# Patient Record
Sex: MALE | Race: WHITE | NOT HISPANIC OR LATINO | Employment: OTHER | URBAN - METROPOLITAN AREA
[De-identification: names, ages, dates, MRNs, and addresses within clinical notes are randomized per-mention and may not be internally consistent; named-entity substitution may affect disease eponyms.]

---

## 2017-01-25 ENCOUNTER — HOSPITAL ENCOUNTER (OUTPATIENT)
Dept: ORTHOPEDIC SURGERY | Facility: CLINIC | Age: 77
Discharge: HOME | End: 2017-01-25
Attending: ORTHOPAEDIC SURGERY | Admitting: ORTHOPAEDIC SURGERY

## 2017-01-25 ENCOUNTER — HOSPITAL ENCOUNTER (OUTPATIENT)
Dept: CT IMAGING | Facility: HOSPITAL | Age: 77
Discharge: HOME | End: 2017-01-25
Attending: ORTHOPAEDIC SURGERY | Admitting: ORTHOPAEDIC SURGERY

## 2017-02-07 ENCOUNTER — HOSPITAL ENCOUNTER (OUTPATIENT)
Dept: MRI IMAGING | Facility: HOSPITAL | Age: 77
Discharge: HOME | End: 2017-02-07
Attending: ORTHOPAEDIC SURGERY | Admitting: ORTHOPAEDIC SURGERY

## 2017-02-27 ENCOUNTER — HOSPITAL ENCOUNTER (OUTPATIENT)
Dept: PAIN MEDICINE | Facility: HOSPITAL | Age: 77
Discharge: HOME | End: 2017-02-27
Attending: PAIN MEDICINE | Admitting: PAIN MEDICINE

## 2017-03-13 ENCOUNTER — HOSPITAL ENCOUNTER (OUTPATIENT)
Dept: PAIN MEDICINE | Facility: HOSPITAL | Age: 77
Discharge: HOME | End: 2017-03-13
Attending: PAIN MEDICINE | Admitting: PAIN MEDICINE

## 2017-04-21 ENCOUNTER — HOSPITAL ENCOUNTER (OUTPATIENT)
Dept: CT IMAGING | Facility: HOSPITAL | Age: 77
Discharge: HOME | End: 2017-04-21
Attending: PHYSICIAN ASSISTANT | Admitting: PHYSICIAN ASSISTANT

## 2017-04-26 ENCOUNTER — HOSPITAL ENCOUNTER (OUTPATIENT)
Dept: PHYSICAL THERAPY | Facility: HOSPITAL | Age: 77
Setting detail: RECURRING SERIES
Discharge: HOME | End: 2017-07-03
Attending: PHYSICIAN ASSISTANT | Admitting: PHYSICIAN ASSISTANT

## 2017-06-13 ENCOUNTER — OFFICE (AMBULATORY)
Dept: URBAN - METROPOLITAN AREA CLINIC 64 | Facility: CLINIC | Age: 77
End: 2017-06-13

## 2017-06-13 VITALS
SYSTOLIC BLOOD PRESSURE: 109 MMHG | HEART RATE: 64 BPM | WEIGHT: 160 LBS | HEIGHT: 73 IN | DIASTOLIC BLOOD PRESSURE: 55 MMHG

## 2017-06-13 DIAGNOSIS — K59.00 CONSTIPATION, UNSPECIFIED: ICD-10-CM

## 2017-06-13 PROCEDURE — 99213 OFFICE O/P EST LOW 20 MIN: CPT | Performed by: INTERNAL MEDICINE

## 2017-11-07 ENCOUNTER — HOSPITAL ENCOUNTER (OUTPATIENT)
Dept: ORTHOPEDIC SURGERY | Facility: CLINIC | Age: 77
Discharge: HOME | End: 2017-11-07
Attending: PHYSICIAN ASSISTANT | Admitting: PHYSICIAN ASSISTANT

## 2017-12-04 ENCOUNTER — HOSPITAL ENCOUNTER (OUTPATIENT)
Dept: ORTHOPEDIC SURGERY | Facility: CLINIC | Age: 77
Discharge: HOME | End: 2017-12-04
Attending: ORTHOPAEDIC SURGERY | Admitting: ORTHOPAEDIC SURGERY

## 2017-12-13 ENCOUNTER — OFFICE (AMBULATORY)
Dept: URBAN - METROPOLITAN AREA CLINIC 64 | Facility: CLINIC | Age: 77
End: 2017-12-13

## 2017-12-13 VITALS
DIASTOLIC BLOOD PRESSURE: 71 MMHG | WEIGHT: 172 LBS | HEART RATE: 82 BPM | SYSTOLIC BLOOD PRESSURE: 106 MMHG | HEIGHT: 73 IN

## 2017-12-13 DIAGNOSIS — K59.00 CONSTIPATION, UNSPECIFIED: ICD-10-CM

## 2017-12-13 PROCEDURE — 99212 OFFICE O/P EST SF 10 MIN: CPT | Performed by: INTERNAL MEDICINE

## 2018-05-09 ENCOUNTER — HOSPITAL ENCOUNTER (OUTPATIENT)
Dept: ORTHOPEDIC SURGERY | Facility: CLINIC | Age: 78
Discharge: HOME | End: 2018-05-09
Attending: PHYSICIAN ASSISTANT | Admitting: PHYSICIAN ASSISTANT

## 2019-08-19 ENCOUNTER — OFFICE VISIT (OUTPATIENT)
Dept: ORTHOPEDIC SURGERY | Facility: CLINIC | Age: 79
End: 2019-08-19

## 2019-08-19 VITALS
DIASTOLIC BLOOD PRESSURE: 74 MMHG | SYSTOLIC BLOOD PRESSURE: 109 MMHG | BODY MASS INDEX: 22.48 KG/M2 | HEART RATE: 105 BPM | HEIGHT: 72 IN | WEIGHT: 166 LBS

## 2019-08-19 DIAGNOSIS — M25.511 RIGHT SHOULDER PAIN, UNSPECIFIED CHRONICITY: ICD-10-CM

## 2019-08-19 DIAGNOSIS — M25.512 ACUTE PAIN OF LEFT SHOULDER: Primary | ICD-10-CM

## 2019-08-19 PROCEDURE — 99213 OFFICE O/P EST LOW 20 MIN: CPT | Performed by: ORTHOPAEDIC SURGERY

## 2019-08-19 PROCEDURE — 20610 DRAIN/INJ JOINT/BURSA W/O US: CPT | Performed by: ORTHOPAEDIC SURGERY

## 2019-08-19 RX ORDER — GABAPENTIN 800 MG/1
TABLET ORAL EVERY 12 HOURS
COMMUNITY
Start: 2016-01-05 | End: 2020-04-15

## 2019-08-19 RX ORDER — SIMVASTATIN 40 MG
40 TABLET ORAL NIGHTLY
COMMUNITY
End: 2020-07-02

## 2019-08-19 RX ORDER — DICLOFENAC SODIUM 75 MG/1
75 TABLET, DELAYED RELEASE ORAL DAILY
COMMUNITY
Start: 2017-11-07 | End: 2020-04-15

## 2019-08-19 RX ORDER — TRIAMCINOLONE ACETONIDE 40 MG/ML
80 INJECTION, SUSPENSION INTRA-ARTICULAR; INTRAMUSCULAR ONCE
Status: COMPLETED | OUTPATIENT
Start: 2019-08-19 | End: 2019-08-19

## 2019-08-19 RX ORDER — MULTIVITAMIN
1 TABLET ORAL DAILY
COMMUNITY

## 2019-08-19 RX ORDER — BACLOFEN 20 MG/1
TABLET ORAL
Refills: 3 | COMMUNITY
Start: 2019-05-24 | End: 2020-04-15

## 2019-08-19 RX ORDER — TOPIRAMATE 100 MG/1
TABLET, FILM COATED ORAL
COMMUNITY
Start: 2016-01-05 | End: 2020-04-15

## 2019-08-19 RX ORDER — LANOLIN ALCOHOL/MO/W.PET/CERES
1000 CREAM (GRAM) TOPICAL DAILY
COMMUNITY
Start: 2018-11-15

## 2019-08-19 RX ORDER — FLUDROCORTISONE ACETATE 0.1 MG/1
0.2 TABLET ORAL EVERY MORNING
COMMUNITY
End: 2020-08-20

## 2019-08-19 RX ORDER — POTASSIUM CHLORIDE 20 MEQ/1
20 TABLET, EXTENDED RELEASE ORAL DAILY
COMMUNITY
End: 2020-04-15

## 2019-08-19 RX ADMIN — TRIAMCINOLONE ACETONIDE 80 MG: 40 INJECTION, SUSPENSION INTRA-ARTICULAR; INTRAMUSCULAR at 14:43

## 2019-08-19 NOTE — PROGRESS NOTES
"Note that injection was done in each shoulder for bilateral procedure     Patient ID: Shukri Park is a 78 y.o. male.  Bilateral shoulder pain  Right side injection in November starting to wear off, left shoulder is causing pain in the impingement area    Review of Systems:  Bilateral shoulder pain  Denies chest pain      Objective:    /74   Pulse 105   Ht 182.9 cm (72\")   Wt 75.3 kg (166 lb)   BMI 22.51 kg/m²     Physical Examination:     right shoulder demonstrates no scars with slight supraspinatus atrophy and pain in the impingement area.  Passive elevation is 150°, abduction 130°, external rotation 50°, internal rotation is the right hip. He has pain and weakness on [place], [place],   and supraspinatus testing.  Belly press and lift-off are 5/5 and 4/5.Sensory and motor exam are intact all distributions.  Radial pulse is palpable and capillary refill is less than two seconds to all digits    Imaging:   Well-maintained joint spaces    Assessment:    Bilateral shoulder pain    Plan:  Treatment options discussed  I recommend injection after todays evaluation.  Risks and benefits were discussed. Under sterile technique and written consent I injected 40mg of Kenalog and 1cc of 1% Lidocaine plain into the subacromial space. It was well tolerated.  "

## 2019-08-28 ENCOUNTER — TELEPHONE (OUTPATIENT)
Dept: ORTHOPEDIC SURGERY | Facility: CLINIC | Age: 79
End: 2019-08-28

## 2019-11-20 ENCOUNTER — HOSPITAL ENCOUNTER (EMERGENCY)
Facility: HOSPITAL | Age: 79
Discharge: HOME | End: 2019-11-20
Attending: EMERGENCY MEDICINE | Admitting: EMERGENCY MEDICINE

## 2019-11-20 ENCOUNTER — APPOINTMENT (OUTPATIENT)
Dept: CT IMAGING | Facility: HOSPITAL | Age: 79
End: 2019-11-20

## 2019-11-20 VITALS
RESPIRATION RATE: 17 BRPM | SYSTOLIC BLOOD PRESSURE: 117 MMHG | HEART RATE: 90 BPM | OXYGEN SATURATION: 99 % | HEIGHT: 73 IN | TEMPERATURE: 98.6 F | DIASTOLIC BLOOD PRESSURE: 65 MMHG | WEIGHT: 158.29 LBS | BODY MASS INDEX: 20.98 KG/M2
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
## 2019-11-20 DIAGNOSIS — S01.01XA LACERATION OF SCALP, INITIAL ENCOUNTER: Primary | ICD-10-CM

## 2019-11-20 PROCEDURE — 99283 EMERGENCY DEPT VISIT LOW MDM: CPT

## 2019-11-20 PROCEDURE — 70450 CT HEAD/BRAIN W/O DYE: CPT

## 2019-11-20 PROCEDURE — 72125 CT NECK SPINE W/O DYE: CPT

## 2020-01-16 ENCOUNTER — OFFICE VISIT (OUTPATIENT)
Dept: ORTHOPEDIC SURGERY | Facility: CLINIC | Age: 80
End: 2020-01-16

## 2020-01-16 VITALS
WEIGHT: 158 LBS | HEIGHT: 73 IN | BODY MASS INDEX: 20.94 KG/M2 | SYSTOLIC BLOOD PRESSURE: 92 MMHG | DIASTOLIC BLOOD PRESSURE: 59 MMHG | HEART RATE: 79 BPM

## 2020-01-16 DIAGNOSIS — M25.511 RIGHT SHOULDER PAIN, UNSPECIFIED CHRONICITY: Primary | ICD-10-CM

## 2020-01-16 DIAGNOSIS — M25.512 ACUTE PAIN OF LEFT SHOULDER: ICD-10-CM

## 2020-01-16 PROCEDURE — 99213 OFFICE O/P EST LOW 20 MIN: CPT | Performed by: ORTHOPAEDIC SURGERY

## 2020-01-16 PROCEDURE — 20610 DRAIN/INJ JOINT/BURSA W/O US: CPT | Performed by: ORTHOPAEDIC SURGERY

## 2020-01-16 RX ORDER — TRIAMCINOLONE ACETONIDE 40 MG/ML
80 INJECTION, SUSPENSION INTRA-ARTICULAR; INTRAMUSCULAR ONCE
Status: COMPLETED | OUTPATIENT
Start: 2020-01-16 | End: 2020-01-16

## 2020-01-16 RX ADMIN — TRIAMCINOLONE ACETONIDE 80 MG: 40 INJECTION, SUSPENSION INTRA-ARTICULAR; INTRAMUSCULAR at 14:54

## 2020-04-15 ENCOUNTER — OFFICE VISIT (OUTPATIENT)
Dept: ORTHOPEDIC SURGERY | Facility: CLINIC | Age: 80
End: 2020-04-15

## 2020-04-15 VITALS
HEIGHT: 73 IN | HEART RATE: 97 BPM | BODY MASS INDEX: 21.87 KG/M2 | TEMPERATURE: 97.5 F | SYSTOLIC BLOOD PRESSURE: 117 MMHG | DIASTOLIC BLOOD PRESSURE: 83 MMHG | WEIGHT: 165 LBS

## 2020-04-15 DIAGNOSIS — M25.511 RIGHT SHOULDER PAIN, UNSPECIFIED CHRONICITY: Primary | ICD-10-CM

## 2020-04-15 PROCEDURE — 20610 DRAIN/INJ JOINT/BURSA W/O US: CPT | Performed by: ORTHOPAEDIC SURGERY

## 2020-04-15 PROCEDURE — 99213 OFFICE O/P EST LOW 20 MIN: CPT | Performed by: ORTHOPAEDIC SURGERY

## 2020-04-15 RX ORDER — HYDROCODONE BITARTRATE AND ACETAMINOPHEN 7.5; 325 MG/1; MG/1
1 TABLET ORAL EVERY 6 HOURS PRN
COMMUNITY
Start: 2020-04-14 | End: 2021-01-07 | Stop reason: HOSPADM

## 2020-04-15 RX ORDER — POTASSIUM CHLORIDE 1500 MG/1
20 TABLET, FILM COATED, EXTENDED RELEASE ORAL 2 TIMES DAILY WITH MEALS
COMMUNITY
Start: 2020-03-14 | End: 2020-07-15 | Stop reason: HOSPADM

## 2020-04-15 RX ORDER — TRIAMCINOLONE ACETONIDE 40 MG/ML
80 INJECTION, SUSPENSION INTRA-ARTICULAR; INTRAMUSCULAR ONCE
Status: COMPLETED | OUTPATIENT
Start: 2020-04-15 | End: 2020-04-15

## 2020-04-15 RX ADMIN — TRIAMCINOLONE ACETONIDE 80 MG: 40 INJECTION, SUSPENSION INTRA-ARTICULAR; INTRAMUSCULAR at 14:10

## 2020-04-15 NOTE — PROGRESS NOTES
"     Patient ID: Shukri Park is a 79 y.o. male.  Right shoulder pain  Shukri is an 79-year-old gentleman here with right shoulder pain.  He has had previous injections most recently in January with good results.  He has pain and grinding in the shoulder worse with heavy activity    Review of Systems:  Right shoulder pain denies chest pain        Objective:    /83   Pulse 97   Temp 97.5 °F (36.4 °C) (Oral)   Ht 185.4 cm (73\")   Wt 74.8 kg (165 lb)   BMI 21.77 kg/m²     Physical Examination:       He is a pleasant male in no distress. He is alert and oriented x3 and appears his stated age.  Right shoulder demonstrates supraspinatus atrophy with pain in the impingement area.  Passive elevation 160 degrees abduction 120 degrees external rotation 30 degrees with pain and weakness on cuff testing.Sensory and motor exam are intact all distributions. Radial pulse is palpable and capillary refill is less than two seconds to all digits  Imaging:       Assessment:    Right shoulder degenerative joint disease    Plan:  Treatment options discussed  I recommend injection after todays evaluation.  Risks and benefits were discussed. Under sterile technique and written consent I injected 40mg of Kenalog and 1cc of 1% Lidocaine plain into the subacromial space. It was well tolerated.          Disclaimer: Please note that areas of this note were completed with computer voice recognition software.  Quite often unanticipated grammatical, syntax, homophones, and other interpretive errors are inadvertently transcribed by the computer software. Please excuse any errors that have escaped final proofreading.  "

## 2020-07-01 ENCOUNTER — APPOINTMENT (OUTPATIENT)
Dept: GENERAL RADIOLOGY | Facility: HOSPITAL | Age: 80
End: 2020-07-01

## 2020-07-01 ENCOUNTER — HOSPITAL ENCOUNTER (INPATIENT)
Facility: HOSPITAL | Age: 80
LOS: 13 days | Discharge: NURSING, REHAB OR LONG-TERM CARE | End: 2020-07-15
Attending: EMERGENCY MEDICINE | Admitting: INTERNAL MEDICINE

## 2020-07-01 DIAGNOSIS — I25.10 CORONARY ARTERY DISEASE INVOLVING NATIVE CORONARY ARTERY OF NATIVE HEART WITHOUT ANGINA PECTORIS: ICD-10-CM

## 2020-07-01 DIAGNOSIS — J18.9 PNEUMONIA OF BOTH LUNGS DUE TO INFECTIOUS ORGANISM, UNSPECIFIED PART OF LUNG: ICD-10-CM

## 2020-07-01 DIAGNOSIS — I48.21 PERMANENT ATRIAL FIBRILLATION (HCC): ICD-10-CM

## 2020-07-01 DIAGNOSIS — I48.11 LONGSTANDING PERSISTENT ATRIAL FIBRILLATION (HCC): ICD-10-CM

## 2020-07-01 DIAGNOSIS — I50.9 CONGESTIVE HEART FAILURE, UNSPECIFIED HF CHRONICITY, UNSPECIFIED HEART FAILURE TYPE (HCC): ICD-10-CM

## 2020-07-01 DIAGNOSIS — I50.22 CHRONIC SYSTOLIC CONGESTIVE HEART FAILURE (HCC): ICD-10-CM

## 2020-07-01 DIAGNOSIS — I48.19 PERSISTENT ATRIAL FIBRILLATION (HCC): ICD-10-CM

## 2020-07-01 DIAGNOSIS — I25.5 ISCHEMIC CARDIOMYOPATHY: ICD-10-CM

## 2020-07-01 DIAGNOSIS — I48.91 ATRIAL FIBRILLATION WITH RAPID VENTRICULAR RESPONSE (HCC): Primary | ICD-10-CM

## 2020-07-01 DIAGNOSIS — R19.7 NAUSEA VOMITING AND DIARRHEA: ICD-10-CM

## 2020-07-01 DIAGNOSIS — R11.2 NAUSEA VOMITING AND DIARRHEA: ICD-10-CM

## 2020-07-01 LAB — D-LACTATE SERPL-SCNC: 2.5 MMOL/L (ref 0.5–2)

## 2020-07-01 PROCEDURE — 86140 C-REACTIVE PROTEIN: CPT | Performed by: EMERGENCY MEDICINE

## 2020-07-01 PROCEDURE — 99284 EMERGENCY DEPT VISIT MOD MDM: CPT

## 2020-07-01 PROCEDURE — 83690 ASSAY OF LIPASE: CPT | Performed by: EMERGENCY MEDICINE

## 2020-07-01 PROCEDURE — 83615 LACTATE (LD) (LDH) ENZYME: CPT | Performed by: EMERGENCY MEDICINE

## 2020-07-01 PROCEDURE — 83880 ASSAY OF NATRIURETIC PEPTIDE: CPT | Performed by: EMERGENCY MEDICINE

## 2020-07-01 PROCEDURE — 93005 ELECTROCARDIOGRAM TRACING: CPT | Performed by: EMERGENCY MEDICINE

## 2020-07-01 PROCEDURE — 84484 ASSAY OF TROPONIN QUANT: CPT | Performed by: EMERGENCY MEDICINE

## 2020-07-01 PROCEDURE — 80053 COMPREHEN METABOLIC PANEL: CPT | Performed by: EMERGENCY MEDICINE

## 2020-07-01 PROCEDURE — 85025 COMPLETE CBC W/AUTO DIFF WBC: CPT | Performed by: EMERGENCY MEDICINE

## 2020-07-01 PROCEDURE — 87040 BLOOD CULTURE FOR BACTERIA: CPT | Performed by: EMERGENCY MEDICINE

## 2020-07-01 PROCEDURE — 71045 X-RAY EXAM CHEST 1 VIEW: CPT

## 2020-07-01 PROCEDURE — 83735 ASSAY OF MAGNESIUM: CPT | Performed by: EMERGENCY MEDICINE

## 2020-07-01 PROCEDURE — 83605 ASSAY OF LACTIC ACID: CPT

## 2020-07-01 PROCEDURE — 84145 PROCALCITONIN (PCT): CPT | Performed by: EMERGENCY MEDICINE

## 2020-07-01 RX ORDER — ONDANSETRON 2 MG/ML
4 INJECTION INTRAMUSCULAR; INTRAVENOUS ONCE
Status: COMPLETED | OUTPATIENT
Start: 2020-07-01 | End: 2020-07-02

## 2020-07-01 RX ORDER — DILTIAZEM HYDROCHLORIDE 5 MG/ML
5 INJECTION INTRAVENOUS ONCE
Status: COMPLETED | OUTPATIENT
Start: 2020-07-01 | End: 2020-07-02

## 2020-07-01 RX ORDER — SODIUM CHLORIDE 0.9 % (FLUSH) 0.9 %
10 SYRINGE (ML) INJECTION AS NEEDED
Status: DISCONTINUED | OUTPATIENT
Start: 2020-07-01 | End: 2020-07-15 | Stop reason: HOSPADM

## 2020-07-02 ENCOUNTER — APPOINTMENT (OUTPATIENT)
Dept: CT IMAGING | Facility: HOSPITAL | Age: 80
End: 2020-07-02

## 2020-07-02 ENCOUNTER — APPOINTMENT (OUTPATIENT)
Dept: CARDIOLOGY | Facility: HOSPITAL | Age: 80
End: 2020-07-02

## 2020-07-02 PROBLEM — A41.9 SEPSIS (HCC): Status: ACTIVE | Noted: 2020-07-02

## 2020-07-02 PROBLEM — G89.29 CHRONIC PAIN: Chronic | Status: ACTIVE | Noted: 2020-07-02

## 2020-07-02 PROBLEM — I48.91 ATRIAL FIBRILLATION WITH RAPID VENTRICULAR RESPONSE: Status: ACTIVE | Noted: 2020-07-02

## 2020-07-02 LAB
ALBUMIN SERPL-MCNC: 3.5 G/DL (ref 3.5–5.2)
ALBUMIN/GLOB SERPL: 1 G/DL
ALP SERPL-CCNC: 54 U/L (ref 39–117)
ALT SERPL W P-5'-P-CCNC: 15 U/L (ref 1–41)
ANION GAP SERPL CALCULATED.3IONS-SCNC: 19 MMOL/L (ref 5–15)
ANION GAP SERPL CALCULATED.3IONS-SCNC: 19 MMOL/L (ref 5–15)
ARTERIAL PATENCY WRIST A: POSITIVE
AST SERPL-CCNC: 31 U/L (ref 1–40)
ATMOSPHERIC PRESS: ABNORMAL MM[HG]
B PERT DNA SPEC QL NAA+PROBE: NOT DETECTED
BASE EXCESS BLDA CALC-SCNC: 3.9 MMOL/L (ref 0–3)
BASOPHILS # BLD AUTO: 0 10*3/MM3 (ref 0–0.2)
BASOPHILS # BLD AUTO: 0.1 10*3/MM3 (ref 0–0.2)
BASOPHILS NFR BLD AUTO: 0.5 % (ref 0–1.5)
BASOPHILS NFR BLD AUTO: 0.6 % (ref 0–1.5)
BDY SITE: ABNORMAL
BH CV ECHO MEAS - ACS: 2.1 CM
BH CV ECHO MEAS - AO MAX PG (FULL): -0.26 MMHG
BH CV ECHO MEAS - AO MAX PG: 2.5 MMHG
BH CV ECHO MEAS - AO MEAN PG (FULL): 0.39 MMHG
BH CV ECHO MEAS - AO MEAN PG: 1.7 MMHG
BH CV ECHO MEAS - AO ROOT AREA (BSA CORRECTED): 2.1
BH CV ECHO MEAS - AO ROOT AREA: 12.8 CM^2
BH CV ECHO MEAS - AO ROOT DIAM: 4 CM
BH CV ECHO MEAS - AO V2 MAX: 78.3 CM/SEC
BH CV ECHO MEAS - AO V2 MEAN: 61.8 CM/SEC
BH CV ECHO MEAS - AO V2 VTI: 11.3 CM
BH CV ECHO MEAS - AORTIC HR: 151.6 BPM
BH CV ECHO MEAS - AORTIC R-R: 0.4 SEC
BH CV ECHO MEAS - ASC AORTA: 3.7 CM
BH CV ECHO MEAS - AVA(I,A): 5.7 CM^2
BH CV ECHO MEAS - AVA(I,D): 5.7 CM^2
BH CV ECHO MEAS - AVA(V,A): 5.3 CM^2
BH CV ECHO MEAS - AVA(V,D): 5.3 CM^2
BH CV ECHO MEAS - BSA(HAYCOCK): 1.9 M^2
BH CV ECHO MEAS - BSA: 1.9 M^2
BH CV ECHO MEAS - BZI_BMI: 18.7 KILOGRAMS/M^2
BH CV ECHO MEAS - BZI_METRIC_HEIGHT: 190.5 CM
BH CV ECHO MEAS - BZI_METRIC_WEIGHT: 68 KG
BH CV ECHO MEAS - CI(AO): 11.3 L/MIN/M^2
BH CV ECHO MEAS - CI(LVOT): 5 L/MIN/M^2
BH CV ECHO MEAS - CO(AO): 22 L/MIN
BH CV ECHO MEAS - CO(LVOT): 9.8 L/MIN
BH CV ECHO MEAS - EDV(CUBED): 179 ML
BH CV ECHO MEAS - EDV(MOD-SP4): 68.5 ML
BH CV ECHO MEAS - EDV(TEICH): 155.9 ML
BH CV ECHO MEAS - EF(CUBED): 44.1 %
BH CV ECHO MEAS - EF(MOD-BP): 27 %
BH CV ECHO MEAS - EF(MOD-SP4): 26.8 %
BH CV ECHO MEAS - EF(TEICH): 36.2 %
BH CV ECHO MEAS - ESV(CUBED): 100.1 ML
BH CV ECHO MEAS - ESV(MOD-SP4): 50.1 ML
BH CV ECHO MEAS - ESV(TEICH): 99.5 ML
BH CV ECHO MEAS - FS: 17.6 %
BH CV ECHO MEAS - IVS/LVPW: 0.84
BH CV ECHO MEAS - IVSD: 1.1 CM
BH CV ECHO MEAS - LA DIMENSION(2D): 4.6 CM
BH CV ECHO MEAS - LV DIASTOLIC VOL/BSA (35-75): 35.3 ML/M^2
BH CV ECHO MEAS - LV MASS(C)D: 276.4 GRAMS
BH CV ECHO MEAS - LV MASS(C)DI: 142.4 GRAMS/M^2
BH CV ECHO MEAS - LV MAX PG: 2.7 MMHG
BH CV ECHO MEAS - LV MEAN PG: 1.3 MMHG
BH CV ECHO MEAS - LV SYSTOLIC VOL/BSA (12-30): 25.8 ML/M^2
BH CV ECHO MEAS - LV V1 MAX: 82.3 CM/SEC
BH CV ECHO MEAS - LV V1 MEAN: 53 CM/SEC
BH CV ECHO MEAS - LV V1 VTI: 12.7 CM
BH CV ECHO MEAS - LVIDD: 5.6 CM
BH CV ECHO MEAS - LVIDS: 4.6 CM
BH CV ECHO MEAS - LVOT AREA: 5.1 CM^2
BH CV ECHO MEAS - LVOT DIAM: 2.5 CM
BH CV ECHO MEAS - LVPWD: 1.3 CM
BH CV ECHO MEAS - MR MAX PG: 82.4 MMHG
BH CV ECHO MEAS - MR MAX VEL: 453.8 CM/SEC
BH CV ECHO MEAS - MV MAX PG: 3.3 MMHG
BH CV ECHO MEAS - MV MEAN PG: 1.8 MMHG
BH CV ECHO MEAS - MV V2 MAX: 90.3 CM/SEC
BH CV ECHO MEAS - MV V2 MEAN: 63.4 CM/SEC
BH CV ECHO MEAS - MV V2 VTI: 9.6 CM
BH CV ECHO MEAS - MVA(VTI): 6.7 CM^2
BH CV ECHO MEAS - PA ACC TIME: 0.06 SEC
BH CV ECHO MEAS - PA MAX PG (FULL): 0.22 MMHG
BH CV ECHO MEAS - PA MAX PG: 2.4 MMHG
BH CV ECHO MEAS - PA MEAN PG (FULL): 0.15 MMHG
BH CV ECHO MEAS - PA MEAN PG: 1.2 MMHG
BH CV ECHO MEAS - PA PR(ACCEL): 49.9 MMHG
BH CV ECHO MEAS - PA V2 MAX: 77.1 CM/SEC
BH CV ECHO MEAS - PA V2 MEAN: 50.6 CM/SEC
BH CV ECHO MEAS - PA V2 VTI: 9.1 CM
BH CV ECHO MEAS - PVA(I,A): 4.7 CM^2
BH CV ECHO MEAS - PVA(I,D): 4.7 CM^2
BH CV ECHO MEAS - PVA(V,A): 3.4 CM^2
BH CV ECHO MEAS - PVA(V,D): 3.4 CM^2
BH CV ECHO MEAS - QP/QS: 0.66
BH CV ECHO MEAS - RAP SYSTOLE: 3 MMHG
BH CV ECHO MEAS - RV MAX PG: 2.2 MMHG
BH CV ECHO MEAS - RV MEAN PG: 1.1 MMHG
BH CV ECHO MEAS - RV V1 MAX: 73.4 CM/SEC
BH CV ECHO MEAS - RV V1 MEAN: 46.7 CM/SEC
BH CV ECHO MEAS - RV V1 VTI: 11.9 CM
BH CV ECHO MEAS - RVDD: 2.1 CM
BH CV ECHO MEAS - RVOT AREA: 3.6 CM^2
BH CV ECHO MEAS - RVOT DIAM: 2.1 CM
BH CV ECHO MEAS - RVSP: 35.7 MMHG
BH CV ECHO MEAS - SI(AO): 74.6 ML/M^2
BH CV ECHO MEAS - SI(CUBED): 40.6 ML/M^2
BH CV ECHO MEAS - SI(LVOT): 33.3 ML/M^2
BH CV ECHO MEAS - SI(MOD-SP4): 9.5 ML/M^2
BH CV ECHO MEAS - SI(TEICH): 29.1 ML/M^2
BH CV ECHO MEAS - SV(AO): 144.9 ML
BH CV ECHO MEAS - SV(CUBED): 78.9 ML
BH CV ECHO MEAS - SV(LVOT): 64.6 ML
BH CV ECHO MEAS - SV(MOD-SP4): 18.4 ML
BH CV ECHO MEAS - SV(RVOT): 42.6 ML
BH CV ECHO MEAS - SV(TEICH): 56.4 ML
BH CV ECHO MEAS - TR MAX VEL: 285.8 CM/SEC
BILIRUB SERPL-MCNC: 1.5 MG/DL (ref 0.2–1.2)
BILIRUB UR QL STRIP: NEGATIVE
BUN SERPL-MCNC: 22 MG/DL (ref 8–23)
BUN SERPL-MCNC: 23 MG/DL (ref 8–23)
BUN SERPL-MCNC: ABNORMAL MG/DL
BUN SERPL-MCNC: ABNORMAL MG/DL
BUN/CREAT SERPL: ABNORMAL
BUN/CREAT SERPL: ABNORMAL
C PNEUM DNA NPH QL NAA+NON-PROBE: NOT DETECTED
CALCIUM SPEC-SCNC: 8.8 MG/DL (ref 8.6–10.5)
CALCIUM SPEC-SCNC: 9.1 MG/DL (ref 8.6–10.5)
CHLORIDE SERPL-SCNC: 104 MMOL/L (ref 98–107)
CHLORIDE SERPL-SCNC: 104 MMOL/L (ref 98–107)
CLARITY UR: CLEAR
CO2 BLDA-SCNC: 27.2 MMOL/L (ref 22–29)
CO2 SERPL-SCNC: 22 MMOL/L (ref 22–29)
CO2 SERPL-SCNC: 23 MMOL/L (ref 22–29)
COLOR UR: YELLOW
CREAT SERPL-MCNC: 0.69 MG/DL (ref 0.76–1.27)
CREAT SERPL-MCNC: 0.8 MG/DL (ref 0.76–1.27)
CRP SERPL-MCNC: 28.6 MG/DL (ref 0–0.5)
D-LACTATE SERPL-SCNC: 1.8 MMOL/L (ref 0.5–2)
D-LACTATE SERPL-SCNC: 5.1 MMOL/L (ref 0.5–2)
DEPRECATED RDW RBC AUTO: 47.3 FL (ref 37–54)
DEPRECATED RDW RBC AUTO: 47.3 FL (ref 37–54)
EOSINOPHIL # BLD AUTO: 0 10*3/MM3 (ref 0–0.4)
EOSINOPHIL # BLD AUTO: 0 10*3/MM3 (ref 0–0.4)
EOSINOPHIL NFR BLD AUTO: 0 % (ref 0.3–6.2)
EOSINOPHIL NFR BLD AUTO: 0.2 % (ref 0.3–6.2)
ERYTHROCYTE [DISTWIDTH] IN BLOOD BY AUTOMATED COUNT: 14.8 % (ref 12.3–15.4)
ERYTHROCYTE [DISTWIDTH] IN BLOOD BY AUTOMATED COUNT: 14.8 % (ref 12.3–15.4)
FLUAV H1 2009 PAND RNA NPH QL NAA+PROBE: NOT DETECTED
FLUAV H1 HA GENE NPH QL NAA+PROBE: NOT DETECTED
FLUAV H3 RNA NPH QL NAA+PROBE: NOT DETECTED
FLUAV SUBTYP SPEC NAA+PROBE: NOT DETECTED
FLUBV RNA ISLT QL NAA+PROBE: NOT DETECTED
GFR SERPL CREATININE-BSD FRML MDRD: 110 ML/MIN/1.73
GFR SERPL CREATININE-BSD FRML MDRD: 93 ML/MIN/1.73
GLOBULIN UR ELPH-MCNC: 3.4 GM/DL
GLUCOSE SERPL-MCNC: 119 MG/DL (ref 65–99)
GLUCOSE SERPL-MCNC: 172 MG/DL (ref 65–99)
GLUCOSE UR STRIP-MCNC: NEGATIVE MG/DL
HADV DNA SPEC NAA+PROBE: NOT DETECTED
HCO3 BLDA-SCNC: 26.2 MMOL/L (ref 21–28)
HCOV 229E RNA SPEC QL NAA+PROBE: NOT DETECTED
HCOV HKU1 RNA SPEC QL NAA+PROBE: NOT DETECTED
HCOV NL63 RNA SPEC QL NAA+PROBE: NOT DETECTED
HCOV OC43 RNA SPEC QL NAA+PROBE: NOT DETECTED
HCT VFR BLD AUTO: 34.3 % (ref 37.5–51)
HCT VFR BLD AUTO: 35.5 % (ref 37.5–51)
HEMODILUTION: NO
HGB BLD-MCNC: 11.6 G/DL (ref 13–17.7)
HGB BLD-MCNC: 11.7 G/DL (ref 13–17.7)
HGB UR QL STRIP.AUTO: NEGATIVE
HMPV RNA NPH QL NAA+NON-PROBE: NOT DETECTED
HOLD SPECIMEN: NORMAL
HPIV1 RNA SPEC QL NAA+PROBE: NOT DETECTED
HPIV2 RNA SPEC QL NAA+PROBE: NOT DETECTED
HPIV3 RNA NPH QL NAA+PROBE: NOT DETECTED
HPIV4 P GENE NPH QL NAA+PROBE: NOT DETECTED
INHALED O2 CONCENTRATION: <21 %
KETONES UR QL STRIP: NEGATIVE
LACTATE HOLD SPECIMEN: NORMAL
LDH SERPL-CCNC: 337 U/L (ref 135–225)
LEUKOCYTE ESTERASE UR QL STRIP.AUTO: NEGATIVE
LIPASE SERPL-CCNC: 13 U/L (ref 13–60)
LYMPHOCYTES # BLD AUTO: 0.6 10*3/MM3 (ref 0.7–3.1)
LYMPHOCYTES # BLD AUTO: 1.3 10*3/MM3 (ref 0.7–3.1)
LYMPHOCYTES NFR BLD AUTO: 13.1 % (ref 19.6–45.3)
LYMPHOCYTES NFR BLD AUTO: 7.9 % (ref 19.6–45.3)
M PNEUMO IGG SER IA-ACNC: NOT DETECTED
MAGNESIUM SERPL-MCNC: 2 MG/DL (ref 1.6–2.4)
MAGNESIUM SERPL-MCNC: 2.4 MG/DL (ref 1.6–2.4)
MCH RBC QN AUTO: 30.2 PG (ref 26.6–33)
MCH RBC QN AUTO: 30.4 PG (ref 26.6–33)
MCHC RBC AUTO-ENTMCNC: 32.9 G/DL (ref 31.5–35.7)
MCHC RBC AUTO-ENTMCNC: 33.8 G/DL (ref 31.5–35.7)
MCV RBC AUTO: 89.9 FL (ref 79–97)
MCV RBC AUTO: 91.7 FL (ref 79–97)
MODALITY: ABNORMAL
MONOCYTES # BLD AUTO: 0.4 10*3/MM3 (ref 0.1–0.9)
MONOCYTES # BLD AUTO: 1 10*3/MM3 (ref 0.1–0.9)
MONOCYTES NFR BLD AUTO: 10.1 % (ref 5–12)
MONOCYTES NFR BLD AUTO: 5.1 % (ref 5–12)
NEUTROPHILS NFR BLD AUTO: 6.5 10*3/MM3 (ref 1.7–7)
NEUTROPHILS NFR BLD AUTO: 7.3 10*3/MM3 (ref 1.7–7)
NEUTROPHILS NFR BLD AUTO: 76.1 % (ref 42.7–76)
NEUTROPHILS NFR BLD AUTO: 86.4 % (ref 42.7–76)
NITRITE UR QL STRIP: NEGATIVE
NRBC BLD AUTO-RTO: 0 /100 WBC (ref 0–0.2)
NRBC BLD AUTO-RTO: 0.1 /100 WBC (ref 0–0.2)
NT-PROBNP SERPL-MCNC: 3485 PG/ML (ref 5–1800)
PCO2 BLDA: 31.3 MM HG (ref 35–48)
PH BLDA: 7.53 PH UNITS (ref 7.35–7.45)
PH UR STRIP.AUTO: <=5 [PH] (ref 5–8)
PLATELET # BLD AUTO: 227 10*3/MM3 (ref 140–450)
PLATELET # BLD AUTO: 253 10*3/MM3 (ref 140–450)
PMV BLD AUTO: 8.3 FL (ref 6–12)
PMV BLD AUTO: 8.6 FL (ref 6–12)
PO2 BLDA: 79.4 MM HG (ref 83–108)
POTASSIUM SERPL-SCNC: 3.5 MMOL/L (ref 3.5–5.2)
POTASSIUM SERPL-SCNC: 4.1 MMOL/L (ref 3.5–5.2)
PROCALCITONIN SERPL-MCNC: 0.11 NG/ML (ref 0.1–0.25)
PROT SERPL-MCNC: 6.9 G/DL (ref 6–8.5)
PROT UR QL STRIP: NEGATIVE
RBC # BLD AUTO: 3.82 10*6/MM3 (ref 4.14–5.8)
RBC # BLD AUTO: 3.87 10*6/MM3 (ref 4.14–5.8)
RHINOVIRUS RNA SPEC NAA+PROBE: NOT DETECTED
RSV RNA NPH QL NAA+NON-PROBE: NOT DETECTED
SAO2 % BLDCOA: 97.1 % (ref 94–98)
SARS-COV-2 RNA PNL SPEC NAA+PROBE: NOT DETECTED
SARS-COV-2 RNA PNL SPEC NAA+PROBE: NOT DETECTED
SODIUM SERPL-SCNC: 145 MMOL/L (ref 136–145)
SODIUM SERPL-SCNC: 146 MMOL/L (ref 136–145)
SP GR UR STRIP: 1.01 (ref 1–1.03)
TROPONIN T SERPL-MCNC: 0.02 NG/ML (ref 0–0.03)
TROPONIN T SERPL-MCNC: <0.01 NG/ML (ref 0–0.03)
TROPONIN T SERPL-MCNC: <0.01 NG/ML (ref 0–0.03)
TSH SERPL DL<=0.05 MIU/L-ACNC: 0.52 UIU/ML (ref 0.27–4.2)
UROBILINOGEN UR QL STRIP: NORMAL
VENTILATOR MODE: 3
WBC # BLD AUTO: 7.5 10*3/MM3 (ref 3.4–10.8)
WBC # BLD AUTO: 9.6 10*3/MM3 (ref 3.4–10.8)
WHOLE BLOOD HOLD SPECIMEN: NORMAL

## 2020-07-02 PROCEDURE — 93306 TTE W/DOPPLER COMPLETE: CPT

## 2020-07-02 PROCEDURE — 25010000002 FUROSEMIDE PER 20 MG: Performed by: NURSE PRACTITIONER

## 2020-07-02 PROCEDURE — 80048 BASIC METABOLIC PNL TOTAL CA: CPT | Performed by: NURSE PRACTITIONER

## 2020-07-02 PROCEDURE — 87635 SARS-COV-2 COVID-19 AMP PRB: CPT | Performed by: INTERNAL MEDICINE

## 2020-07-02 PROCEDURE — 25010000002 HYDROCORTISONE SODIUM SUCCINATE 100 MG RECONSTITUTED SOLUTION: Performed by: INTERNAL MEDICINE

## 2020-07-02 PROCEDURE — 71250 CT THORAX DX C-: CPT

## 2020-07-02 PROCEDURE — 25010000002 DROPERIDOL PER 5 MG: Performed by: EMERGENCY MEDICINE

## 2020-07-02 PROCEDURE — 36600 WITHDRAWAL OF ARTERIAL BLOOD: CPT

## 2020-07-02 PROCEDURE — 25010000002 METHYLPREDNISOLONE PER 40 MG: Performed by: INTERNAL MEDICINE

## 2020-07-02 PROCEDURE — 83605 ASSAY OF LACTIC ACID: CPT | Performed by: INTERNAL MEDICINE

## 2020-07-02 PROCEDURE — 74176 CT ABD & PELVIS W/O CONTRAST: CPT

## 2020-07-02 PROCEDURE — 83605 ASSAY OF LACTIC ACID: CPT

## 2020-07-02 PROCEDURE — 25010000002 PIPERACILLIN SOD-TAZOBACTAM PER 1 G: Performed by: EMERGENCY MEDICINE

## 2020-07-02 PROCEDURE — 25010000002 FUROSEMIDE PER 20 MG: Performed by: EMERGENCY MEDICINE

## 2020-07-02 PROCEDURE — 99223 1ST HOSP IP/OBS HIGH 75: CPT | Performed by: INTERNAL MEDICINE

## 2020-07-02 PROCEDURE — 25010000002 PIPERACILLIN SOD-TAZOBACTAM PER 1 G: Performed by: NURSE PRACTITIONER

## 2020-07-02 PROCEDURE — 83735 ASSAY OF MAGNESIUM: CPT | Performed by: NURSE PRACTITIONER

## 2020-07-02 PROCEDURE — 93306 TTE W/DOPPLER COMPLETE: CPT | Performed by: INTERNAL MEDICINE

## 2020-07-02 PROCEDURE — 87040 BLOOD CULTURE FOR BACTERIA: CPT | Performed by: EMERGENCY MEDICINE

## 2020-07-02 PROCEDURE — 82803 BLOOD GASES ANY COMBINATION: CPT

## 2020-07-02 PROCEDURE — 87635 SARS-COV-2 COVID-19 AMP PRB: CPT | Performed by: EMERGENCY MEDICINE

## 2020-07-02 PROCEDURE — 94640 AIRWAY INHALATION TREATMENT: CPT

## 2020-07-02 PROCEDURE — 99222 1ST HOSP IP/OBS MODERATE 55: CPT | Performed by: INTERNAL MEDICINE

## 2020-07-02 PROCEDURE — 81003 URINALYSIS AUTO W/O SCOPE: CPT | Performed by: NURSE PRACTITIONER

## 2020-07-02 PROCEDURE — 84484 ASSAY OF TROPONIN QUANT: CPT | Performed by: NURSE PRACTITIONER

## 2020-07-02 PROCEDURE — 85025 COMPLETE CBC W/AUTO DIFF WBC: CPT | Performed by: NURSE PRACTITIONER

## 2020-07-02 PROCEDURE — 84443 ASSAY THYROID STIM HORMONE: CPT | Performed by: NURSE PRACTITIONER

## 2020-07-02 PROCEDURE — 94799 UNLISTED PULMONARY SVC/PX: CPT

## 2020-07-02 PROCEDURE — 0099U HC BIOFIRE FILMARRAY RESP PANEL 1: CPT | Performed by: EMERGENCY MEDICINE

## 2020-07-02 PROCEDURE — 25010000002 DIGOXIN PER 500 MCG: Performed by: INTERNAL MEDICINE

## 2020-07-02 PROCEDURE — 70486 CT MAXILLOFACIAL W/O DYE: CPT

## 2020-07-02 PROCEDURE — 25010000002 ONDANSETRON PER 1 MG: Performed by: EMERGENCY MEDICINE

## 2020-07-02 RX ORDER — POTASSIUM CHLORIDE 20 MEQ/1
20 TABLET, EXTENDED RELEASE ORAL DAILY
Status: DISCONTINUED | OUTPATIENT
Start: 2020-07-02 | End: 2020-07-08

## 2020-07-02 RX ORDER — FUROSEMIDE 10 MG/ML
40 INJECTION INTRAMUSCULAR; INTRAVENOUS ONCE
Status: COMPLETED | OUTPATIENT
Start: 2020-07-02 | End: 2020-07-02

## 2020-07-02 RX ORDER — FLUDROCORTISONE ACETATE 0.1 MG/1
0.2 TABLET ORAL DAILY
Status: DISCONTINUED | OUTPATIENT
Start: 2020-07-02 | End: 2020-07-05

## 2020-07-02 RX ORDER — FLUDROCORTISONE ACETATE 0.1 MG/1
0.1 TABLET ORAL EVERY EVENING
COMMUNITY
End: 2020-08-12

## 2020-07-02 RX ORDER — BISACODYL 5 MG/1
5 TABLET, DELAYED RELEASE ORAL DAILY PRN
Status: DISCONTINUED | OUTPATIENT
Start: 2020-07-02 | End: 2020-07-15 | Stop reason: HOSPADM

## 2020-07-02 RX ORDER — HYDROCODONE BITARTRATE AND ACETAMINOPHEN 7.5; 325 MG/1; MG/1
1 TABLET ORAL EVERY 6 HOURS PRN
Status: DISCONTINUED | OUTPATIENT
Start: 2020-07-02 | End: 2020-07-15 | Stop reason: HOSPADM

## 2020-07-02 RX ORDER — SODIUM CHLORIDE 0.9 % (FLUSH) 0.9 %
10 SYRINGE (ML) INJECTION EVERY 12 HOURS SCHEDULED
Status: DISCONTINUED | OUTPATIENT
Start: 2020-07-02 | End: 2020-07-08

## 2020-07-02 RX ORDER — ALUMINA, MAGNESIA, AND SIMETHICONE 2400; 2400; 240 MG/30ML; MG/30ML; MG/30ML
15 SUSPENSION ORAL EVERY 6 HOURS PRN
Status: DISCONTINUED | OUTPATIENT
Start: 2020-07-02 | End: 2020-07-15 | Stop reason: HOSPADM

## 2020-07-02 RX ORDER — LANOLIN ALCOHOL/MO/W.PET/CERES
1000 CREAM (GRAM) TOPICAL DAILY
Status: DISCONTINUED | OUTPATIENT
Start: 2020-07-02 | End: 2020-07-15 | Stop reason: HOSPADM

## 2020-07-02 RX ORDER — SIMVASTATIN 40 MG
80 TABLET ORAL NIGHTLY
COMMUNITY

## 2020-07-02 RX ORDER — ONDANSETRON 4 MG/1
4 TABLET, FILM COATED ORAL EVERY 6 HOURS PRN
Status: DISCONTINUED | OUTPATIENT
Start: 2020-07-02 | End: 2020-07-03

## 2020-07-02 RX ORDER — MULTIVITAMIN,THERAPEUTIC
1 TABLET ORAL DAILY
Status: DISCONTINUED | OUTPATIENT
Start: 2020-07-02 | End: 2020-07-15 | Stop reason: HOSPADM

## 2020-07-02 RX ORDER — IPRATROPIUM BROMIDE AND ALBUTEROL SULFATE 2.5; .5 MG/3ML; MG/3ML
3 SOLUTION RESPIRATORY (INHALATION)
Status: DISCONTINUED | OUTPATIENT
Start: 2020-07-02 | End: 2020-07-06

## 2020-07-02 RX ORDER — ACETAMINOPHEN 160 MG/5ML
650 SOLUTION ORAL EVERY 4 HOURS PRN
Status: DISCONTINUED | OUTPATIENT
Start: 2020-07-02 | End: 2020-07-15 | Stop reason: HOSPADM

## 2020-07-02 RX ORDER — ACETAMINOPHEN 325 MG/1
650 TABLET ORAL EVERY 4 HOURS PRN
Status: DISCONTINUED | OUTPATIENT
Start: 2020-07-02 | End: 2020-07-15 | Stop reason: HOSPADM

## 2020-07-02 RX ORDER — ATORVASTATIN CALCIUM 20 MG/1
20 TABLET, FILM COATED ORAL DAILY
Status: DISCONTINUED | OUTPATIENT
Start: 2020-07-02 | End: 2020-07-15 | Stop reason: HOSPADM

## 2020-07-02 RX ORDER — ACETAMINOPHEN 650 MG/1
650 SUPPOSITORY RECTAL EVERY 4 HOURS PRN
Status: DISCONTINUED | OUTPATIENT
Start: 2020-07-02 | End: 2020-07-15 | Stop reason: HOSPADM

## 2020-07-02 RX ORDER — DIGOXIN 125 MCG
125 TABLET ORAL
Status: DISCONTINUED | OUTPATIENT
Start: 2020-07-02 | End: 2020-07-03

## 2020-07-02 RX ORDER — DROPERIDOL 2.5 MG/ML
1.25 INJECTION, SOLUTION INTRAMUSCULAR; INTRAVENOUS ONCE
Status: COMPLETED | OUTPATIENT
Start: 2020-07-02 | End: 2020-07-02

## 2020-07-02 RX ORDER — CHOLECALCIFEROL (VITAMIN D3) 125 MCG
5 CAPSULE ORAL NIGHTLY PRN
Status: DISCONTINUED | OUTPATIENT
Start: 2020-07-02 | End: 2020-07-15 | Stop reason: HOSPADM

## 2020-07-02 RX ORDER — BUDESONIDE 0.5 MG/2ML
0.5 INHALANT ORAL
Status: DISCONTINUED | OUTPATIENT
Start: 2020-07-02 | End: 2020-07-06

## 2020-07-02 RX ORDER — ONDANSETRON 2 MG/ML
4 INJECTION INTRAMUSCULAR; INTRAVENOUS EVERY 6 HOURS PRN
Status: DISCONTINUED | OUTPATIENT
Start: 2020-07-02 | End: 2020-07-03

## 2020-07-02 RX ORDER — FLUDROCORTISONE ACETATE 0.1 MG/1
0.1 TABLET ORAL EVERY EVENING
Status: DISCONTINUED | OUTPATIENT
Start: 2020-07-02 | End: 2020-07-15 | Stop reason: HOSPADM

## 2020-07-02 RX ORDER — DILTIAZEM HYDROCHLORIDE 5 MG/ML
10 INJECTION INTRAVENOUS ONCE
Status: COMPLETED | OUTPATIENT
Start: 2020-07-02 | End: 2020-07-02

## 2020-07-02 RX ORDER — DILTIAZEM HYDROCHLORIDE 5 MG/ML
5 INJECTION INTRAVENOUS ONCE
Status: COMPLETED | OUTPATIENT
Start: 2020-07-02 | End: 2020-07-02

## 2020-07-02 RX ORDER — FUROSEMIDE 40 MG/1
40 TABLET ORAL DAILY
Status: DISCONTINUED | OUTPATIENT
Start: 2020-07-03 | End: 2020-07-08

## 2020-07-02 RX ORDER — METHYLPREDNISOLONE SODIUM SUCCINATE 40 MG/ML
40 INJECTION, POWDER, LYOPHILIZED, FOR SOLUTION INTRAMUSCULAR; INTRAVENOUS EVERY 8 HOURS
Status: DISCONTINUED | OUTPATIENT
Start: 2020-07-02 | End: 2020-07-05

## 2020-07-02 RX ORDER — SODIUM CHLORIDE 0.9 % (FLUSH) 0.9 %
10 SYRINGE (ML) INJECTION AS NEEDED
Status: DISCONTINUED | OUTPATIENT
Start: 2020-07-02 | End: 2020-07-09 | Stop reason: SDUPTHER

## 2020-07-02 RX ORDER — DIGOXIN 0.25 MG/ML
250 INJECTION INTRAMUSCULAR; INTRAVENOUS ONCE
Status: COMPLETED | OUTPATIENT
Start: 2020-07-02 | End: 2020-07-02

## 2020-07-02 RX ADMIN — MELATONIN TAB 5 MG 5 MG: 5 TAB at 21:02

## 2020-07-02 RX ADMIN — PIPERACILLIN AND TAZOBACTAM 3.38 G: 3; .375 INJECTION, POWDER, FOR SOLUTION INTRAVENOUS at 03:00

## 2020-07-02 RX ADMIN — IPRATROPIUM BROMIDE AND ALBUTEROL SULFATE 3 ML: .5; 3 SOLUTION RESPIRATORY (INHALATION) at 19:36

## 2020-07-02 RX ADMIN — FLUDROCORTISONE ACETATE 0.1 MG: 0.1 TABLET ORAL at 16:44

## 2020-07-02 RX ADMIN — SODIUM CHLORIDE 10 MG/HR: 900 INJECTION, SOLUTION INTRAVENOUS at 12:23

## 2020-07-02 RX ADMIN — FUROSEMIDE 40 MG: 10 INJECTION, SOLUTION INTRAMUSCULAR; INTRAVENOUS at 06:12

## 2020-07-02 RX ADMIN — POTASSIUM CHLORIDE 20 MEQ: 1500 TABLET, EXTENDED RELEASE ORAL at 08:47

## 2020-07-02 RX ADMIN — PIPERACILLIN AND TAZOBACTAM 3.38 G: 3; .375 INJECTION, POWDER, FOR SOLUTION INTRAVENOUS at 08:48

## 2020-07-02 RX ADMIN — DILTIAZEM HYDROCHLORIDE 5 MG: 5 INJECTION INTRAVENOUS at 01:05

## 2020-07-02 RX ADMIN — ONDANSETRON 4 MG: 2 INJECTION INTRAMUSCULAR; INTRAVENOUS at 00:24

## 2020-07-02 RX ADMIN — METOPROLOL TARTRATE 12.5 MG: 25 TABLET, FILM COATED ORAL at 12:13

## 2020-07-02 RX ADMIN — HYDROCORTISONE SODIUM SUCCINATE 25 MG: 100 INJECTION, POWDER, FOR SOLUTION INTRAMUSCULAR; INTRAVENOUS at 21:02

## 2020-07-02 RX ADMIN — Medication 10 ML: at 11:47

## 2020-07-02 RX ADMIN — DROPERIDOL 1.25 MG: 2.5 INJECTION, SOLUTION INTRAMUSCULAR; INTRAVENOUS at 02:25

## 2020-07-02 RX ADMIN — FLUDROCORTISONE ACETATE 0.2 MG: 0.1 TABLET ORAL at 08:47

## 2020-07-02 RX ADMIN — ATORVASTATIN CALCIUM 20 MG: 20 TABLET, FILM COATED ORAL at 08:47

## 2020-07-02 RX ADMIN — METOPROLOL TARTRATE 12.5 MG: 25 TABLET, FILM COATED ORAL at 17:43

## 2020-07-02 RX ADMIN — HYDROCODONE BITARTRATE AND ACETAMINOPHEN 1 TABLET: 7.5; 325 TABLET ORAL at 16:56

## 2020-07-02 RX ADMIN — SODIUM CHLORIDE 1000 ML: 900 INJECTION, SOLUTION INTRAVENOUS at 05:03

## 2020-07-02 RX ADMIN — SODIUM CHLORIDE 5 MG/HR: 900 INJECTION, SOLUTION INTRAVENOUS at 00:24

## 2020-07-02 RX ADMIN — CYANOCOBALAMIN TAB 1000 MCG 1000 MCG: 1000 TAB at 08:47

## 2020-07-02 RX ADMIN — DIGOXIN 500 MCG: 250 INJECTION, SOLUTION INTRAMUSCULAR; INTRAVENOUS; PARENTERAL at 08:39

## 2020-07-02 RX ADMIN — METHYLPREDNISOLONE SODIUM SUCCINATE 40 MG: 40 INJECTION, POWDER, FOR SOLUTION INTRAMUSCULAR; INTRAVENOUS at 17:43

## 2020-07-02 RX ADMIN — BUDESONIDE 0.5 MG: 0.5 INHALANT RESPIRATORY (INHALATION) at 19:36

## 2020-07-02 RX ADMIN — DILTIAZEM HYDROCHLORIDE 5 MG: 5 INJECTION INTRAVENOUS at 00:24

## 2020-07-02 RX ADMIN — HYDROCODONE BITARTRATE AND ACETAMINOPHEN 1 TABLET: 7.5; 325 TABLET ORAL at 09:12

## 2020-07-02 RX ADMIN — DILTIAZEM HYDROCHLORIDE 10 MG: 5 INJECTION INTRAVENOUS at 04:53

## 2020-07-02 RX ADMIN — DIGOXIN 125 MCG: 0.12 TABLET ORAL at 12:09

## 2020-07-02 RX ADMIN — SODIUM CHLORIDE 15 MG/HR: 900 INJECTION, SOLUTION INTRAVENOUS at 05:01

## 2020-07-02 RX ADMIN — PIPERACILLIN AND TAZOBACTAM 3.38 G: 3; .375 INJECTION, POWDER, FOR SOLUTION INTRAVENOUS at 16:50

## 2020-07-02 RX ADMIN — THERA TABS 1 TABLET: TAB at 08:47

## 2020-07-02 RX ADMIN — Medication 10 ML: at 21:03

## 2020-07-02 RX ADMIN — FUROSEMIDE 40 MG: 10 INJECTION, SOLUTION INTRAMUSCULAR; INTRAVENOUS at 02:43

## 2020-07-02 RX ADMIN — SODIUM CHLORIDE 15 MG/HR: 900 INJECTION, SOLUTION INTRAVENOUS at 20:09

## 2020-07-02 RX ADMIN — HYDROCORTISONE SODIUM SUCCINATE 100 MG: 100 INJECTION, POWDER, FOR SOLUTION INTRAMUSCULAR; INTRAVENOUS at 12:13

## 2020-07-02 RX ADMIN — SODIUM CHLORIDE 500 ML: 900 INJECTION, SOLUTION INTRAVENOUS at 00:27

## 2020-07-02 NOTE — CONSULTS
Referring Provider: Dr. Yu    Attending: Temo Marti *    Reason for Consultation:    Atrial fibrillation with rapid ventricular response  CAD  History of previous coronary artery stenting  Hypertension  Nausea and vomiting  Elevated BNP    Chief complaint:    Nausea and vomiting  Shortness of breath  Palpitation       History of present illness:     Patient is 79 years old white gentleman whose past medical history significant for hypertension, hyperlipidemia, CAD, coronary artery stenting, chronic atrial fibrillation, who is admitted with symptom of shortness of breath and palpitation.    Patient reports that few days prior to the admission he started having feeling bad.  He had generalized body aches and pains.  He was weak.  He developed cough which is mostly clear expectoration.  No fever and chills.  Patient complain of shortness of breath.  Patient had nausea and vomiting.  No diarrhea.  Patient developed palpitation.    In the emergency room, he was noted to be in atrial fibrillation with rapid ventricular response.  He was started on Cardizem drip.  His proBNP was 3485.  Initial troponin was negative.    Patient denies any chest pain or tightness or heaviness.  Patient does complain of shortness of breath.  No significant leg edema.    Review of Systems  Review of Systems   Constitution: Negative for chills and fever.   HENT: Negative for ear discharge and nosebleeds.    Eyes: Negative for discharge and redness.   Cardiovascular: Positive for palpitations. Negative for chest pain, orthopnea, paroxysmal nocturnal dyspnea and syncope.   Respiratory: Positive for cough and shortness of breath. Negative for wheezing.    Endocrine: Negative for heat intolerance.   Skin: Negative for rash.   Musculoskeletal: Positive for arthritis and joint pain. Negative for myalgias.   Gastrointestinal: Negative for abdominal pain, melena, nausea and vomiting.   Genitourinary: Negative for dysuria and hematuria.    Neurological: Negative for dizziness, light-headedness, numbness and tremors.   Psychiatric/Behavioral: Negative for depression. The patient is not nervous/anxious.        Past Medical History  Past Medical History:   Diagnosis Date   • A-fib (CMS/HCC)    • Aortic aneurysm (CMS/HCC)    • Appetite loss    • Cancer (CMS/HCC)     right lobe cancer - surgically removed    • Dark stools    • Foot pain, bilateral    • Hyperlipidemia    • Low back pain    • S/P epidural steroid injection     [place] Dr Gomes's - no relief   • Weight loss     acute loss of weight 30 lbs    and Past Surgical History:   Procedure Laterality Date   • CATARACT EXTRACTION, BILATERAL     • CORONARY ANGIOPLASTY WITH STENT PLACEMENT     • LUMBAR DECOMPRESSION  09/2015    L2-L3   • LUMBAR DECOMPRESSION  2006    Dr. Logan   • OTHER SURGICAL HISTORY  05/2015    left SI fusion with bone graft - Dr. Presley   • OTHER SURGICAL HISTORY      carotid artery repair    • OTHER SURGICAL HISTORY Left 02/19/2016    Left SI fusion 2/19/2016 Dr. Zendejas   • POSTERIOR SPINAL FUSION  10/24/2016    PSF T12-3/TLIF L3-L4 poss L2-L3 --- Dr. 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       Family History  Family History   Problem Relation Age of Onset   • Cancer Other    • Hyperlipidemia Other    • Heart disease Other        Social History  Social History     Socioeconomic History   • Marital status: Legally      Spouse name: Not on file   • Number of children: Not on file   • Years of education: Not on file   • Highest education level: Not on file   Tobacco Use   • Smoking status: Never Smoker   • Smokeless tobacco: Never Used   Substance and Sexual Activity   • Alcohol use: No     Frequency: Never   • Drug use: Never   • Sexual activity: Defer       Objective     Physical Exam:    Vital Signs  Vitals:    07/02/20 0653 07/02/20 0941 07/02/20 0956 07/02/20 1000   BP: 137/98   135/81   BP Location:    Left arm   Patient Position:     "Lying   Pulse:  (!) 135 119 (!) 123   Resp:    23   Temp:    97.5 °F (36.4 °C)   TempSrc:    Oral   SpO2:       Weight: 68 kg (150 lb)      Height: 185.4 cm (73\")          Weight  Flowsheet Rows      First Filed Value   Admission Height  185.4 cm (73\") Documented at 07/01/2020 2336   Admission Weight  68 kg (150 lb) Documented at 07/01/2020 2336           Physical Exam   Constitutional: He is oriented to person, place, and time. He appears well-developed and well-nourished.   HENT:   Head: Normocephalic and atraumatic.   Eyes: No scleral icterus.   Neck: No thyromegaly present.   Cardiovascular: Normal rate, regular rhythm and normal heart sounds. Exam reveals no gallop and no friction rub.   No murmur heard.  Pulmonary/Chest: Effort normal. No respiratory distress. He has no wheezes. He has rales.   Abdominal: There is no tenderness.   Musculoskeletal: He exhibits no edema.   Lymphadenopathy:     He has no cervical adenopathy.   Neurological: He is alert and oriented to person, place, and time.   Skin: No rash noted. No erythema.   Psychiatric: He has a normal mood and affect.       Results Review:  Lab Results (last 24 hours)     Procedure Component Value Units Date/Time    Troponin [[id number]] Collected:  07/02/20 1027    Specimen:  Blood Updated:  07/02/20 1053    Blood Gas, Arterial [[id number]]  (Abnormal) Collected:  07/02/20 1049    Specimen:  Arterial Blood Updated:  07/02/20 1053     Site Right Radial     Sunil's Test Positive     pH, Arterial 7.531 pH units      pCO2, Arterial 31.3 mm Hg      pO2, Arterial 79.4 mm Hg      HCO3, Arterial 26.2 mmol/L      Base Excess, Arterial 3.9 mmol/L      Comment: Serial Number: [id number]:  [id number]        O2 Saturation, Arterial 97.1 %      CO2 Content 27.2 mmol/L      Barometric Pressure for Blood Gas --     Comment: N/A        Modality Cannula     FIO2 <21 %      Ventilator Mode 3     Hemodilution No    BUN [[id number]]  (Normal) Collected:  07/02/20 0457    " Specimen:  Blood Updated:  07/02/20 0723     BUN 22 mg/dL     Urinalysis With Culture If Indicated - Urine, Clean Catch [[id number]]  (Normal) Collected:  07/02/20 0612    Specimen:  Urine, Clean Catch Updated:  07/02/20 0659     Color, UA Yellow     Appearance, UA Clear     pH, UA <=5.0     Specific Gravity, UA 1.008     Glucose, UA Negative     Ketones, UA Negative     Bilirubin, UA Negative     Blood, UA Negative     Protein, UA Negative     Leuk Esterase, UA Negative     Nitrite, UA Negative     Urobilinogen, UA 0.2 E.U./dL    Narrative:       Urine microscopic not indicated.    Troponin [[id number]]  (Normal) Collected:  07/02/20 0457    Specimen:  Blood Updated:  07/02/20 0543     Troponin T 0.017 ng/mL     Narrative:       Troponin T Reference Range:  <= 0.03 ng/mL-   Negative for AMI  >0.03 ng/mL-     Abnormal for myocardial necrosis.  Clinicians would have to utilize clinical acumen, EKG, Troponin and serial changes to determine if it is an Acute Myocardial Infarction or myocardial injury due to an underlying chronic condition.       Results may be falsely decreased if patient taking Biotin.      TSH [[id number]]  (Normal) Collected:  07/02/20 0457    Specimen:  Blood Updated:  07/02/20 0543     TSH 0.524 uIU/mL     Basic Metabolic Panel [[id number]]  (Abnormal) Collected:  07/02/20 0457    Specimen:  Blood Updated:  07/02/20 0531     Glucose 172 mg/dL      BUN --     Comment: Testing performed by alternate method        Creatinine 0.80 mg/dL      Sodium 146 mmol/L      Potassium 3.5 mmol/L      Comment: Specimen hemolyzed.  Results may be affected.        Chloride 104 mmol/L      CO2 23.0 mmol/L      Calcium 8.8 mg/dL      eGFR Non African Amer 93 mL/min/1.73      BUN/Creatinine Ratio --     Comment: Testing not performed        Anion Gap 19.0 mmol/L     Narrative:       GFR Normal >60  Chronic Kidney Disease <60  Kidney Failure <15      Magnesium [[id number]]  (Normal) Collected:  07/02/20 0457    Specimen:   Blood Updated:  07/02/20 0531     Magnesium 2.0 mg/dL     CBC Auto Differential [[id number]]  (Abnormal) Collected:  07/02/20 0457    Specimen:  Blood Updated:  07/02/20 0515     WBC 7.50 10*3/mm3      RBC 3.82 10*6/mm3      Hemoglobin 11.6 g/dL      Hematocrit 34.3 %      MCV 89.9 fL      MCH 30.4 pg      MCHC 33.8 g/dL      RDW 14.8 %      RDW-SD 47.3 fl      MPV 8.3 fL      Platelets 227 10*3/mm3      Neutrophil % 86.4 %      Lymphocyte % 7.9 %      Monocyte % 5.1 %      Eosinophil % 0.0 %      Basophil % 0.6 %      Neutrophils, Absolute 6.50 10*3/mm3      Lymphocytes, Absolute 0.60 10*3/mm3      Monocytes, Absolute 0.40 10*3/mm3      Eosinophils, Absolute 0.00 10*3/mm3      Basophils, Absolute 0.00 10*3/mm3      nRBC 0.0 /100 WBC     Lactic Acid, Reflex [[id number]]  (Abnormal) Collected:  07/02/20 0401    Specimen:  Blood Updated:  07/02/20 0443     Lactate 5.1 mmol/L     Lactic Acid, Reflex Timer (This will reflex a repeat order 3-3:15 hours after ordered.) [[id number]] Collected:  07/01/20 2352    Specimen:  Blood Updated:  07/02/20 0300     Hold Tube Hold for add-ons.     Comment: Auto resulted.       Blood Culture - Blood, Arm, Left [[id number]] Collected:  07/02/20 0249    Specimen:  Blood from Arm, Left Updated:  07/02/20 0255    Respiratory Panel, PCR - Swab, Nasopharynx [[id number]]  (Normal) Collected:  07/02/20 0027    Specimen:  Swab from Nasopharynx Updated:  07/02/20 0153     ADENOVIRUS, PCR Not Detected     Coronavirus 229E Not Detected     Coronavirus HKU1 Not Detected     Coronavirus NL63 Not Detected     Coronavirus OC43 Not Detected     Human Metapneumovirus Not Detected     Human Rhinovirus/Enterovirus Not Detected     Influenza B PCR Not Detected     Parainfluenza Virus 1 Not Detected     Parainfluenza Virus 2 Not Detected     Parainfluenza Virus 3 Not Detected     Parainfluenza Virus 4 Not Detected     Bordetella pertussis pcr Not Detected     Influenza A H1 2009 PCR Not Detected      Chlamydophila pneumoniae PCR Not Detected     Mycoplasma pneumo by PCR Not Detected     Influenza A PCR Not Detected     Influenza A H3 Not Detected     Influenza A H1 Not Detected     RSV, PCR Not Detected    Narrative:       The coronavirus on the RVP is NOT COVID-19 and is NOT indicative of infection with COVID-19.     COVID-19 [facility] IN-HOUSE, Nasal Swab No Transport Media - Swab, Nasal Cavity [[id number]]  (Normal) Collected:  07/02/20 0027    Specimen:  Swab from Nasal Cavity Updated:  07/02/20 0104     COVID19 Not Detected    Narrative:       Fact sheet for providers: https://www.fda.gov/media/761418/download     Fact sheet for patients: https://www.fda.gov/media/029748/download    Extra Tubes [[id number]] Collected:  07/01/20 2348    Specimen:  Blood from Arm, Right Updated:  07/02/20 0100    Narrative:       The following orders were created for panel order Extra Tubes.  Procedure                               Abnormality         Status                     ---------                               -----------         ------                     Light Blue Top[[id number]]                                   Final result               Gold Top - SST[[id number]]                                   Final result                 Please view results for these tests on the individual orders.    Light Blue Top [[id number]] Collected:  07/01/20 2348    Specimen:  Blood from Arm, Right Updated:  07/02/20 0100     Extra Tube hold for add-on     Comment: Auto resulted       Gold Top - SST [[id number]] Collected:  07/01/20 2348    Specimen:  Blood from Arm, Right Updated:  07/02/20 0100     Extra Tube Hold for add-ons.     Comment: Auto resulted.       Lactate Dehydrogenase [[id number]]  (Abnormal) Collected:  07/01/20 2348    Specimen:  Blood from Arm, Right Updated:  07/02/20 0039      U/L     C-reactive Protein [[id number]]  (Abnormal) Collected:  07/01/20 2348    Specimen:  Blood from Arm, Right Updated:  07/02/20 0039      "C-Reactive Protein 28.60 mg/dL     BNP [[id number]]  (Abnormal) Collected:  07/01/20 2348    Specimen:  Blood from Arm, Right Updated:  07/02/20 0036     proBNP 3,485.0 pg/mL     Narrative:       Among patients with dyspnea, NT-proBNP is highly sensitive for the detection of acute congestive heart failure. In addition NT-proBNP of <300 pg/ml effectively rules out acute congestive heart failure with 99% negative predictive value.    Results may be falsely decreased if patient taking Biotin.      BUN [[id number]]  (Normal) Collected:  07/01/20 2348    Specimen:  Blood from Arm, Right Updated:  07/02/20 0033     BUN 23 mg/dL     Procalcitonin [[id number]]  (Normal) Collected:  07/01/20 2348    Specimen:  Blood from Arm, Right Updated:  07/02/20 0023     Procalcitonin 0.11 ng/mL     Narrative:       As a Marker for Sepsis (Non-Neonates):   1. <0.5 ng/mL represents a low risk of severe sepsis and/or septic shock.  1. >2 ng/mL represents a high risk of severe sepsis and/or septic shock.    As a Marker for Lower Respiratory Tract Infections that require antibiotic therapy:  PCT on Admission     Antibiotic Therapy             6-12 Hrs later  > 0.5                Strongly Recommended            >0.25 - <0.5         Recommended  0.1 - 0.25           Discouraged                   Remeasure/reassess PCT  <0.1                 Strongly Discouraged          Remeasure/reassess PCT      As 28 day mortality risk marker: \"Change in Procalcitonin Result\" (> 80 % or <=80 %) if Day 0 (or Day 1) and Day 4 values are available. Refer to http://www.[facility]-pct-calculator.com/   Change in PCT <=80 %   A decrease of PCT levels below or equal to 80 % defines a positive change in PCT test result representing a higher risk for 28-day all-cause mortality of patients diagnosed with severe sepsis or septic shock.  Change in PCT > 80 %   A decrease of PCT levels of more than 80 % defines a negative change in PCT result representing a lower risk for " 28-day all-cause mortality of patients diagnosed with severe sepsis or septic shock.                Results may be falsely decreased if patient taking Biotin.     Comprehensive Metabolic Panel [[id number]]  (Abnormal) Collected:  07/01/20 2348    Specimen:  Blood from Arm, Right Updated:  07/02/20 0019     Glucose 119 mg/dL      BUN --     Comment: Testing performed by alternate method        Creatinine 0.69 mg/dL      Sodium 145 mmol/L      Potassium 4.1 mmol/L      Chloride 104 mmol/L      CO2 22.0 mmol/L      Calcium 9.1 mg/dL      Total Protein 6.9 g/dL      Albumin 3.50 g/dL      ALT (SGPT) 15 U/L      AST (SGOT) 31 U/L      Alkaline Phosphatase 54 U/L      Total Bilirubin 1.5 mg/dL      eGFR Non African Amer 110 mL/min/1.73      Globulin 3.4 gm/dL      A/G Ratio 1.0 g/dL      BUN/Creatinine Ratio --     Comment: Testing not performed        Anion Gap 19.0 mmol/L     Narrative:       GFR Normal >60  Chronic Kidney Disease <60  Kidney Failure <15      Lipase [[id number]]  (Normal) Collected:  07/01/20 2348    Specimen:  Blood from Arm, Right Updated:  07/02/20 0019     Lipase 13 U/L     Troponin [[id number]]  (Normal) Collected:  07/01/20 2348    Specimen:  Blood from Arm, Right Updated:  07/02/20 0019     Troponin T <0.010 ng/mL     Narrative:       Troponin T Reference Range:  <= 0.03 ng/mL-   Negative for AMI  >0.03 ng/mL-     Abnormal for myocardial necrosis.  Clinicians would have to utilize clinical acumen, EKG, Troponin and serial changes to determine if it is an Acute Myocardial Infarction or myocardial injury due to an underlying chronic condition.       Results may be falsely decreased if patient taking Biotin.      Magnesium [[id number]]  (Normal) Collected:  07/01/20 2348    Specimen:  Blood from Arm, Right Updated:  07/02/20 0019     Magnesium 2.4 mg/dL     CBC & Differential [[id number]] Collected:  07/01/20 2348    Specimen:  Blood from Arm, Right Updated:  07/02/20 0005    Narrative:       The  following orders were created for panel order CBC & Differential.  Procedure                               Abnormality         Status                     ---------                               -----------         ------                     CBC Auto Differential[[id number]]        Abnormal            Final result                 Please view results for these tests on the individual orders.    CBC Auto Differential [[id number]]  (Abnormal) Collected:  07/01/20 2348    Specimen:  Blood from Arm, Right Updated:  07/02/20 0005     WBC 9.60 10*3/mm3      RBC 3.87 10*6/mm3      Hemoglobin 11.7 g/dL      Hematocrit 35.5 %      MCV 91.7 fL      MCH 30.2 pg      MCHC 32.9 g/dL      RDW 14.8 %      RDW-SD 47.3 fl      MPV 8.6 fL      Platelets 253 10*3/mm3      Neutrophil % 76.1 %      Lymphocyte % 13.1 %      Monocyte % 10.1 %      Eosinophil % 0.2 %      Basophil % 0.5 %      Neutrophils, Absolute 7.30 10*3/mm3      Lymphocytes, Absolute 1.30 10*3/mm3      Monocytes, Absolute 1.00 10*3/mm3      Eosinophils, Absolute 0.00 10*3/mm3      Basophils, Absolute 0.10 10*3/mm3      nRBC 0.1 /100 WBC     Blood Culture - Blood, Arm, Right [[id number]] Collected:  07/01/20 2348    Specimen:  Blood from Arm, Right Updated:  07/01/20 2355    POC Lactate [[id number]]  (Abnormal) Collected:  07/01/20 2352    Specimen:  Blood Updated:  07/01/20 2353     Lactate 2.5 mmol/L      Comment: Serial Number: [id number]:  [id number]           Imaging Results (Last 72 Hours)     Procedure Component Value Units Date/Time    XR Chest 1 View [[id number]] Collected:  07/02/20 0724     Updated:  07/02/20 0729    Narrative:       XR CHEST 1 VW-     Date of Exam: 7/1/2020 11:50 PM     Indication: Atrial fibrillation and vomiting.     Comparison: 11/7/2016     Technique: A single view of the chest was obtained.     FINDINGS:      Heart size is at the upper limits of normal.  There is new  interstitial and alveolar airspace disease within both lungs with  slight  upper lobe predominance.  Findings may be secondary to pulmonary edema  or atypical pneumonia.  Pulmonary vessels are indistinct consistent with  pulmonary vascular congestion.  There is a more dense area of airspace  consolidation within the right lung base.  There is a probable small  right pleural effusion.  No definite left pleural effusion identified.             Impression:             1.  Borderline heart size with pulmonary vascular congestion.  2.  Bilateral interstitial and alveolar airspace disease favored to be  due to pulmonary edema or less likely atypical pneumonia.        Electronically Signed By-Lico Ferrari On:7/2/2020 7:26 AM  This report was finalized on [id number] by  Lico Ferrari, .        ECG 12 Lead   Preliminary Result   HEART RATE= 163  bpm   RR Interval= 368  ms   WY Interval=   ms   P Horizontal Axis=   deg   P Front Axis=   deg   QRSD Interval= 78  ms   QT Interval= 283  ms   QRS Axis= 7  deg   T Wave Axis= 42  deg   - ABNORMAL ECG -   Atrial fibrillation with rapid V-rate   Electronically Signed By:    Date and Time of Study: 2020-07-01 23:40:10      ECG 12 Lead    (Results Pending)     CBC    Results from last 7 days   Lab Units 07/02/20  0457 07/01/20  2348   WBC 10*3/mm3 7.50 9.60   HEMOGLOBIN g/dL 11.6* 11.7*   PLATELETS 10*3/mm3 227 253     BMP   Results from last 7 days   Lab Units 07/02/20  0457 07/01/20  2348   SODIUM mmol/L 146* 145   POTASSIUM mmol/L 3.5 4.1   CHLORIDE mmol/L 104 104   CO2 mmol/L 23.0 22.0   BUN  22 23   CREATININE mg/dL 0.80 0.69*   GLUCOSE mg/dL 172* 119*   MAGNESIUM mg/dL 2.0 2.4     CMP   Results from last 7 days   Lab Units 07/02/20  0457 07/01/20  2348   SODIUM mmol/L 146* 145   POTASSIUM mmol/L 3.5 4.1   CHLORIDE mmol/L 104 104   CO2 mmol/L 23.0 22.0   BUN  22 23   CREATININE mg/dL 0.80 0.69*   GLUCOSE mg/dL 172* 119*   ALBUMIN g/dL  --  3.50   BILIRUBIN mg/dL  --  1.5*   ALK PHOS U/L  --  54   AST (SGOT) U/L  --  31   ALT (SGPT) U/L  --   15   LIPASE U/L  --  13     Medication Review  Scheduled Meds:  atorvastatin 20 mg Oral Daily   digoxin 125 mcg Oral Daily   fludrocortisone 0.1 mg Oral Q PM   fludrocortisone 0.2 mg Oral Daily   hydrocortisone sodium succinate 25 mg Intravenous Q24H   metoprolol tartrate 12.5 mg Oral Q6H   piperacillin-tazobactam 3.375 g Intravenous Q8H   potassium chloride 20 mEq Oral Daily   sodium chloride 10 mL Intravenous Q12H   Thera 1 tablet Oral Daily   vitamin B-12 1,000 mcg Oral Daily     Continuous Infusions:  dilTIAZem 5-15 mg/hr Last Rate: 15 mg/hr (07/02/20 0501)   Pharmacy to Dose Zosyn       PRN Meds:.•  acetaminophen **OR** acetaminophen **OR** acetaminophen  •  aluminum-magnesium hydroxide-simethicone  •  bisacodyl  •  HYDROcodone-acetaminophen  •  magnesium hydroxide  •  melatonin  •  ondansetron **OR** ondansetron  •  Pharmacy to Dose Zosyn  •  [COMPLETED] Insert peripheral IV **AND** sodium chloride  •  sodium chloride    Assessment:      Atrial fibrillation (CMS/[facility])    CAD (coronary artery disease)    Chronic anticoagulation    Hyperlipidemia    Presence of stent in right coronary artery    Chronic pain    Sepsis (CMS/[facility])        Plan:    Patient is admitted with shortness of breath and nausea and vomiting.  He was found to be in atrial fibrillation with rapid ventricular response.  Patient also had elevated proBNP and chest x-ray was consistent with mild pulmonary edema.  Patient still have elevated heart rate.    1.  I would add digoxin 0.125 mg daily  2.  Lopressor 12.5 mg every 6  3.  Lasix 40 mg daily p.o.  4.  Review echocardiogram  5.  If heart rate is controlled by tomorrow I will proceed with stress test.  6.  According to the records of Dr. Montelongo, patient was seen at [facility] in October 2019 and it was documented that patient had chronic atrial fibrillation.  7.  I will continue Xarelto.  I discussed with Dr. Marti        I discussed the patients findings and my recommendations with  patient and Dr. Figueroa Cronin MD  07/02/20  11:08

## 2020-07-02 NOTE — PLAN OF CARE
Patient remains a falls risk.    Patient's pain being managed successfully with Knott.    Patient is being encouraged to turn routinely, drinking lots of fluids, and eating meals well.    Patient went down for CT of chest, abdomen, and sinus. CT of chest came back with bilateral groundglass airspace disease with interlobular septal  thickening with upper lobe predominance.  Findings are nonspecific but  can be seen with Covid 19 pneumonia. Per radiology note. Upon talking with Dr. Marti, we consulted Pulmonology. Dr. Clarke with pulmonology ordered a COVID-19 test. Patient is in enhanced contact/droplet precautions currently. COVID-19 test administered, currently waiting on results.     Patient is on Cardizem drip at 15. As well as digoxin, and metoprolol per mar.  Problem: Patient Care Overview  Goal: Plan of Care Review  Outcome: Ongoing (interventions implemented as appropriate)  Flowsheets (Taken 7/2/2020 2335)  Progress: improving  Plan of Care Reviewed With: patient  Goal: Individualization and Mutuality  Outcome: Ongoing (interventions implemented as appropriate)  Goal: Discharge Needs Assessment  Outcome: Ongoing (interventions implemented as appropriate)  Goal: Interprofessional Rounds/Family Conf  Outcome: Ongoing (interventions implemented as appropriate)     Problem: Fall Risk (Adult)  Goal: Identify Related Risk Factors and Signs and Symptoms  Outcome: Ongoing (interventions implemented as appropriate)  Goal: Absence of Fall  Outcome: Ongoing (interventions implemented as appropriate)     Problem: Pain, Chronic (Adult)  Goal: Identify Related Risk Factors and Signs and Symptoms  Outcome: Ongoing (interventions implemented as appropriate)  Goal: Acceptable Pain/Comfort Level and Functional Ability  Outcome: Ongoing (interventions implemented as appropriate)     Problem: Skin Injury Risk (Adult)  Goal: Identify Related Risk Factors and Signs and Symptoms  Outcome: Ongoing (interventions implemented as  appropriate)  Goal: Skin Health and Integrity  Outcome: Ongoing (interventions implemented as appropriate)

## 2020-07-02 NOTE — H&P
[facility] Medicine Services      Patient Name: Shukri Park  : 1940  MRN: [id number]  Primary Care Physician: Nikki Gonzales MD  Date of admission: 2020    Patient Care Team:  Nikki Gonzales MD as PCP - General (Family Medicine)          Subjective   History Present Illness     Chief Complaint:   Chief Complaint   Patient presents with   • Weakness - Generalized       Mr. Park is a 79 y.o. male with a history of atrial fibrillation, hyperlipidemia, chronic low back pain, hypotension, chronic anticoagulation presented to the [facility] ED on 2020 with a complaint of generalized body aches and pains, weakness and a cough.  Patient states he is also had some nausea and vomiting.  Patient denies chest pain, shortness of breath, diarrhea, or any ill contacts.  Upon arrival to the ED patient was found to be in atrial fibrillation with RVR, heart rate 160s patient was given Cardizem bolus and started on a Cardizem drip.    In the ED, initial troponin negative, proBNP 3485, , glucose 119, sodium 145, potassium 4.1, BUN 23, creatinine 0.69, C-reactive protein 28.6, lactate 2.5, lipase 13, magnesium 2.4, procalcitonin 0.11, WBC 9.6, Hgb 11.7, HCT 35.5, blood cultures pending.  EKG: A. fib with RVR.  Respiratory panel negative, COVID 19 test negative.  Chest x-ray with pulmonary edema, pneumonia as read by per ED physician, awaiting official radiology read.  Patient is on a Cardizem drip at 15 mg/h, received a 500 mL normal saline bolus, 4 mg Zofran IV x1, 40 mg Lasix IV x1, Inapsine 1.25 mg IV x1, patient will be admitted for further evaluation and management.       Review of Systems   Constitution: Positive for decreased appetite and malaise/fatigue.   HENT: Positive for congestion.    Eyes: Negative.    Cardiovascular: Negative.    Respiratory: Positive for cough.    Endocrine: Negative.    Hematologic/Lymphatic: Negative.    Skin: Negative.       Musculoskeletal: Negative.    Gastrointestinal: Positive for nausea and vomiting.   Genitourinary: Negative.    Neurological: Negative.    Psychiatric/Behavioral: Negative.    Allergic/Immunologic: Negative.    All other systems reviewed and are negative.          Personal History     Past Medical History:   Past Medical History:   Diagnosis Date   • A-fib (CMS/HCC)    • Aortic aneurysm (CMS/HCC)    • Appetite loss    • Cancer (CMS/HCC)     right lobe cancer - surgically removed    • Dark stools    • Foot pain, bilateral    • Hyperlipidemia    • Low back pain    • S/P epidural steroid injection     Israel Gomes's - no relief   • Weight loss     acute loss of weight 30 lbs       Surgical History:      Past Surgical History:   Procedure Laterality Date   • CATARACT EXTRACTION, BILATERAL     • CORONARY ANGIOPLASTY WITH STENT PLACEMENT     • LUMBAR DECOMPRESSION  09/2015    L2-L3   • LUMBAR DECOMPRESSION  2006    Dr. Logan   • OTHER SURGICAL HISTORY  05/2015    left SI fusion with bone graft - Dr. Presley   • OTHER SURGICAL HISTORY      carotid artery repair    • OTHER SURGICAL HISTORY Left 02/19/2016    Left SI fusion 2/19/2016 Dr. Zendejas   • POSTERIOR SPINAL FUSION  10/24/2016    PSF T12-3/TLIF L3-L4 poss L2-L3 --- Dr. 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 Otherwise pertinent FHx was reviewed and unremarkable.     Social History:  reports that he has never smoked. He has never used smokeless tobacco. He reports that he does not drink alcohol or use drugs.      Medications:  Prior to Admission medications    Medication Sig Start Date End Date Taking? Authorizing Provider   fludrocortisone 0.1 MG tablet Take 0.2 mg by mouth Every Morning.   Yes Provider, MD Marvin   fludrocortisone 0.1 MG tablet Take 0.1 mg by  mouth Every Evening.   Yes Marvin Lucas MD   HYDROcodone-acetaminophen (NORCO) 7.5-325 MG per tablet Take 1 tablet by mouth Every 6 (Six) Hours As Needed. 4/14/20  Yes Marvin Lucas MD   Multiple Vitamin (MULTIVITAMIN) tablet Take 1 tablet by mouth Daily.   Yes Marvin Lucas MD   potassium chloride ER (K-TAB) 20 MEQ tablet controlled-release ER tablet Take 20 mEq by mouth 2 (Two) Times a Day With Meals. 3/14/20  Yes Marvin Lucas MD   rivaroxaban (XARELTO) 20 MG tablet Take 20 mg by mouth Daily.   Yes Marvin Lucas MD   simvastatin (ZOCOR) 40 MG tablet Take 80 mg by mouth Every Night.   Yes Marvin Lucas MD   vitamin B-12 (CYANOCOBALAMIN) 1000 MCG tablet Take 1,000 mcg by mouth Daily. 11/15/18  Yes Marvin Lucas MD   simvastatin (ZOCOR) 40 MG tablet Take 40 mg by mouth Every Night.  7/2/20  Marvin Lucas MD       Allergies:    Allergies   Allergen Reactions   • Ciprofloxacin Anaphylaxis   • Amlodipine Unknown (See Comments)   • Rocephin [Ceftriaxone] Other (See Comments)     Mouth sores       Objective   Objective     Vital Signs  Temp:  [97.5 °F (36.4 °C)] 97.5 °F (36.4 °C)  Heart Rate:  [117-150] 117  Resp:  [18-22] 18  BP: (107-141)/(54-85) 128/78  SpO2:  [85 %-96 %] 96 %  on   ;      Body mass index is 19.79 kg/m².    Physical Exam   Constitutional: He is oriented to person, place, and time. He appears well-developed.   HENT:   Head: Normocephalic.   Eyes: Conjunctivae are normal.   Neck: Normal range of motion.   Cardiovascular: Normal heart sounds and intact distal pulses.   Atrial fibrillation heart rate 120-140 beats per minute   Pulmonary/Chest: Effort normal. He has rales.   Abdominal: Soft. Bowel sounds are normal.   Musculoskeletal: Normal range of motion.   Neurological: He is alert and oriented to person, place, and time.   Skin: Skin is warm and dry.       Results Review:  I have personally reviewed most recent cardiac tracings, lab  results and radiology images and interpretations and agree with findings,     Results from last 7 days   Lab Units 07/01/20  2348   WBC 10*3/mm3 9.60   HEMOGLOBIN g/dL 11.7*   HEMATOCRIT % 35.5*   PLATELETS 10*3/mm3 253     Results from last 7 days   Lab Units 07/02/20  0401  07/01/20  2348   SODIUM mmol/L  --   --  145   POTASSIUM mmol/L  --   --  4.1   CHLORIDE mmol/L  --   --  104   CO2 mmol/L  --   --  22.0   BUN   --   --  23   CREATININE mg/dL  --   --  0.69*   GLUCOSE mg/dL  --   --  119*   CALCIUM mg/dL  --   --  9.1   ALT (SGPT) U/L  --   --  15   AST (SGOT) U/L  --   --  31   TROPONIN T ng/mL  --   --  <0.010   PROBNP pg/mL  --   --  3,485.0*   LACTATE mmol/L 5.1*   < >  --    PROCALCITONIN ng/mL  --   --  0.11    < > = values in this interval not displayed.     Estimated Creatinine Clearance: 70.8 mL/min (A) (by C-G formula based on SCr of 0.69 mg/dL (L)).  Brief Urine Lab Results     None          Microbiology Results (last 10 days)     Procedure Component Value - Date/Time    Respiratory Panel, PCR - Swab, Nasopharynx [[id number]]  (Normal) Collected:  07/02/20 0027    Lab Status:  Final result Specimen:  Swab from Nasopharynx Updated:  07/02/20 0153     ADENOVIRUS, PCR Not Detected     Coronavirus 229E Not Detected     Coronavirus HKU1 Not Detected     Coronavirus NL63 Not Detected     Coronavirus OC43 Not Detected     Human Metapneumovirus Not Detected     Human Rhinovirus/Enterovirus Not Detected     Influenza B PCR Not Detected     Parainfluenza Virus 1 Not Detected     Parainfluenza Virus 2 Not Detected     Parainfluenza Virus 3 Not Detected     Parainfluenza Virus 4 Not Detected     Bordetella pertussis pcr Not Detected     Influenza A H1 2009 PCR Not Detected     Chlamydophila pneumoniae PCR Not Detected     Mycoplasma pneumo by PCR Not Detected     Influenza A PCR Not Detected     Influenza A H3 Not Detected     Influenza A H1 Not Detected     RSV, PCR Not Detected    Narrative:       The  coronavirus on the RVP is NOT COVID-19 and is NOT indicative of infection with COVID-19.     COVID-19 [employer] IN-HOUSE, Nasal Swab No Transport Media - Swab, Nasal Cavity [[id number]]  (Normal) Collected:  07/02/20 0027    Lab Status:  Final result Specimen:  Swab from Nasal Cavity Updated:  07/02/20 0104     COVID19 Not Detected    Narrative:       Fact sheet for providers: https://www.fda.gov/media/729626/download     Fact sheet for patients: https://www.fda.gov/media/291377/download          ECG/EMG Results (most recent)     Procedure Component Value Units Date/Time    ECG 12 Lead [[id number]] Collected:  07/01/20 2340     Updated:  07/01/20 2341    Narrative:       HEART RATE= 163  bpm  RR Interval= 368  ms  AR Interval=   ms  P Horizontal Axis=   deg  P Front Axis=   deg  QRSD Interval= 78  ms  QT Interval= 283  ms  QRS Axis= 7  deg  T Wave Axis= 42  deg  - ABNORMAL ECG -  Atrial fibrillation with rapid V-rate  Electronically Signed By:   Date and Time of Study: 2020-07-01 23:40:10                    No radiology results for the last 7 days      Estimated Creatinine Clearance: 70.8 mL/min (A) (by C-G formula based on SCr of 0.69 mg/dL (L)).    Assessment/Plan   Assessment/Plan       Active Hospital Problems    Diagnosis  POA   • **Atrial fibrillation (CMS/HCC) [I48.91]  Yes     Priority: High   • Chronic pain [G89.29]  Yes     Priority: Medium   • Sepsis (CMS/HCC) [A41.9]  Yes     Priority: Medium   • CAD (coronary artery disease) [I25.10]  Yes     Priority: Medium   • Chronic anticoagulation [Z79.01]  Not Applicable     Priority: Medium   • Hyperlipidemia [E78.5]  Yes     Priority: Medium   • Presence of stent in right coronary artery [Z95.5]  Not Applicable     Priority: Medium      Resolved Hospital Problems   No resolved problems to display.     Atrial fibrillation  -Cardizem drip 15 mg/hr  -Patient received several Cardizem boluses in the ED  -Patient with history, on Xarelto,  -Not currently on any  antiarrhythmics at home  -Check magnesium, TSH, trend troponins  -Cardiology consult    Possible sepsis  -Patient ruled in for sepsis due to tachycardia, tachypnea, lactate 2.5  -Given Zosyn 3.375 g, continue until sepsis ruled out  -Patient received 500 mL normal saline IV bolus in ED  -Blood cultures pending  -COVID-19 ruled out  -Respiratory panel negative  -Check urine    CAD  -No echo on file, obtain echo  -BNP 3485  -Per review of outside records: Last angioplasty to the RCA and left circumflex 4/16/2004  -Currently on Florinef for hypotension  -Continue Florinef, continue statin    Hyperlipidemia  -Continue statin    Chronic pain  -Due to multiple back surgeries  -Continue Norco, (INSPECT verified)      VTE Prophylaxis -   Mechanical Order History:     None      Pharmalogical Order History:     Ordered     Dose Route Frequency Stop    07/02/20 0414  rivaroxaban (XARELTO) tablet 20 mg      20 mg PO Daily With Dinner --          CODE STATUS:    Code Status and Medical Interventions:   Ordered at: 07/02/20 0406     Level Of Support Discussed With:    Patient     Code Status:    CPR     Medical Interventions (Level of Support Prior to Arrest):    Full         I discussed the patient's findings and my recommendations with patient.        Electronically signed by TERRANCE Guallpa, 07/02/20, 4:44 AM.  [facility] [employer] Hospitalist Team

## 2020-07-02 NOTE — CONSULTS
PULMONARY/ CRITICAL CARE/ SLEEP MEDICINE CONSULT NOTE        Patient Name:  Shukri Park    :  1940    Medical Record:  [id number]    REQUESTING PHYSICIAN    Nikki Gonzales MD    PRIMARY CARE PHYSICIAN     Nikki Gonzales MD    REASON FOR CONSULTATION    Shukri Park is a 79 y.o. male who was referred for consultation for SOA and ? COVID.      This is a 79-year-old male with a history of COPD, former smoker, atrial fibrillation on chronic anticoagulation who lives all by himself presented to the hospital for fatigue, tiredness and shortness of breath.  Patient has been feeling puny for the few days with poor appetite.  He has noticed extreme fatigue with malaise.  He denies any fever or chills.  He has noted increased shortness of breath however cough has been mild.  He did cough clear sputum and only one time he had a maroonish sputum.  He denies any wheezing or chest pain or pleurisy.  Denies any sick contacts.  He states that he is visited by a home health nurse only.  He denies any other contacts.    CT scan of the chest was done that showed bilateral groundglass airspace disease with interlobular septal thickening upper lobe predominant.  Patient denies any active tobacco use or vaping.    He has a history of chronic sinus issues.  He is not on any maintenance inhalers.      REVIEW OF SYSTEMS    Constitutional:  + Fatigue  Eyes:  Denies change in visual acuity   HENT:  Denies nasal congestion or sore throat   Respiratory:  + SOA, one episode of maroonish sputum  Cardiovascular:  Denies chest pain or edema   GI:  Denies abdominal pain, nausea, vomiting, bloody stools or diarrhea   :  Denies dysuria   Musculoskeletal:  Denies back pain or joint pain   Integument:  Denies rash   Neurologic:  Denies headache, focal weakness or sensory changes   Endocrine:  Denies polyuria or polydipsia   Lymphatic:  Denies swollen glands   Psychiatric:  Denies depression or anxiety     MEDICAL HISTORY    Past  Medical History:   Diagnosis Date   • A-fib (CMS/HCC)    • Aortic aneurysm (CMS/HCC)    • Appetite loss    • Cancer (CMS/HCC)     right lobe cancer - surgically removed    • Dark stools    • Foot pain, bilateral    • Hyperlipidemia    • Low back pain    • S/P epidural steroid injection     Israel Gomes's - no relief   • Weight loss     acute loss of weight 30 lbs        SURGICAL HISTORY    Past Surgical History:   Procedure Laterality Date   • CATARACT EXTRACTION, BILATERAL     • CORONARY ANGIOPLASTY WITH STENT PLACEMENT     • LUMBAR DECOMPRESSION  09/2015    L2-L3   • LUMBAR DECOMPRESSION  2006    Dr. Logan   • OTHER SURGICAL HISTORY  05/2015    left SI fusion with bone graft - Dr. Presley   • OTHER SURGICAL HISTORY      carotid artery repair    • OTHER SURGICAL HISTORY Left 02/19/2016    Left SI fusion 2/19/2016 Dr. Zendejas   • POSTERIOR SPINAL FUSION  10/24/2016    PSF T12-3/TLIF L3-L4 poss L2-L3 --- Dr. 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        FAMILY HISTORY    Family History   Problem Relation Age of Onset   • Cancer Other    • Hyperlipidemia Other    • Heart disease Other        SOCIAL HISTORY    Social History     Tobacco Use   • Smoking status: Never Smoker   • Smokeless tobacco: Never Used   Substance Use Topics   • Alcohol use: No     Frequency: Never        ALLERGIES    Allergies   Allergen Reactions   • Ciprofloxacin Anaphylaxis   • Amlodipine Unknown (See Comments)   • Rocephin [Ceftriaxone] Other (See Comments)     Mouth sores       MEDICATIONS    Scheduled Meds:  atorvastatin 20 mg Oral Daily   digoxin 125 mcg Oral Daily   fludrocortisone 0.1 mg Oral Q PM   fludrocortisone 0.2 mg Oral Daily   [START ON 7/3/2020] furosemide 40 mg Oral Daily   hydrocortisone sodium succinate 25 mg Intravenous Q8H   methylPREDNISolone sodium succinate 40 mg Intravenous Q8H   metoprolol tartrate 12.5 mg Oral Q6H   piperacillin-tazobactam 3.375 g Intravenous Q8H   potassium chloride  20 mEq Oral Daily   rivaroxaban 20 mg Oral Daily With Dinner   sodium chloride 10 mL Intravenous Q12H   Thera 1 tablet Oral Daily   vitamin B-12 1,000 mcg Oral Daily     Continuous Infusions:  dilTIAZem 5-15 mg/hr Last Rate: 15 mg/hr (20 1309)   Pharmacy to Dose Zosyn       PRN Meds:.•  acetaminophen **OR** acetaminophen **OR** acetaminophen  •  aluminum-magnesium hydroxide-simethicone  •  bisacodyl  •  HYDROcodone-acetaminophen  •  magnesium hydroxide  •  melatonin  •  ondansetron **OR** ondansetron  •  Pharmacy to Dose Zosyn  •  [COMPLETED] Insert peripheral IV **AND** sodium chloride  •  sodium chloride      PHYSICAL EXAM    tMax 24 hrs:  Temp (24hrs), Av.5 °F (36.4 °C), Min:97.5 °F (36.4 °C), Max:97.5 °F (36.4 °C)    Vitals Ranges:  Temp:  [97.5 °F (36.4 °C)] 97.5 °F (36.4 °C)  Heart Rate:  [] 112  Resp:  [12-23] 12  BP: ()/(12-98) 94/60  Intake and Output Last 3 Shifts:  I/O last 3 completed shifts:  In: 1500 [IV Piggyback:1500]  Out: 1850 [Urine:1850]    Constitutional:  Well developed, well nourished, no acute distress, non-toxic appearance, Elderly male  Eyes:  PERRL, conjunctiva normal   HENT:  Atraumatic, external ears normal, nose normal, oropharynx moist, no pharyngeal exudates.   Neck- normal range of motion, no tenderness, supple   Respiratory:  No respiratory distress, normal breath sounds, no rales, no wheezing   Cardiovascular:  Normal rate, normal rhythm, no murmurs, no gallops, no rubs   GI:  Soft, nondistended, normal bowel sounds, nontender, no organomegaly, no mass, no rebound, no guarding   :  No costovertebral angle tenderness   Musculoskeletal:  No edema, no tenderness, no deformities. Back- no tenderness  Integument:  Well hydrated, no rash   Lymphatic:  No lymphadenopathy noted   Neurologic:  Alert & oriented x 3, CN 2-12 normal, normal motor function, normal sensory function, no focal deficits noted   Psychiatric:  Speech and behavior appropriate     LABS    Lab  Results (last 24 hours)     Procedure Component Value Units Date/Time    Lactic Acid, Plasma [[id number]]  (Normal) Collected:  07/02/20 1243    Specimen:  Blood Updated:  07/02/20 1310     Lactate 1.8 mmol/L     Troponin [[id number]]  (Normal) Collected:  07/02/20 1027    Specimen:  Blood Updated:  07/02/20 1118     Troponin T <0.010 ng/mL     Narrative:       Troponin T Reference Range:  <= 0.03 ng/mL-   Negative for AMI  >0.03 ng/mL-     Abnormal for myocardial necrosis.  Clinicians would have to utilize clinical acumen, EKG, Troponin and serial changes to determine if it is an Acute Myocardial Infarction or myocardial injury due to an underlying chronic condition.       Results may be falsely decreased if patient taking Biotin.      Blood Gas, Arterial [[id number]]  (Abnormal) Collected:  07/02/20 1049    Specimen:  Arterial Blood Updated:  07/02/20 1053     Site Right Radial     Sunil's Test Positive     pH, Arterial 7.531 pH units      pCO2, Arterial 31.3 mm Hg      pO2, Arterial 79.4 mm Hg      HCO3, Arterial 26.2 mmol/L      Base Excess, Arterial 3.9 mmol/L      Comment: Serial Number: [id number]:  [id number]        O2 Saturation, Arterial 97.1 %      CO2 Content 27.2 mmol/L      Barometric Pressure for Blood Gas --     Comment: N/A        Modality Cannula     FIO2 <21 %      Ventilator Mode 3     Hemodilution No    BUN [[id number]]  (Normal) Collected:  07/02/20 0457    Specimen:  Blood Updated:  07/02/20 0723     BUN 22 mg/dL     Urinalysis With Culture If Indicated - Urine, Clean Catch [[id number]]  (Normal) Collected:  07/02/20 0612    Specimen:  Urine, Clean Catch Updated:  07/02/20 0659     Color, UA Yellow     Appearance, UA Clear     pH, UA <=5.0     Specific Gravity, UA 1.008     Glucose, UA Negative     Ketones, UA Negative     Bilirubin, UA Negative     Blood, UA Negative     Protein, UA Negative     Leuk Esterase, UA Negative     Nitrite, UA Negative     Urobilinogen, UA 0.2 E.U./dL    Narrative:        Urine microscopic not indicated.    Troponin [[id number]]  (Normal) Collected:  07/02/20 0457    Specimen:  Blood Updated:  07/02/20 0543     Troponin T 0.017 ng/mL     Narrative:       Troponin T Reference Range:  <= 0.03 ng/mL-   Negative for AMI  >0.03 ng/mL-     Abnormal for myocardial necrosis.  Clinicians would have to utilize clinical acumen, EKG, Troponin and serial changes to determine if it is an Acute Myocardial Infarction or myocardial injury due to an underlying chronic condition.       Results may be falsely decreased if patient taking Biotin.      TSH [[id number]]  (Normal) Collected:  07/02/20 0457    Specimen:  Blood Updated:  07/02/20 0543     TSH 0.524 uIU/mL     Basic Metabolic Panel [[id number]]  (Abnormal) Collected:  07/02/20 0457    Specimen:  Blood Updated:  07/02/20 0531     Glucose 172 mg/dL      BUN --     Comment: Testing performed by alternate method        Creatinine 0.80 mg/dL      Sodium 146 mmol/L      Potassium 3.5 mmol/L      Comment: Specimen hemolyzed.  Results may be affected.        Chloride 104 mmol/L      CO2 23.0 mmol/L      Calcium 8.8 mg/dL      eGFR Non African Amer 93 mL/min/1.73      BUN/Creatinine Ratio --     Comment: Testing not performed        Anion Gap 19.0 mmol/L     Narrative:       GFR Normal >60  Chronic Kidney Disease <60  Kidney Failure <15      Magnesium [[id number]]  (Normal) Collected:  07/02/20 0457    Specimen:  Blood Updated:  07/02/20 0531     Magnesium 2.0 mg/dL     CBC Auto Differential [[id number]]  (Abnormal) Collected:  07/02/20 0457    Specimen:  Blood Updated:  07/02/20 0515     WBC 7.50 10*3/mm3      RBC 3.82 10*6/mm3      Hemoglobin 11.6 g/dL      Hematocrit 34.3 %      MCV 89.9 fL      MCH 30.4 pg      MCHC 33.8 g/dL      RDW 14.8 %      RDW-SD 47.3 fl      MPV 8.3 fL      Platelets 227 10*3/mm3      Neutrophil % 86.4 %      Lymphocyte % 7.9 %      Monocyte % 5.1 %      Eosinophil % 0.0 %      Basophil % 0.6 %      Neutrophils, Absolute 6.50  10*3/mm3      Lymphocytes, Absolute 0.60 10*3/mm3      Monocytes, Absolute 0.40 10*3/mm3      Eosinophils, Absolute 0.00 10*3/mm3      Basophils, Absolute 0.00 10*3/mm3      nRBC 0.0 /100 WBC     Lactic Acid, Reflex [[id number]]  (Abnormal) Collected:  07/02/20 0401    Specimen:  Blood Updated:  07/02/20 0443     Lactate 5.1 mmol/L     Lactic Acid, Reflex Timer (This will reflex a repeat order 3-3:15 hours after ordered.) [[id number]] Collected:  07/01/20 2352    Specimen:  Blood Updated:  07/02/20 0300     Hold Tube Hold for add-ons.     Comment: Auto resulted.       Blood Culture - Blood, Arm, Left [[id number]] Collected:  07/02/20 0249    Specimen:  Blood from Arm, Left Updated:  07/02/20 0255    Respiratory Panel, PCR - Swab, Nasopharynx [[id number]]  (Normal) Collected:  07/02/20 0027    Specimen:  Swab from Nasopharynx Updated:  07/02/20 0153     ADENOVIRUS, PCR Not Detected     Coronavirus 229E Not Detected     Coronavirus HKU1 Not Detected     Coronavirus NL63 Not Detected     Coronavirus OC43 Not Detected     Human Metapneumovirus Not Detected     Human Rhinovirus/Enterovirus Not Detected     Influenza B PCR Not Detected     Parainfluenza Virus 1 Not Detected     Parainfluenza Virus 2 Not Detected     Parainfluenza Virus 3 Not Detected     Parainfluenza Virus 4 Not Detected     Bordetella pertussis pcr Not Detected     Influenza A H1 2009 PCR Not Detected     Chlamydophila pneumoniae PCR Not Detected     Mycoplasma pneumo by PCR Not Detected     Influenza A PCR Not Detected     Influenza A H3 Not Detected     Influenza A H1 Not Detected     RSV, PCR Not Detected    Narrative:       The coronavirus on the RVP is NOT COVID-19 and is NOT indicative of infection with COVID-19.     COVID-19 [facility] IN-HOUSE, Nasal Swab No Transport Media - Swab, Nasal Cavity [[id number]]  (Normal) Collected:  07/02/20 0027    Specimen:  Swab from Nasal Cavity Updated:  07/02/20 0104     COVID19 Not Detected    Narrative:       Fact  sheet for providers: https://www.fda.gov/media/998881/download     Fact sheet for patients: https://www.fda.gov/media/865713/download    Extra Tubes [[id number]] Collected:  07/01/20 2348    Specimen:  Blood from Arm, Right Updated:  07/02/20 0100    Narrative:       The following orders were created for panel order Extra Tubes.  Procedure                               Abnormality         Status                     ---------                               -----------         ------                     Light Blue Top[[id number]]                                   Final result               Gold Top - SST[[id number]]                                   Final result                 Please view results for these tests on the individual orders.    Light Blue Top [[id number]] Collected:  07/01/20 2348    Specimen:  Blood from Arm, Right Updated:  07/02/20 0100     Extra Tube hold for add-on     Comment: Auto resulted       Gold Top - SST [[id number]] Collected:  07/01/20 2348    Specimen:  Blood from Arm, Right Updated:  07/02/20 0100     Extra Tube Hold for add-ons.     Comment: Auto resulted.       Lactate Dehydrogenase [[id number]]  (Abnormal) Collected:  07/01/20 2348    Specimen:  Blood from Arm, Right Updated:  07/02/20 0039      U/L     C-reactive Protein [[id number]]  (Abnormal) Collected:  07/01/20 2348    Specimen:  Blood from Arm, Right Updated:  07/02/20 0039     C-Reactive Protein 28.60 mg/dL     BNP [[id number]]  (Abnormal) Collected:  07/01/20 2348    Specimen:  Blood from Arm, Right Updated:  07/02/20 0036     proBNP 3,485.0 pg/mL     Narrative:       Among patients with dyspnea, NT-proBNP is highly sensitive for the detection of acute congestive heart failure. In addition NT-proBNP of <300 pg/ml effectively rules out acute congestive heart failure with 99% negative predictive value.    Results may be falsely decreased if patient taking Biotin.      BUN [[id number]]  (Normal) Collected:  07/01/20 2348     "Specimen:  Blood from Arm, Right Updated:  07/02/20 0033     BUN 23 mg/dL     Procalcitonin [[id number]]  (Normal) Collected:  07/01/20 2348    Specimen:  Blood from Arm, Right Updated:  07/02/20 0023     Procalcitonin 0.11 ng/mL     Narrative:       As a Marker for Sepsis (Non-Neonates):   1. <0.5 ng/mL represents a low risk of severe sepsis and/or septic shock.  1. >2 ng/mL represents a high risk of severe sepsis and/or septic shock.    As a Marker for Lower Respiratory Tract Infections that require antibiotic therapy:  PCT on Admission     Antibiotic Therapy             6-12 Hrs later  > 0.5                Strongly Recommended            >0.25 - <0.5         Recommended  0.1 - 0.25           Discouraged                   Remeasure/reassess PCT  <0.1                 Strongly Discouraged          Remeasure/reassess PCT      As 28 day mortality risk marker: \"Change in Procalcitonin Result\" (> 80 % or <=80 %) if Day 0 (or Day 1) and Day 4 values are available. Refer to http://www.[employer]-pct-calculator.com/   Change in PCT <=80 %   A decrease of PCT levels below or equal to 80 % defines a positive change in PCT test result representing a higher risk for 28-day all-cause mortality of patients diagnosed with severe sepsis or septic shock.  Change in PCT > 80 %   A decrease of PCT levels of more than 80 % defines a negative change in PCT result representing a lower risk for 28-day all-cause mortality of patients diagnosed with severe sepsis or septic shock.                Results may be falsely decreased if patient taking Biotin.     Comprehensive Metabolic Panel [[id number]]  (Abnormal) Collected:  07/01/20 2348    Specimen:  Blood from Arm, Right Updated:  07/02/20 0019     Glucose 119 mg/dL      BUN --     Comment: Testing performed by alternate method        Creatinine 0.69 mg/dL      Sodium 145 mmol/L      Potassium 4.1 mmol/L      Chloride 104 mmol/L      CO2 22.0 mmol/L      Calcium 9.1 mg/dL      Total Protein 6.9 " g/dL      Albumin 3.50 g/dL      ALT (SGPT) 15 U/L      AST (SGOT) 31 U/L      Alkaline Phosphatase 54 U/L      Total Bilirubin 1.5 mg/dL      eGFR Non African Amer 110 mL/min/1.73      Globulin 3.4 gm/dL      A/G Ratio 1.0 g/dL      BUN/Creatinine Ratio --     Comment: Testing not performed        Anion Gap 19.0 mmol/L     Narrative:       GFR Normal >60  Chronic Kidney Disease <60  Kidney Failure <15      Lipase [[id number]]  (Normal) Collected:  07/01/20 2348    Specimen:  Blood from Arm, Right Updated:  07/02/20 0019     Lipase 13 U/L     Troponin [[id number]]  (Normal) Collected:  07/01/20 2348    Specimen:  Blood from Arm, Right Updated:  07/02/20 0019     Troponin T <0.010 ng/mL     Narrative:       Troponin T Reference Range:  <= 0.03 ng/mL-   Negative for AMI  >0.03 ng/mL-     Abnormal for myocardial necrosis.  Clinicians would have to utilize clinical acumen, EKG, Troponin and serial changes to determine if it is an Acute Myocardial Infarction or myocardial injury due to an underlying chronic condition.       Results may be falsely decreased if patient taking Biotin.      Magnesium [[id number]]  (Normal) Collected:  07/01/20 2348    Specimen:  Blood from Arm, Right Updated:  07/02/20 0019     Magnesium 2.4 mg/dL     CBC & Differential [[id number]] Collected:  07/01/20 2348    Specimen:  Blood from Arm, Right Updated:  07/02/20 0005    Narrative:       The following orders were created for panel order CBC & Differential.  Procedure                               Abnormality         Status                     ---------                               -----------         ------                     CBC Auto Differential[[id number]]        Abnormal            Final result                 Please view results for these tests on the individual orders.    CBC Auto Differential [[id number]]  (Abnormal) Collected:  07/01/20 2348    Specimen:  Blood from Arm, Right Updated:  07/02/20 0005     WBC 9.60 10*3/mm3      RBC 3.87  10*6/mm3      Hemoglobin 11.7 g/dL      Hematocrit 35.5 %      MCV 91.7 fL      MCH 30.2 pg      MCHC 32.9 g/dL      RDW 14.8 %      RDW-SD 47.3 fl      MPV 8.6 fL      Platelets 253 10*3/mm3      Neutrophil % 76.1 %      Lymphocyte % 13.1 %      Monocyte % 10.1 %      Eosinophil % 0.2 %      Basophil % 0.5 %      Neutrophils, Absolute 7.30 10*3/mm3      Lymphocytes, Absolute 1.30 10*3/mm3      Monocytes, Absolute 1.00 10*3/mm3      Eosinophils, Absolute 0.00 10*3/mm3      Basophils, Absolute 0.10 10*3/mm3      nRBC 0.1 /100 WBC     Blood Culture - Blood, Arm, Right [[id number]] Collected:  07/01/20 2348    Specimen:  Blood from Arm, Right Updated:  07/01/20 2355    POC Lactate [[id number]]  (Abnormal) Collected:  07/01/20 2352    Specimen:  Blood Updated:  07/01/20 2353     Lactate 2.5 mmol/L      Comment: Serial Number: [id number]:  [id number]            Microbiology Results (last 10 days)     Procedure Component Value - Date/Time    Respiratory Panel, PCR - Swab, Nasopharynx [[id number]]  (Normal) Collected:  07/02/20 0027    Lab Status:  Final result Specimen:  Swab from Nasopharynx Updated:  07/02/20 0153     ADENOVIRUS, PCR Not Detected     Coronavirus 229E Not Detected     Coronavirus HKU1 Not Detected     Coronavirus NL63 Not Detected     Coronavirus OC43 Not Detected     Human Metapneumovirus Not Detected     Human Rhinovirus/Enterovirus Not Detected     Influenza B PCR Not Detected     Parainfluenza Virus 1 Not Detected     Parainfluenza Virus 2 Not Detected     Parainfluenza Virus 3 Not Detected     Parainfluenza Virus 4 Not Detected     Bordetella pertussis pcr Not Detected     Influenza A H1 2009 PCR Not Detected     Chlamydophila pneumoniae PCR Not Detected     Mycoplasma pneumo by PCR Not Detected     Influenza A PCR Not Detected     Influenza A H3 Not Detected     Influenza A H1 Not Detected     RSV, PCR Not Detected    Narrative:       The coronavirus on the RVP is NOT COVID-19 and is NOT  indicative of infection with COVID-19.     COVID-19 [facility] IN-HOUSE, Nasal Swab No Transport Media - Swab, Nasal Cavity [[id number]]  (Normal) Collected:  07/02/20 0027    Lab Status:  Final result Specimen:  Swab from Nasal Cavity Updated:  07/02/20 0104     COVID19 Not Detected    Narrative:       Fact sheet for providers: https://www.fda.gov/media/997563/download     Fact sheet for patients: https://www.fda.gov/media/509713/download         CBC  Results from last 7 days   Lab Units 07/02/20  0457 07/01/20  2348   WBC 10*3/mm3 7.50 9.60   RBC 10*6/mm3 3.82* 3.87*   HEMOGLOBIN g/dL 11.6* 11.7*   HEMATOCRIT % 34.3* 35.5*   MCV fL 89.9 91.7   PLATELETS 10*3/mm3 227 253       BMP  Results from last 7 days   Lab Units 07/02/20  0457 07/01/20  2348   SODIUM mmol/L 146* 145   POTASSIUM mmol/L 3.5 4.1   CHLORIDE mmol/L 104 104   CO2 mmol/L 23.0 22.0   BUN  22 23   CREATININE mg/dL 0.80 0.69*   GLUCOSE mg/dL 172* 119*   MAGNESIUM mg/dL 2.0 2.4       CMP Results from last 7 days   Lab Units 07/02/20  0457 07/01/20  2348   SODIUM mmol/L 146* 145   POTASSIUM mmol/L 3.5 4.1   CHLORIDE mmol/L 104 104   CO2 mmol/L 23.0 22.0   BUN  22 23   CREATININE mg/dL 0.80 0.69*   GLUCOSE mg/dL 172* 119*   ALBUMIN g/dL  --  3.50   BILIRUBIN mg/dL  --  1.5*   ALK PHOS U/L  --  54   AST (SGOT) U/L  --  31   ALT (SGPT) U/L  --  15   LIPASE U/L  --  13       TROPONIN  Results from last 7 days   Lab Units 07/02/20  1027   TROPONIN T ng/mL <0.010       CoAg        Creatinine Clearance  Estimated Creatinine Clearance: 70.8 mL/min (by C-G formula based on SCr of 0.8 mg/dL).    ABG  Results from last 7 days   Lab Units 07/02/20  1049   PH, ARTERIAL pH units 7.531*   PCO2, ARTERIAL mm Hg 31.3*   PO2 ART mm Hg 79.4*   O2 SATURATION ART % 97.1   BASE EXCESS ART mmol/L 3.9*     IMAGING & OTHER STUDIES    Imaging Results (Last 72 Hours)     Procedure Component Value Units Date/Time    CT Chest Without Contrast [[id number]] Collected:  07/02/20 1412      Updated:  07/02/20 1431    Narrative:       CT CHEST WO CONTRAST-, CT ABDOMEN PELVIS WO CONTRAST-     Date of Exam: 7/2/2020 1:46 PM     Indication: pneumonia; I48.91-Unspecified atrial fibrillation;  I50.9-Heart failure, unspecified; J18.9-Pneumonia, unspecified organism.   Sepsis      Comparison Exams: None available.     Technique: Multiple axial images were obtained from the thoracic inlet  through the sepsis pubis without the administration of IV contrast. The  axial data was used to generate reformatted images in the coronal and  sagittal planes.     Automated exposure control and iterative reconstruction methods were  used.     FINDINGS:  Chest: There is mediastinal adenopathy.  Precarinal node measures 2.5 x  1.8 cm in size.  No hilar or axillary adenopathy identified.  There is  emphysematous change of the lungs.  There is groundglass airspace  disease and interlobular septal thickening within the bilateral upper  lobes and patchy areas of additional groundglass airspace disease and  interlobular septal thickening within the bilateral lower lobes.   Findings are nonspecific and may be related to pneumonia including  atypical pneumonia as well as Covid 19 pneumonia.  Clinical correlation  recommended.  Within the right upper lobe there is a noncalcified 7 mm  pulmonary nodule.  Patient status post right middle lobectomy.  There is  low density material tracking within the major fissure which may be due  to small right pleural effusion.  Pleural scarring or treatment-related  change could also give this appearance.  Left within the posterior left  lower lobe there is a noncalcified 4 mm nodule.     ABDOMEN: Liver, pancreas, and spleen are within normal limits.   Bilateral adrenal glands appear normal.  Again demonstrated is a large  cyst arises from the upper pole the right kidney.  Kidneys are otherwise  unremarkable.  No renal or ureteral stone or hydronephrosis.  There is  some high density material seen  dependently within the gallbladder which  may be due to sludge or multiple small stones.  There is no inflammatory  change around the gallbladder.  No evidence of biliary tract  obstruction.  Upper GI tract appears within normal limits.  There is an  abdominal aortic aneurysm measuring 3.8 x 3.7 cm in size.  No abdominal  adenopathy or free intraperitoneal fluid identified.     Pelvis: Urinary bladder is within normal limits.  GI tract is  unremarkable.  There is no pelvic or inguinal adenopathy.  No free  intraperitoneal fluid is seen.  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  Left common iliac artery  measures 1.9 cm in size.     Bones: There is diffuse osteopenia.  Patient status post arthrodesis of  the left sacroiliac joint.  There are degenerative changes throughout  spine.  There are postoperative changes of the lumbar spine with fusion  from the T12-L4 vertebral levels.  No hardware complication identified.   There are no suspicious lytic or sclerotic bony lesions.       Impression:          1.  Bilateral groundglass airspace disease with interlobular septal  thickening with upper lobe predominance.  Findings are nonspecific but  can be seen with Covid 19 pneumonia.  Other bacterial and atypical  pneumonias can give this appearance as well.  2.  Status post right middle lobectomy.  3.  Thickening along the major fissure on the right which could be due  to a small amount of pleural fluid or pleural scarring related to  treatment change.  4.  No acute process seen within the abdomen.  5.  Abdominal aortic aneurysm measuring 3.8 cm in size.     Electronically Signed By-Lico Ferrari On:7/2/2020 2:29 PM  This report was finalized on [id number] by  Lico Ferrari, .    CT Abdomen Pelvis Without Contrast [[id number]] Collected:  07/02/20 1416     Updated:  07/02/20 1431    Narrative:       CT CHEST WO CONTRAST-, CT ABDOMEN PELVIS WO CONTRAST-     Date of Exam: 7/2/2020  1:46 PM     Indication: pneumonia; I48.91-Unspecified atrial fibrillation;  I50.9-Heart failure, unspecified; J18.9-Pneumonia, unspecified organism.   Sepsis      Comparison Exams: None available.     Technique: Multiple axial images were obtained from the thoracic inlet  through the sepsis pubis without the administration of IV contrast. The  axial data was used to generate reformatted images in the coronal and  sagittal planes.     Automated exposure control and iterative reconstruction methods were  used.     FINDINGS:  Chest: There is mediastinal adenopathy.  Precarinal node measures 2.5 x  1.8 cm in size.  No hilar or axillary adenopathy identified.  There is  emphysematous change of the lungs.  There is groundglass airspace  disease and interlobular septal thickening within the bilateral upper  lobes and patchy areas of additional groundglass airspace disease and  interlobular septal thickening within the bilateral lower lobes.   Findings are nonspecific and may be related to pneumonia including  atypical pneumonia as well as Covid 19 pneumonia.  Clinical correlation  recommended.  Within the right upper lobe there is a noncalcified 7 mm  pulmonary nodule.  Patient status post right middle lobectomy.  There is  low density material tracking within the major fissure which may be due  to small right pleural effusion.  Pleural scarring or treatment-related  change could also give this appearance.  Left within the posterior left  lower lobe there is a noncalcified 4 mm nodule.     ABDOMEN: Liver, pancreas, and spleen are within normal limits.   Bilateral adrenal glands appear normal.  Again demonstrated is a large  cyst arises from the upper pole the right kidney.  Kidneys are otherwise  unremarkable.  No renal or ureteral stone or hydronephrosis.  There is  some high density material seen dependently within the gallbladder which  may be due to sludge or multiple small stones.  There is no inflammatory  change  around the gallbladder.  No evidence of biliary tract  obstruction.  Upper GI tract appears within normal limits.  There is an  abdominal aortic aneurysm measuring 3.8 x 3.7 cm in size.  No abdominal  adenopathy or free intraperitoneal fluid identified.     Pelvis: Urinary bladder is within normal limits.  GI tract is  unremarkable.  There is no pelvic or inguinal adenopathy.  No free  intraperitoneal fluid is seen.  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  Left common iliac artery  measures 1.9 cm in size.     Bones: There is diffuse osteopenia.  Patient status post arthrodesis of  the left sacroiliac joint.  There are degenerative changes throughout  spine.  There are postoperative changes of the lumbar spine with fusion  from the T12-L4 vertebral levels.  No hardware complication identified.   There are no suspicious lytic or sclerotic bony lesions.       Impression:          1.  Bilateral groundglass airspace disease with interlobular septal  thickening with upper lobe predominance.  Findings are nonspecific but  can be seen with Covid 19 pneumonia.  Other bacterial and atypical  pneumonias can give this appearance as well.  2.  Status post right middle lobectomy.  3.  Thickening along the major fissure on the right which could be due  to a small amount of pleural fluid or pleural scarring related to  treatment change.  4.  No acute process seen within the abdomen.  5.  Abdominal aortic aneurysm measuring 3.8 cm in size.     Electronically Signed By-Lico Ferrari On:7/2/2020 2:29 PM  This report was finalized on [id number] by  Lico Ferrari, .    CT Sinus Without Contrast [[id number]] Collected:  07/02/20 1409     Updated:  07/02/20 1416    Narrative:       CT SINUS WO CONTRAST-     Date of Exam: 7/2/2020 1:45 PM     Indication: severe sinusitis and epistaxis, sepsis; I48.91-Unspecified  atrial fibrillation; I50.9-Heart failure, unspecified;  J18.9-Pneumonia,  unspecified organism.     Comparison: CT head without contrast 11/20/2019. No prior dedicated CT  sinus study at this institution for comparison.      Technique: Contiguous axial images of the paranasal sinuses were  performed.  Coronal and sagittal reconstructions were performed.  Automated exposure control and iterative reconstruction methods were  used.     FINDINGS:  There is nasal septal deviation toward the right with a right-sided bony  apical spur measuring 5 mm. The osteomeatal units are normal.  No  osteolytic or osteoblastic lesions are present. There is mild the left  maxillary sinus mucosal thickening superiorly. There is a small mucous  retention cyst or polyp in the left maxillary sinus anteriorly measuring  7 x 12 x 11 mm. A small mucous retention cyst or polyp in the right  sphenoid sinus laterally measures 9 x 10 x 7 mm. Otherwise, the  paranasal sinuses are clear. No air-fluid levels are identified. The  cribriform plate is intact.  The orbital spaces are clear.  No soft  tissue masses.       Impression:          1. No evidence of acute/active sinusitis.  2. Minor left maxillary sinus mucosal thickening. Small mucous retention  cysts or polyps within the left maxillary and right sphenoid sinus.  3. Bony nasal septal deviation toward the right.     Electronically Signed By-Dr. Gladys Lau MD On:7/2/2020 2:14 PM  This report was finalized on [id number] by Dr. Gladys Lau MD.    XR Chest 1 View [[id number]] Collected:  07/02/20 0724     Updated:  07/02/20 0729    Narrative:       XR CHEST 1 VW-     Date of Exam: 7/1/2020 11:50 PM     Indication: Atrial fibrillation and vomiting.     Comparison: 11/7/2016     Technique: A single view of the chest was obtained.     FINDINGS:      Heart size is at the upper limits of normal.  There is new  interstitial and alveolar airspace disease within both lungs with slight  upper lobe predominance.  Findings may be secondary to pulmonary  edema  or atypical pneumonia.  Pulmonary vessels are indistinct consistent with  pulmonary vascular congestion.  There is a more dense area of airspace  consolidation within the right lung base.  There is a probable small  right pleural effusion.  No definite left pleural effusion identified.             Impression:             1.  Borderline heart size with pulmonary vascular congestion.  2.  Bilateral interstitial and alveolar airspace disease favored to be  due to pulmonary edema or less likely atypical pneumonia.        Electronically Signed By-Lico Ferrari On:7/2/2020 7:26 AM  This report was finalized on [id number] by  Lico Ferrari, .          ASSESSMENT      Atrial fibrillation (CMS/HCC)    CAD (coronary artery disease)    Chronic anticoagulation    Hyperlipidemia    Presence of stent in right coronary artery    Chronic pain    Sepsis (CMS/HCC)      1.  Bilateral PNA (viral vs bacterial) other differentials include Pulmonary edema and possible hemorrhage  2. COPD with exacerbation  3. H/o RML lobectomy  4. CAD  5. Acute hypoxia due to above  6. Afib with RVR  7. Chronic Hypotension  8. Chronic Anticoagulation with Xarelto  9. Lactic acidosis    PLAN    See orders. The plan was discussed with the patient and/or family.     1.  Reviewed CT scan of the chest and discussed with the patient in detail.  Covid test is negative.  Will repeat COVID test.  If negative can consider bronchoscopy.  Will continue IV antibiotics with Zosyn.  Procalcitonin mildly elevated only.  Urine for Legionella and streptococcal antigen negative.  Follow sputum studies.    2.  I will add IV Solu-Medrol.  Patient has extensive bilateral emphysema with bullous disease noted.  Start patient on scheduled DuoNeb along with Pulmicort nebs.    3.  Patient has chronic hypertension and is on Florinef and hydrocortisone    4.  Currently on Cardizem drip per cardiology.  Patient remains on Xarelto and digoxin    Patient is currently  requiring 3 L oxygen via nasal cannula.  Continue weaning as tolerated.    I thank you for this opportunity to take part in this patient's care and will follow the patient along with you.

## 2020-07-02 NOTE — PLAN OF CARE
Problem: Patient Care Overview  Goal: Plan of Care Review  Outcome: Ongoing (interventions implemented as appropriate)  Note:   Pt recently admitted to the unit with a-fib with RVR. Cardizem drip infusing. HR uncontrolled. Lactate significantly increased. Pt is anxious, but resting in between care.   Goal: Individualization and Mutuality  Outcome: Ongoing (interventions implemented as appropriate)  Goal: Discharge Needs Assessment  Outcome: Ongoing (interventions implemented as appropriate)  Goal: Interprofessional Rounds/Family Conf  Outcome: Ongoing (interventions implemented as appropriate)

## 2020-07-02 NOTE — ED NOTES
While giving patient Inapsine, I noticed Pt's O2 sats were 88. I hooked pt to 3L nasal canula. Pt's O2 now 95.     Pedro Nuñez, RN  07/02/20 9241

## 2020-07-02 NOTE — ED NOTES
Patient reports generalized weakness, fatigue, nausea, and vomiting x4 days. Symptoms got worse tonight.     Keren Patel LPN  07/02/20 0035

## 2020-07-02 NOTE — NURSING NOTE
After full head to toe assessment was completed on admission, CESIA Mar was notified that pt's HR remains 125-165. oCy ordered a 10mg cardizem bolus. About 10 minutes later, also notified Coy of pt's latest lactate increased to 5.1. Coy ordered 500ml NS bolus. Coy then called back to modify the order to 1000ml and add lasix once bolus complete, also cardiology consult called in to office. All orders repeated back. Pt is resting in bed at this time. Will continue to monitor closely.

## 2020-07-02 NOTE — PROGRESS NOTES
Continued Stay Note   [facility]     Patient Name: Shukri Park  MRN: [id number]  Today's Date: 7/2/2020    Admit Date: 7/1/2020    Discharge Plan     Row Name 07/02/20 1232       Plan    Plan  P.T./O.T. eval -pending .     Plan Comments  Spoke with pt - he lives alone and has help thru [facility] 2/days week-Wed/Fri. Pt said he has had [facility] ,in past and has been to [facility]. Pt does not think he will need rehab ,at this time.     Row Name 07/02/20 1111       Plan    Plan Comments  Called pt's rm -no ans. -called Josie /Narendra [phone] left message.                    Letty Roberts RN

## 2020-07-02 NOTE — ED PROVIDER NOTES
Subjective   History of Present Illness  79-year-old male has had a 2-day history of generalized aches and pains as well as weakness and occasional cough as well as postnasal drainage.  The patient has had nausea and vomiting.  He denies any diarrhea.  The patient denies any skin sores and he denies any difficulties with urination.  The patient has a history of atrial fibrillation as well as aortic aneurysm.  He has chronic low back pain as well as elevated cholesterol.  Review of Systems  The patient does not want to answer a review of systems  Past Medical History:   Diagnosis Date   • A-fib (CMS/HCC)    • Aortic aneurysm (CMS/HCC)    • Appetite loss    • Cancer (CMS/HCC)     right lobe cancer - surgically removed    • Dark stools    • Foot pain, bilateral    • Hyperlipidemia    • Low back pain    • S/P epidural steroid injection     Israel Gomes's - no relief   • Weight loss     acute loss of weight 30 lbs       Allergies   Allergen Reactions   • Ciprofloxacin Anaphylaxis   • Amlodipine Unknown (See Comments)   • Rocephin [Ceftriaxone] Other (See Comments)     Mouth sores       Past Surgical History:   Procedure Laterality Date   • CATARACT EXTRACTION, BILATERAL     • CORONARY ANGIOPLASTY WITH STENT PLACEMENT     • LUMBAR DECOMPRESSION  09/2015    L2-L3   • LUMBAR DECOMPRESSION  2006    Dr. Logan   • OTHER SURGICAL HISTORY  05/2015    left SI fusion with bone graft - Dr. Presley   • OTHER SURGICAL HISTORY      carotid artery repair    • OTHER SURGICAL HISTORY Left 02/19/2016    Left SI fusion 2/19/2016 Dr. Zendejas   • POSTERIOR SPINAL FUSION  10/24/2016    PSF T12-3/TLIF L3-L4 poss L2-L3 --- Dr. 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       Family History   Problem Relation Age of Onset   • Cancer Other    • Hyperlipidemia Other    • Heart disease Other        Social History     Socioeconomic History   • Marital status: Legally      Spouse name: Not on file   • Number of  children: Not on file   • Years of education: Not on file   • Highest education level: Not on file   Tobacco Use   • Smoking status: Never Smoker   • Smokeless tobacco: Never Used   Substance and Sexual Activity   • Alcohol use: No     Frequency: Never   • Drug use: Never   • Sexual activity: Defer           Objective   Physical Exam  The patient is awake and alert he was afebrile his pulse was 163 and irregularly irregular blood pressure was 113/76.  The HEENT exam the sclera appear pale pupils equal round reactive to light EOMs are full ENT shows no exudate the neck is supple his chest reveals rales in the right base cardiovascular exam reveals an irregularly irregular rhythm at a rate of about 160.  He has a soft systolic murmur at the left sternal border.  The abdomen was soft nontender bowel sounds are positive I do not feel any masses or pulsatile masses.  The patient has good distal pulses he has no cyanosis clubbing or edema he has no rash neurologic exam is normal  Procedures           ED Course      EKG shows atrial fibrillation at a rate of 160     Results for orders placed or performed during the hospital encounter of 07/01/20   Respiratory Panel, PCR - Swab, Nasopharynx   Result Value Ref Range    ADENOVIRUS, PCR Not Detected Not Detected    Coronavirus 229E Not Detected Not Detected    Coronavirus HKU1 Not Detected Not Detected    Coronavirus NL63 Not Detected Not Detected    Coronavirus OC43 Not Detected Not Detected    Human Metapneumovirus Not Detected Not Detected    Human Rhinovirus/Enterovirus Not Detected Not Detected    Influenza B PCR Not Detected Not Detected    Parainfluenza Virus 1 Not Detected Not Detected    Parainfluenza Virus 2 Not Detected Not Detected    Parainfluenza Virus 3 Not Detected Not Detected    Parainfluenza Virus 4 Not Detected Not Detected    Bordetella pertussis pcr Not Detected Not Detected    Influenza A H1 2009 PCR Not Detected Not Detected    Chlamydophila pneumoniae  PCR Not Detected Not Detected    Mycoplasma pneumo by PCR Not Detected Not Detected    Influenza A PCR Not Detected Not Detected    Influenza A H3 Not Detected Not Detected    Influenza A H1 Not Detected Not Detected    RSV, PCR Not Detected Not Detected   COVID-19 [facility]-HOUSE, Nasal Swab No Transport Media - Swab, Nasal Cavity   Result Value Ref Range    COVID19 Not Detected Not Detected - Ref. Range   Comprehensive Metabolic Panel   Result Value Ref Range    Glucose 119 (H) 65 - 99 mg/dL    BUN      Creatinine 0.69 (L) 0.76 - 1.27 mg/dL    Sodium 145 136 - 145 mmol/L    Potassium 4.1 3.5 - 5.2 mmol/L    Chloride 104 98 - 107 mmol/L    CO2 22.0 22.0 - 29.0 mmol/L    Calcium 9.1 8.6 - 10.5 mg/dL    Total Protein 6.9 6.0 - 8.5 g/dL    Albumin 3.50 3.50 - 5.20 g/dL    ALT (SGPT) 15 1 - 41 U/L    AST (SGOT) 31 1 - 40 U/L    Alkaline Phosphatase 54 39 - 117 U/L    Total Bilirubin 1.5 (H) 0.2 - 1.2 mg/dL    eGFR Non African Amer 110 >60 mL/min/1.73    Globulin 3.4 gm/dL    A/G Ratio 1.0 g/dL    BUN/Creatinine Ratio      Anion Gap 19.0 (H) 5.0 - 15.0 mmol/L   Lipase   Result Value Ref Range    Lipase 13 13 - 60 U/L   Troponin   Result Value Ref Range    Troponin T <0.010 0.000 - 0.030 ng/mL   Procalcitonin   Result Value Ref Range    Procalcitonin 0.11 0.10 - 0.25 ng/mL   Magnesium   Result Value Ref Range    Magnesium 2.4 1.6 - 2.4 mg/dL   CBC Auto Differential   Result Value Ref Range    WBC 9.60 3.40 - 10.80 10*3/mm3    RBC 3.87 (L) 4.14 - 5.80 10*6/mm3    Hemoglobin 11.7 (L) 13.0 - 17.7 g/dL    Hematocrit 35.5 (L) 37.5 - 51.0 %    MCV 91.7 79.0 - 97.0 fL    MCH 30.2 26.6 - 33.0 pg    MCHC 32.9 31.5 - 35.7 g/dL    RDW 14.8 12.3 - 15.4 %    RDW-SD 47.3 37.0 - 54.0 fl    MPV 8.6 6.0 - 12.0 fL    Platelets 253 140 - 450 10*3/mm3    Neutrophil % 76.1 (H) 42.7 - 76.0 %    Lymphocyte % 13.1 (L) 19.6 - 45.3 %    Monocyte % 10.1 5.0 - 12.0 %    Eosinophil % 0.2 (L) 0.3 - 6.2 %    Basophil % 0.5 0.0 - 1.5 %     Neutrophils, Absolute 7.30 (H) 1.70 - 7.00 10*3/mm3    Lymphocytes, Absolute 1.30 0.70 - 3.10 10*3/mm3    Monocytes, Absolute 1.00 (H) 0.10 - 0.90 10*3/mm3    Eosinophils, Absolute 0.00 0.00 - 0.40 10*3/mm3    Basophils, Absolute 0.10 0.00 - 0.20 10*3/mm3    nRBC 0.1 0.0 - 0.2 /100 WBC   BUN   Result Value Ref Range    BUN 23 8 - 23 mg/dL   Lactate Dehydrogenase   Result Value Ref Range     (H) 135 - 225 U/L   C-reactive Protein   Result Value Ref Range    C-Reactive Protein 28.60 (H) 0.00 - 0.50 mg/dL   BNP   Result Value Ref Range    proBNP 3,485.0 (H) 5.0-1,800.0 pg/mL   POC Lactate   Result Value Ref Range    Lactate 2.5 (C) 0.5 - 2.0 mmol/L   Light Blue Top   Result Value Ref Range    Extra Tube hold for add-on    Gold Top - SST   Result Value Ref Range    Extra Tube Hold for add-ons.      Medications   sodium chloride 0.9 % flush 10 mL (has no administration in time range)   dilTIAZem (CARDIZEM) 100 mg in 100 mL NS (1 mg/mL) infusion (ADV) (15 mg/hr Intravenous Rate Change (DUAL SIGN) 7/2/20 0052)   droperidol (INAPSINE) injection 1.25 mg (has no administration in time range)   furosemide (LASIX) injection 40 mg (has no administration in time range)   piperacillin-tazobactam (ZOSYN) IVPB 3.375 g in 100 mL NS (CD) (has no administration in time range)   ondansetron (ZOFRAN) injection 4 mg (4 mg Intravenous Given 7/2/20 0024)   dilTIAZem (CARDIZEM) injection 5 mg (5 mg Intravenous Given 7/2/20 0024)   sodium chloride 0.9 % bolus 500 mL (0 mL Intravenous Stopped 7/2/20 0104)   dilTIAZem (CARDIZEM) injection 5 mg (5 mg Intravenous Given 7/2/20 0105)     No radiology results for the last day       Chest x-ray is remarkable for cardiomegaly and increased pulmonary vasculature possible pulmonary edema and possible pneumonia.  There is some atelectasis on the right.  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                            MDM  The patient has findings that are consistent with congestive heart failure as well as pneumonia.  The patient was treated with diuretics as well as antibiotics.  He was given supplemental O2.  The patient also has atrial fibrillation longstanding with a rapid ventricular response that was treated with Cardizem as a bolus and a drip and his rate improved.  Patient will be admitted for further evaluation.  The patient's troponin was negative.  He had an elevated BNP of 3485.  His CRP was 28 and lactic acid was 2.5.  He did meet criteria for sepsis testis but was not a candidate for fluid bolus and was not hypotensive.  The patient was also COVID negative.  Final diagnoses:   Atrial fibrillation with rapid ventricular response (CMS/HCC)   Congestive heart failure, unspecified HF chronicity, unspecified heart failure type (CMS/HCC)   Pneumonia of both lungs due to infectious organism, unspecified part of lung            Sunny Unger MD  07/02/20 0206

## 2020-07-03 ENCOUNTER — APPOINTMENT (OUTPATIENT)
Dept: ULTRASOUND IMAGING | Facility: HOSPITAL | Age: 80
End: 2020-07-03

## 2020-07-03 ENCOUNTER — INPATIENT HOSPITAL (AMBULATORY)
Dept: URBAN - METROPOLITAN AREA HOSPITAL 84 | Facility: HOSPITAL | Age: 80
End: 2020-07-03
Payer: COMMERCIAL

## 2020-07-03 DIAGNOSIS — R63.0 ANOREXIA: ICD-10-CM

## 2020-07-03 DIAGNOSIS — R93.2 ABNORMAL FINDINGS ON DIAGNOSTIC IMAGING OF LIVER AND BILIARY: ICD-10-CM

## 2020-07-03 DIAGNOSIS — R10.13 EPIGASTRIC PAIN: ICD-10-CM

## 2020-07-03 DIAGNOSIS — R11.2 NAUSEA WITH VOMITING, UNSPECIFIED: ICD-10-CM

## 2020-07-03 DIAGNOSIS — R53.1 WEAKNESS: ICD-10-CM

## 2020-07-03 DIAGNOSIS — R19.7 DIARRHEA, UNSPECIFIED: ICD-10-CM

## 2020-07-03 LAB
ADV 40+41 DNA STL QL NAA+NON-PROBE: NOT DETECTED
ALBUMIN SERPL-MCNC: 3.8 G/DL (ref 3.5–5.2)
ALBUMIN/GLOB SERPL: 1.3 G/DL
ALP SERPL-CCNC: 60 U/L (ref 39–117)
ALT SERPL W P-5'-P-CCNC: 49 U/L (ref 1–41)
ANION GAP SERPL CALCULATED.3IONS-SCNC: 20 MMOL/L (ref 5–15)
AST SERPL-CCNC: 37 U/L (ref 1–40)
ASTRO TYP 1-8 RNA STL QL NAA+NON-PROBE: NOT DETECTED
BASOPHILS # BLD AUTO: 0 10*3/MM3 (ref 0–0.2)
BASOPHILS NFR BLD AUTO: 0.2 % (ref 0–1.5)
BILIRUB SERPL-MCNC: 1.2 MG/DL (ref 0.2–1.2)
BUN SERPL-MCNC: 17 MG/DL (ref 8–23)
BUN SERPL-MCNC: ABNORMAL MG/DL
BUN/CREAT SERPL: ABNORMAL
C CAYETANENSIS DNA STL QL NAA+NON-PROBE: NOT DETECTED
CALCIUM SPEC-SCNC: 9.1 MG/DL (ref 8.6–10.5)
CAMPY SP DNA.DIARRHEA STL QL NAA+PROBE: NOT DETECTED
CHLORIDE SERPL-SCNC: 103 MMOL/L (ref 98–107)
CO2 SERPL-SCNC: 22 MMOL/L (ref 22–29)
CREAT SERPL-MCNC: 0.75 MG/DL (ref 0.76–1.27)
CRYPTOSP STL CULT: NOT DETECTED
DEPRECATED RDW RBC AUTO: 48.1 FL (ref 37–54)
DIGOXIN SERPL-MCNC: <0.3 NG/ML (ref 0.6–1.2)
E COLI DNA SPEC QL NAA+PROBE: NOT DETECTED
E HISTOLYT AG STL-ACNC: NOT DETECTED
EAEC PAA PLAS AGGR+AATA ST NAA+NON-PRB: NOT DETECTED
EC STX1 + STX2 GENES STL NAA+PROBE: NOT DETECTED
EOSINOPHIL # BLD AUTO: 0 10*3/MM3 (ref 0–0.4)
EOSINOPHIL NFR BLD AUTO: 0 % (ref 0.3–6.2)
EPEC EAE GENE STL QL NAA+NON-PROBE: NOT DETECTED
ERYTHROCYTE [DISTWIDTH] IN BLOOD BY AUTOMATED COUNT: 15 % (ref 12.3–15.4)
ETEC LTA+ST1A+ST1B TOX ST NAA+NON-PROBE: NOT DETECTED
G LAMBLIA DNA SPEC QL NAA+PROBE: NOT DETECTED
GFR SERPL CREATININE-BSD FRML MDRD: 100 ML/MIN/1.73
GLOBULIN UR ELPH-MCNC: 3 GM/DL
GLUCOSE BLDC GLUCOMTR-MCNC: 140 MG/DL (ref 70–105)
GLUCOSE BLDC GLUCOMTR-MCNC: 154 MG/DL (ref 70–105)
GLUCOSE BLDC GLUCOMTR-MCNC: 219 MG/DL (ref 70–105)
GLUCOSE SERPL-MCNC: 246 MG/DL (ref 65–99)
HCT VFR BLD AUTO: 39.4 % (ref 37.5–51)
HEMOCCULT STL QL IA: NEGATIVE
HGB BLD-MCNC: 12.7 G/DL (ref 13–17.7)
LIPASE SERPL-CCNC: 30 U/L (ref 13–60)
LYMPHOCYTES # BLD AUTO: 0.7 10*3/MM3 (ref 0.7–3.1)
LYMPHOCYTES NFR BLD AUTO: 5.1 % (ref 19.6–45.3)
MAGNESIUM SERPL-MCNC: 2.1 MG/DL (ref 1.6–2.4)
MCH RBC QN AUTO: 29.9 PG (ref 26.6–33)
MCHC RBC AUTO-ENTMCNC: 32.1 G/DL (ref 31.5–35.7)
MCV RBC AUTO: 93.1 FL (ref 79–97)
MONOCYTES # BLD AUTO: 0.5 10*3/MM3 (ref 0.1–0.9)
MONOCYTES NFR BLD AUTO: 3.4 % (ref 5–12)
NEUTROPHILS NFR BLD AUTO: 12.9 10*3/MM3 (ref 1.7–7)
NEUTROPHILS NFR BLD AUTO: 91.3 % (ref 42.7–76)
NOROVIRUS GI+II RNA STL QL NAA+NON-PROBE: NOT DETECTED
NRBC BLD AUTO-RTO: 0.1 /100 WBC (ref 0–0.2)
P SHIGELLOIDES DNA STL QL NAA+PROBE: NOT DETECTED
PLATELET # BLD AUTO: 273 10*3/MM3 (ref 140–450)
PMV BLD AUTO: 8.4 FL (ref 6–12)
POTASSIUM SERPL-SCNC: 2.6 MMOL/L (ref 3.5–5.2)
PROT SERPL-MCNC: 6.8 G/DL (ref 6–8.5)
RBC # BLD AUTO: 4.23 10*6/MM3 (ref 4.14–5.8)
RV RNA STL NAA+PROBE: NOT DETECTED
SALMONELLA DNA SPEC QL NAA+PROBE: NOT DETECTED
SAPO I+II+IV+V RNA STL QL NAA+NON-PROBE: NOT DETECTED
SHIGELLA SP+EIEC IPAH STL QL NAA+PROBE: NOT DETECTED
SODIUM SERPL-SCNC: 145 MMOL/L (ref 136–145)
V CHOLERAE DNA SPEC QL NAA+PROBE: NOT DETECTED
VIBRIO DNA SPEC NAA+PROBE: NOT DETECTED
WBC # BLD AUTO: 14.1 10*3/MM3 (ref 3.4–10.8)
YERSINIA STL CULT: NOT DETECTED

## 2020-07-03 PROCEDURE — 87324 CLOSTRIDIUM AG IA: CPT | Performed by: NURSE PRACTITIONER

## 2020-07-03 PROCEDURE — 25010000002 HYDROCORTISONE SODIUM SUCCINATE 100 MG RECONSTITUTED SOLUTION: Performed by: INTERNAL MEDICINE

## 2020-07-03 PROCEDURE — 97535 SELF CARE MNGMENT TRAINING: CPT

## 2020-07-03 PROCEDURE — 99233 SBSQ HOSP IP/OBS HIGH 50: CPT | Performed by: INTERNAL MEDICINE

## 2020-07-03 PROCEDURE — 25010000002 METOCLOPRAMIDE PER 10 MG: Performed by: NURSE PRACTITIONER

## 2020-07-03 PROCEDURE — 25010000002 METHYLPREDNISOLONE PER 40 MG: Performed by: INTERNAL MEDICINE

## 2020-07-03 PROCEDURE — 25010000002 DIGOXIN PER 500 MCG: Performed by: INTERNAL MEDICINE

## 2020-07-03 PROCEDURE — 94799 UNLISTED PULMONARY SVC/PX: CPT

## 2020-07-03 PROCEDURE — 80053 COMPREHEN METABOLIC PANEL: CPT | Performed by: INTERNAL MEDICINE

## 2020-07-03 PROCEDURE — 80162 ASSAY OF DIGOXIN TOTAL: CPT | Performed by: INTERNAL MEDICINE

## 2020-07-03 PROCEDURE — 25010000002 MORPHINE PER 10 MG: Performed by: INTERNAL MEDICINE

## 2020-07-03 PROCEDURE — 87449 NOS EACH ORGANISM AG IA: CPT | Performed by: NURSE PRACTITIONER

## 2020-07-03 PROCEDURE — 76705 ECHO EXAM OF ABDOMEN: CPT

## 2020-07-03 PROCEDURE — 63710000001 INSULIN LISPRO (HUMAN) PER 5 UNITS: Performed by: INTERNAL MEDICINE

## 2020-07-03 PROCEDURE — 25010000002 PIPERACILLIN SOD-TAZOBACTAM PER 1 G: Performed by: NURSE PRACTITIONER

## 2020-07-03 PROCEDURE — 93005 ELECTROCARDIOGRAM TRACING: CPT | Performed by: INTERNAL MEDICINE

## 2020-07-03 PROCEDURE — 25010000002 ONDANSETRON PER 1 MG: Performed by: NURSE PRACTITIONER

## 2020-07-03 PROCEDURE — 25010000002 AMIODARONE PER 30 MG: Performed by: INTERNAL MEDICINE

## 2020-07-03 PROCEDURE — 25010000002 PROMETHAZINE PER 50 MG: Performed by: INTERNAL MEDICINE

## 2020-07-03 PROCEDURE — 83690 ASSAY OF LIPASE: CPT | Performed by: INTERNAL MEDICINE

## 2020-07-03 PROCEDURE — 25010000003 POTASSIUM CHLORIDE 10 MEQ/100ML SOLUTION: Performed by: INTERNAL MEDICINE

## 2020-07-03 PROCEDURE — 0097U HC BIOFIRE FILMARRAY GI PANEL: CPT | Performed by: NURSE PRACTITIONER

## 2020-07-03 PROCEDURE — 82962 GLUCOSE BLOOD TEST: CPT

## 2020-07-03 PROCEDURE — 82274 ASSAY TEST FOR BLOOD FECAL: CPT | Performed by: INTERNAL MEDICINE

## 2020-07-03 PROCEDURE — 83735 ASSAY OF MAGNESIUM: CPT | Performed by: INTERNAL MEDICINE

## 2020-07-03 PROCEDURE — 85025 COMPLETE CBC W/AUTO DIFF WBC: CPT | Performed by: INTERNAL MEDICINE

## 2020-07-03 PROCEDURE — 25010000002 ENOXAPARIN PER 10 MG: Performed by: INTERNAL MEDICINE

## 2020-07-03 PROCEDURE — 99222 1ST HOSP IP/OBS MODERATE 55: CPT | Performed by: NURSE PRACTITIONER

## 2020-07-03 PROCEDURE — 97166 OT EVAL MOD COMPLEX 45 MIN: CPT

## 2020-07-03 RX ORDER — NICOTINE POLACRILEX 4 MG
15 LOZENGE BUCCAL
Status: DISCONTINUED | OUTPATIENT
Start: 2020-07-03 | End: 2020-07-13

## 2020-07-03 RX ORDER — DEXTROSE MONOHYDRATE 25 G/50ML
25 INJECTION, SOLUTION INTRAVENOUS
Status: DISCONTINUED | OUTPATIENT
Start: 2020-07-03 | End: 2020-07-13

## 2020-07-03 RX ORDER — DIGOXIN 0.25 MG/ML
500 INJECTION INTRAMUSCULAR; INTRAVENOUS ONCE
Status: COMPLETED | OUTPATIENT
Start: 2020-07-03 | End: 2020-07-03

## 2020-07-03 RX ORDER — POTASSIUM CHLORIDE 7.45 MG/ML
10 INJECTION INTRAVENOUS
Status: DISCONTINUED | OUTPATIENT
Start: 2020-07-03 | End: 2020-07-15 | Stop reason: HOSPADM

## 2020-07-03 RX ORDER — POTASSIUM CHLORIDE 1.5 G/1.77G
40 POWDER, FOR SOLUTION ORAL AS NEEDED
Status: DISCONTINUED | OUTPATIENT
Start: 2020-07-03 | End: 2020-07-15 | Stop reason: HOSPADM

## 2020-07-03 RX ORDER — METOPROLOL TARTRATE 5 MG/5ML
5 INJECTION INTRAVENOUS EVERY 6 HOURS
Status: DISCONTINUED | OUTPATIENT
Start: 2020-07-03 | End: 2020-07-06

## 2020-07-03 RX ORDER — MAGNESIUM SULFATE HEPTAHYDRATE 40 MG/ML
4 INJECTION, SOLUTION INTRAVENOUS AS NEEDED
Status: DISCONTINUED | OUTPATIENT
Start: 2020-07-03 | End: 2020-07-15 | Stop reason: HOSPADM

## 2020-07-03 RX ORDER — ONDANSETRON 2 MG/ML
4 INJECTION INTRAMUSCULAR; INTRAVENOUS EVERY 6 HOURS PRN
Status: DISCONTINUED | OUTPATIENT
Start: 2020-07-03 | End: 2020-07-15 | Stop reason: HOSPADM

## 2020-07-03 RX ORDER — MAGNESIUM SULFATE HEPTAHYDRATE 40 MG/ML
2 INJECTION, SOLUTION INTRAVENOUS AS NEEDED
Status: DISCONTINUED | OUTPATIENT
Start: 2020-07-03 | End: 2020-07-15 | Stop reason: HOSPADM

## 2020-07-03 RX ORDER — DIGOXIN 0.25 MG/ML
250 INJECTION INTRAMUSCULAR; INTRAVENOUS ONCE
Status: DISCONTINUED | OUTPATIENT
Start: 2020-07-03 | End: 2020-07-03

## 2020-07-03 RX ORDER — PANTOPRAZOLE SODIUM 40 MG/10ML
40 INJECTION, POWDER, LYOPHILIZED, FOR SOLUTION INTRAVENOUS
Status: DISCONTINUED | OUTPATIENT
Start: 2020-07-03 | End: 2020-07-06

## 2020-07-03 RX ORDER — METOCLOPRAMIDE HYDROCHLORIDE 5 MG/ML
10 INJECTION INTRAMUSCULAR; INTRAVENOUS EVERY 6 HOURS
Status: DISCONTINUED | OUTPATIENT
Start: 2020-07-03 | End: 2020-07-07

## 2020-07-03 RX ORDER — DIGOXIN 0.25 MG/ML
250 INJECTION INTRAMUSCULAR; INTRAVENOUS ONCE
Status: COMPLETED | OUTPATIENT
Start: 2020-07-04 | End: 2020-07-04

## 2020-07-03 RX ORDER — AMIODARONE HCL/D5W 450 MG/250
0.5 PLASTIC BAG, INJECTION (ML) INTRAVENOUS CONTINUOUS
Status: DISCONTINUED | OUTPATIENT
Start: 2020-07-04 | End: 2020-07-07

## 2020-07-03 RX ORDER — METOPROLOL TARTRATE 5 MG/5ML
2.5 INJECTION INTRAVENOUS EVERY 6 HOURS
Status: DISCONTINUED | OUTPATIENT
Start: 2020-07-03 | End: 2020-07-03

## 2020-07-03 RX ORDER — SUCRALFATE 1 G/1
1 TABLET ORAL
Status: DISCONTINUED | OUTPATIENT
Start: 2020-07-03 | End: 2020-07-15 | Stop reason: HOSPADM

## 2020-07-03 RX ORDER — POTASSIUM CHLORIDE 20 MEQ/1
40 TABLET, EXTENDED RELEASE ORAL AS NEEDED
Status: DISCONTINUED | OUTPATIENT
Start: 2020-07-03 | End: 2020-07-15 | Stop reason: HOSPADM

## 2020-07-03 RX ORDER — ONDANSETRON 2 MG/ML
4 INJECTION INTRAMUSCULAR; INTRAVENOUS EVERY 6 HOURS
Status: DISCONTINUED | OUTPATIENT
Start: 2020-07-03 | End: 2020-07-13

## 2020-07-03 RX ORDER — MORPHINE SULFATE 4 MG/ML
2 INJECTION, SOLUTION INTRAMUSCULAR; INTRAVENOUS EVERY 4 HOURS PRN
Status: DISPENSED | OUTPATIENT
Start: 2020-07-03 | End: 2020-07-13

## 2020-07-03 RX ORDER — DIGOXIN 0.25 MG/ML
125 INJECTION INTRAMUSCULAR; INTRAVENOUS
Status: DISCONTINUED | OUTPATIENT
Start: 2020-07-04 | End: 2020-07-05

## 2020-07-03 RX ORDER — AMIODARONE HCL/D5W 450 MG/250
1 PLASTIC BAG, INJECTION (ML) INTRAVENOUS CONTINUOUS
Status: DISPENSED | OUTPATIENT
Start: 2020-07-03 | End: 2020-07-04

## 2020-07-03 RX ADMIN — THERA TABS 1 TABLET: TAB at 08:42

## 2020-07-03 RX ADMIN — METOPROLOL TARTRATE 12.5 MG: 25 TABLET, FILM COATED ORAL at 01:04

## 2020-07-03 RX ADMIN — METOPROLOL TARTRATE 12.5 MG: 25 TABLET, FILM COATED ORAL at 05:16

## 2020-07-03 RX ADMIN — SODIUM CHLORIDE 15 MG/HR: 900 INJECTION, SOLUTION INTRAVENOUS at 01:53

## 2020-07-03 RX ADMIN — METHYLPREDNISOLONE SODIUM SUCCINATE 40 MG: 40 INJECTION, POWDER, FOR SOLUTION INTRAMUSCULAR; INTRAVENOUS at 01:04

## 2020-07-03 RX ADMIN — POTASSIUM CHLORIDE 10 MEQ: 7.46 INJECTION, SOLUTION INTRAVENOUS at 12:46

## 2020-07-03 RX ADMIN — MELATONIN TAB 5 MG 5 MG: 5 TAB at 20:53

## 2020-07-03 RX ADMIN — BUDESONIDE 0.5 MG: 0.5 INHALANT RESPIRATORY (INHALATION) at 06:50

## 2020-07-03 RX ADMIN — POTASSIUM CHLORIDE 20 MEQ: 1500 TABLET, EXTENDED RELEASE ORAL at 08:42

## 2020-07-03 RX ADMIN — METOCLOPRAMIDE 10 MG: 5 INJECTION, SOLUTION INTRAMUSCULAR; INTRAVENOUS at 20:54

## 2020-07-03 RX ADMIN — Medication 10 ML: at 09:10

## 2020-07-03 RX ADMIN — SODIUM CHLORIDE 15 MG/HR: 900 INJECTION, SOLUTION INTRAVENOUS at 17:33

## 2020-07-03 RX ADMIN — ONDANSETRON 4 MG: 2 INJECTION INTRAMUSCULAR; INTRAVENOUS at 20:54

## 2020-07-03 RX ADMIN — MORPHINE SULFATE: 4 INJECTION INTRAVENOUS at 18:10

## 2020-07-03 RX ADMIN — SUCRALFATE 1 G: 1 TABLET ORAL at 20:53

## 2020-07-03 RX ADMIN — ONDANSETRON 4 MG: 2 INJECTION INTRAMUSCULAR; INTRAVENOUS at 09:16

## 2020-07-03 RX ADMIN — BUDESONIDE 0.5 MG: 0.5 INHALANT RESPIRATORY (INHALATION) at 18:42

## 2020-07-03 RX ADMIN — METOPROLOL TARTRATE 12.5 MG: 25 TABLET, FILM COATED ORAL at 11:57

## 2020-07-03 RX ADMIN — METOPROLOL TARTRATE: 5 INJECTION INTRAVENOUS at 12:32

## 2020-07-03 RX ADMIN — INSULIN LISPRO 3 UNITS: 100 INJECTION, SOLUTION INTRAVENOUS; SUBCUTANEOUS at 11:41

## 2020-07-03 RX ADMIN — HYDROCORTISONE SODIUM SUCCINATE 25 MG: 100 INJECTION, POWDER, FOR SOLUTION INTRAMUSCULAR; INTRAVENOUS at 05:16

## 2020-07-03 RX ADMIN — POTASSIUM CHLORIDE 10 MEQ: 7.46 INJECTION, SOLUTION INTRAVENOUS at 15:14

## 2020-07-03 RX ADMIN — METHYLPREDNISOLONE SODIUM SUCCINATE 40 MG: 40 INJECTION, POWDER, FOR SOLUTION INTRAMUSCULAR; INTRAVENOUS at 17:52

## 2020-07-03 RX ADMIN — PIPERACILLIN AND TAZOBACTAM 3.38 G: 3; .375 INJECTION, POWDER, FOR SOLUTION INTRAVENOUS at 17:49

## 2020-07-03 RX ADMIN — ATORVASTATIN CALCIUM 20 MG: 20 TABLET, FILM COATED ORAL at 08:42

## 2020-07-03 RX ADMIN — MORPHINE SULFATE: 4 INJECTION INTRAVENOUS at 12:34

## 2020-07-03 RX ADMIN — IPRATROPIUM BROMIDE AND ALBUTEROL SULFATE 3 ML: .5; 3 SOLUTION RESPIRATORY (INHALATION) at 18:42

## 2020-07-03 RX ADMIN — METOPROLOL TARTRATE 5 MG: 5 INJECTION INTRAVENOUS at 20:54

## 2020-07-03 RX ADMIN — POTASSIUM CHLORIDE 10 MEQ: 7.46 INJECTION, SOLUTION INTRAVENOUS at 11:40

## 2020-07-03 RX ADMIN — DIGOXIN 125 MCG: 0.12 TABLET ORAL at 11:41

## 2020-07-03 RX ADMIN — INSULIN LISPRO 2 UNITS: 100 INJECTION, SOLUTION INTRAVENOUS; SUBCUTANEOUS at 17:52

## 2020-07-03 RX ADMIN — FLUDROCORTISONE ACETATE 0.1 MG: 0.1 TABLET ORAL at 17:52

## 2020-07-03 RX ADMIN — DIGOXIN 500 MCG: 250 INJECTION, SOLUTION INTRAMUSCULAR; INTRAVENOUS; PARENTERAL at 20:54

## 2020-07-03 RX ADMIN — AMIODARONE HYDROCHLORIDE 1 MG/MIN: 50 INJECTION, SOLUTION INTRAVENOUS at 19:56

## 2020-07-03 RX ADMIN — SODIUM CHLORIDE 15 MG/HR: 900 INJECTION, SOLUTION INTRAVENOUS at 10:03

## 2020-07-03 RX ADMIN — PANTOPRAZOLE SODIUM 40 MG: 40 INJECTION, POWDER, FOR SOLUTION INTRAVENOUS at 11:41

## 2020-07-03 RX ADMIN — FUROSEMIDE 40 MG: 40 TABLET ORAL at 08:42

## 2020-07-03 RX ADMIN — FLUDROCORTISONE ACETATE 0.2 MG: 0.1 TABLET ORAL at 08:42

## 2020-07-03 RX ADMIN — SUCRALFATE 1 G: 1 TABLET ORAL at 17:52

## 2020-07-03 RX ADMIN — METHYLPREDNISOLONE SODIUM SUCCINATE 40 MG: 40 INJECTION, POWDER, FOR SOLUTION INTRAMUSCULAR; INTRAVENOUS at 09:10

## 2020-07-03 RX ADMIN — Medication 10 ML: at 20:54

## 2020-07-03 RX ADMIN — PIPERACILLIN AND TAZOBACTAM 3.38 G: 3; .375 INJECTION, POWDER, FOR SOLUTION INTRAVENOUS at 01:04

## 2020-07-03 RX ADMIN — POTASSIUM CHLORIDE 10 MEQ: 7.46 INJECTION, SOLUTION INTRAVENOUS at 10:33

## 2020-07-03 RX ADMIN — ENOXAPARIN SODIUM 70 MG: 80 INJECTION SUBCUTANEOUS at 20:55

## 2020-07-03 RX ADMIN — ONDANSETRON 4 MG: 2 INJECTION INTRAMUSCULAR; INTRAVENOUS at 15:14

## 2020-07-03 RX ADMIN — PIPERACILLIN AND TAZOBACTAM 3.38 G: 3; .375 INJECTION, POWDER, FOR SOLUTION INTRAVENOUS at 08:43

## 2020-07-03 RX ADMIN — SODIUM CHLORIDE 12.5 MG: 900 INJECTION, SOLUTION INTRAVENOUS at 10:03

## 2020-07-03 RX ADMIN — METOCLOPRAMIDE 10 MG: 5 INJECTION, SOLUTION INTRAMUSCULAR; INTRAVENOUS at 15:14

## 2020-07-03 RX ADMIN — CYANOCOBALAMIN TAB 1000 MCG 1000 MCG: 1000 TAB at 08:42

## 2020-07-03 RX ADMIN — ENOXAPARIN SODIUM 70 MG: 80 INJECTION SUBCUTANEOUS at 11:41

## 2020-07-03 RX ADMIN — IPRATROPIUM BROMIDE AND ALBUTEROL SULFATE 3 ML: .5; 3 SOLUTION RESPIRATORY (INHALATION) at 06:50

## 2020-07-03 NOTE — PROGRESS NOTES
Continued Stay Note  OFEL[facility] Candelario     Patient Name: Shukri Park  MRN: [id number]  Today's Date: 7/3/2020    Admit Date: 7/1/2020    Discharge Plan      OT recommends IP Rehab - Attempted to reach patient per phone due to Covid process. Left message with grandgianna Mackey to  Discuss and obtain choices.     Leeann Marquez RN, CM  Office Phone [phone]  Cell [phone]

## 2020-07-03 NOTE — PROGRESS NOTES
[facility]/PULM/CC PROGRESS NOTE       SUBJECTIVE       This is a 79-year-old male with a history of COPD, former smoker, atrial fibrillation on chronic anticoagulation who lives all by himself presented to the hospital for fatigue, tiredness and shortness of breath.  Patient has been feeling puny for the few days with poor appetite.  He has noticed extreme fatigue with malaise.  He denies any fever or chills.  He has noted increased shortness of breath however cough has been mild.  He did cough clear sputum and only one time he had a maroonish sputum.  He denies any wheezing or chest pain or pleurisy.  Denies any sick contacts.  He states that he is visited by a home health nurse only.  He denies any other contacts.     CT scan of the chest was done that showed bilateral groundglass airspace disease with interlobular septal thickening upper lobe predominant.  Patient denies any active tobacco use or vaping.     He has a history of chronic sinus issues.  He is not on any maintenance inhalers.   7/3:  No new fevers  SOA stable  O2 requirement worsened over night and now on 5L  Remains fully awake and responsive  On oral diet    OBJECTIVE    Vitals:    07/03/20 0500 07/03/20 0617 07/03/20 0650 07/03/20 0655   BP: 99/58 111/63     BP Location:  Left arm     Patient Position:  Lying     Pulse: 98 82 112    Resp:  15 22 22   Temp:  98.6 °F (37 °C)     TempSrc:  Oral     SpO2: 94% 94% 93%    Weight:  67 kg (147 lb 11.3 oz)     Height:          Intake/Output last 3 shifts:  I/O last 3 completed shifts:  In: 1920 [P.O.:320; IV Piggyback:1600]  Out: 3100 [Urine:3100]  Intake/Output this shift:  No intake/output data recorded.    General Appearance:  Alert, cooperative, no distress, appears stated age  Head:  Normocephalic, without obvious abnormality, atraumatic  Eyes:  PERRL, conjunctivae/corneas clear, EOM's intact     Neck:  Supple,  no adenopathy;      Lungs:   Clear to auscultation bilaterally, respirations unlabored  Chest  wall:  No tenderness  Heart:  Regular rate and rhythm, S1 and S2 normal, no murmur, rub   or gallop  Abdomen:  Soft, non-tender, bowel sounds active all four quadrants,  no masses, no hepatomegaly, no splenomegaly  Extremities:  Extremities normal, no cyanosis or edema  Pulses: 2+ and symmetric all extremities  Skin:  No rashes or lesions  Neurologic:   Alert and oriented, no focal deficits    Scheduled Meds:  atorvastatin 20 mg Oral Daily   budesonide 0.5 mg Nebulization BID - RT   digoxin 125 mcg Oral Daily   fludrocortisone 0.1 mg Oral Q PM   fludrocortisone 0.2 mg Oral Daily   furosemide 40 mg Oral Daily   hydrocortisone sodium succinate 25 mg Intravenous Q8H   ipratropium-albuterol 3 mL Nebulization 4x Daily - RT   methylPREDNISolone sodium succinate 40 mg Intravenous Q8H   metoprolol tartrate 12.5 mg Oral Q6H   piperacillin-tazobactam 3.375 g Intravenous Q8H   potassium chloride 20 mEq Oral Daily   rivaroxaban 20 mg Oral Daily With Dinner   sodium chloride 10 mL Intravenous Q12H   Thera 1 tablet Oral Daily   vitamin B-12 1,000 mcg Oral Daily       Continuous Infusions:  dilTIAZem 5-15 mg/hr Last Rate: 15 mg/hr (07/03/20 0153)   Pharmacy to Dose Zosyn         PRN Meds:•  acetaminophen **OR** acetaminophen **OR** acetaminophen  •  aluminum-magnesium hydroxide-simethicone  •  bisacodyl  •  HYDROcodone-acetaminophen  •  magnesium hydroxide  •  melatonin  •  ondansetron **OR** ondansetron  •  Pharmacy to Dose Zosyn  •  [COMPLETED] Insert peripheral IV **AND** sodium chloride  •  sodium chloride     LABS    CBC  Results from last 7 days   Lab Units 07/02/20  0457 07/01/20  2348   WBC 10*3/mm3 7.50 9.60   RBC 10*6/mm3 3.82* 3.87*   HEMOGLOBIN g/dL 11.6* 11.7*   HEMATOCRIT % 34.3* 35.5*   MCV fL 89.9 91.7   PLATELETS 10*3/mm3 227 253       CMP Results from last 7 days   Lab Units 07/02/20  0457 07/01/20  2348   SODIUM mmol/L 146* 145   POTASSIUM mmol/L 3.5 4.1   CHLORIDE mmol/L 104 104   CO2 mmol/L 23.0 22.0   BUN  22  23   CREATININE mg/dL 0.80 0.69*   GLUCOSE mg/dL 172* 119*   ALBUMIN g/dL  --  3.50   BILIRUBIN mg/dL  --  1.5*   ALK PHOS U/L  --  54   AST (SGOT) U/L  --  31   ALT (SGPT) U/L  --  15   LIPASE U/L  --  13       TROPONIN  Results from last 7 days   Lab Units 07/02/20  1027   TROPONIN T ng/mL <0.010       CoAg        ABG  Results from last 7 days   Lab Units 07/02/20  1049   PH, ARTERIAL pH units 7.531*   PCO2, ARTERIAL mm Hg 31.3*   PO2 ART mm Hg 79.4*   O2 SATURATION ART % 97.1   BASE EXCESS ART mmol/L 3.9*       Microbiology  Microbiology Results (last 10 days)     Procedure Component Value - Date/Time    COVID-19,CEPHEID,COR/MILENA/PAD IN-HOUSE(OR EMERGENT/ADD-ON),NP SWAB IN TRANSPORT MEDIA 3-4 HR TAT - Swab, Nasopharynx [[id number]]  (Normal) Collected:  07/02/20 1659    Lab Status:  Final result Specimen:  Swab from Nasopharynx Updated:  07/02/20 2003     COVID19 Not Detected    Narrative:       Fact sheet for providers: https://www.fda.gov/media/438559/download     Fact sheet for patients: https://www.fda.gov/media/385553/download    Blood Culture - Blood, Arm, Left [[id number]] Collected:  07/02/20 0249    Lab Status:  Preliminary result Specimen:  Blood from Arm, Left Updated:  07/03/20 0300     Blood Culture No growth at 24 hours    Respiratory Panel, PCR - Swab, Nasopharynx [[id number]]  (Normal) Collected:  07/02/20 0027    Lab Status:  Final result Specimen:  Swab from Nasopharynx Updated:  07/02/20 0153     ADENOVIRUS, PCR Not Detected     Coronavirus 229E Not Detected     Coronavirus HKU1 Not Detected     Coronavirus NL63 Not Detected     Coronavirus OC43 Not Detected     Human Metapneumovirus Not Detected     Human Rhinovirus/Enterovirus Not Detected     Influenza B PCR Not Detected     Parainfluenza Virus 1 Not Detected     Parainfluenza Virus 2 Not Detected     Parainfluenza Virus 3 Not Detected     Parainfluenza Virus 4 Not Detected     Bordetella pertussis pcr Not Detected     Influenza A H1 2009 PCR  Not Detected     Chlamydophila pneumoniae PCR Not Detected     Mycoplasma pneumo by PCR Not Detected     Influenza A PCR Not Detected     Influenza A H3 Not Detected     Influenza A H1 Not Detected     RSV, PCR Not Detected    Narrative:       The coronavirus on the RVP is NOT COVID-19 and is NOT indicative of infection with COVID-19.     COVID-19 [facility] [employer] IN-HOUSE, Nasal Swab No Transport Media - Swab, Nasal Cavity [[id number]]  (Normal) Collected:  07/02/20 0027    Lab Status:  Final result Specimen:  Swab from Nasal Cavity Updated:  07/02/20 0104     COVID19 Not Detected    Narrative:       Fact sheet for providers: https://www.fda.gov/media/431720/download     Fact sheet for patients: https://www.fda.gov/media/092256/download    Blood Culture - Blood, Arm, Right [[id number]] Collected:  07/01/20 2348    Lab Status:  Preliminary result Specimen:  Blood from Arm, Right Updated:  07/03/20 0000     Blood Culture No growth at 24 hours          IMAGING & OTHER STUDIES    Imaging Results (Last 72 Hours)     Procedure Component Value Units Date/Time    CT Chest Without Contrast [[id number]] Collected:  07/02/20 1416     Updated:  07/02/20 1431    Narrative:       CT CHEST WO CONTRAST-, CT ABDOMEN PELVIS WO CONTRAST-     Date of Exam: 7/2/2020 1:46 PM     Indication: pneumonia; I48.91-Unspecified atrial fibrillation;  I50.9-Heart failure, unspecified; J18.9-Pneumonia, unspecified organism.   Sepsis      Comparison Exams: None available.     Technique: Multiple axial images were obtained from the thoracic inlet  through the sepsis pubis without the administration of IV contrast. The  axial data was used to generate reformatted images in the coronal and  sagittal planes.     Automated exposure control and iterative reconstruction methods were  used.     FINDINGS:  Chest: There is mediastinal adenopathy.  Precarinal node measures 2.5 x  1.8 cm in size.  No hilar or axillary adenopathy identified.  There is  emphysematous  change of the lungs.  There is groundglass airspace  disease and interlobular septal thickening within the bilateral upper  lobes and patchy areas of additional groundglass airspace disease and  interlobular septal thickening within the bilateral lower lobes.   Findings are nonspecific and may be related to pneumonia including  atypical pneumonia as well as Covid 19 pneumonia.  Clinical correlation  recommended.  Within the right upper lobe there is a noncalcified 7 mm  pulmonary nodule.  Patient status post right middle lobectomy.  There is  low density material tracking within the major fissure which may be due  to small right pleural effusion.  Pleural scarring or treatment-related  change could also give this appearance.  Left within the posterior left  lower lobe there is a noncalcified 4 mm nodule.     ABDOMEN: Liver, pancreas, and spleen are within normal limits.   Bilateral adrenal glands appear normal.  Again demonstrated is a large  cyst arises from the upper pole the right kidney.  Kidneys are otherwise  unremarkable.  No renal or ureteral stone or hydronephrosis.  There is  some high density material seen dependently within the gallbladder which  may be due to sludge or multiple small stones.  There is no inflammatory  change around the gallbladder.  No evidence of biliary tract  obstruction.  Upper GI tract appears within normal limits.  There is an  abdominal aortic aneurysm measuring 3.8 x 3.7 cm in size.  No abdominal  adenopathy or free intraperitoneal fluid identified.     Pelvis: Urinary bladder is within normal limits.  GI tract is  unremarkable.  There is no pelvic or inguinal adenopathy.  No free  intraperitoneal fluid is seen.  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  Left common iliac artery  measures 1.9 cm in size.     Bones: There is diffuse osteopenia.  Patient status post arthrodesis of  the left sacroiliac joint.  There are degenerative changes  throughout  spine.  There are postoperative changes of the lumbar spine with fusion  from the T12-L4 vertebral levels.  No hardware complication identified.   There are no suspicious lytic or sclerotic bony lesions.       Impression:          1.  Bilateral groundglass airspace disease with interlobular septal  thickening with upper lobe predominance.  Findings are nonspecific but  can be seen with Covid 19 pneumonia.  Other bacterial and atypical  pneumonias can give this appearance as well.  2.  Status post right middle lobectomy.  3.  Thickening along the major fissure on the right which could be due  to a small amount of pleural fluid or pleural scarring related to  treatment change.  4.  No acute process seen within the abdomen.  5.  Abdominal aortic aneurysm measuring 3.8 cm in size.     Electronically Signed By-Lico Ferrari On:7/2/2020 2:29 PM  This report was finalized on [id number] by  Lico Ferrari, .    CT Abdomen Pelvis Without Contrast [[id number]] Collected:  07/02/20 1416     Updated:  07/02/20 1431    Narrative:       CT CHEST WO CONTRAST-, CT ABDOMEN PELVIS WO CONTRAST-     Date of Exam: 7/2/2020 1:46 PM     Indication: pneumonia; I48.91-Unspecified atrial fibrillation;  I50.9-Heart failure, unspecified; J18.9-Pneumonia, unspecified organism.   Sepsis      Comparison Exams: None available.     Technique: Multiple axial images were obtained from the thoracic inlet  through the sepsis pubis without the administration of IV contrast. The  axial data was used to generate reformatted images in the coronal and  sagittal planes.     Automated exposure control and iterative reconstruction methods were  used.     FINDINGS:  Chest: There is mediastinal adenopathy.  Precarinal node measures 2.5 x  1.8 cm in size.  No hilar or axillary adenopathy identified.  There is  emphysematous change of the lungs.  There is groundglass airspace  disease and interlobular septal thickening within the bilateral  upper  lobes and patchy areas of additional groundglass airspace disease and  interlobular septal thickening within the bilateral lower lobes.   Findings are nonspecific and may be related to pneumonia including  atypical pneumonia as well as Covid 19 pneumonia.  Clinical correlation  recommended.  Within the right upper lobe there is a noncalcified 7 mm  pulmonary nodule.  Patient status post right middle lobectomy.  There is  low density material tracking within the major fissure which may be due  to small right pleural effusion.  Pleural scarring or treatment-related  change could also give this appearance.  Left within the posterior left  lower lobe there is a noncalcified 4 mm nodule.     ABDOMEN: Liver, pancreas, and spleen are within normal limits.   Bilateral adrenal glands appear normal.  Again demonstrated is a large  cyst arises from the upper pole the right kidney.  Kidneys are otherwise  unremarkable.  No renal or ureteral stone or hydronephrosis.  There is  some high density material seen dependently within the gallbladder which  may be due to sludge or multiple small stones.  There is no inflammatory  change around the gallbladder.  No evidence of biliary tract  obstruction.  Upper GI tract appears within normal limits.  There is an  abdominal aortic aneurysm measuring 3.8 x 3.7 cm in size.  No abdominal  adenopathy or free intraperitoneal fluid identified.     Pelvis: Urinary bladder is within normal limits.  GI tract is  unremarkable.  There is no pelvic or inguinal adenopathy.  No free  intraperitoneal fluid is seen.  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  Left common iliac artery  measures 1.9 cm in size.     Bones: There is diffuse osteopenia.  Patient status post arthrodesis of  the left sacroiliac joint.  There are degenerative changes throughout  spine.  There are postoperative changes of the lumbar spine with fusion  from the T12-L4 vertebral levels.   No hardware complication identified.   There are no suspicious lytic or sclerotic bony lesions.       Impression:          1.  Bilateral groundglass airspace disease with interlobular septal  thickening with upper lobe predominance.  Findings are nonspecific but  can be seen with Covid 19 pneumonia.  Other bacterial and atypical  pneumonias can give this appearance as well.  2.  Status post right middle lobectomy.  3.  Thickening along the major fissure on the right which could be due  to a small amount of pleural fluid or pleural scarring related to  treatment change.  4.  No acute process seen within the abdomen.  5.  Abdominal aortic aneurysm measuring 3.8 cm in size.     Electronically Signed By-Lico Ferrari On:7/2/2020 2:29 PM  This report was finalized on [id number] by  Lico Ferrari, .    CT Sinus Without Contrast [[id number]] Collected:  07/02/20 1409     Updated:  07/02/20 1416    Narrative:       CT SINUS WO CONTRAST-     Date of Exam: 7/2/2020 1:45 PM     Indication: severe sinusitis and epistaxis, sepsis; I48.91-Unspecified  atrial fibrillation; I50.9-Heart failure, unspecified; J18.9-Pneumonia,  unspecified organism.     Comparison: CT head without contrast 11/20/2019. No prior dedicated CT  sinus study at this institution for comparison.      Technique: Contiguous axial images of the paranasal sinuses were  performed.  Coronal and sagittal reconstructions were performed.  Automated exposure control and iterative reconstruction methods were  used.     FINDINGS:  There is nasal septal deviation toward the right with a right-sided bony  apical spur measuring 5 mm. The osteomeatal units are normal.  No  osteolytic or osteoblastic lesions are present. There is mild the left  maxillary sinus mucosal thickening superiorly. There is a small mucous  retention cyst or polyp in the left maxillary sinus anteriorly measuring  7 x 12 x 11 mm. A small mucous retention cyst or polyp in the right  sphenoid sinus  laterally measures 9 x 10 x 7 mm. Otherwise, the  paranasal sinuses are clear. No air-fluid levels are identified. The  cribriform plate is intact.  The orbital spaces are clear.  No soft  tissue masses.       Impression:          1. No evidence of acute/active sinusitis.  2. Minor left maxillary sinus mucosal thickening. Small mucous retention  cysts or polyps within the left maxillary and right sphenoid sinus.  3. Bony nasal septal deviation toward the right.     Electronically Signed By-Dr. Gladys Lau MD On:7/2/2020 2:14 PM  This report was finalized on [id number] by Dr. Gladys Lau MD.    XR Chest 1 View [[id number]] Collected:  07/02/20 0724     Updated:  07/02/20 0729    Narrative:       XR CHEST 1 VW-     Date of Exam: 7/1/2020 11:50 PM     Indication: Atrial fibrillation and vomiting.     Comparison: 11/7/2016     Technique: A single view of the chest was obtained.     FINDINGS:      Heart size is at the upper limits of normal.  There is new  interstitial and alveolar airspace disease within both lungs with slight  upper lobe predominance.  Findings may be secondary to pulmonary edema  or atypical pneumonia.  Pulmonary vessels are indistinct consistent with  pulmonary vascular congestion.  There is a more dense area of airspace  consolidation within the right lung base.  There is a probable small  right pleural effusion.  No definite left pleural effusion identified.             Impression:             1.  Borderline heart size with pulmonary vascular congestion.  2.  Bilateral interstitial and alveolar airspace disease favored to be  due to pulmonary edema or less likely atypical pneumonia.        Electronically Signed By-Lico Ferrari On:7/2/2020 7:26 AM  This report was finalized on [id number] by  Lico Ferrari, .        Results for orders placed during the hospital encounter of 07/01/20   Adult Transthoracic Echo Complete W/ Cont if Necessary Per Protocol    Narrative · Left ventricular  systolic function is severely decreased.  · Left ventricular wall thickness is consistent with mild concentric   hypertrophy.  · Left atrial cavity size is moderately dilated.  · Mild-to-moderate mitral valve regurgitation is present  · Mild tricuspid valve regurgitation is present.  · Mild aortic valve regurgitation is present.             ASSESSMENT/PLAN:     Atrial fibrillation (CMS/HCC)    CAD (coronary artery disease)    Chronic anticoagulation    Hyperlipidemia    Presence of stent in right coronary artery    Chronic pain    Sepsis (CMS/HCC)        1.  Bilateral PNA (viral vs bacterial) other differentials include Pulmonary edema and possible hemorrhage  2. COPD with exacerbation  3. H/o RML lobectomy  4. CAD  5. Acute hypoxia due to above  6. Afib with RVR  7. Chronic Hypotension  8. Chronic Anticoagulation with Xarelto  9. Lactic acidosis  10. Hypokalemia     PLAN    See orders. The plan was discussed with the patient and/or family.      1.  Reviewed CT scan of the chest and discussed with the patient in detail.  Covid test is negative X2.  Afebrile.  Will continue IV antibiotics with Zosyn.  Procalcitonin mildly elevated only. Hold off on bronch due to Afib with RVR and potential worsening of hemodynamics.   Urine for Legionella and streptococcal antigen negative.  Follow sputum studies.     2.  Continue IV Solu-Medrol.  Patient has extensive bilateral emphysema with bullous disease noted.  Continue scheduled DuoNeb along with Pulmicort nebs.     3.  Patient has chronic hypotension and is on Florinef and hydrocortisone     4.  Currently on Cardizem drip per cardiology.  Patient remains on digoxin and anticoagulated with therapeutic Lovenox (on xarelto at home)     5. O2 requirement slightly worse at 5L overnight however back to 3L. HR remains uncontrolled despite on Cardizem gtt    Will follow    THIS DOCUMENT HAS BEEN ELECTRONICALLY SIGNED BY  Khadra Saucedo MD  8:12 AM

## 2020-07-03 NOTE — PLAN OF CARE
Problem: Patient Care Overview  Goal: Plan of Care Review  Outcome: Ongoing (interventions implemented as appropriate)  Flowsheets (Taken 7/3/2020 0900)  Outcome Summary: Pt is 78 yo male admitted with A fib with RVR, B PNA, AE COPD, and chronic back pain. Pt c/o weakness, cough, nausea, vomiting, and general malaise. At baseline, pt's sister reports he is primarily ambulatory without AE, but occassionally utilizes cane.  He was independent with ADLs.  Per sister, pt has had one fall to her knowledge. Fal lwas in November with scalp laceration, treated at [facility] ER.  This date, pt on BSC upon OT arrival. HR elevated, and pt is nauseous and attempting to vomit throughout session. Requires Min A for toileting task due to nausea/vomiting, and Min A to maintain dynamic balance task due to nausea/vomiting. Pt appears deconditioned, requiring 4L o2 to maintain SpO2 saturations. He is below independent baseline, and appear to require IP rehab based on this assessment. PPE: gloves, mask, gogglees

## 2020-07-03 NOTE — NURSING NOTE
Per GI's note today there should be a stool study and a CDIFF test completed. I cannot see the orders for these test, so I called lab to verify if the stool collected was sufficient. Fatou in lab could see the orders and stated she has sufficient samples to complete the additional testing and it would be done in the AM.

## 2020-07-03 NOTE — PROGRESS NOTES
[facility] Daily Progress Note      Hospitalist Team  LOS 1 days      Patient Care Team:  Nikki Gonzales MD as PCP - General (Family Medicine)    Patient Location: [facility]/1      Subjective   Subjective     Chief Complaint / Subjective  Chief Complaint   Patient presents with   • Weakness - Generalized         Brief Synopsis of Hospital Course/HPI       Mr. Park is a 79 y.o. male with a history of atrial fibrillation, hyperlipidemia, chronic low back pain, hypotension, chronic anticoagulation presented to the [facility] ED on 7/1/2020 with a complaint of generalized body aches and pains, weakness and a cough.  Patient states he is also had some nausea and vomiting.  Patient denies chest pain, shortness of breath, diarrhea, or any ill contacts.  Upon arrival to the ED patient was found to be in atrial fibrillation with RVR, heart rate 160s patient was given Cardizem bolus and started on a Cardizem drip.     In the ED, initial troponin negative, proBNP 3485, , glucose 119, sodium 145, potassium 4.1, BUN 23, creatinine 0.69, C-reactive protein 28.6, lactate 2.5, lipase 13, magnesium 2.4, procalcitonin 0.11, WBC 9.6, Hgb 11.7, HCT 35.5, blood cultures pending.  EKG: A. fib with RVR.  Respiratory panel negative, COVID 19 test negative.  Chest x-ray with pulmonary edema, pneumonia as read by per ED physician, awaiting official radiology read.  Patient is on a Cardizem drip at 15 mg/h, received a 500 mL normal saline bolus, 4 mg Zofran IV x1, 40 mg Lasix IV x1, Inapsine 1.25 mg IV x1, patient will be admitted for further evaluation and management.         Date::      7/3  Int vomiting  Reviewed ctC/PA/P  Iv phenergan added  ruq for possible gall bladder disease\  Consult gi      ROS  All other ssytems rev and neg    Objective   Objective      Vital Signs  Temp:  [97.5 °F (36.4 °C)-98.9 °F (37.2 °C)] 98.6 °F (37 °C)  Heart Rate:  [] 112  Resp:  [12-23]  "22  BP: ()/(47-81) 111/63  Oxygen Therapy  SpO2: 93 %  Pulse Oximetry Type: Continuous  Device (Oxygen Therapy): room air  Device (Oxygen Therapy): nasal cannula  Flow (L/min): 4  Flowsheet Rows      First Filed Value   Admission Height  185.4 cm (73\") Documented at 07/01/2020 2336   Admission Weight  68 kg (150 lb) Documented at 07/01/2020 2336        Intake & Output (last 3 days)       06/30 0701 - 07/01 0700 07/01 0701 - 07/02 0700 07/02 0701 - 07/03 0700 07/03 0701 - 07/04 0700    P.O.   320 200    IV Piggyback  1500 100     Total Intake(mL/kg)  1500 (22.1) 420 (6.3) 200 (3)    Urine (mL/kg/hr)  1850 1250 (0.8)     Total Output  1850 1250     Net  -350 -830 +200                Lines, Drains & Airways    Active LDAs     Name:   Placement date:   Placement time:   Site:   Days:    Peripheral IV 07/01/20 2349 Right Antecubital   07/01/20    2349    Antecubital   1    Peripheral IV 07/02/20 0837 Posterior;Right Forearm   07/02/20    0837    Forearm   1                  Physical Exam:    Physical Exam   Constitutional: He is oriented to person, place, and time. He appears well-developed and well-nourished. No distress.   HENT:   Head: Normocephalic and atraumatic.   Right Ear: External ear normal.   Left Ear: External ear normal.   Nose: Nose normal.   Mouth/Throat: Oropharynx is clear and moist. No oropharyngeal exudate.   Eyes: Pupils are equal, round, and reactive to light. Conjunctivae and EOM are normal. Right eye exhibits no discharge. Left eye exhibits no discharge. No scleral icterus.   Neck: Normal range of motion. No JVD present. No tracheal deviation present. No thyromegaly present.   Cardiovascular: Normal rate, regular rhythm, normal heart sounds and intact distal pulses. Exam reveals no gallop and no friction rub.   No murmur heard.  Pulmonary/Chest: Effort normal and breath sounds normal. No stridor. No respiratory distress. He has no wheezes. He has no rales. He exhibits no tenderness. "   Abdominal: Soft. Bowel sounds are normal. He exhibits no distension and no mass. There is no tenderness. There is no rebound and no guarding. No hernia.   Musculoskeletal: Normal range of motion. He exhibits no edema, tenderness or deformity.   Lymphadenopathy:     He has no cervical adenopathy.   Neurological: He is alert and oriented to person, place, and time. No cranial nerve deficit or sensory deficit. He exhibits normal muscle tone. Coordination normal.   Skin: Skin is warm and dry. No rash noted. He is not diaphoretic. No erythema.   Psychiatric: He has a normal mood and affect. His behavior is normal.   Nursing note and vitals reviewed.            Procedures:              Results Review:     I reviewed the patient's new clinical results.      Lab Results (last 24 hours)     Procedure Component Value Units Date/Time    Blood Culture - Blood, Arm, Left [[id number]] Collected:  07/02/20 0249    Specimen:  Blood from Arm, Left Updated:  07/03/20 0300     Blood Culture No growth at 24 hours    Blood Culture - Blood, Arm, Right [[id number]] Collected:  07/01/20 2348    Specimen:  Blood from Arm, Right Updated:  07/03/20 0000     Blood Culture No growth at 24 hours    COVID-19,CEPHEID,COR/MILENA/PAD IN-HOUSE(OR EMERGENT/ADD-ON),NP SWAB IN TRANSPORT MEDIA 3-4 HR TAT - Swab, Nasopharynx [[id number]]  (Normal) Collected:  07/02/20 1659    Specimen:  Swab from Nasopharynx Updated:  07/02/20 2003     COVID19 Not Detected    Narrative:       Fact sheet for providers: https://www.fda.gov/media/512965/download     Fact sheet for patients: https://www.fda.gov/media/631685/download    Lactic Acid, Plasma [[id number]]  (Normal) Collected:  07/02/20 1243    Specimen:  Blood Updated:  07/02/20 1310     Lactate 1.8 mmol/L     Troponin [[id number]]  (Normal) Collected:  07/02/20 1027    Specimen:  Blood Updated:  07/02/20 1118     Troponin T <0.010 ng/mL     Narrative:       Troponin T Reference Range:  <= 0.03 ng/mL-   Negative for  AMI  >0.03 ng/mL-     Abnormal for myocardial necrosis.  Clinicians would have to utilize clinical acumen, EKG, Troponin and serial changes to determine if it is an Acute Myocardial Infarction or myocardial injury due to an underlying chronic condition.       Results may be falsely decreased if patient taking Biotin.      Blood Gas, Arterial [[id number]]  (Abnormal) Collected:  07/02/20 1049    Specimen:  Arterial Blood Updated:  07/02/20 1053     Site Right Radial     Sunil's Test Positive     pH, Arterial 7.531 pH units      pCO2, Arterial 31.3 mm Hg      pO2, Arterial 79.4 mm Hg      HCO3, Arterial 26.2 mmol/L      Base Excess, Arterial 3.9 mmol/L      Comment: Serial Number: [id number]:  [id number]        O2 Saturation, Arterial 97.1 %      CO2 Content 27.2 mmol/L      Barometric Pressure for Blood Gas --     Comment: N/A        Modality Cannula     FIO2 <21 %      Ventilator Mode 3     Hemodilution No        No results found for: HGBA1C        Results from last 7 days   Lab Units 07/02/20  1049   PH, ARTERIAL pH units 7.531*   PO2 ART mm Hg 79.4*   PCO2, ARTERIAL mm Hg 31.3*   HCO3 ART mmol/L 26.2     Lab Results   Component Value Date    LIPASE 13 07/01/2020     No results found for: CHOL, CHLPL, TRIG, HDL, LDL, LDLDIRECT    No results found for: INTRAOP, PREDX, FINALDX, COMDX    Microbiology Results (last 10 days)     Procedure Component Value - Date/Time    COVID-19,CEPHEID,COR/MILENA/PAD IN-HOUSE(OR EMERGENT/ADD-ON),NP SWAB IN TRANSPORT MEDIA 3-4 HR TAT - Swab, Nasopharynx [[id number]]  (Normal) Collected:  07/02/20 1659    Lab Status:  Final result Specimen:  Swab from Nasopharynx Updated:  07/02/20 2003     COVID19 Not Detected    Narrative:       Fact sheet for providers: https://www.fda.gov/media/663972/download     Fact sheet for patients: https://www.fda.gov/media/936406/download    Blood Culture - Blood, Arm, Left [[id number]] Collected:  07/02/20 0249    Lab Status:  Preliminary result Specimen:  Blood  from Arm, Left Updated:  07/03/20 0300     Blood Culture No growth at 24 hours    Respiratory Panel, PCR - Swab, Nasopharynx [[id number]]  (Normal) Collected:  07/02/20 0027    Lab Status:  Final result Specimen:  Swab from Nasopharynx Updated:  07/02/20 0153     ADENOVIRUS, PCR Not Detected     Coronavirus 229E Not Detected     Coronavirus HKU1 Not Detected     Coronavirus NL63 Not Detected     Coronavirus OC43 Not Detected     Human Metapneumovirus Not Detected     Human Rhinovirus/Enterovirus Not Detected     Influenza B PCR Not Detected     Parainfluenza Virus 1 Not Detected     Parainfluenza Virus 2 Not Detected     Parainfluenza Virus 3 Not Detected     Parainfluenza Virus 4 Not Detected     Bordetella pertussis pcr Not Detected     Influenza A H1 2009 PCR Not Detected     Chlamydophila pneumoniae PCR Not Detected     Mycoplasma pneumo by PCR Not Detected     Influenza A PCR Not Detected     Influenza A H3 Not Detected     Influenza A H1 Not Detected     RSV, PCR Not Detected    Narrative:       The coronavirus on the RVP is NOT COVID-19 and is NOT indicative of infection with COVID-19.     COVID-19 [employer] IN-HOUSE, Nasal Swab No Transport Media - Swab, Nasal Cavity [[id number]]  (Normal) Collected:  07/02/20 0027    Lab Status:  Final result Specimen:  Swab from Nasal Cavity Updated:  07/02/20 0104     COVID19 Not Detected    Narrative:       Fact sheet for providers: https://www.fda.gov/media/042303/download     Fact sheet for patients: https://www.fda.gov/media/574004/download    Blood Culture - Blood, Arm, Right [[id number]] Collected:  07/01/20 2348    Lab Status:  Preliminary result Specimen:  Blood from Arm, Right Updated:  07/03/20 0000     Blood Culture No growth at 24 hours          ECG/EMG Results (most recent)     Procedure Component Value Units Date/Time    ECG 12 Lead [[id number]] Collected:  07/01/20 2340     Updated:  07/01/20 2341    Narrative:       HEART RATE= 163  bpm  RR Interval= 368   ms  LA Interval=   ms  P Horizontal Axis=   deg  P Front Axis=   deg  QRSD Interval= 78  ms  QT Interval= 283  ms  QRS Axis= 7  deg  T Wave Axis= 42  deg  - ABNORMAL ECG -  Atrial fibrillation with rapid V-rate  Electronically Signed By:   Date and Time of Study: 2020-07-01 23:40:10    Adult Transthoracic Echo Complete W/ Cont if Necessary Per Protocol [[id number]] Collected:  07/02/20 0653     Updated:  07/02/20 1409     BSA 1.9 m^2      RVIDd 2.1 cm      IVSd 1.1 cm      LVIDd 5.6 cm      LVIDs 4.6 cm      LVPWd 1.3 cm      IVS/LVPW 0.84     FS 17.6 %      EDV(Teich) 155.9 ml      ESV(Teich) 99.5 ml      EF(Teich) 36.2 %      EDV(cubed) 179.0 ml      ESV(cubed) 100.1 ml      EF(cubed) 44.1 %      LV mass(C)d 276.4 grams      LV mass(C)dI 142.4 grams/m^2      SV(Teich) 56.4 ml      SI(Teich) 29.1 ml/m^2      SV(cubed) 78.9 ml      SI(cubed) 40.6 ml/m^2      Ao root diam 4.0 cm      Ao root area 12.8 cm^2      ACS 2.1 cm      asc Aorta Diam 3.7 cm      LVOT diam 2.5 cm      LVOT area 5.1 cm^2      RVOT diam 2.1 cm      RVOT area 3.6 cm^2      EDV(MOD-sp4) 68.5 ml      ESV(MOD-sp4) 50.1 ml      EF(MOD-sp4) 26.8 %      SV(MOD-sp4) 18.4 ml      SI(MOD-sp4) 9.5 ml/m^2      Ao root area (BSA corrected) 2.1     LV Dunlap Vol (BSA corrected) 35.3 ml/m^2      LV Sys Vol (BSA corrected) 25.8 ml/m^2      Aortic R-R 0.4 sec      Aortic .6 BPM      MV V2 max 90.3 cm/sec      MV max PG 3.3 mmHg      MV V2 mean 63.4 cm/sec      MV mean PG 1.8 mmHg      MV V2 VTI 9.6 cm      MVA(VTI) 6.7 cm^2      Ao pk sharmila 78.3 cm/sec      Ao max PG 2.5 mmHg      Ao max PG (full) -0.26 mmHg      Ao V2 mean 61.8 cm/sec      Ao mean PG 1.7 mmHg      Ao mean PG (full) 0.39 mmHg      Ao V2 VTI 11.3 cm      AISHWARYA(I,A) 5.7 cm^2      AISHWARYA(I,D) 5.7 cm^2      AISHWARYA(V,A) 5.3 cm^2      AISHWARYA(V,D) 5.3 cm^2      LV V1 max PG 2.7 mmHg      LV V1 mean PG 1.3 mmHg      LV V1 max 82.3 cm/sec      LV V1 mean 53.0 cm/sec      LV V1 VTI 12.7 cm      MR max sharmila 453.8  cm/sec      MR max PG 82.4 mmHg      CO(Ao) 22.0 l/min      CI(Ao) 11.3 l/min/m^2      SV(Ao) 144.9 ml      SI(Ao) 74.6 ml/m^2      CO(LVOT) 9.8 l/min      CI(LVOT) 5.0 l/min/m^2      SV(LVOT) 64.6 ml      SV(RVOT) 42.6 ml      SI(LVOT) 33.3 ml/m^2      PA V2 max 77.1 cm/sec      PA max PG 2.4 mmHg      PA max PG (full) 0.22 mmHg      PA V2 mean 50.6 cm/sec      PA mean PG 1.2 mmHg      PA mean PG (full) 0.15 mmHg      PA V2 VTI 9.1 cm      PVA(I,A) 4.7 cm^2       CV ECHO TARYN - PVA(I,D) 4.7 cm^2       CV ECHO TARYN - PVA(V,A) 3.4 cm^2       CV ECHO TARYN - PVA(V,D) 3.4 cm^2      PA acc time 0.06 sec      RV V1 max PG 2.2 mmHg      RV V1 mean PG 1.1 mmHg      RV V1 max 73.4 cm/sec      RV V1 mean 46.7 cm/sec      RV V1 VTI 11.9 cm      TR max sharmila 285.8 cm/sec      RVSP(TR) 35.7 mmHg      RAP systole 3.0 mmHg      PA pr(Accel) 49.9 mmHg      Qp/Qs 0.66      CV ECHO TARYN - BZI_BMI 18.7 kilograms/m^2       CV ECHO TARYN - BSA(HAYCOCK) 1.9 m^2       CV ECHO TARYN - BZI_METRIC_WEIGHT 68.0 kg       CV ECHO TARYN - BZI_METRIC_HEIGHT 190.5 cm      EF(MOD-bp) 27.0 %      LA dimension(2D) 4.6 cm     Narrative:       · Left ventricular systolic function is severely decreased.  · Left ventricular wall thickness is consistent with mild concentric   hypertrophy.  · Left atrial cavity size is moderately dilated.  · Mild-to-moderate mitral valve regurgitation is present  · Mild tricuspid valve regurgitation is present.  · Mild aortic valve regurgitation is present.                  Results for orders placed during the hospital encounter of 07/01/20   Adult Transthoracic Echo Complete W/ Cont if Necessary Per Protocol    Narrative · Left ventricular systolic function is severely decreased.  · Left ventricular wall thickness is consistent with mild concentric   hypertrophy.  · Left atrial cavity size is moderately dilated.  · Mild-to-moderate mitral valve regurgitation is present  · Mild tricuspid valve regurgitation is  present.  · Mild aortic valve regurgitation is present.          Ct Abdomen Pelvis Without Contrast    Result Date: 7/2/2020   1.  Bilateral groundglass airspace disease with interlobular septal thickening with upper lobe predominance.  Findings are nonspecific but can be seen with Covid 19 pneumonia.  Other bacterial and atypical pneumonias can give this appearance as well. 2.  Status post right middle lobectomy. 3.  Thickening along the major fissure on the right which could be due to a small amount of pleural fluid or pleural scarring related to treatment change. 4.  No acute process seen within the abdomen. 5.  Abdominal aortic aneurysm measuring 3.8 cm in size.  Electronically Signed ByGabriel Ferrari On:7/2/2020 2:29 PM This report was finalized on [id number] by  Lico Ferrari, .    Ct Chest Without Contrast    Result Date: 7/2/2020   1.  Bilateral groundglass airspace disease with interlobular septal thickening with upper lobe predominance.  Findings are nonspecific but can be seen with Covid 19 pneumonia.  Other bacterial and atypical pneumonias can give this appearance as well. 2.  Status post right middle lobectomy. 3.  Thickening along the major fissure on the right which could be due to a small amount of pleural fluid or pleural scarring related to treatment change. 4.  No acute process seen within the abdomen. 5.  Abdominal aortic aneurysm measuring 3.8 cm in size.  Electronically Signed ByGabriel Ferrari On:7/2/2020 2:29 PM This report was finalized on [id number] by  Lico Ferrari, .    Xr Chest 1 View    Result Date: 7/2/2020    1.  Borderline heart size with pulmonary vascular congestion. 2.  Bilateral interstitial and alveolar airspace disease favored to be due to pulmonary edema or less likely atypical pneumonia.   Electronically Signed ByGabriel Ferrari On:7/2/2020 7:26 AM This report was finalized on [id number] by  Lico Ferrari, .    Ct Sinus Without Contrast    Result Date: 7/2/2020    1. No evidence of acute/active sinusitis. 2. Minor left maxillary sinus mucosal thickening. Small mucous retention cysts or polyps within the left maxillary and right sphenoid sinus. 3. Bony nasal septal deviation toward the right.  Electronically Signed By-Dr. Gladys Lau MD On:7/2/2020 2:14 PM This report was finalized on [id number] by Dr. Gladys Lau MD.          Xrays, labs reviewed personally by physician.    Medication Review:   I have reviewed the patient's current medication list      Scheduled Meds    atorvastatin 20 mg Oral Daily   budesonide 0.5 mg Nebulization BID - RT   digoxin 125 mcg Oral Daily   fludrocortisone 0.1 mg Oral Q PM   fludrocortisone 0.2 mg Oral Daily   furosemide 40 mg Oral Daily   ipratropium-albuterol 3 mL Nebulization 4x Daily - RT   methylPREDNISolone sodium succinate 40 mg Intravenous Q8H   metoprolol tartrate 12.5 mg Oral Q6H   piperacillin-tazobactam 3.375 g Intravenous Q8H   potassium chloride 20 mEq Oral Daily   sodium chloride 10 mL Intravenous Q12H   Thera 1 tablet Oral Daily   vitamin B-12 1,000 mcg Oral Daily       Meds Infusions    dilTIAZem 5-15 mg/hr Last Rate: 15 mg/hr (07/03/20 0153)   Pharmacy Consult - Pharmacy to dose     Pharmacy to Dose Zosyn         Meds PRN  •  acetaminophen **OR** acetaminophen **OR** acetaminophen  •  aluminum-magnesium hydroxide-simethicone  •  bisacodyl  •  HYDROcodone-acetaminophen  •  magnesium hydroxide  •  melatonin  •  ondansetron **OR** ondansetron  •  Pharmacy Consult - Pharmacy to dose  •  Pharmacy to Dose Zosyn  •  promethazine  •  [COMPLETED] Insert peripheral IV **AND** sodium chloride  •  sodium chloride    I personally reviewed patient's x-ray films and my findings are     I personally reviewed patient's EKG strips and my findings are       Assessment/Plan   Assessment/Plan     Active Hospital Problems    Diagnosis  POA   • **Atrial fibrillation (CMS/HCC) [I48.91]  Yes   • Chronic pain [G89.29]  Yes   • Sepsis (CMS/HCC)  [A41.9]  Yes   • CAD (coronary artery disease) [I25.10]  Yes   • Chronic anticoagulation [Z79.01]  Not Applicable   • Hyperlipidemia [E78.5]  Yes   • Presence of stent in right coronary artery [Z95.5]  Not Applicable      Resolved Hospital Problems   No resolved problems to display.       MEDICAL DECISION MAKING COMPLEXITY BY PROBLEM:     Atrial fibrillation wRVR   -Cardizem drip 15 mg/hr  -Patient received several Cardizem boluses in the ED  -Patient with history, on Xarelto,  -Not currently on any antiarrhythmics at home  -Check magnesium, TSH, trend troponins  -Cardiology consult     Possible sepsis  Elevated lactic acid  Ct A/P shows possible cholecystitis  RUQ and gi consulted( intract vomiting)  Iv protonix  -Patient ruled in for sepsis due to tachycardia, tachypnea, lactate 2.5  -continue Zosyn 3.375 g, continue until sepsis ruled out  -Patient received 500 mL normal saline IV bolus in ED  -Blood cultures pending  -COVID-19 ruled out neg twice  -Respiratory panel negative  -neg urine     ch systolic chf?  On lasix  On bb  acei when stable      Copd exacerb  pulm following     CAD whx of angioplasty/stent w dr Sweat  -echo shows low ef 27 %  -BNP 3485  -Per review of outside records: Last angioplasty to the RCA and left circumflex 4/16/2004  -Currently on Florinef for hypotension  -Continue Florinef, continue statin  On steroids per pulm     Hyperlipidemia  -Continue statin     Chronic pain  -Due to multiple back surgeries  -Continue Norco, (INSPECT verified)        High risk  Complex case    VTE Prophylaxis -   Mechanical Order History:     None      Pharmalogical Order History:     Ordered     Dose Route Frequency Stop    07/02/20 0414  rivaroxaban (XARELTO) tablet 20 mg  Status:  Discontinued      20 mg PO Daily With Dinner 07/02/20 0951    07/02/20 1221  rivaroxaban (XARELTO) tablet 10 mg  Status:  Discontinued     Note to Pharmacy:  If occult blood negative   Question:  Are you ordering rivaroxaban for  the prevention of blood clots in an acutely ill medical patient?  Answer:  No    10 mg PO Daily With Dinner 07/02/20 1446    07/02/20 1446  rivaroxaban (XARELTO) tablet 20 mg  Status:  Discontinued     Note to Pharmacy:  ## do not start unless fecal occult blood test is negative ##   Question:  Are you ordering rivaroxaban for the prevention of blood clots in an acutely ill medical patient?  Answer:  No    20 mg PO Daily With Dinner 07/03/20 0937            Code Status -   Code Status and Medical Interventions:   Ordered at: 07/02/20 0406     Level Of Support Discussed With:    Patient     Code Status:    CPR     Medical Interventions (Level of Support Prior to Arrest):    Full       This patient has been examined wearing appropriate Personal Protective Equipment and discussed with hospital infection control department. 07/03/20        Discharge Planning          Destination      Coordination has not been started for this encounter.      Durable Medical Equipment      Coordination has not been started for this encounter.      Dialysis/Infusion      Coordination has not been started for this encounter.      Home Medical Care      Coordination has not been started for this encounter.      Therapy      Coordination has not been started for this encounter.      Community Resources      Coordination has not been started for this encounter.            Electronically signed by Temo Marti MD, 07/03/20, 09:39.  [facility] Hospitalist Team

## 2020-07-03 NOTE — PLAN OF CARE
Problem: Patient Care Overview  Goal: Plan of Care Review  Outcome: Ongoing (interventions implemented as appropriate)  Note:   Cardizem drip still infusing at max dose, HR a-fib, uncontrolled. BP stable. Pt has no new complaints or concerns at this time.   Goal: Individualization and Mutuality  Outcome: Ongoing (interventions implemented as appropriate)  Goal: Discharge Needs Assessment  Outcome: Ongoing (interventions implemented as appropriate)  Goal: Interprofessional Rounds/Family Conf  Outcome: Ongoing (interventions implemented as appropriate)

## 2020-07-03 NOTE — PLAN OF CARE
Patient remains in Afib with a rate between 100-170 on my shift. Cardizem drip continues to run at 15 ml/hr.     Patient remains a falls risk.     Patients pain being managed with Morphine.      Problem: Arrhythmia/Dysrhythmia (Symptomatic) (Adult)  Goal: Signs and Symptoms of Listed Potential Problems Will be Absent, Minimized or Managed (Arrhythmia/Dysrhythmia)  Outcome: Ongoing (interventions implemented as appropriate)     Problem: Skin Injury Risk (Adult)  Goal: Identify Related Risk Factors and Signs and Symptoms  Outcome: Ongoing (interventions implemented as appropriate)  Goal: Skin Health and Integrity  Outcome: Ongoing (interventions implemented as appropriate)     Problem: Pain, Chronic (Adult)  Goal: Identify Related Risk Factors and Signs and Symptoms  Outcome: Ongoing (interventions implemented as appropriate)  Goal: Acceptable Pain/Comfort Level and Functional Ability  Outcome: Ongoing (interventions implemented as appropriate)     Problem: Fall Risk (Adult)  Goal: Identify Related Risk Factors and Signs and Symptoms  Outcome: Ongoing (interventions implemented as appropriate)  Goal: Absence of Fall  Outcome: Ongoing (interventions implemented as appropriate)     Problem: Patient Care Overview  Goal: Plan of Care Review  Outcome: Ongoing (interventions implemented as appropriate)  Flowsheets (Taken 7/3/2020 1946)  Progress: no change  Plan of Care Reviewed With: patient  Goal: Individualization and Mutuality  Outcome: Ongoing (interventions implemented as appropriate)  Goal: Discharge Needs Assessment  Outcome: Ongoing (interventions implemented as appropriate)  Goal: Interprofessional Rounds/Family Conf  Outcome: Ongoing (interventions implemented as appropriate)

## 2020-07-03 NOTE — CONSULTS
"GI CONSULT  NOTE:    Referring Provider:    Dr Marti     Chief complaint:   Nausea     Subjective    \"I am so weak I can't talk\"    History of present illness:    Patient is a 79-year-old male with a history of atrial fib on Xarelto, coronary artery disease with stenting, abdominal aortic aneurysm, right middle lobe lobectomy due to lung cancer, COPD, and reflux who presented to the hospital with a complaint of generalized aches and pains weakness, cough and postnasal drip.  Patient was evaluated and admitted into the hospital for bilateral pneumonia and COPD exacerbation.  GI was consulted for nausea.  Patient supposedly has been having nausea, vomiting & diarrhea for about 3 weeks. Has had decreased appetite as well. Denies any NSAID intake. Does not take PPI at home.   No hematemesis or coffee ground emesis.       Endo History:  2016 EGD/colonoscopy (Dr. Hines) normal EGD. hyperplastic polyps.  Grade 2 internal hemorrhoids.    Past Medical History:  Past Medical History:   Diagnosis Date   • A-fib (CMS/HCC)    • Aortic aneurysm (CMS/HCC)    • Appetite loss    • Cancer (CMS/HCC)     right lobe cancer - surgically removed    • Dark stools    • Foot pain, bilateral    • Hyperlipidemia    • Low back pain    • S/P epidural steroid injection     Israel Gomes's - no relief   • Weight loss     acute loss of weight 30 lbs       Past Surgical History:  Past Surgical History:   Procedure Laterality Date   • CATARACT EXTRACTION, BILATERAL     • CORONARY ANGIOPLASTY WITH STENT PLACEMENT     • LUMBAR DECOMPRESSION  09/2015    L2-L3   • LUMBAR DECOMPRESSION  2006    Dr. Logan   • OTHER SURGICAL HISTORY  05/2015    left SI fusion with bone graft - Dr. Presley   • OTHER SURGICAL HISTORY      carotid artery repair    • OTHER SURGICAL HISTORY Left 02/19/2016    Left SI fusion 2/19/2016 Dr. Zendejas   • POSTERIOR SPINAL FUSION  10/24/2016    PSF T12-3/TLIF L3-L4 poss L2-L3 --- Dr. Zendejas   • TUMOR REMOVAL      carcinoid tumor removed " off right lobe tumor       Social History:  Social History     Tobacco Use   • Smoking status: Never Smoker   • Smokeless tobacco: Never Used   Substance Use Topics   • Alcohol use: No     Frequency: Never   • Drug use: Never       Family History:  Family History   Problem Relation Age of Onset   • Cancer Other    • Hyperlipidemia Other    • Heart disease Other        Medications:  Medications Prior to Admission   Medication Sig Dispense Refill Last Dose   • fludrocortisone 0.1 MG tablet Take 0.2 mg by mouth Every Morning.   Taking   • fludrocortisone 0.1 MG tablet Take 0.1 mg by mouth Every Evening.      • HYDROcodone-acetaminophen (NORCO) 7.5-325 MG per tablet Take 1 tablet by mouth Every 6 (Six) Hours As Needed.   Taking   • Multiple Vitamin (MULTIVITAMIN) tablet Take 1 tablet by mouth Daily.   Taking   • potassium chloride ER (K-TAB) 20 MEQ tablet controlled-release ER tablet Take 20 mEq by mouth 2 (Two) Times a Day With Meals.   Taking   • rivaroxaban (XARELTO) 20 MG tablet Take 20 mg by mouth Daily.   Taking   • simvastatin (ZOCOR) 40 MG tablet Take 80 mg by mouth Every Night.      • vitamin B-12 (CYANOCOBALAMIN) 1000 MCG tablet Take 1,000 mcg by mouth Daily.   Taking       Scheduled Meds:  atorvastatin 20 mg Oral Daily   budesonide 0.5 mg Nebulization BID - RT   [START ON 7/4/2020] digoxin 125 mcg Intravenous Daily   enoxaparin 70 mg Subcutaneous Q12H   fludrocortisone 0.1 mg Oral Q PM   fludrocortisone 0.2 mg Oral Daily   furosemide 40 mg Oral Daily   insulin lispro 0-7 Units Subcutaneous Q6H   ipratropium-albuterol 3 mL Nebulization 4x Daily - RT   methylPREDNISolone sodium succinate 40 mg Intravenous Q8H   metoprolol tartrate 2.5 mg Intravenous Q6H   pantoprazole 40 mg Intravenous Q AM   piperacillin-tazobactam 3.375 g Intravenous Q8H   potassium chloride 20 mEq Oral Daily   sodium chloride 10 mL Intravenous Q12H   Thera 1 tablet Oral Daily   vitamin B-12 1,000 mcg Oral Daily     Continuous  Infusions:  dilTIAZem 5-15 mg/hr Last Rate: 15 mg/hr (07/03/20 1003)   Pharmacy Consult - Pharmacy to dose     Pharmacy to Dose Zosyn       PRN Meds:.•  acetaminophen **OR** acetaminophen **OR** acetaminophen  •  aluminum-magnesium hydroxide-simethicone  •  bisacodyl  •  dextrose  •  dextrose  •  glucagon (human recombinant)  •  HYDROcodone-acetaminophen  •  insulin lispro **AND** insulin lispro  •  magnesium hydroxide  •  magnesium sulfate **OR** magnesium sulfate **OR** magnesium sulfate  •  melatonin  •  Morphine  •  ondansetron **OR** ondansetron  •  Pharmacy Consult - Pharmacy to dose  •  Pharmacy to Dose Zosyn  •  potassium chloride **OR** potassium chloride **OR** potassium chloride  •  potassium phosphate infusion greater than 15 mMoles **OR** potassium phosphate infusion greater than 15 mMoles **OR** potassium phosphate **OR** sodium phosphate IVPB **OR** sodium phosphate IVPB **OR** sodium phosphate IVPB  •  promethazine  •  [COMPLETED] Insert peripheral IV **AND** sodium chloride  •  sodium chloride    ALLERGIES:  Ciprofloxacin; Amlodipine; and Rocephin [ceftriaxone]    ROS:  Review of Systems   Constitutional: Positive for appetite change.   HENT: Positive for postnasal drip.    Respiratory: Positive for cough.    Gastrointestinal: Positive for diarrhea, nausea and vomiting.   Neurological: Positive for weakness.       The following systems were reviewed and negative;   Constitution:  No fevers, chills, no unintentional weight loss  Skin: no rash, no jaundice  Eyes:  No blurry vision, no eye pain  HENT:  No change in hearing or smell  Resp:  No dyspnea or cough  CV:  No chest pain or palpitations  :  No dysuria, hematuria  Musculoskeletal:  No leg cramps or arthralgias  Neuro:  No tremor, no numbness  Psych:  No depression or confsuion    Objective  Resting in hospital bed. Family at bedside.     Vital Signs:   Vitals:    07/03/20 1001 07/03/20 1015 07/03/20 1030 07/03/20 1045   BP:       BP Location:        Patient Position:       Pulse: (!) 170 (!) 128 115 (!) 141   Resp:       Temp:       TempSrc:       SpO2:  96% (!) 77% 97%   Weight:       Height:           Physical Exam:   General Appearance:    Awake and alert, in no acute distress. Malaised   Head:    Normocephalic, without obvious abnormality, atraumatic   Eyes:            Conjunctivae normal, anicteric sclera, pupils equal   Ears:    Ears appear intact with no abnormalities noted   Throat:   No oral lesions, no thrush, oral mucosa moist   Neck:   Supple, no JVD   Lungs:     Clear to auscultation bilaterally, respirations regular, even and unlabored    Heart:    Regular rhythm and normal rate, normal S1 and S2, no            Murmur appreciated   Chest Wall:    No abnormalities observed   Abdomen:     Normal bowel sounds, soft, non-tender, no rebound or guarding, non-distended, no hepatosplenomegaly   Rectal:     Deferred   Extremities:   Moves all extremities, no edema, no cyanosis   Pulses:   Pulses palpable and equal bilaterally   Skin:   No rash, no jaundice, normal palpaion   Lymph nodes:   No cervical, supraclavicular or submandibular palpable adenopathy   Neurologic:   Cranial nerves 2 - 12 grossly intact, no asterixis       Results Review:   I reviewed the patient's labs and imaging.  Lab Results (last 24 hours)     Procedure Component Value Units Date/Time    Digoxin Level [[id number]] Collected:  07/03/20 0940    Specimen:  Blood Updated:  07/03/20 1226    POC Glucose Once [[id number]]  (Abnormal) Collected:  07/03/20 1140    Specimen:  Blood Updated:  07/03/20 1151     Glucose 219 mg/dL      Comment: Serial Number: [id number]:  [id number]       Comprehensive Metabolic Panel [[id number]]  (Abnormal) Collected:  07/03/20 0940    Specimen:  Blood Updated:  07/03/20 1029     Glucose 246 mg/dL      BUN --     Comment: Testing performed by alternate method        Creatinine 0.75 mg/dL      Sodium 145 mmol/L      Potassium 2.6 mmol/L      Comment:  Specimen hemolyzed.  Results may be affected.        Chloride 103 mmol/L      CO2 22.0 mmol/L      Calcium 9.1 mg/dL      Total Protein 6.8 g/dL      Albumin 3.80 g/dL      ALT (SGPT) 49 U/L      AST (SGOT) 37 U/L      Alkaline Phosphatase 60 U/L      Total Bilirubin 1.2 mg/dL      eGFR Non African Amer 100 mL/min/1.73      Globulin 3.0 gm/dL      A/G Ratio 1.3 g/dL      BUN/Creatinine Ratio --     Comment: Testing not performed        Anion Gap 20.0 mmol/L     Narrative:       GFR Normal >60  Chronic Kidney Disease <60  Kidney Failure <15      Magnesium [[id number]]  (Normal) Collected:  07/03/20 0940    Specimen:  Blood Updated:  07/03/20 1026     Magnesium 2.1 mg/dL     Lipase [[id number]]  (Normal) Collected:  07/03/20 0940    Specimen:  Blood Updated:  07/03/20 1026     Lipase 30 U/L     BUN [[id number]] Collected:  07/03/20 0940    Specimen:  Blood Updated:  07/03/20 1026    CBC & Differential [[id number]] Collected:  07/03/20 0940    Specimen:  Blood Updated:  07/03/20 0958    Narrative:       The following orders were created for panel order CBC & Differential.  Procedure                               Abnormality         Status                     ---------                               -----------         ------                     CBC Auto Differential[[id number]]        Abnormal            Final result                 Please view results for these tests on the individual orders.    CBC Auto Differential [[id number]]  (Abnormal) Collected:  07/03/20 0940    Specimen:  Blood Updated:  07/03/20 0958     WBC 14.10 10*3/mm3      RBC 4.23 10*6/mm3      Hemoglobin 12.7 g/dL      Hematocrit 39.4 %      MCV 93.1 fL      MCH 29.9 pg      MCHC 32.1 g/dL      RDW 15.0 %      RDW-SD 48.1 fl      MPV 8.4 fL      Platelets 273 10*3/mm3      Neutrophil % 91.3 %      Lymphocyte % 5.1 %      Monocyte % 3.4 %      Eosinophil % 0.0 %      Basophil % 0.2 %      Neutrophils, Absolute 12.90 10*3/mm3      Lymphocytes, Absolute 0.70  10*3/mm3      Monocytes, Absolute 0.50 10*3/mm3      Eosinophils, Absolute 0.00 10*3/mm3      Basophils, Absolute 0.00 10*3/mm3      nRBC 0.1 /100 WBC     Occult Blood, Fecal By Immunoassay - Stool, Per Rectum [[id number]]  (Normal) Collected:  07/03/20 0856    Specimen:  Stool from Per Rectum Updated:  07/03/20 0958     Occult Blood, Fecal by Immunoassay Negative    Blood Culture - Blood, Arm, Left [[id number]] Collected:  07/02/20 0249    Specimen:  Blood from Arm, Left Updated:  07/03/20 0300     Blood Culture No growth at 24 hours    Blood Culture - Blood, Arm, Right [[id number]] Collected:  07/01/20 2348    Specimen:  Blood from Arm, Right Updated:  07/03/20 0000     Blood Culture No growth at 24 hours    COVID-19,CEPHEID,COR/MILENA/PAD IN-HOUSE(OR EMERGENT/ADD-ON),NP SWAB IN TRANSPORT MEDIA 3-4 HR TAT - Swab, Nasopharynx [[id number]]  (Normal) Collected:  07/02/20 1659    Specimen:  Swab from Nasopharynx Updated:  07/02/20 2003     COVID19 Not Detected    Narrative:       Fact sheet for providers: https://www.fda.gov/media/732718/download     Fact sheet for patients: https://www.fda.gov/media/336754/download    Lactic Acid, Plasma [[id number]]  (Normal) Collected:  07/02/20 1243    Specimen:  Blood Updated:  07/02/20 1310     Lactate 1.8 mmol/L           Imaging Results (Last 24 Hours)     Procedure Component Value Units Date/Time    CT Chest Without Contrast [[id number]] Collected:  07/02/20 1416     Updated:  07/02/20 1431    Narrative:       CT CHEST WO CONTRAST-, CT ABDOMEN PELVIS WO CONTRAST-     Date of Exam: 7/2/2020 1:46 PM     Indication: pneumonia; I48.91-Unspecified atrial fibrillation;  I50.9-Heart failure, unspecified; J18.9-Pneumonia, unspecified organism.   Sepsis      Comparison Exams: None available.     Technique: Multiple axial images were obtained from the thoracic inlet  through the sepsis pubis without the administration of IV contrast. The  axial data was used to generate reformatted images in  the coronal and  sagittal planes.     Automated exposure control and iterative reconstruction methods were  used.     FINDINGS:  Chest: There is mediastinal adenopathy.  Precarinal node measures 2.5 x  1.8 cm in size.  No hilar or axillary adenopathy identified.  There is  emphysematous change of the lungs.  There is groundglass airspace  disease and interlobular septal thickening within the bilateral upper  lobes and patchy areas of additional groundglass airspace disease and  interlobular septal thickening within the bilateral lower lobes.   Findings are nonspecific and may be related to pneumonia including  atypical pneumonia as well as Covid 19 pneumonia.  Clinical correlation  recommended.  Within the right upper lobe there is a noncalcified 7 mm  pulmonary nodule.  Patient status post right middle lobectomy.  There is  low density material tracking within the major fissure which may be due  to small right pleural effusion.  Pleural scarring or treatment-related  change could also give this appearance.  Left within the posterior left  lower lobe there is a noncalcified 4 mm nodule.     ABDOMEN: Liver, pancreas, and spleen are within normal limits.   Bilateral adrenal glands appear normal.  Again demonstrated is a large  cyst arises from the upper pole the right kidney.  Kidneys are otherwise  unremarkable.  No renal or ureteral stone or hydronephrosis.  There is  some high density material seen dependently within the gallbladder which  may be due to sludge or multiple small stones.  There is no inflammatory  change around the gallbladder.  No evidence of biliary tract  obstruction.  Upper GI tract appears within normal limits.  There is an  abdominal aortic aneurysm measuring 3.8 x 3.7 cm in size.  No abdominal  adenopathy or free intraperitoneal fluid identified.     Pelvis: Urinary bladder is within normal limits.  GI tract is  unremarkable.  There is no pelvic or inguinal adenopathy.  No  free  intraperitoneal fluid is seen.  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  Left common iliac artery  measures 1.9 cm in size.     Bones: There is diffuse osteopenia.  Patient status post arthrodesis of  the left sacroiliac joint.  There are degenerative changes throughout  spine.  There are postoperative changes of the lumbar spine with fusion  from the T12-L4 vertebral levels.  No hardware complication identified.   There are no suspicious lytic or sclerotic bony lesions.       Impression:          1.  Bilateral groundglass airspace disease with interlobular septal  thickening with upper lobe predominance.  Findings are nonspecific but  can be seen with Covid 19 pneumonia.  Other bacterial and atypical  pneumonias can give this appearance as well.  2.  Status post right middle lobectomy.  3.  Thickening along the major fissure on the right which could be due  to a small amount of pleural fluid or pleural scarring related to  treatment change.  4.  No acute process seen within the abdomen.  5.  Abdominal aortic aneurysm measuring 3.8 cm in size.     Electronically Signed By-Lico Ferrari On:7/2/2020 2:29 PM  This report was finalized on [id number] by  Lico Ferrari, .    CT Abdomen Pelvis Without Contrast [[id number]] Collected:  07/02/20 1416     Updated:  07/02/20 1431    Narrative:       CT CHEST WO CONTRAST-, CT ABDOMEN PELVIS WO CONTRAST-     Date of Exam: 7/2/2020 1:46 PM     Indication: pneumonia; I48.91-Unspecified atrial fibrillation;  I50.9-Heart failure, unspecified; J18.9-Pneumonia, unspecified organism.   Sepsis      Comparison Exams: None available.     Technique: Multiple axial images were obtained from the thoracic inlet  through the sepsis pubis without the administration of IV contrast. The  axial data was used to generate reformatted images in the coronal and  sagittal planes.     Automated exposure control and iterative reconstruction methods  were  used.     FINDINGS:  Chest: There is mediastinal adenopathy.  Precarinal node measures 2.5 x  1.8 cm in size.  No hilar or axillary adenopathy identified.  There is  emphysematous change of the lungs.  There is groundglass airspace  disease and interlobular septal thickening within the bilateral upper  lobes and patchy areas of additional groundglass airspace disease and  interlobular septal thickening within the bilateral lower lobes.   Findings are nonspecific and may be related to pneumonia including  atypical pneumonia as well as Covid 19 pneumonia.  Clinical correlation  recommended.  Within the right upper lobe there is a noncalcified 7 mm  pulmonary nodule.  Patient status post right middle lobectomy.  There is  low density material tracking within the major fissure which may be due  to small right pleural effusion.  Pleural scarring or treatment-related  change could also give this appearance.  Left within the posterior left  lower lobe there is a noncalcified 4 mm nodule.     ABDOMEN: Liver, pancreas, and spleen are within normal limits.   Bilateral adrenal glands appear normal.  Again demonstrated is a large  cyst arises from the upper pole the right kidney.  Kidneys are otherwise  unremarkable.  No renal or ureteral stone or hydronephrosis.  There is  some high density material seen dependently within the gallbladder which  may be due to sludge or multiple small stones.  There is no inflammatory  change around the gallbladder.  No evidence of biliary tract  obstruction.  Upper GI tract appears within normal limits.  There is an  abdominal aortic aneurysm measuring 3.8 x 3.7 cm in size.  No abdominal  adenopathy or free intraperitoneal fluid identified.     Pelvis: Urinary bladder is within normal limits.  GI tract is  unremarkable.  There is no pelvic or inguinal adenopathy.  No free  intraperitoneal fluid is seen.  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   Left common iliac artery  measures 1.9 cm in size.     Bones: There is diffuse osteopenia.  Patient status post arthrodesis of  the left sacroiliac joint.  There are degenerative changes throughout  spine.  There are postoperative changes of the lumbar spine with fusion  from the T12-L4 vertebral levels.  No hardware complication identified.   There are no suspicious lytic or sclerotic bony lesions.       Impression:          1.  Bilateral groundglass airspace disease with interlobular septal  thickening with upper lobe predominance.  Findings are nonspecific but  can be seen with Covid 19 pneumonia.  Other bacterial and atypical  pneumonias can give this appearance as well.  2.  Status post right middle lobectomy.  3.  Thickening along the major fissure on the right which could be due  to a small amount of pleural fluid or pleural scarring related to  treatment change.  4.  No acute process seen within the abdomen.  5.  Abdominal aortic aneurysm measuring 3.8 cm in size.     Electronically Signed By-Lico Ferrari On:7/2/2020 2:29 PM  This report was finalized on [id number] by  Lico Ferrari, .    CT Sinus Without Contrast [[id number]] Collected:  07/02/20 1409     Updated:  07/02/20 1416    Narrative:       CT SINUS WO CONTRAST-     Date of Exam: 7/2/2020 1:45 PM     Indication: severe sinusitis and epistaxis, sepsis; I48.91-Unspecified  atrial fibrillation; I50.9-Heart failure, unspecified; J18.9-Pneumonia,  unspecified organism.     Comparison: CT head without contrast 11/20/2019. No prior dedicated CT  sinus study at this institution for comparison.      Technique: Contiguous axial images of the paranasal sinuses were  performed.  Coronal and sagittal reconstructions were performed.  Automated exposure control and iterative reconstruction methods were  used.     FINDINGS:  There is nasal septal deviation toward the right with a right-sided bony  apical spur measuring 5 mm. The osteomeatal units are normal.   No  osteolytic or osteoblastic lesions are present. There is mild the left  maxillary sinus mucosal thickening superiorly. There is a small mucous  retention cyst or polyp in the left maxillary sinus anteriorly measuring  7 x 12 x 11 mm. A small mucous retention cyst or polyp in the right  sphenoid sinus laterally measures 9 x 10 x 7 mm. Otherwise, the  paranasal sinuses are clear. No air-fluid levels are identified. The  cribriform plate is intact.  The orbital spaces are clear.  No soft  tissue masses.       Impression:          1. No evidence of acute/active sinusitis.  2. Minor left maxillary sinus mucosal thickening. Small mucous retention  cysts or polyps within the left maxillary and right sphenoid sinus.  3. Bony nasal septal deviation toward the right.     Electronically Signed By-Dr. Gladys Lau MD On:7/2/2020 2:14 PM  This report was finalized on [id number] by Dr. Gladys Lau MD.             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  COVID is negative.  Patient has Been complaining of nausea vomiting and diarrhea for the last couple of weeks.  CT was negative.  Patient has also had a decreased appetite.  Patient supposedly had a green formed stool this morning.  Will order stool studies.  I will order PPI, scheduled Reglan and Zofran.  Add carafate slurry  Continue Zosyn for pneumonia  Consider EGD if he does not improve.     I discussed the patients findings and my recommendations with the patient.  Peggy Johnson,  TERRANCE  07/03/20  12:30    Time:

## 2020-07-03 NOTE — THERAPY EVALUATION
Acute Care - Occupational Therapy Initial Evaluation  OFELIA Candelario     Patient Name: Shukri Park  : 1940  MRN: [id number]  Today's Date: 7/3/2020             Admit Date: 2020       ICD-10-CM ICD-9-CM   1. Atrial fibrillation with rapid ventricular response (CMS/HCC) I48.91 427.31   2. Congestive heart failure, unspecified HF chronicity, unspecified heart failure type (CMS/HCC) I50.9 428.0   3. Pneumonia of both lungs due to infectious organism, unspecified part of lung J18.9 483.8     Patient Active Problem List   Diagnosis   • Atrial fibrillation (CMS/HCC)   • CAD (coronary artery disease)   • Chronic anticoagulation   • Hyperlipidemia   • Presence of stent in right coronary artery   • Chronic pain   • Sepsis (CMS/HCC)     Past Medical History:   Diagnosis Date   • A-fib (CMS/HCC)    • Aortic aneurysm (CMS/HCC)    • Appetite loss    • Cancer (CMS/HCC)     right lobe cancer - surgically removed    • Dark stools    • Foot pain, bilateral    • Hyperlipidemia    • Low back pain    • S/P epidural steroid injection     [place] Dr Gomes's - no relief   • Weight loss     acute loss of weight 30 lbs     Past Surgical History:   Procedure Laterality Date   • CATARACT EXTRACTION, BILATERAL     • CORONARY ANGIOPLASTY WITH STENT PLACEMENT     • LUMBAR DECOMPRESSION  2015    L2-L3   • LUMBAR DECOMPRESSION      Dr. Logan   • OTHER SURGICAL HISTORY  2015    left SI fusion with bone graft - Dr. Presley   • OTHER SURGICAL HISTORY      carotid artery repair    • OTHER SURGICAL HISTORY Left 2016    Left SI fusion 2016 Dr. Zendejas   • POSTERIOR SPINAL FUSION  10/24/2016    PSF T12-3/TLIF L3-L4 poss L2-L3 --- Dr. 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  --  yes  -ES       Prior Level of Function  --  independent:  -ES       Equipment Currently Used at Home  --  walker, rolling;cane, straight  -ES       Pertinent History of Current Functional Problem  --  Pt is 78 yo male admitted with A fib with RVR, B PNA, AE COPD, and chronic back pain. Pt c/o weakness, cough, nausea, vomiting, and general malaise. At baseline, pt's sister reports he is primarily ambulatory without AE, but occassionally utilizes cane.  He was independent with ADLs.  Per sister, pt has had one fall to her knowledge. Lilian lwas in November with scalp laceration, treated at [facility].   -ES       Existing Precautions/Restrictions  --  fall  -ES          Relationship/Environment    Primary Source of Support/Comfort  --  spouse;child(hira)  -ES       Name of Support/Comfort Primary Source  --  Grandson,Narendra, is POA. Pt currently going through divorce from spouse, who is in SNF.  -ES       Lives With  --  alone  -ES       Family Caregiver if Needed  --  grandchild(hira), adult  -ES       Primary Roles/Responsibilities  --  retired [employer],   -ES          Resource/Environmental Concerns    Current Living Arrangements  --  home/apartment/condo  -ES       Resource/Environmental Concerns  --  none  -ES       Transportation Concerns  --  car, none  -ES          Home Main Entrance    Number of Stairs, Main Entrance  --  one  -ES          Cognitive Assessment/Intervention- PT/OT    Orientation Status (Cognition)  --  oriented x 4  -ES          Safety Issues, Functional Mobility    Safety Issues Affecting Function (Mobility)  --  impulsivity;judgment impulsive due to nausea/vomiting.   -ES       Impairments Affecting Function (Mobility)  --   balance;pain;postural/trunk control  -ES          Bed Mobility Assessment/Treatment    Comment (Bed Mobility)  --  Pt on BSC upon OT arrival. Pt requests to return to recliner  -ES          Transfer Assessment/Treatment    Transfer Assessment/Treatment  --  toilet transfer;sit-stand transfer;stand-sit transfer;stand pivot/stand step transfer  -ES          Sit-Stand Transfer    Sit-Stand [place] (Transfers)  --  contact guard  -ES          Stand-Sit Transfer    Stand-Sit [place] (Transfers)  --  contact guard  -ES          Stand Pivot/Stand Step Transfer    Stand Pivot/Stand Step [place]  --  contact guard  -ES          Toilet Transfer    Type (Toilet Transfer)  --  lateral  -ES       [place] Level (Toilet Transfer)  --  contact guard  -ES          ADL Assessment/Intervention    BADL Assessment/Intervention  --  upper body dressing;lower body dressing;feeding;toileting  -ES          Upper Body Dressing Assessment/Training    Upper Body Dressing [place] Level  --  upper body dressing skills;set up  -ES          Lower Body Dressing Assessment/Training    Lower Body Dressing [place] Level  --  lower body dressing skills;minimum assist (75% patient effort)  -ES          Self-Feeding Assessment/Training    Comment (Feeding)  --  Pt reports feeding self without difficulty, but is now vomiting.   -ES          Toileting Assessment/Training    [place] Level (Toileting)  --  minimum assist (75% patient effort)  -ES       Comment (Toileting)  --  A for marychuy hygiene, primarily due to pt nausea/vomiting.   -ES          BADL Safety/Performance    Skilled BADL Treatment/Intervention  --  BADL process/adaptation training;cognitive/safety deficit modifications;energy conservation  -ES          General ROM    GENERAL ROM COMMENTS  --  Grossly WFL  -ES          MMT (Manual Muscle Testing)    General MMT Comments  --  Grossly WFL  -ES          Motor Assessment/Interventions    Additional Documentation   --  Balance (Group);Functional Endurance Training (Group)  -ES          Balance    Balance  --  static sitting balance;static standing balance;dynamic sitting balance;dynamic standing balance  -ES          Static Sitting Balance    Level of [place] (Unsupported Sitting, Static Balance)  --  supervision  -ES       Sitting Position (Unsupported Sitting, Static Balance)  --  sitting in chair  -ES       Time Able to Maintain Position (Unsupported Sitting, Static Balance)  --  1 to 2 minutes  -ES          Dynamic Sitting Balance    Level of [place], Reaches Outside Midline (Sitting, Dynamic Balance)  --  supervision  -ES       Sitting Position, Reaches Outside Midline (Sitting, Dynamic Balance)  --  sitting in chair  -ES          Static Standing Balance    Level of [place] (Supported Standing, Static Balance)  --  contact guard assist  -ES       Time Able to Maintain Position (Supported Standing, Static Balance)  --  45 to 60 seconds  -ES          Dynamic Standing Balance    Level of [place], Reaches Outside Midline (Standing, Dynamic Balance)  --  minimal assist, 75% patient effort  -ES       Time Able to Maintain Position, Reaches Outside Midline (Standing, Dynamic Balance)  --  45 to 60 seconds  -ES       Comment, Reaches Outside Midline (Standing, Dynamic Balance)  --  Requires physical assist to maintain upright positioning due to dry heaving.   -ES          Functional Endurance Training    Comment, Functional Endurance  --  Pt weak, deconditioned. SOB with minimal activity.   -ES          Positioning and Restraints    Pre-Treatment Position  --  bedside commode  -ES       Post Treatment Position  --  chair  -ES       In Chair  --  call light within reach;exit alarm on;encouraged to call for assist;notified nsg;with nsg  -ES          Pain Assessment    Additional Documentation  --  Pain Scale: Numbers Pre/Post-Treatment (Group)  -ES          Pain Scale: Numbers Pre/Post-Treatment    Pain Scale:  Numbers, Pretreatment  --  3/10  -ES       Pain Scale: Numbers, Post-Treatment  --  4/10  -ES       Pain Location  --  abdomen  -ES          Health Promotion    Additional Documentation  --  Coping (Group)  -ES          Coping    Observed Emotional State  --  hopeless  -ES       Verbalized Emotional State  --  hopelessness  -ES          Plan of Care Review    Plan of Care Reviewed With  --  patient  -ES       Outcome Summary  --  Pt is 78 yo male admitted with A fib with RVR, B PNA, AE COPD, and chronic back pain. Pt c/o weakness, cough, nausea, vomiting, and general malaise. At baseline, pt's sister reports he is primarily ambulatory without AE, but occassionally utilizes cane.  He was independent with ADLs.  Per sister, pt has had one fall to her knowledge. Lilian lwas in November with scalp laceration, treated at [facility].  This date, pt on BSC upon OT arrival. HR elevated, and pt is nauseous and attempting to vomit throughout session. Requires Min A for toileting task due to nausea/vomiting, and Min A to maintain dynamic balance task due to nausea/vomiting. Pt appears deconditioned, requiring 4L o2 to maintain SpO2 saturations. He is below independent baseline, and appear to require IP rehab based on this assessment. PPE: gloves, mask, gogglees  -ES          Clinical Impression (OT)    Therapy Frequency (OT Eval)  --  5 times/wk  -ES       Predicted Duration of Therapy Intervention (Therapy Eval)  --  until discharge  -ES       Anticipated Discharge Disposition (OT)  --  inpatient rehabilitation facility  -ES          Vital Signs    Pre Systolic BP Rehab  --  96  -ES       Pre Treatment Diastolic BP  --  63  -ES       Intra Systolic BP Rehab  --  140  -ES       Intra Treatment Diastolic BP  --  103  -ES       Post Treatment Diastolic BP  --  133  -ES       Pretreatment Heart Rate (beats/min)  158  -ES  --       Intratreatment Heart Rate (beats/min)  130  -ES  --       Pre SpO2 (%)  --  95  -ES       O2  Delivery Pre Treatment  --  supplemental O2  -ES       Intra SpO2 (%)  --  94  -ES       O2 Delivery Intra Treatment  --  supplemental O2  -ES       Post SpO2 (%)  --  91  -ES       O2 Delivery Post Treatment  --  supplemental O2  -ES       Pre Patient Position  --  Sitting  -ES       Intra Patient Position  --  Standing  -ES       Post Patient Position  --  Sitting  -ES          OT Goals    Bathing Goal Selection (OT)  --  bathing, OT goal 1  -ES       Dressing Goal Selection (OT)  --  dressing, OT goal 1  -ES       Toileting Goal Selection (OT)  --  toileting, OT goal 1  -ES          Bathing Goal 1 (OT)    Activity/Assistive Device (Bathing Goal 1, OT)  --  bathing skills, all  -ES       [place] Level/Cues Needed (Bathing Goal 1, OT)  --  conditional independence  -ES       Time Frame (Bathing Goal 1, OT)  --  2 weeks  -ES          Dressing Goal 1 (OT)    Activity/Assistive Device (Dressing Goal 1, OT)  --  dressing skills, all  -ES       [place]/Cues Needed (Dressing Goal 1, OT)  --  supervision required  -ES       Time Frame (Dressing Goal 1, OT)  --  2 weeks  -ES          Toileting Goal 1 (OT)    Activity/Device (Toileting Goal 1, OT)  --  toileting skills, all  -ES       [place] Level/Cues Needed (Toileting Goal 1, OT)  --  supervision required  -ES       Time Frame (Toileting Goal 1, OT)  --  2 weeks  -ES          Living Environment    Home Accessibility  --  stairs to enter home;tub/shower is not walk in  -ES         User Key  (r) = Recorded By, (t) = Taken By, (c) = Cosigned By    Initials Name Effective Dates    ES Shelby Bass OT 03/01/19 -          Occupational Therapy Education                 Title: PT OT SLP Therapies (In Progress)     Topic: Occupational Therapy (In Progress)     Point: ADL training (Done)     Description:   Instruct learner(s) on proper safety adaptation and remediation techniques during self care or transfers.   Instruct in proper use of assistive devices.               Learning Progress Summary           Patient Acceptance, E,TB, VU,NR by ES at 7/3/2020 1100                   Point: Home exercise program (Not Started)     Description:   Instruct learner(s) on appropriate technique for monitoring, assisting and/or progressing therapeutic exercises/activities.              Learner Progress:   Not documented in this visit.          Point: Precautions (Done)     Description:   Instruct learner(s) on prescribed precautions during self-care and functional transfers.              Learning Progress Summary           Patient Acceptance, E,TB, VU,NR by  at 7/3/2020 1100                   Point: Body mechanics (Not Started)     Description:   Instruct learner(s) on proper positioning and spine alignment during self-care, functional mobility activities and/or exercises.              Learner Progress:   Not documented in this visit.                      User Key     Initials Effective Dates Name Provider Type Discipline     03/01/19 -  Shelby Bass OT Occupational Therapist OT                  OT Recommendation and Plan  Outcome Summary/Treatment Plan (OT)  Anticipated Discharge Disposition (OT): inpatient rehabilitation facility  Therapy Frequency (OT Eval): 5 times/wk  Plan of Care Review  Plan of Care Reviewed With: patient  Plan of Care Reviewed With: patient  Outcome Summary: Pt is 80 yo male admitted with A fib with RVR, B PNA, AE COPD, and chronic back pain. Pt c/o weakness, cough, nausea, vomiting, and general malaise. At baseline, pt's sister reports he is primarily ambulatory without AE, but occassionally utilizes cane.  He was independent with ADLs.  Per sister, pt has had one fall to her knowledge. Fal lwas in November with scalp laceration, treated at [facility].  This date, pt on BSC upon OT arrival. HR elevated, and pt is nauseous and attempting to vomit throughout session. Requires Min A for toileting task due to nausea/vomiting, and Min A to maintain  dynamic balance task due to nausea/vomiting. Pt appears deconditioned, requiring 4L o2 to maintain SpO2 saturations. He is below independent baseline, and appear to require IP rehab based on this assessment. PPE: gloves, mask, gogglees        Time Calculation:   Time Calculation- OT     Row Name 07/03/20 1101             Time Calculation- OT    OT Start Time  0850  -ES      OT Stop Time  0922  -ES      OT Time Calculation (min)  32 min  -ES      Total Timed Code Minutes- OT  10 minute(s)  -ES      OT Non-Billable Time (min)  22 min  -ES      OT Received On  07/03/20  -ES      OT - Next Appointment  07/06/20  -ES      OT Goal Re-Cert Due Date  07/17/20  -ES        User Key  (r) = Recorded By, (t) = Taken By, (c) = Cosigned By    Initials Name Provider Type    Shelby Marie OT Occupational Therapist        Therapy Charges for Today     Code Description Service Date Service Provider Modifiers Qty    [id number]  OT SELF CARE/MGMT/TRAIN EA 15 MIN 7/3/2020 Shelby Bass OT GO 1    [id number]  OT EVAL MOD COMPLEXITY 3 7/3/2020 Shelby Bass OT GO 1               Shelby Bass OT  7/3/2020

## 2020-07-03 NOTE — PROGRESS NOTES
Referring Provider: Hospitalist    Reason for follow-up: Atrial fibrillation     Patient Care Team:  Nikki Gonzales MD as PCP - General (Family Medicine)    Subjective .  Complaints of weakness and fatigue    Objective  Lying in bed comfortably     Review of Systems   Constitution: Positive for malaise/fatigue. Negative for fever.   HENT: Negative for ear pain and nosebleeds.    Eyes: Negative for blurred vision and double vision.   Cardiovascular: Negative for chest pain, dyspnea on exertion and palpitations.   Respiratory: Negative for cough and shortness of breath.    Skin: Negative for rash.   Musculoskeletal: Negative for joint pain.   Gastrointestinal: Negative for abdominal pain, nausea and vomiting.   Neurological: Negative for focal weakness and headaches.   Psychiatric/Behavioral: Negative for depression. The patient is not nervous/anxious.    All other systems reviewed and are negative.      Ciprofloxacin; Amlodipine; and Rocephin [ceftriaxone]    Scheduled Meds:    atorvastatin 20 mg Oral Daily   budesonide 0.5 mg Nebulization BID - RT   [START ON 7/4/2020] digoxin 125 mcg Intravenous Daily   enoxaparin 70 mg Subcutaneous Q12H   fludrocortisone 0.1 mg Oral Q PM   fludrocortisone 0.2 mg Oral Daily   furosemide 40 mg Oral Daily   insulin lispro 0-7 Units Subcutaneous Q6H   ipratropium-albuterol 3 mL Nebulization 4x Daily - RT   methylPREDNISolone sodium succinate 40 mg Intravenous Q8H   metoprolol tartrate 2.5 mg Intravenous Q6H   pantoprazole 40 mg Intravenous Q AM   piperacillin-tazobactam 3.375 g Intravenous Q8H   potassium chloride 20 mEq Oral Daily   sodium chloride 10 mL Intravenous Q12H   Thera 1 tablet Oral Daily   vitamin B-12 1,000 mcg Oral Daily     Continuous Infusions:    dilTIAZem 5-15 mg/hr Last Rate: 15 mg/hr (07/03/20 1003)   Pharmacy Consult - Pharmacy to dose     Pharmacy to Dose Zosyn       PRN Meds:.•  acetaminophen **OR** acetaminophen **OR** acetaminophen  •  aluminum-magnesium  "hydroxide-simethicone  •  bisacodyl  •  dextrose  •  dextrose  •  glucagon (human recombinant)  •  HYDROcodone-acetaminophen  •  insulin lispro **AND** insulin lispro  •  magnesium hydroxide  •  magnesium sulfate **OR** magnesium sulfate **OR** magnesium sulfate  •  melatonin  •  Morphine  •  ondansetron **OR** ondansetron  •  Pharmacy Consult - Pharmacy to dose  •  Pharmacy to Dose Zosyn  •  potassium chloride **OR** potassium chloride **OR** potassium chloride  •  potassium phosphate infusion greater than 15 mMoles **OR** potassium phosphate infusion greater than 15 mMoles **OR** potassium phosphate **OR** sodium phosphate IVPB **OR** sodium phosphate IVPB **OR** sodium phosphate IVPB  •  promethazine  •  [COMPLETED] Insert peripheral IV **AND** sodium chloride  •  sodium chloride        VITAL SIGNS  Vitals:    07/03/20 1001 07/03/20 1015 07/03/20 1030 07/03/20 1045   BP:       BP Location:       Patient Position:       Pulse: (!) 170 (!) 128 115 (!) 141   Resp:       Temp:       TempSrc:       SpO2:  96% (!) 77% 97%   Weight:       Height:           Flowsheet Rows      First Filed Value   Admission Height  185.4 cm (73\") Documented at 07/01/2020 2336   Admission Weight  68 kg (150 lb) Documented at 07/01/2020 2336           TELEMETRY: Atrial fibrillation with controlled ventricular response    Physical Exam:  Physical Exam   Constitutional: He appears well-developed and well-nourished.   HENT:   Head: Normocephalic and atraumatic.   Eyes: Pupils are equal, round, and reactive to light. Conjunctivae and EOM are normal. No scleral icterus.   Neck: Normal range of motion. Neck supple. No JVD present. Carotid bruit is not present.   Cardiovascular: Normal rate, regular rhythm, S1 normal, S2 normal and intact distal pulses. PMI is not displaced.   Murmur heard.  Pulmonary/Chest: Effort normal and breath sounds normal. He has no wheezes. He has no rales.   Abdominal: Soft. Bowel sounds are normal.   Musculoskeletal: " Normal range of motion.   Neurological: He is alert. He has normal strength.   No focal deficits   Skin: Skin is warm and dry. No rash noted.   Psychiatric: He has a normal mood and affect.        Results Review:   I reviewed the patient's new clinical results.  Lab Results (last 24 hours)     Procedure Component Value Units Date/Time    Digoxin Level [[id number]]  (Abnormal) Collected:  07/03/20 0940    Specimen:  Blood Updated:  07/03/20 1242     Digoxin <0.30 ng/mL     POC Glucose Once [[id number]]  (Abnormal) Collected:  07/03/20 1140    Specimen:  Blood Updated:  07/03/20 1151     Glucose 219 mg/dL      Comment: Serial Number: [id number]:  [id number]       Comprehensive Metabolic Panel [[id number]]  (Abnormal) Collected:  07/03/20 0940    Specimen:  Blood Updated:  07/03/20 1029     Glucose 246 mg/dL      BUN --     Comment: Testing performed by alternate method        Creatinine 0.75 mg/dL      Sodium 145 mmol/L      Potassium 2.6 mmol/L      Comment: Specimen hemolyzed.  Results may be affected.        Chloride 103 mmol/L      CO2 22.0 mmol/L      Calcium 9.1 mg/dL      Total Protein 6.8 g/dL      Albumin 3.80 g/dL      ALT (SGPT) 49 U/L      AST (SGOT) 37 U/L      Alkaline Phosphatase 60 U/L      Total Bilirubin 1.2 mg/dL      eGFR Non African Amer 100 mL/min/1.73      Globulin 3.0 gm/dL      A/G Ratio 1.3 g/dL      BUN/Creatinine Ratio --     Comment: Testing not performed        Anion Gap 20.0 mmol/L     Narrative:       GFR Normal >60  Chronic Kidney Disease <60  Kidney Failure <15      Magnesium [[id number]]  (Normal) Collected:  07/03/20 0940    Specimen:  Blood Updated:  07/03/20 1026     Magnesium 2.1 mg/dL     Lipase [[id number]]  (Normal) Collected:  07/03/20 0940    Specimen:  Blood Updated:  07/03/20 1026     Lipase 30 U/L     BUN [[id number]] Collected:  07/03/20 0940    Specimen:  Blood Updated:  07/03/20 1026    CBC & Differential [[id number]] Collected:  07/03/20 0940    Specimen:  Blood  Updated:  07/03/20 0958    Narrative:       The following orders were created for panel order CBC & Differential.  Procedure                               Abnormality         Status                     ---------                               -----------         ------                     CBC Auto Differential[[id number]]        Abnormal            Final result                 Please view results for these tests on the individual orders.    CBC Auto Differential [[id number]]  (Abnormal) Collected:  07/03/20 0940    Specimen:  Blood Updated:  07/03/20 0958     WBC 14.10 10*3/mm3      RBC 4.23 10*6/mm3      Hemoglobin 12.7 g/dL      Hematocrit 39.4 %      MCV 93.1 fL      MCH 29.9 pg      MCHC 32.1 g/dL      RDW 15.0 %      RDW-SD 48.1 fl      MPV 8.4 fL      Platelets 273 10*3/mm3      Neutrophil % 91.3 %      Lymphocyte % 5.1 %      Monocyte % 3.4 %      Eosinophil % 0.0 %      Basophil % 0.2 %      Neutrophils, Absolute 12.90 10*3/mm3      Lymphocytes, Absolute 0.70 10*3/mm3      Monocytes, Absolute 0.50 10*3/mm3      Eosinophils, Absolute 0.00 10*3/mm3      Basophils, Absolute 0.00 10*3/mm3      nRBC 0.1 /100 WBC     Occult Blood, Fecal By Immunoassay - Stool, Per Rectum [[id number]]  (Normal) Collected:  07/03/20 0856    Specimen:  Stool from Per Rectum Updated:  07/03/20 0958     Occult Blood, Fecal by Immunoassay Negative    Blood Culture - Blood, Arm, Left [[id number]] Collected:  07/02/20 0249    Specimen:  Blood from Arm, Left Updated:  07/03/20 0300     Blood Culture No growth at 24 hours    Blood Culture - Blood, Arm, Right [[id number]] Collected:  07/01/20 2348    Specimen:  Blood from Arm, Right Updated:  07/03/20 0000     Blood Culture No growth at 24 hours    COVID-19,CEPHEID,COR/MILENA/PAD IN-HOUSE(OR EMERGENT/ADD-ON),NP SWAB IN TRANSPORT MEDIA 3-4 HR TAT - Swab, Nasopharynx [[id number]]  (Normal) Collected:  07/02/20 1659    Specimen:  Swab from Nasopharynx Updated:  07/02/20 2003     COVID19 Not Detected     Narrative:       Fact sheet for providers: https://www.fda.gov/media/788936/download     Fact sheet for patients: https://www.fda.gov/media/568671/download          Imaging Results (Last 24 Hours)     Procedure Component Value Units Date/Time    CT Chest Without Contrast [[id number]] Collected:  07/02/20 1416     Updated:  07/02/20 1431    Narrative:       CT CHEST WO CONTRAST-, CT ABDOMEN PELVIS WO CONTRAST-     Date of Exam: 7/2/2020 1:46 PM     Indication: pneumonia; I48.91-Unspecified atrial fibrillation;  I50.9-Heart failure, unspecified; J18.9-Pneumonia, unspecified organism.   Sepsis      Comparison Exams: None available.     Technique: Multiple axial images were obtained from the thoracic inlet  through the sepsis pubis without the administration of IV contrast. The  axial data was used to generate reformatted images in the coronal and  sagittal planes.     Automated exposure control and iterative reconstruction methods were  used.     FINDINGS:  Chest: There is mediastinal adenopathy.  Precarinal node measures 2.5 x  1.8 cm in size.  No hilar or axillary adenopathy identified.  There is  emphysematous change of the lungs.  There is groundglass airspace  disease and interlobular septal thickening within the bilateral upper  lobes and patchy areas of additional groundglass airspace disease and  interlobular septal thickening within the bilateral lower lobes.   Findings are nonspecific and may be related to pneumonia including  atypical pneumonia as well as Covid 19 pneumonia.  Clinical correlation  recommended.  Within the right upper lobe there is a noncalcified 7 mm  pulmonary nodule.  Patient status post right middle lobectomy.  There is  low density material tracking within the major fissure which may be due  to small right pleural effusion.  Pleural scarring or treatment-related  change could also give this appearance.  Left within the posterior left  lower lobe there is a noncalcified 4 mm nodule.        ABDOMEN: Liver, pancreas, and spleen are within normal limits.   Bilateral adrenal glands appear normal.  Again demonstrated is a large  cyst arises from the upper pole the right kidney.  Kidneys are otherwise  unremarkable.  No renal or ureteral stone or hydronephrosis.  There is  some high density material seen dependently within the gallbladder which  may be due to sludge or multiple small stones.  There is no inflammatory  change around the gallbladder.  No evidence of biliary tract  obstruction.  Upper GI tract appears within normal limits.  There is an  abdominal aortic aneurysm measuring 3.8 x 3.7 cm in size.  No abdominal  adenopathy or free intraperitoneal fluid identified.     Pelvis: Urinary bladder is within normal limits.  GI tract is  unremarkable.  There is no pelvic or inguinal adenopathy.  No free  intraperitoneal fluid is seen.  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  Left common iliac artery  measures 1.9 cm in size.     Bones: There is diffuse osteopenia.  Patient status post arthrodesis of  the left sacroiliac joint.  There are degenerative changes throughout  spine.  There are postoperative changes of the lumbar spine with fusion  from the T12-L4 vertebral levels.  No hardware complication identified.   There are no suspicious lytic or sclerotic bony lesions.       Impression:          1.  Bilateral groundglass airspace disease with interlobular septal  thickening with upper lobe predominance.  Findings are nonspecific but  can be seen with Covid 19 pneumonia.  Other bacterial and atypical  pneumonias can give this appearance as well.  2.  Status post right middle lobectomy.  3.  Thickening along the major fissure on the right which could be due  to a small amount of pleural fluid or pleural scarring related to  treatment change.  4.  No acute process seen within the abdomen.  5.  Abdominal aortic aneurysm measuring 3.8 cm in size.     Electronically  Signed By-Lico Ferrari On:7/2/2020 2:29 PM  This report was finalized on [id number] by  Lico Ferrari, .    CT Abdomen Pelvis Without Contrast [[id number]] Collected:  07/02/20 1416     Updated:  07/02/20 1431    Narrative:       CT CHEST WO CONTRAST-, CT ABDOMEN PELVIS WO CONTRAST-     Date of Exam: 7/2/2020 1:46 PM     Indication: pneumonia; I48.91-Unspecified atrial fibrillation;  I50.9-Heart failure, unspecified; J18.9-Pneumonia, unspecified organism.   Sepsis      Comparison Exams: None available.     Technique: Multiple axial images were obtained from the thoracic inlet  through the sepsis pubis without the administration of IV contrast. The  axial data was used to generate reformatted images in the coronal and  sagittal planes.     Automated exposure control and iterative reconstruction methods were  used.     FINDINGS:  Chest: There is mediastinal adenopathy.  Precarinal node measures 2.5 x  1.8 cm in size.  No hilar or axillary adenopathy identified.  There is  emphysematous change of the lungs.  There is groundglass airspace  disease and interlobular septal thickening within the bilateral upper  lobes and patchy areas of additional groundglass airspace disease and  interlobular septal thickening within the bilateral lower lobes.   Findings are nonspecific and may be related to pneumonia including  atypical pneumonia as well as Covid 19 pneumonia.  Clinical correlation  recommended.  Within the right upper lobe there is a noncalcified 7 mm  pulmonary nodule.  Patient status post right middle lobectomy.  There is  low density material tracking within the major fissure which may be due  to small right pleural effusion.  Pleural scarring or treatment-related  change could also give this appearance.  Left within the posterior left  lower lobe there is a noncalcified 4 mm nodule.     ABDOMEN: Liver, pancreas, and spleen are within normal limits.   Bilateral adrenal glands appear normal.  Again demonstrated  is a large  cyst arises from the upper pole the right kidney.  Kidneys are otherwise  unremarkable.  No renal or ureteral stone or hydronephrosis.  There is  some high density material seen dependently within the gallbladder which  may be due to sludge or multiple small stones.  There is no inflammatory  change around the gallbladder.  No evidence of biliary tract  obstruction.  Upper GI tract appears within normal limits.  There is an  abdominal aortic aneurysm measuring 3.8 x 3.7 cm in size.  No abdominal  adenopathy or free intraperitoneal fluid identified.     Pelvis: Urinary bladder is within normal limits.  GI tract is  unremarkable.  There is no pelvic or inguinal adenopathy.  No free  intraperitoneal fluid is seen.  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  Left common iliac artery  measures 1.9 cm in size.     Bones: There is diffuse osteopenia.  Patient status post arthrodesis of  the left sacroiliac joint.  There are degenerative changes throughout  spine.  There are postoperative changes of the lumbar spine with fusion  from the T12-L4 vertebral levels.  No hardware complication identified.   There are no suspicious lytic or sclerotic bony lesions.       Impression:          1.  Bilateral groundglass airspace disease with interlobular septal  thickening with upper lobe predominance.  Findings are nonspecific but  can be seen with Covid 19 pneumonia.  Other bacterial and atypical  pneumonias can give this appearance as well.  2.  Status post right middle lobectomy.  3.  Thickening along the major fissure on the right which could be due  to a small amount of pleural fluid or pleural scarring related to  treatment change.  4.  No acute process seen within the abdomen.  5.  Abdominal aortic aneurysm measuring 3.8 cm in size.     Electronically Signed By-Lico Ferrari On:7/2/2020 2:29 PM  This report was finalized on [id number] by  Lico Ferrari, .    CT Sinus  Without Contrast [[id number]] Collected:  07/02/20 1409     Updated:  07/02/20 1416    Narrative:       CT SINUS WO CONTRAST-     Date of Exam: 7/2/2020 1:45 PM     Indication: severe sinusitis and epistaxis, sepsis; I48.91-Unspecified  atrial fibrillation; I50.9-Heart failure, unspecified; J18.9-Pneumonia,  unspecified organism.     Comparison: CT head without contrast 11/20/2019. No prior dedicated CT  sinus study at this institution for comparison.      Technique: Contiguous axial images of the paranasal sinuses were  performed.  Coronal and sagittal reconstructions were performed.  Automated exposure control and iterative reconstruction methods were  used.     FINDINGS:  There is nasal septal deviation toward the right with a right-sided bony  apical spur measuring 5 mm. The osteomeatal units are normal.  No  osteolytic or osteoblastic lesions are present. There is mild the left  maxillary sinus mucosal thickening superiorly. There is a small mucous  retention cyst or polyp in the left maxillary sinus anteriorly measuring  7 x 12 x 11 mm. A small mucous retention cyst or polyp in the right  sphenoid sinus laterally measures 9 x 10 x 7 mm. Otherwise, the  paranasal sinuses are clear. No air-fluid levels are identified. The  cribriform plate is intact.  The orbital spaces are clear.  No soft  tissue masses.       Impression:          1. No evidence of acute/active sinusitis.  2. Minor left maxillary sinus mucosal thickening. Small mucous retention  cysts or polyps within the left maxillary and right sphenoid sinus.  3. Bony nasal septal deviation toward the right.     Electronically Signed By-Dr. Gladys Lau MD On:7/2/2020 2:14 PM  This report was finalized on [id number] by Dr. Gladys Lau MD.          EKG      I personally viewed and interpreted the patient's EKG/Telemetry data:    ECHOCARDIOGRAM:    STRESS MYOVIEW:    CARDIAC CATHETERIZATION:    OTHER:         Assessment/Plan     Principal Problem:     Atrial fibrillation (CMS/HCC)  Active Problems:    CAD (coronary artery disease)    Chronic anticoagulation    Hyperlipidemia    Presence of stent in right coronary artery    Chronic pain    Sepsis (CMS/HCC)  Hypokalemia    Patient presented with atrial fibrillation with rapid medical response and is currently on IV Cardizem  Patient's heart rates are better and hence will switch to oral Cardizem  Patient is already on Xarelto  Patient is ruled out for MI by EKG enzymes  Patient also had signs of sepsis and hence is getting the work-up done and is currently on antibiotics  He is ruled out for COVID  Patient is having an echocardiogram for LV function  Patient has history of coronary status post and placement to the right coronary artery  Patient has hyperlipidemia and is on oral medicines.  Patient's potassium is low and hence being replaced    I discussed the patients findings and my recommendations with patient and nurse    Sedrick Salcedo MD  07/03/20  13:27

## 2020-07-03 NOTE — CONSULTS
"Nutrition Services    Patient Name:  Shukri Park  YOB: 1940  MRN: [id number]  Admit Date:  7/1/2020    Comments: Clear Liquid diet with Boost Breeze BID to provide 500kcal and 18g protein if consumed.    Diet advancement as clinically indicated.      Reason for Assessment                Reason for Assessment    Reason For Assessment 7/3/20: GUMARO, MST 3        Diagnosis H&P:   79 y.o. male with a history of atrial fibrillation, hyperlipidemia, chronic low back pain, hypotension, chronic anticoagulation presented to the [facility] ED on 7/1/2020 with a complaint of generalized body aches and pains, weakness and a cough.  Patient states he is also had some nausea and vomiting.    Current Problems:   Atrial fibrillation  -Cardizem drip 15 mg/hr  -Patient received several Cardizem boluses in the ED  -Patient with history, on Xarelto,  -Not currently on any antiarrhythmics at home  -Check magnesium, TSH, trend troponins  -Cardiology consult     Possible sepsis  -Patient ruled in for sepsis due to tachycardia, tachypnea, lactate 2.5  -Given Zosyn 3.375 g, continue until sepsis ruled out  -Patient received 500 mL normal saline IV bolus in ED  -Blood cultures pending  -COVID-19 ruled out  -Respiratory panel negative  -Check urine     CAD  -No echo on file, obtain echo  -BNP 3485  -Per review of outside records: Last angioplasty to the RCA and left circumflex 4/16/2004  -Currently on Florinef for hypotension  -Continue Florinef, continue statin     Hyperlipidemia  -Continue statin     Chronic pain  -Due to multiple back surgeries  -Continue Norco, (INSPECT verified)               Nutrition/Diet History                Nutrition/Diet History    Narrative     7/3: Attempted to call pt via room telephone without answer. Noted per recent physician note that patient stated \"I am so weak I can't talk\". Chart reviewed.        Functional Status Mostly independent per ADL's, occassionally uses a cane.       " Food Allergies  NKFA       Factors Affecting Nutritional Intake  acute hospitalization with chronic illnesses, nausea         Anthropometrics             Anthropometrics    Height 185.4cm, 73in    Weight 67kg, 147lb, 7/3/20       Admit Weight    Admit Weight 150lb, 7/1/20       Ideal Body Weight (IBW)    Ideal Body Weight (IBW) 184lb    % Ideal Body Weight 80%       Usual Body Weight (UBW)    Usual Body Weight TOMY    % Usual Body Weight TOMY    Weight Hx  4/15/20 165lb  1/16/20 158lb  11/20/19 158lb  8/19/19 166lb       Body Mass Index (BMI)    BMI (kg/m2) 19.49           Labs/Medications                Labs    Pertinent Lab Results Comments Gluc 219 elev, K 2.6 low, Creat 0.75 low,         Medications    Pertinent Medications Comments Lasix, Humalog, Solumedrol, Reglan, Zofran, Protonix, Abx, KCl, Carafate, Mvt, Vit B12         Physical Findings                Physical Findings    Overall Physical Appearance Unable to assess in person related to COVID-19 pandemic and limiting in person visits.     Edema  None documented     Gastrointestinal 7/3 BM     Tubes none     Oral/Mouth Cavity No issues documented     Skin No breakdown noted         Estimated/Assessed Needs              Calorie Requirements     Height Used for Calorie Calculations       Weight Used for Calorie Calculations      Formula chosen for Calorie Calculations       Estimated Calorie Requirements (kcals/day)              Protein Requirements    Weight Used For Protein Calculations       Est Protein Requirement Amount (gms/kg)       Estimated Protein Requirements (gms/day)          Fluid Requirements     Estimated Fluid Requirements (mL/day)            Fluid Deficit       Desired Sodium Level (mEq/L)      Desired Sodium Level (mEq/L)      Estimated Fluid Deficit Needs (L)           Nutrition Prescription Ordered                Nutrition Prescription PO    Current PO Diet Clear Liquid     Supplement         Nutrition Prescription EN    Enteral Route -      TF modular -     TF Delivery Method -     Current Ordered TF  -     Current Ordered Water flush  -     TF Observation  -        Nutrition Prescription TPN    TPN Route -     Current Ordered TPN Volume  -     Dextrose (gm/kcals)  -     Amino Acid (gm/kcals) -     Lipids (ML/Concentration/Frequency)  -     TPN Observation  -          Evaluation of Received Nutrient/Fluid Intake                PO Evaluation    % PO Intake 7/3: 50% x 3 meals        EN Evaluation    TF Changes -     TF Residual -     TF Tolerance      Average EN Delivered  -        TPN Evaluation    Total Number of Days on TPN  -           Clinical Course      Clinical Nutrition Course Details  7/2 HH  7/3 NPO, CL diet         Problem/Interventions:                     Nutrition Diagnoses Problem 1      Problem 1   Inadequate oral intake related to nausea as evidenced by 50% intake on clear liquid diet.     Nutrition Diagnoses Problem 2      Problem 2            Intervention Goal                Intervention Goal    General Diet advanced.    Nausea improves.         Nutrition Intervention                Nutrition Intervention    RD/Tech Action  Add Boost Breeze BID         Nutrition Prescription                Nutrition Prescription PO      Diet  Clear Liquid     Supplement Boost Breeze BID        Nutrition Prescription EN    Enteral Prescription -        Nutrition Prescription TPN    TPN Prescription -         Monitor/Evaluation                Monitor/Evaluation    Monitor Diet advancement, intake, weight, labs, BM, skin             Electronically signed by:  Rebecca Horowitz RD  07/03/20 14:32

## 2020-07-04 ENCOUNTER — APPOINTMENT (OUTPATIENT)
Dept: GENERAL RADIOLOGY | Facility: HOSPITAL | Age: 80
End: 2020-07-04

## 2020-07-04 PROBLEM — I50.9 CONGESTIVE HEART FAILURE (CHF): Status: ACTIVE | Noted: 2020-07-04

## 2020-07-04 LAB
ALBUMIN SERPL-MCNC: 3.9 G/DL (ref 3.5–5.2)
ALBUMIN/GLOB SERPL: 1.3 G/DL
ALP SERPL-CCNC: 55 U/L (ref 39–117)
ALT SERPL W P-5'-P-CCNC: 43 U/L (ref 1–41)
ANION GAP SERPL CALCULATED.3IONS-SCNC: 17 MMOL/L (ref 5–15)
AST SERPL-CCNC: 45 U/L (ref 1–40)
BASOPHILS # BLD AUTO: 0.1 10*3/MM3 (ref 0–0.2)
BASOPHILS NFR BLD AUTO: 0.4 % (ref 0–1.5)
BILIRUB SERPL-MCNC: 1.2 MG/DL (ref 0.2–1.2)
BUN SERPL-MCNC: 12 MG/DL (ref 8–23)
BUN SERPL-MCNC: ABNORMAL MG/DL
BUN/CREAT SERPL: ABNORMAL
C DIFF GDH STL QL: NEGATIVE
CALCIUM SPEC-SCNC: 9.3 MG/DL (ref 8.6–10.5)
CHLORIDE SERPL-SCNC: 101 MMOL/L (ref 98–107)
CO2 SERPL-SCNC: 26 MMOL/L (ref 22–29)
CREAT SERPL-MCNC: 0.59 MG/DL (ref 0.76–1.27)
DEPRECATED RDW RBC AUTO: 54.3 FL (ref 37–54)
EOSINOPHIL # BLD AUTO: 0 10*3/MM3 (ref 0–0.4)
EOSINOPHIL NFR BLD AUTO: 0 % (ref 0.3–6.2)
ERYTHROCYTE [DISTWIDTH] IN BLOOD BY AUTOMATED COUNT: 15.9 % (ref 12.3–15.4)
GFR SERPL CREATININE-BSD FRML MDRD: 132 ML/MIN/1.73
GLOBULIN UR ELPH-MCNC: 2.9 GM/DL
GLUCOSE BLDC GLUCOMTR-MCNC: 141 MG/DL (ref 70–105)
GLUCOSE BLDC GLUCOMTR-MCNC: 148 MG/DL (ref 70–105)
GLUCOSE BLDC GLUCOMTR-MCNC: 163 MG/DL (ref 70–105)
GLUCOSE BLDC GLUCOMTR-MCNC: 168 MG/DL (ref 70–105)
GLUCOSE SERPL-MCNC: 149 MG/DL (ref 65–99)
HCT VFR BLD AUTO: 41.2 % (ref 37.5–51)
HGB BLD-MCNC: 12.9 G/DL (ref 13–17.7)
LYMPHOCYTES # BLD AUTO: 0.8 10*3/MM3 (ref 0.7–3.1)
LYMPHOCYTES NFR BLD AUTO: 4.4 % (ref 19.6–45.3)
MAGNESIUM SERPL-MCNC: 2.2 MG/DL (ref 1.6–2.4)
MCH RBC QN AUTO: 30.2 PG (ref 26.6–33)
MCHC RBC AUTO-ENTMCNC: 31.3 G/DL (ref 31.5–35.7)
MCV RBC AUTO: 96.7 FL (ref 79–97)
MONOCYTES # BLD AUTO: 1 10*3/MM3 (ref 0.1–0.9)
MONOCYTES NFR BLD AUTO: 5.8 % (ref 5–12)
NEUTROPHILS NFR BLD AUTO: 16.1 10*3/MM3 (ref 1.7–7)
NEUTROPHILS NFR BLD AUTO: 89.4 % (ref 42.7–76)
NRBC BLD AUTO-RTO: 0 /100 WBC (ref 0–0.2)
PLATELET # BLD AUTO: 280 10*3/MM3 (ref 140–450)
PMV BLD AUTO: 8.3 FL (ref 6–12)
POTASSIUM SERPL-SCNC: 2.8 MMOL/L (ref 3.5–5.2)
PROT SERPL-MCNC: 6.8 G/DL (ref 6–8.5)
RBC # BLD AUTO: 4.26 10*6/MM3 (ref 4.14–5.8)
SODIUM SERPL-SCNC: 144 MMOL/L (ref 136–145)
WBC # BLD AUTO: 18 10*3/MM3 (ref 3.4–10.8)

## 2020-07-04 PROCEDURE — 25010000002 ONDANSETRON PER 1 MG: Performed by: NURSE PRACTITIONER

## 2020-07-04 PROCEDURE — 82962 GLUCOSE BLOOD TEST: CPT

## 2020-07-04 PROCEDURE — 63710000001 INSULIN LISPRO (HUMAN) PER 5 UNITS: Performed by: INTERNAL MEDICINE

## 2020-07-04 PROCEDURE — 25010000002 MORPHINE PER 10 MG: Performed by: INTERNAL MEDICINE

## 2020-07-04 PROCEDURE — 83735 ASSAY OF MAGNESIUM: CPT | Performed by: INTERNAL MEDICINE

## 2020-07-04 PROCEDURE — 85025 COMPLETE CBC W/AUTO DIFF WBC: CPT | Performed by: INTERNAL MEDICINE

## 2020-07-04 PROCEDURE — 93005 ELECTROCARDIOGRAM TRACING: CPT | Performed by: INTERNAL MEDICINE

## 2020-07-04 PROCEDURE — 94799 UNLISTED PULMONARY SVC/PX: CPT

## 2020-07-04 PROCEDURE — 25010000002 AMIODARONE PER 30 MG: Performed by: INTERNAL MEDICINE

## 2020-07-04 PROCEDURE — 25010000002 METHYLPREDNISOLONE PER 40 MG: Performed by: INTERNAL MEDICINE

## 2020-07-04 PROCEDURE — 99233 SBSQ HOSP IP/OBS HIGH 50: CPT | Performed by: INTERNAL MEDICINE

## 2020-07-04 PROCEDURE — 99232 SBSQ HOSP IP/OBS MODERATE 35: CPT | Performed by: NURSE PRACTITIONER

## 2020-07-04 PROCEDURE — 63710000001 DIPHENHYDRAMINE PER 50 MG: Performed by: PHYSICIAN ASSISTANT

## 2020-07-04 PROCEDURE — 25010000002 DIGOXIN PER 500 MCG: Performed by: INTERNAL MEDICINE

## 2020-07-04 PROCEDURE — 25010000002 METOCLOPRAMIDE PER 10 MG: Performed by: NURSE PRACTITIONER

## 2020-07-04 PROCEDURE — 25010000002 ENOXAPARIN PER 10 MG: Performed by: INTERNAL MEDICINE

## 2020-07-04 PROCEDURE — 71045 X-RAY EXAM CHEST 1 VIEW: CPT

## 2020-07-04 PROCEDURE — 25010000002 PIPERACILLIN SOD-TAZOBACTAM PER 1 G: Performed by: NURSE PRACTITIONER

## 2020-07-04 PROCEDURE — 80053 COMPREHEN METABOLIC PANEL: CPT | Performed by: INTERNAL MEDICINE

## 2020-07-04 RX ORDER — DIPHENHYDRAMINE HCL 25 MG
25 TABLET ORAL ONCE
Status: COMPLETED | OUTPATIENT
Start: 2020-07-04 | End: 2020-07-04

## 2020-07-04 RX ADMIN — SODIUM CHLORIDE 15 MG/HR: 900 INJECTION, SOLUTION INTRAVENOUS at 12:59

## 2020-07-04 RX ADMIN — PIPERACILLIN AND TAZOBACTAM 3.38 G: 3; .375 INJECTION, POWDER, FOR SOLUTION INTRAVENOUS at 16:16

## 2020-07-04 RX ADMIN — ONDANSETRON 4 MG: 2 INJECTION INTRAMUSCULAR; INTRAVENOUS at 09:38

## 2020-07-04 RX ADMIN — HYDROCODONE BITARTRATE AND ACETAMINOPHEN 1 TABLET: 7.5; 325 TABLET ORAL at 05:01

## 2020-07-04 RX ADMIN — SUCRALFATE 1 G: 1 TABLET ORAL at 09:28

## 2020-07-04 RX ADMIN — MELATONIN TAB 5 MG 5 MG: 5 TAB at 20:01

## 2020-07-04 RX ADMIN — THERA TABS 1 TABLET: TAB at 09:28

## 2020-07-04 RX ADMIN — METOCLOPRAMIDE 10 MG: 5 INJECTION, SOLUTION INTRAMUSCULAR; INTRAVENOUS at 20:01

## 2020-07-04 RX ADMIN — METOPROLOL TARTRATE 5 MG: 5 INJECTION INTRAVENOUS at 01:41

## 2020-07-04 RX ADMIN — ONDANSETRON 4 MG: 2 INJECTION INTRAMUSCULAR; INTRAVENOUS at 21:38

## 2020-07-04 RX ADMIN — CYANOCOBALAMIN TAB 1000 MCG 1000 MCG: 1000 TAB at 09:49

## 2020-07-04 RX ADMIN — FLUDROCORTISONE ACETATE 0.1 MG: 0.1 TABLET ORAL at 16:16

## 2020-07-04 RX ADMIN — DIGOXIN 125 MCG: 250 INJECTION, SOLUTION INTRAMUSCULAR; INTRAVENOUS; PARENTERAL at 12:56

## 2020-07-04 RX ADMIN — AMIODARONE HYDROCHLORIDE 0.5 MG/MIN: 50 INJECTION, SOLUTION INTRAVENOUS at 01:41

## 2020-07-04 RX ADMIN — PIPERACILLIN AND TAZOBACTAM 3.38 G: 3; .375 INJECTION, POWDER, FOR SOLUTION INTRAVENOUS at 10:40

## 2020-07-04 RX ADMIN — BUDESONIDE 0.5 MG: 0.5 INHALANT RESPIRATORY (INHALATION) at 20:12

## 2020-07-04 RX ADMIN — POTASSIUM CHLORIDE 40 MEQ: 1500 TABLET, EXTENDED RELEASE ORAL at 05:02

## 2020-07-04 RX ADMIN — ONDANSETRON 4 MG: 2 INJECTION INTRAMUSCULAR; INTRAVENOUS at 16:16

## 2020-07-04 RX ADMIN — ENOXAPARIN SODIUM 70 MG: 80 INJECTION SUBCUTANEOUS at 22:06

## 2020-07-04 RX ADMIN — DIGOXIN 250 MCG: 250 INJECTION, SOLUTION INTRAMUSCULAR; INTRAVENOUS; PARENTERAL at 00:09

## 2020-07-04 RX ADMIN — METOPROLOL TARTRATE 5 MG: 5 INJECTION INTRAVENOUS at 09:44

## 2020-07-04 RX ADMIN — METOCLOPRAMIDE 10 MG: 5 INJECTION, SOLUTION INTRAMUSCULAR; INTRAVENOUS at 14:01

## 2020-07-04 RX ADMIN — POTASSIUM CHLORIDE 20 MEQ: 1500 TABLET, EXTENDED RELEASE ORAL at 09:49

## 2020-07-04 RX ADMIN — IPRATROPIUM BROMIDE AND ALBUTEROL SULFATE 3 ML: .5; 3 SOLUTION RESPIRATORY (INHALATION) at 20:12

## 2020-07-04 RX ADMIN — SUCRALFATE 1 G: 1 TABLET ORAL at 20:01

## 2020-07-04 RX ADMIN — METHYLPREDNISOLONE SODIUM SUCCINATE 40 MG: 40 INJECTION, POWDER, FOR SOLUTION INTRAMUSCULAR; INTRAVENOUS at 18:07

## 2020-07-04 RX ADMIN — POTASSIUM CHLORIDE 40 MEQ: 1500 TABLET, EXTENDED RELEASE ORAL at 20:01

## 2020-07-04 RX ADMIN — SODIUM CHLORIDE 15 MG/HR: 900 INJECTION, SOLUTION INTRAVENOUS at 00:08

## 2020-07-04 RX ADMIN — METOPROLOL TARTRATE 5 MG: 5 INJECTION INTRAVENOUS at 13:49

## 2020-07-04 RX ADMIN — ATORVASTATIN CALCIUM 20 MG: 20 TABLET, FILM COATED ORAL at 09:28

## 2020-07-04 RX ADMIN — DIPHENHYDRAMINE HCL 25 MG: 25 TABLET ORAL at 22:06

## 2020-07-04 RX ADMIN — ONDANSETRON 4 MG: 2 INJECTION INTRAMUSCULAR; INTRAVENOUS at 01:41

## 2020-07-04 RX ADMIN — IPRATROPIUM BROMIDE AND ALBUTEROL SULFATE 3 ML: .5; 3 SOLUTION RESPIRATORY (INHALATION) at 07:48

## 2020-07-04 RX ADMIN — SUCRALFATE 1 G: 1 TABLET ORAL at 13:44

## 2020-07-04 RX ADMIN — METHYLPREDNISOLONE SODIUM SUCCINATE 40 MG: 40 INJECTION, POWDER, FOR SOLUTION INTRAMUSCULAR; INTRAVENOUS at 09:41

## 2020-07-04 RX ADMIN — INSULIN LISPRO 2 UNITS: 100 INJECTION, SOLUTION INTRAVENOUS; SUBCUTANEOUS at 12:50

## 2020-07-04 RX ADMIN — ENOXAPARIN SODIUM 70 MG: 80 INJECTION SUBCUTANEOUS at 12:49

## 2020-07-04 RX ADMIN — FUROSEMIDE 40 MG: 40 TABLET ORAL at 09:28

## 2020-07-04 RX ADMIN — Medication 10 ML: at 20:02

## 2020-07-04 RX ADMIN — AMIODARONE HYDROCHLORIDE 0.5 MG/MIN: 50 INJECTION, SOLUTION INTRAVENOUS at 03:52

## 2020-07-04 RX ADMIN — METOCLOPRAMIDE 10 MG: 5 INJECTION, SOLUTION INTRAMUSCULAR; INTRAVENOUS at 09:46

## 2020-07-04 RX ADMIN — Medication 10 ML: at 09:50

## 2020-07-04 RX ADMIN — FLUDROCORTISONE ACETATE 0.2 MG: 0.1 TABLET ORAL at 09:28

## 2020-07-04 RX ADMIN — BUDESONIDE 0.5 MG: 0.5 INHALANT RESPIRATORY (INHALATION) at 07:48

## 2020-07-04 RX ADMIN — METOCLOPRAMIDE 10 MG: 5 INJECTION, SOLUTION INTRAMUSCULAR; INTRAVENOUS at 01:42

## 2020-07-04 RX ADMIN — MORPHINE SULFATE 2 MG: 4 INJECTION INTRAVENOUS at 13:59

## 2020-07-04 RX ADMIN — POTASSIUM CHLORIDE 40 MEQ: 1500 TABLET, EXTENDED RELEASE ORAL at 17:01

## 2020-07-04 RX ADMIN — SODIUM CHLORIDE 15 MG/HR: 900 INJECTION, SOLUTION INTRAVENOUS at 06:00

## 2020-07-04 RX ADMIN — PIPERACILLIN AND TAZOBACTAM 3.38 G: 3; .375 INJECTION, POWDER, FOR SOLUTION INTRAVENOUS at 00:09

## 2020-07-04 RX ADMIN — METOPROLOL TARTRATE 5 MG: 5 INJECTION INTRAVENOUS at 20:01

## 2020-07-04 RX ADMIN — METHYLPREDNISOLONE SODIUM SUCCINATE 40 MG: 40 INJECTION, POWDER, FOR SOLUTION INTRAMUSCULAR; INTRAVENOUS at 01:41

## 2020-07-04 RX ADMIN — SUCRALFATE 1 G: 1 TABLET ORAL at 18:07

## 2020-07-04 RX ADMIN — PANTOPRAZOLE SODIUM 40 MG: 40 INJECTION, POWDER, FOR SOLUTION INTRAVENOUS at 05:08

## 2020-07-04 NOTE — PROGRESS NOTES
LOS: 2 days   Patient Care Team:  Nikki Gonzales MD as PCP - General (Family Medicine)      Subjective   Sleeping. Less nausea & retching today.    Interval History:   Pending stool studies.   Labs improving except WBC elevated to 18  Feeling slightly better with less vomiting.  Heart rate up to 150, on Cardizem & Amiodarone     ROS:   Nausea and vomiting.  Diarrhea.  Tachycardia  No chest pain, shortness of breath, or cough.        Medication Review:     Current Facility-Administered Medications:   •  acetaminophen (TYLENOL) tablet 650 mg, 650 mg, Oral, Q4H PRN **OR** acetaminophen (TYLENOL) 160 MG/5ML solution 650 mg, 650 mg, Oral, Q4H PRN **OR** acetaminophen (TYLENOL) suppository 650 mg, 650 mg, Rectal, Q4H PRN, Coy Donahue APRN  •  aluminum-magnesium hydroxide-simethicone (MAALOX MAX) 400-400-40 MG/5ML suspension 15 mL, 15 mL, Oral, Q6H PRN, Coy Donahue APRN  •  [] AMIODARONE HCL IN DEXTROSE 450-5 MG/250ML-% IV SOLN, 1 mg/min, Intravenous, Continuous, Stopped at 20 0141 **FOLLOWED BY** AMIODARONE HCL IN DEXTROSE 450-5 MG/250ML-% IV SOLN, 0.5 mg/min, Intravenous, Continuous, Pedro Ritchie, , Last Rate: 16.67 mL/hr at 20 0910, 0.5 mg/min at 20 0910  •  atorvastatin (LIPITOR) tablet 20 mg, 20 mg, Oral, Daily, Coy Donahue APRN, 20 mg at 20 0928  •  bisacodyl (DULCOLAX) EC tablet 5 mg, 5 mg, Oral, Daily PRN, Coy Donahue APRN  •  budesonide (PULMICORT) nebulizer solution 0.5 mg, 0.5 mg, Nebulization, BID - RT, Khadra Saucedo MD, 0.5 mg at 20 0748  •  dextrose (D50W) 25 g/ 50mL Intravenous Solution 25 g, 25 g, Intravenous, Q15 Min PRN, Temo Marti MD  •  dextrose (GLUTOSE) oral gel 15 g, 15 g, Oral, Q15 Min PRN, Temo Marti MD  •  digoxin (LANOXIN) injection 125 mcg, 125 mcg, Intravenous, Daily, Temo Marti MD  •  dilTIAZem (CARDIZEM) 100 mg in 100 mL NS (1  mg/mL) infusion (ADV), 5-15 mg/hr, Intravenous, Titrated, Wade Cronin MD, Last Rate: 15 mL/hr at 07/04/20 0910, 15 mg/hr at 07/04/20 0910  •  enoxaparin (LOVENOX) syringe 70 mg, 70 mg, Subcutaneous, Q12H, FigueroaTemo MD, 70 mg at 07/03/20 2055  •  fludrocortisone tablet 0.1 mg, 0.1 mg, Oral, Q PM, Coy Donahue APRN, 0.1 mg at 07/03/20 1752  •  fludrocortisone tablet 0.2 mg, 0.2 mg, Oral, Daily, Coy Donahue APRN, 0.2 mg at 07/04/20 0928  •  furosemide (LASIX) tablet 40 mg, 40 mg, Oral, Daily, Wade Cronin MD, 40 mg at 07/04/20 0928  •  glucagon (human recombinant) (GLUCAGEN DIAGNOSTIC) injection 1 mg, 1 mg, Subcutaneous, Q15 Min PRN, FigueroaTemo MD  •  HYDROcodone-acetaminophen (NORCO) 7.5-325 MG per tablet 1 tablet, 1 tablet, Oral, Q6H PRN, Coy Donahue APRN, 1 tablet at 07/04/20 0501  •  insulin lispro (humaLOG) injection 0-7 Units, 0-7 Units, Subcutaneous, Q6H, 2 Units at 07/03/20 1752 **AND** insulin lispro (humaLOG) injection 0-7 Units, 0-7 Units, Subcutaneous, PRN, FigueroaTemo MD  •  ipratropium-albuterol (DUO-NEB) nebulizer solution 3 mL, 3 mL, Nebulization, 4x Daily - RT, Khadra Saucedo MD, 3 mL at 07/04/20 0748  •  magnesium hydroxide (MILK OF MAGNESIA) suspension 2400 mg/10mL 10 mL, 10 mL, Oral, Daily PRN, Coy Donahue APRN  •  Magnesium Sulfate 2 gram Bolus, followed by 8 gram infusion (total Mg dose 10 grams)- Mg less than or equal to 1mg/dL, 2 g, Intravenous, PRN **OR** Magnesium Sulfate 2 gram / 50mL Infusion (GIVE X 3 BAGS TO EQUAL 6GM TOTAL DOSE) - Mg 1.1 - 1.5 mg/dl, 2 g, Intravenous, PRN **OR** Magnesium Sulfate 4 gram infusion- Mg 1.6-1.9 mg/dL, 4 g, Intravenous, PRN, Temo Marti MD  •  melatonin tablet 5 mg, 5 mg, Oral, Nightly PRN, Coy Donahue, APRN, 5 mg at 07/03/20 2053  •  methylPREDNISolone sodium succinate (SOLU-Medrol) injection 40 mg, 40 mg, Intravenous,  Q8H, Khadra Saucedo MD, 40 mg at 07/04/20 0941  •  metoclopramide (REGLAN) injection 10 mg, 10 mg, Intravenous, Q6H, Peggy Johnson APRN, 10 mg at 07/04/20 0946  •  metoprolol tartrate (LOPRESSOR) injection 5 mg, 5 mg, Intravenous, Q6H, Pedro Ritchie DO, 5 mg at 07/04/20 0944  •  Morphine sulfate (PF) injection 2 mg, 2 mg, Intravenous, Q4H PRN, Temo Marti MD  •  ondansetron (ZOFRAN) injection 4 mg, 4 mg, Intravenous, Q6H PRN, Peggy Johnson APRN  •  [DISCONTINUED] ondansetron (ZOFRAN) tablet 4 mg, 4 mg, Oral, Q6H PRN **OR** ondansetron (ZOFRAN) injection 4 mg, 4 mg, Intravenous, Q6H, Peggy Johnson APRN, 4 mg at 07/04/20 0938  •  pantoprazole (PROTONIX) injection 40 mg, 40 mg, Intravenous, Q AM, Temo Marti MD, 40 mg at 07/04/20 0508  •  Pharmacy Consult - Pharmacy to dose, , Does not apply, Continuous PRN, Temo Marti MD  •  Pharmacy to Dose Zosyn, , Does not apply, Continuous PRN, Coy Donahue APRN  •  piperacillin-tazobactam (ZOSYN) IVPB 3.375 g in 100 mL NS (CD), 3.375 g, Intravenous, Q8H, Coy Donahue APRN, 3.375 g at 07/04/20 1040  •  potassium chloride (K-DUR,KLOR-CON) CR tablet 20 mEq, 20 mEq, Oral, Daily, Coy Donahue APRN, 20 mEq at 07/04/20 0949  •  potassium chloride (K-DUR,KLOR-CON) CR tablet 40 mEq, 40 mEq, Oral, PRN, 40 mEq at 07/04/20 0502 **OR** potassium chloride (KLOR-CON) packet 40 mEq, 40 mEq, Oral, PRN **OR** potassium chloride 10 mEq in 100 mL IVPB, 10 mEq, Intravenous, Q1H PRN, Temo Marti MD, Last Rate: 100 mL/hr at 07/03/20 1514, 10 mEq at 07/03/20 1514  •  potassium phosphate 45 mmol in sodium chloride 0.9 % 500 mL infusion, 45 mmol, Intravenous, PRN **OR** potassium phosphate 30 mmol in sodium chloride 0.9 % 250 mL infusion, 30 mmol, Intravenous, PRN **OR** potassium phosphate 15 mmol in sodium chloride 0.9 % 100 mL infusion, 15 mmol, Intravenous, PRN  **OR** sodium phosphates 45 mmol in sodium chloride 0.9 % 500 mL IVPB, 45 mmol, Intravenous, PRN **OR** sodium phosphates 30 mmol in sodium chloride 0.9 % 250 mL IVPB, 30 mmol, Intravenous, PRN **OR** sodium phosphates 15 mmol in sodium chloride 0.9 % 250 mL IVPB, 15 mmol, Intravenous, PRN, Temo Marti MD  •  promethazine (PHENERGAN) IVPB 12.5 mg, 12.5 mg, Intravenous, Q6H PRN, Temo Marti MD, 12.5 mg at 07/03/20 1003  •  [COMPLETED] Insert peripheral IV, , , Once **AND** sodium chloride 0.9 % flush 10 mL, 10 mL, Intravenous, PRN, Sunny Unger MD  •  sodium chloride 0.9 % flush 10 mL, 10 mL, Intravenous, Q12H, Coy Donahue APRN, 10 mL at 07/04/20 0950  •  sodium chloride 0.9 % flush 10 mL, 10 mL, Intravenous, PRN, Coy Donahue APRN  •  sucralfate (CARAFATE) tablet 1 g, 1 g, Oral, 4x Daily AC & at Bedtime, Peggy Johnson APRN, 1 g at 07/04/20 0928  •  Thera tablet 1 tablet, 1 tablet, Oral, Daily, Coy Donahue APRN, 1 tablet at 07/04/20 0928  •  vitamin B-12 (CYANOCOBALAMIN) tablet 1,000 mcg, 1,000 mcg, Oral, Daily, Coy Donahue APRN, 1,000 mcg at 07/04/20 0949      Objective  Resting comfortably in bed. Sitter at bedside.     Vital Signs  Temp:  [98 °F (36.7 °C)-99.1 °F (37.3 °C)] 99.1 °F (37.3 °C)  Heart Rate:  [] 96  Resp:  [16-26] 18  BP: (133-162)/() 153/89  Physical Exam:    General Appearance:    Awake and alert, in no acute distress   Head:    Normocephalic, without obvious abnormality   Eyes:          Conjunctivae normal, anicteric sclera   Ears:    Hearing intact   Throat:   No oral lesions, no thrush, oral mucosa moist   Neck:   No adenopathy, supple, no JVD   Lungs:     Clear to auscultation bilaterally, respirations regular, even and unlabored    Heart:    Regular rhythm and Tachycardic. normal S1 and S2, no            murmur, no gallop, no rub   Abdomen:     Normal bowel sounds, soft, non-tender, no rebound  or guarding, non-distended, no hepatosplenomegaly   Rectal:     Deferred   Extremities:   No edema, no cyanosis, no redness   Skin:   No bleeding, bruising or rash, no jaundice   Neurologic:   Cranial nerves 2 - 12 grossly intact, no asterixis, sensation   intact        Results Review:    Lab Results (last 24 hours)     Procedure Component Value Units Date/Time    BUN [[id number]]  (Normal) Collected:  07/04/20 0334    Specimen:  Blood Updated:  07/04/20 0839     BUN 12 mg/dL     POC Glucose Once [[id number]]  (Abnormal) Collected:  07/04/20 0726    Specimen:  Blood Updated:  07/04/20 0734     Glucose 141 mg/dL      Comment: Serial Number: [id number]:  [id number]       Clostridium Difficile EIA - Stool, Per Rectum [[id number]]  (Normal) Collected:  07/03/20 0856    Specimen:  Stool from Per Rectum Updated:  07/04/20 0715     C Diff GDH / Toxin Negative    Comprehensive Metabolic Panel [[id number]]  (Abnormal) Collected:  07/04/20 0334    Specimen:  Blood Updated:  07/04/20 0432     Glucose 149 mg/dL      BUN --     Comment: Testing performed by alternate method        Creatinine 0.59 mg/dL      Sodium 144 mmol/L      Potassium 2.8 mmol/L      Comment: Specimen hemolyzed.  Results may be affected.        Chloride 101 mmol/L      CO2 26.0 mmol/L      Calcium 9.3 mg/dL      Total Protein 6.8 g/dL      Albumin 3.90 g/dL      ALT (SGPT) 43 U/L      AST (SGOT) 45 U/L      Comment: Specimen hemolyzed.  Results may be affected.        Alkaline Phosphatase 55 U/L      Total Bilirubin 1.2 mg/dL      eGFR Non African Amer 132 mL/min/1.73      Globulin 2.9 gm/dL      A/G Ratio 1.3 g/dL      BUN/Creatinine Ratio --     Comment: Testing not performed        Anion Gap 17.0 mmol/L     Narrative:       GFR Normal >60  Chronic Kidney Disease <60  Kidney Failure <15      Magnesium [[id number]]  (Normal) Collected:  07/04/20 0334    Specimen:  Blood Updated:  07/04/20 0427     Magnesium 2.2 mg/dL     CBC & Differential [[id number]]  Collected:  07/04/20 0334    Specimen:  Blood Updated:  07/04/20 0416    Narrative:       The following orders were created for panel order CBC & Differential.  Procedure                               Abnormality         Status                     ---------                               -----------         ------                     CBC Auto Differential[[id number]]        Abnormal            Final result                 Please view results for these tests on the individual orders.    CBC Auto Differential [[id number]]  (Abnormal) Collected:  07/04/20 0334    Specimen:  Blood Updated:  07/04/20 0416     WBC 18.00 10*3/mm3      RBC 4.26 10*6/mm3      Hemoglobin 12.9 g/dL      Hematocrit 41.2 %      MCV 96.7 fL      MCH 30.2 pg      MCHC 31.3 g/dL      RDW 15.9 %      RDW-SD 54.3 fl      MPV 8.3 fL      Platelets 280 10*3/mm3      Neutrophil % 89.4 %      Lymphocyte % 4.4 %      Monocyte % 5.8 %      Eosinophil % 0.0 %      Basophil % 0.4 %      Neutrophils, Absolute 16.10 10*3/mm3      Lymphocytes, Absolute 0.80 10*3/mm3      Monocytes, Absolute 1.00 10*3/mm3      Eosinophils, Absolute 0.00 10*3/mm3      Basophils, Absolute 0.10 10*3/mm3      nRBC 0.0 /100 WBC     Blood Culture - Blood, Arm, Left [[id number]] Collected:  07/02/20 0249    Specimen:  Blood from Arm, Left Updated:  07/04/20 0300     Blood Culture No growth at 2 days    Blood Culture - Blood, Arm, Right [[id number]] Collected:  07/01/20 2348    Specimen:  Blood from Arm, Right Updated:  07/04/20 0000     Blood Culture No growth at 2 days    POC Glucose Once [[id number]]  (Abnormal) Collected:  07/03/20 2004    Specimen:  Blood Updated:  07/03/20 2008     Glucose 140 mg/dL      Comment: Serial Number: [id number]:  [id number]       POC Glucose Once [[id number]]  (Abnormal) Collected:  07/03/20 1629    Specimen:  Blood Updated:  07/03/20 1631     Glucose 154 mg/dL      Comment: Serial Number: [id number]:  [id number]       Gastrointestinal Panel, PCR -  Stool, Per Rectum [[id number]]  (Normal) Collected:  07/03/20 0856    Specimen:  Stool from Per Rectum Updated:  07/03/20 1535     Campylobacter Not Detected     Plesiomonas shigelloides Not Detected     Salmonella Not Detected     Vibrio Not Detected     Vibrio cholerae Not Detected     Yersinia enterocolitica Not Detected     Enteroaggregative E. coli (EAEC) Not Detected     Enteropathogenic E. coli (EPEC) Not Detected     Enterotoxigenic E. coli (ETEC) lt/st Not Detected     Shiga-like toxin-producing E. coli (STEC) stx1/stx2 Not Detected     E. coli O157 Not Detected     Shigella/Enteroinvasive E. coli (EIEC) Not Detected     Cryptosporidium Not Detected     Cyclospora cayetanensis Not Detected     Entamoeba histolytica Not Detected     Giardia lamblia Not Detected     Adenovirus F40/41 Not Detected     Astrovirus Not Detected     Norovirus GI/GII Not Detected     Rotavirus A Not Detected     Sapovirus (I, II, IV or V) Not Detected    Narrative:       If Aeromonas, Staphylococcus aureus or Bacillus cereus are suspected, please order [id number]: Stool Culture, Aeromonas, S aureus, B Cereus.    BUN [[id number]]  (Normal) Collected:  07/03/20 0940    Specimen:  Blood Updated:  07/03/20 1415     BUN 17 mg/dL     Digoxin Level [[id number]]  (Abnormal) Collected:  07/03/20 0940    Specimen:  Blood Updated:  07/03/20 1242     Digoxin <0.30 ng/mL     POC Glucose Once [[id number]]  (Abnormal) Collected:  07/03/20 1140    Specimen:  Blood Updated:  07/03/20 1151     Glucose 219 mg/dL      Comment: Serial Number: [id number]:  [id number]             Imaging Results (Last 24 Hours)     Procedure Component Value Units Date/Time    US Gallbladder [[id number]] Collected:  07/03/20 1503     Updated:  07/03/20 1506    Narrative:       US GALLBLADDER-     Date of Exam: 7/3/2020 2:31 PM     Indication: intractable nausea and vomiting; I48.91-Unspecified atrial  fibrillation; I50.9-Heart failure, unspecified;  J18.9-Pneumonia,  unspecified organism.   Abdominal pain     Comparison: CT 07/02/2020     Technique: Transverse and sagittal ultrasound images of the right upper  quadrant were obtained. Doppler evaluation was also conducted.     FINDINGS:  Visualized portions of the head and body of the pancreas are normal.  Evaluation is limited due to surrounding bowel gas.     The liver appears echogenic.  No focal hepatic lesions.  Portal and  hepatic veins are patent and have normal flow direction and waveforms.      Gallbladder is normal in caliber with no evidence of stones, wall  thickening or pericholecystic fluid. A small amount of sludge is present  within the gallbladder lumen. Negative sonographic Gomes's sign.     The biliary system is nondilated.      The right kidney is normal in size with no evidence of hydronephrosis.  No suspicious focal lesions. Simple cyst is seen arising from the  superior pole measuring up to 7.6 cm.       Impression:       Gallbladder sludge with no evidence of stones. No secondary findings of  acute cholecystitis. Mildly increased echogenicity of liver parenchyma  is present likely related to steatosis. A simple cyst is seen arising  from the right kidney.     Electronically Signed By-Jessica Myles On:7/3/2020 3:04 PM  This report was finalized on [id number] by  Jessica Myles, .            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  White blood cell count increased from 14-18.  Potassium is slowly rising from 2.6-2.8.  AST and ALT are minimally elevated.  Resting better today. Less vomiting. Continue PPI,  carafate, reglan & zofran.   Pending stool study results.  Continue Zosyn for pneumonia  Consider EGD if he does not improve.       Peggy Johnson, APRN  07/04/20  10:52

## 2020-07-04 NOTE — PLAN OF CARE
Problem: Patient Care Overview  Goal: Plan of Care Review  Outcome: Ongoing (interventions implemented as appropriate)  Note:   Pt has complained of difficulty sleeping and intermittent nausea this shift. HR better controlled with the additional meds, but still >100.   Goal: Individualization and Mutuality  Outcome: Ongoing (interventions implemented as appropriate)  Goal: Discharge Needs Assessment  Outcome: Ongoing (interventions implemented as appropriate)  Goal: Interprofessional Rounds/Family Conf  Outcome: Ongoing (interventions implemented as appropriate)

## 2020-07-04 NOTE — PROGRESS NOTES
[facility]/PULM/CC PROGRESS NOTE       SUBJECTIVE       This is a 79-year-old male with a history of COPD, former smoker, atrial fibrillation on chronic anticoagulation who lives all by himself presented to the hospital for fatigue, tiredness and shortness of breath.  Patient has been feeling puny for the few days with poor appetite.  He has noticed extreme fatigue with malaise.  He denies any fever or chills.  He has noted increased shortness of breath however cough has been mild.  He did cough clear sputum and only one time he had a maroonish sputum.  He denies any wheezing or chest pain or pleurisy.  Denies any sick contacts.  He states that he is visited by a home health nurse only.  He denies any other contacts.     CT scan of the chest was done that showed bilateral groundglass airspace disease with interlobular septal thickening upper lobe predominant.  Patient denies any active tobacco use or vaping.     He has a history of chronic sinus issues.  He is not on any maintenance inhalers.   7/3:  No new fevers  SOA stable  O2 requirement worsened over night and now on 5L  Remains fully awake and responsive  On oral diet  7/4: afebrile. soa at baseline. On 5 L. No n.v/d. Feels weak and fatigued.     OBJECTIVE    Vitals:    07/04/20 1400 07/04/20 1500 07/04/20 1600 07/04/20 1700   BP: 134/91 123/77 123/68 124/64   BP Location: Left arm      Patient Position: Lying      Pulse: 90 109 99 97   Resp: 20      Temp: 97.3 °F (36.3 °C)      TempSrc: Oral      SpO2: 95%      Weight:       Height:          Intake/Output last 3 shifts:  I/O last 3 completed shifts:  In: 300 [P.O.:200; IV Piggyback:100]  Out: 800 [Urine:800]  Intake/Output this shift:  I/O this shift:  In: 689 [P.O.:50; I.V.:639]  Out: 990 [Urine:990]    General Appearance:  Alert, cooperative, no distress, appears stated age  Head:  Normocephalic, without obvious abnormality, atraumatic  Eyes:  PERRL, conjunctivae/corneas clear, EOM's intact     Neck:  Supple,  no  adenopathy;      Lungs:   Clear to auscultation bilaterally, respirations unlabored  Chest wall:  No tenderness  Heart:  Regular rate and rhythm, S1 and S2 normal, no murmur, rub   or gallop  Abdomen:  Soft, non-tender, bowel sounds active all four quadrants,  no masses, no hepatomegaly, no splenomegaly  Extremities:  Extremities normal, no cyanosis or edema  Pulses: 2+ and symmetric all extremities  Skin:  No rashes or lesions  Neurologic:   Alert and oriented, no focal deficits    Scheduled Meds:    atorvastatin 20 mg Oral Daily   budesonide 0.5 mg Nebulization BID - RT   digoxin 125 mcg Intravenous Daily   enoxaparin 70 mg Subcutaneous Q12H   fludrocortisone 0.1 mg Oral Q PM   fludrocortisone 0.2 mg Oral Daily   furosemide 40 mg Oral Daily   insulin lispro 0-7 Units Subcutaneous Q6H   ipratropium-albuterol 3 mL Nebulization 4x Daily - RT   methylPREDNISolone sodium succinate 40 mg Intravenous Q8H   metoclopramide 10 mg Intravenous Q6H   metoprolol tartrate 5 mg Intravenous Q6H   ondansetron 4 mg Intravenous Q6H   pantoprazole 40 mg Intravenous Q AM   piperacillin-tazobactam 3.375 g Intravenous Q8H   potassium chloride 20 mEq Oral Daily   sodium chloride 10 mL Intravenous Q12H   sucralfate 1 g Oral 4x Daily AC & at Bedtime   Thera 1 tablet Oral Daily   vitamin B-12 1,000 mcg Oral Daily       Continuous Infusions:    amiodarone 0.5 mg/min Last Rate: 0.5 mg/min (07/04/20 0910)   dilTIAZem 5-15 mg/hr Last Rate: 15 mg/hr (07/04/20 1259)   Pharmacy Consult - Pharmacy to dose     Pharmacy to Dose Zosyn         PRN Meds:•  acetaminophen **OR** acetaminophen **OR** acetaminophen  •  aluminum-magnesium hydroxide-simethicone  •  bisacodyl  •  dextrose  •  dextrose  •  glucagon (human recombinant)  •  HYDROcodone-acetaminophen  •  insulin lispro **AND** insulin lispro  •  magnesium hydroxide  •  magnesium sulfate **OR** magnesium sulfate **OR** magnesium sulfate  •  melatonin  •  Morphine  •  ondansetron  •  Pharmacy Consult  - Pharmacy to dose  •  Pharmacy to Dose Zosyn  •  potassium chloride **OR** potassium chloride **OR** potassium chloride  •  potassium phosphate infusion greater than 15 mMoles **OR** potassium phosphate infusion greater than 15 mMoles **OR** potassium phosphate **OR** sodium phosphate IVPB **OR** sodium phosphate IVPB **OR** sodium phosphate IVPB  •  promethazine  •  [COMPLETED] Insert peripheral IV **AND** sodium chloride  •  sodium chloride     LABS    CBC  Results from last 7 days   Lab Units 07/04/20 0334 07/03/20 0940 07/02/20 0457 07/01/20  2348   WBC 10*3/mm3 18.00* 14.10* 7.50 9.60   RBC 10*6/mm3 4.26 4.23 3.82* 3.87*   HEMOGLOBIN g/dL 12.9* 12.7* 11.6* 11.7*   HEMATOCRIT % 41.2 39.4 34.3* 35.5*   MCV fL 96.7 93.1 89.9 91.7   PLATELETS 10*3/mm3 280 273 227 253       CMP   Results from last 7 days   Lab Units 07/04/20 0334 07/03/20 0940 07/02/20 0457 07/01/20  2348   SODIUM mmol/L 144 145 146* 145   POTASSIUM mmol/L 2.8* 2.6* 3.5 4.1   CHLORIDE mmol/L 101 103 104 104   CO2 mmol/L 26.0 22.0 23.0 22.0   BUN  12 17 22 23   CREATININE mg/dL 0.59* 0.75* 0.80 0.69*   GLUCOSE mg/dL 149* 246* 172* 119*   ALBUMIN g/dL 3.90 3.80  --  3.50   BILIRUBIN mg/dL 1.2 1.2  --  1.5*   ALK PHOS U/L 55 60  --  54   AST (SGOT) U/L 45* 37  --  31   ALT (SGPT) U/L 43* 49*  --  15   LIPASE U/L  --  30  --  13       TROPONIN  Results from last 7 days   Lab Units 07/02/20  1027   TROPONIN T ng/mL <0.010       CoAg        ABG  Results from last 7 days   Lab Units 07/02/20  1049   PH, ARTERIAL pH units 7.531*   PCO2, ARTERIAL mm Hg 31.3*   PO2 ART mm Hg 79.4*   O2 SATURATION ART % 97.1   BASE EXCESS ART mmol/L 3.9*       Microbiology  Microbiology Results (last 10 days)     Procedure Component Value - Date/Time    Gastrointestinal Panel, PCR - Stool, Per Rectum [[id number]]  (Normal) Collected:  07/03/20 0856    Lab Status:  Final result Specimen:  Stool from Per Rectum Updated:  07/03/20 1535     Campylobacter Not Detected      Plesiomonas shigelloides Not Detected     Salmonella Not Detected     Vibrio Not Detected     Vibrio cholerae Not Detected     Yersinia enterocolitica Not Detected     Enteroaggregative E. coli (EAEC) Not Detected     Enteropathogenic E. coli (EPEC) Not Detected     Enterotoxigenic E. coli (ETEC) lt/st Not Detected     Shiga-like toxin-producing E. coli (STEC) stx1/stx2 Not Detected     E. coli O157 Not Detected     Shigella/Enteroinvasive E. coli (EIEC) Not Detected     Cryptosporidium Not Detected     Cyclospora cayetanensis Not Detected     Entamoeba histolytica Not Detected     Giardia lamblia Not Detected     Adenovirus F40/41 Not Detected     Astrovirus Not Detected     Norovirus GI/GII Not Detected     Rotavirus A Not Detected     Sapovirus (I, II, IV or V) Not Detected    Narrative:       If Aeromonas, Staphylococcus aureus or Bacillus cereus are suspected, please order [id number]: Stool Culture, Aeromonas, S aureus, B Cereus.    Clostridium Difficile EIA - Stool, Per Rectum [[id number]]  (Normal) Collected:  07/03/20 0856    Lab Status:  Final result Specimen:  Stool from Per Rectum Updated:  07/04/20 0715     C Diff GDH / Toxin Negative    COVID-19,CEPHEID,COR/[facility]LENA/PAD IN-HOUSE(OR EMERGENT/ADD-ON),NP SWAB IN TRANSPORT MEDIA 3-4 HR TAT - Swab, Nasopharynx [[id number]]  (Normal) Collected:  07/02/20 1659    Lab Status:  Final result Specimen:  Swab from Nasopharynx Updated:  07/02/20 2003     COVID19 Not Detected    Narrative:       Fact sheet for providers: https://www.fda.gov/media/018480/download     Fact sheet for patients: https://www.fda.gov/media/852649/download    Blood Culture - Blood, Arm, Left [[id number]] Collected:  07/02/20 0249    Lab Status:  Preliminary result Specimen:  Blood from Arm, Left Updated:  07/04/20 0300     Blood Culture No growth at 2 days    Respiratory Panel, PCR - Swab, Nasopharynx [[id number]]  (Normal) Collected:  07/02/20 0027    Lab Status:  Final result Specimen:  Swab from  Nasopharynx Updated:  07/02/20 0153     ADENOVIRUS, PCR Not Detected     Coronavirus 229E Not Detected     Coronavirus HKU1 Not Detected     Coronavirus NL63 Not Detected     Coronavirus OC43 Not Detected     Human Metapneumovirus Not Detected     Human Rhinovirus/Enterovirus Not Detected     Influenza B PCR Not Detected     Parainfluenza Virus 1 Not Detected     Parainfluenza Virus 2 Not Detected     Parainfluenza Virus 3 Not Detected     Parainfluenza Virus 4 Not Detected     Bordetella pertussis pcr Not Detected     Influenza A H1 2009 PCR Not Detected     Chlamydophila pneumoniae PCR Not Detected     Mycoplasma pneumo by PCR Not Detected     Influenza A PCR Not Detected     Influenza A H3 Not Detected     Influenza A H1 Not Detected     RSV, PCR Not Detected    Narrative:       The coronavirus on the RVP is NOT COVID-19 and is NOT indicative of infection with COVID-19.     COVID-19 [facility] IN-HOUSE, Nasal Swab No Transport Media - Swab, Nasal Cavity [[id number]]  (Normal) Collected:  07/02/20 0027    Lab Status:  Final result Specimen:  Swab from Nasal Cavity Updated:  07/02/20 0104     COVID19 Not Detected    Narrative:       Fact sheet for providers: https://www.fda.gov/media/707281/download     Fact sheet for patients: https://www.fda.gov/media/426386/download    Blood Culture - Blood, Arm, Right [[id number]] Collected:  07/01/20 2348    Lab Status:  Preliminary result Specimen:  Blood from Arm, Right Updated:  07/04/20 0000     Blood Culture No growth at 2 days          IMAGING & OTHER STUDIES    Imaging Results (Last 72 Hours)     Procedure Component Value Units Date/Time    US Gallbladder [[id number]] Collected:  07/03/20 1503     Updated:  07/03/20 1506    Narrative:       US GALLBLADDER-     Date of Exam: 7/3/2020 2:31 PM     Indication: intractable nausea and vomiting; I48.91-Unspecified atrial  fibrillation; I50.9-Heart failure, unspecified; J18.9-Pneumonia,  unspecified organism.   Abdominal pain      Comparison: CT 07/02/2020     Technique: Transverse and sagittal ultrasound images of the right upper  quadrant were obtained. Doppler evaluation was also conducted.     FINDINGS:  Visualized portions of the head and body of the pancreas are normal.  Evaluation is limited due to surrounding bowel gas.     The liver appears echogenic.  No focal hepatic lesions.  Portal and  hepatic veins are patent and have normal flow direction and waveforms.      Gallbladder is normal in caliber with no evidence of stones, wall  thickening or pericholecystic fluid. A small amount of sludge is present  within the gallbladder lumen. Negative sonographic Gomes's sign.     The biliary system is nondilated.      The right kidney is normal in size with no evidence of hydronephrosis.  No suspicious focal lesions. Simple cyst is seen arising from the  superior pole measuring up to 7.6 cm.       Impression:       Gallbladder sludge with no evidence of stones. No secondary findings of  acute cholecystitis. Mildly increased echogenicity of liver parenchyma  is present likely related to steatosis. A simple cyst is seen arising  from the right kidney.     Electronically Signed By-Jessica Myles On:7/3/2020 3:04 PM  This report was finalized on [id number] by  Jessica Myles, .    CT Chest Without Contrast [[id number]] Collected:  07/02/20 1416     Updated:  07/02/20 1431    Narrative:       CT CHEST WO CONTRAST-, CT ABDOMEN PELVIS WO CONTRAST-     Date of Exam: 7/2/2020 1:46 PM     Indication: pneumonia; I48.91-Unspecified atrial fibrillation;  I50.9-Heart failure, unspecified; J18.9-Pneumonia, unspecified organism.   Sepsis      Comparison Exams: None available.     Technique: Multiple axial images were obtained from the thoracic inlet  through the sepsis pubis without the administration of IV contrast. The  axial data was used to generate reformatted images in the coronal and  sagittal planes.     Automated exposure control and iterative  reconstruction methods were  used.     FINDINGS:  Chest: There is mediastinal adenopathy.  Precarinal node measures 2.5 x  1.8 cm in size.  No hilar or axillary adenopathy identified.  There is  emphysematous change of the lungs.  There is groundglass airspace  disease and interlobular septal thickening within the bilateral upper  lobes and patchy areas of additional groundglass airspace disease and  interlobular septal thickening within the bilateral lower lobes.   Findings are nonspecific and may be related to pneumonia including  atypical pneumonia as well as Covid 19 pneumonia.  Clinical correlation  recommended.  Within the right upper lobe there is a noncalcified 7 mm  pulmonary nodule.  Patient status post right middle lobectomy.  There is  low density material tracking within the major fissure which may be due  to small right pleural effusion.  Pleural scarring or treatment-related  change could also give this appearance.  Left within the posterior left  lower lobe there is a noncalcified 4 mm nodule.     ABDOMEN: Liver, pancreas, and spleen are within normal limits.   Bilateral adrenal glands appear normal.  Again demonstrated is a large  cyst arises from the upper pole the right kidney.  Kidneys are otherwise  unremarkable.  No renal or ureteral stone or hydronephrosis.  There is  some high density material seen dependently within the gallbladder which  may be due to sludge or multiple small stones.  There is no inflammatory  change around the gallbladder.  No evidence of biliary tract  obstruction.  Upper GI tract appears within normal limits.  There is an  abdominal aortic aneurysm measuring 3.8 x 3.7 cm in size.  No abdominal  adenopathy or free intraperitoneal fluid identified.     Pelvis: Urinary bladder is within normal limits.  GI tract is  unremarkable.  There is no pelvic or inguinal adenopathy.  No free  intraperitoneal fluid is seen.  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  Left common iliac artery  measures 1.9 cm in size.     Bones: There is diffuse osteopenia.  Patient status post arthrodesis of  the left sacroiliac joint.  There are degenerative changes throughout  spine.  There are postoperative changes of the lumbar spine with fusion  from the T12-L4 vertebral levels.  No hardware complication identified.   There are no suspicious lytic or sclerotic bony lesions.       Impression:          1.  Bilateral groundglass airspace disease with interlobular septal  thickening with upper lobe predominance.  Findings are nonspecific but  can be seen with Covid 19 pneumonia.  Other bacterial and atypical  pneumonias can give this appearance as well.  2.  Status post right middle lobectomy.  3.  Thickening along the major fissure on the right which could be due  to a small amount of pleural fluid or pleural scarring related to  treatment change.  4.  No acute process seen within the abdomen.  5.  Abdominal aortic aneurysm measuring 3.8 cm in size.     Electronically Signed By-Lico Ferrari On:7/2/2020 2:29 PM  This report was finalized on [id number] by  Lico Ferrari, .    CT Abdomen Pelvis Without Contrast [[id number]] Collected:  07/02/20 1416     Updated:  07/02/20 1431    Narrative:       CT CHEST WO CONTRAST-, CT ABDOMEN PELVIS WO CONTRAST-     Date of Exam: 7/2/2020 1:46 PM     Indication: pneumonia; I48.91-Unspecified atrial fibrillation;  I50.9-Heart failure, unspecified; J18.9-Pneumonia, unspecified organism.   Sepsis      Comparison Exams: None available.     Technique: Multiple axial images were obtained from the thoracic inlet  through the sepsis pubis without the administration of IV contrast. The  axial data was used to generate reformatted images in the coronal and  sagittal planes.     Automated exposure control and iterative reconstruction methods were  used.     FINDINGS:  Chest: There is mediastinal adenopathy.  Precarinal node measures 2.5  x  1.8 cm in size.  No hilar or axillary adenopathy identified.  There is  emphysematous change of the lungs.  There is groundglass airspace  disease and interlobular septal thickening within the bilateral upper  lobes and patchy areas of additional groundglass airspace disease and  interlobular septal thickening within the bilateral lower lobes.   Findings are nonspecific and may be related to pneumonia including  atypical pneumonia as well as Covid 19 pneumonia.  Clinical correlation  recommended.  Within the right upper lobe there is a noncalcified 7 mm  pulmonary nodule.  Patient status post right middle lobectomy.  There is  low density material tracking within the major fissure which may be due  to small right pleural effusion.  Pleural scarring or treatment-related  change could also give this appearance.  Left within the posterior left  lower lobe there is a noncalcified 4 mm nodule.     ABDOMEN: Liver, pancreas, and spleen are within normal limits.   Bilateral adrenal glands appear normal.  Again demonstrated is a large  cyst arises from the upper pole the right kidney.  Kidneys are otherwise  unremarkable.  No renal or ureteral stone or hydronephrosis.  There is  some high density material seen dependently within the gallbladder which  may be due to sludge or multiple small stones.  There is no inflammatory  change around the gallbladder.  No evidence of biliary tract  obstruction.  Upper GI tract appears within normal limits.  There is an  abdominal aortic aneurysm measuring 3.8 x 3.7 cm in size.  No abdominal  adenopathy or free intraperitoneal fluid identified.     Pelvis: Urinary bladder is within normal limits.  GI tract is  unremarkable.  There is no pelvic or inguinal adenopathy.  No free  intraperitoneal fluid is seen.  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  Left common iliac artery  measures 1.9 cm in size.     Bones: There is diffuse osteopenia.  Patient  status post arthrodesis of  the left sacroiliac joint.  There are degenerative changes throughout  spine.  There are postoperative changes of the lumbar spine with fusion  from the T12-L4 vertebral levels.  No hardware complication identified.   There are no suspicious lytic or sclerotic bony lesions.       Impression:          1.  Bilateral groundglass airspace disease with interlobular septal  thickening with upper lobe predominance.  Findings are nonspecific but  can be seen with Covid 19 pneumonia.  Other bacterial and atypical  pneumonias can give this appearance as well.  2.  Status post right middle lobectomy.  3.  Thickening along the major fissure on the right which could be due  to a small amount of pleural fluid or pleural scarring related to  treatment change.  4.  No acute process seen within the abdomen.  5.  Abdominal aortic aneurysm measuring 3.8 cm in size.     Electronically Signed By-Lico Ferrari On:7/2/2020 2:29 PM  This report was finalized on [id number] by  Lico Ferrari, .    CT Sinus Without Contrast [[id number]] Collected:  07/02/20 1409     Updated:  07/02/20 1416    Narrative:       CT SINUS WO CONTRAST-     Date of Exam: 7/2/2020 1:45 PM     Indication: severe sinusitis and epistaxis, sepsis; I48.91-Unspecified  atrial fibrillation; I50.9-Heart failure, unspecified; J18.9-Pneumonia,  unspecified organism.     Comparison: CT head without contrast 11/20/2019. No prior dedicated CT  sinus study at this institution for comparison.      Technique: Contiguous axial images of the paranasal sinuses were  performed.  Coronal and sagittal reconstructions were performed.  Automated exposure control and iterative reconstruction methods were  used.     FINDINGS:  There is nasal septal deviation toward the right with a right-sided bony  apical spur measuring 5 mm. The osteomeatal units are normal.  No  osteolytic or osteoblastic lesions are present. There is mild the left  maxillary sinus mucosal  thickening superiorly. There is a small mucous  retention cyst or polyp in the left maxillary sinus anteriorly measuring  7 x 12 x 11 mm. A small mucous retention cyst or polyp in the right  sphenoid sinus laterally measures 9 x 10 x 7 mm. Otherwise, the  paranasal sinuses are clear. No air-fluid levels are identified. The  cribriform plate is intact.  The orbital spaces are clear.  No soft  tissue masses.       Impression:          1. No evidence of acute/active sinusitis.  2. Minor left maxillary sinus mucosal thickening. Small mucous retention  cysts or polyps within the left maxillary and right sphenoid sinus.  3. Bony nasal septal deviation toward the right.     Electronically Signed By-Dr. Gladys Lau MD On:7/2/2020 2:14 PM  This report was finalized on [id number] by Dr. Gladys Lau MD.    XR Chest 1 View [[id number]] Collected:  07/02/20 0724     Updated:  07/02/20 0729    Narrative:       XR CHEST 1 VW-     Date of Exam: 7/1/2020 11:50 PM     Indication: Atrial fibrillation and vomiting.     Comparison: 11/7/2016     Technique: A single view of the chest was obtained.     FINDINGS:      Heart size is at the upper limits of normal.  There is new  interstitial and alveolar airspace disease within both lungs with slight  upper lobe predominance.  Findings may be secondary to pulmonary edema  or atypical pneumonia.  Pulmonary vessels are indistinct consistent with  pulmonary vascular congestion.  There is a more dense area of airspace  consolidation within the right lung base.  There is a probable small  right pleural effusion.  No definite left pleural effusion identified.             Impression:             1.  Borderline heart size with pulmonary vascular congestion.  2.  Bilateral interstitial and alveolar airspace disease favored to be  due to pulmonary edema or less likely atypical pneumonia.        Electronically Signed By-Lico Ferrari On:7/2/2020 7:26 AM  This report was finalized on  [id number] by  Lico Ferrari, .        Results for orders placed during the hospital encounter of 07/01/20   Adult Transthoracic Echo Complete W/ Cont if Necessary Per Protocol    Narrative · Left ventricular systolic function is severely decreased.  · Left ventricular wall thickness is consistent with mild concentric   hypertrophy.  · Left atrial cavity size is moderately dilated.  · Mild-to-moderate mitral valve regurgitation is present  · Mild tricuspid valve regurgitation is present.  · Mild aortic valve regurgitation is present.             ASSESSMENT/PLAN:     Atrial fibrillation (CMS/HCC)    CAD (coronary artery disease)    Chronic anticoagulation    Hyperlipidemia    Presence of stent in right coronary artery    Chronic pain    Sepsis (CMS/HCC)    Congestive heart failure (CHF) (CMS/HCC)        1.  Bilateral PNA (viral vs bacterial) other differentials include Pulmonary edema and possible hemorrhage  2. COPD with exacerbation  3. H/o RML lobectomy  4. CAD  5. Acute hypoxia due to above  6. Afib with RVR  7. Chronic Hypotension  8. Chronic Anticoagulation with Xarelto  9. Lactic acidosis  10. Hypokalemia     PLAN    See orders. The plan was discussed with the patient and/or family.      1.  Reviewed CT scan of the chest and discussed with the patient in detail.  Covid test is negative X2.  Afebrile.  Will continue IV antibiotics with Zosyn.  Procalcitonin mildly elevated only.     2.  Continue IV Solu-Medrol.   Continue scheduled DuoNeb along with Pulmicort nebs.     3.  Patient has chronic hypotension and is on Florinef and hydrocortisone     4.  Currently on Cardizem drip per cardiology.  Patient remains on digoxin and anticoagulated with therapeutic Lovenox (on xarelto at home)     5. O2 requirement slightly worse at 5L overnight however back to 3L. HR remains uncontrolled despite on Cardizem gtt    Recheck cxr today.     Will follow    THIS DOCUMENT HAS BEEN ELECTRONICALLY SIGNED BY  Lynsey Zaldivar  MD  8:12 AM

## 2020-07-04 NOTE — PLAN OF CARE
Plan of care: heart rate control  Patient remains on amiodarone, cardizem, IV metoprolol and will start PO digoxin at 12 noon today.  Heart rate is still very labile, getting to the 150s when the patient stands and transfers to the bedside commode.  I have talked extensively with the patient that we want to work to control the heart rate as much as possible today, so I am discouraging activity until the heart rate is better controlled.    Skin intact: rash to bilateral buttock    Chest pain 5/10 report this morning.    Problem: Patient Care Overview  Goal: Plan of Care Review  Outcome: Ongoing (interventions implemented as appropriate)  Flowsheets (Taken 7/4/2020 3399)  Plan of Care Reviewed With: patient  Goal: Individualization and Mutuality  Outcome: Ongoing (interventions implemented as appropriate)  Goal: Discharge Needs Assessment  Outcome: Ongoing (interventions implemented as appropriate)  Goal: Interprofessional Rounds/Family Conf  Outcome: Ongoing (interventions implemented as appropriate)     Problem: Fall Risk (Adult)  Goal: Identify Related Risk Factors and Signs and Symptoms  Outcome: Ongoing (interventions implemented as appropriate)  Goal: Absence of Fall  Outcome: Ongoing (interventions implemented as appropriate)     Problem: Pain, Chronic (Adult)  Goal: Identify Related Risk Factors and Signs and Symptoms  Outcome: Ongoing (interventions implemented as appropriate)  Goal: Acceptable Pain/Comfort Level and Functional Ability  Outcome: Ongoing (interventions implemented as appropriate)     Problem: Skin Injury Risk (Adult)  Goal: Identify Related Risk Factors and Signs and Symptoms  Outcome: Ongoing (interventions implemented as appropriate)  Goal: Skin Health and Integrity  Outcome: Ongoing (interventions implemented as appropriate)     Problem: Arrhythmia/Dysrhythmia (Symptomatic) (Adult)  Goal: Signs and Symptoms of Listed Potential Problems Will be Absent, Minimized or Managed  (Arrhythmia/Dysrhythmia)  Outcome: Ongoing (interventions implemented as appropriate)       Problem: Patient Care Overview  Goal: Plan of Care Review  Outcome: Ongoing (interventions implemented as appropriate)  Flowsheets (Taken 7/4/2020 0782)  Plan of Care Reviewed With: patient  Goal: Individualization and Mutuality  Outcome: Ongoing (interventions implemented as appropriate)  Goal: Discharge Needs Assessment  Outcome: Ongoing (interventions implemented as appropriate)  Goal: Interprofessional Rounds/Family Conf  Outcome: Ongoing (interventions implemented as appropriate)     Problem: Fall Risk (Adult)  Goal: Identify Related Risk Factors and Signs and Symptoms  Outcome: Ongoing (interventions implemented as appropriate)  Goal: Absence of Fall  Outcome: Ongoing (interventions implemented as appropriate)     Problem: Pain, Chronic (Adult)  Goal: Identify Related Risk Factors and Signs and Symptoms  Outcome: Ongoing (interventions implemented as appropriate)  Goal: Acceptable Pain/Comfort Level and Functional Ability  Outcome: Ongoing (interventions implemented as appropriate)     Problem: Skin Injury Risk (Adult)  Goal: Identify Related Risk Factors and Signs and Symptoms  Outcome: Ongoing (interventions implemented as appropriate)  Goal: Skin Health and Integrity  Outcome: Ongoing (interventions implemented as appropriate)     Problem: Arrhythmia/Dysrhythmia (Symptomatic) (Adult)  Goal: Signs and Symptoms of Listed Potential Problems Will be Absent, Minimized or Managed (Arrhythmia/Dysrhythmia)  Outcome: Ongoing (interventions implemented as appropriate)

## 2020-07-04 NOTE — PROGRESS NOTES
[facility] Daily Progress Note      Hospitalist Team  LOS 2 days      Patient Care Team:  Nikki Gonzales MD as PCP - General (Family Medicine)    Patient Location: [facility]/1      Subjective   Subjective     Chief Complaint / Subjective  Chief Complaint   Patient presents with   • Weakness - Generalized         Brief Synopsis of Hospital Course/HPI       Mr. Park is a 79 y.o. male with a history of atrial fibrillation, hyperlipidemia, chronic low back pain, hypotension, chronic anticoagulation presented to the [facility] ED on 7/1/2020 with a complaint of generalized body aches and pains, weakness and a cough.  Patient states he is also had some nausea and vomiting.  Patient denies chest pain, shortness of breath, diarrhea, or any ill contacts.  Upon arrival to the ED patient was found to be in atrial fibrillation with RVR, heart rate 160s patient was given Cardizem bolus and started on a Cardizem drip.     In the ED, initial troponin negative, proBNP 3485, , glucose 119, sodium 145, potassium 4.1, BUN 23, creatinine 0.69, C-reactive protein 28.6, lactate 2.5, lipase 13, magnesium 2.4, procalcitonin 0.11, WBC 9.6, Hgb 11.7, HCT 35.5, blood cultures pending.  EKG: A. fib with RVR.  Respiratory panel negative, COVID 19 test negative.  Chest x-ray with pulmonary edema, pneumonia as read by per ED physician, awaiting official radiology read.  Patient is on a Cardizem drip at 15 mg/h, received a 500 mL normal saline bolus, 4 mg Zofran IV x1, 40 mg Lasix IV x1, Inapsine 1.25 mg IV x1, patient will be admitted for further evaluation and management.         Date::      7/3  Int vomiting  Reviewed ctC/PA/P  Iv phenergan added  ruq for possible gall bladder disease\  Consult gi  Echo shows very low ejection fraction of 27%    7/4/2020  Still nauseous  Seen by GI and right upper quadrant ultrasound was done showed no gallstones but gallbladder sludge noted.  PEREZ  "difficile ordered for diarrhea.  Worsening leukocytosis could be related to his steroids.  Has been afebrile throughout.  Hyperkalemia will be corrected with protocol.  No elevation liver enzymes noted.   still  A. fib but heart rate better..  Start amiodarone drip due to uncontrolled A. f    Ib  ROS  All other ssytems rev and neg    Objective   Objective      Vital Signs  Temp:  [98 °F (36.7 °C)-99.1 °F (37.3 °C)] 99.1 °F (37.3 °C)  Heart Rate:  [] 96  Resp:  [16-26] 18  BP: (133-162)/() 153/89  Oxygen Therapy  SpO2: 99 %  Pulse Oximetry Type: Intermittent  Device (Oxygen Therapy): room air  Device (Oxygen Therapy): nasal cannula  Flow (L/min): 5  Flowsheet Rows      First Filed Value   Admission Height  185.4 cm (73\") Documented at 07/01/2020 2336   Admission Weight  68 kg (150 lb) Documented at 07/01/2020 2336        Intake & Output (last 3 days)       07/01 0701 - 07/02 0700 07/02 0701 - 07/03 0700 07/03 0701 - 07/04 0700 07/04 0701 - 07/05 0700    P.O.  320 200     IV Piggyback 1500 100      Total Intake(mL/kg) 1500 (22.1) 420 (6.3) 200 (3)     Urine (mL/kg/hr) 1850 1250 (0.8) 500 (0.3)     Total Output 1850 1250 500     Net -350 -830 -300                 Lines, Drains & Airways    Active LDAs     Name:   Placement date:   Placement time:   Site:   Days:    Peripheral IV 07/01/20 2349 Right Antecubital   07/01/20 2349    Antecubital   1    Peripheral IV 07/02/20 0837 Posterior;Right Forearm   07/02/20    0837    Forearm   1                  Physical Exam:    Physical Exam   Constitutional: He is oriented to person, place, and time. He appears well-developed and well-nourished. No distress.   HENT:   Head: Normocephalic and atraumatic.   Right Ear: External ear normal.   Left Ear: External ear normal.   Nose: Nose normal.   Mouth/Throat: Oropharynx is clear and moist. No oropharyngeal exudate.   Eyes: Pupils are equal, round, and reactive to light. Conjunctivae and EOM are normal. Right eye " exhibits no discharge. Left eye exhibits no discharge. No scleral icterus.   Neck: Normal range of motion. No JVD present. No tracheal deviation present. No thyromegaly present.   Cardiovascular: Normal rate, regular rhythm, normal heart sounds and intact distal pulses. Exam reveals no gallop and no friction rub.   No murmur heard.  Pulmonary/Chest: Effort normal and breath sounds normal. No stridor. No respiratory distress. He has no wheezes. He has no rales. He exhibits no tenderness.   Abdominal: Soft. Bowel sounds are normal. He exhibits no distension and no mass. There is no tenderness. There is no rebound and no guarding. No hernia.   Musculoskeletal: Normal range of motion. He exhibits no edema, tenderness or deformity.   Lymphadenopathy:     He has no cervical adenopathy.   Neurological: He is alert and oriented to person, place, and time. No cranial nerve deficit or sensory deficit. He exhibits normal muscle tone. Coordination normal.   Skin: Skin is warm and dry. No rash noted. He is not diaphoretic. No erythema.   Psychiatric: He has a normal mood and affect. His behavior is normal.   Nursing note and vitals reviewed.            Procedures:              Results Review:     I reviewed the patient's new clinical results.      Lab Results (last 24 hours)     Procedure Component Value Units Date/Time    BUN [[id number]]  (Normal) Collected:  07/04/20 0334    Specimen:  Blood Updated:  07/04/20 0839     BUN 12 mg/dL     POC Glucose Once [[id number]]  (Abnormal) Collected:  07/04/20 0726    Specimen:  Blood Updated:  07/04/20 0734     Glucose 141 mg/dL      Comment: Serial Number: [id number]:  [id number]       Clostridium Difficile EIA - Stool, Per Rectum [[id number]]  (Normal) Collected:  07/03/20 0856    Specimen:  Stool from Per Rectum Updated:  07/04/20 0715     C Diff GDH / Toxin Negative    Comprehensive Metabolic Panel [[id number]]  (Abnormal) Collected:  07/04/20 0334    Specimen:  Blood Updated:   07/04/20 0432     Glucose 149 mg/dL      BUN --     Comment: Testing performed by alternate method        Creatinine 0.59 mg/dL      Sodium 144 mmol/L      Potassium 2.8 mmol/L      Comment: Specimen hemolyzed.  Results may be affected.        Chloride 101 mmol/L      CO2 26.0 mmol/L      Calcium 9.3 mg/dL      Total Protein 6.8 g/dL      Albumin 3.90 g/dL      ALT (SGPT) 43 U/L      AST (SGOT) 45 U/L      Comment: Specimen hemolyzed.  Results may be affected.        Alkaline Phosphatase 55 U/L      Total Bilirubin 1.2 mg/dL      eGFR Non African Amer 132 mL/min/1.73      Globulin 2.9 gm/dL      A/G Ratio 1.3 g/dL      BUN/Creatinine Ratio --     Comment: Testing not performed        Anion Gap 17.0 mmol/L     Narrative:       GFR Normal >60  Chronic Kidney Disease <60  Kidney Failure <15      Magnesium [[id number]]  (Normal) Collected:  07/04/20 0334    Specimen:  Blood Updated:  07/04/20 0427     Magnesium 2.2 mg/dL     CBC & Differential [[id number]] Collected:  07/04/20 0334    Specimen:  Blood Updated:  07/04/20 0416    Narrative:       The following orders were created for panel order CBC & Differential.  Procedure                               Abnormality         Status                     ---------                               -----------         ------                     CBC Auto Differential[[id number]]        Abnormal            Final result                 Please view results for these tests on the individual orders.    CBC Auto Differential [[id number]]  (Abnormal) Collected:  07/04/20 0334    Specimen:  Blood Updated:  07/04/20 0416     WBC 18.00 10*3/mm3      RBC 4.26 10*6/mm3      Hemoglobin 12.9 g/dL      Hematocrit 41.2 %      MCV 96.7 fL      MCH 30.2 pg      MCHC 31.3 g/dL      RDW 15.9 %      RDW-SD 54.3 fl      MPV 8.3 fL      Platelets 280 10*3/mm3      Neutrophil % 89.4 %      Lymphocyte % 4.4 %      Monocyte % 5.8 %      Eosinophil % 0.0 %      Basophil % 0.4 %      Neutrophils, Absolute 16.10  10*3/mm3      Lymphocytes, Absolute 0.80 10*3/mm3      Monocytes, Absolute 1.00 10*3/mm3      Eosinophils, Absolute 0.00 10*3/mm3      Basophils, Absolute 0.10 10*3/mm3      nRBC 0.0 /100 WBC     Blood Culture - Blood, Arm, Left [[id number]] Collected:  07/02/20 0249    Specimen:  Blood from Arm, Left Updated:  07/04/20 0300     Blood Culture No growth at 2 days    Blood Culture - Blood, Arm, Right [[id number]] Collected:  07/01/20 2348    Specimen:  Blood from Arm, Right Updated:  07/04/20 0000     Blood Culture No growth at 2 days    POC Glucose Once [[id number]]  (Abnormal) Collected:  07/03/20 2004    Specimen:  Blood Updated:  07/03/20 2008     Glucose 140 mg/dL      Comment: Serial Number: [id number]:  [id number]       POC Glucose Once [[id number]]  (Abnormal) Collected:  07/03/20 1629    Specimen:  Blood Updated:  07/03/20 1631     Glucose 154 mg/dL      Comment: Serial Number: [id number]:  [id number]       Gastrointestinal Panel, PCR - Stool, Per Rectum [[id number]]  (Normal) Collected:  07/03/20 0856    Specimen:  Stool from Per Rectum Updated:  07/03/20 1535     Campylobacter Not Detected     Plesiomonas shigelloides Not Detected     Salmonella Not Detected     Vibrio Not Detected     Vibrio cholerae Not Detected     Yersinia enterocolitica Not Detected     Enteroaggregative E. coli (EAEC) Not Detected     Enteropathogenic E. coli (EPEC) Not Detected     Enterotoxigenic E. coli (ETEC) lt/st Not Detected     Shiga-like toxin-producing E. coli (STEC) stx1/stx2 Not Detected     E. coli O157 Not Detected     Shigella/Enteroinvasive E. coli (EIEC) Not Detected     Cryptosporidium Not Detected     Cyclospora cayetanensis Not Detected     Entamoeba histolytica Not Detected     Giardia lamblia Not Detected     Adenovirus F40/41 Not Detected     Astrovirus Not Detected     Norovirus GI/GII Not Detected     Rotavirus A Not Detected     Sapovirus (I, II, IV or V) Not Detected    Narrative:       If Aeromonas,  Staphylococcus aureus or Bacillus cereus are suspected, please order [id number]: Stool Culture, Aeromonas, S aureus, B Cereus.    BUN [[id number]]  (Normal) Collected:  07/03/20 0940    Specimen:  Blood Updated:  07/03/20 1415     BUN 17 mg/dL     Digoxin Level [[id number]]  (Abnormal) Collected:  07/03/20 0940    Specimen:  Blood Updated:  07/03/20 1242     Digoxin <0.30 ng/mL     POC Glucose Once [[id number]]  (Abnormal) Collected:  07/03/20 1140    Specimen:  Blood Updated:  07/03/20 1151     Glucose 219 mg/dL      Comment: Serial Number: [id number]:  [id number]       Comprehensive Metabolic Panel [[id number]]  (Abnormal) Collected:  07/03/20 0940    Specimen:  Blood Updated:  07/03/20 1029     Glucose 246 mg/dL      BUN --     Comment: Testing performed by alternate method        Creatinine 0.75 mg/dL      Sodium 145 mmol/L      Potassium 2.6 mmol/L      Comment: Specimen hemolyzed.  Results may be affected.        Chloride 103 mmol/L      CO2 22.0 mmol/L      Calcium 9.1 mg/dL      Total Protein 6.8 g/dL      Albumin 3.80 g/dL      ALT (SGPT) 49 U/L      AST (SGOT) 37 U/L      Alkaline Phosphatase 60 U/L      Total Bilirubin 1.2 mg/dL      eGFR Non African Amer 100 mL/min/1.73      Globulin 3.0 gm/dL      A/G Ratio 1.3 g/dL      BUN/Creatinine Ratio --     Comment: Testing not performed        Anion Gap 20.0 mmol/L     Narrative:       GFR Normal >60  Chronic Kidney Disease <60  Kidney Failure <15      Magnesium [[id number]]  (Normal) Collected:  07/03/20 0940    Specimen:  Blood Updated:  07/03/20 1026     Magnesium 2.1 mg/dL     Lipase [[id number]]  (Normal) Collected:  07/03/20 0940    Specimen:  Blood Updated:  07/03/20 1026     Lipase 30 U/L     CBC & Differential [[id number]] Collected:  07/03/20 0940    Specimen:  Blood Updated:  07/03/20 0958    Narrative:       The following orders were created for panel order CBC & Differential.  Procedure                               Abnormality         Status                      ---------                               -----------         ------                     CBC Auto Differential[[id number]]        Abnormal            Final result                 Please view results for these tests on the individual orders.    CBC Auto Differential [[id number]]  (Abnormal) Collected:  07/03/20 0940    Specimen:  Blood Updated:  07/03/20 0958     WBC 14.10 10*3/mm3      RBC 4.23 10*6/mm3      Hemoglobin 12.7 g/dL      Hematocrit 39.4 %      MCV 93.1 fL      MCH 29.9 pg      MCHC 32.1 g/dL      RDW 15.0 %      RDW-SD 48.1 fl      MPV 8.4 fL      Platelets 273 10*3/mm3      Neutrophil % 91.3 %      Lymphocyte % 5.1 %      Monocyte % 3.4 %      Eosinophil % 0.0 %      Basophil % 0.2 %      Neutrophils, Absolute 12.90 10*3/mm3      Lymphocytes, Absolute 0.70 10*3/mm3      Monocytes, Absolute 0.50 10*3/mm3      Eosinophils, Absolute 0.00 10*3/mm3      Basophils, Absolute 0.00 10*3/mm3      nRBC 0.1 /100 WBC     Occult Blood, Fecal By Immunoassay - Stool, Per Rectum [[id number]]  (Normal) Collected:  07/03/20 0856    Specimen:  Stool from Per Rectum Updated:  07/03/20 0958     Occult Blood, Fecal by Immunoassay Negative        No results found for: HGBA1C        Results from last 7 days   Lab Units 07/02/20  1049   PH, ARTERIAL pH units 7.531*   PO2 ART mm Hg 79.4*   PCO2, ARTERIAL mm Hg 31.3*   HCO3 ART mmol/L 26.2     Lab Results   Component Value Date    LIPASE 30 07/03/2020     No results found for: CHOL, CHLPL, TRIG, HDL, LDL, LDLDIRECT    No results found for: INTRAOP, PREDX, FINALDX, COMDX    Microbiology Results (last 10 days)     Procedure Component Value - Date/Time    Gastrointestinal Panel, PCR - Stool, Per Rectum [[id number]]  (Normal) Collected:  07/03/20 0856    Lab Status:  Final result Specimen:  Stool from Per Rectum Updated:  07/03/20 1535     Campylobacter Not Detected     Plesiomonas shigelloides Not Detected     Salmonella Not Detected     Vibrio Not Detected     Vibrio cholerae  Not Detected     Yersinia enterocolitica Not Detected     Enteroaggregative E. coli (EAEC) Not Detected     Enteropathogenic E. coli (EPEC) Not Detected     Enterotoxigenic E. coli (ETEC) lt/st Not Detected     Shiga-like toxin-producing E. coli (STEC) stx1/stx2 Not Detected     E. coli O157 Not Detected     Shigella/Enteroinvasive E. coli (EIEC) Not Detected     Cryptosporidium Not Detected     Cyclospora cayetanensis Not Detected     Entamoeba histolytica Not Detected     Giardia lamblia Not Detected     Adenovirus F40/41 Not Detected     Astrovirus Not Detected     Norovirus GI/GII Not Detected     Rotavirus A Not Detected     Sapovirus (I, II, IV or V) Not Detected    Narrative:       If Aeromonas, Staphylococcus aureus or Bacillus cereus are suspected, please order [id number]: Stool Culture, Aeromonas, S aureus, B Cereus.    Clostridium Difficile EIA - Stool, Per Rectum [[id number]]  (Normal) Collected:  07/03/20 0856    Lab Status:  Final result Specimen:  Stool from Per Rectum Updated:  07/04/20 0715     C Diff GDH / Toxin Negative    COVID-19,CEPHEID,COR/[facility]LENA/PAD IN-HOUSE(OR EMERGENT/ADD-ON),NP SWAB IN TRANSPORT MEDIA 3-4 HR TAT - Swab, Nasopharynx [[id number]]  (Normal) Collected:  07/02/20 1659    Lab Status:  Final result Specimen:  Swab from Nasopharynx Updated:  07/02/20 2003     COVID19 Not Detected    Narrative:       Fact sheet for providers: https://www.fda.gov/media/271266/download     Fact sheet for patients: https://www.fda.gov/media/776577/download    Blood Culture - Blood, Arm, Left [[id number]] Collected:  07/02/20 0249    Lab Status:  Preliminary result Specimen:  Blood from Arm, Left Updated:  07/04/20 0300     Blood Culture No growth at 2 days    Respiratory Panel, PCR - Swab, Nasopharynx [[id number]]  (Normal) Collected:  07/02/20 0027    Lab Status:  Final result Specimen:  Swab from Nasopharynx Updated:  07/02/20 0153     ADENOVIRUS, PCR Not Detected     Coronavirus 229E Not Detected      Coronavirus HKU1 Not Detected     Coronavirus NL63 Not Detected     Coronavirus OC43 Not Detected     Human Metapneumovirus Not Detected     Human Rhinovirus/Enterovirus Not Detected     Influenza B PCR Not Detected     Parainfluenza Virus 1 Not Detected     Parainfluenza Virus 2 Not Detected     Parainfluenza Virus 3 Not Detected     Parainfluenza Virus 4 Not Detected     Bordetella pertussis pcr Not Detected     Influenza A H1 2009 PCR Not Detected     Chlamydophila pneumoniae PCR Not Detected     Mycoplasma pneumo by PCR Not Detected     Influenza A PCR Not Detected     Influenza A H3 Not Detected     Influenza A H1 Not Detected     RSV, PCR Not Detected    Narrative:       The coronavirus on the RVP is NOT COVID-19 and is NOT indicative of infection with COVID-19.     COVID-19 [employer] IN-HOUSE, Nasal Swab No Transport Media - Swab, Nasal Cavity [[id number]]  (Normal) Collected:  07/02/20 0027    Lab Status:  Final result Specimen:  Swab from Nasal Cavity Updated:  07/02/20 0104     COVID19 Not Detected    Narrative:       Fact sheet for providers: https://www.fda.gov/media/789614/download     Fact sheet for patients: https://www.fda.gov/media/609624/download    Blood Culture - Blood, Arm, Right [[id number]] Collected:  07/01/20 2348    Lab Status:  Preliminary result Specimen:  Blood from Arm, Right Updated:  07/04/20 0000     Blood Culture No growth at 2 days          ECG/EMG Results (most recent)     Procedure Component Value Units Date/Time    Adult Transthoracic Echo Complete W/ Cont if Necessary Per Protocol [[id number]] Collected:  07/02/20 0653     Updated:  07/02/20 1409     BSA 1.9 m^2      RVIDd 2.1 cm      IVSd 1.1 cm      LVIDd 5.6 cm      LVIDs 4.6 cm      LVPWd 1.3 cm      IVS/LVPW 0.84     FS 17.6 %      EDV(Teich) 155.9 ml      ESV(Teich) 99.5 ml      EF(Teich) 36.2 %      EDV(cubed) 179.0 ml      ESV(cubed) 100.1 ml      EF(cubed) 44.1 %      LV mass(C)d 276.4 grams      LV mass(C)dI 142.4  grams/m^2      SV(Teich) 56.4 ml      SI(Teich) 29.1 ml/m^2      SV(cubed) 78.9 ml      SI(cubed) 40.6 ml/m^2      Ao root diam 4.0 cm      Ao root area 12.8 cm^2      ACS 2.1 cm      asc Aorta Diam 3.7 cm      LVOT diam 2.5 cm      LVOT area 5.1 cm^2      RVOT diam 2.1 cm      RVOT area 3.6 cm^2      EDV(MOD-sp4) 68.5 ml      ESV(MOD-sp4) 50.1 ml      EF(MOD-sp4) 26.8 %      SV(MOD-sp4) 18.4 ml      SI(MOD-sp4) 9.5 ml/m^2      Ao root area (BSA corrected) 2.1     LV Dunlap Vol (BSA corrected) 35.3 ml/m^2      LV Sys Vol (BSA corrected) 25.8 ml/m^2      Aortic R-R 0.4 sec      Aortic .6 BPM      MV V2 max 90.3 cm/sec      MV max PG 3.3 mmHg      MV V2 mean 63.4 cm/sec      MV mean PG 1.8 mmHg      MV V2 VTI 9.6 cm      MVA(VTI) 6.7 cm^2      Ao pk sharmila 78.3 cm/sec      Ao max PG 2.5 mmHg      Ao max PG (full) -0.26 mmHg      Ao V2 mean 61.8 cm/sec      Ao mean PG 1.7 mmHg      Ao mean PG (full) 0.39 mmHg      Ao V2 VTI 11.3 cm      AISHWARYA(I,A) 5.7 cm^2      AISHWARYA(I,D) 5.7 cm^2      AISHWARYA(V,A) 5.3 cm^2      AISHWARYA(V,D) 5.3 cm^2      LV V1 max PG 2.7 mmHg      LV V1 mean PG 1.3 mmHg      LV V1 max 82.3 cm/sec      LV V1 mean 53.0 cm/sec      LV V1 VTI 12.7 cm      MR max sharmila 453.8 cm/sec      MR max PG 82.4 mmHg      CO(Ao) 22.0 l/min      CI(Ao) 11.3 l/min/m^2      SV(Ao) 144.9 ml      SI(Ao) 74.6 ml/m^2      CO(LVOT) 9.8 l/min      CI(LVOT) 5.0 l/min/m^2      SV(LVOT) 64.6 ml      SV(RVOT) 42.6 ml      SI(LVOT) 33.3 ml/m^2      PA V2 max 77.1 cm/sec      PA max PG 2.4 mmHg      PA max PG (full) 0.22 mmHg      PA V2 mean 50.6 cm/sec      PA mean PG 1.2 mmHg      PA mean PG (full) 0.15 mmHg      PA V2 VTI 9.1 cm      PVA(I,A) 4.7 cm^2      BH CV ECHO TARYN - PVA(I,D) 4.7 cm^2      BH CV ECHO TARYN - PVA(V,A) 3.4 cm^2       CV ECHO TARYN - PVA(V,D) 3.4 cm^2      PA acc time 0.06 sec      RV V1 max PG 2.2 mmHg      RV V1 mean PG 1.1 mmHg      RV V1 max 73.4 cm/sec      RV V1 mean 46.7 cm/sec      RV V1 VTI 11.9 cm      TR max  sharmila 285.8 cm/sec      RVSP(TR) 35.7 mmHg      RAP systole 3.0 mmHg      PA pr(Accel) 49.9 mmHg      Qp/Qs 0.66      CV ECHO TARYN - BZI_BMI 18.7 kilograms/m^2       CV ECHO TARYN - BSA(HAYCOCK) 1.9 m^2       CV ECHO TARYN - BZI_METRIC_WEIGHT 68.0 kg       CV ECHO TARYN - BZI_METRIC_HEIGHT 190.5 cm      EF(MOD-bp) 27.0 %      LA dimension(2D) 4.6 cm     Narrative:       · Left ventricular systolic function is severely decreased.  · Left ventricular wall thickness is consistent with mild concentric   hypertrophy.  · Left atrial cavity size is moderately dilated.  · Mild-to-moderate mitral valve regurgitation is present  · Mild tricuspid valve regurgitation is present.  · Mild aortic valve regurgitation is present.       ECG 12 Lead [[id number]] Collected:  07/03/20 0945     Updated:  07/03/20 0947    Narrative:       HEART RATE= 106  bpm  RR Interval= 568  ms  IA Interval= 196  ms  P Horizontal Axis= 48  deg  P Front Axis= 0  deg  QRSD Interval= 95  ms  QT Interval= 298  ms  QRS Axis= -18  deg  T Wave Axis= 14  deg  - ABNORMAL ECG -  Sinus tachycardia  Multiple premature complexes, vent & supraven  When compared with ECG of 01-Jul-2020 23:40:10,  Significant rate decrease  Electronically Signed By:   Date and Time of Study: 2020-07-03 09:45:28    ECG 12 Lead [[id number]] Collected:  07/01/20 2340     Updated:  07/03/20 1140    Narrative:       HEART RATE= 163  bpm  RR Interval= 368  ms  IA Interval=   ms  P Horizontal Axis=   deg  P Front Axis=   deg  QRSD Interval= 78  ms  QT Interval= 283  ms  QRS Axis= 7  deg  T Wave Axis= 42  deg  - ABNORMAL ECG -  Atrial fibrillation with rapid V-rate  No previous ECG available for comparison  Electronically Signed By: Sunny Unger (MILENA) 03-Jul-2020 11:38:35  Date and Time of Study: 2020-07-01 23:40:10    ECG 12 Lead [[id number]] Collected:  07/03/20 2021     Updated:  07/03/20 2023    Narrative:       HEART RATE= 133  bpm  RR Interval= 449  ms  IA Interval=   ms  P Horizontal  Axis=   deg  P Front Axis=   deg  QRSD Interval= 92  ms  QT Interval= 296  ms  QRS Axis= -17  deg  T Wave Axis= -11  deg  - ABNORMAL ECG -  Atrial fibrillation  Ventricular premature complex  Probable lateral infarct, old  Electronically Signed By:   Date and Time of Study: 2020-07-03 20:21:30    ECG 12 Lead [[id number]] Collected:  07/04/20 0607     Updated:  07/04/20 0609    Narrative:       HEART RATE= 102  bpm  RR Interval= 589  ms  IL Interval=   ms  P Horizontal Axis=   deg  P Front Axis=   deg  QRSD Interval= 95  ms  QT Interval= 334  ms  QRS Axis= -21  deg  T Wave Axis= 3  deg  - ABNORMAL ECG -  Atrial fibrillation  Ventricular premature complex  Electronically Signed By:   Date and Time of Study: 2020-07-04 06:07:51               Results for orders placed during the hospital encounter of 07/01/20   Adult Transthoracic Echo Complete W/ Cont if Necessary Per Protocol    Narrative · Left ventricular systolic function is severely decreased.  · Left ventricular wall thickness is consistent with mild concentric   hypertrophy.  · Left atrial cavity size is moderately dilated.  · Mild-to-moderate mitral valve regurgitation is present  · Mild tricuspid valve regurgitation is present.  · Mild aortic valve regurgitation is present.          Ct Abdomen Pelvis Without Contrast    Result Date: 7/2/2020   1.  Bilateral groundglass airspace disease with interlobular septal thickening with upper lobe predominance.  Findings are nonspecific but can be seen with Covid 19 pneumonia.  Other bacterial and atypical pneumonias can give this appearance as well. 2.  Status post right middle lobectomy. 3.  Thickening along the major fissure on the right which could be due to a small amount of pleural fluid or pleural scarring related to treatment change. 4.  No acute process seen within the abdomen. 5.  Abdominal aortic aneurysm measuring 3.8 cm in size.  Electronically Signed By-Lico Ferrari On:7/2/2020 2:29 PM This report was  finalized on [id number] by  Lico Ferrari, .    Ct Chest Without Contrast    Result Date: 7/2/2020   1.  Bilateral groundglass airspace disease with interlobular septal thickening with upper lobe predominance.  Findings are nonspecific but can be seen with Covid 19 pneumonia.  Other bacterial and atypical pneumonias can give this appearance as well. 2.  Status post right middle lobectomy. 3.  Thickening along the major fissure on the right which could be due to a small amount of pleural fluid or pleural scarring related to treatment change. 4.  No acute process seen within the abdomen. 5.  Abdominal aortic aneurysm measuring 3.8 cm in size.  Electronically Signed By-Lico Ferrari On:7/2/2020 2:29 PM This report was finalized on [id number] by  Lico Ferrari, Alexandra    Us Gallbladder    Result Date: 7/3/2020  Gallbladder sludge with no evidence of stones. No secondary findings of acute cholecystitis. Mildly increased echogenicity of liver parenchyma is present likely related to steatosis. A simple cyst is seen arising from the right kidney.  Electronically Signed By-Jessica Myles On:7/3/2020 3:04 PM This report was finalized on [id number] by  Jessica Myles, Alexandra    Xr Chest 1 View    Result Date: 7/2/2020    1.  Borderline heart size with pulmonary vascular congestion. 2.  Bilateral interstitial and alveolar airspace disease favored to be due to pulmonary edema or less likely atypical pneumonia.   Electronically Signed ByGabriel Ferrari On:7/2/2020 7:26 AM This report was finalized on [id number] by  Lico Ferrari, Alexandra    Ct Sinus Without Contrast    Result Date: 7/2/2020   1. No evidence of acute/active sinusitis. 2. Minor left maxillary sinus mucosal thickening. Small mucous retention cysts or polyps within the left maxillary and right sphenoid sinus. 3. Bony nasal septal deviation toward the right.  Electronically Signed By-Dr. Gladys Lau MD On:7/2/2020 2:14 PM This report was finalized on [id number] by   Gladys Lau MD.          Xrays, labs reviewed personally by physician.    Medication Review:   I have reviewed the patient's current medication list      Scheduled Meds    atorvastatin 20 mg Oral Daily   budesonide 0.5 mg Nebulization BID - RT   digoxin 125 mcg Intravenous Daily   enoxaparin 70 mg Subcutaneous Q12H   fludrocortisone 0.1 mg Oral Q PM   fludrocortisone 0.2 mg Oral Daily   furosemide 40 mg Oral Daily   insulin lispro 0-7 Units Subcutaneous Q6H   ipratropium-albuterol 3 mL Nebulization 4x Daily - RT   methylPREDNISolone sodium succinate 40 mg Intravenous Q8H   metoclopramide 10 mg Intravenous Q6H   metoprolol tartrate 5 mg Intravenous Q6H   ondansetron 4 mg Intravenous Q6H   pantoprazole 40 mg Intravenous Q AM   piperacillin-tazobactam 3.375 g Intravenous Q8H   potassium chloride 20 mEq Oral Daily   sodium chloride 10 mL Intravenous Q12H   sucralfate 1 g Oral 4x Daily AC & at Bedtime   Thera 1 tablet Oral Daily   vitamin B-12 1,000 mcg Oral Daily       Meds Infusions    amiodarone 0.5 mg/min Last Rate: 0.5 mg/min (07/04/20 0352)   dilTIAZem 5-15 mg/hr Last Rate: 15 mg/hr (07/04/20 0600)   Pharmacy Consult - Pharmacy to dose     Pharmacy to Dose Zosyn         Meds PRN  •  acetaminophen **OR** acetaminophen **OR** acetaminophen  •  aluminum-magnesium hydroxide-simethicone  •  bisacodyl  •  dextrose  •  dextrose  •  glucagon (human recombinant)  •  HYDROcodone-acetaminophen  •  insulin lispro **AND** insulin lispro  •  magnesium hydroxide  •  magnesium sulfate **OR** magnesium sulfate **OR** magnesium sulfate  •  melatonin  •  Morphine  •  ondansetron  •  Pharmacy Consult - Pharmacy to dose  •  Pharmacy to Dose Zosyn  •  potassium chloride **OR** potassium chloride **OR** potassium chloride  •  potassium phosphate infusion greater than 15 mMoles **OR** potassium phosphate infusion greater than 15 mMoles **OR** potassium phosphate **OR** sodium phosphate IVPB **OR** sodium phosphate IVPB **OR** sodium  phosphate IVPB  •  promethazine  •  [COMPLETED] Insert peripheral IV **AND** sodium chloride  •  sodium chloride    I personally reviewed patient's x-ray films and my findings are     I personally reviewed patient's EKG strips and my findings are       Assessment/Plan   Assessment/Plan     Active Hospital Problems    Diagnosis  POA   • **Atrial fibrillation (CMS/HCC) [I48.91]  Yes   • Chronic pain [G89.29]  Yes   • Sepsis (CMS/HCC) [A41.9]  Yes   • CAD (coronary artery disease) [I25.10]  Yes   • Chronic anticoagulation [Z79.01]  Not Applicable   • Hyperlipidemia [E78.5]  Yes   • Presence of stent in right coronary artery [Z95.5]  Not Applicable      Resolved Hospital Problems   No resolved problems to display.       MEDICAL DECISION MAKING COMPLEXITY BY PROBLEM:     Atrial fibrillation wRVR   -Cardizem drip 15 mg/hr, IV digoxin, IV metoprolol, amiodarone drip started  -Patient received several Cardizem boluses in the ED  -Patient with history, on Xarelto, at home.  Currently on Lovenox until stable.  -Not currently on any antiarrhythmics at home  -Replace electrolytes       Possible sepsis  Elevated lactic acid  Ct A/P shows possible cholecystitis  RUQ and gi consulted( intract vomiting)  Iv protonix  -Patient ruled in for sepsis due to tachycardia, tachypnea, lactate 2.5  -continue Zosyn 3.375 g, continue until sepsis ruled out  -Patient received 500 mL normal saline IV bolus in ED  -Blood cultures pending  -COVID-19 ruled out neg twice  -Respiratory panel negative  -neg urine    Intractable nausea  Vomiting after admission  Mild elevated liver enzymes  CT abdomen pelvis shows possible gallstones.  Ultrasound right upper quadrant shows no stones but positive sludge without cholecystitis  Appreciate GI input  May need EGD  On PPI    Chronic A. fib  On interim Lovenox until patient stable and switch to home Xarelto on discharge if renal function stable    New onset systolic congestive heart failure EF 27%, unknown  chronicity  On lasix  On bb  acei when stable  Stress test per cardiology    Copd exacerb  pulm following     CAD whx of angioplasty/stent w dr Montelongo  -echo shows low ef 27 %  -BNP 3485  -Per review of outside records: Last angioplasty to the RCA and left circumflex 4/16/2004  -Currently on Florinef for hypotension  -Continue Florinef, continue statin  On steroids per pulm     Hyperlipidemia  -Continue statin     Chronic pain  -Due to multiple back surgeries  -Continue Norco, (INSPECT verified)        High risk  Complex case    VTE Prophylaxis -   Mechanical Order History:     None      Pharmalogical Order History:     Ordered     Dose Route Frequency Stop    07/02/20 0414  rivaroxaban (XARELTO) tablet 20 mg  Status:  Discontinued      20 mg PO Daily With Dinner 07/02/20 0951    07/02/20 1221  rivaroxaban (XARELTO) tablet 10 mg  Status:  Discontinued     Note to Pharmacy:  If occult blood negative   Question:  Are you ordering rivaroxaban for the prevention of blood clots in an acutely ill medical patient?  Answer:  No    10 mg PO Daily With Dinner 07/02/20 1446    07/02/20 1446  rivaroxaban (XARELTO) tablet 20 mg  Status:  Discontinued     Note to Pharmacy:  ## do not start unless fecal occult blood test is negative ##   Question:  Are you ordering rivaroxaban for the prevention of blood clots in an acutely ill medical patient?  Answer:  No    20 mg PO Daily With Dinner 07/03/20 0937            Code Status -   Code Status and Medical Interventions:   Ordered at: 07/02/20 0406     Level Of Support Discussed With:    Patient     Code Status:    CPR     Medical Interventions (Level of Support Prior to Arrest):    Full       This patient has been examined wearing appropriate Personal Protective Equipment and discussed with hospital infection control department. 07/04/20        Discharge Planning          Destination      Coordination has not been started for this encounter.      Durable Medical Equipment       Coordination has not been started for this encounter.      Dialysis/Infusion      Coordination has not been started for this encounter.      Home Medical Care      Coordination has not been started for this encounter.      Therapy      Coordination has not been started for this encounter.      Community Resources      Coordination has not been started for this encounter.            Electronically signed by Temo Marti MD, 07/04/20, 08:53.  [facility] Hospitalist Team

## 2020-07-04 NOTE — PROGRESS NOTES
Referring Provider: Hospitalist    Reason for follow-up: Atrial fibrillation     Patient Care Team:  Nikki Gonzales MD as PCP - General (Family Medicine)    Subjective .  Complaints of weakness and fatigue    Objective  Lying in bed comfortably     Review of Systems   Constitution: Positive for malaise/fatigue. Negative for fever.   HENT: Negative for ear pain and nosebleeds.    Eyes: Negative for blurred vision and double vision.   Cardiovascular: Negative for chest pain, dyspnea on exertion and palpitations.   Respiratory: Negative for cough and shortness of breath.    Skin: Negative for rash.   Musculoskeletal: Negative for joint pain.   Gastrointestinal: Negative for abdominal pain, nausea and vomiting.   Neurological: Negative for focal weakness and headaches.   Psychiatric/Behavioral: Negative for depression. The patient is not nervous/anxious.    All other systems reviewed and are negative.      Ciprofloxacin; Amlodipine; and Rocephin [ceftriaxone]    Scheduled Meds:    atorvastatin 20 mg Oral Daily   budesonide 0.5 mg Nebulization BID - RT   digoxin 125 mcg Intravenous Daily   enoxaparin 70 mg Subcutaneous Q12H   fludrocortisone 0.1 mg Oral Q PM   fludrocortisone 0.2 mg Oral Daily   furosemide 40 mg Oral Daily   insulin lispro 0-7 Units Subcutaneous Q6H   ipratropium-albuterol 3 mL Nebulization 4x Daily - RT   methylPREDNISolone sodium succinate 40 mg Intravenous Q8H   metoclopramide 10 mg Intravenous Q6H   metoprolol tartrate 5 mg Intravenous Q6H   ondansetron 4 mg Intravenous Q6H   pantoprazole 40 mg Intravenous Q AM   piperacillin-tazobactam 3.375 g Intravenous Q8H   potassium chloride 20 mEq Oral Daily   sodium chloride 10 mL Intravenous Q12H   sucralfate 1 g Oral 4x Daily AC & at Bedtime   Thera 1 tablet Oral Daily   vitamin B-12 1,000 mcg Oral Daily     Continuous Infusions:    amiodarone 0.5 mg/min Last Rate: 0.5 mg/min (07/04/20 0910)   dilTIAZem 5-15 mg/hr Last Rate: 15 mg/hr (07/04/20 0910)    "  Pharmacy Consult - Pharmacy to dose     Pharmacy to Dose Zosyn       PRN Meds:.•  acetaminophen **OR** acetaminophen **OR** acetaminophen  •  aluminum-magnesium hydroxide-simethicone  •  bisacodyl  •  dextrose  •  dextrose  •  glucagon (human recombinant)  •  HYDROcodone-acetaminophen  •  insulin lispro **AND** insulin lispro  •  magnesium hydroxide  •  magnesium sulfate **OR** magnesium sulfate **OR** magnesium sulfate  •  melatonin  •  Morphine  •  ondansetron  •  Pharmacy Consult - Pharmacy to dose  •  Pharmacy to Dose Zosyn  •  potassium chloride **OR** potassium chloride **OR** potassium chloride  •  potassium phosphate infusion greater than 15 mMoles **OR** potassium phosphate infusion greater than 15 mMoles **OR** potassium phosphate **OR** sodium phosphate IVPB **OR** sodium phosphate IVPB **OR** sodium phosphate IVPB  •  promethazine  •  [COMPLETED] Insert peripheral IV **AND** sodium chloride  •  sodium chloride        VITAL SIGNS  Vitals:    07/04/20 0500 07/04/20 0600 07/04/20 0748 07/04/20 0751   BP: 141/84 153/89     BP Location:       Pulse: 108 101 96    Resp:  20 18 18   Temp:  99.1 °F (37.3 °C)     TempSrc:  Oral     SpO2: 91% 98% 99%    Weight:       Height:           Flowsheet Rows      First Filed Value   Admission Height  185.4 cm (73\") Documented at 07/01/2020 2336   Admission Weight  68 kg (150 lb) Documented at 07/01/2020 2336           TELEMETRY: Atrial fibrillation with controlled ventricular response    Physical Exam:  Physical Exam   Constitutional: He appears well-developed and well-nourished.   HENT:   Head: Normocephalic and atraumatic.   Eyes: Pupils are equal, round, and reactive to light. Conjunctivae and EOM are normal. No scleral icterus.   Neck: Normal range of motion. Neck supple. No JVD present. Carotid bruit is not present.   Cardiovascular: Normal rate, regular rhythm, S1 normal, S2 normal and intact distal pulses. PMI is not displaced.   Murmur heard.  Pulmonary/Chest: " Effort normal and breath sounds normal. He has no wheezes. He has no rales.   Abdominal: Soft. Bowel sounds are normal.   Musculoskeletal: Normal range of motion.   Neurological: He is alert. He has normal strength.   No focal deficits   Skin: Skin is warm and dry. No rash noted.   Psychiatric: He has a normal mood and affect.        Results Review:   I reviewed the patient's new clinical results.  Lab Results (last 24 hours)     Procedure Component Value Units Date/Time    BUN [[id number]]  (Normal) Collected:  07/04/20 0334    Specimen:  Blood Updated:  07/04/20 0839     BUN 12 mg/dL     POC Glucose Once [[id number]]  (Abnormal) Collected:  07/04/20 0726    Specimen:  Blood Updated:  07/04/20 0734     Glucose 141 mg/dL      Comment: Serial Number: [id number]:  [id number]       Clostridium Difficile EIA - Stool, Per Rectum [[id number]]  (Normal) Collected:  07/03/20 0856    Specimen:  Stool from Per Rectum Updated:  07/04/20 0715     C Diff GDH / Toxin Negative    Comprehensive Metabolic Panel [[id number]]  (Abnormal) Collected:  07/04/20 0334    Specimen:  Blood Updated:  07/04/20 0432     Glucose 149 mg/dL      BUN --     Comment: Testing performed by alternate method        Creatinine 0.59 mg/dL      Sodium 144 mmol/L      Potassium 2.8 mmol/L      Comment: Specimen hemolyzed.  Results may be affected.        Chloride 101 mmol/L      CO2 26.0 mmol/L      Calcium 9.3 mg/dL      Total Protein 6.8 g/dL      Albumin 3.90 g/dL      ALT (SGPT) 43 U/L      AST (SGOT) 45 U/L      Comment: Specimen hemolyzed.  Results may be affected.        Alkaline Phosphatase 55 U/L      Total Bilirubin 1.2 mg/dL      eGFR Non African Amer 132 mL/min/1.73      Globulin 2.9 gm/dL      A/G Ratio 1.3 g/dL      BUN/Creatinine Ratio --     Comment: Testing not performed        Anion Gap 17.0 mmol/L     Narrative:       GFR Normal >60  Chronic Kidney Disease <60  Kidney Failure <15      Magnesium [[id number]]  (Normal) Collected:  07/04/20  0334    Specimen:  Blood Updated:  07/04/20 0427     Magnesium 2.2 mg/dL     CBC & Differential [[id number]] Collected:  07/04/20 0334    Specimen:  Blood Updated:  07/04/20 0416    Narrative:       The following orders were created for panel order CBC & Differential.  Procedure                               Abnormality         Status                     ---------                               -----------         ------                     CBC Auto Differential[[id number]]        Abnormal            Final result                 Please view results for these tests on the individual orders.    CBC Auto Differential [[id number]]  (Abnormal) Collected:  07/04/20 0334    Specimen:  Blood Updated:  07/04/20 0416     WBC 18.00 10*3/mm3      RBC 4.26 10*6/mm3      Hemoglobin 12.9 g/dL      Hematocrit 41.2 %      MCV 96.7 fL      MCH 30.2 pg      MCHC 31.3 g/dL      RDW 15.9 %      RDW-SD 54.3 fl      MPV 8.3 fL      Platelets 280 10*3/mm3      Neutrophil % 89.4 %      Lymphocyte % 4.4 %      Monocyte % 5.8 %      Eosinophil % 0.0 %      Basophil % 0.4 %      Neutrophils, Absolute 16.10 10*3/mm3      Lymphocytes, Absolute 0.80 10*3/mm3      Monocytes, Absolute 1.00 10*3/mm3      Eosinophils, Absolute 0.00 10*3/mm3      Basophils, Absolute 0.10 10*3/mm3      nRBC 0.0 /100 WBC     Blood Culture - Blood, Arm, Left [[id number]] Collected:  07/02/20 0249    Specimen:  Blood from Arm, Left Updated:  07/04/20 0300     Blood Culture No growth at 2 days    Blood Culture - Blood, Arm, Right [[id number]] Collected:  07/01/20 2348    Specimen:  Blood from Arm, Right Updated:  07/04/20 0000     Blood Culture No growth at 2 days    POC Glucose Once [[id number]]  (Abnormal) Collected:  07/03/20 2004    Specimen:  Blood Updated:  07/03/20 2008     Glucose 140 mg/dL      Comment: Serial Number: [id number]:  [id number]       POC Glucose Once [[id number]]  (Abnormal) Collected:  07/03/20 1629    Specimen:  Blood Updated:  07/03/20 1631      Glucose 154 mg/dL      Comment: Serial Number: [id number]:  [id number]       Gastrointestinal Panel, PCR - Stool, Per Rectum [[id number]]  (Normal) Collected:  07/03/20 0856    Specimen:  Stool from Per Rectum Updated:  07/03/20 1535     Campylobacter Not Detected     Plesiomonas shigelloides Not Detected     Salmonella Not Detected     Vibrio Not Detected     Vibrio cholerae Not Detected     Yersinia enterocolitica Not Detected     Enteroaggregative E. coli (EAEC) Not Detected     Enteropathogenic E. coli (EPEC) Not Detected     Enterotoxigenic E. coli (ETEC) lt/st Not Detected     Shiga-like toxin-producing E. coli (STEC) stx1/stx2 Not Detected     E. coli O157 Not Detected     Shigella/Enteroinvasive E. coli (EIEC) Not Detected     Cryptosporidium Not Detected     Cyclospora cayetanensis Not Detected     Entamoeba histolytica Not Detected     Giardia lamblia Not Detected     Adenovirus F40/41 Not Detected     Astrovirus Not Detected     Norovirus GI/GII Not Detected     Rotavirus A Not Detected     Sapovirus (I, II, IV or V) Not Detected    Narrative:       If Aeromonas, Staphylococcus aureus or Bacillus cereus are suspected, please order [id number]: Stool Culture, Aeromonas, S aureus, B Cereus.    BUN [[id number]]  (Normal) Collected:  07/03/20 0940    Specimen:  Blood Updated:  07/03/20 1415     BUN 17 mg/dL     Digoxin Level [[id number]]  (Abnormal) Collected:  07/03/20 0940    Specimen:  Blood Updated:  07/03/20 1242     Digoxin <0.30 ng/mL     POC Glucose Once [[id number]]  (Abnormal) Collected:  07/03/20 1140    Specimen:  Blood Updated:  07/03/20 1151     Glucose 219 mg/dL      Comment: Serial Number: [id number]:  [id number]             Imaging Results (Last 24 Hours)     Procedure Component Value Units Date/Time    US Gallbladder [[id number]] Collected:  07/03/20 1503     Updated:  07/03/20 1506    Narrative:       US GALLBLADDER-     Date of Exam: 7/3/2020 2:31 PM     Indication: intractable nausea  and vomiting; I48.91-Unspecified atrial  fibrillation; I50.9-Heart failure, unspecified; J18.9-Pneumonia,  unspecified organism.   Abdominal pain     Comparison: CT 07/02/2020     Technique: Transverse and sagittal ultrasound images of the right upper  quadrant were obtained. Doppler evaluation was also conducted.     FINDINGS:  Visualized portions of the head and body of the pancreas are normal.  Evaluation is limited due to surrounding bowel gas.     The liver appears echogenic.  No focal hepatic lesions.  Portal and  hepatic veins are patent and have normal flow direction and waveforms.      Gallbladder is normal in caliber with no evidence of stones, wall  thickening or pericholecystic fluid. A small amount of sludge is present  within the gallbladder lumen. Negative sonographic Gomes's sign.     The biliary system is nondilated.      The right kidney is normal in size with no evidence of hydronephrosis.  No suspicious focal lesions. Simple cyst is seen arising from the  superior pole measuring up to 7.6 cm.       Impression:       Gallbladder sludge with no evidence of stones. No secondary findings of  acute cholecystitis. Mildly increased echogenicity of liver parenchyma  is present likely related to steatosis. A simple cyst is seen arising  from the right kidney.     Electronically Signed By-Jessica Myles On:7/3/2020 3:04 PM  This report was finalized on [id number] by  Jessica Myles, .          EKG      I personally viewed and interpreted the patient's EKG/Telemetry data:    ECHOCARDIOGRAM:    STRESS MYOVIEW:    CARDIAC CATHETERIZATION:    OTHER:         Assessment/Plan     Principal Problem:    Atrial fibrillation (CMS/HCC)  Active Problems:    CAD (coronary artery disease)    Chronic anticoagulation    Hyperlipidemia    Presence of stent in right coronary artery    Chronic pain    Sepsis (CMS/HCC)  Hypokalemia    Patient presented with atrial fibrillation with rapid medical response and is currently on IV  Cardizem  Patient's heart rates are better and hence will switch to oral Cardizem  Patient is already on Xarelto  Patient is ruled out for MI by EKG enzymes  Patient also had signs of sepsis and hence is getting the work-up done and is currently on antibiotics  He is ruled out for COVID  Patient is having an echocardiogram for LV function  Patient also has a stress test ordered but currently has A. fib and hence is on IV amiodarone and Cardizem.  We will switch amiodarone to oral  Patient also has diarrhea and abdominal discomfort is having work-up done for C. difficile  Patient has history of coronary status post and placement to the right coronary artery  Patient has hyperlipidemia and is on oral medicines.  Patient's potassium is low and hence being replaced    I discussed the patients findings and my recommendations with patient and nurse    Sedrick Salcedo MD  07/04/20  10:52

## 2020-07-05 ENCOUNTER — ANESTHESIA EVENT (OUTPATIENT)
Dept: GASTROENTEROLOGY | Facility: HOSPITAL | Age: 80
End: 2020-07-05

## 2020-07-05 ENCOUNTER — INPATIENT HOSPITAL (AMBULATORY)
Dept: URBAN - METROPOLITAN AREA HOSPITAL 84 | Facility: HOSPITAL | Age: 80
End: 2020-07-05
Payer: COMMERCIAL

## 2020-07-05 ENCOUNTER — ANESTHESIA (OUTPATIENT)
Dept: GASTROENTEROLOGY | Facility: HOSPITAL | Age: 80
End: 2020-07-05

## 2020-07-05 DIAGNOSIS — R11.2 NAUSEA WITH VOMITING, UNSPECIFIED: ICD-10-CM

## 2020-07-05 DIAGNOSIS — K31.89 OTHER DISEASES OF STOMACH AND DUODENUM: ICD-10-CM

## 2020-07-05 DIAGNOSIS — R19.7 DIARRHEA, UNSPECIFIED: ICD-10-CM

## 2020-07-05 LAB
ALBUMIN SERPL-MCNC: 3.8 G/DL (ref 3.5–5.2)
ALBUMIN/GLOB SERPL: 1.3 G/DL
ALP SERPL-CCNC: 60 U/L (ref 39–117)
ALT SERPL W P-5'-P-CCNC: 46 U/L (ref 1–41)
ANION GAP SERPL CALCULATED.3IONS-SCNC: 15 MMOL/L (ref 5–15)
AST SERPL-CCNC: 37 U/L (ref 1–40)
BASOPHILS # BLD AUTO: 0 10*3/MM3 (ref 0–0.2)
BASOPHILS NFR BLD AUTO: 0.1 % (ref 0–1.5)
BILIRUB SERPL-MCNC: 1.5 MG/DL (ref 0.2–1.2)
BUN SERPL-MCNC: 15 MG/DL (ref 8–23)
BUN SERPL-MCNC: ABNORMAL MG/DL
BUN/CREAT SERPL: ABNORMAL
CALCIUM SPEC-SCNC: 9.2 MG/DL (ref 8.6–10.5)
CHLORIDE SERPL-SCNC: 95 MMOL/L (ref 98–107)
CO2 SERPL-SCNC: 32 MMOL/L (ref 22–29)
CREAT SERPL-MCNC: 0.64 MG/DL (ref 0.76–1.27)
DEPRECATED RDW RBC AUTO: 46.4 FL (ref 37–54)
EOSINOPHIL # BLD AUTO: 0 10*3/MM3 (ref 0–0.4)
EOSINOPHIL NFR BLD AUTO: 0 % (ref 0.3–6.2)
ERYTHROCYTE [DISTWIDTH] IN BLOOD BY AUTOMATED COUNT: 14.7 % (ref 12.3–15.4)
GFR SERPL CREATININE-BSD FRML MDRD: 120 ML/MIN/1.73
GLOBULIN UR ELPH-MCNC: 2.9 GM/DL
GLUCOSE BLDC GLUCOMTR-MCNC: 133 MG/DL (ref 70–105)
GLUCOSE BLDC GLUCOMTR-MCNC: 145 MG/DL (ref 70–105)
GLUCOSE BLDC GLUCOMTR-MCNC: 165 MG/DL (ref 70–105)
GLUCOSE BLDC GLUCOMTR-MCNC: 183 MG/DL (ref 70–105)
GLUCOSE SERPL-MCNC: 162 MG/DL (ref 65–99)
HCT VFR BLD AUTO: 41.1 % (ref 37.5–51)
HGB BLD-MCNC: 13.5 G/DL (ref 13–17.7)
LYMPHOCYTES # BLD AUTO: 0.6 10*3/MM3 (ref 0.7–3.1)
LYMPHOCYTES NFR BLD AUTO: 4.4 % (ref 19.6–45.3)
MAGNESIUM SERPL-MCNC: 1.9 MG/DL (ref 1.6–2.4)
MCH RBC QN AUTO: 29.9 PG (ref 26.6–33)
MCHC RBC AUTO-ENTMCNC: 32.8 G/DL (ref 31.5–35.7)
MCV RBC AUTO: 91 FL (ref 79–97)
MONOCYTES # BLD AUTO: 0.8 10*3/MM3 (ref 0.1–0.9)
MONOCYTES NFR BLD AUTO: 5.8 % (ref 5–12)
NEUTROPHILS NFR BLD AUTO: 13 10*3/MM3 (ref 1.7–7)
NEUTROPHILS NFR BLD AUTO: 89.7 % (ref 42.7–76)
NRBC BLD AUTO-RTO: 0.1 /100 WBC (ref 0–0.2)
PLATELET # BLD AUTO: 286 10*3/MM3 (ref 140–450)
PMV BLD AUTO: 8.1 FL (ref 6–12)
POTASSIUM SERPL-SCNC: 3.1 MMOL/L (ref 3.5–5.2)
PROT SERPL-MCNC: 6.7 G/DL (ref 6–8.5)
RBC # BLD AUTO: 4.51 10*6/MM3 (ref 4.14–5.8)
SODIUM SERPL-SCNC: 142 MMOL/L (ref 136–145)
WBC # BLD AUTO: 14.5 10*3/MM3 (ref 3.4–10.8)

## 2020-07-05 PROCEDURE — 88305 TISSUE EXAM BY PATHOLOGIST: CPT | Performed by: INTERNAL MEDICINE

## 2020-07-05 PROCEDURE — 25010000002 METHYLPREDNISOLONE PER 40 MG: Performed by: INTERNAL MEDICINE

## 2020-07-05 PROCEDURE — 93005 ELECTROCARDIOGRAM TRACING: CPT | Performed by: INTERNAL MEDICINE

## 2020-07-05 PROCEDURE — 25010000002 PROPOFOL 200 MG/20ML EMULSION: Performed by: ANESTHESIOLOGY

## 2020-07-05 PROCEDURE — 85025 COMPLETE CBC W/AUTO DIFF WBC: CPT | Performed by: INTERNAL MEDICINE

## 2020-07-05 PROCEDURE — 0DB68ZX EXCISION OF STOMACH, VIA NATURAL OR ARTIFICIAL OPENING ENDOSCOPIC, DIAGNOSTIC: ICD-10-PCS | Performed by: INTERNAL MEDICINE

## 2020-07-05 PROCEDURE — 25010000002 MORPHINE PER 10 MG: Performed by: INTERNAL MEDICINE

## 2020-07-05 PROCEDURE — 99233 SBSQ HOSP IP/OBS HIGH 50: CPT | Performed by: INTERNAL MEDICINE

## 2020-07-05 PROCEDURE — 25010000002 ONDANSETRON PER 1 MG: Performed by: INTERNAL MEDICINE

## 2020-07-05 PROCEDURE — 83735 ASSAY OF MAGNESIUM: CPT | Performed by: INTERNAL MEDICINE

## 2020-07-05 PROCEDURE — 25010000002 ENOXAPARIN PER 10 MG: Performed by: INTERNAL MEDICINE

## 2020-07-05 PROCEDURE — 25010000002 METOCLOPRAMIDE PER 10 MG: Performed by: INTERNAL MEDICINE

## 2020-07-05 PROCEDURE — 25010000002 ONDANSETRON PER 1 MG: Performed by: NURSE PRACTITIONER

## 2020-07-05 PROCEDURE — 43239 EGD BIOPSY SINGLE/MULTIPLE: CPT | Performed by: INTERNAL MEDICINE

## 2020-07-05 PROCEDURE — 80053 COMPREHEN METABOLIC PANEL: CPT | Performed by: INTERNAL MEDICINE

## 2020-07-05 PROCEDURE — 25010000002 METOCLOPRAMIDE PER 10 MG: Performed by: NURSE PRACTITIONER

## 2020-07-05 PROCEDURE — 94799 UNLISTED PULMONARY SVC/PX: CPT

## 2020-07-05 PROCEDURE — 25010000002 PIPERACILLIN SOD-TAZOBACTAM PER 1 G: Performed by: NURSE PRACTITIONER

## 2020-07-05 PROCEDURE — 82962 GLUCOSE BLOOD TEST: CPT

## 2020-07-05 PROCEDURE — 63710000001 INSULIN LISPRO (HUMAN) PER 5 UNITS: Performed by: INTERNAL MEDICINE

## 2020-07-05 PROCEDURE — 25010000002 AMIODARONE PER 30 MG: Performed by: INTERNAL MEDICINE

## 2020-07-05 PROCEDURE — 25010000002 DIGOXIN PER 500 MCG: Performed by: INTERNAL MEDICINE

## 2020-07-05 RX ORDER — DIGOXIN 0.25 MG/ML
125 INJECTION INTRAMUSCULAR; INTRAVENOUS
Status: DISCONTINUED | OUTPATIENT
Start: 2020-07-05 | End: 2020-07-07

## 2020-07-05 RX ORDER — ONDANSETRON 2 MG/ML
4 INJECTION INTRAMUSCULAR; INTRAVENOUS EVERY 6 HOURS PRN
Status: DISCONTINUED | OUTPATIENT
Start: 2020-07-05 | End: 2020-07-05 | Stop reason: SDUPTHER

## 2020-07-05 RX ORDER — PROPOFOL 10 MG/ML
INJECTION, EMULSION INTRAVENOUS AS NEEDED
Status: DISCONTINUED | OUTPATIENT
Start: 2020-07-05 | End: 2020-07-05 | Stop reason: SURG

## 2020-07-05 RX ORDER — ONDANSETRON 4 MG/1
4 TABLET, FILM COATED ORAL EVERY 6 HOURS PRN
Status: DISCONTINUED | OUTPATIENT
Start: 2020-07-05 | End: 2020-07-05 | Stop reason: SDUPTHER

## 2020-07-05 RX ORDER — SODIUM CHLORIDE 9 MG/ML
INJECTION, SOLUTION INTRAVENOUS CONTINUOUS PRN
Status: DISCONTINUED | OUTPATIENT
Start: 2020-07-05 | End: 2020-07-05 | Stop reason: SURG

## 2020-07-05 RX ORDER — METHYLPREDNISOLONE SODIUM SUCCINATE 40 MG/ML
40 INJECTION, POWDER, LYOPHILIZED, FOR SOLUTION INTRAMUSCULAR; INTRAVENOUS EVERY 24 HOURS
Status: DISCONTINUED | OUTPATIENT
Start: 2020-07-06 | End: 2020-07-05

## 2020-07-05 RX ORDER — PREDNISONE 10 MG/1
10 TABLET ORAL DAILY
Status: DISCONTINUED | OUTPATIENT
Start: 2020-07-10 | End: 2020-07-06

## 2020-07-05 RX ORDER — PREDNISONE 20 MG/1
20 TABLET ORAL DAILY
Status: DISCONTINUED | OUTPATIENT
Start: 2020-07-08 | End: 2020-07-06

## 2020-07-05 RX ORDER — LIDOCAINE HYDROCHLORIDE 20 MG/ML
INJECTION, SOLUTION EPIDURAL; INFILTRATION; INTRACAUDAL; PERINEURAL AS NEEDED
Status: DISCONTINUED | OUTPATIENT
Start: 2020-07-05 | End: 2020-07-05 | Stop reason: SURG

## 2020-07-05 RX ADMIN — ATORVASTATIN CALCIUM 20 MG: 20 TABLET, FILM COATED ORAL at 08:50

## 2020-07-05 RX ADMIN — PIPERACILLIN AND TAZOBACTAM 3.38 G: 3; .375 INJECTION, POWDER, FOR SOLUTION INTRAVENOUS at 01:51

## 2020-07-05 RX ADMIN — METOCLOPRAMIDE 10 MG: 5 INJECTION, SOLUTION INTRAMUSCULAR; INTRAVENOUS at 15:26

## 2020-07-05 RX ADMIN — Medication 10 ML: at 20:46

## 2020-07-05 RX ADMIN — IPRATROPIUM BROMIDE AND ALBUTEROL SULFATE 3 ML: .5; 3 SOLUTION RESPIRATORY (INHALATION) at 20:37

## 2020-07-05 RX ADMIN — SODIUM CHLORIDE: 0.9 INJECTION, SOLUTION INTRAVENOUS at 11:26

## 2020-07-05 RX ADMIN — POTASSIUM CHLORIDE 40 MEQ: 1500 TABLET, EXTENDED RELEASE ORAL at 18:56

## 2020-07-05 RX ADMIN — AMIODARONE HYDROCHLORIDE 0.5 MG/MIN: 50 INJECTION, SOLUTION INTRAVENOUS at 10:58

## 2020-07-05 RX ADMIN — POTASSIUM CHLORIDE 20 MEQ: 1500 TABLET, EXTENDED RELEASE ORAL at 08:50

## 2020-07-05 RX ADMIN — METOPROLOL TARTRATE 5 MG: 5 INJECTION INTRAVENOUS at 20:54

## 2020-07-05 RX ADMIN — ENOXAPARIN SODIUM 70 MG: 80 INJECTION SUBCUTANEOUS at 11:18

## 2020-07-05 RX ADMIN — POTASSIUM CHLORIDE 40 MEQ: 1500 TABLET, EXTENDED RELEASE ORAL at 06:01

## 2020-07-05 RX ADMIN — METOPROLOL TARTRATE 5 MG: 5 INJECTION INTRAVENOUS at 08:51

## 2020-07-05 RX ADMIN — BUDESONIDE 0.5 MG: 0.5 INHALANT RESPIRATORY (INHALATION) at 20:38

## 2020-07-05 RX ADMIN — PANTOPRAZOLE SODIUM 40 MG: 40 INJECTION, POWDER, FOR SOLUTION INTRAVENOUS at 05:03

## 2020-07-05 RX ADMIN — FLUDROCORTISONE ACETATE 0.1 MG: 0.1 TABLET ORAL at 16:12

## 2020-07-05 RX ADMIN — SUCRALFATE 1 G: 1 TABLET ORAL at 20:46

## 2020-07-05 RX ADMIN — Medication 10 ML: at 09:00

## 2020-07-05 RX ADMIN — SODIUM CHLORIDE 15 MG/HR: 900 INJECTION, SOLUTION INTRAVENOUS at 16:05

## 2020-07-05 RX ADMIN — INSULIN LISPRO 2 UNITS: 100 INJECTION, SOLUTION INTRAVENOUS; SUBCUTANEOUS at 18:12

## 2020-07-05 RX ADMIN — THERA TABS 1 TABLET: TAB at 09:01

## 2020-07-05 RX ADMIN — METHYLPREDNISOLONE SODIUM SUCCINATE 40 MG: 40 INJECTION, POWDER, FOR SOLUTION INTRAMUSCULAR; INTRAVENOUS at 01:50

## 2020-07-05 RX ADMIN — METOCLOPRAMIDE 10 MG: 5 INJECTION, SOLUTION INTRAMUSCULAR; INTRAVENOUS at 08:51

## 2020-07-05 RX ADMIN — SODIUM CHLORIDE 15 MG/HR: 900 INJECTION, SOLUTION INTRAVENOUS at 09:21

## 2020-07-05 RX ADMIN — MORPHINE SULFATE: 4 INJECTION INTRAVENOUS at 09:29

## 2020-07-05 RX ADMIN — ONDANSETRON 4 MG: 2 INJECTION INTRAMUSCULAR; INTRAVENOUS at 02:33

## 2020-07-05 RX ADMIN — METOPROLOL TARTRATE 5 MG: 5 INJECTION INTRAVENOUS at 15:23

## 2020-07-05 RX ADMIN — ENOXAPARIN SODIUM 70 MG: 80 INJECTION SUBCUTANEOUS at 22:15

## 2020-07-05 RX ADMIN — LIDOCAINE HYDROCHLORIDE 70 MG: 20 INJECTION, SOLUTION EPIDURAL; INFILTRATION; INTRACAUDAL; PERINEURAL at 11:19

## 2020-07-05 RX ADMIN — ONDANSETRON 4 MG: 2 INJECTION INTRAMUSCULAR; INTRAVENOUS at 20:46

## 2020-07-05 RX ADMIN — PIPERACILLIN AND TAZOBACTAM 3.38 G: 3; .375 INJECTION, POWDER, FOR SOLUTION INTRAVENOUS at 08:52

## 2020-07-05 RX ADMIN — ONDANSETRON 4 MG: 2 INJECTION INTRAMUSCULAR; INTRAVENOUS at 15:36

## 2020-07-05 RX ADMIN — METHYLPREDNISOLONE SODIUM SUCCINATE 40 MG: 40 INJECTION, POWDER, FOR SOLUTION INTRAMUSCULAR; INTRAVENOUS at 09:01

## 2020-07-05 RX ADMIN — SUCRALFATE 1 G: 1 TABLET ORAL at 11:18

## 2020-07-05 RX ADMIN — METOCLOPRAMIDE 10 MG: 5 INJECTION, SOLUTION INTRAMUSCULAR; INTRAVENOUS at 20:54

## 2020-07-05 RX ADMIN — PROPOFOL 90 MG: 10 INJECTION, EMULSION INTRAVENOUS at 11:22

## 2020-07-05 RX ADMIN — POTASSIUM CHLORIDE 40 MEQ: 1500 TABLET, EXTENDED RELEASE ORAL at 22:15

## 2020-07-05 RX ADMIN — ONDANSETRON 4 MG: 2 INJECTION INTRAMUSCULAR; INTRAVENOUS at 08:51

## 2020-07-05 RX ADMIN — SUCRALFATE 1 G: 1 TABLET ORAL at 17:59

## 2020-07-05 RX ADMIN — FUROSEMIDE 40 MG: 40 TABLET ORAL at 08:49

## 2020-07-05 RX ADMIN — SODIUM CHLORIDE 15 MG/HR: 900 INJECTION, SOLUTION INTRAVENOUS at 23:07

## 2020-07-05 RX ADMIN — MORPHINE SULFATE 2 MG: 4 INJECTION INTRAVENOUS at 16:06

## 2020-07-05 RX ADMIN — FLUDROCORTISONE ACETATE 0.2 MG: 0.1 TABLET ORAL at 08:50

## 2020-07-05 RX ADMIN — METOCLOPRAMIDE 10 MG: 5 INJECTION, SOLUTION INTRAMUSCULAR; INTRAVENOUS at 01:50

## 2020-07-05 RX ADMIN — SUCRALFATE 1 G: 1 TABLET ORAL at 08:50

## 2020-07-05 RX ADMIN — DIGOXIN 125 MCG: 250 INJECTION, SOLUTION INTRAMUSCULAR; INTRAVENOUS; PARENTERAL at 15:30

## 2020-07-05 RX ADMIN — CYANOCOBALAMIN TAB 1000 MCG 1000 MCG: 1000 TAB at 08:49

## 2020-07-05 NOTE — PROGRESS NOTES
[facility] Medicine Services Daily Progress Note      Hospitalist Team  LOS 3 days      Patient Care Team:  Nikki Gonzales MD as PCP - General (Family Medicine)    Patient Location: [facility]/1      Subjective   Subjective     Chief Complaint / Subjective  Chief Complaint   Patient presents with   • Weakness - Generalized         Brief Synopsis of Hospital Course/HPI       Mr. Park is a 79 y.o. male with a history of atrial fibrillation, hyperlipidemia, chronic low back pain, hypotension, chronic anticoagulation presented to the [facility] ED on 7/1/2020 with a complaint of generalized body aches and pains, weakness and a cough.  Patient states he is also had some nausea and vomiting.  Patient denies chest pain, shortness of breath, diarrhea, or any ill contacts.  Upon arrival to the ED patient was found to be in atrial fibrillation with RVR, heart rate 160s patient was given Cardizem bolus and started on a Cardizem drip.     In the ED, initial troponin negative, proBNP 3485, , glucose 119, sodium 145, potassium 4.1, BUN 23, creatinine 0.69, C-reactive protein 28.6, lactate 2.5, lipase 13, magnesium 2.4, procalcitonin 0.11, WBC 9.6, Hgb 11.7, HCT 35.5, blood cultures pending.  EKG: A. fib with RVR.  Respiratory panel negative, COVID 19 test negative.  Chest x-ray with pulmonary edema, pneumonia as read by per ED physician, awaiting official radiology read.  Patient is on a Cardizem drip at 15 mg/h, received a 500 mL normal saline bolus, 4 mg Zofran IV x1, 40 mg Lasix IV x1, Inapsine 1.25 mg IV x1, patient will be admitted for further evaluation and management.         Date::      7/3  Int vomiting  Reviewed ctC/PA/P  Iv phenergan added  ruq for possible gall bladder disease\  Consult gi  Echo shows very low ejection fraction of 27%    7/4/2020  Still nauseous  Seen by GI and right upper quadrant ultrasound was done showed no gallstones but gallbladder sludge noted.  PEREZ  "difficile ordered for diarrhea.  Worsening leukocytosis could be related to his steroids.  Has been afebrile throughout.  Hyperkalemia will be corrected with protocol.  No elevation liver enzymes noted.   still  A. fib but heart rate better..  Start amiodarone drip due to uncontrolled A. f      7/5    bp now much higher  Stop 0.2 mg of fludrocort..Keep 0.1 and monitor.  Low k on replacem,ent  egd today  ? Stress test today or tomorrow  Start diet hopefuly later today  Still w poor appetite an =d nausea        ROS  All other ssytems rev and neg    Objective   Objective      Vital Signs  Temp:  [97.3 °F (36.3 °C)-98 °F (36.7 °C)] 98 °F (36.7 °C)  Heart Rate:  [] 94  Resp:  [15-21] 21  BP: (120-169)/() 150/91  Oxygen Therapy  SpO2: 100 %  Pulse Oximetry Type: Continuous  Device (Oxygen Therapy): room air  Device (Oxygen Therapy): nasal cannula  Flow (L/min): 3  Oxygen Concentration (%): 40  Flowsheet Rows      First Filed Value   Admission Height  185.4 cm (73\") Documented at 07/01/2020 2336   Admission Weight  68 kg (150 lb) Documented at 07/01/2020 2336        Intake & Output (last 3 days)       07/02 0701 - 07/03 0700 07/03 0701 - 07/04 0700 07/04 0701 - 07/05 0700 07/05 0701 - 07/06 0700    P.O. 320 200 50 0    I.V. (mL/kg)   639 (9.4)     IV Piggyback 100       Total Intake(mL/kg) 420 (6.3) 200 (3) 689 (10.2) 0 (0)    Urine (mL/kg/hr) 1250 (0.8) 500 (0.3) 1390 (0.9) 250 (1.2)    Total Output 4752 699 7201 250    Net -830 -300 -701 -250            Urine Unmeasured Occurrence   7 x         Lines, Drains & Airways    Active LDAs     Name:   Placement date:   Placement time:   Site:   Days:    Peripheral IV 07/01/20 2349 Right Antecubital   07/01/20 2349    Antecubital   1    Peripheral IV 07/02/20 0837 Posterior;Right Forearm   07/02/20    0837    Forearm   1                  Physical Exam:    Physical Exam   Constitutional: He is oriented to person, place, and time. He appears well-developed and " well-nourished. No distress.   HENT:   Head: Normocephalic and atraumatic.   Right Ear: External ear normal.   Left Ear: External ear normal.   Nose: Nose normal.   Mouth/Throat: Oropharynx is clear and moist. No oropharyngeal exudate.   Eyes: Pupils are equal, round, and reactive to light. Conjunctivae and EOM are normal. Right eye exhibits no discharge. Left eye exhibits no discharge. No scleral icterus.   Neck: Normal range of motion. No JVD present. No tracheal deviation present. No thyromegaly present.   Cardiovascular: Normal rate, regular rhythm, normal heart sounds and intact distal pulses. Exam reveals no gallop and no friction rub.   No murmur heard.  Pulmonary/Chest: Effort normal and breath sounds normal. No stridor. No respiratory distress. He has no wheezes. He has no rales. He exhibits no tenderness.   Abdominal: Soft. Bowel sounds are normal. He exhibits no distension and no mass. There is no tenderness. There is no rebound and no guarding. No hernia.   Musculoskeletal: Normal range of motion. He exhibits no edema, tenderness or deformity.   Lymphadenopathy:     He has no cervical adenopathy.   Neurological: He is alert and oriented to person, place, and time. No cranial nerve deficit or sensory deficit. He exhibits normal muscle tone. Coordination normal.   Skin: Skin is warm and dry. No rash noted. He is not diaphoretic. No erythema.   Psychiatric: He has a normal mood and affect. His behavior is normal.   Nursing note and vitals reviewed.            Procedures:              Results Review:     I reviewed the patient's new clinical results.      Lab Results (last 24 hours)     Procedure Component Value Units Date/Time    POC Glucose Once [[id number]]  (Abnormal) Collected:  07/05/20 0839    Specimen:  Blood Updated:  07/05/20 0840     Glucose 145 mg/dL      Comment: Serial Number: [id number]:  [id number]       BUN [[id number]]  (Normal) Collected:  07/05/20 0215    Specimen:  Blood Updated:   07/05/20 0831     BUN 15 mg/dL     Comprehensive Metabolic Panel [[id number]]  (Abnormal) Collected:  07/05/20 0215    Specimen:  Blood Updated:  07/05/20 0320     Glucose 162 mg/dL      BUN --     Comment: Testing performed by alternate method        Creatinine 0.64 mg/dL      Sodium 142 mmol/L      Potassium 3.1 mmol/L      Comment: Specimen hemolyzed.  Results may be affected.        Chloride 95 mmol/L      CO2 32.0 mmol/L      Calcium 9.2 mg/dL      Total Protein 6.7 g/dL      Albumin 3.80 g/dL      ALT (SGPT) 46 U/L      AST (SGOT) 37 U/L      Alkaline Phosphatase 60 U/L      Total Bilirubin 1.5 mg/dL      eGFR Non African Amer 120 mL/min/1.73      Globulin 2.9 gm/dL      A/G Ratio 1.3 g/dL      BUN/Creatinine Ratio --     Comment: Testing not performed        Anion Gap 15.0 mmol/L     Narrative:       GFR Normal >60  Chronic Kidney Disease <60  Kidney Failure <15      Magnesium [[id number]]  (Normal) Collected:  07/05/20 0215    Specimen:  Blood Updated:  07/05/20 0320     Magnesium 1.9 mg/dL     Blood Culture - Blood, Arm, Left [[id number]] Collected:  07/02/20 0249    Specimen:  Blood from Arm, Left Updated:  07/05/20 0300     Blood Culture No growth at 3 days    CBC & Differential [[id number]] Collected:  07/05/20 0215    Specimen:  Blood Updated:  07/05/20 0259    Narrative:       The following orders were created for panel order CBC & Differential.  Procedure                               Abnormality         Status                     ---------                               -----------         ------                     CBC Auto Differential[[id number]]        Abnormal            Final result                 Please view results for these tests on the individual orders.    CBC Auto Differential [[id number]]  (Abnormal) Collected:  07/05/20 0215    Specimen:  Blood Updated:  07/05/20 0259     WBC 14.50 10*3/mm3      RBC 4.51 10*6/mm3      Hemoglobin 13.5 g/dL      Hematocrit 41.1 %      MCV 91.0 fL      MCH 29.9  pg      MCHC 32.8 g/dL      RDW 14.7 %      RDW-SD 46.4 fl      MPV 8.1 fL      Platelets 286 10*3/mm3      Neutrophil % 89.7 %      Lymphocyte % 4.4 %      Monocyte % 5.8 %      Eosinophil % 0.0 %      Basophil % 0.1 %      Neutrophils, Absolute 13.00 10*3/mm3      Lymphocytes, Absolute 0.60 10*3/mm3      Monocytes, Absolute 0.80 10*3/mm3      Eosinophils, Absolute 0.00 10*3/mm3      Basophils, Absolute 0.00 10*3/mm3      nRBC 0.1 /100 WBC     Blood Culture - Blood, Arm, Right [[id number]] Collected:  07/01/20 2348    Specimen:  Blood from Arm, Right Updated:  07/05/20 0000     Blood Culture No growth at 3 days    POC Glucose Once [[id number]]  (Abnormal) Collected:  07/04/20 2012    Specimen:  Blood Updated:  07/04/20 2014     Glucose 168 mg/dL      Comment: Serial Number: [id number]:  [id number]       POC Glucose Once [[id number]]  (Abnormal) Collected:  07/04/20 1628    Specimen:  Blood Updated:  07/04/20 1631     Glucose 148 mg/dL      Comment: Serial Number: [id number]:  [id number]       POC Glucose Once [[id number]]  (Abnormal) Collected:  07/04/20 1111    Specimen:  Blood Updated:  07/04/20 1121     Glucose 163 mg/dL      Comment: Serial Number: [id number]:  [id number]           No results found for: HGBA1C        Results from last 7 days   Lab Units 07/02/20  1049   PH, ARTERIAL pH units 7.531*   PO2 ART mm Hg 79.4*   PCO2, ARTERIAL mm Hg 31.3*   HCO3 ART mmol/L 26.2     Lab Results   Component Value Date    LIPASE 30 07/03/2020     No results found for: CHOL, CHLPL, TRIG, HDL, LDL, LDLDIRECT    No results found for: INTRAOP, PREDX, FINALDX, COMDX    Microbiology Results (last 10 days)     Procedure Component Value - Date/Time    Gastrointestinal Panel, PCR - Stool, Per Rectum [[id number]]  (Normal) Collected:  07/03/20 0856    Lab Status:  Final result Specimen:  Stool from Per Rectum Updated:  07/03/20 1535     Campylobacter Not Detected     Plesiomonas shigelloides Not Detected     Salmonella  Not Detected     Vibrio Not Detected     Vibrio cholerae Not Detected     Yersinia enterocolitica Not Detected     Enteroaggregative E. coli (EAEC) Not Detected     Enteropathogenic E. coli (EPEC) Not Detected     Enterotoxigenic E. coli (ETEC) lt/st Not Detected     Shiga-like toxin-producing E. coli (STEC) stx1/stx2 Not Detected     E. coli O157 Not Detected     Shigella/Enteroinvasive E. coli (EIEC) Not Detected     Cryptosporidium Not Detected     Cyclospora cayetanensis Not Detected     Entamoeba histolytica Not Detected     Giardia lamblia Not Detected     Adenovirus F40/41 Not Detected     Astrovirus Not Detected     Norovirus GI/GII Not Detected     Rotavirus A Not Detected     Sapovirus (I, II, IV or V) Not Detected    Narrative:       If Aeromonas, Staphylococcus aureus or Bacillus cereus are suspected, please order [id number]: Stool Culture, Aeromonas, S aureus, B Cereus.    Clostridium Difficile EIA - Stool, Per Rectum [[id number]]  (Normal) Collected:  07/03/20 0856    Lab Status:  Final result Specimen:  Stool from Per Rectum Updated:  07/04/20 0715     C Diff GDH / Toxin Negative    COVID-19,CEPHEID,COR/MILENA/PAD IN-HOUSE(OR EMERGENT/ADD-ON),NP SWAB IN TRANSPORT MEDIA 3-4 HR TAT - Swab, Nasopharynx [[id number]]  (Normal) Collected:  07/02/20 1659    Lab Status:  Final result Specimen:  Swab from Nasopharynx Updated:  07/02/20 2003     COVID19 Not Detected    Narrative:       Fact sheet for providers: https://www.fda.gov/media/597506/download     Fact sheet for patients: https://www.fda.gov/media/257767/download    Blood Culture - Blood, Arm, Left [[id number]] Collected:  07/02/20 0249    Lab Status:  Preliminary result Specimen:  Blood from Arm, Left Updated:  07/05/20 0300     Blood Culture No growth at 3 days    Respiratory Panel, PCR - Swab, Nasopharynx [[id number]]  (Normal) Collected:  07/02/20 0027    Lab Status:  Final result Specimen:  Swab from Nasopharynx Updated:  07/02/20 0153     ADENOVIRUS, PCR  Not Detected     Coronavirus 229E Not Detected     Coronavirus HKU1 Not Detected     Coronavirus NL63 Not Detected     Coronavirus OC43 Not Detected     Human Metapneumovirus Not Detected     Human Rhinovirus/Enterovirus Not Detected     Influenza B PCR Not Detected     Parainfluenza Virus 1 Not Detected     Parainfluenza Virus 2 Not Detected     Parainfluenza Virus 3 Not Detected     Parainfluenza Virus 4 Not Detected     Bordetella pertussis pcr Not Detected     Influenza A H1 2009 PCR Not Detected     Chlamydophila pneumoniae PCR Not Detected     Mycoplasma pneumo by PCR Not Detected     Influenza A PCR Not Detected     Influenza A H3 Not Detected     Influenza A H1 Not Detected     RSV, PCR Not Detected    Narrative:       The coronavirus on the RVP is NOT COVID-19 and is NOT indicative of infection with COVID-19.     COVID-19 [employer] IN-HOUSE, Nasal Swab No Transport Media - Swab, Nasal Cavity [[id number]]  (Normal) Collected:  07/02/20 0027    Lab Status:  Final result Specimen:  Swab from Nasal Cavity Updated:  07/02/20 0104     COVID19 Not Detected    Narrative:       Fact sheet for providers: https://www.fda.gov/media/311910/download     Fact sheet for patients: https://www.fda.gov/media/503960/download    Blood Culture - Blood, Arm, Right [[id number]] Collected:  07/01/20 2348    Lab Status:  Preliminary result Specimen:  Blood from Arm, Right Updated:  07/05/20 0000     Blood Culture No growth at 3 days          ECG/EMG Results (most recent)     Procedure Component Value Units Date/Time    Adult Transthoracic Echo Complete W/ Cont if Necessary Per Protocol [[id number]] Collected:  07/02/20 0653     Updated:  07/02/20 1409     BSA 1.9 m^2      RVIDd 2.1 cm      IVSd 1.1 cm      LVIDd 5.6 cm      LVIDs 4.6 cm      LVPWd 1.3 cm      IVS/LVPW 0.84     FS 17.6 %      EDV(Teich) 155.9 ml      ESV(Teich) 99.5 ml      EF(Teich) 36.2 %      EDV(cubed) 179.0 ml      ESV(cubed) 100.1 ml      EF(cubed) 44.1 %      LV  mass(C)d 276.4 grams      LV mass(C)dI 142.4 grams/m^2      SV(Teich) 56.4 ml      SI(Teich) 29.1 ml/m^2      SV(cubed) 78.9 ml      SI(cubed) 40.6 ml/m^2      Ao root diam 4.0 cm      Ao root area 12.8 cm^2      ACS 2.1 cm      asc Aorta Diam 3.7 cm      LVOT diam 2.5 cm      LVOT area 5.1 cm^2      RVOT diam 2.1 cm      RVOT area 3.6 cm^2      EDV(MOD-sp4) 68.5 ml      ESV(MOD-sp4) 50.1 ml      EF(MOD-sp4) 26.8 %      SV(MOD-sp4) 18.4 ml      SI(MOD-sp4) 9.5 ml/m^2      Ao root area (BSA corrected) 2.1     LV Dunlap Vol (BSA corrected) 35.3 ml/m^2      LV Sys Vol (BSA corrected) 25.8 ml/m^2      Aortic R-R 0.4 sec      Aortic .6 BPM      MV V2 max 90.3 cm/sec      MV max PG 3.3 mmHg      MV V2 mean 63.4 cm/sec      MV mean PG 1.8 mmHg      MV V2 VTI 9.6 cm      MVA(VTI) 6.7 cm^2      Ao pk sharmila 78.3 cm/sec      Ao max PG 2.5 mmHg      Ao max PG (full) -0.26 mmHg      Ao V2 mean 61.8 cm/sec      Ao mean PG 1.7 mmHg      Ao mean PG (full) 0.39 mmHg      Ao V2 VTI 11.3 cm      AISHWARYA(I,A) 5.7 cm^2      AISHWARYA(I,D) 5.7 cm^2      AISHWARYA(V,A) 5.3 cm^2      AISHWARYA(V,D) 5.3 cm^2      LV V1 max PG 2.7 mmHg      LV V1 mean PG 1.3 mmHg      LV V1 max 82.3 cm/sec      LV V1 mean 53.0 cm/sec      LV V1 VTI 12.7 cm      MR max sharmila 453.8 cm/sec      MR max PG 82.4 mmHg      CO(Ao) 22.0 l/min      CI(Ao) 11.3 l/min/m^2      SV(Ao) 144.9 ml      SI(Ao) 74.6 ml/m^2      CO(LVOT) 9.8 l/min      CI(LVOT) 5.0 l/min/m^2      SV(LVOT) 64.6 ml      SV(RVOT) 42.6 ml      SI(LVOT) 33.3 ml/m^2      PA V2 max 77.1 cm/sec      PA max PG 2.4 mmHg      PA max PG (full) 0.22 mmHg      PA V2 mean 50.6 cm/sec      PA mean PG 1.2 mmHg      PA mean PG (full) 0.15 mmHg      PA V2 VTI 9.1 cm      PVA(I,A) 4.7 cm^2      BH CV ECHO TARYN - PVA(I,D) 4.7 cm^2      BH CV ECHO TARYN - PVA(V,A) 3.4 cm^2      BH CV ECHO TARYN - PVA(V,D) 3.4 cm^2      PA acc time 0.06 sec      RV V1 max PG 2.2 mmHg      RV V1 mean PG 1.1 mmHg      RV V1 max 73.4 cm/sec      RV V1 mean  46.7 cm/sec      RV V1 VTI 11.9 cm      TR max sharmila 285.8 cm/sec      RVSP(TR) 35.7 mmHg      RAP systole 3.0 mmHg      PA pr(Accel) 49.9 mmHg      Qp/Qs 0.66      CV ECHO TARYN - BZI_BMI 18.7 kilograms/m^2       CV ECHO TARYN - BSA(HAYCOCK) 1.9 m^2       CV ECHO TARYN - BZI_METRIC_WEIGHT 68.0 kg       CV ECHO TARYN - BZI_METRIC_HEIGHT 190.5 cm      EF(MOD-bp) 27.0 %      LA dimension(2D) 4.6 cm     Narrative:       · Left ventricular systolic function is severely decreased.  · Left ventricular wall thickness is consistent with mild concentric   hypertrophy.  · Left atrial cavity size is moderately dilated.  · Mild-to-moderate mitral valve regurgitation is present  · Mild tricuspid valve regurgitation is present.  · Mild aortic valve regurgitation is present.       ECG 12 Lead [[id number]] Collected:  07/03/20 0945     Updated:  07/03/20 0947    Narrative:       HEART RATE= 106  bpm  RR Interval= 568  ms  CT Interval= 196  ms  P Horizontal Axis= 48  deg  P Front Axis= 0  deg  QRSD Interval= 95  ms  QT Interval= 298  ms  QRS Axis= -18  deg  T Wave Axis= 14  deg  - ABNORMAL ECG -  Sinus tachycardia  Multiple premature complexes, vent & supraven  When compared with ECG of 01-Jul-2020 23:40:10,  Significant rate decrease  Electronically Signed By:   Date and Time of Study: 2020-07-03 09:45:28    ECG 12 Lead [[id number]] Collected:  07/01/20 2340     Updated:  07/03/20 1140    Narrative:       HEART RATE= 163  bpm  RR Interval= 368  ms  CT Interval=   ms  P Horizontal Axis=   deg  P Front Axis=   deg  QRSD Interval= 78  ms  QT Interval= 283  ms  QRS Axis= 7  deg  T Wave Axis= 42  deg  - ABNORMAL ECG -  Atrial fibrillation with rapid V-rate  No previous ECG available for comparison  Electronically Signed By: Sunny Unger (MILENA) 03-Jul-2020 11:38:35  Date and Time of Study: 2020-07-01 23:40:10    ECG 12 Lead [[id number]] Collected:  07/03/20 2021     Updated:  07/03/20 2023    Narrative:       HEART RATE= 133  bpm  RR  Interval= 449  ms  W[place] Interval=   ms  P Horizontal Axis=   deg  P Front Axis=   deg  QRSD Interval= 92  ms  QT Interval= 296  ms  QRS Axis= -17  deg  T Wave Axis= -11  deg  - ABNORMAL ECG -  Atrial fibrillation  Ventricular premature complex  Probable lateral infarct, old  Electronically Signed By:   Date and Time of Study: 2020-07-03 20:21:30    ECG 12 Lead [[id number]] Collected:  07/04/20 0607     Updated:  07/04/20 0609    Narrative:       HEART RATE= 102  bpm  RR Interval= 589  ms  W[place] Interval=   ms  P Horizontal Axis=   deg  P Front Axis=   deg  QRSD Interval= 95  ms  QT Interval= 334  ms  QRS Axis= -21  deg  T Wave Axis= 3  deg  - ABNORMAL ECG -  Atrial fibrillation  Ventricular premature complex  Electronically Signed By:   Date and Time of Study: 2020-07-04 06:07:51    ECG 12 Lead [[id number]] Collected:  07/05/20 0709     Updated:  07/05/20 0710    Narrative:       HEART RATE= 113  bpm  RR Interval= 528  ms  W[place] Interval=   ms  P Horizontal Axis=   deg  P Front Axis=   deg  QRSD Interval= 89  ms  QT Interval= 321  ms  QRS Axis= -36  deg  T Wave Axis= 0  deg  - ABNORMAL ECG -  Atrial fibrillation  Ventricular premature complex  Left axis deviation  When compared with ECG of 04-Jul-2020 6:07:51,  No significant change  Electronically Signed By:   Date and Time of Study: 2020-07-05 07:09:38               Results for orders placed during the hospital encounter of 07/01/20   Adult Transthoracic Echo Complete W/ Cont if Necessary Per Protocol    Narrative · Left ventricular systolic function is severely decreased.  · Left ventricular wall thickness is consistent with mild concentric   hypertrophy.  · Left atrial cavity size is moderately dilated.  · Mild-to-moderate mitral valve regurgitation is present  · Mild tricuspid valve regurgitation is present.  · Mild aortic valve regurgitation is present.          Ct Abdomen Pelvis Without Contrast    Result Date: 7/2/2020   1.  Bilateral groundglass airspace disease with  interlobular septal thickening with upper lobe predominance.  Findings are nonspecific but can be seen with Covid 19 pneumonia.  Other bacterial and atypical pneumonias can give this appearance as well. 2.  Status post right middle lobectomy. 3.  Thickening along the major fissure on the right which could be due to a small amount of pleural fluid or pleural scarring related to treatment change. 4.  No acute process seen within the abdomen. 5.  Abdominal aortic aneurysm measuring 3.8 cm in size.  Electronically Signed ByGabriel Ferrari On:7/2/2020 2:29 PM This report was finalized on [id number] by  Lico Ferrari, .    Ct Chest Without Contrast    Result Date: 7/2/2020   1.  Bilateral groundglass airspace disease with interlobular septal thickening with upper lobe predominance.  Findings are nonspecific but can be seen with Covid 19 pneumonia.  Other bacterial and atypical pneumonias can give this appearance as well. 2.  Status post right middle lobectomy. 3.  Thickening along the major fissure on the right which could be due to a small amount of pleural fluid or pleural scarring related to treatment change. 4.  No acute process seen within the abdomen. 5.  Abdominal aortic aneurysm measuring 3.8 cm in size.  Electronically Signed ByGabriel Ferrari On:7/2/2020 2:29 PM This report was finalized on [id number] by  Lico Ferrari, .    Us Gallbladder    Result Date: 7/3/2020  Gallbladder sludge with no evidence of stones. No secondary findings of acute cholecystitis. Mildly increased echogenicity of liver parenchyma is present likely related to steatosis. A simple cyst is seen arising from the right kidney.  Electronically Signed ByFanny Myles On:7/3/2020 3:04 PM This report was finalized on [id number] by  Jessica Myles, Alexandra    Xr Chest 1 View    Result Date: 7/4/2020  Slight improvement compared to prior radiograph. Groundglass opacity septal thickening upper lobe predominant with slight improvement on the right.  Question atypical infection including Covid 19. Other bacterial or atypical pneumonia as can have this appearance.  Electronically Signed By-Sarah Beth Bolden MD On:7/4/2020 6:33 PM This report was finalized on [id number] by  Sarah Beth Bolden MD.    Xr Chest 1 View    Result Date: 7/2/2020    1.  Borderline heart size with pulmonary vascular congestion. 2.  Bilateral interstitial and alveolar airspace disease favored to be due to pulmonary edema or less likely atypical pneumonia.   Electronically Signed By-Lico Ferrari On:7/2/2020 7:26 AM This report was finalized on [id number] by  Lico Ferrari, .    Ct Sinus Without Contrast    Result Date: 7/2/2020   1. No evidence of acute/active sinusitis. 2. Minor left maxillary sinus mucosal thickening. Small mucous retention cysts or polyps within the left maxillary and right sphenoid sinus. 3. Bony nasal septal deviation toward the right.  Electronically Signed By-Dr. Gladys Lau MD On:7/2/2020 2:14 PM This report was finalized on [id number] by Dr. Gladys Lau MD.          Xrays, labs reviewed personally by physician.    Medication Review:   I have reviewed the patient's current medication list      Scheduled Meds    atorvastatin 20 mg Oral Daily   budesonide 0.5 mg Nebulization BID - RT   digoxin 125 mcg Intravenous Daily   enoxaparin 70 mg Subcutaneous Q12H   fludrocortisone 0.1 mg Oral Q PM   fludrocortisone 0.2 mg Oral Daily   furosemide 40 mg Oral Daily   insulin lispro 0-7 Units Subcutaneous 4x Daily With Meals & Nightly   ipratropium-albuterol 3 mL Nebulization 4x Daily - RT   methylPREDNISolone sodium succinate 40 mg Intravenous Q8H   metoclopramide 10 mg Intravenous Q6H   metoprolol tartrate 5 mg Intravenous Q6H   ondansetron 4 mg Intravenous Q6H   pantoprazole 40 mg Intravenous Q AM   piperacillin-tazobactam 3.375 g Intravenous Q8H   potassium chloride 20 mEq Oral Daily   sodium chloride 10 mL Intravenous Q12H   sucralfate 1 g Oral 4x Daily AC & at Bedtime    Thera 1 tablet Oral Daily   vitamin B-12 1,000 mcg Oral Daily       Meds Infusions    amiodarone 0.5 mg/min Last Rate: 0.5 mg/min (07/04/20 0910)   dilTIAZem 5-15 mg/hr Last Rate: 15 mg/hr (07/05/20 0921)   Pharmacy Consult - Pharmacy to dose     Pharmacy to Dose Zosyn         Meds PRN  •  acetaminophen **OR** acetaminophen **OR** acetaminophen  •  aluminum-magnesium hydroxide-simethicone  •  bisacodyl  •  dextrose  •  dextrose  •  glucagon (human recombinant)  •  HYDROcodone-acetaminophen  •  insulin lispro **AND** insulin lispro  •  magnesium hydroxide  •  magnesium sulfate **OR** magnesium sulfate **OR** magnesium sulfate  •  melatonin  •  Morphine  •  ondansetron  •  Pharmacy Consult - Pharmacy to dose  •  Pharmacy to Dose Zosyn  •  potassium chloride **OR** potassium chloride **OR** potassium chloride  •  potassium phosphate infusion greater than 15 mMoles **OR** potassium phosphate infusion greater than 15 mMoles **OR** potassium phosphate **OR** sodium phosphate IVPB **OR** sodium phosphate IVPB **OR** sodium phosphate IVPB  •  promethazine  •  [COMPLETED] Insert peripheral IV **AND** sodium chloride  •  sodium chloride    I personally reviewed patient's x-ray films and my findings are     I personally reviewed patient's EKG strips and my findings are       Assessment/Plan   Assessment/Plan     Active Hospital Problems    Diagnosis  POA   • **Atrial fibrillation (CMS/HCC) [I48.91]  Yes   • Congestive heart failure (CHF) (CMS/HCC) [I50.9]  Unknown   • Chronic pain [G89.29]  Yes   • Sepsis (CMS/HCC) [A41.9]  Yes   • Nausea vomiting and diarrhea [R11.2, R19.7]  Unknown   • CAD (coronary artery disease) [I25.10]  Yes   • Chronic anticoagulation [Z79.01]  Not Applicable   • Hyperlipidemia [E78.5]  Yes   • Presence of stent in right coronary artery [Z95.5]  Not Applicable      Resolved Hospital Problems   No resolved problems to display.       MEDICAL DECISION MAKING COMPLEXITY BY PROBLEM:     Atrial fibrillation  wRVR   -Cardizem drip 15 mg/hr, IV digoxin, IV metoprolol, amiodarone drip started  -Patient received several Cardizem boluses in the ED  -Patient with history, on Xarelto, at home.  Currently on Lovenox until stable.  -Replace electrolytes  - cards following     Possible sepsis, bilat pna, POA  Elevated lactic acid  Ct A/P shows possible cholecystitis,  RUQ shows sludge only and gi consulted( intract vomiting)  Iv protonix  -Patient ruled in for sepsis due to tachycardia, tachypnea, lactate 2.5  -continue Zosyn 3.375 g,  -Patient received 500 mL normal saline IV bolus in ED  -Blood cultures NEG  -COVID-19 ruled out neg twice  -Respiratory panel negative  -neg urine    Intractable nausea  Vomiting after admission  Mild elevated liver enzymes  CT abdomen pelvis shows possible gallstones.  Ultrasound right upper quadrant shows no stones but positive sludge without cholecystitis  Appreciate GI input   EGD7/5/20  On PPI    diarrhea  cdiff and gi panel neg    Chronic A. fib  On interim Lovenox until patient stable and switch to home Xarelto on discharge if renal function stable    New onset systolic congestive heart failure EF 27%, unknown chronicity  On lasix  On bb  acei when stable  Stress test per cardiology    ?orthiostatic hypotension  On fludrocort  Pt not aware of adrenal defeciency  On steroids in hospital  bp now increasing  cuut down on dose    Copd exacerb  pulm following     CAD whx of angioplasty/stent w dr Montelongo  -echo shows low ef 27 %  -BNP 3485  -Per review of outside records: Last angioplasty to the RCA and left circumflex 4/16/2004  -Currently on Florinef for hypotension  -Continue Florinef, continue statin  On steroids per pulm     Hyperlipidemia  -Continue statin     Chronic pain  -Due to multiple back surgeries  -Continue Norco, (INSPECT verified)        High risk  Complex case    VTE Prophylaxis -   Mechanical Order History:     None      Pharmalogical Order History:     Ordered     Dose Route  Frequency Stop    07/02/20 0414  rivaroxaban (XARELTO) tablet 20 mg  Status:  Discontinued      20 mg PO Daily With Dinner 07/02/20 0951    07/02/20 1221  rivaroxaban (XARELTO) tablet 10 mg  Status:  Discontinued     Note to Pharmacy:  If occult blood negative   Question:  Are you ordering rivaroxaban for the prevention of blood clots in an acutely ill medical patient?  Answer:  No    10 mg PO Daily With Dinner 07/02/20 1446    07/02/20 1446  rivaroxaban (XARELTO) tablet 20 mg  Status:  Discontinued     Note to Pharmacy:  ## do not start unless fecal occult blood test is negative ##   Question:  Are you ordering rivaroxaban for the prevention of blood clots in an acutely ill medical patient?  Answer:  No    20 mg PO Daily With Dinner 07/03/20 0937            Code Status -   Code Status and Medical Interventions:   Ordered at: 07/02/20 0406     Level Of Support Discussed With:    Patient     Code Status:    CPR     Medical Interventions (Level of Support Prior to Arrest):    Full       This patient has been examined wearing appropriate Personal Protective Equipment and discussed with hospital infection control department. 07/05/20        Discharge Planning          Destination      Coordination has not been started for this encounter.      Durable Medical Equipment      Coordination has not been started for this encounter.      Dialysis/Infusion      Coordination has not been started for this encounter.      Home Medical Care      Coordination has not been started for this encounter.      Therapy      Coordination has not been started for this encounter.      Community Resources      Coordination has not been started for this encounter.            Electronically signed by Temo Marti MD, 07/05/20, 10:10.  [facility] Hospitalist Team

## 2020-07-05 NOTE — PLAN OF CARE
"Plan of care:  EGD planned for 11am this morning patient is NPO  Stress test pending the cardiologist's review.    Goals:  Heart rate control - patient remains in afib  with a rate of  bpm laying in bed    Patient remains a fall risk due to deconditioning, and generalized weakness.    Patient reports chest pain in the epigastric area \"5/10\", morphine administered.    Rash to buttock      Problem: Patient Care Overview  Goal: Plan of Care Review  Outcome: Ongoing (interventions implemented as appropriate)  Flowsheets (Taken 7/5/2020 0256)  Plan of Care Reviewed With: patient  Goal: Individualization and Mutuality  Outcome: Ongoing (interventions implemented as appropriate)  Goal: Discharge Needs Assessment  Outcome: Ongoing (interventions implemented as appropriate)  Goal: Interprofessional Rounds/Family Conf  Outcome: Ongoing (interventions implemented as appropriate)     Problem: Fall Risk (Adult)  Goal: Identify Related Risk Factors and Signs and Symptoms  Outcome: Ongoing (interventions implemented as appropriate)  Goal: Absence of Fall  Outcome: Ongoing (interventions implemented as appropriate)     Problem: Pain, Chronic (Adult)  Goal: Identify Related Risk Factors and Signs and Symptoms  Outcome: Ongoing (interventions implemented as appropriate)  Goal: Acceptable Pain/Comfort Level and Functional Ability  Outcome: Ongoing (interventions implemented as appropriate)     Problem: Skin Injury Risk (Adult)  Goal: Identify Related Risk Factors and Signs and Symptoms  Outcome: Ongoing (interventions implemented as appropriate)  Goal: Skin Health and Integrity  Outcome: Ongoing (interventions implemented as appropriate)     Problem: Arrhythmia/Dysrhythmia (Symptomatic) (Adult)  Goal: Signs and Symptoms of Listed Potential Problems Will be Absent, Minimized or Managed (Arrhythmia/Dysrhythmia)  Outcome: Ongoing (interventions implemented as appropriate)    "

## 2020-07-05 NOTE — PROGRESS NOTES
KPA/PULM/CC PROGRESS NOTE       SUBJECTIVE       This is a 79-year-old male with a history of COPD, former smoker, atrial fibrillation on chronic anticoagulation who lives all by himself presented to the hospital for fatigue, tiredness and shortness of breath.  Patient has been feeling puny for the few days with poor appetite.  He has noticed extreme fatigue with malaise.  He denies any fever or chills.  He has noted increased shortness of breath however cough has been mild.  He did cough clear sputum and only one time he had a maroonish sputum.  He denies any wheezing or chest pain or pleurisy.  Denies any sick contacts.  He states that he is visited by a home health nurse only.  He denies any other contacts.     CT scan of the chest was done that showed bilateral groundglass airspace disease with interlobular septal thickening upper lobe predominant.  Patient denies any active tobacco use or vaping.     He has a history of chronic sinus issues.  He is not on any maintenance inhalers.   7/3:  No new fevers  SOA stable  O2 requirement worsened over night and now on 5L  Remains fully awake and responsive  On oral diet  7/4: afebrile. soa at baseline. On 5 L. No n.v/d. Feels weak and fatigued.     OBJECTIVE    Vitals:    07/05/20 1145 07/05/20 1300 07/05/20 1309 07/05/20 1400   BP: 119/79  148/91 147/79   BP Location:    Left arm   Patient Position:    Lying   Pulse: 109 112  118   Resp:    21   Temp:    98.3 °F (36.8 °C)   TempSrc:    Axillary   SpO2: 97% 94%  92%   Weight:       Height:          Intake/Output last 3 shifts:  I/O last 3 completed shifts:  In: 689 [P.O.:50; I.V.:639]  Out: 1890 [Urine:1890]  Intake/Output this shift:  I/O this shift:  In: 50 [I.V.:50]  Out: 1000 [Urine:1000]    General Appearance:  Alert, cooperative, no distress, appears stated age  Head:  Normocephalic, without obvious abnormality, atraumatic  Eyes:  PERRL, conjunctivae/corneas clear, EOM's intact     Neck:  Supple,  no adenopathy;       Lungs:   Clear to auscultation bilaterally, respirations unlabored  Chest wall:  No tenderness  Heart:  Regular rate and rhythm, S1 and S2 normal, no murmur, rub   or gallop  Abdomen:  Soft, non-tender, bowel sounds active all four quadrants,  no masses, no hepatomegaly, no splenomegaly  Extremities:  Extremities normal, no cyanosis or edema  Pulses: 2+ and symmetric all extremities  Skin:  No rashes or lesions  Neurologic:   Alert and oriented, no focal deficits    Scheduled Meds:    atorvastatin 20 mg Oral Daily   budesonide 0.5 mg Nebulization BID - RT   digoxin 125 mcg Intravenous Daily   enoxaparin 70 mg Subcutaneous Q12H   fludrocortisone 0.1 mg Oral Q PM   furosemide 40 mg Oral Daily   insulin lispro 0-7 Units Subcutaneous 4x Daily With Meals & Nightly   ipratropium-albuterol 3 mL Nebulization 4x Daily - RT   [START ON 7/6/2020] methylPREDNISolone sodium succinate 40 mg Intravenous Q24H   metoclopramide 10 mg Intravenous Q6H   metoprolol tartrate 5 mg Intravenous Q6H   ondansetron 4 mg Intravenous Q6H   pantoprazole 40 mg Intravenous Q AM   [MAR Hold] piperacillin-tazobactam 3.375 g Intravenous Q8H   potassium chloride 20 mEq Oral Daily   sodium chloride 10 mL Intravenous Q12H   sucralfate 1 g Oral 4x Daily AC & at Bedtime   Thera 1 tablet Oral Daily   vitamin B-12 1,000 mcg Oral Daily       Continuous Infusions:    amiodarone 0.5 mg/min Last Rate: 0.5 mg/min (07/05/20 1058)   dilTIAZem 5-15 mg/hr Last Rate: 15 mg/hr (07/05/20 0921)   Pharmacy Consult - Pharmacy to dose     Pharmacy to Dose Zosyn         PRN Meds:•  acetaminophen **OR** acetaminophen **OR** acetaminophen  •  aluminum-magnesium hydroxide-simethicone  •  bisacodyl  •  dextrose  •  dextrose  •  glucagon (human recombinant)  •  HYDROcodone-acetaminophen  •  insulin lispro **AND** insulin lispro  •  magnesium hydroxide  •  magnesium sulfate **OR** magnesium sulfate **OR** magnesium sulfate  •  melatonin  •  Morphine  •  ondansetron  •   Pharmacy Consult - Pharmacy to dose  •  Pharmacy to Dose Zosyn  •  potassium chloride **OR** potassium chloride **OR** potassium chloride  •  potassium phosphate infusion greater than 15 mMoles **OR** potassium phosphate infusion greater than 15 mMoles **OR** potassium phosphate **OR** sodium phosphate IVPB **OR** sodium phosphate IVPB **OR** sodium phosphate IVPB  •  promethazine  •  [COMPLETED] Insert peripheral IV **AND** sodium chloride  •  sodium chloride     LABS    CBC  Results from last 7 days   Lab Units 07/05/20 0215 07/04/20  0334 07/03/20  0940 07/02/20  0457 07/01/20  2348   WBC 10*3/mm3 14.50* 18.00* 14.10* 7.50 9.60   RBC 10*6/mm3 4.51 4.26 4.23 3.82* 3.87*   HEMOGLOBIN g/dL 13.5 12.9* 12.7* 11.6* 11.7*   HEMATOCRIT % 41.1 41.2 39.4 34.3* 35.5*   MCV fL 91.0 96.7 93.1 89.9 91.7   PLATELETS 10*3/mm3 286 280 273 227 253       CMP   Results from last 7 days   Lab Units 07/05/20 0215 07/04/20 0334 07/03/20 0940 07/02/20 0457 07/01/20  2348   SODIUM mmol/L 142 144 145 146* 145   POTASSIUM mmol/L 3.1* 2.8* 2.6* 3.5 4.1   CHLORIDE mmol/L 95* 101 103 104 104   CO2 mmol/L 32.0* 26.0 22.0 23.0 22.0   BUN  15 12 17 22 23   CREATININE mg/dL 0.64* 0.59* 0.75* 0.80 0.69*   GLUCOSE mg/dL 162* 149* 246* 172* 119*   ALBUMIN g/dL 3.80 3.90 3.80  --  3.50   BILIRUBIN mg/dL 1.5* 1.2 1.2  --  1.5*   ALK PHOS U/L 60 55 60  --  54   AST (SGOT) U/L 37 45* 37  --  31   ALT (SGPT) U/L 46* 43* 49*  --  15   LIPASE U/L  --   --  30  --  13       TROPONIN  Results from last 7 days   Lab Units 07/02/20  1027   TROPONIN T ng/mL <0.010       CoAg        ABG  Results from last 7 days   Lab Units 07/02/20  1049   PH, ARTERIAL pH units 7.531*   PCO2, ARTERIAL mm Hg 31.3*   PO2 ART mm Hg 79.4*   O2 SATURATION ART % 97.1   BASE EXCESS ART mmol/L 3.9*       Microbiology  Microbiology Results (last 10 days)     Procedure Component Value - Date/Time    Gastrointestinal Panel, PCR - Stool, Per Rectum [[id number]]  (Normal) Collected:   07/03/20 0856    Lab Status:  Final result Specimen:  Stool from Per Rectum Updated:  07/03/20 1535     Campylobacter Not Detected     Plesiomonas shigelloides Not Detected     Salmonella Not Detected     Vibrio Not Detected     Vibrio cholerae Not Detected     Yersinia enterocolitica Not Detected     Enteroaggregative E. coli (EAEC) Not Detected     Enteropathogenic E. coli (EPEC) Not Detected     Enterotoxigenic E. coli (ETEC) lt/st Not Detected     Shiga-like toxin-producing E. coli (STEC) stx1/stx2 Not Detected     E. coli O157 Not Detected     Shigella/Enteroinvasive E. coli (EIEC) Not Detected     Cryptosporidium Not Detected     Cyclospora cayetanensis Not Detected     Entamoeba histolytica Not Detected     Giardia lamblia Not Detected     Adenovirus F40/41 Not Detected     Astrovirus Not Detected     Norovirus GI/GII Not Detected     Rotavirus A Not Detected     Sapovirus (I, II, IV or V) Not Detected    Narrative:       If Aeromonas, Staphylococcus aureus or Bacillus cereus are suspected, please order [id number]: Stool Culture, Aeromonas, S aureus, B Cereus.    Clostridium Difficile EIA - Stool, Per Rectum [[id number]]  (Normal) Collected:  07/03/20 0856    Lab Status:  Final result Specimen:  Stool from Per Rectum Updated:  07/04/20 0715     C Diff GDH / Toxin Negative    COVID-19,CEPHEID,COR/MILENA/PAD IN-HOUSE(OR EMERGENT/ADD-ON),NP SWAB IN TRANSPORT MEDIA 3-4 HR TAT - Swab, Nasopharynx [[id number]]  (Normal) Collected:  07/02/20 1659    Lab Status:  Final result Specimen:  Swab from Nasopharynx Updated:  07/02/20 2003     COVID19 Not Detected    Narrative:       Fact sheet for providers: https://www.fda.gov/media/107453/download     Fact sheet for patients: https://www.fda.gov/media/166136/download    Blood Culture - Blood, Arm, Left [[id number]] Collected:  07/02/20 0249    Lab Status:  Preliminary result Specimen:  Blood from Arm, Left Updated:  07/05/20 0300     Blood Culture No growth at 3 days     Respiratory Panel, PCR - Swab, Nasopharynx [[id number]]  (Normal) Collected:  07/02/20 0027    Lab Status:  Final result Specimen:  Swab from Nasopharynx Updated:  07/02/20 0153     ADENOVIRUS, PCR Not Detected     Coronavirus 229E Not Detected     Coronavirus HKU1 Not Detected     Coronavirus NL63 Not Detected     Coronavirus OC43 Not Detected     Human Metapneumovirus Not Detected     Human Rhinovirus/Enterovirus Not Detected     Influenza B PCR Not Detected     Parainfluenza Virus 1 Not Detected     Parainfluenza Virus 2 Not Detected     Parainfluenza Virus 3 Not Detected     Parainfluenza Virus 4 Not Detected     Bordetella pertussis pcr Not Detected     Influenza A H1 2009 PCR Not Detected     Chlamydophila pneumoniae PCR Not Detected     Mycoplasma pneumo by PCR Not Detected     Influenza A PCR Not Detected     Influenza A H3 Not Detected     Influenza A H1 Not Detected     RSV, PCR Not Detected    Narrative:       The coronavirus on the RVP is NOT COVID-19 and is NOT indicative of infection with COVID-19.     COVID-19 [employer] IN-HOUSE, Nasal Swab No Transport Media - Swab, Nasal Cavity [[id number]]  (Normal) Collected:  07/02/20 0027    Lab Status:  Final result Specimen:  Swab from Nasal Cavity Updated:  07/02/20 0104     COVID19 Not Detected    Narrative:       Fact sheet for providers: https://www.fda.gov/media/427279/download     Fact sheet for patients: https://www.fda.gov/media/109360/download    Blood Culture - Blood, Arm, Right [[id number]] Collected:  07/01/20 2348    Lab Status:  Preliminary result Specimen:  Blood from Arm, Right Updated:  07/05/20 0000     Blood Culture No growth at 3 days          IMAGING & OTHER STUDIES    Imaging Results (Last 72 Hours)     Procedure Component Value Units Date/Time    XR Chest 1 View [[id number]] Collected:  07/04/20 1831     Updated:  07/04/20 1835    Narrative:       DATE OF EXAM:  7/4/2020 6:05 PM     PROCEDURE:  XR CHEST 1 VW-     INDICATIONS:  worsening  hypoxemia; I48.91-Unspecified atrial fibrillation; I50.9-Heart  failure, unspecified; J18.9-Pneumonia, unspecified organism     COMPARISON:  07/01/2020 CT chest 07/02/2020     TECHNIQUE:   Single radiographic view of the chest was obtained.     FINDINGS:  Multifocal groundglass opacities again seen. Slight improvement in the  right mid lung. Mild cardiomegaly. Underlying septal thickening is  slightly improved. No pneumothorax. No pleural effusion. Elevated right  diaphragm.       Impression:       Slight improvement compared to prior radiograph. Groundglass opacity  septal thickening upper lobe predominant with slight improvement on the  right. Question atypical infection including Covid 19. Other bacterial  or atypical pneumonia as can have this appearance.     Electronically Signed By-Sarah Beth Bolden MD On:7/4/2020 6:33 PM  This report was finalized on [id number] by  Sarah Beth Bolden MD.    US Gallbladder [[id number]] Collected:  07/03/20 1503     Updated:  07/03/20 1506    Narrative:       US GALLBLADDER-     Date of Exam: 7/3/2020 2:31 PM     Indication: intractable nausea and vomiting; I48.91-Unspecified atrial  fibrillation; I50.9-Heart failure, unspecified; J18.9-Pneumonia,  unspecified organism.   Abdominal pain     Comparison: CT 07/02/2020     Technique: Transverse and sagittal ultrasound images of the right upper  quadrant were obtained. Doppler evaluation was also conducted.     FINDINGS:  Visualized portions of the head and body of the pancreas are normal.  Evaluation is limited due to surrounding bowel gas.     The liver appears echogenic.  No focal hepatic lesions.  Portal and  hepatic veins are patent and have normal flow direction and waveforms.      Gallbladder is normal in caliber with no evidence of stones, wall  thickening or pericholecystic fluid. A small amount of sludge is present  within the gallbladder lumen. Negative sonographic Gomes's sign.     The biliary system is nondilated.      The  right kidney is normal in size with no evidence of hydronephrosis.  No suspicious focal lesions. Simple cyst is seen arising from the  superior pole measuring up to 7.6 cm.       Impression:       Gallbladder sludge with no evidence of stones. No secondary findings of  acute cholecystitis. Mildly increased echogenicity of liver parenchyma  is present likely related to steatosis. A simple cyst is seen arising  from the right kidney.     Electronically Signed By-Jessica Myles On:7/3/2020 3:04 PM  This report was finalized on [id number] by  Jessica Myles, .        Results for orders placed during the hospital encounter of 07/01/20   Adult Transthoracic Echo Complete W/ Cont if Necessary Per Protocol    Narrative · Left ventricular systolic function is severely decreased.  · Left ventricular wall thickness is consistent with mild concentric   hypertrophy.  · Left atrial cavity size is moderately dilated.  · Mild-to-moderate mitral valve regurgitation is present  · Mild tricuspid valve regurgitation is present.  · Mild aortic valve regurgitation is present.             ASSESSMENT/PLAN:     Atrial fibrillation (CMS/HCC)    CAD (coronary artery disease)    Chronic anticoagulation    Hyperlipidemia    Presence of stent in right coronary artery    Chronic pain    Sepsis (CMS/HCC)    Congestive heart failure (CHF) (CMS/HCC)    Nausea vomiting and diarrhea        1.  Bilateral PNA (viral vs bacterial) other differentials include Pulmonary edema and possible hemorrhage  2. COPD with exacerbation  3. H/o RML lobectomy  4. CAD  5. Acute hypoxia due to above  6. Afib with RVR  7. Chronic Hypotension  8. Chronic Anticoagulation with Xarelto  9. Lactic acidosis  10. Hypokalemia     PLAN    See orders. The plan was discussed with the patient and/or family.      1.  Reviewed CT scan of the chest and discussed with the patient in detail.  Covid test is negative X2.  Afebrile.  Will continue IV antibiotics with Zosyn. Set to fall off at  5 days.     2.  sounds much better overall. Change to oral pred taper for tomorrow. Continue scheduled DuoNeb along with Pulmicort nebs.     3.  Patient has chronic hypotension and is on Florinef and hydrocortisone     4.  Currently on Cardizem, amiodarone, dig  per cardiology.  Patient anticoagulated with therapeutic Lovenox (on xarelto at home). A fib still not controlled and likely  of continued soa.      5. O2 requirement improving.     cxr better from yesterday.     Will follow    THIS DOCUMENT HAS BEEN ELECTRONICALLY SIGNED BY  Lynsey Zaldivar MD  8:12 AM

## 2020-07-05 NOTE — ANESTHESIA POSTPROCEDURE EVALUATION
Patient: Shukri Park    Procedure Summary     Date:  07/05/20 Room / Location:  [facility] ENDOSCOPY 1 / [facility] ENDOSCOPY    Anesthesia Start:  1119 Anesthesia Stop:  1130    Procedure:  ESOPHAGOGASTRODUODENOSCOPY (N/A ) Diagnosis:       Nausea vomiting and diarrhea      (Nausea vomiting and diarrhea [R11.2, R19.7])    Surgeon:  Neal Correa MD Provider:  Cherelle Peng MD    Anesthesia Type:  MAC ASA Status:  3          Anesthesia Type: MAC    Vitals  Vitals Value Taken Time   /81 7/5/2020  3:01 PM   Temp 98.3 °F (36.8 °C) 7/5/2020  2:00 PM   Pulse 93 7/5/2020  3:21 PM   Resp 21 7/5/2020  2:00 PM   SpO2 94 % 7/5/2020  3:21 PM   Vitals shown include unvalidated device data.        Post Anesthesia Care and Evaluation    Patient location during evaluation: PACU  Patient participation: complete - patient participated  Level of consciousness: awake  Pain scale: See nurse's notes for pain score.  Pain management: adequate  Airway patency: patent  Anesthetic complications: No anesthetic complications  PONV Status: none  Cardiovascular status: acceptable  Respiratory status: acceptable  Hydration status: acceptable    Comments: Patient seen and examined postoperatively; vital signs stable; SpO2 greater than or equal to 90%; cardiopulmonary status stable; nausea/vomiting adequately controlled; pain adequately controlled; no apparent anesthesia complications; patient discharged from anesthesia care when discharge criteria were met

## 2020-07-05 NOTE — PROGRESS NOTES
Referring Provider: Hospitalist    Reason for follow-up: Atrial fibrillation     Patient Care Team:  Nikki Gonzales MD as PCP - General (Family Medicine)    Subjective .  Complaints of weakness and fatigue    Objective  Lying in bed comfortably     Review of Systems   Constitution: Positive for malaise/fatigue. Negative for fever.   HENT: Negative for ear pain and nosebleeds.    Eyes: Negative for blurred vision and double vision.   Cardiovascular: Negative for chest pain, dyspnea on exertion and palpitations.   Respiratory: Negative for cough and shortness of breath.    Skin: Negative for rash.   Musculoskeletal: Negative for joint pain.   Gastrointestinal: Negative for abdominal pain, nausea and vomiting.   Neurological: Negative for focal weakness and headaches.   Psychiatric/Behavioral: Negative for depression. The patient is not nervous/anxious.    All other systems reviewed and are negative.      Ciprofloxacin; Amlodipine; and Rocephin [ceftriaxone]    Scheduled Meds:    atorvastatin 20 mg Oral Daily   budesonide 0.5 mg Nebulization BID - RT   digoxin 125 mcg Intravenous Daily   enoxaparin 70 mg Subcutaneous Q12H   fludrocortisone 0.1 mg Oral Q PM   furosemide 40 mg Oral Daily   insulin lispro 0-7 Units Subcutaneous 4x Daily With Meals & Nightly   ipratropium-albuterol 3 mL Nebulization 4x Daily - RT   [START ON 7/6/2020] methylPREDNISolone sodium succinate 40 mg Intravenous Q24H   metoclopramide 10 mg Intravenous Q6H   metoprolol tartrate 5 mg Intravenous Q6H   ondansetron 4 mg Intravenous Q6H   pantoprazole 40 mg Intravenous Q AM   [MAR Hold] piperacillin-tazobactam 3.375 g Intravenous Q8H   potassium chloride 20 mEq Oral Daily   sodium chloride 10 mL Intravenous Q12H   sucralfate 1 g Oral 4x Daily AC & at Bedtime   Thera 1 tablet Oral Daily   vitamin B-12 1,000 mcg Oral Daily     Continuous Infusions:    amiodarone 0.5 mg/min Last Rate: 0.5 mg/min (07/05/20 6021)   dilTIAZem 5-15 mg/hr Last Rate: 15  "mg/hr (07/05/20 0921)   Pharmacy Consult - Pharmacy to dose     Pharmacy to Dose Zosyn       PRN Meds:.•  acetaminophen **OR** acetaminophen **OR** acetaminophen  •  aluminum-magnesium hydroxide-simethicone  •  bisacodyl  •  dextrose  •  dextrose  •  glucagon (human recombinant)  •  HYDROcodone-acetaminophen  •  insulin lispro **AND** insulin lispro  •  magnesium hydroxide  •  magnesium sulfate **OR** magnesium sulfate **OR** magnesium sulfate  •  melatonin  •  Morphine  •  ondansetron  •  Pharmacy Consult - Pharmacy to dose  •  Pharmacy to Dose Zosyn  •  potassium chloride **OR** potassium chloride **OR** potassium chloride  •  potassium phosphate infusion greater than 15 mMoles **OR** potassium phosphate infusion greater than 15 mMoles **OR** potassium phosphate **OR** sodium phosphate IVPB **OR** sodium phosphate IVPB **OR** sodium phosphate IVPB  •  promethazine  •  [COMPLETED] Insert peripheral IV **AND** sodium chloride  •  sodium chloride        VITAL SIGNS  Vitals:    07/05/20 1000 07/05/20 1130 07/05/20 1140 07/05/20 1145   BP: 140/86 99/63 116/81 119/79   BP Location: Left arm      Patient Position: Lying      Pulse: 107 108 107 109   Resp: 26      Temp: 97.1 °F (36.2 °C)      TempSrc: Oral      SpO2: 94% 99% 99% 97%   Weight:       Height:           Flowsheet Rows      First Filed Value   Admission Height  185.4 cm (73\") Documented at 07/01/2020 2336   Admission Weight  68 kg (150 lb) Documented at 07/01/2020 2336           TELEMETRY: Atrial fibrillation with controlled ventricular response    Physical Exam:  Physical Exam   Constitutional: He appears well-developed and well-nourished.   HENT:   Head: Normocephalic and atraumatic.   Eyes: Pupils are equal, round, and reactive to light. Conjunctivae and EOM are normal. No scleral icterus.   Neck: Normal range of motion. Neck supple. No JVD present. Carotid bruit is not present.   Cardiovascular: Normal rate, regular rhythm, S1 normal, S2 normal and intact " distal pulses. PMI is not displaced.   Murmur heard.  Pulmonary/Chest: Effort normal and breath sounds normal. He has no wheezes. He has no rales.   Abdominal: Soft. Bowel sounds are normal.   Musculoskeletal: Normal range of motion.   Neurological: He is alert. He has normal strength.   No focal deficits   Skin: Skin is warm and dry. No rash noted.   Psychiatric: He has a normal mood and affect.        Results Review:   I reviewed the patient's new clinical results.  Lab Results (last 24 hours)     Procedure Component Value Units Date/Time    POC Glucose Once [[id number]]  (Abnormal) Collected:  07/05/20 0839    Specimen:  Blood Updated:  07/05/20 0840     Glucose 145 mg/dL      Comment: Serial Number: [id number]:  [id number]       BUN [[id number]]  (Normal) Collected:  07/05/20 0215    Specimen:  Blood Updated:  07/05/20 0831     BUN 15 mg/dL     Comprehensive Metabolic Panel [[id number]]  (Abnormal) Collected:  07/05/20 0215    Specimen:  Blood Updated:  07/05/20 0320     Glucose 162 mg/dL      BUN --     Comment: Testing performed by alternate method        Creatinine 0.64 mg/dL      Sodium 142 mmol/L      Potassium 3.1 mmol/L      Comment: Specimen hemolyzed.  Results may be affected.        Chloride 95 mmol/L      CO2 32.0 mmol/L      Calcium 9.2 mg/dL      Total Protein 6.7 g/dL      Albumin 3.80 g/dL      ALT (SGPT) 46 U/L      AST (SGOT) 37 U/L      Alkaline Phosphatase 60 U/L      Total Bilirubin 1.5 mg/dL      eGFR Non African Amer 120 mL/min/1.73      Globulin 2.9 gm/dL      A/G Ratio 1.3 g/dL      BUN/Creatinine Ratio --     Comment: Testing not performed        Anion Gap 15.0 mmol/L     Narrative:       GFR Normal >60  Chronic Kidney Disease <60  Kidney Failure <15      Magnesium [[id number]]  (Normal) Collected:  07/05/20 0215    Specimen:  Blood Updated:  07/05/20 0320     Magnesium 1.9 mg/dL     Blood Culture - Blood, Arm, Left [[id number]] Collected:  07/02/20 0249    Specimen:  Blood from Arm,  Left Updated:  07/05/20 0300     Blood Culture No growth at 3 days    CBC & Differential [[id number]] Collected:  07/05/20 0215    Specimen:  Blood Updated:  07/05/20 0259    Narrative:       The following orders were created for panel order CBC & Differential.  Procedure                               Abnormality         Status                     ---------                               -----------         ------                     CBC Auto Differential[[id number]]        Abnormal            Final result                 Please view results for these tests on the individual orders.    CBC Auto Differential [[id number]]  (Abnormal) Collected:  07/05/20 0215    Specimen:  Blood Updated:  07/05/20 0259     WBC 14.50 10*3/mm3      RBC 4.51 10*6/mm3      Hemoglobin 13.5 g/dL      Hematocrit 41.1 %      MCV 91.0 fL      MCH 29.9 pg      MCHC 32.8 g/dL      RDW 14.7 %      RDW-SD 46.4 fl      MPV 8.1 fL      Platelets 286 10*3/mm3      Neutrophil % 89.7 %      Lymphocyte % 4.4 %      Monocyte % 5.8 %      Eosinophil % 0.0 %      Basophil % 0.1 %      Neutrophils, Absolute 13.00 10*3/mm3      Lymphocytes, Absolute 0.60 10*3/mm3      Monocytes, Absolute 0.80 10*3/mm3      Eosinophils, Absolute 0.00 10*3/mm3      Basophils, Absolute 0.00 10*3/mm3      nRBC 0.1 /100 WBC     Blood Culture - Blood, Arm, Right [[id number]] Collected:  07/01/20 2348    Specimen:  Blood from Arm, Right Updated:  07/05/20 0000     Blood Culture No growth at 3 days    POC Glucose Once [[id number]]  (Abnormal) Collected:  07/04/20 2012    Specimen:  Blood Updated:  07/04/20 2014     Glucose 168 mg/dL      Comment: Serial Number: [id number]:  [id number]       POC Glucose Once [[id number]]  (Abnormal) Collected:  07/04/20 1628    Specimen:  Blood Updated:  07/04/20 1631     Glucose 148 mg/dL      Comment: Serial Number: [id number]:  [id number]             Imaging Results (Last 24 Hours)     Procedure Component Value Units Date/Time    XR Chest 1  View [[id number]] Collected:  07/04/20 1831     Updated:  07/04/20 1835    Narrative:       DATE OF EXAM:  7/4/2020 6:05 PM     PROCEDURE:  XR CHEST 1 VW-     INDICATIONS:  worsening hypoxemia; I48.91-Unspecified atrial fibrillation; I50.9-Heart  failure, unspecified; J18.9-Pneumonia, unspecified organism     COMPARISON:  07/01/2020 CT chest 07/02/2020     TECHNIQUE:   Single radiographic view of the chest was obtained.     FINDINGS:  Multifocal groundglass opacities again seen. Slight improvement in the  right mid lung. Mild cardiomegaly. Underlying septal thickening is  slightly improved. No pneumothorax. No pleural effusion. Elevated right  diaphragm.       Impression:       Slight improvement compared to prior radiograph. Groundglass opacity  septal thickening upper lobe predominant with slight improvement on the  right. Question atypical infection including Covid 19. Other bacterial  or atypical pneumonia as can have this appearance.     Electronically Signed By-Sarah Beth Bolden MD On:7/4/2020 6:33 PM  This report was finalized on [id number] by  Sarah Beth Bolden MD.          EKG      I personally viewed and interpreted the patient's EKG/Telemetry data:    ECHOCARDIOGRAM:    STRESS MYOVIEW:    CARDIAC CATHETERIZATION:    OTHER:         Assessment/Plan     Principal Problem:    Atrial fibrillation (CMS/HCC)  Active Problems:    CAD (coronary artery disease)    Chronic anticoagulation    Hyperlipidemia    Presence of stent in right coronary artery    Chronic pain    Sepsis (CMS/HCC)    Congestive heart failure (CHF) (CMS/HCC)    Nausea vomiting and diarrhea  Hypokalemia    Patient presented with atrial fibrillation with rapid medical response and is currently on IV Cardizem  Patient's heart rates are better and hence will switch to oral Cardizem  Patient is already on Xarelto  Patient is ruled out for MI by EKG enzymes  Patient also had signs of sepsis and hence is getting the work-up done and is currently on  antibiotics  He is ruled out for COVID  Patient is having an echocardiogram for LV function  Patient also has a stress test ordered but currently has A. fib and hence is on IV amiodarone and Cardizem.  We will switch amiodarone to oral  Patient also has diarrhea and abdominal discomfort is having work-up done for C. Difficile.  He does not have C. Difficile  Patient is seen by gastroenterologist and is having EGD performed  Patient has history of coronary status post and placement to the right coronary artery  Patient has hyperlipidemia and is on oral medicines.  Patient's potassium is low and hence being replaced    I discussed the patients findings and my recommendations with patient and nurse    Sedrick Salcedo MD  07/05/20  12:22

## 2020-07-05 NOTE — OP NOTE
ESOPHAGOGASTRODUODENOSCOPY Procedure Report    Patient Name:  Shukri Park  YOB: 1940    Date of Surgery:  7/5/2020     Pre-Op Diagnosis:  Nausea vomiting and diarrhea [R11.2, R19.7]       Post-Op Diagnosis Codes:  Moderate gastritis which was biopsied      Procedure/CPT® Codes:      Procedure(s):  ESOPHAGOGASTRODUODENOSCOPY    Staff:  Surgeon(s):  Neal Correa MD      Anesthesia: General    Description of Procedure:  A description of the procedure as well as risks, benefits and alternative methods were explained to the patient who voiced understanding and signed the corresponding consent form. A physical exam was performed and vital signs were monitored throughout the procedure.    An upper GI endoscope was placed into the mouth and proceeded through the esophagus, stomach and second portion of the duodenum without difficulty. The scope was then retroflexed and the fundus was visualized. The procedure was not difficult and there were no immediate complications.    Estimated blood loss none    Biopsies see below    Findings:  Moderate gastritis which was biopsied    Impression:  Nausea and vomiting which may be medication induced versus gastritis versus gallbladder sludge    Recommendations:  Follow-up biopsy results  Continue PPI, Carafate and Reglan as well as antiemetics  Consider surgical evaluation for lap darby if symptoms persist  We will see as needed in the hospital      Neal Correa MD     Date: 7/5/2020    Time: 11:24

## 2020-07-05 NOTE — ANESTHESIA PREPROCEDURE EVALUATION
" Anesthesia Evaluation     Nursing notes reviewed                Airway   Dental      Pulmonary    Cardiovascular     ECG reviewed  PT is on anticoagulation therapy    (+) CAD, dysrhythmias Atrial Fib, CHF , hyperlipidemia,     ROS comment: On xarelto; last dose uncertain    Neuro/Psych  GI/Hepatic/Renal/Endo      Musculoskeletal     Abdominal    Substance History       Comment: \" right lobe cancer - surgically removed\"   OB/GYN          Other      history of cancer remission                    Anesthesia Plan    ASA 3     MAC   (Pt to have a stress test before EGD, per cardiologist)    Anesthetic plan, all risks, benefits, and alternatives have been provided, discussed and informed consent has been obtained with: patient.      "

## 2020-07-05 NOTE — PLAN OF CARE
Spoke with RN, reports pt going for EGD today.  Still with tachycardia at rest, on multiple cardiac meds.  Not appropriate for PT eval today, will f/u 7/6. PT did not enter room.

## 2020-07-05 NOTE — PLAN OF CARE
Problem: Patient Care Overview  Goal: Plan of Care Review  Outcome: Ongoing (interventions implemented as appropriate)  Goal: Individualization and Mutuality  Outcome: Ongoing (interventions implemented as appropriate)  Goal: Discharge Needs Assessment  Outcome: Ongoing (interventions implemented as appropriate)  Goal: Interprofessional Rounds/Family Conf  Outcome: Ongoing (interventions implemented as appropriate)     Problem: Fall Risk (Adult)  Goal: Identify Related Risk Factors and Signs and Symptoms  Outcome: Ongoing (interventions implemented as appropriate)  Goal: Absence of Fall  Outcome: Ongoing (interventions implemented as appropriate)     Problem: Pain, Chronic (Adult)  Goal: Identify Related Risk Factors and Signs and Symptoms  Outcome: Ongoing (interventions implemented as appropriate)  Goal: Acceptable Pain/Comfort Level and Functional Ability  Outcome: Ongoing (interventions implemented as appropriate)     Problem: Skin Injury Risk (Adult)  Goal: Identify Related Risk Factors and Signs and Symptoms  Outcome: Ongoing (interventions implemented as appropriate)  Goal: Skin Health and Integrity  Outcome: Ongoing (interventions implemented as appropriate)     Problem: Arrhythmia/Dysrhythmia (Symptomatic) (Adult)  Goal: Signs and Symptoms of Listed Potential Problems Will be Absent, Minimized or Managed (Arrhythmia/Dysrhythmia)  Outcome: Ongoing (interventions implemented as appropriate)

## 2020-07-06 LAB
ALBUMIN SERPL-MCNC: 3.8 G/DL (ref 3.5–5.2)
ALBUMIN/GLOB SERPL: 1.4 G/DL
ALP SERPL-CCNC: 59 U/L (ref 39–117)
ALT SERPL W P-5'-P-CCNC: 59 U/L (ref 1–41)
ANION GAP SERPL CALCULATED.3IONS-SCNC: 12 MMOL/L (ref 5–15)
AST SERPL-CCNC: 41 U/L (ref 1–40)
BASOPHILS # BLD AUTO: 0 10*3/MM3 (ref 0–0.2)
BASOPHILS NFR BLD AUTO: 0.2 % (ref 0–1.5)
BILIRUB SERPL-MCNC: 1.5 MG/DL (ref 0.2–1.2)
BUN SERPL-MCNC: 19 MG/DL (ref 8–23)
BUN SERPL-MCNC: ABNORMAL MG/DL
BUN/CREAT SERPL: ABNORMAL
CALCIUM SPEC-SCNC: 8.9 MG/DL (ref 8.6–10.5)
CHLORIDE SERPL-SCNC: 98 MMOL/L (ref 98–107)
CO2 SERPL-SCNC: 33 MMOL/L (ref 22–29)
CREAT SERPL-MCNC: 0.65 MG/DL (ref 0.76–1.27)
DEPRECATED RDW RBC AUTO: 45.9 FL (ref 37–54)
DIGOXIN SERPL-MCNC: 1.3 NG/ML (ref 0.6–1.2)
EOSINOPHIL # BLD AUTO: 0 10*3/MM3 (ref 0–0.4)
EOSINOPHIL NFR BLD AUTO: 0 % (ref 0.3–6.2)
ERYTHROCYTE [DISTWIDTH] IN BLOOD BY AUTOMATED COUNT: 14.7 % (ref 12.3–15.4)
GFR SERPL CREATININE-BSD FRML MDRD: 118 ML/MIN/1.73
GLOBULIN UR ELPH-MCNC: 2.8 GM/DL
GLUCOSE BLDC GLUCOMTR-MCNC: 116 MG/DL (ref 70–105)
GLUCOSE BLDC GLUCOMTR-MCNC: 117 MG/DL (ref 70–105)
GLUCOSE BLDC GLUCOMTR-MCNC: 130 MG/DL (ref 70–105)
GLUCOSE BLDC GLUCOMTR-MCNC: 159 MG/DL (ref 70–105)
GLUCOSE SERPL-MCNC: 151 MG/DL (ref 65–99)
HCT VFR BLD AUTO: 44.1 % (ref 37.5–51)
HGB BLD-MCNC: 14.3 G/DL (ref 13–17.7)
LYMPHOCYTES # BLD AUTO: 0.7 10*3/MM3 (ref 0.7–3.1)
LYMPHOCYTES NFR BLD AUTO: 4.5 % (ref 19.6–45.3)
MAGNESIUM SERPL-MCNC: 2.2 MG/DL (ref 1.6–2.4)
MCH RBC QN AUTO: 29.5 PG (ref 26.6–33)
MCHC RBC AUTO-ENTMCNC: 32.3 G/DL (ref 31.5–35.7)
MCV RBC AUTO: 91.1 FL (ref 79–97)
MONOCYTES # BLD AUTO: 1.3 10*3/MM3 (ref 0.1–0.9)
MONOCYTES NFR BLD AUTO: 8.3 % (ref 5–12)
NEUTROPHILS NFR BLD AUTO: 14 10*3/MM3 (ref 1.7–7)
NEUTROPHILS NFR BLD AUTO: 87 % (ref 42.7–76)
NRBC BLD AUTO-RTO: 0 /100 WBC (ref 0–0.2)
PLATELET # BLD AUTO: 302 10*3/MM3 (ref 140–450)
PMV BLD AUTO: 8.2 FL (ref 6–12)
POTASSIUM SERPL-SCNC: 3.5 MMOL/L (ref 3.5–5.2)
PROT SERPL-MCNC: 6.6 G/DL (ref 6–8.5)
RBC # BLD AUTO: 4.84 10*6/MM3 (ref 4.14–5.8)
SODIUM SERPL-SCNC: 143 MMOL/L (ref 136–145)
WBC # BLD AUTO: 16.1 10*3/MM3 (ref 3.4–10.8)

## 2020-07-06 PROCEDURE — 25010000002 PIPERACILLIN SOD-TAZOBACTAM PER 1 G: Performed by: NURSE PRACTITIONER

## 2020-07-06 PROCEDURE — 25010000002 AMIODARONE PER 30 MG: Performed by: NURSE PRACTITIONER

## 2020-07-06 PROCEDURE — 80162 ASSAY OF DIGOXIN TOTAL: CPT | Performed by: NURSE PRACTITIONER

## 2020-07-06 PROCEDURE — 63710000001 INSULIN LISPRO (HUMAN) PER 5 UNITS: Performed by: NURSE PRACTITIONER

## 2020-07-06 PROCEDURE — 25010000002 METOCLOPRAMIDE PER 10 MG: Performed by: NURSE PRACTITIONER

## 2020-07-06 PROCEDURE — 93005 ELECTROCARDIOGRAM TRACING: CPT | Performed by: INTERNAL MEDICINE

## 2020-07-06 PROCEDURE — 99222 1ST HOSP IP/OBS MODERATE 55: CPT | Performed by: SURGERY

## 2020-07-06 PROCEDURE — 99232 SBSQ HOSP IP/OBS MODERATE 35: CPT | Performed by: HOSPITALIST

## 2020-07-06 PROCEDURE — 82962 GLUCOSE BLOOD TEST: CPT

## 2020-07-06 PROCEDURE — 85025 COMPLETE CBC W/AUTO DIFF WBC: CPT | Performed by: INTERNAL MEDICINE

## 2020-07-06 PROCEDURE — 99232 SBSQ HOSP IP/OBS MODERATE 35: CPT | Performed by: INTERNAL MEDICINE

## 2020-07-06 PROCEDURE — 25010000002 DIGOXIN PER 500 MCG: Performed by: NURSE PRACTITIONER

## 2020-07-06 PROCEDURE — 80053 COMPREHEN METABOLIC PANEL: CPT | Performed by: NURSE PRACTITIONER

## 2020-07-06 PROCEDURE — 25010000002 ONDANSETRON PER 1 MG: Performed by: NURSE PRACTITIONER

## 2020-07-06 PROCEDURE — 25010000002 ENOXAPARIN PER 10 MG: Performed by: NURSE PRACTITIONER

## 2020-07-06 PROCEDURE — 97162 PT EVAL MOD COMPLEX 30 MIN: CPT

## 2020-07-06 PROCEDURE — 83735 ASSAY OF MAGNESIUM: CPT | Performed by: NURSE PRACTITIONER

## 2020-07-06 PROCEDURE — 97530 THERAPEUTIC ACTIVITIES: CPT

## 2020-07-06 RX ORDER — IPRATROPIUM BROMIDE AND ALBUTEROL SULFATE 2.5; .5 MG/3ML; MG/3ML
3 SOLUTION RESPIRATORY (INHALATION) EVERY 6 HOURS PRN
Status: DISCONTINUED | OUTPATIENT
Start: 2020-07-06 | End: 2020-07-15 | Stop reason: HOSPADM

## 2020-07-06 RX ORDER — PANTOPRAZOLE SODIUM 40 MG/1
40 TABLET, DELAYED RELEASE ORAL
Status: DISCONTINUED | OUTPATIENT
Start: 2020-07-07 | End: 2020-07-12

## 2020-07-06 RX ADMIN — Medication 10 ML: at 20:02

## 2020-07-06 RX ADMIN — METOPROLOL TARTRATE 5 MG: 5 INJECTION INTRAVENOUS at 02:46

## 2020-07-06 RX ADMIN — METOCLOPRAMIDE 10 MG: 5 INJECTION, SOLUTION INTRAMUSCULAR; INTRAVENOUS at 09:06

## 2020-07-06 RX ADMIN — PIPERACILLIN AND TAZOBACTAM 3.38 G: 3; .375 INJECTION, POWDER, FOR SOLUTION INTRAVENOUS at 09:06

## 2020-07-06 RX ADMIN — METOPROLOL TARTRATE 5 MG: 5 INJECTION INTRAVENOUS at 09:07

## 2020-07-06 RX ADMIN — ONDANSETRON 4 MG: 2 INJECTION INTRAMUSCULAR; INTRAVENOUS at 02:46

## 2020-07-06 RX ADMIN — DIGOXIN 125 MCG: 250 INJECTION, SOLUTION INTRAMUSCULAR; INTRAVENOUS; PARENTERAL at 12:15

## 2020-07-06 RX ADMIN — PIPERACILLIN AND TAZOBACTAM 3.38 G: 3; .375 INJECTION, POWDER, FOR SOLUTION INTRAVENOUS at 02:46

## 2020-07-06 RX ADMIN — AMIODARONE HYDROCHLORIDE 0.5 MG/MIN: 50 INJECTION, SOLUTION INTRAVENOUS at 20:07

## 2020-07-06 RX ADMIN — SUCRALFATE 1 G: 1 TABLET ORAL at 12:16

## 2020-07-06 RX ADMIN — METOCLOPRAMIDE 10 MG: 5 INJECTION, SOLUTION INTRAMUSCULAR; INTRAVENOUS at 20:02

## 2020-07-06 RX ADMIN — AMIODARONE HYDROCHLORIDE 0.5 MG/MIN: 50 INJECTION, SOLUTION INTRAVENOUS at 03:36

## 2020-07-06 RX ADMIN — ONDANSETRON 4 MG: 2 INJECTION INTRAMUSCULAR; INTRAVENOUS at 20:34

## 2020-07-06 RX ADMIN — METOCLOPRAMIDE 10 MG: 5 INJECTION, SOLUTION INTRAMUSCULAR; INTRAVENOUS at 16:54

## 2020-07-06 RX ADMIN — ENOXAPARIN SODIUM 70 MG: 80 INJECTION SUBCUTANEOUS at 23:16

## 2020-07-06 RX ADMIN — SUCRALFATE 1 G: 1 TABLET ORAL at 16:53

## 2020-07-06 RX ADMIN — DILTIAZEM HYDROCHLORIDE 30 MG: 30 TABLET ORAL at 23:16

## 2020-07-06 RX ADMIN — SUCRALFATE 1 G: 1 TABLET ORAL at 20:03

## 2020-07-06 RX ADMIN — METOCLOPRAMIDE 10 MG: 5 INJECTION, SOLUTION INTRAMUSCULAR; INTRAVENOUS at 02:47

## 2020-07-06 RX ADMIN — POTASSIUM CHLORIDE 20 MEQ: 1500 TABLET, EXTENDED RELEASE ORAL at 12:16

## 2020-07-06 RX ADMIN — SODIUM CHLORIDE 15 MG/HR: 900 INJECTION, SOLUTION INTRAVENOUS at 06:27

## 2020-07-06 RX ADMIN — ONDANSETRON 4 MG: 2 INJECTION INTRAMUSCULAR; INTRAVENOUS at 16:54

## 2020-07-06 RX ADMIN — INSULIN LISPRO 2 UNITS: 100 INJECTION, SOLUTION INTRAVENOUS; SUBCUTANEOUS at 12:15

## 2020-07-06 RX ADMIN — METOPROLOL TARTRATE 25 MG: 25 TABLET, FILM COATED ORAL at 20:02

## 2020-07-06 RX ADMIN — METOPROLOL TARTRATE 25 MG: 25 TABLET, FILM COATED ORAL at 16:53

## 2020-07-06 RX ADMIN — FLUDROCORTISONE ACETATE 0.1 MG: 0.1 TABLET ORAL at 16:53

## 2020-07-06 RX ADMIN — PANTOPRAZOLE SODIUM 40 MG: 40 INJECTION, POWDER, FOR SOLUTION INTRAVENOUS at 06:18

## 2020-07-06 RX ADMIN — ONDANSETRON 4 MG: 2 INJECTION INTRAMUSCULAR; INTRAVENOUS at 09:06

## 2020-07-06 RX ADMIN — ENOXAPARIN SODIUM 70 MG: 80 INJECTION SUBCUTANEOUS at 12:15

## 2020-07-06 RX ADMIN — POTASSIUM CHLORIDE 40 MEQ: 1.5 POWDER, FOR SOLUTION ORAL at 03:40

## 2020-07-06 RX ADMIN — PIPERACILLIN AND TAZOBACTAM 3.38 G: 3; .375 INJECTION, POWDER, FOR SOLUTION INTRAVENOUS at 16:54

## 2020-07-06 NOTE — PLAN OF CARE
Problem: Patient Care Overview  Goal: Plan of Care Review  Outcome: Ongoing (interventions implemented as appropriate)  Flowsheets  Taken 7/6/2020 1538  Progress: no change  Plan of Care Reviewed With: patient;son  Taken 7/6/2020 1500  Outcome Summary: Pt is typically (I) but this date still requiring min (A) to mod (A) to come to stand. Marches in place w/ CGA for < 30 sec & HR is over 150, BP is orthostatic & Pt c/o lightheadedness. Continue to recommend IP rehab at d/c. PPE worn: mask, safety glasses, gloves.

## 2020-07-06 NOTE — PROGRESS NOTES
K[facility]/PULM/CC PROGRESS NOTE       SUBJECTIVE       This is a 79-year-old male with a history of COPD, former smoker, atrial fibrillation on chronic anticoagulation who lives all by himself presented to the hospital for fatigue, tiredness and shortness of breath.  Patient has been feeling puny for the few days with poor appetite.  He has noticed extreme fatigue with malaise.  He denies any fever or chills.  He has noted increased shortness of breath however cough has been mild.  He did cough clear sputum and only one time he had a maroonish sputum.  He denies any wheezing or chest pain or pleurisy.  Denies any sick contacts.  He states that he is visited by a home health nurse only.  He denies any other contacts.     CT scan of the chest was done that showed bilateral groundglass airspace disease with interlobular septal thickening upper lobe predominant.  Patient denies any active tobacco use or vaping.     He has a history of chronic sinus issues.  He is not on any maintenance inhalers.   7/3:  No new fevers  SOA stable  O2 requirement worsened over night and now on 5L  Remains fully awake and responsive  On oral diet  7/4: afebrile. soa at baseline. On 5 L. No n.v/d. Feels weak and fatigued.   7/6: Awake.  Currently on 2 L nasal cannula.  Remains on Cardizem and amiodarone infusions.  No complaints of cough or productive phlegm.  No chest pains.  No vomiting    OBJECTIVE    Vitals:    07/06/20 0300 07/06/20 0400 07/06/20 0500 07/06/20 0600   BP: 142/90 141/95 151/99 139/100   BP Location:    Left arm   Patient Position:    Lying   Pulse: 81 89 92 87   Resp:    21   Temp:    97.7 °F (36.5 °C)   TempSrc:    Oral   SpO2: 94% 94% 97% 93%   Weight:    67.2 kg (148 lb 2.4 oz)   Height:          Intake/Output last 3 shifts:  I/O last 3 completed shifts:  In: 2155.1 [P.O.:272; I.V.:1468.1; IV Piggyback:415]  Out: 1780 [Urine:1780]  Intake/Output this shift:  I/O this shift:  In: -   Out: 175 [Urine:175]    General  Appearance:  Alert, cooperative, no distress, appears stated age  Head:  Normocephalic, without obvious abnormality, atraumatic  Eyes:  conjunctivae/corneas clear, EOM's intact     Neck:  Supple,  no adenopathy;      Lungs:   Clear to auscultation bilaterally, respirations unlabored  Chest wall:  No tenderness  Heart: Irregularly irregular rhythm, S1 and S2 normal, no murmur, rub   or gallop  Abdomen:  Soft, non-tender, bowel sounds active all four quadrants,  no masses, no hepatomegaly, no splenomegaly  Extremities:  Extremities normal, no cyanosis or edema  Pulses: 2+ and symmetric all extremities  Skin:  No rashes or lesions  Neurologic:   Alert and oriented, no focal deficits    Scheduled Meds:    atorvastatin 20 mg Oral Daily   budesonide 0.5 mg Nebulization BID - RT   digoxin 125 mcg Intravenous Daily   enoxaparin 70 mg Subcutaneous Q12H   fludrocortisone 0.1 mg Oral Q PM   furosemide 40 mg Oral Daily   insulin lispro 0-7 Units Subcutaneous 4x Daily With Meals & Nightly   ipratropium-albuterol 3 mL Nebulization 4x Daily - RT   metoclopramide 10 mg Intravenous Q6H   metoprolol tartrate 5 mg Intravenous Q6H   metoprolol tartrate 25 mg Oral Q6H   ondansetron 4 mg Intravenous Q6H   pantoprazole 40 mg Intravenous Q AM   piperacillin-tazobactam 3.375 g Intravenous Q8H   potassium chloride 20 mEq Oral Daily   predniSONE 30 mg Oral Daily   Followed by      [START ON 7/8/2020] predniSONE 20 mg Oral Daily   Followed by      [START ON 7/10/2020] predniSONE 10 mg Oral Daily   sodium chloride 10 mL Intravenous Q12H   sucralfate 1 g Oral 4x Daily AC & at Bedtime   Thera 1 tablet Oral Daily   vitamin B-12 1,000 mcg Oral Daily       Continuous Infusions:    amiodarone 0.5 mg/min Last Rate: 0.5 mg/min (07/06/20 0336)   dilTIAZem 5-15 mg/hr Last Rate: 15 mg/hr (07/06/20 0627)   Pharmacy Consult - Pharmacy to dose     Pharmacy to Dose Zosyn         PRN Meds:•  acetaminophen **OR** acetaminophen **OR** acetaminophen  •   aluminum-magnesium hydroxide-simethicone  •  bisacodyl  •  dextrose  •  dextrose  •  glucagon (human recombinant)  •  HYDROcodone-acetaminophen  •  insulin lispro **AND** insulin lispro  •  magnesium hydroxide  •  magnesium sulfate **OR** magnesium sulfate **OR** magnesium sulfate  •  melatonin  •  Morphine  •  ondansetron  •  Pharmacy Consult - Pharmacy to dose  •  Pharmacy to Dose Zosyn  •  potassium chloride **OR** potassium chloride **OR** potassium chloride  •  potassium phosphate infusion greater than 15 mMoles **OR** potassium phosphate infusion greater than 15 mMoles **OR** potassium phosphate **OR** sodium phosphate IVPB **OR** sodium phosphate IVPB **OR** sodium phosphate IVPB  •  promethazine  •  [COMPLETED] Insert peripheral IV **AND** sodium chloride  •  sodium chloride     LABS    CBC  Results from last 7 days   Lab Units 07/06/20 0225 07/05/20 0215 07/04/20  0334 07/03/20  0940 07/02/20  0457 07/01/20  2348   WBC 10*3/mm3 16.10* 14.50* 18.00* 14.10* 7.50 9.60   RBC 10*6/mm3 4.84 4.51 4.26 4.23 3.82* 3.87*   HEMOGLOBIN g/dL 14.3 13.5 12.9* 12.7* 11.6* 11.7*   HEMATOCRIT % 44.1 41.1 41.2 39.4 34.3* 35.5*   MCV fL 91.1 91.0 96.7 93.1 89.9 91.7   PLATELETS 10*3/mm3 302 286 280 273 227 253       CMP   Results from last 7 days   Lab Units 07/06/20  0225 07/05/20 0215 07/04/20  0334 07/03/20  0940 07/02/20  0457 07/01/20  2348   SODIUM mmol/L 143 142 144 145 146* 145   POTASSIUM mmol/L 3.5 3.1* 2.8* 2.6* 3.5 4.1   CHLORIDE mmol/L 98 95* 101 103 104 104   CO2 mmol/L 33.0* 32.0* 26.0 22.0 23.0 22.0   BUN  19 15 12 17 22 23   CREATININE mg/dL 0.65* 0.64* 0.59* 0.75* 0.80 0.69*   GLUCOSE mg/dL 151* 162* 149* 246* 172* 119*   ALBUMIN g/dL 3.80 3.80 3.90 3.80  --  3.50   BILIRUBIN mg/dL 1.5* 1.5* 1.2 1.2  --  1.5*   ALK PHOS U/L 59 60 55 60  --  54   AST (SGOT) U/L 41* 37 45* 37  --  31   ALT (SGPT) U/L 59* 46* 43* 49*  --  15   LIPASE U/L  --   --   --  30  --  13       TROPONIN  Results from last 7 days   Lab  Units 07/02/20  1027   TROPONIN T ng/mL <0.010       CoAg        ABG  Results from last 7 days   Lab Units 07/02/20  1049   PH, ARTERIAL pH units 7.531*   PCO2, ARTERIAL mm Hg 31.3*   PO2 ART mm Hg 79.4*   O2 SATURATION ART % 97.1   BASE EXCESS ART mmol/L 3.9*       Microbiology  Microbiology Results (last 10 days)     Procedure Component Value - Date/Time    Gastrointestinal Panel, PCR - Stool, Per Rectum [[id number]]  (Normal) Collected:  07/03/20 0856    Lab Status:  Final result Specimen:  Stool from Per Rectum Updated:  07/03/20 1535     Campylobacter Not Detected     Plesiomonas shigelloides Not Detected     Salmonella Not Detected     Vibrio Not Detected     Vibrio cholerae Not Detected     Yersinia enterocolitica Not Detected     Enteroaggregative E. coli (EAEC) Not Detected     Enteropathogenic E. coli (EPEC) Not Detected     Enterotoxigenic E. coli (ETEC) lt/st Not Detected     Shiga-like toxin-producing E. coli (STEC) stx1/stx2 Not Detected     E. coli O157 Not Detected     Shigella/Enteroinvasive E. coli (EIEC) Not Detected     Cryptosporidium Not Detected     Cyclospora cayetanensis Not Detected     Entamoeba histolytica Not Detected     Giardia lamblia Not Detected     Adenovirus F40/41 Not Detected     Astrovirus Not Detected     Norovirus GI/GII Not Detected     Rotavirus A Not Detected     Sapovirus (I, II, IV or V) Not Detected    Narrative:       If Aeromonas, Staphylococcus aureus or Bacillus cereus are suspected, please order [id number]: Stool Culture, Aeromonas, S aureus, B Cereus.    Clostridium Difficile EIA - Stool, Per Rectum [[id number]]  (Normal) Collected:  07/03/20 0856    Lab Status:  Final result Specimen:  Stool from Per Rectum Updated:  07/04/20 0715     C Diff GDH / Toxin Negative    COVID-19,CEPHEID,COR/MILENA/PAD IN-HOUSE(OR EMERGENT/ADD-ON),NP SWAB IN TRANSPORT MEDIA 3-4 HR TAT - Swab, Nasopharynx [[id number]]  (Normal) Collected:  07/02/20 1659    Lab Status:  Final result Specimen:   Swab from Nasopharynx Updated:  07/02/20 2003     COVID19 Not Detected    Narrative:       Fact sheet for providers: https://www.fda.gov/media/429431/download     Fact sheet for patients: https://www.fda.gov/media/366989/download    Blood Culture - Blood, Arm, Left [[id number]] Collected:  07/02/20 0249    Lab Status:  Preliminary result Specimen:  Blood from Arm, Left Updated:  07/06/20 0300     Blood Culture No growth at 4 days    Respiratory Panel, PCR - Swab, Nasopharynx [[id number]]  (Normal) Collected:  07/02/20 0027    Lab Status:  Final result Specimen:  Swab from Nasopharynx Updated:  07/02/20 0153     ADENOVIRUS, PCR Not Detected     Coronavirus 229E Not Detected     Coronavirus HKU1 Not Detected     Coronavirus NL63 Not Detected     Coronavirus OC43 Not Detected     Human Metapneumovirus Not Detected     Human Rhinovirus/Enterovirus Not Detected     Influenza B PCR Not Detected     Parainfluenza Virus 1 Not Detected     Parainfluenza Virus 2 Not Detected     Parainfluenza Virus 3 Not Detected     Parainfluenza Virus 4 Not Detected     Bordetella pertussis pcr Not Detected     Influenza A H1 2009 PCR Not Detected     Chlamydophila pneumoniae PCR Not Detected     Mycoplasma pneumo by PCR Not Detected     Influenza A PCR Not Detected     Influenza A H3 Not Detected     Influenza A H1 Not Detected     RSV, PCR Not Detected    Narrative:       The coronavirus on the RVP is NOT COVID-19 and is NOT indicative of infection with COVID-19.     COVID-19 [facility] [employer] IN-HOUSE, Nasal Swab No Transport Media - Swab, Nasal Cavity [[id number]]  (Normal) Collected:  07/02/20 0027    Lab Status:  Final result Specimen:  Swab from Nasal Cavity Updated:  07/02/20 0104     COVID19 Not Detected    Narrative:       Fact sheet for providers: https://www.fda.gov/media/264279/download     Fact sheet for patients: https://www.fda.gov/media/780625/download    Blood Culture - Blood, Arm, Right [[id number]] Collected:  07/01/20 2348    Lab  Status:  Preliminary result Specimen:  Blood from Arm, Right Updated:  07/06/20 0000     Blood Culture No growth at 4 days          IMAGING & OTHER STUDIES    Imaging Results (Last 72 Hours)     Procedure Component Value Units Date/Time    XR Chest 1 View [[id number]] Collected:  07/04/20 1831     Updated:  07/04/20 1835    Narrative:       DATE OF EXAM:  7/4/2020 6:05 PM     PROCEDURE:  XR CHEST 1 VW-     INDICATIONS:  worsening hypoxemia; I48.91-Unspecified atrial fibrillation; I50.9-Heart  failure, unspecified; J18.9-Pneumonia, unspecified organism     COMPARISON:  07/01/2020 CT chest 07/02/2020     TECHNIQUE:   Single radiographic view of the chest was obtained.     FINDINGS:  Multifocal groundglass opacities again seen. Slight improvement in the  right mid lung. Mild cardiomegaly. Underlying septal thickening is  slightly improved. No pneumothorax. No pleural effusion. Elevated right  diaphragm.       Impression:       Slight improvement compared to prior radiograph. Groundglass opacity  septal thickening upper lobe predominant with slight improvement on the  right. Question atypical infection including Covid 19. Other bacterial  or atypical pneumonia as can have this appearance.     Electronically Signed By-Sarah Beth Bolden MD On:7/4/2020 6:33 PM  This report was finalized on [id number] by  Sarah Beth Bolden MD.    US Gallbladder [[id number]] Collected:  07/03/20 1503     Updated:  07/03/20 1506    Narrative:       US GALLBLADDER-     Date of Exam: 7/3/2020 2:31 PM     Indication: intractable nausea and vomiting; I48.91-Unspecified atrial  fibrillation; I50.9-Heart failure, unspecified; J18.9-Pneumonia,  unspecified organism.   Abdominal pain     Comparison: CT 07/02/2020     Technique: Transverse and sagittal ultrasound images of the right upper  quadrant were obtained. Doppler evaluation was also conducted.     FINDINGS:  Visualized portions of the head and body of the pancreas are normal.  Evaluation is limited due  to surrounding bowel gas.     The liver appears echogenic.  No focal hepatic lesions.  Portal and  hepatic veins are patent and have normal flow direction and waveforms.      Gallbladder is normal in caliber with no evidence of stones, wall  thickening or pericholecystic fluid. A small amount of sludge is present  within the gallbladder lumen. Negative sonographic Gomes's sign.     The biliary system is nondilated.      The right kidney is normal in size with no evidence of hydronephrosis.  No suspicious focal lesions. Simple cyst is seen arising from the  superior pole measuring up to 7.6 cm.       Impression:       Gallbladder sludge with no evidence of stones. No secondary findings of  acute cholecystitis. Mildly increased echogenicity of liver parenchyma  is present likely related to steatosis. A simple cyst is seen arising  from the right kidney.     Electronically Signed By-Jessica Myles On:7/3/2020 3:04 PM  This report was finalized on [id number] by  Jessica Myles, .        Results for orders placed during the hospital encounter of 07/01/20   Adult Transthoracic Echo Complete W/ Cont if Necessary Per Protocol    Narrative · Left ventricular systolic function is severely decreased.  · Left ventricular wall thickness is consistent with mild concentric   hypertrophy.  · Left atrial cavity size is moderately dilated.  · Mild-to-moderate mitral valve regurgitation is present  · Mild tricuspid valve regurgitation is present.  · Mild aortic valve regurgitation is present.             ASSESSMENT/PLAN:     Atrial fibrillation (CMS/HCC)    CAD (coronary artery disease)    Chronic anticoagulation    Hyperlipidemia    Presence of stent in right coronary artery    Chronic pain    Sepsis (CMS/HCC)    Congestive heart failure (CHF) (CMS/HCC)    Nausea vomiting and diarrhea        1.  Bilateral PNA (viral vs bacterial) other differentials include Pulmonary edema and possible hemorrhage  2.  A. fib with RVR  3. H/o RML  lobectomy  4. CAD  5. Acute hypoxia due to above  6.  Nausea  7. Chronic Hypotension  8. Chronic Anticoagulation with Xarelto  9. Lactic acidosis  10. Hypokalemia  11.  Diarrhea  12.  Gastritis noted on EGD  13.  Acute on chronic systolic CHF exacerbation       PLAN       -Reviewed CT scan of the chest. Covid test is negative X2.  Afebrile.    Remains on antibiotic therapy with Zosyn.  Finishing antibiotic therapy.  -Blood cultures are negative.  -Patient is a lifelong non-smoker.  No previous history of COPD.  I will discontinue steroids and Pulmicort.  Will change nebs to PRN.  -Management of A. fib per cardiology.  Remains on Cardizem and amiodarone infusions.  Also on IV digoxin and metoprolol.  2D echo results reviewed.  EF 30 to 35%.  Mild to moderate MR noted.  RVSP 35.7  -GI work-up reviewed.  Status post EGD 7/5 with changes of gastritis noted.  Remains on Reglan Carafate and Protonix.  Will switch Protonix to p.o. stool for C. difficile analysis is negative  -Patient with issues with chronic hypotension.  Remains on Florinef which is being continued.  -Currently on 2 L nasal cannula.  Wean supplemental oxygen as appropriate.  Evaluate need for supplemental oxygen at discharge.  -Remains on therapeutic anticoagulation with Lovenox.  He can be switched back to home Xarelto once appropriate.  -Increase activity.  -Okay to resume p.o. diet.  No procedures planned from pulmonary standpoint  We will continue with rest of therapy.  Discussed with patient's nurse in detail  THIS DOCUMENT HAS BEEN ELECTRONICALLY SIGNED BY  Ebenezer Brambila MD

## 2020-07-06 NOTE — PROGRESS NOTES
Continued Stay Note   [facility]     Patient Name: Shukri Park  MRN: [id number]  Today's Date: 7/6/2020    Admit Date: 7/1/2020    Discharge Plan     Row Name 07/06/20 1436       Plan    Plan  [facility] referral pending. Will require pre-cert. No PASRR needed for [facility].     Plan Comments  CM spoke w/ pt's family and they requested placement at [facility]. Referral made via Epic and text message.         Nikia Ozuna, ANUELW, LSW  PRN   Phone: [phone]

## 2020-07-06 NOTE — PROGRESS NOTES
"    Chief complaint abdominal pain    Subjective   Patient continues to have epigastric abdominal pain associated with nausea and poor appetite.  He still rates his symptoms as at least moderate.  Does not feel good at all does not feel like he can go home due to those complaints.  He does not feel like he will be able to eat much.      Objective     Vital Signs  Visit Vitals  /98 (BP Location: Left arm, Patient Position: Lying)   Pulse (!) 122   Temp 97.8 °F (36.6 °C) (Axillary) Comment (Src): pt had a cold drink   Resp 26   Ht 185.4 cm (73\")   Wt 67.2 kg (148 lb 2.4 oz)   SpO2 96%   BMI 19.55 kg/m²       Physical Exam:  Physical Exam   Constitutional: He is oriented to person, place, and time. No distress.   HENT:   Head: Normocephalic and atraumatic.   Eyes: Conjunctivae and EOM are normal. Pupils are equal, round, and reactive to light.   Neck: No JVD present. No thyromegaly present.   Cardiovascular: On and off tachycardic, irregular rhythm, normal heart sounds and intact distal pulses. Exam reveals no gallop and no friction rub.   No murmur heard.  Pulmonary/Chest: Effort normal and breath sounds normal. No stridor. No respiratory distress. He has no wheezes. He has no rales. He exhibits no tenderness.   Abdominal: Soft. Bowel sounds are normal. He exhibits no distension and no mass. There is no tenderness. There is no rebound and no guarding. No hernia.   Musculoskeletal: Normal range of motion.   Lymphadenopathy:     He has no cervical adenopathy.   Neurological: He is alert and oriented to person, place, and time. No cranial nerve deficit or sensory deficit. He exhibits normal muscle tone.   Skin: No rash noted. He is not diaphoretic.   Psychiatric: He has a normal mood and affect.   Vitals reviewed.    Physical Exam          Results Review:    CMP:  Lab Results   Component Value Date    BUN  07/06/2020      Comment:      Testing performed by alternate method    BUN 19 07/06/2020    CREATININE 0.65 " (L) 07/06/2020    EGFRIFNONA 118 07/06/2020     07/06/2020    K 3.5 07/06/2020    CL 98 07/06/2020    CALCIUM 8.9 07/06/2020    ALBUMIN 3.80 07/06/2020    BILITOT 1.5 (H) 07/06/2020    ALKPHOS 59 07/06/2020    AST 41 (H) 07/06/2020    ALT 59 (H) 07/06/2020     CBC:  Lab Results   Component Value Date    WBC 16.10 (H) 07/06/2020    RBC 4.84 07/06/2020    HGB 14.3 07/06/2020    HCT 44.1 07/06/2020    MCV 91.1 07/06/2020    MCH 29.5 07/06/2020    MCHC 32.3 07/06/2020    RDW 14.7 07/06/2020     07/06/2020         Medication Review:     Scheduled Meds:  atorvastatin 20 mg Oral Daily   digoxin 125 mcg Intravenous Daily   enoxaparin 70 mg Subcutaneous Q12H   fludrocortisone 0.1 mg Oral Q PM   furosemide 40 mg Oral Daily   insulin lispro 0-7 Units Subcutaneous 4x Daily With Meals & Nightly   metoclopramide 10 mg Intravenous Q6H   metoprolol tartrate 25 mg Oral Q6H   ondansetron 4 mg Intravenous Q6H   [START ON 7/7/2020] pantoprazole 40 mg Oral Q AM   piperacillin-tazobactam 3.375 g Intravenous Q8H   potassium chloride 20 mEq Oral Daily   sodium chloride 10 mL Intravenous Q12H   sucralfate 1 g Oral 4x Daily AC & at Bedtime   Thera 1 tablet Oral Daily   vitamin B-12 1,000 mcg Oral Daily     Continuous Infusions:  amiodarone 0.5 mg/min Last Rate: 0.5 mg/min (07/06/20 0336)   dilTIAZem 5-15 mg/hr Last Rate: 15 mg/hr (07/06/20 0627)   Pharmacy Consult - Pharmacy to dose     Pharmacy to Dose Zosyn       PRN Meds:.•  acetaminophen **OR** acetaminophen **OR** acetaminophen  •  aluminum-magnesium hydroxide-simethicone  •  bisacodyl  •  dextrose  •  dextrose  •  glucagon (human recombinant)  •  HYDROcodone-acetaminophen  •  insulin lispro **AND** insulin lispro  •  ipratropium-albuterol  •  magnesium hydroxide  •  magnesium sulfate **OR** magnesium sulfate **OR** magnesium sulfate  •  melatonin  •  Morphine  •  ondansetron  •  Pharmacy Consult - Pharmacy to dose  •  Pharmacy to Dose Zosyn  •  potassium chloride **OR**  potassium chloride **OR** potassium chloride  •  potassium phosphate infusion greater than 15 mMoles **OR** potassium phosphate infusion greater than 15 mMoles **OR** potassium phosphate **OR** sodium phosphate IVPB **OR** sodium phosphate IVPB **OR** sodium phosphate IVPB  •  promethazine  •  [COMPLETED] Insert peripheral IV **AND** sodium chloride  •  sodium chloride    Assessment/Plan   Abdominal pain  Epigastric, associated with nausea and no appetite  Status post EGD showed some mild gastritis however his symptoms are really bothersome and some of the imaging shows gallbladder sludge and I am wondering if some of the symptoms might be due to that.  Will consult with general surgery to evaluate if patient is a good candidate for cholecystectomy to see if that may improve his symptoms  Continue PPI Carafate and anti-nausea meds    Pneumonia  Has been on Zosyn  Pulmonary on board adjusted his bronchodilators DC steroids  Micro has been negative including COVID and viral panel    A. fib  Managed by cardiology on IV dig, amnio Cardizem among some of the others-General surgery consult pending if no immediate plan for any intervention surgically we may be able to transition to p.o. meds?  On metoprolol as well   Currently on Lovenox for anticoagulation    CHF  Chronic  EF of 35  Lasix, metoprolol    Diabetes  And insulin sliding scale and Accu-Cheks    Orthostatic hypotension  Fludrocortisone    Dyslipidemia  Continue statin    DVT PUD prophylaxis    Plan as above    Denzel Rich MD  07/06/20  11:23

## 2020-07-06 NOTE — PLAN OF CARE
Problem: Patient Care Overview  Goal: Plan of Care Review  Outcome: Ongoing (interventions implemented as appropriate)  Flowsheets (Taken 7/6/2020 1241)  Outcome Summary: Pt is 80 yo male admitted with A fib with RVR, B PNA, AE COPD, and chronic back pain. Pt c/o weakness, cough, nausea, vomiting, and general malaise. At baseline, pt's sister reports he is primarily ambulatory without AD, but occassionally utilizes cane.  He was independent with ADLs.  Per sister, pt has had one fall to her knowledge. Fall was in November with scalp laceration. Also he fell out of a chair while sleeping and landed on his head within the last week. Patient does not require supp O2 at baseline. Patient is limited this date by tachycardia. He requires min A for transfers. Not able to progress to gait due to fatigue and elevated HR. He is significantly below his independent baseline function. He will require IP rehab upon d/c. PPE: mask, face shield, gloves.

## 2020-07-06 NOTE — PROGRESS NOTES
Nutrition Services    Patient Name:  Shukri Park  YOB: 1940  MRN: [id number]  Admit Date:  7/1/2020    Comments: Healthy Heart diet with Boost Breeze BID to provide 500kcal and 18g protein if consumed.        Reason for Assessment                Reason for Assessment    Reason For Assessment 7/6/20: Follow up protocol    7/3/20: GUMARO, MST 3        Diagnosis H&P:   79 y.o. male with a history of atrial fibrillation, hyperlipidemia, chronic low back pain, hypotension, chronic anticoagulation presented to the [facility] ED on 7/1/2020 with a complaint of generalized body aches and pains, weakness and a cough.  Patient states he is also had some nausea and vomiting.    Current Problems:   Atrial fibrillation  -Cardizem drip 15 mg/hr  -Patient received several Cardizem boluses in the ED  -Patient with history, on Xarelto,  -Not currently on any antiarrhythmics at home  -Check magnesium, TSH, trend troponins  -Cardiology consult     Possible sepsis  -Patient ruled in for sepsis due to tachycardia, tachypnea, lactate 2.5  -Given Zosyn 3.375 g, continue until sepsis ruled out  -Patient received 500 mL normal saline IV bolus in ED  -Blood cultures pending  -COVID-19 ruled out  -Respiratory panel negative  -Check urine     CAD  -No echo on file, obtain echo  -BNP 3485  -Per review of outside records: Last angioplasty to the RCA and left circumflex 4/16/2004  -Currently on Florinef for hypotension  -Continue Florinef, continue statin     Hyperlipidemia  -Continue statin     Chronic pain  -Due to multiple back surgeries  -Continue Norco, (INSPECT verified)      Abdominal pain, nausea  -S/p EGD on 7/5 showing gastritis  -Noted gallbladder sludge with gen.sx to consult for possible cholecystectomy           Nutrition/Diet History                Nutrition/Diet History    Narrative     7/6: Attempted to call pt via room telephone, busy signal. Secure message with pt's RN who reports pt did not take the  "Boost Breeze this morning, but she will attempt again this afternoon.    7/3: Attempted to call pt via room telephone without answer. Noted per recent physician note that patient stated \"I am so weak I can't talk\". Chart reviewed.        Functional Status Mostly independent per ADL's, occassionally uses a cane.       Food Allergies  NKFA       Factors Affecting Nutritional Intake  acute hospitalization with chronic illnesses, nausea         Anthropometrics             Anthropometrics    Height 185.4cm, 73in    Weight 67.2kg, 148lb, 7/6/20    67kg, 147lb, 7/3/20       Admit Weight    Admit Weight 150lb, 7/1/20       Ideal Body Weight (IBW)    Ideal Body Weight (IBW) 184lb    % Ideal Body Weight 80%       Usual Body Weight (UBW)    Usual Body Weight TOMY    % Usual Body Weight TOMY    Weight Hx  4/15/20 165lb  1/16/20 158lb  11/20/19 158lb  8/19/19 166lb       Body Mass Index (BMI)    BMI (kg/m2) 19.55           Labs/Medications                Labs    Pertinent Lab Results Comments Gluc 116-159 elev, Creat 0.65 low, LFTs slightly elev        Medications    Pertinent Medications Comments Lasix, Humalog, Solumedrol, Reglan, Zofran, Protonix, Abx, KCl, Carafate, Mvt, Vit B12         Physical Findings                Physical Findings    Overall Physical Appearance Unable to assess in person related to COVID-19 pandemic and limiting in person visits.     Edema  None documented     Gastrointestinal 7/3 BM, day 3 today if no BM today     Tubes none     Oral/Mouth Cavity No issues documented     Skin No breakdown noted         Estimated/Assessed Needs              Calorie Requirements     Height Used for Calorie Calculations       Weight Used for Calorie Calculations      Formula chosen for Calorie Calculations       Estimated Calorie Requirements (kcals/day)              Protein Requirements    Weight Used For Protein Calculations       Est Protein Requirement Amount (gms/kg)       Estimated Protein Requirements (gms/day)    "       Fluid Requirements     Estimated Fluid Requirements (mL/day)            Fluid Deficit       Desired Sodium Level (mEq/L)      Desired Sodium Level (mEq/L)      Estimated Fluid Deficit Needs (L)           Nutrition Prescription Ordered                Nutrition Prescription PO    Current PO Diet Healthy Heart     Supplement         Nutrition Prescription EN    Enteral Route -     TF modular -     TF Delivery Method -     Current Ordered TF  -     Current Ordered Water flush  -     TF Observation  -        Nutrition Prescription TPN    TPN Route -     Current Ordered TPN Volume  -     Dextrose (gm/kcals)  -     Amino Acid (gm/kcals) -     Lipids (ML/Concentration/Frequency)  -     TPN Observation  -          Evaluation of Received Nutrient/Fluid Intake                PO Evaluation    % PO Intake 7/6: 25% x last 4 meals  7/3: 50% x 3 meals        EN Evaluation    TF Changes -     TF Residual -     TF Tolerance      Average EN Delivered  -        TPN Evaluation    Total Number of Days on TPN  -           Clinical Course      Clinical Nutrition Course Details  7/2 HH  7/3 NPO, CL diet  7/4 NPO  7/5 HH           Problem/Interventions:                     Nutrition Diagnoses Problem 1      Problem 1   Inadequate oral intake related to nausea as evidenced by 25% meal intake.     Nutrition Diagnoses Problem 2      Problem 2            Intervention Goal                Intervention Goal    General Meal intake greater than 50% of meals.    Nausea improves.         Nutrition Intervention                Nutrition Intervention    RD/Tech Action  Continue Boost Breeze BID    *Continue HH diet at this time due to possible need for gallbladder removal and low fat diet showing benefit at this time.         Nutrition Prescription                Nutrition Prescription PO      Diet  Healthy Heart     Supplement Boost Breeze BID        Nutrition Prescription EN    Enteral Prescription -        Nutrition Prescription TPN    TPN  Prescription -         Monitor/Evaluation                Monitor/Evaluation    Monitor Intake, weight, labs, BM, skin             Electronically signed by:  Rebecca Horowitz RD  07/06/20 12:03

## 2020-07-06 NOTE — THERAPY EVALUATION
Patient Name: Shukri Park  : 1940    MRN: [id number]                              Today's Date: 2020       Admit Date: 2020    Visit Dx:     ICD-10-CM ICD-9-CM   1. Atrial fibrillation with rapid ventricular response (CMS/HCC) I48.91 427.31   2. Congestive heart failure, unspecified HF chronicity, unspecified heart failure type (CMS/HCC) I50.9 428.0   3. Pneumonia of both lungs due to infectious organism, unspecified part of lung J18.9 483.8   4. Nausea vomiting and diarrhea R11.2 787.91    R19.7 787.01     Patient Active Problem List   Diagnosis   • Atrial fibrillation (CMS/HCC)   • CAD (coronary artery disease)   • Chronic anticoagulation   • Hyperlipidemia   • Presence of stent in right coronary artery   • Chronic pain   • Sepsis (CMS/HCC)   • Congestive heart failure (CHF) (CMS/HCC)   • Nausea vomiting and diarrhea     Past Medical History:   Diagnosis Date   • A-fib (CMS/[facility])    • Aortic aneurysm (CMS/HCC)    • Appetite loss    • Cancer (CMS/[facility])     right lobe cancer - surgically removed    • Dark stools    • Foot pain, bilateral    • Hyperlipidemia    • Low back pain    • S/P epidural steroid injection     [place] Dr Gomes's - no relief   • Weight loss     acute loss of weight 30 lbs     Past Surgical History:   Procedure Laterality Date   • CATARACT EXTRACTION, BILATERAL     • CORONARY ANGIOPLASTY WITH STENT PLACEMENT     • ENDOSCOPY N/A 2020    Procedure: ESOPHAGOGASTRODUODENOSCOPY;  Surgeon: Neal Correa MD;  Location: [facility] ENDOSCOPY;  Service: Gastroenterology;  Laterality: N/A;   • LUMBAR DECOMPRESSION  2015    L2-L3   • LUMBAR DECOMPRESSION      Dr. Logan   • OTHER SURGICAL HISTORY  2015    left SI fusion with bone graft - Dr. Presley   • OTHER SURGICAL HISTORY      carotid artery repair    • OTHER SURGICAL HISTORY Left 2016    Left SI fusion 2016 Dr. Zendejas   • POSTERIOR SPINAL FUSION  10/24/2016    PSF T12-3/TLIF L3-L4 poss L2-L3 --- Dr. Zendejas   • TUMOR  REMOVAL      carcinoid tumor removed off right lobe tumor     General Information     Row Name 07/06/20 1236          PT Evaluation Time/Intention    Document Type  evaluation  -     Mode of Treatment  physical therapy  -     Row Name 07/06/20 1236          General Information    Patient Profile Reviewed?  yes Patietn sister Ashley is present and assists with subjective.   -     Prior Level of Function  independent:;ADL's;community mobility;driving occasionally uses [employer]. 1 fall in november and fell out of his chair when his chair tipped over and landed on his head 1 week ago.   -     Existing Precautions/Restrictions  fall;oxygen therapy device and L/min 3l SUPP o2. Does not need supp O2 at baseline.   -     Row Name 07/06/20 1236          Relationship/Environment    Lives With  alone  -     Row Name 07/06/20 1236          Resource/Environmental Concerns    Current Living Arrangements  home/apartment/condo one story home  -     Row Name 07/06/20 1236          Home Main Entrance    Number of Stairs, Main Entrance  one  -     Row Name 07/06/20 1236          Stairs Within Home, Primary    Number of Stairs, Within Home, Primary  none  -     Row Name 07/06/20 1236          Cognitive Assessment/Intervention- PT/OT    Orientation Status (Cognition)  oriented x 4  -     Row Name 07/06/20 1236          Safety Issues, Functional Mobility    Impairments Affecting Function (Mobility)  endurance/activity tolerance  -       User Key  (r) = Recorded By, (t) = Taken By, (c) = Cosigned By    Initials Name Provider Type     Ashlyn Ross, PT Physical Therapist        Mobility     Row Name 07/06/20 1239          Bed Mobility Assessment/Treatment    Bed Mobility Assessment/Treatment  supine-sit  -     Supine-Sit [place] (Bed Mobility)  minimum assist (75% patient effort)  -     Row Name 07/06/20 1239          Transfer Assessment/Treatment    Comment (Transfers)  patient performs stand pivot to  chair however not able to progress to gait due to tachycardia  -     Row Name 07/06/20 1239          Bed-Chair Transfer    Bed-Chair [place] (Transfers)  minimum assist (75% patient effort)  -     Row Name 07/06/20 1239          Sit-Stand Transfer    Sit-Stand [place] (Transfers)  minimum assist (75% patient effort)  -       User Key  (r) = Recorded By, (t) = Taken By, (c) = Cosigned By    Initials Name Provider Type     Ashlyn Ross PT Physical Therapist        Obj/Interventions     Row Name 07/06/20 1240          General ROM    GENERAL ROM COMMENTS  B LE WFL- able to don socks with SBA  -     Row Name 07/06/20 1240          MMT (Manual Muscle Testing)    General MMT Comments  B LE >3/5 per functional assessment  -     Row Name 07/06/20 1240          Static Sitting Balance    Level of [place] (Unsupported Sitting, Static Balance)  supervision  -     Row Name 07/06/20 1240          Static Standing Balance    Level of [place] (Supported Standing, Static Balance)  minimal assist, 75% patient effort  -     Time Able to Maintain Position (Supported Standing, Static Balance)  30 to 45 seconds  -     Comment (Supported Standing, Static Balance)  pt fatigues quickly in standing position   -     Row Name 07/06/20 1240          Sensory Assessment/Intervention    Sensory General Assessment  no sensation deficits identified  -       User Key  (r) = Recorded By, (t) = Taken By, (c) = Cosigned By    Initials Name Provider Type     Ashlyn Ross PT Physical Therapist        Goals/Plan     Row Name 07/06/20 1248          Bed Mobility Goal 1 (PT)    Activity/Assistive Device (Bed Mobility Goal 1, PT)  bed mobility activities, all  -     [place] Level/Cues Needed (Bed Mobility Goal 1, PT)  conditional independence  -     Time Frame (Bed Mobility Goal 1, PT)  long term goal (LTG);2 weeks  -     Row Name 07/06/20 1248          Transfer Goal 1 (PT)     Activity/Assistive Device (Transfer Goal 1, PT)  transfers, all  -SS     [place] Level/Cues Needed (Transfer Goal 1, PT)  conditional independence  -SS     Time Frame (Transfer Goal 1, PT)  long term goal (LTG);2 weeks  -SS     Row Name 07/06/20 1248          Gait Training Goal 1 (PT)    Activity/Assistive Device (Gait Training Goal 1, PT)  gait (walking locomotion)  -SS     [place] Level (Gait Training Goal 1, PT)  supervision required  -SS     Distance (Gait Goal 1, PT)  75'  -SS     Time Frame (Gait Training Goal 1, PT)  long term goal (LTG);2 weeks  -SS       User Key  (r) = Recorded By, (t) = Taken By, (c) = Cosigned By    Initials Name Provider Type     Ashlyn Ross, PT Physical Therapist        Clinical Impression     Row Name 07/06/20 1241          Pain Assessment    Additional Documentation  Pain Scale: FACES Pre/Post-Treatment (Group)  -SS     Row Name 07/06/20 1241          Pain Scale: FACES Pre/Post-Treatment    Pain: FACES Scale, Pretreatment  0-->no hurt  -SS     Pain: FACES Scale, Post-Treatment  0-->no hurt  -SS     Row Name 07/06/20 1241          Plan of Care Review    Plan of Care Reviewed With  patient  -SS     Outcome Summary  Pt is 78 yo male admitted with A fib with RVR, B PNA, AE COPD, and chronic back pain. Pt c/o weakness, cough, nausea, vomiting, and general malaise. At baseline, pt's sister reports he is primarily ambulatory without AD, but occassionally utilizes cane.  He was independent with ADLs.  Per sister, pt has had one fall to her knowledge. Fall was in November with scalp laceration. Also he fell out of a chair while sleeping and landed on his head within the last week. Patient does not require supp O2 at baseline. Patient is limited this date by tachycardia. He requires min A for transfers. Not able to progress to gait due to fatigue and elevated HR. He is significantly below his independent baseline function. He will require IP rehab upon d/c. PPE: mask, face  shield, gloves.   -SS     Row Name 07/06/20 1241          Physical Therapy Clinical Impression    Criteria for Skilled Interventions Met (PT Clinical Impression)  yes;treatment indicated  -SS     Rehab Potential (PT Clinical Summary)  good, to achieve stated therapy goals  -SS     Predicted Duration of Therapy (PT)  until d/c  -SS     Row Name 07/06/20 1241          Vital Signs    Pretreatment Heart Rate (beats/min)  100  -SS     Intratreatment Heart Rate (beats/min)  125  -SS     Posttreatment Heart Rate (beats/min)  109  -SS     Pre Patient Position  Supine  -SS     Intra Patient Position  Sitting  -SS     Post Patient Position  Sitting  -SS     Row Name 07/06/20 1241          Positioning and Restraints    Pre-Treatment Position  in bed  -SS     Post Treatment Position  chair  -SS     In Chair  exit alarm on;encouraged to call for assist;with family/caregiver  -SS       User Key  (r) = Recorded By, (t) = Taken By, (c) = Cosigned By    Initials Name Provider Type    SS Ashlyn Ross, PT Physical Therapist        Outcome Measures    No documentation.       Physical Therapy Education                 Title: PT OT SLP Therapies (In Progress)     Topic: Physical Therapy (Done)     Point: Mobility training (Done)     Description:   Instruct learner(s) on safety and technique for assisting patient out of bed, chair or wheelchair.  Instruct in the proper use of assistive devices, such as walker, crutches, cane or brace.              Patient Friendly Description:   It's important to get you on your feet again, but we need to do so in a way that is safe for you. Falling has serious consequences, and your personal safety is the most important thing of all.        When it's time to get out of bed, one of us or a family member will sit next to you on the bed to give you support.     If your doctor or nurse tells you to use a walker, crutches, a cane, or a brace, be sure you use it every time you get out of bed, even if  you think you don't need it.    Learning Progress Summary           Patient Acceptance, E, VU by  at 7/6/2020 2749                               User Key     Initials Effective Dates Name Provider Type Discipline     06/19/19 -  Ashlyn Ross, PT Physical Therapist PT              PT Recommendation and Plan  Planned Therapy Interventions (PT Eval): balance training, bed mobility training, home exercise program, gait training, patient/family education, strengthening, transfer training  Outcome Summary/Treatment Plan (PT)  Anticipated Discharge Disposition (PT): inpatient rehabilitation facility  Plan of Care Reviewed With: patient  Outcome Summary: Pt is 80 yo male admitted with A fib with RVR, B PNA, AE COPD, and chronic back pain. Pt c/o weakness, cough, nausea, vomiting, and general malaise. At baseline, pt's sister reports he is primarily ambulatory without AD, but occassionally utilizes cane.  He was independent with ADLs.  Per sister, pt has had one fall to her knowledge. Fall was in November with scalp laceration. Also he fell out of a chair while sleeping and landed on his head within the last week. Patient does not require supp O2 at baseline. Patient is limited this date by tachycardia. He requires min A for transfers. Not able to progress to gait due to fatigue and elevated HR. He is significantly below his independent baseline function. He will require IP rehab upon d/c. PPE: mask, face shield, gloves.      Time Calculation:   PT Charges     Row Name 07/06/20 1250             Time Calculation    Start Time  1135  -      Stop Time  1154  -      Time Calculation (min)  19 min  -      PT Received On  07/06/20  -      PT - Next Appointment  07/07/20  -      PT Goal Re-Cert Due Date  07/20/20  -         Time Calculation- PT    Total Timed Code Minutes- PT  0 minute(s)  -        User Key  (r) = Recorded By, (t) = Taken By, (c) = Cosigned By    Initials Name Provider Type    MERARI Ross  Ashlyn STRONG, PT Physical Therapist        Therapy Charges for Today     Code Description Service Date Service Provider Modifiers Qty    [id number]  PT EVAL MOD COMPLEXITY 3 7/6/2020 Ashlyn Ross, PT GP 1               Ashlyn Ross, PT  7/6/2020

## 2020-07-06 NOTE — THERAPY TREATMENT NOTE
Acute Care - Occupational Therapy Treatment Note  [facility]     Patient Name: Shukri Park  : 1940  MRN: [id number]  Today's Date: 2020             Admit Date: 2020       ICD-10-CM ICD-9-CM   1. Atrial fibrillation with rapid ventricular response (CMS/HCC) I48.91 427.31   2. Congestive heart failure, unspecified HF chronicity, unspecified heart failure type (CMS/HCC) I50.9 428.0   3. Pneumonia of both lungs due to infectious organism, unspecified part of lung J18.9 483.8   4. Nausea vomiting and diarrhea R11.2 787.91    R19.7 787.01     Patient Active Problem List   Diagnosis   • Atrial fibrillation (CMS/HCC)   • CAD (coronary artery disease)   • Chronic anticoagulation   • Hyperlipidemia   • Presence of stent in right coronary artery   • Chronic pain   • Sepsis (CMS/HCC)   • Congestive heart failure (CHF) (CMS/HCC)   • Nausea vomiting and diarrhea     Past Medical History:   Diagnosis Date   • A-fib (CMS/[facility])    • Aortic aneurysm (CMS/HCC)    • Appetite loss    • Cancer (CMS/HCC)     right lobe cancer - surgically removed    • Dark stools    • Foot pain, bilateral    • Hyperlipidemia    • Low back pain    • S/P epidural steroid injection     [place] Dr Gomes's - no relief   • Weight loss     acute loss of weight 30 lbs     Past Surgical History:   Procedure Laterality Date   • CATARACT EXTRACTION, BILATERAL     • CORONARY ANGIOPLASTY WITH STENT PLACEMENT     • ENDOSCOPY N/A 2020    Procedure: ESOPHAGOGASTRODUODENOSCOPY;  Surgeon: Neal Correa MD;  Location: [facility];  Service: Gastroenterology;  Laterality: N/A;   • LUMBAR DECOMPRESSION  2015    L2-L3   • LUMBAR DECOMPRESSION      Dr. Logan   • OTHER SURGICAL HISTORY  2015    left SI fusion with bone graft - Dr. Presley   • OTHER SURGICAL HISTORY      carotid artery repair    • OTHER SURGICAL HISTORY Left 2016    Left SI fusion 2016 Dr. Zendejas   • POSTERIOR SPINAL FUSION  10/24/2016    PSF T12-3/TLIF L3-L4 poss  L2-L3 --- Dr. 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       Therapy Treatment    Rehabilitation Treatment Summary     Row Name 07/06/20 1500             Vital Signs    Pre Systolic BP Rehab  134  -MH      Pre Treatment Diastolic BP  98  -MH      Intra Systolic BP Rehab  98  -MH      Intra Treatment Diastolic BP  54  -MH      Pretreatment Heart Rate (beats/min)  112  -MH      Intratreatment Heart Rate (beats/min)  152  -MH      Posttreatment Heart Rate (beats/min)  118  -MH      Pre SpO2 (%)  96  -MH      O2 Delivery Pre Treatment  supplemental O2  -MH      Intra SpO2 (%)  90  -MH      O2 Delivery Intra Treatment  room air  -MH      Post SpO2 (%)  94  -MH      O2 Delivery Post Treatment  supplemental O2  -MH      Pre Patient Position  Sitting  -MH      Intra Patient Position  Standing  -MH      Post Patient Position  Sitting  -MH      Recorded by [] Mana Lucio, OT 07/06/20 1536      Row Name 07/06/20 1500             ADL Assessment/Intervention    BADL Assessment/Intervention  lower body dressing;feeding  -MH      Recorded by [] Mana Lucio OT 07/06/20 1536      Row Name 07/06/20 1500             Lower Body Dressing Assessment/Training    Lower Body Dressing [place] Level  socks adjusts socks w/ CGA & extra time supported sit.  -MH      Recorded by [] Mana Lucio OT 07/06/20 1536      Row Name 07/06/20 1500             Self-Feeding Assessment/Training    [place] Level (Feeding)  prepare tray/open items;maximum assist (25% patient effort);scoop food and bring to mouth;liquids to mouth;supervision;set up  -MH      Recorded by [] Mana Lucio OT 07/06/20 1536      Row Name 07/06/20 1500             Toileting Assessment/Training    [place] Level (Toileting)  contact guard assist for use of urinal  -MH      Recorded by [] Mana Lucio OT 07/06/20 1536      Row Name 07/06/20 1500             Positioning and Restraints    Pre-Treatment Position   sitting in chair/recliner  -      Post Treatment Position  chair  -MH      In Chair  notified nsg;reclined;encouraged to call for assist;exit alarm on;with family/caregiver  -      Recorded by [] Mana Lucio, CASTILLO 07/06/20 1536      Row Name 07/06/20 1500             Pain Scale: FACES Pre/Post-Treatment    Pain: FACES Scale, Pretreatment  0-->no hurt  -      Pain: FACES Scale, Post-Treatment  0-->no hurt  -      Recorded by [] Mana Lucio, OT 07/06/20 1536      Row Name 07/06/20 1500             Coping    Observed Emotional State  accepting;calm;cooperative;sad;apprehensive;frustrated  -      Verbalized Emotional State  acceptance  -      Recorded by [] Mana Lucio OT 07/06/20 1536      Row Name 07/06/20 1500             Plan of Care Review    Plan of Care Reviewed With  patient;son  -      Progress  no change  -      Outcome Summary  Pt is typically (I) and still requiring min (A) to mod (A) to come to stand. Marches in place w/ CGA for < 30 sec & HR is over 150, BP is orthostatic & Pt c/o lightheadedness. Continue to recommend IP rehab at d/c.  -      Recorded by [] Mana Lucio, CASTILLO 07/06/20 1536      Row Name 07/06/20 1500             Outcome Summary/Treatment Plan (OT)    Anticipated Discharge Disposition (OT)  inpatient rehabilitation facility  -      Recorded by [] Mana Lucio, OT 07/06/20 1536        User Key  (r) = Recorded By, (t) = Taken By, (c) = Cosigned By    Initials Name Effective Dates Discipline     Mana Lucio, OT 03/01/19 -  OT           Rehab Goal Summary     Row Name 07/06/20 1248             Bed Mobility Goal 1 (PT)    Activity/Assistive Device (Bed Mobility Goal 1, PT)  bed mobility activities, all  -SS      [place] Level/Cues Needed (Bed Mobility Goal 1, PT)  conditional independence  -SS      Time Frame (Bed Mobility Goal 1, PT)  long term goal (LTG);2 weeks  -SS         Transfer Goal 1 (PT)    Activity/Assistive Device (Transfer Goal 1,  PT)  transfers, all  -SS      [place] Level/Cues Needed (Transfer Goal 1, PT)  conditional independence  -SS      Time Frame (Transfer Goal 1, PT)  long term goal (LTG);2 weeks  -SS         Gait Training Goal 1 (PT)    Activity/Assistive Device (Gait Training Goal 1, PT)  gait (walking locomotion)  -SS      [place] Level (Gait Training Goal 1, PT)  supervision required  -SS      Distance (Gait Goal 1, PT)  75'  -SS      Time Frame (Gait Training Goal 1, PT)  long term goal (LTG);2 weeks  -SS        User Key  (r) = Recorded By, (t) = Taken By, (c) = Cosigned By    Initials Name Provider Type Discipline    SS Ashlyn Ross PT Physical Therapist PT        Occupational Therapy Education                 Title: PT OT SLP Therapies (Done)     Topic: Occupational Therapy (Done)     Point: ADL training (Done)     Description:   Instruct learner(s) on proper safety adaptation and remediation techniques during self care or transfers.   Instruct in proper use of assistive devices.              Learning Progress Summary           Patient Acceptance, E,TB, VU,NR by ES at 7/3/2020 1100                   Point: Home exercise program (Done)     Description:   Instruct learner(s) on appropriate technique for monitoring, assisting and/or progressing therapeutic exercises/activities.              Learning Progress Summary           Patient Acceptance, E,TB, VU by TIFFANY at 7/5/2020 0013                   Point: Precautions (Done)     Description:   Instruct learner(s) on prescribed precautions during self-care and functional transfers.              Learning Progress Summary           Patient Acceptance, E, VU,NL,NR by  at 7/6/2020 1537    Acceptance, E,TB, VU,NR by ES at 7/3/2020 1100                   Point: Body mechanics (Done)     Description:   Instruct learner(s) on proper positioning and spine alignment during self-care, functional mobility activities and/or exercises.              Learning Progress Summary            Patient Acceptance, E, VU,NL,NR by  at 7/6/2020 1537                               User Key     Initials Effective Dates Name Provider Type Discipline     03/01/19 -  Mana Lucio OT Occupational Therapist OT    ES 03/01/19 -  Shelby Bass OT Occupational Therapist OT    TIFFANY 03/22/19 -  Renée Luong, RN Registered Nurse Nurse                OT Recommendation and Plan  Outcome Summary/Treatment Plan (OT)  Anticipated Discharge Disposition (OT): inpatient rehabilitation facility  Plan of Care Review  Plan of Care Reviewed With: patient, son  Plan of Care Reviewed With: patient, son  Outcome Summary: Pt is typically (I) and still requiring min (A) to mod (A) to come to stand. Marches in place w/ CGA for < 30 sec & HR is over 150, BP is orthostatic & Pt c/o lightheadedness. Continue to recommend IP rehab at d/c.  Outcome Measures     Row Name 07/06/20 1500             How much help from another person do you currently need...    Turning from your back to your side while in flat bed without using bedrails?  3  -MH      Moving from lying on back to sitting on the side of a flat bed without bedrails?  2  -MH      Moving to and from a bed to a chair (including a wheelchair)?  2  -MH      Standing up from a chair using your arms (e.g., wheelchair, bedside chair)?  2  -MH      Climbing 3-5 steps with a railing?  1  -MH      To walk in hospital room?  1  -MH      AM-PAC 6 Clicks Score (PT)  11  -         Functional Assessment    Outcome Measure Options  AM-PAC 6 Clicks Basic Mobility (PT)  -        User Key  (r) = Recorded By, (t) = Taken By, (c) = Cosigned By    Initials Name Provider Type     Mana Lucio, OT Occupational Therapist           Time Calculation:              Mana Lucio OT  7/6/2020

## 2020-07-06 NOTE — CONSULTS
GENERAL SURGERY CONSULTATION NOTE    Consult requested by: MD Hilario    Patient Care Team:  Nikki Gonzales MD as PCP - General (Family Medicine)    Reason for consult: gallbladder sludge    Subjective     Patient is a 79 y.o. male presents with generalized weakness, body aches, pains, and cough on 7/1/2020.  Of note, the patient reports that he has been having poor appetite with weight loss, nausea, and some mild vomiting over the last few months.  In the ER, the patient was found to be in A. fib with RVR with a heart rate into the 160s was started on a Cardizem bolus and Cardizem drip.  Chest x-ray obtained in the emergency room demonstrated pulmonary edema with pneumonia.  The patient was admitted to the hospital, started on antibiotics, and continued on treatment for his A. fib with RVR.  He does report that he has had some melena.  His anticoagulation was held.  During his hospitalization, the patient was evaluated by cardiology, as well as gastroenterology.  The patient reported that he had been having nausea, vomiting, and diarrhea for about 3 weeks.  Patient was started on a PPI, Reglan, and had a right upper quadrant ultrasound in addition to a CT scan of the abdomen and pelvis.  The CT scan of the abdomen and pelvis, and right upper quadrant ultrasound both demonstrated sludge within the gallbladder without convincing evidence of acute cholecystitis.  An EGD was later performed by gastroenterology yesterday which demonstrated gastritis.  General surgery was consulted as the patient has had persistent nausea and vomiting with poor p.o. intake to see about potentially performing a cholecystectomy.  Of note, the patient still remains on supplemental oxygen with some mild shortness of breath.  He also is noted to be in A. fib with intermittent bouts of RVR controlled with Cardizem and amiodarone.  He has been receiving therapeutic Lovenox for anticoagulation.    Review of Systems   Constitutional: Positive  for appetite change, fatigue and unexpected weight loss. Negative for chills and fever.   HENT: Positive for postnasal drip and sinus pressure. Negative for congestion and sore throat.    Respiratory: Positive for shortness of breath. Negative for cough.    Cardiovascular: Positive for palpitations. Negative for chest pain.   Gastrointestinal: Positive for abdominal pain, diarrhea, nausea, vomiting and indigestion. Negative for constipation and GERD.   Genitourinary: Negative for difficulty urinating, dysuria and frequency.   Musculoskeletal: Negative for arthralgias and back pain.   Skin: Negative for rash and skin lesions.   Neurological: Negative for dizziness, seizures and memory problem.   Hematological: Negative for adenopathy. Bruises/bleeds easily.   Psychiatric/Behavioral: Negative for sleep disturbance and depressed mood.        History  Past Medical History:   Diagnosis Date   • A-fib (CMS/HCC)    • Aortic aneurysm (CMS/HCC)    • Appetite loss    • Cancer (CMS/HCC)     right lobe cancer - surgically removed    • Dark stools    • Foot pain, bilateral    • Hyperlipidemia    • Low back pain    • S/P epidural steroid injection     [place] Dr Gomes's - no relief   • Weight loss     acute loss of weight 30 lbs     Past Surgical History:   Procedure Laterality Date   • CATARACT EXTRACTION, BILATERAL     • CORONARY ANGIOPLASTY WITH STENT PLACEMENT     • ENDOSCOPY N/A 7/5/2020    Procedure: ESOPHAGOGASTRODUODENOSCOPY;  Surgeon: Neal Correa MD;  Location: [facility] ENDOSCOPY;  Service: Gastroenterology;  Laterality: N/A;   • LUMBAR DECOMPRESSION  09/2015    L2-L3   • LUMBAR DECOMPRESSION  2006    Dr. Logan   • OTHER SURGICAL HISTORY  05/2015    left SI fusion with bone graft - Dr. Presley   • OTHER SURGICAL HISTORY      carotid artery repair    • OTHER SURGICAL HISTORY Left 02/19/2016    Left SI fusion 2/19/2016 Dr. Zendejas   • POSTERIOR SPINAL FUSION  10/24/2016    PSF T12-3/TLIF L3-L4 poss L2-L3 --- Dr. Zendejas   •  TUMOR REMOVAL      carcinoid tumor removed off right lobe tumor     Family History   Problem Relation Age of Onset   • Cancer Other    • Hyperlipidemia Other    • Heart disease Other      Social History     Tobacco Use   • Smoking status: Never Smoker   • Smokeless tobacco: Never Used   Substance Use Topics   • Alcohol use: No     Frequency: Never   • Drug use: Never     Medications Prior to Admission   Medication Sig Dispense Refill Last Dose   • fludrocortisone 0.1 MG tablet Take 0.2 mg by mouth Every Morning.   Taking   • fludrocortisone 0.1 MG tablet Take 0.1 mg by mouth Every Evening.      • HYDROcodone-acetaminophen (NORCO) 7.5-325 MG per tablet Take 1 tablet by mouth Every 6 (Six) Hours As Needed.   Taking   • Multiple Vitamin (MULTIVITAMIN) tablet Take 1 tablet by mouth Daily.   Taking   • potassium chloride ER (K-TAB) 20 MEQ tablet controlled-release ER tablet Take 20 mEq by mouth 2 (Two) Times a Day With Meals.   Taking   • rivaroxaban (XARELTO) 20 MG tablet Take 20 mg by mouth Daily.   Taking   • simvastatin (ZOCOR) 40 MG tablet Take 80 mg by mouth Every Night.      • vitamin B-12 (CYANOCOBALAMIN) 1000 MCG tablet Take 1,000 mcg by mouth Daily.   Taking     Allergies:  Ciprofloxacin; Amlodipine; and Rocephin [ceftriaxone]    Objective     Vital Signs  Temp:  [97.2 °F (36.2 °C)-98 °F (36.7 °C)] 97.3 °F (36.3 °C)  Heart Rate:  [] 116  Resp:  [16-28] 28  BP: (119-151)/() 149/98    Physical Exam   Constitutional: He is oriented to person, place, and time. He appears well-developed.   Elderly-appearing man in no acute distress   HENT:   Head: Normocephalic and atraumatic.   Eyes: Pupils are equal, round, and reactive to light. EOM are normal.   Neck: Normal range of motion. Neck supple.   Cardiovascular:   Irregular rate and rhythm, slightly tachycardic   Pulmonary/Chest: Effort normal and breath sounds normal.   Abdominal: Soft. He exhibits no distension. There is no tenderness. There is no  guarding.   Musculoskeletal: Normal range of motion. He exhibits no edema.   Neurological: He is alert and oriented to person, place, and time.   Skin: Skin is warm and dry.   Psychiatric: He has a normal mood and affect. His behavior is normal.   Vitals reviewed.      Results Review:   Lab Results (last 24 hours)     Procedure Component Value Units Date/Time    POC Glucose Once [[id number]]  (Abnormal) Collected:  07/06/20 1634    Specimen:  Blood Updated:  07/06/20 1635     Glucose 130 mg/dL      Comment: Serial Number: [id number]:  [id number]       POC Glucose Once [[id number]]  (Abnormal) Collected:  07/06/20 1127    Specimen:  Blood Updated:  07/06/20 1141     Glucose 159 mg/dL      Comment: Serial Number: [id number]:  [id number]       Tissue Pathology Exam [[id number]] Collected:  07/05/20 1121    Specimen:  Tissue from Gastric, Antrum Updated:  07/06/20 0726    POC Glucose Once [[id number]]  (Abnormal) Collected:  07/06/20 0723    Specimen:  Blood Updated:  07/06/20 0724     Glucose 116 mg/dL      Comment: Serial Number: [id number]:  [id number]       BUN [[id number]]  (Normal) Collected:  07/06/20 0225    Specimen:  Blood Updated:  07/06/20 0701     BUN 19 mg/dL     Digoxin Level [[id number]]  (Abnormal) Collected:  07/06/20 0225    Specimen:  Blood Updated:  07/06/20 0301     Digoxin 1.30 ng/mL     Blood Culture - Blood, Arm, Left [[id number]] Collected:  07/02/20 0249    Specimen:  Blood from Arm, Left Updated:  07/06/20 0300     Blood Culture No growth at 4 days    Comprehensive Metabolic Panel [[id number]]  (Abnormal) Collected:  07/06/20 0225    Specimen:  Blood Updated:  07/06/20 0259     Glucose 151 mg/dL      BUN --     Comment: Testing performed by alternate method        Creatinine 0.65 mg/dL      Sodium 143 mmol/L      Potassium 3.5 mmol/L      Chloride 98 mmol/L      CO2 33.0 mmol/L      Calcium 8.9 mg/dL      Total Protein 6.6 g/dL      Albumin 3.80 g/dL      ALT (SGPT) 59 U/L      AST  (SGOT) 41 U/L      Alkaline Phosphatase 59 U/L      Total Bilirubin 1.5 mg/dL      eGFR Non African Amer 118 mL/min/1.73      Globulin 2.8 gm/dL      A/G Ratio 1.4 g/dL      BUN/Creatinine Ratio --     Comment: Testing not performed        Anion Gap 12.0 mmol/L     Narrative:       GFR Normal >60  Chronic Kidney Disease <60  Kidney Failure <15      Magnesium [[id number]]  (Normal) Collected:  07/06/20 0225    Specimen:  Blood Updated:  07/06/20 0259     Magnesium 2.2 mg/dL     CBC & Differential [[id number]] Collected:  07/06/20 0225    Specimen:  Blood Updated:  07/06/20 0242    Narrative:       The following orders were created for panel order CBC & Differential.  Procedure                               Abnormality         Status                     ---------                               -----------         ------                     CBC Auto Differential[[id number]]        Abnormal            Final result                 Please view results for these tests on the individual orders.    CBC Auto Differential [[id number]]  (Abnormal) Collected:  07/06/20 0225    Specimen:  Blood Updated:  07/06/20 0242     WBC 16.10 10*3/mm3      RBC 4.84 10*6/mm3      Hemoglobin 14.3 g/dL      Hematocrit 44.1 %      MCV 91.1 fL      MCH 29.5 pg      MCHC 32.3 g/dL      RDW 14.7 %      RDW-SD 45.9 fl      MPV 8.2 fL      Platelets 302 10*3/mm3      Neutrophil % 87.0 %      Lymphocyte % 4.5 %      Monocyte % 8.3 %      Eosinophil % 0.0 %      Basophil % 0.2 %      Neutrophils, Absolute 14.00 10*3/mm3      Lymphocytes, Absolute 0.70 10*3/mm3      Monocytes, Absolute 1.30 10*3/mm3      Eosinophils, Absolute 0.00 10*3/mm3      Basophils, Absolute 0.00 10*3/mm3      nRBC 0.0 /100 WBC     Blood Culture - Blood, Arm, Right [[id number]] Collected:  07/01/20 2348    Specimen:  Blood from Arm, Right Updated:  07/06/20 0000     Blood Culture No growth at 4 days    POC Glucose Once [[id number]]  (Abnormal) Collected:  07/05/20 2048    Specimen:   Blood Updated:  07/05/20 2050     Glucose 133 mg/dL      Comment: Serial Number: [id number]:  [id number]       POC Glucose Once [[id number]]  (Abnormal) Collected:  07/05/20 1804    Specimen:  Blood Updated:  07/05/20 1805     Glucose 165 mg/dL      Comment: Serial Number: [id number]:  [id number]           Xr Chest 1 View    Result Date: 7/4/2020  Slight improvement compared to prior radiograph. Groundglass opacity septal thickening upper lobe predominant with slight improvement on the right. Question atypical infection including Covid 19. Other bacterial or atypical pneumonia as can have this appearance.  Electronically Signed By-Sarah Beth Bolden MD On:7/4/2020 6:33 PM This report was finalized on [id number] by  Sarah Beth Bolden MD.    CT CHEST WO CONTRAST-, CT ABDOMEN PELVIS WO CONTRAST-     Date of Exam: 7/2/2020 1:46 PM     Indication: pneumonia; I48.91-Unspecified atrial fibrillation;  I50.9-Heart failure, unspecified; J18.9-Pneumonia, unspecified organism.   Sepsis      Comparison Exams: None available.     Technique: Multiple axial images were obtained from the thoracic inlet  through the sepsis pubis without the administration of IV contrast. The  axial data was used to generate reformatted images in the coronal and  sagittal planes.     Automated exposure control and iterative reconstruction methods were  used.     FINDINGS:  Chest: There is mediastinal adenopathy.  Precarinal node measures 2.5 x  1.8 cm in size.  No hilar or axillary adenopathy identified.  There is  emphysematous change of the lungs.  There is groundglass airspace  disease and interlobular septal thickening within the bilateral upper  lobes and patchy areas of additional groundglass airspace disease and  interlobular septal thickening within the bilateral lower lobes.   Findings are nonspecific and may be related to pneumonia including  atypical pneumonia as well as Covid 19 pneumonia.  Clinical correlation  recommended.  Within the  right upper lobe there is a noncalcified 7 mm  pulmonary nodule.  Patient status post right middle lobectomy.  There is  low density material tracking within the major fissure which may be due  to small right pleural effusion.  Pleural scarring or treatment-related  change could also give this appearance.  Left within the posterior left  lower lobe there is a noncalcified 4 mm nodule.     ABDOMEN: Liver, pancreas, and spleen are within normal limits.   Bilateral adrenal glands appear normal.  Again demonstrated is a large  cyst arises from the upper pole the right kidney.  Kidneys are otherwise  unremarkable.  No renal or ureteral stone or hydronephrosis.  There is  some high density material seen dependently within the gallbladder which  may be due to sludge or multiple small stones.  There is no inflammatory  change around the gallbladder.  No evidence of biliary tract  obstruction.  Upper GI tract appears within normal limits.  There is an  abdominal aortic aneurysm measuring 3.8 x 3.7 cm in size.  No abdominal  adenopathy or free intraperitoneal fluid identified.     Pelvis: Urinary bladder is within normal limits.  GI tract is  unremarkable.  There is no pelvic or inguinal adenopathy.  No free  intraperitoneal fluid is seen.  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  Left common iliac artery  measures 1.9 cm in size.     Bones: There is diffuse osteopenia.  Patient status post arthrodesis of  the left sacroiliac joint.  There are degenerative changes throughout  spine.  There are postoperative changes of the lumbar spine with fusion  from the T12-L4 vertebral levels.  No hardware complication identified.   There are no suspicious lytic or sclerotic bony lesions.     IMPRESSION:     1.  Bilateral groundglass airspace disease with interlobular septal  thickening with upper lobe predominance.  Findings are nonspecific but  can be seen with Covid 19 pneumonia.  Other  bacterial and atypical  pneumonias can give this appearance as well.  2.  Status post right middle lobectomy.  3.  Thickening along the major fissure on the right which could be due  to a small amount of pleural fluid or pleural scarring related to  treatment change.  4.  No acute process seen within the abdomen.  5.  Abdominal aortic aneurysm measuring 3.8 cm in size.    US GALLBLADDER-     Date of Exam: 7/3/2020 2:31 PM     Indication: intractable nausea and vomiting; I48.91-Unspecified atrial  fibrillation; I50.9-Heart failure, unspecified; J18.9-Pneumonia,  unspecified organism.   Abdominal pain     Comparison: CT 07/02/2020     Technique: Transverse and sagittal ultrasound images of the right upper  quadrant were obtained. Doppler evaluation was also conducted.     FINDINGS:  Visualized portions of the head and body of the pancreas are normal.  Evaluation is limited due to surrounding bowel gas.     The liver appears echogenic.  No focal hepatic lesions.  Portal and  hepatic veins are patent and have normal flow direction and waveforms.      Gallbladder is normal in caliber with no evidence of stones, wall  thickening or pericholecystic fluid. A small amount of sludge is present  within the gallbladder lumen. Negative sonographic Gomes's sign.     The biliary system is nondilated.      The right kidney is normal in size with no evidence of hydronephrosis.  No suspicious focal lesions. Simple cyst is seen arising from the  superior pole measuring up to 7.6 cm.     IMPRESSION:  Gallbladder sludge with no evidence of stones. No secondary findings of  acute cholecystitis. Mildly increased echogenicity of liver parenchyma  is present likely related to steatosis. A simple cyst is seen arising  from the right kidney.    I reviewed the patient's new imaging results and agree with the interpretation.  I reviewed the patient's other test results and agree with the interpretation    Assessment/Plan     Principal  Problem:    Atrial fibrillation (CMS/HCC)  Active Problems:    CAD (coronary artery disease)    Chronic anticoagulation    Hyperlipidemia    Presence of stent in right coronary artery    Chronic pain    Sepsis (CMS/HCC)    Congestive heart failure (CHF) (CMS/HCC)    Nausea vomiting and diarrhea    I reviewed the patient's imaging.  After listening the patient's history and physical exam, it is possible that his gallbladder sludge could be contributing to his epigastric abdominal pain, nausea, and poor appetite.  I discussed with the patient that he also was noted to definitely have gastritis which could explain the symptoms as well.  With regards to surgery to remove his gallbladder, I believe that technically, the surgery would be fairly easy to perform.  However, the patient has multiple medical comorbidities including CAD, and uncontrolled atrial fibrillation with RVR, pneumonia, and is fully anticoagulated.  Because of this, the patient is extremely high risk for having a perioperative complication related to bleeding, infection, worsening pneumonia with respiratory failure, heart attack, stroke, and/or possibly death.  As such, I believe the patient should be optimized from a cardiac standpoint prior to consideration of removal of his gallbladder.  Once the patient has stabilized and received cardiac clearance, then we could consider potentially removing his gallbladder.  In the interim, I agree with gastroenterology that we should continue to treat his gastritis.  If the patient's symptoms michael with treatment of his gastritis, then removal of his gallbladder will not be necessary.  If the patient's cardiac issues become controlled during this hospitalization, and he is cleared by cardiology to undergo a surgery, we could consider surgical removal of his gallbladder during this hospitalization. However, it would be my preference to wait until his cardiac issues stabilize and he recovers from his pneumonia  prior to proceeding the operating room.  Continue antibiotics  We will continue to follow, however patient will likely be discharged from the hospital and follow-up with me as an outpatient to discuss elective removal of his gallbladder in the future.    Cody Randall MD  07/06/20  17:38

## 2020-07-06 NOTE — DISCHARGE PLACEMENT REQUEST
"Shukri Huggins (79 y.o. Male)     Date of Birth Social Security Number Address Home Phone MRN    1940  [id number]  [place] [address] [phone] [id number]    Hoahaoism Marital Status          None Legally        Admission Date Admission Type Admitting Provider Attending Provider Department, Room/Bed    7/1/20 Emergency Denzel Rich MD Piwko, Radomir, MD [facility] NEURO HEART, 257/1    Discharge Date Discharge Disposition Discharge Destination                       Attending Provider:  Denzel Rich MD    Allergies:  Ciprofloxacin, Amlodipine, Rocephin [Ceftriaxone]    Isolation:  None   Infection:  None   Code Status:  CPR    Ht:  185.4 cm (73\")   Wt:  67.2 kg (148 lb 2.4 oz)    Admission Cmt:  None   Principal Problem:  Atrial fibrillation (CMS/HCC) [I48.91]                 Active Insurance as of 7/1/2020     Primary Coverage     Payor Plan Insurance Group Employer/Plan Group    ANTHEM MEDICARE REPLACEMENT ANTHEM MEDICARE ADVANTAGE [id number]     Payor Plan Address Payor Plan Phone Number Payor Plan Fax Number Effective Dates    [address] [phone]  1/1/2019 - None Entered    [facility] [address]       Subscriber Name Subscriber Birth Date Member ID       SHUKRI HUGGINS 1940 [id number]                 Emergency Contacts      (Rel.) Home Phone Work Phone Mobile Phone    GAURAV HUGGINS (Grandchild) -- -- [phone]    BHAVNAALEYDA (Sister) [phone] -- [phone]               History & Physical      Coy Donahue APRN at 07/02/20 0443     Attestation signed by Temo Marti MD at 07/02/20 0980    Hospitalist Physician Assessment/Plan    History:*  Co headache , nasal sinus paina and draianage  Co melena  Poor po     Exam:  Abd sodt  Ao3  Lungs: lt bronchial sounds w crackles  No swelling le  Neuro exam nonfocal      Medical Decision Making:  Agree w abx, and ccurrent rx  Hold xarelto; check occult " blood  Lactic worseining  Check abg, ct c/a/p and sinuses        Attending Physician Attestation    For this patient encounter, I have reviewed the mid-level provider documentation, medical decision making and treatment plan, and I have personally spent time with the patient.  All procedures were done by me, and/or all procedures were performed by the mid-level under my supervision.                         [facility] Medicine Services      Patient Name: Shukri Park  : 1940  MRN: [id number]  Primary Care Physician: Nikki Gonzales MD  Date of admission: 2020    Patient Care Team:  Nikki Gonzales MD as PCP - General (Family Medicine)          Subjective   History Present Illness     Chief Complaint:   Chief Complaint   Patient presents with   • Weakness - Generalized       Mr. Park is a 79 y.o. male with a history of atrial fibrillation, hyperlipidemia, chronic low back pain, hypotension, chronic anticoagulation presented to the [facility] ED on 2020 with a complaint of generalized body aches and pains, weakness and a cough.  Patient states he is also had some nausea and vomiting.  Patient denies chest pain, shortness of breath, diarrhea, or any ill contacts.  Upon arrival to the ED patient was found to be in atrial fibrillation with RVR, heart rate 160s patient was given Cardizem bolus and started on a Cardizem drip.    In the ED, initial troponin negative, proBNP 3485, , glucose 119, sodium 145, potassium 4.1, BUN 23, creatinine 0.69, C-reactive protein 28.6, lactate 2.5, lipase 13, magnesium 2.4, procalcitonin 0.11, WBC 9.6, Hgb 11.7, HCT 35.5, blood cultures pending.  EKG: A. fib with RVR.  Respiratory panel negative, COVID 19 test negative.  Chest x-ray with pulmonary edema, pneumonia as read by per ED physician, awaiting official radiology read.  Patient is on a Cardizem drip at 15 mg/h, received a 500 mL normal saline bolus, 4 mg Zofran IV x1, 40 mg  Lasix IV x1, Inapsine 1.25 mg IV x1, patient will be admitted for further evaluation and management.       Review of Systems   Constitution: Positive for decreased appetite and malaise/fatigue.   HENT: Positive for congestion.    Eyes: Negative.    Cardiovascular: Negative.    Respiratory: Positive for cough.    Endocrine: Negative.    Hematologic/Lymphatic: Negative.    Skin: Negative.    Musculoskeletal: Negative.    Gastrointestinal: Positive for nausea and vomiting.   Genitourinary: Negative.    Neurological: Negative.    Psychiatric/Behavioral: Negative.    Allergic/Immunologic: Negative.    All other systems reviewed and are negative.          Personal History     Past Medical History:   Past Medical History:   Diagnosis Date   • A-fib (CMS/HCC)    • Aortic aneurysm (CMS/HCC)    • Appetite loss    • Cancer (CMS/HCC)     right lobe cancer - surgically removed    • Dark stools    • Foot pain, bilateral    • Hyperlipidemia    • Low back pain    • S/P epidural steroid injection     [place] Dr Gomes's - no relief   • Weight loss     acute loss of weight 30 lbs       Surgical History:      Past Surgical History:   Procedure Laterality Date   • CATARACT EXTRACTION, BILATERAL     • CORONARY ANGIOPLASTY WITH STENT PLACEMENT     • LUMBAR DECOMPRESSION  09/2015    L2-L3   • LUMBAR DECOMPRESSION  2006    Dr. Logan   • OTHER SURGICAL HISTORY  05/2015    left SI fusion with bone graft - Dr. Presley   • OTHER SURGICAL HISTORY      carotid artery repair    • OTHER SURGICAL HISTORY Left 02/19/2016    Left SI fusion 2/19/2016 Dr. Zendejas   • POSTERIOR SPINAL FUSION  10/24/2016    PSF T12-3/TLIF L3-L4 poss L2-L3 --- Dr. 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 Otherwise pertinent FHx was reviewed and unremarkable.     Social History:   reports that he has never smoked. He has never used smokeless tobacco. He reports that he does not drink alcohol or use drugs.      Medications:  Prior to Admission medications    Medication Sig Start Date End Date Taking? Authorizing Provider   fludrocortisone 0.1 MG tablet Take 0.2 mg by mouth Every Morning.   Yes Marvin Lucas MD   fludrocortisone 0.1 MG tablet Take 0.1 mg by mouth Every Evening.   Yes Marvin Lucas MD   HYDROcodone-acetaminophen (NORCO) 7.5-325 MG per tablet Take 1 tablet by mouth Every 6 (Six) Hours As Needed. 4/14/20  Yes Marvin Lucas MD   Multiple Vitamin (MULTIVITAMIN) tablet Take 1 tablet by mouth Daily.   Yes Marvin Lucas MD   potassium chloride ER (K-TAB) 20 MEQ tablet controlled-release ER tablet Take 20 mEq by mouth 2 (Two) Times a Day With Meals. 3/14/20  Yes Marvin Lucas MD   rivaroxaban (XARELTO) 20 MG tablet Take 20 mg by mouth Daily.   Yes Marvin Lucas MD   simvastatin (ZOCOR) 40 MG tablet Take 80 mg by mouth Every Night.   Yes Marvin Lucas MD   vitamin B-12 (CYANOCOBALAMIN) 1000 MCG tablet Take 1,000 mcg by mouth Daily. 11/15/18  Yes Marvin Lucas MD   simvastatin (ZOCOR) 40 MG tablet Take 40 mg by mouth Every Night.  7/2/20  Marvin Lucas MD       Allergies:    Allergies   Allergen Reactions   • Ciprofloxacin Anaphylaxis   • Amlodipine Unknown (See Comments)   • Rocephin [Ceftriaxone] Other (See Comments)     Mouth sores       Objective   Objective     Vital Signs  Temp:  [97.5 °F (36.4 °C)] 97.5 °F (36.4 °C)  Heart Rate:  [117-150] 117  Resp:  [18-22] 18  BP: (107-141)/(54-85) 128/78  SpO2:  [85 %-96 %] 96 %  on   ;      Body mass index is 19.79 kg/m².    Physical Exam   Constitutional: He is oriented to person, place, and time. He appears well-developed.   HENT:   Head: Normocephalic.   Eyes: Conjunctivae are normal.   Neck: Normal range of motion.   Cardiovascular: Normal heart sounds and intact  distal pulses.   Atrial fibrillation heart rate 120-140 beats per minute   Pulmonary/Chest: Effort normal. He has rales.   Abdominal: Soft. Bowel sounds are normal.   Musculoskeletal: Normal range of motion.   Neurological: He is alert and oriented to person, place, and time.   Skin: Skin is warm and dry.       Results Review:  I have personally reviewed most recent cardiac tracings, lab results and radiology images and interpretations and agree with findings,     Results from last 7 days   Lab Units 07/01/20  2348   WBC 10*3/mm3 9.60   HEMOGLOBIN g/dL 11.7*   HEMATOCRIT % 35.5*   PLATELETS 10*3/mm3 253     Results from last 7 days   Lab Units 07/02/20  0401  07/01/20  2348   SODIUM mmol/L  --   --  145   POTASSIUM mmol/L  --   --  4.1   CHLORIDE mmol/L  --   --  104   CO2 mmol/L  --   --  22.0   BUN   --   --  23   CREATININE mg/dL  --   --  0.69*   GLUCOSE mg/dL  --   --  119*   CALCIUM mg/dL  --   --  9.1   ALT (SGPT) U/L  --   --  15   AST (SGOT) U/L  --   --  31   TROPONIN T ng/mL  --   --  <0.010   PROBNP pg/mL  --   --  3,485.0*   LACTATE mmol/L 5.1*   < >  --    PROCALCITONIN ng/mL  --   --  0.11    < > = values in this interval not displayed.     Estimated Creatinine Clearance: 70.8 mL/min (A) (by C-G formula based on SCr of 0.69 mg/dL (L)).  Brief Urine Lab Results     None          Microbiology Results (last 10 days)     Procedure Component Value - Date/Time    Respiratory Panel, PCR - Swab, Nasopharynx [[id number]]  (Normal) Collected:  07/02/20 0027    Lab Status:  Final result Specimen:  Swab from Nasopharynx Updated:  07/02/20 0153     ADENOVIRUS, PCR Not Detected     Coronavirus 229E Not Detected     Coronavirus HKU1 Not Detected     Coronavirus NL63 Not Detected     Coronavirus OC43 Not Detected     Human Metapneumovirus Not Detected     Human Rhinovirus/Enterovirus Not Detected     Influenza B PCR Not Detected     Parainfluenza Virus 1 Not Detected     Parainfluenza Virus 2 Not Detected      Parainfluenza Virus 3 Not Detected     Parainfluenza Virus 4 Not Detected     Bordetella pertussis pcr Not Detected     Influenza A H1 2009 PCR Not Detected     Chlamydophila pneumoniae PCR Not Detected     Mycoplasma pneumo by PCR Not Detected     Influenza A PCR Not Detected     Influenza A H3 Not Detected     Influenza A H1 Not Detected     RSV, PCR Not Detected    Narrative:       The coronavirus on the RVP is NOT COVID-19 and is NOT indicative of infection with COVID-19.     COVID-19 [facility] [employer] IN-HOUSE, Nasal Swab No Transport Media - Swab, Nasal Cavity [[id number]]  (Normal) Collected:  07/02/20 0027    Lab Status:  Final result Specimen:  Swab from Nasal Cavity Updated:  07/02/20 0104     COVID19 Not Detected    Narrative:       Fact sheet for providers: https://www.fda.gov/media/704484/download     Fact sheet for patients: https://www.fda.gov/media/105303/download          ECG/EMG Results (most recent)     Procedure Component Value Units Date/Time    ECG 12 Lead [[id number]] Collected:  07/01/20 2340     Updated:  07/01/20 2341    Narrative:       HEART RATE= 163  bpm  RR Interval= 368  ms  MI Interval=   ms  P Horizontal Axis=   deg  P Front Axis=   deg  QRSD Interval= 78  ms  QT Interval= 283  ms  QRS Axis= 7  deg  T Wave Axis= 42  deg  - ABNORMAL ECG -  Atrial fibrillation with rapid V-rate  Electronically Signed By:   Date and Time of Study: 2020-07-01 23:40:10                    No radiology results for the last 7 days      Estimated Creatinine Clearance: 70.8 mL/min (A) (by C-G formula based on SCr of 0.69 mg/dL (L)).    Assessment/Plan   Assessment/Plan       Active Hospital Problems    Diagnosis  POA   • **Atrial fibrillation (CMS/HCC) [I48.91]  Yes     Priority: High   • Chronic pain [G89.29]  Yes     Priority: Medium   • Sepsis (CMS/HCC) [A41.9]  Yes     Priority: Medium   • CAD (coronary artery disease) [I25.10]  Yes     Priority: Medium   • Chronic anticoagulation [Z79.01]  Not Applicable      Priority: Medium   • Hyperlipidemia [E78.5]  Yes     Priority: Medium   • Presence of stent in right coronary artery [Z95.5]  Not Applicable     Priority: Medium      Resolved Hospital Problems   No resolved problems to display.     Atrial fibrillation  -Cardizem drip 15 mg/hr  -Patient received several Cardizem boluses in the ED  -Patient with history, on Xarelto,  -Not currently on any antiarrhythmics at home  -Check magnesium, TSH, trend troponins  -Cardiology consult    Possible sepsis  -Patient ruled in for sepsis due to tachycardia, tachypnea, lactate 2.5  -Given Zosyn 3.375 g, continue until sepsis ruled out  -Patient received 500 mL normal saline IV bolus in ED  -Blood cultures pending  -COVID-19 ruled out  -Respiratory panel negative  -Check urine    CAD  -No echo on file, obtain echo  -BNP 3485  -Per review of outside records: Last angioplasty to the RCA and left circumflex 4/16/2004  -Currently on Florinef for hypotension  -Continue Florinef, continue statin    Hyperlipidemia  -Continue statin    Chronic pain  -Due to multiple back surgeries  -Continue Norco, (INSPECT verified)      VTE Prophylaxis -   Mechanical Order History:     None      Pharmalogical Order History:     Ordered     Dose Route Frequency Stop    07/02/20 0414  rivaroxaban (XARELTO) tablet 20 mg      20 mg PO Daily With Dinner --          CODE STATUS:    Code Status and Medical Interventions:   Ordered at: 07/02/20 0406     Level Of Support Discussed With:    Patient     Code Status:    CPR     Medical Interventions (Level of Support Prior to Arrest):    Full         I discussed the patient's findings and my recommendations with patient.        Electronically signed by TERRANCE Guallpa, 07/02/20, 4:44 AM.  [facility] Hospitalist Team          Electronically signed by Temo Marti MD at 07/02/20 3027       {Outbreak/Travel/Exposure Documentation......;  Question Available Choices Patient Response   Outbreak  Screen: Do you currently have the following symptoms?        Fever, Cough, Runny nose, Congestion, Diarrhea, Nausea/Vomiting,Shortness of breath, Recent sudden loss of taste or smell, chills, sore throat, Muscle aches, New onset headache, No, Unknown  (!) Nausea/vomiting;Muscle aches;Congestion (07/01/20 2340)   Outbreak Screen: In the last 14 days, have you had contact with anyone who is ill, has show any of the symptoms listed above and/or has been diagnosis with the 2019 Novel Coronavirus? This includes any immediate household members but excludes any patients with whom you have been in contact within your normal work duties wearing proper PPE, if you are a healthcare worker.  Yes, No, Unknown              No (07/01/20 2340)   Outbreak Screen: Who was notified?    Free text  MEEK Charles (07/01/20 2340)   Travel Screen: Have you traveled in the last month? If so, to what country have you traveled? If [place] what state? Yes, No, Unknown  List of all countries  List of all States No (07/01/20 2340)  (not recorded)  (not recorded)   Infection Risk: Do you currently have the following symptoms?  (If cough is selected, the Tuberculosis Screen is performed.) Cough, Fever, Rash, No No (07/01/20 2340)   Tuberculosis Screen: Do you have any of the following Tuberculosis Risks?  · Have you lived or spent time with anyone who had or may have TB?  · Have you lived in or visited any of the following areas for more than one month: [place], [place], [place], [place] or [place], the [place] or [place]?  · Do you have HIV/AIDS?  · Have you lived in or worked in a nursing home, homeless shelter, correctional facility, or substance abuse treatment facility?   · No    If Yes do you have any of the following symptoms? Yes responses display to the right    If Yes, symptoms listed are:  Cough greater than or equal to 3 weeks, Loss of appetite, Unexplained weight loss, Night sweats, Bloody sputum or hemoptysis, Hoarseness, Fever,  Fatigue, Chest pain, No (not recorded)  (not recorded)   Exposure Screen: Have you been exposed to any of these contagious diseases in the last month? Measles, Chickenpox, Meningitis, Pertussis, Whooping Cough, No No (07/01/20 6600)       Current Facility-Administered Medications   Medication Dose Route Frequency Provider Last Rate Last Dose   • acetaminophen (TYLENOL) tablet 650 mg  650 mg Oral Q4H PRN Dami Burleson APRN        Or   • acetaminophen (TYLENOL) 160 MG/5ML solution 650 mg  650 mg Oral Q4H PRN Dami Burleson APRN        Or   • acetaminophen (TYLENOL) suppository 650 mg  650 mg Rectal Q4H PRN Dami Burleson APRN       • aluminum-magnesium hydroxide-simethicone (MAALOX MAX) 400-400-40 MG/5ML suspension 15 mL  15 mL Oral Q6H PRN Dami Burleson APRN       • AMIODARONE HCL IN DEXTROSE 450-5 MG/250ML-% IV SOLN  0.5 mg/min Intravenous Continuous Dami Burleson APRN 16.67 mL/hr at 07/06/20 0336 0.5 mg/min at 07/06/20 0336   • atorvastatin (LIPITOR) tablet 20 mg  20 mg Oral Daily Dami Burleson APRN   20 mg at 07/05/20 0850   • bisacodyl (DULCOLAX) EC tablet 5 mg  5 mg Oral Daily PRN Dami Burleson APRN       • dextrose (D50W) 25 g/ 50mL Intravenous Solution 25 g  25 g Intravenous Q15 Min PRN Dami Burleson APRN       • dextrose (GLUTOSE) oral gel 15 g  15 g Oral Q15 Min PRN Dami Burleson APRN       • digoxin (LANOXIN) injection 125 mcg  125 mcg Intravenous Daily Dami Burleson APRN   125 mcg at 07/06/20 1215   • dilTIAZem (CARDIZEM) 100 mg in 100 mL NS (1 mg/mL) infusion (ADV)  5-15 mg/hr Intravenous Titrated Dami Burleson APRN 10 mL/hr at 07/06/20 1025 10 mg/hr at 07/06/20 1025   • enoxaparin (LOVENOX) syringe 70 mg  70 mg Subcutaneous Q12H Dami Burleson APRN   70 mg at 07/06/20 1215   • fludrocortisone tablet 0.1 mg  0.1 mg Oral Q PM Dami Burleson APRN   0.1 mg at 07/05/20 1612   • furosemide (LASIX) tablet 40 mg  40 mg Oral Daily Dami Burleson APRN   40 mg at 07/05/20 0808    • glucagon (human recombinant) (GLUCAGEN DIAGNOSTIC) injection 1 mg  1 mg Subcutaneous Q15 Min PRN Dami Burleson APRN       • HYDROcodone-acetaminophen (NORCO) 7.5-325 MG per tablet 1 tablet  1 tablet Oral Q6H PRN Dami Burleson APRN   1 tablet at 07/04/20 0501   • insulin lispro (humaLOG) injection 0-7 Units  0-7 Units Subcutaneous 4x Daily With Meals & Nightly Dami Burleson APRN   2 Units at 07/06/20 1215    And   • insulin lispro (humaLOG) injection 0-7 Units  0-7 Units Subcutaneous PRN Dami Burleson APRN       • ipratropium-albuterol (DUO-NEB) nebulizer solution 3 mL  3 mL Nebulization Q6H PRN Ebenezer Brambila MD       • magnesium hydroxide (MILK OF MAGNESIA) suspension 2400 mg/10mL 10 mL  10 mL Oral Daily PRN Dami Burleson APRN       • Magnesium Sulfate 2 gram Bolus, followed by 8 gram infusion (total Mg dose 10 grams)- Mg less than or equal to 1mg/dL  2 g Intravenous PRN Dami Burleson APRN        Or   • Magnesium Sulfate 2 gram / 50mL Infusion (GIVE X 3 BAGS TO EQUAL 6GM TOTAL DOSE) - Mg 1.1 - 1.5 mg/dl  2 g Intravenous PRN Dami Burleson APRN        Or   • Magnesium Sulfate 4 gram infusion- Mg 1.6-1.9 mg/dL  4 g Intravenous PRN Dami Burleson APRN       • melatonin tablet 5 mg  5 mg Oral Nightly PRN Dami Burleson APRN   5 mg at 07/04/20 2001   • metoclopramide (REGLAN) injection 10 mg  10 mg Intravenous Q6H Dami Burleson APRN   10 mg at 07/06/20 0906   • metoprolol tartrate (LOPRESSOR) tablet 25 mg  25 mg Oral Q6H Wade Cronin MD       • Morphine sulfate (PF) injection 2 mg  2 mg Intravenous Q4H PRN Dami Burleson APRN   2 mg at 07/05/20 1606   • ondansetron (ZOFRAN) injection 4 mg  4 mg Intravenous Q6H PRN Dami Burleson APRN       • ondansetron (ZOFRAN) injection 4 mg  4 mg Intravenous Q6H Dami Burleson APRN   4 mg at 07/06/20 0906   • [START ON 7/7/2020] pantoprazole (PROTONIX) EC tablet 40 mg  40 mg Oral Q AM Ebenezer Brambila MD       • Pharmacy Consult -  Pharmacy to dose   Does not apply Continuous PRN Dami Burleson APRN       • Pharmacy to Dose Zosyn   Does not apply Continuous PRN Dami Burleson APRN       • piperacillin-tazobactam (ZOSYN) IVPB 3.375 g in 100 mL NS (CD)  3.375 g Intravenous Q8H Dami Burleson APRN 0 mL/hr at 07/05/20 0959 3.375 g at 07/06/20 0906   • potassium chloride (K-DUR,KLOR-CON) CR tablet 20 mEq  20 mEq Oral Daily Dami Burleson APRN   20 mEq at 07/06/20 1216   • potassium chloride (K-DUR,KLOR-CON) CR tablet 40 mEq  40 mEq Oral PRN Dami Burleson APRN   40 mEq at 07/05/20 2215    Or   • potassium chloride (KLOR-CON) packet 40 mEq  40 mEq Oral PRN Dmai Burleson APRN   40 mEq at 07/06/20 0340    Or   • potassium chloride 10 mEq in 100 mL IVPB  10 mEq Intravenous Q1H PRN Dami Burleson APRN 100 mL/hr at 07/03/20 1514 10 mEq at 07/03/20 1514   • potassium phosphate 45 mmol in sodium chloride 0.9 % 500 mL infusion  45 mmol Intravenous PRN Dami Burleson APRN        Or   • potassium phosphate 30 mmol in sodium chloride 0.9 % 250 mL infusion  30 mmol Intravenous PRN Dami Burleson APRN        Or   • potassium phosphate 15 mmol in sodium chloride 0.9 % 100 mL infusion  15 mmol Intravenous PRN Dami Burleson APRN        Or   • sodium phosphates 45 mmol in sodium chloride 0.9 % 500 mL IVPB  45 mmol Intravenous PRN Dami Burleson APRN        Or   • sodium phosphates 30 mmol in sodium chloride 0.9 % 250 mL IVPB  30 mmol Intravenous PRN Dami Burleson APRN        Or   • sodium phosphates 15 mmol in sodium chloride 0.9 % 250 mL IVPB  15 mmol Intravenous PRN Dami Burleson APRN       • promethazine (PHENERGAN) IVPB 12.5 mg  12.5 mg Intravenous Q6H PRN Dami Burleson APRN   12.5 mg at 07/03/20 1003   • sodium chloride 0.9 % flush 10 mL  10 mL Intravenous PRN Dami Burleson APRN       • sodium chloride 0.9 % flush 10 mL  10 mL Intravenous Q12H Dami Burleson APRN   10 mL at 07/05/20 2046   • sodium chloride 0.9 % flush 10  "mL  10 mL Intravenous PRN Dami Burleson APRN       • sucralfate (CARAFATE) tablet 1 g  1 g Oral 4x Daily AC & at Bedtime Dami Burleson APRN   1 g at 07/06/20 1216   • Thera tablet 1 tablet  1 tablet Oral Daily Dami Burleson APRN   1 tablet at 07/05/20 0901   • vitamin B-12 (CYANOCOBALAMIN) tablet 1,000 mcg  1,000 mcg Oral Daily Dami Burleson APRN   1,000 mcg at 07/05/20 0849        Physician Progress Notes (most recent note)      Denzel Rich MD at 07/06/20 1123              Chief complaint abdominal pain    Subjective   Patient continues to have epigastric abdominal pain associated with nausea and poor appetite.  He still rates his symptoms as at least moderate.  Does not feel good at all does not feel like he can go home due to those complaints.  He does not feel like he will be able to eat much.      Objective     Vital Signs  Visit Vitals  /98 (BP Location: Left arm, Patient Position: Lying)   Pulse (!) 122   Temp 97.8 °F (36.6 °C) (Axillary) Comment (Src): pt had a cold drink   Resp 26   Ht 185.4 cm (73\")   Wt 67.2 kg (148 lb 2.4 oz)   SpO2 96%   BMI 19.55 kg/m²       Physical Exam:  Physical Exam   Constitutional: He is oriented to person, place, and time. No distress.   HENT:   Head: Normocephalic and atraumatic.   Eyes: Conjunctivae and EOM are normal. Pupils are equal, round, and reactive to light.   Neck: No JVD present. No thyromegaly present.   Cardiovascular: On and off tachycardic, irregular rhythm, normal heart sounds and intact distal pulses. Exam reveals no gallop and no friction rub.   No murmur heard.  Pulmonary/Chest: Effort normal and breath sounds normal. No stridor. No respiratory distress. He has no wheezes. He has no rales. He exhibits no tenderness.   Abdominal: Soft. Bowel sounds are normal. He exhibits no distension and no mass. There is no tenderness. There is no rebound and no guarding. No hernia.   Musculoskeletal: Normal range of motion.   Lymphadenopathy:     He " has no cervical adenopathy.   Neurological: He is alert and oriented to person, place, and time. No cranial nerve deficit or sensory deficit. He exhibits normal muscle tone.   Skin: No rash noted. He is not diaphoretic.   Psychiatric: He has a normal mood and affect.   Vitals reviewed.    Physical Exam          Results Review:    CMP:  Lab Results   Component Value Date    BUN  07/06/2020      Comment:      Testing performed by alternate method    BUN 19 07/06/2020    CREATININE 0.65 (L) 07/06/2020    EGFRIFNONA 118 07/06/2020     07/06/2020    K 3.5 07/06/2020    CL 98 07/06/2020    CALCIUM 8.9 07/06/2020    ALBUMIN 3.80 07/06/2020    BILITOT 1.5 (H) 07/06/2020    ALKPHOS 59 07/06/2020    AST 41 (H) 07/06/2020    ALT 59 (H) 07/06/2020     CBC:  Lab Results   Component Value Date    WBC 16.10 (H) 07/06/2020    RBC 4.84 07/06/2020    HGB 14.3 07/06/2020    HCT 44.1 07/06/2020    MCV 91.1 07/06/2020    MCH 29.5 07/06/2020    MCHC 32.3 07/06/2020    RDW 14.7 07/06/2020     07/06/2020         Medication Review:     Scheduled Meds:  atorvastatin 20 mg Oral Daily   digoxin 125 mcg Intravenous Daily   enoxaparin 70 mg Subcutaneous Q12H   fludrocortisone 0.1 mg Oral Q PM   furosemide 40 mg Oral Daily   insulin lispro 0-7 Units Subcutaneous 4x Daily With Meals & Nightly   metoclopramide 10 mg Intravenous Q6H   metoprolol tartrate 25 mg Oral Q6H   ondansetron 4 mg Intravenous Q6H   [START ON 7/7/2020] pantoprazole 40 mg Oral Q AM   piperacillin-tazobactam 3.375 g Intravenous Q8H   potassium chloride 20 mEq Oral Daily   sodium chloride 10 mL Intravenous Q12H   sucralfate 1 g Oral 4x Daily AC & at Bedtime   Thera 1 tablet Oral Daily   vitamin B-12 1,000 mcg Oral Daily     Continuous Infusions:  amiodarone 0.5 mg/min Last Rate: 0.5 mg/min (07/06/20 0336)   dilTIAZem 5-15 mg/hr Last Rate: 15 mg/hr (07/06/20 0627)   Pharmacy Consult - Pharmacy to dose     Pharmacy to Dose Zosyn       PRN Meds:.•  acetaminophen **OR**  acetaminophen **OR** acetaminophen  •  aluminum-magnesium hydroxide-simethicone  •  bisacodyl  •  dextrose  •  dextrose  •  glucagon (human recombinant)  •  HYDROcodone-acetaminophen  •  insulin lispro **AND** insulin lispro  •  ipratropium-albuterol  •  magnesium hydroxide  •  magnesium sulfate **OR** magnesium sulfate **OR** magnesium sulfate  •  melatonin  •  Morphine  •  ondansetron  •  Pharmacy Consult - Pharmacy to dose  •  Pharmacy to Dose Zosyn  •  potassium chloride **OR** potassium chloride **OR** potassium chloride  •  potassium phosphate infusion greater than 15 mMoles **OR** potassium phosphate infusion greater than 15 mMoles **OR** potassium phosphate **OR** sodium phosphate IVPB **OR** sodium phosphate IVPB **OR** sodium phosphate IVPB  •  promethazine  •  [COMPLETED] Insert peripheral IV **AND** sodium chloride  •  sodium chloride    Assessment/Plan   Abdominal pain  Epigastric, associated with nausea and no appetite  Status post EGD showed some mild gastritis however his symptoms are really bothersome and some of the imaging shows gallbladder sludge and I am wondering if some of the symptoms might be due to that.  Will consult with general surgery to evaluate if patient is a good candidate for cholecystectomy to see if that may improve his symptoms  Continue PPI Carafate and anti-nausea meds    Pneumonia  Has been on Zosyn  Pulmonary on board adjusted his bronchodilators DC steroids  Micro has been negative including COVID and viral panel    A. fib  Managed by cardiology on IV dig, amnio Cardizem among some of the others-General surgery consult pending if no immediate plan for any intervention surgically we may be able to transition to p.o. meds?  On metoprolol as well   Currently on Lovenox for anticoagulation    CHF  Chronic  EF of 35  Lasix, metoprolol    Diabetes  And insulin sliding scale and Accu-Cheks    Orthostatic hypotension  Fludrocortisone    Dyslipidemia  Continue statin    DVT PUD  "prophylaxis    Plan as above    Denzel Rich MD  07/06/20  11:23        Electronically signed by Denzel Rich MD at 07/06/20 1133          Consult Notes (most recent note)      Peggy Johnson APRN at 07/03/20 1230     Attestation signed by Neal Correa MD at 07/03/20 1523    I have reviewed this documentation and agree. 79-year-old male with a history of atrial fib on Xarelto, coronary artery disease with stenting, abdominal aortic aneurysm, right middle lobe lobectomy due to lung cancer, COPD, and reflux who presented to the hospital with a complaint of generalized aches and pains weakness, cough and postnasal drip.  Patient was evaluated and admitted into the hospital for bilateral pneumonia and COPD exacerbation.  GI was consulted for nausea.  Patient supposedly has been having nausea, vomiting & diarrhea for about 3 weeks. Has had decreased appetite as well. Denies any NSAID intake. Does not take PPI at home. No hematemesis or coffee ground emesis.  His abdomen is soft with mild epigastric tenderness to palpation.  Plan PPI, reglan, stool studies and await right upper quadrant ultrasound.  Consider EGD if symptoms persist.  Will follow.                    GI CONSULT  NOTE:    Referring Provider:    Dr Marti     Chief complaint:   Nausea     Subjective    \"I am so weak I can't talk\"    History of present illness:    Patient is a 79-year-old male with a history of atrial fib on Xarelto, coronary artery disease with stenting, abdominal aortic aneurysm, right middle lobe lobectomy due to lung cancer, COPD, and reflux who presented to the hospital with a complaint of generalized aches and pains weakness, cough and postnasal drip.  Patient was evaluated and admitted into the hospital for bilateral pneumonia and COPD exacerbation.  GI was consulted for nausea.  Patient supposedly has been having nausea, vomiting & diarrhea for about 3 weeks. Has had decreased appetite as well. Denies any NSAID intake. Does not " take PPI at home.   No hematemesis or coffee ground emesis.       Endo History:  2016 EGD/colonoscopy (Dr. Hines) normal EGD. hyperplastic polyps.  Grade 2 internal hemorrhoids.    Past Medical History:  Past Medical History:   Diagnosis Date   • A-fib (CMS/HCC)    • Aortic aneurysm (CMS/HCC)    • Appetite loss    • Cancer (CMS/HCC)     right lobe cancer - surgically removed    • Dark stools    • Foot pain, bilateral    • Hyperlipidemia    • Low back pain    • S/P epidural steroid injection     [place] Dr Gomes's - no relief   • Weight loss     acute loss of weight 30 lbs       Past Surgical History:  Past Surgical History:   Procedure Laterality Date   • CATARACT EXTRACTION, BILATERAL     • CORONARY ANGIOPLASTY WITH STENT PLACEMENT     • LUMBAR DECOMPRESSION  09/2015    L2-L3   • LUMBAR DECOMPRESSION  2006    Dr. Logan   • OTHER SURGICAL HISTORY  05/2015    left SI fusion with bone graft - Dr. Presley   • OTHER SURGICAL HISTORY      carotid artery repair    • OTHER SURGICAL HISTORY Left 02/19/2016    Left SI fusion 2/19/2016 Dr. Zendejas   • POSTERIOR SPINAL FUSION  10/24/2016    PSF T12-3/TLIF L3-L4 poss L2-L3 --- Dr. 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       Social History:  Social History     Tobacco Use   • Smoking status: Never Smoker   • Smokeless tobacco: Never Used   Substance Use Topics   • Alcohol use: No     Frequency: Never   • Drug use: Never       Family History:  Family History   Problem Relation Age of Onset   • Cancer Other    • Hyperlipidemia Other    • Heart disease Other        Medications:  Medications Prior to Admission   Medication Sig Dispense Refill Last Dose   • fludrocortisone 0.1 MG tablet Take 0.2 mg by mouth Every Morning.   Taking   • fludrocortisone 0.1 MG tablet Take 0.1 mg by mouth Every Evening.      • HYDROcodone-acetaminophen (NORCO) 7.5-325 MG per tablet Take 1 tablet by mouth Every 6 (Six) Hours As Needed.   Taking   • Multiple Vitamin  (MULTIVITAMIN) tablet Take 1 tablet by mouth Daily.   Taking   • potassium chloride ER (K-TAB) 20 MEQ tablet controlled-release ER tablet Take 20 mEq by mouth 2 (Two) Times a Day With Meals.   Taking   • rivaroxaban (XARELTO) 20 MG tablet Take 20 mg by mouth Daily.   Taking   • simvastatin (ZOCOR) 40 MG tablet Take 80 mg by mouth Every Night.      • vitamin B-12 (CYANOCOBALAMIN) 1000 MCG tablet Take 1,000 mcg by mouth Daily.   Taking       Scheduled Meds:  atorvastatin 20 mg Oral Daily   budesonide 0.5 mg Nebulization BID - RT   [START ON 7/4/2020] digoxin 125 mcg Intravenous Daily   enoxaparin 70 mg Subcutaneous Q12H   fludrocortisone 0.1 mg Oral Q PM   fludrocortisone 0.2 mg Oral Daily   furosemide 40 mg Oral Daily   insulin lispro 0-7 Units Subcutaneous Q6H   ipratropium-albuterol 3 mL Nebulization 4x Daily - RT   methylPREDNISolone sodium succinate 40 mg Intravenous Q8H   metoprolol tartrate 2.5 mg Intravenous Q6H   pantoprazole 40 mg Intravenous Q AM   piperacillin-tazobactam 3.375 g Intravenous Q8H   potassium chloride 20 mEq Oral Daily   sodium chloride 10 mL Intravenous Q12H   Thera 1 tablet Oral Daily   vitamin B-12 1,000 mcg Oral Daily     Continuous Infusions:  dilTIAZem 5-15 mg/hr Last Rate: 15 mg/hr (07/03/20 1003)   Pharmacy Consult - Pharmacy to dose     Pharmacy to Dose Zosyn       PRN Meds:.•  acetaminophen **OR** acetaminophen **OR** acetaminophen  •  aluminum-magnesium hydroxide-simethicone  •  bisacodyl  •  dextrose  •  dextrose  •  glucagon (human recombinant)  •  HYDROcodone-acetaminophen  •  insulin lispro **AND** insulin lispro  •  magnesium hydroxide  •  magnesium sulfate **OR** magnesium sulfate **OR** magnesium sulfate  •  melatonin  •  Morphine  •  ondansetron **OR** ondansetron  •  Pharmacy Consult - Pharmacy to dose  •  Pharmacy to Dose Zosyn  •  potassium chloride **OR** potassium chloride **OR** potassium chloride  •  potassium phosphate infusion greater than 15 mMoles **OR**  potassium phosphate infusion greater than 15 mMoles **OR** potassium phosphate **OR** sodium phosphate IVPB **OR** sodium phosphate IVPB **OR** sodium phosphate IVPB  •  promethazine  •  [COMPLETED] Insert peripheral IV **AND** sodium chloride  •  sodium chloride    ALLERGIES:  Ciprofloxacin; Amlodipine; and Rocephin [ceftriaxone]    ROS:  Review of Systems   Constitutional: Positive for appetite change.   HENT: Positive for postnasal drip.    Respiratory: Positive for cough.    Gastrointestinal: Positive for diarrhea, nausea and vomiting.   Neurological: Positive for weakness.       The following systems were reviewed and negative;   Constitution:  No fevers, chills, no unintentional weight loss  Skin: no rash, no jaundice  Eyes:  No blurry vision, no eye pain  HENT:  No change in hearing or smell  Resp:  No dyspnea or cough  CV:  No chest pain or palpitations  :  No dysuria, hematuria  Musculoskeletal:  No leg cramps or arthralgias  Neuro:  No tremor, no numbness  Psych:  No depression or confsuion    Objective  Resting in hospital bed. Family at bedside.     Vital Signs:   Vitals:    07/03/20 1001 07/03/20 1015 07/03/20 1030 07/03/20 1045   BP:       BP Location:       Patient Position:       Pulse: (!) 170 (!) 128 115 (!) 141   Resp:       Temp:       TempSrc:       SpO2:  96% (!) 77% 97%   Weight:       Height:           Physical Exam:   General Appearance:    Awake and alert, in no acute distress. Malaised   Head:    Normocephalic, without obvious abnormality, atraumatic   Eyes:            Conjunctivae normal, anicteric sclera, pupils equal   Ears:    Ears appear intact with no abnormalities noted   Throat:   No oral lesions, no thrush, oral mucosa moist   Neck:   Supple, no JVD   Lungs:     Clear to auscultation bilaterally, respirations regular, even and unlabored    Heart:    Regular rhythm and normal rate, normal S1 and S2, no            Murmur appreciated   Chest Wall:    No abnormalities observed      Abdomen:     Normal bowel sounds, soft, non-tender, no rebound or guarding, non-distended, no hepatosplenomegaly   Rectal:     Deferred   Extremities:   Moves all extremities, no edema, no cyanosis   Pulses:   Pulses palpable and equal bilaterally   Skin:   No rash, no jaundice, normal palpaion   Lymph nodes:   No cervical, supraclavicular or submandibular palpable adenopathy   Neurologic:   Cranial nerves 2 - 12 grossly intact, no asterixis       Results Review:   I reviewed the patient's labs and imaging.  Lab Results (last 24 hours)     Procedure Component Value Units Date/Time    Digoxin Level [[id number]] Collected:  07/03/20 0940    Specimen:  Blood Updated:  07/03/20 1226    POC Glucose Once [[id number]]  (Abnormal) Collected:  07/03/20 1140    Specimen:  Blood Updated:  07/03/20 1151     Glucose 219 mg/dL      Comment: Serial Number: [id number]:  [id number]       Comprehensive Metabolic Panel [[id number]]  (Abnormal) Collected:  07/03/20 0940    Specimen:  Blood Updated:  07/03/20 1029     Glucose 246 mg/dL      BUN --     Comment: Testing performed by alternate method        Creatinine 0.75 mg/dL      Sodium 145 mmol/L      Potassium 2.6 mmol/L      Comment: Specimen hemolyzed.  Results may be affected.        Chloride 103 mmol/L      CO2 22.0 mmol/L      Calcium 9.1 mg/dL      Total Protein 6.8 g/dL      Albumin 3.80 g/dL      ALT (SGPT) 49 U/L      AST (SGOT) 37 U/L      Alkaline Phosphatase 60 U/L      Total Bilirubin 1.2 mg/dL      eGFR Non African Amer 100 mL/min/1.73      Globulin 3.0 gm/dL      A/G Ratio 1.3 g/dL      BUN/Creatinine Ratio --     Comment: Testing not performed        Anion Gap 20.0 mmol/L     Narrative:       GFR Normal >60  Chronic Kidney Disease <60  Kidney Failure <15      Magnesium [[id number]]  (Normal) Collected:  07/03/20 0940    Specimen:  Blood Updated:  07/03/20 1026     Magnesium 2.1 mg/dL     Lipase [[id number]]  (Normal) Collected:  07/03/20 0940    Specimen:  Blood  Updated:  07/03/20 1026     Lipase 30 U/L     BUN [[id number]] Collected:  07/03/20 0940    Specimen:  Blood Updated:  07/03/20 1026    CBC & Differential [[id number]] Collected:  07/03/20 0940    Specimen:  Blood Updated:  07/03/20 0958    Narrative:       The following orders were created for panel order CBC & Differential.  Procedure                               Abnormality         Status                     ---------                               -----------         ------                     CBC Auto Differential[[id number]]        Abnormal            Final result                 Please view results for these tests on the individual orders.    CBC Auto Differential [[id number]]  (Abnormal) Collected:  07/03/20 0940    Specimen:  Blood Updated:  07/03/20 0958     WBC 14.10 10*3/mm3      RBC 4.23 10*6/mm3      Hemoglobin 12.7 g/dL      Hematocrit 39.4 %      MCV 93.1 fL      MCH 29.9 pg      MCHC 32.1 g/dL      RDW 15.0 %      RDW-SD 48.1 fl      MPV 8.4 fL      Platelets 273 10*3/mm3      Neutrophil % 91.3 %      Lymphocyte % 5.1 %      Monocyte % 3.4 %      Eosinophil % 0.0 %      Basophil % 0.2 %      Neutrophils, Absolute 12.90 10*3/mm3      Lymphocytes, Absolute 0.70 10*3/mm3      Monocytes, Absolute 0.50 10*3/mm3      Eosinophils, Absolute 0.00 10*3/mm3      Basophils, Absolute 0.00 10*3/mm3      nRBC 0.1 /100 WBC     Occult Blood, Fecal By Immunoassay - Stool, Per Rectum [[id number]]  (Normal) Collected:  07/03/20 0856    Specimen:  Stool from Per Rectum Updated:  07/03/20 0958     Occult Blood, Fecal by Immunoassay Negative    Blood Culture - Blood, Arm, Left [[id number]] Collected:  07/02/20 0249    Specimen:  Blood from Arm, Left Updated:  07/03/20 0300     Blood Culture No growth at 24 hours    Blood Culture - Blood, Arm, Right [[id number]] Collected:  07/01/20 2348    Specimen:  Blood from Arm, Right Updated:  07/03/20 0000     Blood Culture No growth at 24 hours    COVID-19,CEPHEID,COR/MILENA/PAD IN-HOUSE(OR  EMERGENT/ADD-ON),NP SWAB IN TRANSPORT MEDIA 3-4 HR TAT - Swab, Nasopharynx [[id number]]  (Normal) Collected:  07/02/20 1659    Specimen:  Swab from Nasopharynx Updated:  07/02/20 2003     COVID19 Not Detected    Narrative:       Fact sheet for providers: https://www.fda.gov/media/763043/download     Fact sheet for patients: https://www.fda.gov/media/672092/download    Lactic Acid, Plasma [[id number]]  (Normal) Collected:  07/02/20 1243    Specimen:  Blood Updated:  07/02/20 1310     Lactate 1.8 mmol/L           Imaging Results (Last 24 Hours)     Procedure Component Value Units Date/Time    CT Chest Without Contrast [[id number]] Collected:  07/02/20 1416     Updated:  07/02/20 1431    Narrative:       CT CHEST WO CONTRAST-, CT ABDOMEN PELVIS WO CONTRAST-     Date of Exam: 7/2/2020 1:46 PM     Indication: pneumonia; I48.91-Unspecified atrial fibrillation;  I50.9-Heart failure, unspecified; J18.9-Pneumonia, unspecified organism.   Sepsis      Comparison Exams: None available.     Technique: Multiple axial images were obtained from the thoracic inlet  through the sepsis pubis without the administration of IV contrast. The  axial data was used to generate reformatted images in the coronal and  sagittal planes.     Automated exposure control and iterative reconstruction methods were  used.     FINDINGS:  Chest: There is mediastinal adenopathy.  Precarinal node measures 2.5 x  1.8 cm in size.  No hilar or axillary adenopathy identified.  There is  emphysematous change of the lungs.  There is groundglass airspace  disease and interlobular septal thickening within the bilateral upper  lobes and patchy areas of additional groundglass airspace disease and  interlobular septal thickening within the bilateral lower lobes.   Findings are nonspecific and may be related to pneumonia including  atypical pneumonia as well as Covid 19 pneumonia.  Clinical correlation  recommended.  Within the right upper lobe there is a noncalcified 7  mm  pulmonary nodule.  Patient status post right middle lobectomy.  There is  low density material tracking within the major fissure which may be due  to small right pleural effusion.  Pleural scarring or treatment-related  change could also give this appearance.  Left within the posterior left  lower lobe there is a noncalcified 4 mm nodule.     ABDOMEN: Liver, pancreas, and spleen are within normal limits.   Bilateral adrenal glands appear normal.  Again demonstrated is a large  cyst arises from the upper pole the right kidney.  Kidneys are otherwise  unremarkable.  No renal or ureteral stone or hydronephrosis.  There is  some high density material seen dependently within the gallbladder which  may be due to sludge or multiple small stones.  There is no inflammatory  change around the gallbladder.  No evidence of biliary tract  obstruction.  Upper GI tract appears within normal limits.  There is an  abdominal aortic aneurysm measuring 3.8 x 3.7 cm in size.  No abdominal  adenopathy or free intraperitoneal fluid identified.     Pelvis: Urinary bladder is within normal limits.  GI tract is  unremarkable.  There is no pelvic or inguinal adenopathy.  No free  intraperitoneal fluid is seen.  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  Left common iliac artery  measures 1.9 cm in size.     Bones: There is diffuse osteopenia.  Patient status post arthrodesis of  the left sacroiliac joint.  There are degenerative changes throughout  spine.  There are postoperative changes of the lumbar spine with fusion  from the T12-L4 vertebral levels.  No hardware complication identified.   There are no suspicious lytic or sclerotic bony lesions.       Impression:          1.  Bilateral groundglass airspace disease with interlobular septal  thickening with upper lobe predominance.  Findings are nonspecific but  can be seen with Covid 19 pneumonia.  Other bacterial and atypical  pneumonias can give  this appearance as well.  2.  Status post right middle lobectomy.  3.  Thickening along the major fissure on the right which could be due  to a small amount of pleural fluid or pleural scarring related to  treatment change.  4.  No acute process seen within the abdomen.  5.  Abdominal aortic aneurysm measuring 3.8 cm in size.     Electronically Signed By-Lico Ferrari On:7/2/2020 2:29 PM  This report was finalized on [id number] by  Lico Ferrari, .    CT Abdomen Pelvis Without Contrast [[id number]] Collected:  07/02/20 1416     Updated:  07/02/20 1431    Narrative:       CT CHEST WO CONTRAST-, CT ABDOMEN PELVIS WO CONTRAST-     Date of Exam: 7/2/2020 1:46 PM     Indication: pneumonia; I48.91-Unspecified atrial fibrillation;  I50.9-Heart failure, unspecified; J18.9-Pneumonia, unspecified organism.   Sepsis      Comparison Exams: None available.     Technique: Multiple axial images were obtained from the thoracic inlet  through the sepsis pubis without the administration of IV contrast. The  axial data was used to generate reformatted images in the coronal and  sagittal planes.     Automated exposure control and iterative reconstruction methods were  used.     FINDINGS:  Chest: There is mediastinal adenopathy.  Precarinal node measures 2.5 x  1.8 cm in size.  No hilar or axillary adenopathy identified.  There is  emphysematous change of the lungs.  There is groundglass airspace  disease and interlobular septal thickening within the bilateral upper  lobes and patchy areas of additional groundglass airspace disease and  interlobular septal thickening within the bilateral lower lobes.   Findings are nonspecific and may be related to pneumonia including  atypical pneumonia as well as Covid 19 pneumonia.  Clinical correlation  recommended.  Within the right upper lobe there is a noncalcified 7 mm  pulmonary nodule.  Patient status post right middle lobectomy.  There is  low density material tracking within the major  fissure which may be due  to small right pleural effusion.  Pleural scarring or treatment-related  change could also give this appearance.  Left within the posterior left  lower lobe there is a noncalcified 4 mm nodule.     ABDOMEN: Liver, pancreas, and spleen are within normal limits.   Bilateral adrenal glands appear normal.  Again demonstrated is a large  cyst arises from the upper pole the right kidney.  Kidneys are otherwise  unremarkable.  No renal or ureteral stone or hydronephrosis.  There is  some high density material seen dependently within the gallbladder which  may be due to sludge or multiple small stones.  There is no inflammatory  change around the gallbladder.  No evidence of biliary tract  obstruction.  Upper GI tract appears within normal limits.  There is an  abdominal aortic aneurysm measuring 3.8 x 3.7 cm in size.  No abdominal  adenopathy or free intraperitoneal fluid identified.     Pelvis: Urinary bladder is within normal limits.  GI tract is  unremarkable.  There is no pelvic or inguinal adenopathy.  No free  intraperitoneal fluid is seen.  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  Left common iliac artery  measures 1.9 cm in size.     Bones: There is diffuse osteopenia.  Patient status post arthrodesis of  the left sacroiliac joint.  There are degenerative changes throughout  spine.  There are postoperative changes of the lumbar spine with fusion  from the T12-L4 vertebral levels.  No hardware complication identified.   There are no suspicious lytic or sclerotic bony lesions.       Impression:          1.  Bilateral groundglass airspace disease with interlobular septal  thickening with upper lobe predominance.  Findings are nonspecific but  can be seen with Covid 19 pneumonia.  Other bacterial and atypical  pneumonias can give this appearance as well.  2.  Status post right middle lobectomy.  3.  Thickening along the major fissure on the right which  could be due  to a small amount of pleural fluid or pleural scarring related to  treatment change.  4.  No acute process seen within the abdomen.  5.  Abdominal aortic aneurysm measuring 3.8 cm in size.     Electronically Signed By-Lico Ferrari On:7/2/2020 2:29 PM  This report was finalized on [id number] by  Lico Ferrari, .    CT Sinus Without Contrast [[id number]] Collected:  07/02/20 1409     Updated:  07/02/20 1416    Narrative:       CT SINUS WO CONTRAST-     Date of Exam: 7/2/2020 1:45 PM     Indication: severe sinusitis and epistaxis, sepsis; I48.91-Unspecified  atrial fibrillation; I50.9-Heart failure, unspecified; J18.9-Pneumonia,  unspecified organism.     Comparison: CT head without contrast 11/20/2019. No prior dedicated CT  sinus study at this institution for comparison.      Technique: Contiguous axial images of the paranasal sinuses were  performed.  Coronal and sagittal reconstructions were performed.  Automated exposure control and iterative reconstruction methods were  used.     FINDINGS:  There is nasal septal deviation toward the right with a right-sided bony  apical spur measuring 5 mm. The osteomeatal units are normal.  No  osteolytic or osteoblastic lesions are present. There is mild the left  maxillary sinus mucosal thickening superiorly. There is a small mucous  retention cyst or polyp in the left maxillary sinus anteriorly measuring  7 x 12 x 11 mm. A small mucous retention cyst or polyp in the right  sphenoid sinus laterally measures 9 x 10 x 7 mm. Otherwise, the  paranasal sinuses are clear. No air-fluid levels are identified. The  cribriform plate is intact.  The orbital spaces are clear.  No soft  tissue masses.       Impression:          1. No evidence of acute/active sinusitis.  2. Minor left maxillary sinus mucosal thickening. Small mucous retention  cysts or polyps within the left maxillary and right sphenoid sinus.  3. Bony nasal septal deviation toward the right.        Electronically Signed By-Dr. Gladys Lau MD On:2020 2:14 PM  This report was finalized on [id number] by Dr. Gladys Lau MD.             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  COVID is negative.  Patient has Been complaining of nausea vomiting and diarrhea for the last couple of weeks.  CT was negative.  Patient has also had a decreased appetite.  Patient supposedly had a green formed stool this morning.  Will order stool studies.  I will order PPI, scheduled Reglan and Zofran.  Add carafate slurry  Continue Zosyn for pneumonia  Consider EGD if he does not improve.     I discussed the patients findings and my recommendations with the patient.  Peggy Johnson, APRN  20  12:30    Time:        Electronically signed by Neal Correa MD at 20 1523          Physical Therapy Notes (most recent note)      Ashlyn Ross, PT at 20 1250  Version 1 of 1         Patient Name: Shkuri Park  : 1940    MRN: [id number]                              Today's Date: 2020       Admit Date: 2020    Visit Dx:     ICD-10-CM ICD-9-CM   1. Atrial fibrillation with rapid ventricular response (CMS/HCC) I48.91 427.31   2. Congestive heart failure, unspecified HF chronicity, unspecified heart failure type (CMS/HCC) I50.9 428.0   3. Pneumonia of both lungs due to infectious organism, unspecified part of lung J18.9 483.8   4. Nausea vomiting and diarrhea R11.2 787.91    R19.7 787.01     Patient Active Problem List    Diagnosis   • Atrial fibrillation (CMS/HCC)   • CAD (coronary artery disease)   • Chronic anticoagulation   • Hyperlipidemia   • Presence of stent in right coronary artery   • Chronic pain   • Sepsis (CMS/HCC)   • Congestive heart failure (CHF) (CMS/HCC)   • Nausea vomiting and diarrhea     Past Medical History:   Diagnosis Date   • A-fib (CMS/HCC)    • Aortic aneurysm (CMS/HCC)    • Appetite loss    • Cancer (CMS/HCC)     right lobe cancer - surgically removed    • Dark stools    • Foot pain, bilateral    • Hyperlipidemia    • Low back pain    • S/P epidural steroid injection     [facility] Gomes's - no relief   • Weight loss     acute loss of weight 30 lbs     Past Surgical History:   Procedure Laterality Date   • CATARACT EXTRACTION, BILATERAL     • CORONARY ANGIOPLASTY WITH STENT PLACEMENT     • ENDOSCOPY N/A 7/5/2020    Procedure: ESOPHAGOGASTRODUODENOSCOPY;  Surgeon: Neal Correa MD;  Location: [facility] ENDOSCOPY;  Service: Gastroenterology;  Laterality: N/A;   • LUMBAR DECOMPRESSION  09/2015    L2-L3   • LUMBAR DECOMPRESSION  2006    Dr. Logan   • OTHER SURGICAL HISTORY  05/2015    left SI fusion with bone graft - Dr. Presley   • OTHER SURGICAL HISTORY      carotid artery repair    • OTHER SURGICAL HISTORY Left 02/19/2016    Left SI fusion 2/19/2016 Dr. Zendejas   • POSTERIOR SPINAL FUSION  10/24/2016    PSF T12-3/TLIF L3-L4 poss L2-L3 --- Dr. 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     General Information     Row Name 07/06/20 1236          PT Evaluation Time/Intention    Document Type  evaluation  -SS     Mode of Treatment  physical therapy  -     Row Name 07/06/20 1236          General Information    Patient Profile Reviewed?  yes Patietn sister Ashley is present and assists with subjective.   -SS     Prior Level of Function  independent:;ADL's;community mobility;driving occasionally uses [employer]. 1 fall in november and fell out of his chair when his chair tipped over and  landed on his head 1 week ago.   -     Existing Precautions/Restrictions  fall;oxygen therapy device and L/min 3l SUPP o2. Does not need supp O2 at baseline.   -     Row Name 07/06/20 1236          Relationship/Environment    Lives With  alone  -     Row Name 07/06/20 1236          Resource/Environmental Concerns    Current Living Arrangements  home/apartment/condo one story home  -     Row Name 07/06/20 1236          Home Main Entrance    Number of Stairs, Main Entrance  one  -     Row Name 07/06/20 1236          Stairs Within Home, Primary    Number of Stairs, Within Home, Primary  none  -     Row Name 07/06/20 1236          Cognitive Assessment/Intervention- PT/OT    Orientation Status (Cognition)  oriented x 4  -     Row Name 07/06/20 1236          Safety Issues, Functional Mobility    Impairments Affecting Function (Mobility)  endurance/activity tolerance  -       User Key  (r) = Recorded By, (t) = Taken By, (c) = Cosigned By    Initials Name Provider Type     Ashlyn Ross PT Physical Therapist        Mobility     Row Name 07/06/20 1239          Bed Mobility Assessment/Treatment    Bed Mobility Assessment/Treatment  supine-sit  -     Supine-Sit [place] (Bed Mobility)  minimum assist (75% patient effort)  -     Row Name 07/06/20 1239          Transfer Assessment/Treatment    Comment (Transfers)  patient performs stand pivot to chair however not able to progress to gait due to tachycardia  -     Row Name 07/06/20 1239          Bed-Chair Transfer    Bed-Chair [place] (Transfers)  minimum assist (75% patient effort)  -     Row Name 07/06/20 1239          Sit-Stand Transfer    Sit-Stand [place] (Transfers)  minimum assist (75% patient effort)  -       User Key  (r) = Recorded By, (t) = Taken By, (c) = Cosigned By    Initials Name Provider Type     Ashlyn Ross PT Physical Therapist        Obj/Interventions     Row Name 07/06/20 1240          General ROM     GENERAL ROM COMMENTS  B LE WFL- able to don socks with SBA  -     Row Name 07/06/20 1240          MMT (Manual Muscle Testing)    General MMT Comments  B LE >3/5 per functional assessment  -     Row Name 07/06/20 1240          Static Sitting Balance    Level of [place] (Unsupported Sitting, Static Balance)  supervision  -     Row Name 07/06/20 1240          Static Standing Balance    Level of [place] (Supported Standing, Static Balance)  minimal assist, 75% patient effort  -     Time Able to Maintain Position (Supported Standing, Static Balance)  30 to 45 seconds  -     Comment (Supported Standing, Static Balance)  pt fatigues quickly in standing position   -     Row Name 07/06/20 1240          Sensory Assessment/Intervention    Sensory General Assessment  no sensation deficits identified  -       User Key  (r) = Recorded By, (t) = Taken By, (c) = Cosigned By    Initials Name Provider Type     Ashlyn Ross, PT Physical Therapist        Goals/Plan     [facility] Name 07/06/20 1248          Bed Mobility Goal 1 (PT)    Activity/Assistive Device (Bed Mobility Goal 1, PT)  bed mobility activities, all  -SS     [place] Level/Cues Needed (Bed Mobility Goal 1, PT)  conditional independence  -SS     Time Frame (Bed Mobility Goal 1, PT)  long term goal (LTG);2 weeks  -[facility] Name 07/06/20 1248          Transfer Goal 1 (PT)    Activity/Assistive Device (Transfer Goal 1, PT)  transfers, all  -SS     [place] Level/Cues Needed (Transfer Goal 1, PT)  conditional independence  -SS     Time Frame (Transfer Goal 1, PT)  long term goal (LTG);2 weeks  -[facility] Name 07/06/20 1248          Gait Training Goal 1 (PT)    Activity/Assistive Device (Gait Training Goal 1, PT)  gait (walking locomotion)  -SS     [place] Level (Gait Training Goal 1, PT)  supervision required  -SS     Distance (Gait Goal 1, PT)  75'  -SS     Time Frame (Gait Training Goal 1, PT)  long term goal (LTG);2 weeks  -        User Key  (r) = Recorded By, (t) = Taken By, (c) = Cosigned By    Initials Name Provider Type    SS Ashlyn Ross, PT Physical Therapist        Clinical Impression     Row Name 07/06/20 1241          Pain Assessment    Additional Documentation  Pain Scale: FACES Pre/Post-Treatment (Group)  -     Row Name 07/06/20 1241          Pain Scale: FACES Pre/Post-Treatment    Pain: FACES Scale, Pretreatment  0-->no hurt  -SS     Pain: FACES Scale, Post-Treatment  0-->no hurt  -SS     Row Name 07/06/20 1241          Plan of Care Review    Plan of Care Reviewed With  patient  -SS     Outcome Summary  Pt is 78 yo male admitted with A fib with RVR, B PNA, AE COPD, and chronic back pain. Pt c/o weakness, cough, nausea, vomiting, and general malaise. At baseline, pt's sister reports he is primarily ambulatory without AD, but occassionally utilizes cane.  He was independent with ADLs.  Per sister, pt has had one fall to her knowledge. Fall was in November with scalp laceration. Also he fell out of a chair while sleeping and landed on his head within the last week. Patient does not require supp O2 at baseline. Patient is limited this date by tachycardia. He requires min A for transfers. Not able to progress to gait due to fatigue and elevated HR. He is significantly below his independent baseline function. He will require IP rehab upon d/c. PPE: mask, face shield, gloves.   -     Row Name 07/06/20 1241          Physical Therapy Clinical Impression    Criteria for Skilled Interventions Met (PT Clinical Impression)  yes;treatment indicated  -     Rehab Potential (PT Clinical Summary)  good, to achieve stated therapy goals  -     Predicted Duration of Therapy (PT)  until d/c  -     Row Name 07/06/20 1241          Vital Signs    Pretreatment Heart Rate (beats/min)  100  -SS     Intratreatment Heart Rate (beats/min)  125  -SS     Posttreatment Heart Rate (beats/min)  109  -SS     Pre Patient Position  Supine  -SS      Intra Patient Position  Sitting  -SS     Post Patient Position  Sitting  -SS     Row Name 07/06/20 1241          Positioning and Restraints    Pre-Treatment Position  in bed  -SS     Post Treatment Position  chair  -SS     In Chair  exit alarm on;encouraged to call for assist;with family/caregiver  -       User Key  (r) = Recorded By, (t) = Taken By, (c) = Cosigned By    Initials Name Provider Type     Ashlyn Ross, PT Physical Therapist        Outcome Measures    No documentation.       Physical Therapy Education                 Title: PT OT SLP Therapies (In Progress)     Topic: Physical Therapy (Done)     Point: Mobility training (Done)     Description:   Instruct learner(s) on safety and technique for assisting patient out of bed, chair or wheelchair.  Instruct in the proper use of assistive devices, such as walker, crutches, cane or brace.              Patient Friendly Description:   It's important to get you on your feet again, but we need to do so in a way that is safe for you. Falling has serious consequences, and your personal safety is the most important thing of all.        When it's time to get out of bed, one of us or a family member will sit next to you on the bed to give you support.     If your doctor or nurse tells you to use a walker, crutches, a cane, or a brace, be sure you use it every time you get out of bed, even if you think you don't need it.    Learning Progress Summary           Patient Acceptance, E, VU by  at 7/6/2020 1249                               User Key     Initials Effective Dates Name Provider Type Discipline     06/19/19 -  Ashlyn Ross, PT Physical Therapist PT              PT Recommendation and Plan  Planned Therapy Interventions (PT Eval): balance training, bed mobility training, home exercise program, gait training, patient/family education, strengthening, transfer training  Outcome Summary/Treatment Plan (PT)  Anticipated Discharge Disposition (PT):  inpatient rehabilitation facility  Plan of Care Reviewed With: patient  Outcome Summary: Pt is 78 yo male admitted with A fib with RVR, B PNA, AE COPD, and chronic back pain. Pt c/o weakness, cough, nausea, vomiting, and general malaise. At baseline, pt's sister reports he is primarily ambulatory without AD, but occassionally utilizes cane.  He was independent with ADLs.  Per sister, pt has had one fall to her knowledge. Fall was in November with scalp laceration. Also he fell out of a chair while sleeping and landed on his head within the last week. Patient does not require supp O2 at baseline. Patient is limited this date by tachycardia. He requires min A for transfers. Not able to progress to gait due to fatigue and elevated HR. He is significantly below his independent baseline function. He will require IP rehab upon d/c. PPE: mask, face shield, gloves.      Time Calculation:   PT Charges     Row Name 20 1250             Time Calculation    Start Time  1135  -      Stop Time  1154  -      Time Calculation (min)  19 min  -      PT Received On  20  -      PT - Next Appointment  20  -      PT Goal Re-Cert Due Date  20  -         Time Calculation- PT    Total Timed Code Minutes- PT  0 minute(s)  -        User Key  (r) = Recorded By, (t) = Taken By, (c) = Cosigned By    Initials Name Provider Type    SS Ashlyn Ross PT Physical Therapist        Therapy Charges for Today     Code Description Service Date Service Provider Modifiers Qty    81064612409 HC PT EVAL MOD COMPLEXITY 3 2020 Ashlyn Ross PT GP 1               Ashlyn Ross PT  2020         Electronically signed by Ashlyn Ross PT at 20 1250          Occupational Therapy Notes (most recent note)      Shelby Bass, OT at 20 1101          Acute Care - Occupational Therapy Initial Evaluation   [place]     Patient Name: Shukri Park  : 1940  MRN:  [id number]  Today's Date: 7/3/2020             Admit Date: 7/1/2020       ICD-10-CM ICD-9-CM   1. Atrial fibrillation with rapid ventricular response (CMS/HCC) I48.91 427.31   2. Congestive heart failure, unspecified HF chronicity, unspecified heart failure type (CMS/HCC) I50.9 428.0   3. Pneumonia of both lungs due to infectious organism, unspecified part of lung J18.9 483.8     Patient Active Problem List   Diagnosis   • Atrial fibrillation (CMS/HCC)   • CAD (coronary artery disease)   • Chronic anticoagulation   • Hyperlipidemia   • Presence of stent in right coronary artery   • Chronic pain   • Sepsis (CMS/HCC)     Past Medical History:   Diagnosis Date   • A-fib (CMS/HCC)    • Aortic aneurysm (CMS/HCC)    • Appetite loss    • Cancer (CMS/HCC)     right lobe cancer - surgically removed    • Dark stools    • Foot pain, bilateral    • Hyperlipidemia    • Low back pain    • S/P epidural steroid injection     [place] Dr Gomes's - no relief   • Weight loss     acute loss of weight 30 lbs     Past Surgical History:   Procedure Laterality Date   • CATARACT EXTRACTION, BILATERAL     • CORONARY ANGIOPLASTY WITH STENT PLACEMENT     • LUMBAR DECOMPRESSION  09/2015    L2-L3   • LUMBAR DECOMPRESSION  2006    Dr. Logan   • OTHER SURGICAL HISTORY  05/2015    left SI fusion with bone graft - Dr. Presley   • OTHER SURGICAL HISTORY      carotid artery repair    • OTHER SURGICAL HISTORY Left 02/19/2016    Left SI fusion 2/19/2016 Dr. Zendejas   • POSTERIOR SPINAL FUSION  10/24/2016    PSF T12-3/TLIF L3-L4 poss L2-L3 --- Dr. 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          OT ASSESSMENT FLOWSHEET (last 12 hours)      Occupational Therapy Evaluation     Row Name 07/03/20 1000 07/03/20 0900                OT Evaluation Time/Intention    Subjective Information  --  complains of;fatigue;nausea/vomiting  -ES       Document Type  --  evaluation  -ES       Mode of Treatment  --  occupational therapy  -ES        Patient Effort  --  adequate  -ES          General Information    Patient Profile Reviewed?  --  yes  -ES       Prior Level of Function  --  independent:  -ES       Equipment Currently Used at Home  --  walker, rolling;cane, straight  -ES       Pertinent History of Current Functional Problem  --  Pt is 78 yo male admitted with A fib with RVR, B PNA, AE COPD, and chronic back pain. Pt c/o weakness, cough, nausea, vomiting, and general malaise. At baseline, pt's sister reports he is primarily ambulatory without AE, but occassionally utilizes cane.  He was independent with ADLs.  Per sister, pt has had one fall to her knowledge. Lilian lwas in November with scalp laceration, treated at [facility].   -ES       Existing Precautions/Restrictions  --  fall  -ES          Relationship/Environment    Primary Source of Support/Comfort  --  spouse;child(hira)  -ES       Name of Support/Comfort Primary Source  --  Grandson,Narendra, is POA. Pt currently going through divorce from spouse, who is in SNF.  -ES       Lives With  --  alone  -ES       Family Caregiver if Needed  --  grandchild(hira), adult  -ES       Primary Roles/Responsibilities  --  retired [employer],   -ES          Resource/Environmental Concerns    Current Living Arrangements  --  home/apartment/condo  -ES       Resource/Environmental Concerns  --  none  -ES       Transportation Concerns  --  car, none  -ES          Home Main Entrance    Number of Stairs, Main Entrance  --  one  -ES          Cognitive Assessment/Intervention- PT/OT    Orientation Status (Cognition)  --  oriented x 4  -ES          Safety Issues, Functional Mobility    Safety Issues Affecting Function (Mobility)  --  impulsivity;judgment impulsive due to nausea/vomiting.   -ES       Impairments Affecting Function (Mobility)  --  balance;pain;postural/trunk control  -ES          Bed Mobility Assessment/Treatment    Comment (Bed Mobility)  --  Pt on BSC upon OT arrival. Pt  requests to return to recliner  -ES          Transfer Assessment/Treatment    Transfer Assessment/Treatment  --  toilet transfer;sit-stand transfer;stand-sit transfer;stand pivot/stand step transfer  -ES          Sit-Stand Transfer    Sit-Stand [place] (Transfers)  --  contact guard  -ES          Stand-Sit Transfer    Stand-Sit [place] (Transfers)  --  contact guard  -ES          Stand Pivot/Stand Step Transfer    Stand Pivot/Stand Step [place]  --  contact guard  -ES          Toilet Transfer    Type (Toilet Transfer)  --  lateral  -ES       [place] Level (Toilet Transfer)  --  contact guard  -ES          ADL Assessment/Intervention    BADL Assessment/Intervention  --  upper body dressing;lower body dressing;feeding;toileting  -ES          Upper Body Dressing Assessment/Training    Upper Body Dressing [place] Level  --  upper body dressing skills;set up  -ES          Lower Body Dressing Assessment/Training    Lower Body Dressing [place] Level  --  lower body dressing skills;minimum assist (75% patient effort)  -ES          Self-Feeding Assessment/Training    Comment (Feeding)  --  Pt reports feeding self without difficulty, but is now vomiting.   -ES          Toileting Assessment/Training    [place] Level (Toileting)  --  minimum assist (75% patient effort)  -ES       Comment (Toileting)  --  A for marychuy hygiene, primarily due to pt nausea/vomiting.   -ES          BADL Safety/Performance    Skilled BADL Treatment/Intervention  --  BADL process/adaptation training;cognitive/safety deficit modifications;energy conservation  -ES          General ROM    GENERAL ROM COMMENTS  --  Grossly WFL  -ES          MMT (Manual Muscle Testing)    General MMT Comments  --  Grossly WFL  -ES          Motor Assessment/Interventions    Additional Documentation  --  Balance (Group);Functional Endurance Training (Group)  -ES          Balance    Balance  --  static sitting balance;static standing  balance;dynamic sitting balance;dynamic standing balance  -ES          Static Sitting Balance    Level of [place] (Unsupported Sitting, Static Balance)  --  supervision  -ES       Sitting Position (Unsupported Sitting, Static Balance)  --  sitting in chair  -ES       Time Able to Maintain Position (Unsupported Sitting, Static Balance)  --  1 to 2 minutes  -ES          Dynamic Sitting Balance    Level of [place], Reaches Outside Midline (Sitting, Dynamic Balance)  --  supervision  -ES       Sitting Position, Reaches Outside Midline (Sitting, Dynamic Balance)  --  sitting in chair  -ES          Static Standing Balance    Level of [place] (Supported Standing, Static Balance)  --  contact guard assist  -ES       Time Able to Maintain Position (Supported Standing, Static Balance)  --  45 to 60 seconds  -ES          Dynamic Standing Balance    Level of [place], Reaches Outside Midline (Standing, Dynamic Balance)  --  minimal assist, 75% patient effort  -ES       Time Able to Maintain Position, Reaches Outside Midline (Standing, Dynamic Balance)  --  45 to 60 seconds  -ES       Comment, Reaches Outside Midline (Standing, Dynamic Balance)  --  Requires physical assist to maintain upright positioning due to dry heaving.   -ES          Functional Endurance Training    Comment, Functional Endurance  --  Pt weak, deconditioned. SOB with minimal activity.   -ES          Positioning and Restraints    Pre-Treatment Position  --  bedside commode  -ES       Post Treatment Position  --  chair  -ES       In Chair  --  call light within reach;exit alarm on;encouraged to call for assist;notified nsg;with nsg  -ES          Pain Assessment    Additional Documentation  --  Pain Scale: Numbers Pre/Post-Treatment (Group)  -ES          Pain Scale: Numbers Pre/Post-Treatment    Pain Scale: Numbers, Pretreatment  --  3/10  -ES       Pain Scale: Numbers, Post-Treatment  --  4/10  -ES       Pain Location  --  abdomen  -ES           Health Promotion    Additional Documentation  --  Coping (Group)  -ES          Coping    Observed Emotional State  --  hopeless  -ES       Verbalized Emotional State  --  hopelessness  -ES          Plan of Care Review    Plan of Care Reviewed With  --  patient  -ES       Outcome Summary  --  Pt is 78 yo male admitted with A fib with RVR, B PNA, AE COPD, and chronic back pain. Pt c/o weakness, cough, nausea, vomiting, and general malaise. At baseline, pt's sister reports he is primarily ambulatory without AE, but occassionally utilizes cane.  He was independent with ADLs.  Per sister, pt has had one fall to her knowledge. Lilian lwas in November with scalp laceration, treated at [facility].  This date, pt on BSC upon OT arrival. HR elevated, and pt is nauseous and attempting to vomit throughout session. Requires Min A for toileting task due to nausea/vomiting, and Min A to maintain dynamic balance task due to nausea/vomiting. Pt appears deconditioned, requiring 4L o2 to maintain SpO2 saturations. He is below independent baseline, and appear to require IP rehab based on this assessment. PPE: gloves, mask, gogglees  -ES          Clinical Impression (OT)    Therapy Frequency (OT Eval)  --  5 times/wk  -ES       Predicted Duration of Therapy Intervention (Therapy Eval)  --  until discharge  -ES       Anticipated Discharge Disposition (OT)  --  inpatient rehabilitation facility  -ES          Vital Signs    Pre Systolic BP Rehab  --  96  -ES       Pre Treatment Diastolic BP  --  63  -ES       Intra Systolic BP Rehab  --  140  -ES       Intra Treatment Diastolic BP  --  103  -ES       Post Treatment Diastolic BP  --  133  -ES       Pretreatment Heart Rate (beats/min)  158  -ES  --       Intratreatment Heart Rate (beats/min)  130  -ES  --       Pre SpO2 (%)  --  95  -ES       O2 Delivery Pre Treatment  --  supplemental O2  -ES       Intra SpO2 (%)  --  94  -ES       O2 Delivery Intra Treatment  --  supplemental O2   -ES       Post SpO2 (%)  --  91  -ES       O2 Delivery Post Treatment  --  supplemental O2  -ES       Pre Patient Position  --  Sitting  -ES       Intra Patient Position  --  Standing  -ES       Post Patient Position  --  Sitting  -ES          OT Goals    Bathing Goal Selection (OT)  --  bathing, OT goal 1  -ES       Dressing Goal Selection (OT)  --  dressing, OT goal 1  -ES       Toileting Goal Selection (OT)  --  toileting, OT goal 1  -ES          Bathing Goal 1 (OT)    Activity/Assistive Device (Bathing Goal 1, OT)  --  bathing skills, all  -ES       [place] Level/Cues Needed (Bathing Goal 1, OT)  --  conditional independence  -ES       Time Frame (Bathing Goal 1, OT)  --  2 weeks  -ES          Dressing Goal 1 (OT)    Activity/Assistive Device (Dressing Goal 1, OT)  --  dressing skills, all  -ES       [place]/Cues Needed (Dressing Goal 1, OT)  --  supervision required  -ES       Time Frame (Dressing Goal 1, OT)  --  2 weeks  -ES          Toileting Goal 1 (OT)    Activity/Device (Toileting Goal 1, OT)  --  toileting skills, all  -ES       [place] Level/Cues Needed (Toileting Goal 1, OT)  --  supervision required  -ES       Time Frame (Toileting Goal 1, OT)  --  2 weeks  -ES          Living Environment    Home Accessibility  --  stairs to enter home;tub/shower is not walk in  -ES         User Key  (r) = Recorded By, (t) = Taken By, (c) = Cosigned By    Initials Name Effective Dates    Shelby Marie OT 03/01/19 -          Occupational Therapy Education                 Title: PT OT SLP Therapies (In Progress)     Topic: Occupational Therapy (In Progress)     Point: ADL training (Done)     Description:   Instruct learner(s) on proper safety adaptation and remediation techniques during self care or transfers.   Instruct in proper use of assistive devices.              Learning Progress Summary           Patient Acceptance, E,TB, VU,NR by ES at 7/3/2020 1100                   Point: Home exercise  program (Not Started)     Description:   Instruct learner(s) on appropriate technique for monitoring, assisting and/or progressing therapeutic exercises/activities.              Learner Progress:   Not documented in this visit.          Point: Precautions (Done)     Description:   Instruct learner(s) on prescribed precautions during self-care and functional transfers.              Learning Progress Summary           Patient Acceptance, E,TB, VU,NR by ES at 7/3/2020 1100                   Point: Body mechanics (Not Started)     Description:   Instruct learner(s) on proper positioning and spine alignment during self-care, functional mobility activities and/or exercises.              Learner Progress:   Not documented in this visit.                      User Key     Initials Effective Dates Name Provider Type Discipline    ES 03/01/19 -  Shelby Bass OT Occupational Therapist OT                  OT Recommendation and Plan  Outcome Summary/Treatment Plan (OT)  Anticipated Discharge Disposition (OT): inpatient rehabilitation facility  Therapy Frequency (OT Eval): 5 times/wk  Plan of Care Review  Plan of Care Reviewed With: patient  Plan of Care Reviewed With: patient  Outcome Summary: Pt is 78 yo male admitted with A fib with RVR, B PNA, AE COPD, and chronic back pain. Pt c/o weakness, cough, nausea, vomiting, and general malaise. At baseline, pt's sister reports he is primarily ambulatory without AE, but occassionally utilizes cane.  He was independent with ADLs.  Per sister, pt has had one fall to her knowledge. Fal lwas in November with scalp laceration, treated at [facility].  This date, pt on BSC upon OT arrival. HR elevated, and pt is nauseous and attempting to vomit throughout session. Requires Min A for toileting task due to nausea/vomiting, and Min A to maintain dynamic balance task due to nausea/vomiting. Pt appears deconditioned, requiring 4L o2 to maintain SpO2 saturations. He is below  independent baseline, and appear to require IP rehab based on this assessment. PPE: gloves, mask, gogglees        Time Calculation:   Time Calculation- OT     Row Name 07/03/20 1101             Time Calculation- OT    OT Start Time  0850  -ES      OT Stop Time  0922  -ES      OT Time Calculation (min)  32 min  -ES      Total Timed Code Minutes- OT  10 minute(s)  -ES      OT Non-Billable Time (min)  22 min  -ES      OT Received On  07/03/20  -ES      OT - Next Appointment  07/06/20  -ES      OT Goal Re-Cert Due Date  07/17/20  -ES        User Key  (r) = Recorded By, (t) = Taken By, (c) = Cosigned By    Initials Name Provider Type     Shelby Bass OT Occupational Therapist        Therapy Charges for Today     Code Description Service Date Service Provider Modifiers Qty    [id number]  OT SELF CARE/MGMT/TRAIN EA 15 MIN 7/3/2020 Shelby Bass OT GO 1    [id number]  OT EVAL MOD COMPLEXITY 3 7/3/2020 Shelby Bass OT GO 1               Shelby Bass OT  7/3/2020    Electronically signed by Shelby Bass OT at 07/03/20 1101

## 2020-07-06 NOTE — PLAN OF CARE
Problem: Patient Care Overview  Goal: Plan of Care Review  Outcome: Ongoing (interventions implemented as appropriate)  Flowsheets  Taken 7/6/2020 0026 by Georgia Bravo RN  Plan of Care Reviewed With: patient  Taken 7/3/2020 0900 by Shelby Bass OT  Outcome Summary: Pt is 78 yo male admitted with A fib with RVR, B PNA, AE COPD, and chronic back pain. Pt c/o weakness, cough, nausea, vomiting, and general malaise. At baseline, pt's sister reports he is primarily ambulatory without AE, but occassionally utilizes cane.  He was independent with ADLs.  Per sister, pt has had one fall to her knowledge. Fal lwas in November with scalp laceration, treated at [facility] ER.  This date, pt on BSC upon OT arrival. HR elevated, and pt is nauseous and attempting to vomit throughout session. Requires Min A for toileting task due to nausea/vomiting, and Min A to maintain dynamic balance task due to nausea/vomiting. Pt appears deconditioned, requiring 4L o2 to maintain SpO2 saturations. He is below independent baseline, and appear to require IP rehab based on this assessment. PPE: gloves, mask, gogglees  Goal: Individualization and Mutuality  Outcome: Ongoing (interventions implemented as appropriate)  Goal: Discharge Needs Assessment  Outcome: Ongoing (interventions implemented as appropriate)  Goal: Interprofessional Rounds/Family Conf  Outcome: Ongoing (interventions implemented as appropriate)     Problem: Fall Risk (Adult)  Goal: Identify Related Risk Factors and Signs and Symptoms  Outcome: Ongoing (interventions implemented as appropriate)  Goal: Absence of Fall  Outcome: Ongoing (interventions implemented as appropriate)     Problem: Pain, Chronic (Adult)  Goal: Identify Related Risk Factors and Signs and Symptoms  Outcome: Ongoing (interventions implemented as appropriate)  Goal: Acceptable Pain/Comfort Level and Functional Ability  Outcome: Ongoing (interventions implemented as appropriate)     Problem:  Skin Injury Risk (Adult)  Goal: Identify Related Risk Factors and Signs and Symptoms  Outcome: Ongoing (interventions implemented as appropriate)  Goal: Skin Health and Integrity  Outcome: Ongoing (interventions implemented as appropriate)     Problem: Arrhythmia/Dysrhythmia (Symptomatic) (Adult)  Goal: Signs and Symptoms of Listed Potential Problems Will be Absent, Minimized or Managed (Arrhythmia/Dysrhythmia)  Outcome: Ongoing (interventions implemented as appropriate)

## 2020-07-06 NOTE — PLAN OF CARE
Pt alert and oriented. Remains high falls risk. Still on amio at 0.5, cardizem at 10. Switched metoprolol to PO. Still in afib. General sx consulted for possible cholecystectomy. Heart rate between 80's-110's. Otherwise vitals stable. Will continue to monitor.     Problem: Patient Care Overview  Goal: Plan of Care Review  Flowsheets (Taken 7/6/2020 1721)  Progress: no change  Plan of Care Reviewed With: patient; son; daughter  Goal: Interprofessional Rounds/Family Conf  Flowsheets (Taken 7/6/2020 1721)  Participants: ; nursing; pharmacy; speech language pathology     Problem: Fall Risk (Adult)  Goal: Identify Related Risk Factors and Signs and Symptoms  Flowsheets (Taken 7/6/2020 1721)  Related Risk Factors (Fall Risk): age-related changes; gait/mobility problems; environment unfamiliar  Goal: Absence of Fall  Flowsheets (Taken 7/6/2020 1721)  Absence of Fall: making progress toward outcome     Problem: Pain, Chronic (Adult)  Goal: Acceptable Pain/Comfort Level and Functional Ability  Flowsheets (Taken 7/6/2020 1721)  Acceptable Pain/Comfort Level and Functional Ability: making progress toward outcome     Problem: Skin Injury Risk (Adult)  Goal: Identify Related Risk Factors and Signs and Symptoms  Flowsheets (Taken 7/6/2020 1721)  Related Risk Factors (Skin Injury Risk): infection; advanced age; mobility impaired; moisture  Goal: Skin Health and Integrity  Flowsheets (Taken 7/6/2020 1721)  Skin Health and Integrity: making progress toward outcome     Problem: Arrhythmia/Dysrhythmia (Symptomatic) (Adult)  Goal: Signs and Symptoms of Listed Potential Problems Will be Absent, Minimized or Managed (Arrhythmia/Dysrhythmia)  Flowsheets (Taken 7/6/2020 1721)  Problems Present (Dysrhythmia): situational response

## 2020-07-06 NOTE — PROGRESS NOTES
Continued Stay Note  OFELIA Candelario     Patient Name: Shukri Park  MRN: [id number]  Today's Date: 7/6/2020    Admit Date: 7/1/2020    Discharge Plan      Second message left for ese Mackey to discuss rehab choices/DC planning.    DC Barriers Amiodarone GTT, Cardizem GTT    Leeann Marquez RN, CM  Office Phone [phone]  Cell [phone]

## 2020-07-07 LAB
ALBUMIN SERPL-MCNC: 3.6 G/DL (ref 3.5–5.2)
ALBUMIN/GLOB SERPL: 1.6 G/DL
ALP SERPL-CCNC: 51 U/L (ref 39–117)
ALT SERPL W P-5'-P-CCNC: 52 U/L (ref 1–41)
ANION GAP SERPL CALCULATED.3IONS-SCNC: 12 MMOL/L (ref 5–15)
AST SERPL-CCNC: 24 U/L (ref 1–40)
BACTERIA SPEC AEROBE CULT: NORMAL
BACTERIA SPEC AEROBE CULT: NORMAL
BILIRUB SERPL-MCNC: 1.5 MG/DL (ref 0–1.2)
BUN SERPL-MCNC: 15 MG/DL (ref 8–23)
BUN SERPL-MCNC: ABNORMAL MG/DL
BUN/CREAT SERPL: ABNORMAL
CALCIUM SPEC-SCNC: 8.5 MG/DL (ref 8.6–10.5)
CHLORIDE SERPL-SCNC: 100 MMOL/L (ref 98–107)
CO2 SERPL-SCNC: 30 MMOL/L (ref 22–29)
CREAT SERPL-MCNC: 0.53 MG/DL (ref 0.76–1.27)
DEPRECATED RDW RBC AUTO: 46.4 FL (ref 37–54)
ERYTHROCYTE [DISTWIDTH] IN BLOOD BY AUTOMATED COUNT: 14.8 % (ref 12.3–15.4)
GFR SERPL CREATININE-BSD FRML MDRD: 150 ML/MIN/1.73
GLOBULIN UR ELPH-MCNC: 2.3 GM/DL
GLUCOSE BLDC GLUCOMTR-MCNC: 124 MG/DL (ref 70–105)
GLUCOSE BLDC GLUCOMTR-MCNC: 134 MG/DL (ref 70–105)
GLUCOSE BLDC GLUCOMTR-MCNC: 96 MG/DL (ref 70–105)
GLUCOSE BLDC GLUCOMTR-MCNC: 97 MG/DL (ref 70–105)
GLUCOSE SERPL-MCNC: 109 MG/DL (ref 65–99)
HCT VFR BLD AUTO: 41.7 % (ref 37.5–51)
HGB BLD-MCNC: 13.9 G/DL (ref 13–17.7)
LAB AP CASE REPORT: NORMAL
MAGNESIUM SERPL-MCNC: 1.9 MG/DL (ref 1.6–2.4)
MCH RBC QN AUTO: 30.1 PG (ref 26.6–33)
MCHC RBC AUTO-ENTMCNC: 33.4 G/DL (ref 31.5–35.7)
MCV RBC AUTO: 90.2 FL (ref 79–97)
PATH REPORT.FINAL DX SPEC: NORMAL
PATH REPORT.GROSS SPEC: NORMAL
PLATELET # BLD AUTO: 263 10*3/MM3 (ref 140–450)
PMV BLD AUTO: 8.4 FL (ref 6–12)
POTASSIUM SERPL-SCNC: 3.3 MMOL/L (ref 3.5–5.2)
PROT SERPL-MCNC: 5.9 G/DL (ref 6–8.5)
RBC # BLD AUTO: 4.62 10*6/MM3 (ref 4.14–5.8)
SODIUM SERPL-SCNC: 142 MMOL/L (ref 136–145)
WBC # BLD AUTO: 14.5 10*3/MM3 (ref 3.4–10.8)

## 2020-07-07 PROCEDURE — 97530 THERAPEUTIC ACTIVITIES: CPT

## 2020-07-07 PROCEDURE — 97535 SELF CARE MNGMENT TRAINING: CPT

## 2020-07-07 PROCEDURE — 25010000002 METOCLOPRAMIDE PER 10 MG: Performed by: NURSE PRACTITIONER

## 2020-07-07 PROCEDURE — 99232 SBSQ HOSP IP/OBS MODERATE 35: CPT | Performed by: HOSPITALIST

## 2020-07-07 PROCEDURE — 80053 COMPREHEN METABOLIC PANEL: CPT | Performed by: HOSPITALIST

## 2020-07-07 PROCEDURE — 99232 SBSQ HOSP IP/OBS MODERATE 35: CPT | Performed by: SURGERY

## 2020-07-07 PROCEDURE — 25010000002 ENOXAPARIN PER 10 MG: Performed by: INTERNAL MEDICINE

## 2020-07-07 PROCEDURE — 82962 GLUCOSE BLOOD TEST: CPT

## 2020-07-07 PROCEDURE — 85027 COMPLETE CBC AUTOMATED: CPT | Performed by: HOSPITALIST

## 2020-07-07 PROCEDURE — 25010000002 ONDANSETRON PER 1 MG: Performed by: NURSE PRACTITIONER

## 2020-07-07 PROCEDURE — 25010000002 DIGOXIN PER 500 MCG: Performed by: NURSE PRACTITIONER

## 2020-07-07 PROCEDURE — 25010000002 PIPERACILLIN SOD-TAZOBACTAM PER 1 G: Performed by: NURSE PRACTITIONER

## 2020-07-07 PROCEDURE — 25010000002 ENOXAPARIN PER 10 MG: Performed by: NURSE PRACTITIONER

## 2020-07-07 PROCEDURE — 83735 ASSAY OF MAGNESIUM: CPT | Performed by: NURSE PRACTITIONER

## 2020-07-07 PROCEDURE — 99232 SBSQ HOSP IP/OBS MODERATE 35: CPT | Performed by: INTERNAL MEDICINE

## 2020-07-07 PROCEDURE — 97110 THERAPEUTIC EXERCISES: CPT

## 2020-07-07 RX ORDER — METOCLOPRAMIDE 5 MG/1
5 TABLET ORAL
Status: DISCONTINUED | OUTPATIENT
Start: 2020-07-07 | End: 2020-07-13

## 2020-07-07 RX ORDER — DIGOXIN 125 MCG
125 TABLET ORAL
Status: DISCONTINUED | OUTPATIENT
Start: 2020-07-08 | End: 2020-07-10

## 2020-07-07 RX ADMIN — METOCLOPRAMIDE 10 MG: 5 INJECTION, SOLUTION INTRAMUSCULAR; INTRAVENOUS at 02:50

## 2020-07-07 RX ADMIN — POTASSIUM CHLORIDE 40 MEQ: 1500 TABLET, EXTENDED RELEASE ORAL at 06:32

## 2020-07-07 RX ADMIN — ONDANSETRON 4 MG: 2 INJECTION INTRAMUSCULAR; INTRAVENOUS at 08:39

## 2020-07-07 RX ADMIN — CYANOCOBALAMIN TAB 1000 MCG 1000 MCG: 1000 TAB at 08:39

## 2020-07-07 RX ADMIN — FLUDROCORTISONE ACETATE 0.1 MG: 0.1 TABLET ORAL at 17:07

## 2020-07-07 RX ADMIN — DILTIAZEM HYDROCHLORIDE 30 MG: 30 TABLET ORAL at 05:41

## 2020-07-07 RX ADMIN — METOPROLOL TARTRATE 25 MG: 25 TABLET, FILM COATED ORAL at 02:49

## 2020-07-07 RX ADMIN — ATORVASTATIN CALCIUM 20 MG: 20 TABLET, FILM COATED ORAL at 08:39

## 2020-07-07 RX ADMIN — DILTIAZEM HYDROCHLORIDE 30 MG: 30 TABLET ORAL at 17:07

## 2020-07-07 RX ADMIN — PIPERACILLIN AND TAZOBACTAM 3.38 G: 3; .375 INJECTION, POWDER, FOR SOLUTION INTRAVENOUS at 00:05

## 2020-07-07 RX ADMIN — ONDANSETRON 4 MG: 2 INJECTION INTRAMUSCULAR; INTRAVENOUS at 17:07

## 2020-07-07 RX ADMIN — PANTOPRAZOLE SODIUM 40 MG: 40 TABLET, DELAYED RELEASE ORAL at 05:42

## 2020-07-07 RX ADMIN — FUROSEMIDE 40 MG: 40 TABLET ORAL at 08:39

## 2020-07-07 RX ADMIN — SUCRALFATE 1 G: 1 TABLET ORAL at 17:07

## 2020-07-07 RX ADMIN — ONDANSETRON 4 MG: 2 INJECTION INTRAMUSCULAR; INTRAVENOUS at 20:35

## 2020-07-07 RX ADMIN — SUCRALFATE 1 G: 1 TABLET ORAL at 20:17

## 2020-07-07 RX ADMIN — THERA TABS 1 TABLET: TAB at 08:39

## 2020-07-07 RX ADMIN — Medication 10 ML: at 20:18

## 2020-07-07 RX ADMIN — DILTIAZEM HYDROCHLORIDE 30 MG: 30 TABLET ORAL at 11:33

## 2020-07-07 RX ADMIN — ENOXAPARIN SODIUM 70 MG: 80 INJECTION SUBCUTANEOUS at 11:47

## 2020-07-07 RX ADMIN — SUCRALFATE 1 G: 1 TABLET ORAL at 08:39

## 2020-07-07 RX ADMIN — DILTIAZEM HYDROCHLORIDE 30 MG: 30 TABLET ORAL at 23:04

## 2020-07-07 RX ADMIN — METOPROLOL TARTRATE 25 MG: 25 TABLET, FILM COATED ORAL at 08:39

## 2020-07-07 RX ADMIN — METOCLOPRAMIDE 5 MG: 5 TABLET ORAL at 17:07

## 2020-07-07 RX ADMIN — HYDROCODONE BITARTRATE AND ACETAMINOPHEN 1 TABLET: 7.5; 325 TABLET ORAL at 05:47

## 2020-07-07 RX ADMIN — SUCRALFATE 1 G: 1 TABLET ORAL at 11:33

## 2020-07-07 RX ADMIN — ONDANSETRON 4 MG: 2 INJECTION INTRAMUSCULAR; INTRAVENOUS at 02:49

## 2020-07-07 RX ADMIN — METOPROLOL TARTRATE 25 MG: 25 TABLET, FILM COATED ORAL at 20:18

## 2020-07-07 RX ADMIN — METOCLOPRAMIDE 10 MG: 5 INJECTION, SOLUTION INTRAMUSCULAR; INTRAVENOUS at 08:38

## 2020-07-07 RX ADMIN — ENOXAPARIN SODIUM 70 MG: 80 INJECTION SUBCUTANEOUS at 23:04

## 2020-07-07 RX ADMIN — METOCLOPRAMIDE 5 MG: 5 TABLET ORAL at 11:33

## 2020-07-07 RX ADMIN — HYDROCODONE BITARTRATE AND ACETAMINOPHEN 1 TABLET: 7.5; 325 TABLET ORAL at 11:48

## 2020-07-07 RX ADMIN — DIGOXIN 125 MCG: 250 INJECTION, SOLUTION INTRAMUSCULAR; INTRAVENOUS; PARENTERAL at 11:34

## 2020-07-07 RX ADMIN — POTASSIUM CHLORIDE 20 MEQ: 1500 TABLET, EXTENDED RELEASE ORAL at 08:44

## 2020-07-07 RX ADMIN — HYDROCODONE BITARTRATE AND ACETAMINOPHEN 1 TABLET: 7.5; 325 TABLET ORAL at 18:24

## 2020-07-07 NOTE — PROGRESS NOTES
"    Chief complaint abdominal pain    Subjective   Seen and examined.  He still feels overall \"lousy\" however he is little bit more willing to try to eat more today.  He feels less nauseated today still has slight epigastric aching but he says he might feel little bit better today.    Objective     Vital Signs  Visit Vitals  /76 (BP Location: Left arm, Patient Position: Lying)   Pulse 75   Temp 97.2 °F (36.2 °C) (Axillary)   Resp 16   Ht 185.4 cm (73\")   Wt 68 kg (149 lb 14.6 oz)   SpO2 94%   BMI 19.78 kg/m²       Physical Exam:  Physical Exam   Constitutional: He is oriented to person, place, and time. No distress.   HENT:   Head: Normocephalic and atraumatic.   Eyes: Conjunctivae and EOM are normal. Pupils are equal, round, and reactive to light.   Neck: No JVD present. No thyromegaly present.   Cardiovascular: On and off tachycardic, irregular rhythm, normal heart sounds and intact distal pulses. Exam reveals no gallop and no friction rub.   No murmur heard.  Pulmonary/Chest: Effort normal and breath sounds normal. No stridor. No respiratory distress. He has no wheezes. He has no rales. He exhibits no tenderness.   Abdominal: Soft. Bowel sounds are normal. He exhibits no distension and no mass. There is no tenderness. There is no rebound and no guarding. No hernia.   Musculoskeletal: Normal range of motion.   Lymphadenopathy:     He has no cervical adenopathy.   Neurological: He is alert and oriented to person, place, and time. No cranial nerve deficit or sensory deficit. He exhibits normal muscle tone.   Skin: No rash noted. He is not diaphoretic.   Psychiatric: He has a normal mood and affect.   Vitals reviewed.    Physical Exam          Results Review:    CMP:  Lab Results   Component Value Date    BUN  07/07/2020      Comment:      Testing performed by alternate method    BUN 15 07/07/2020    CREATININE 0.53 (L) 07/07/2020    EGFRIFNONA 150 07/07/2020     07/07/2020    K 3.3 (L) 07/07/2020    CL " 100 07/07/2020    CALCIUM 8.5 (L) 07/07/2020    ALBUMIN 3.60 07/07/2020    BILITOT 1.5 (H) 07/07/2020    ALKPHOS 51 07/07/2020    AST 24 07/07/2020    ALT 52 (H) 07/07/2020     CBC:  Lab Results   Component Value Date    WBC 14.50 (H) 07/07/2020    RBC 4.62 07/07/2020    HGB 13.9 07/07/2020    HCT 41.7 07/07/2020    MCV 90.2 07/07/2020    MCH 30.1 07/07/2020    MCHC 33.4 07/07/2020    RDW 14.8 07/07/2020     07/07/2020         Medication Review:     Scheduled Meds:    atorvastatin 20 mg Oral Daily   digoxin 125 mcg Intravenous Daily   dilTIAZem 30 mg Oral Q6H   enoxaparin 70 mg Subcutaneous Q12H   fludrocortisone 0.1 mg Oral Q PM   furosemide 40 mg Oral Daily   insulin lispro 0-7 Units Subcutaneous 4x Daily With Meals & Nightly   metoclopramide 5 mg Oral TID AC   metoprolol tartrate 25 mg Oral Q6H   ondansetron 4 mg Intravenous Q6H   pantoprazole 40 mg Oral Q AM   potassium chloride 20 mEq Oral Daily   sodium chloride 10 mL Intravenous Q12H   sucralfate 1 g Oral 4x Daily AC & at Bedtime   Thera 1 tablet Oral Daily   vitamin B-12 1,000 mcg Oral Daily     Continuous Infusions:    Pharmacy Consult - Pharmacy to dose      PRN Meds:.•  acetaminophen **OR** acetaminophen **OR** acetaminophen  •  aluminum-magnesium hydroxide-simethicone  •  bisacodyl  •  dextrose  •  dextrose  •  glucagon (human recombinant)  •  HYDROcodone-acetaminophen  •  insulin lispro **AND** insulin lispro  •  ipratropium-albuterol  •  magnesium hydroxide  •  magnesium sulfate **OR** magnesium sulfate **OR** magnesium sulfate  •  melatonin  •  Morphine  •  ondansetron  •  Pharmacy Consult - Pharmacy to dose  •  potassium chloride **OR** potassium chloride **OR** potassium chloride  •  potassium phosphate infusion greater than 15 mMoles **OR** potassium phosphate infusion greater than 15 mMoles **OR** potassium phosphate **OR** sodium phosphate IVPB **OR** sodium phosphate IVPB **OR** sodium phosphate IVPB  •  promethazine  •  [COMPLETED]  Insert peripheral IV **AND** sodium chloride  •  sodium chloride    Assessment/Plan   Abdominal pain  Epigastric, associated with nausea and no appetite  Status post EGD showed some gastritis appreciate general surgery evaluation.  At this time due to overall complex comorbidities we will try to hold off on surgery until patient more stable.  Continue PPI Carafate and anti-nausea meds  Encourage more food today patient is willing to try to eat more today    Pneumonia  Has been on Zosyn  Pulmonary on board adjusted his bronchodilators DC steroids  Micro has been negative including COVID and viral panel    A. fib  Cardiology working on weaning him off to p.o. meds  Currently on Lovenox for anticoagulation plan for Xarelto eventually    CHF  Chronic  EF of 35  Lasix, metoprolol    Diabetes  And insulin sliding scale and Accu-Cheks    Orthostatic hypotension  Fludrocortisone    Dyslipidemia  Continue statin    DVT PUD prophylaxis    Plan as above    Denzel Rich MD  07/07/20  11:50

## 2020-07-07 NOTE — THERAPY TREATMENT NOTE
Acute Care - Occupational Therapy Treatment Note  [facility]     Patient Name: Shukri Park  : 1940  MRN: [id number]  Today's Date: 2020             Admit Date: 2020       ICD-10-CM ICD-9-CM   1. Atrial fibrillation with rapid ventricular response (CMS/HCC) I48.91 427.31   2. Congestive heart failure, unspecified HF chronicity, unspecified heart failure type (CMS/HCC) I50.9 428.0   3. Pneumonia of both lungs due to infectious organism, unspecified part of lung J18.9 483.8   4. Nausea vomiting and diarrhea R11.2 787.91    R19.7 787.01     Patient Active Problem List   Diagnosis   • Atrial fibrillation (CMS/HCC)   • CAD (coronary artery disease)   • Chronic anticoagulation   • Hyperlipidemia   • Presence of stent in right coronary artery   • Chronic pain   • Sepsis (CMS/HCC)   • Congestive heart failure (CHF) (CMS/HCC)   • Nausea vomiting and diarrhea     Past Medical History:   Diagnosis Date   • A-fib (CMS/HCC)    • Aortic aneurysm (CMS/HCC)    • Appetite loss    • Cancer (CMS/HCC)     right lobe cancer - surgically removed    • Dark stools    • Foot pain, bilateral    • Hyperlipidemia    • Low back pain    • S/P epidural steroid injection     [place] Dr Gomes's - no relief   • Weight loss     acute loss of weight 30 lbs     Past Surgical History:   Procedure Laterality Date   • CATARACT EXTRACTION, BILATERAL     • CORONARY ANGIOPLASTY WITH STENT PLACEMENT     • ENDOSCOPY N/A 2020    Procedure: ESOPHAGOGASTRODUODENOSCOPY;  Surgeon: Neal Correa MD;  Location: [facility];  Service: Gastroenterology;  Laterality: N/A;   • LUMBAR DECOMPRESSION  2015    L2-L3   • LUMBAR DECOMPRESSION      Dr. Logan   • OTHER SURGICAL HISTORY  2015    left SI fusion with bone graft - Dr. Presley   • OTHER SURGICAL HISTORY      carotid artery repair    • OTHER SURGICAL HISTORY Left 2016    Left SI fusion 2016 Dr. Zendejas   • POSTERIOR SPINAL FUSION  10/24/2016    PSF T12-3/TLIF L3-L4 poss  L2-L3 --- Dr. 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       Therapy Treatment    Rehabilitation Treatment Summary     Row Name 07/07/20 1000             Treatment Time/Intention    Discipline  physical therapist  -ES      Document Type  therapy note (daily note)  -ES      Therapy Frequency (PT Clinical Impression)  5 times/wk  -ES      Therapy Frequency (OT Eval)  5 times/wk  -ES      Patient Effort  good  -ES      Existing Precautions/Restrictions  fall;oxygen therapy device and L/min 3l SUPP o2. Does not need supp O2 at baseline.   -ES      Recorded by [ES] Shelby Bass OT 07/07/20 1036      Row Name 07/07/20 1000             Vital Signs    Pre Patient Position  Sitting  -ES      Intra Patient Position  Standing  -ES      Post Patient Position  Sitting  -ES      Recorded by [ES] Shelby Bass OT 07/07/20 1201      Row Name 07/07/20 1000             Cognitive Assessment/Intervention- PT/OT    Orientation Status (Cognition)  oriented x 4  -ES      Recorded by [ES] Shelby Bass OT 07/07/20 1036      Row Name 07/07/20 1000             Safety Issues, Functional Mobility    Impairments Affecting Function (Mobility)  endurance/activity tolerance  -ES      Recorded by [ES] Shelby Bass OT 07/07/20 1036      Row Name 07/07/20 1000             Bed Mobility Assessment/Treatment    Bed Mobility Assessment/Treatment  --  -ES      Supine-Sit [place] (Bed Mobility)  --  -ES      Comment (Bed Mobility)  EOB with NSG  -ES      Recorded by [ES] Shelby Bass OT 07/07/20 1201      Row Name 07/07/20 1000             Transfer Assessment/Treatment    Transfer Assessment/Treatment  sit-stand transfer;stand-sit transfer;bed-chair transfer  -ES      Recorded by [ES] Shelby Bass OT 07/07/20 1201      Row Name 07/07/20 1000             Bed-Chair Transfer    Bed-Chair [place] (Transfers)  minimum assist (75% patient effort)  -ES      Recorded by [ES] Kali  Shelby LYNN, OT 07/07/20 1036      Row Name 07/07/20 1000             Sit-Stand Transfer    Sit-Stand [place] (Transfers)  --  -ES      Recorded by [ES] Shelby Bass, OT 07/07/20 1201      Row Name 07/07/20 1000             Stand-Sit Transfer    Stand-Sit [place] (Transfers)  contact guard  -ES      Recorded by [ES] Shelby Bass, OT 07/07/20 1036      Row Name 07/07/20 1000             Stand Pivot/Stand Step Transfer    Stand Pivot/Stand Step [place]  contact guard  -ES      Recorded by [ES] Shelby Bass, OT 07/07/20 1036      Row Name 07/07/20 1000             Toilet Transfer    Type (Toilet Transfer)  --  -ES      [place] Level (Toilet Transfer)  --  -ES      Recorded by [ES] Shelby Bass, OT 07/07/20 1201      Row Name 07/07/20 1000             ADL Assessment/Intervention    BADL Assessment/Intervention  upper body dressing;lower body dressing  -ES      Recorded by [ES] Shelby Bass, OT 07/07/20 1201      Row Name 07/07/20 1000             Upper Body Dressing Assessment/Training    Upper Body Dressing [place] Level  upper body dressing skills;set up  -ES      Recorded by [ES] Shelby Bass, OT 07/07/20 1036      Row Name 07/07/20 1000             Lower Body Dressing Assessment/Training    Lower Body Dressing [place] Level  socks adjusts socks w/ CGA & extra time supported sit.  -ES      Recorded by [ES] Shelby Bass, OT 07/07/20 1036      Row Name 07/07/20 1000             Self-Feeding Assessment/Training    [place] Level (Feeding)  prepare tray/open items;maximum assist (25% patient effort);scoop food and bring to mouth;liquids to mouth;supervision;set up  -ES      Recorded by [ES] Shelby Bass, OT 07/07/20 1036      Row Name 07/07/20 1000             Toileting Assessment/Training    [place] Level (Toileting)  -- for use of urinal  -ES      Recorded by [ES] Shelby Bass, OT 07/07/20 1201      Row Name 07/07/20 1000              Motor Skills Assessment/Interventions    Additional Documentation  Balance (Group);Therapeutic Exercise Interventions (Group);Therapeutic Exercise (Group)  -ES      Recorded by [ES] Shelby Bass, OT 07/07/20 1201      Row Name 07/07/20 1000             Therapeutic Exercise    Upper Extremity (Therapeutic Exercise)  bicep curl, bilateral;tricep extension, bilateral  -ES      Upper Extremity Range of Motion (Therapeutic Exercise)  shoulder flexion/extension, bilateral;shoulder abduction/adduction, bilateral;shoulder horizontal abduction/adduction, bilateral;shoulder internal/external rotation, bilateral  -ES      Comment (Therapeutic Exercise)  Pt completes BUE ROM HEP to facilitate increased strength and activity tolerance. He states fair understanding, and was encouraged to complete outside therapy services.   -ES      Recorded by [ES] Shelby Bass, OT 07/07/20 1201      Row Name 07/07/20 1000             Balance    Balance  static sitting balance;static standing balance;dynamic standing balance;dynamic sitting balance  -ES      Recorded by [ES] Shelby Bass, OT 07/07/20 1201      Row Name 07/07/20 1000             Static Sitting Balance    Level of [place] (Unsupported Sitting, Static Balance)  conditional independence  -ES      Sitting Position (Unsupported Sitting, Static Balance)  sitting in chair  -ES      Time Able to Maintain Position (Unsupported Sitting, Static Balance)  more than 5 minutes  -ES      Recorded by [ES] Shelby Bass, OT 07/07/20 1201      Row Name 07/07/20 1000             Dynamic Sitting Balance    Level of [place], Reaches Outside Midline (Sitting, Dynamic Balance)  supervision  -ES      Sitting Position, Reaches Outside Midline (Sitting, Dynamic Balance)  sitting in chair  -ES      Recorded by [ES] Shelby Bass, OT 07/07/20 1201      Row Name 07/07/20 1000             Static Standing Balance    Level of [place] (Supported Standing, Static  Balance)  contact guard assist  -ES      Time Able to Maintain Position (Supported Standing, Static Balance)  1 to 2 minutes  -ES      Assistive Device Utilized (Supported Standing, Static Balance)  walker, rolling  -ES      Recorded by [ES] Shelby Bass, OT 07/07/20 1201      Row Name 07/07/20 1000             Dynamic Standing Balance    Level of [place], Reaches Outside Midline (Standing, Dynamic Balance)  minimal assist, 75% patient effort  -ES      Time Able to Maintain Position, Reaches Outside Midline (Standing, Dynamic Balance)  1 to 2 minutes  -ES      Assistive Device Utilized (Supported Standing, Dynamic Balance)  walker, rolling  -ES      Recorded by [ES] Shelby Bass, OT 07/07/20 1201      Row Name 07/07/20 1000             Positioning and Restraints    Pre-Treatment Position  in bed EOB  -ES      Post Treatment Position  chair  -ES      In Chair  notified nsg;call light within reach;exit alarm on;encouraged to call for assist  -ES      Recorded by [ES] Shelby Bass, OT 07/07/20 1201      Row Name 07/07/20 1000             Pain Scale: Numbers Pre/Post-Treatment    Pain Scale: Numbers, Pretreatment  2/10  -ES      Pain Scale: Numbers, Post-Treatment  4/10  -ES2      Pain Location  abdomen  -ES2      Recorded by [ES] Shelby Bass, OT 07/07/20 1201  [ES2] Shelby Bass, OT 07/07/20 1036      Row Name 07/07/20 1000             Pain Scale: FACES Pre/Post-Treatment    Pain: FACES Scale, Pretreatment  --  -ES      Pain: FACES Scale, Post-Treatment  --  -ES      Recorded by [ES] Shelby Bass, OT 07/07/20 1201      Row Name 07/07/20 1000             Coping    Observed Emotional State  cooperative  -ES      Verbalized Emotional State  acceptance  -ES2      Recorded by [ES] Shelby Bass, OT 07/07/20 1036  [ES2] Shelby Bass, OT 07/07/20 1201      Row Name 07/07/20 1000             Plan of Care Review    Plan of Care Reviewed With  patient  -ES      Outcome Summary   Pt is 78 yo male being followed for Afib with RVR, B PNA, AE COPD, and Chronic back pain. Incontinent of bladder this date upon OT arrivla, and sitting EOB with NSG to be cleaned up. Pt completes transfers with CGA - Min A, and is able to complete LB dressing with set up while seated in chair. Pt continues to be limited by respiratory status and general deconditioning, and fatigues with minimal exertion. Pt completes BUE ROM HEP to facilitate increased strength and activity tolerance. He states fair understanding, and was encouraged to complete outside therapy services.  Pt demos improvement this date, but continues to be limited by weakness and impaired activity tolerance. Continue to recommend IP rehab at discharge. PPE: gloves, mask, goggles.  -ES      Recorded by [ES] Shelby Bass OT 07/07/20 1201      Row Name 07/07/20 1000             Outcome Summary/Treatment Plan (OT)    Anticipated Discharge Disposition (OT)  inpatient rehabilitation facility  -ES      Recorded by [ES] Shelby Bass OT 07/07/20 1036      Row Name 07/07/20 1000             Outcome Summary/Treatment Plan (PT)    Anticipated Discharge Disposition (PT)  inpatient rehabilitation facility  -ES      Recorded by [ES] Shelby Bass, OT 07/07/20 1036        User Key  (r) = Recorded By, (t) = Taken By, (c) = Cosigned By    Initials Name Effective Dates Discipline    ES Shelby Bass OT 03/01/19 -  OT             Occupational Therapy Education                 Title: PT OT SLP Therapies (Done)     Topic: Occupational Therapy (Done)     Point: ADL training (Done)     Description:   Instruct learner(s) on proper safety adaptation and remediation techniques during self care or transfers.   Instruct in proper use of assistive devices.              Learning Progress Summary           Patient Acceptance, E,TB, VU by ES at 7/7/2020 1201    Acceptance, E,TB, VU,NR by ES at 7/3/2020 1100                   Point: Home exercise program  (Done)     Description:   Instruct learner(s) on appropriate technique for monitoring, assisting and/or progressing therapeutic exercises/activities.              Learning Progress Summary           Patient Acceptance, E,TB, VU by  at 7/7/2020 1201    Acceptance, E,TB, VU by TIFFANY at 7/5/2020 0013                   Point: Precautions (Done)     Description:   Instruct learner(s) on prescribed precautions during self-care and functional transfers.              Learning Progress Summary           Patient Acceptance, E,TB, VU by  at 7/7/2020 1201    Acceptance, E, VU,NL,NR by  at 7/6/2020 1537    Acceptance, E,TB, VU,NR by  at 7/3/2020 1100                   Point: Body mechanics (Done)     Description:   Instruct learner(s) on proper positioning and spine alignment during self-care, functional mobility activities and/or exercises.              Learning Progress Summary           Patient Acceptance, E,TB, VU by  at 7/7/2020 1201    Acceptance, E, VU,NL,NR by  at 7/6/2020 1537                               User Key     Initials Effective Dates Name Provider Type Discipline     03/01/19 -  Mana Lucio, OT Occupational Therapist OT     03/01/19 -  Shelby Bass OT Occupational Therapist OT    TIFFANY 03/22/19 -  Renée Luong, RN Registered Nurse Nurse                OT Recommendation and Plan  Outcome Summary/Treatment Plan (OT)  Anticipated Discharge Disposition (OT): inpatient rehabilitation facility  Therapy Frequency (OT Eval): 5 times/wk  Plan of Care Review  Plan of Care Reviewed With: patient  Plan of Care Reviewed With: patient  Outcome Summary: Pt is 80 yo male being followed for Afib with RVR, B PNA, AE COPD, and Chronic back pain. Incontinent of bladder this date upon OT arrivla, and sitting EOB with NSG to be cleaned up. Pt completes transfers with CGA - Min A, and is able to complete LB dressing with set up while seated in chair. Pt continues to be limited by respiratory status and general  deconditioning, and fatigues with minimal exertion. Pt completes BUE ROM HEP to facilitate increased strength and activity tolerance. He states fair understanding, and was encouraged to complete outside therapy services.  Pt demos improvement this date, but continues to be limited by weakness and impaired activity tolerance. Continue to recommend IP rehab at discharge. PPE: gloves, mask, goggles.  Outcome Measures     Row Name 07/06/20 1500             How much help from another person do you currently need...    Turning from your back to your side while in flat bed without using bedrails?  3  -MH      Moving from lying on back to sitting on the side of a flat bed without bedrails?  2  -MH      Moving to and from a bed to a chair (including a wheelchair)?  2  -MH      Standing up from a chair using your arms (e.g., wheelchair, bedside chair)?  2  -MH      Climbing 3-5 steps with a railing?  1  -MH      To walk in hospital room?  1  -MH      AM-PAC 6 Clicks Score (PT)  11  -MH         Functional Assessment    Outcome Measure Options  AM-PAC 6 Clicks Basic Mobility (PT)  -        User Key  (r) = Recorded By, (t) = Taken By, (c) = Cosigned By    Initials Name Provider Type     Mana Lucio OT Occupational Therapist           Time Calculation:   Time Calculation- OT     Row Name 07/07/20 1201             Time Calculation- OT    OT Start Time  0850  -ES      OT Stop Time  0930  -ES      OT Time Calculation (min)  40 min  -ES      Total Timed Code Minutes- OT  40 minute(s)  -ES      OT Received On  07/07/20  -ES      OT - Next Appointment  07/08/20  -        User Key  (r) = Recorded By, (t) = Taken By, (c) = Cosigned By    Initials Name Provider Type     Shelby Bass OT Occupational Therapist        Therapy Charges for Today     Code Description Service Date Service Provider Modifiers Qty    [id number]  OT SELF CARE/MGMT/TRAIN EA 15 MIN 7/7/2020 Shelby Bass OT GO 1    [id number]  OT THER  PROC EA 15 MIN 7/7/2020 Shelby Bass OT GO 1    [id number]  OT THERAPEUTIC ACT EA 15 MIN 7/7/2020 Shelby Bass OT GO 1               Shelby Bass OT  7/7/2020

## 2020-07-07 NOTE — PROGRESS NOTES
LOS: 5 days   Admiting Physician- Denzel Rich MD    Reason For Followup:    Atrial fibrillation  COPD  CAD  Coronary artery stenting  Cholelithiasis    Subjective     Patient nausea and vomiting has improved.  No complaints    Objective     Heart rate is improving    Review of Systems:   Review of Systems   Constitution: Negative for chills and fever.   HENT: Negative for ear discharge and nosebleeds.    Eyes: Negative for discharge and redness.   Cardiovascular: Negative for chest pain, orthopnea, palpitations, paroxysmal nocturnal dyspnea and syncope.   Respiratory: Positive for shortness of breath. Negative for cough and wheezing.    Endocrine: Negative for heat intolerance.   Skin: Negative for rash.   Musculoskeletal: Positive for arthritis and joint pain. Negative for myalgias.   Gastrointestinal: Negative for abdominal pain, melena, nausea and vomiting.   Genitourinary: Negative for dysuria and hematuria.   Neurological: Negative for dizziness, light-headedness, numbness and tremors.   Psychiatric/Behavioral: Negative for depression. The patient is not nervous/anxious.          Vital Signs  Vitals:    07/07/20 0838 07/07/20 0936 07/07/20 1001 07/07/20 1123   BP:   124/76    BP Location:   Left arm    Patient Position:   Lying    Pulse: 106 74 86 75   Resp:   16    Temp:   97.2 °F (36.2 °C)    TempSrc:   Axillary    SpO2:  98% 98% 94%   Weight:       Height:         Wt Readings from Last 1 Encounters:   07/07/20 68 kg (149 lb 14.6 oz)       Intake/Output Summary (Last 24 hours) at 7/7/2020 1418  Last data filed at 7/7/2020 1220  Gross per 24 hour   Intake 1255.07 ml   Output 1025 ml   Net 230.07 ml     Physical Exam:  Physical Exam   Constitutional: He is oriented to person, place, and time. He appears well-developed and well-nourished.   HENT:   Head: Normocephalic and atraumatic.   Eyes: No scleral icterus.   Neck: No thyromegaly present.   Cardiovascular: Normal rate, regular rhythm and normal heart  "sounds. Exam reveals no gallop and no friction rub.   No murmur heard.  Pulmonary/Chest: Effort normal and breath sounds normal. No respiratory distress. He has no wheezes. He has no rales.   Abdominal: There is no tenderness.   Musculoskeletal: He exhibits no edema.   Lymphadenopathy:     He has no cervical adenopathy.   Neurological: He is alert and oriented to person, place, and time.   Skin: No rash noted. No erythema.   Psychiatric: He has a normal mood and affect.       Results Review:   Lab Results (last 24 hours)     Procedure Component Value Units Date/Time    Tissue Pathology Exam [[id number]] Collected:  07/05/20 1121    Specimen:  Tissue from Gastric, Antrum Updated:  07/07/20 1153     Case Report --     Surgical Pathology Report                         Case: [id number]                                  Authorizing Provider:  Neal Correa MD        Collected:           07/05/2020 11:21 AM          Ordering Location:     [facility]  Received:            07/06/2020 07:26 AM                                 SUITES                                                                       Pathologist:           Clint Harrell MD                                                           Specimen:    Gastric, Antrum                                                                             Final Diagnosis --     Gastric antrum, biopsy:    Suggetive of mild reactive gastropathy with focal congestion    Negative for significant inflammatory component    No evidence of Helicobacter pylori on H&E stain    MARYLU/tkd          Gross Description --     Received in a single container of formalin labeled \"Gastric antrum\" is a single tan, irregular soft tissue fragment, 4 mm in maximum dimension. It is filtered and entirely submitted.     MARYLU/sms          POC Glucose Once [[id number]]  (Normal) Collected:  07/07/20 1117    Specimen:  Blood Updated:  07/07/20 1121     Glucose 97 mg/dL      Comment: Serial " Number: [id number]:  [id number]       BUN [[id number]]  (Normal) Collected:  07/07/20 0255    Specimen:  Blood Updated:  07/07/20 0755     BUN 15 mg/dL     POC Glucose Once [[id number]]  (Normal) Collected:  07/07/20 0729    Specimen:  Blood Updated:  07/07/20 0731     Glucose 96 mg/dL      Comment: Serial Number: [id number]:  [id number]       Magnesium [[id number]]  (Normal) Collected:  07/07/20 0255    Specimen:  Blood Updated:  07/07/20 0353     Magnesium 1.9 mg/dL     Comprehensive Metabolic Panel [[id number]]  (Abnormal) Collected:  07/07/20 0255    Specimen:  Blood Updated:  07/07/20 0353     Glucose 109 mg/dL      BUN --     Comment: Testing performed by alternate method        Creatinine 0.53 mg/dL      Sodium 142 mmol/L      Potassium 3.3 mmol/L      Chloride 100 mmol/L      CO2 30.0 mmol/L      Calcium 8.5 mg/dL      Total Protein 5.9 g/dL      Albumin 3.60 g/dL      ALT (SGPT) 52 U/L      AST (SGOT) 24 U/L      Alkaline Phosphatase 51 U/L      Total Bilirubin 1.5 mg/dL      eGFR Non African Amer 150 mL/min/1.73      Globulin 2.3 gm/dL      A/G Ratio 1.6 g/dL      BUN/Creatinine Ratio --     Comment: Testing not performed        Anion Gap 12.0 mmol/L     Narrative:       GFR Normal >60  Chronic Kidney Disease <60  Kidney Failure <15      CBC (No Diff) [[id number]]  (Abnormal) Collected:  07/07/20 0255    Specimen:  Blood Updated:  07/07/20 0334     WBC 14.50 10*3/mm3      RBC 4.62 10*6/mm3      Hemoglobin 13.9 g/dL      Hematocrit 41.7 %      MCV 90.2 fL      MCH 30.1 pg      MCHC 33.4 g/dL      RDW 14.8 %      RDW-SD 46.4 fl      MPV 8.4 fL      Platelets 263 10*3/mm3     Blood Culture - Blood, Arm, Left [[id number]] Collected:  07/02/20 0249    Specimen:  Blood from Arm, Left Updated:  07/07/20 0300     Blood Culture No growth at 5 days    Blood Culture - Blood, Arm, Right [[id number]] Collected:  07/01/20 2348    Specimen:  Blood from Arm, Right Updated:  07/07/20 0000     Blood Culture No growth  at 5 days    POC Glucose Once [[id number]]  (Abnormal) Collected:  07/06/20 2017    Specimen:  Blood Updated:  07/06/20 2019     Glucose 117 mg/dL      Comment: Serial Number: [id number]:  [id number]       POC Glucose Once [[id number]]  (Abnormal) Collected:  07/06/20 1634    Specimen:  Blood Updated:  07/06/20 1635     Glucose 130 mg/dL      Comment: Serial Number: [id number]:  [id number]           Imaging Results (Last 72 Hours)     Procedure Component Value Units Date/Time    XR Chest 1 View [[id number]] Collected:  07/04/20 1831     Updated:  07/04/20 1835    Narrative:       DATE OF EXAM:  7/4/2020 6:05 PM     PROCEDURE:  XR CHEST 1 VW-     INDICATIONS:  worsening hypoxemia; I48.91-Unspecified atrial fibrillation; I50.9-Heart  failure, unspecified; J18.9-Pneumonia, unspecified organism     COMPARISON:  07/01/2020 CT chest 07/02/2020     TECHNIQUE:   Single radiographic view of the chest was obtained.     FINDINGS:  Multifocal groundglass opacities again seen. Slight improvement in the  right mid lung. Mild cardiomegaly. Underlying septal thickening is  slightly improved. No pneumothorax. No pleural effusion. Elevated right  diaphragm.       Impression:       Slight improvement compared to prior radiograph. Groundglass opacity  septal thickening upper lobe predominant with slight improvement on the  right. Question atypical infection including Covid 19. Other bacterial  or atypical pneumonia as can have this appearance.     Electronically Signed By-Sarah Beth Bolden MD On:7/4/2020 6:33 PM  This report was finalized on [id number] by  Sarah Beth Bolden MD.        ECG/EMG Results (most recent)     Procedure Component Value Units Date/Time    Adult Transthoracic Echo Complete W/ Cont if Necessary Per Protocol [[id number]] Collected:  07/02/20 0653     Updated:  07/02/20 1409     BSA 1.9 m^2      RVIDd 2.1 cm      IVSd 1.1 cm      LVIDd 5.6 cm      LVIDs 4.6 cm      LVPWd 1.3 cm      IVS/LVPW 0.84     FS 17.6 %       EDV(Teich) 155.9 ml      ESV(Teich) 99.5 ml      EF(Teich) 36.2 %      EDV(cubed) 179.0 ml      ESV(cubed) 100.1 ml      EF(cubed) 44.1 %      LV mass(C)d 276.4 grams      LV mass(C)dI 142.4 grams/m^2      SV(Teich) 56.4 ml      SI(Teich) 29.1 ml/m^2      SV(cubed) 78.9 ml      SI(cubed) 40.6 ml/m^2      Ao root diam 4.0 cm      Ao root area 12.8 cm^2      ACS 2.1 cm      asc Aorta Diam 3.7 cm      LVOT diam 2.5 cm      LVOT area 5.1 cm^2      RVOT diam 2.1 cm      RVOT area 3.6 cm^2      EDV(MOD-sp4) 68.5 ml      ESV(MOD-sp4) 50.1 ml      EF(MOD-sp4) 26.8 %      SV(MOD-sp4) 18.4 ml      SI(MOD-sp4) 9.5 ml/m^2      Ao root area (BSA corrected) 2.1     LV Dunlap Vol (BSA corrected) 35.3 ml/m^2      LV Sys Vol (BSA corrected) 25.8 ml/m^2      Aortic R-R 0.4 sec      Aortic .6 BPM      MV V2 max 90.3 cm/sec      MV max PG 3.3 mmHg      MV V2 mean 63.4 cm/sec      MV mean PG 1.8 mmHg      MV V2 VTI 9.6 cm      MVA(VTI) 6.7 cm^2      Ao pk sharmila 78.3 cm/sec      Ao max PG 2.5 mmHg      Ao max PG (full) -0.26 mmHg      Ao V2 mean 61.8 cm/sec      Ao mean PG 1.7 mmHg      Ao mean PG (full) 0.39 mmHg      Ao V2 VTI 11.3 cm      AISHWARYA(I,A) 5.7 cm^2      AISHWARYA(I,D) 5.7 cm^2      AISHWARYA(V,A) 5.3 cm^2      AISHWARYA(V,D) 5.3 cm^2      LV V1 max PG 2.7 mmHg      LV V1 mean PG 1.3 mmHg      LV V1 max 82.3 cm/sec      LV V1 mean 53.0 cm/sec      LV V1 VTI 12.7 cm      MR max sharmila 453.8 cm/sec      MR max PG 82.4 mmHg      CO(Ao) 22.0 l/min      CI(Ao) 11.3 l/min/m^2      SV(Ao) 144.9 ml      SI(Ao) 74.6 ml/m^2      CO(LVOT) 9.8 l/min      CI(LVOT) 5.0 l/min/m^2      SV(LVOT) 64.6 ml      SV(RVOT) 42.6 ml      SI(LVOT) 33.3 ml/m^2      PA V2 max 77.1 cm/sec      PA max PG 2.4 mmHg      PA max PG (full) 0.22 mmHg      PA V2 mean 50.6 cm/sec      PA mean PG 1.2 mmHg      PA mean PG (full) 0.15 mmHg      PA V2 VTI 9.1 cm      PVA(I,A) 4.7 cm^2      BH CV ECHO TARYN - PVA(I,D) 4.7 cm^2      BH CV ECHO TARYN - PVA(V,A) 3.4 cm^2      BH CV ECHO TARYN -  PVA(V,D) 3.4 cm^2      PA acc time 0.06 sec      RV V1 max PG 2.2 mmHg      RV V1 mean PG 1.1 mmHg      RV V1 max 73.4 cm/sec      RV V1 mean 46.7 cm/sec      RV V1 VTI 11.9 cm      TR max sharmila 285.8 cm/sec      RVSP(TR) 35.7 mmHg      RAP systole 3.0 mmHg      PA pr(Accel) 49.9 mmHg      Qp/Qs 0.66      CV ECHO TARYN - BZI_BMI 18.7 kilograms/m^2       CV ECHO TARYN - BSA(HAYCOCK) 1.9 m^2       CV ECHO TARYN - BZI_METRIC_WEIGHT 68.0 kg       CV ECHO TARYN - BZI_METRIC_HEIGHT 190.5 cm      EF(MOD-bp) 27.0 %      LA dimension(2D) 4.6 cm     Narrative:       · Left ventricular systolic function is severely decreased.  · Left ventricular wall thickness is consistent with mild concentric   hypertrophy.  · Left atrial cavity size is moderately dilated.  · Mild-to-moderate mitral valve regurgitation is present  · Mild tricuspid valve regurgitation is present.  · Mild aortic valve regurgitation is present.       ECG 12 Lead [[id number]] Collected:  07/03/20 0945     Updated:  07/03/20 0947    Narrative:       HEART RATE= 106  bpm  RR Interval= 568  ms  WV Interval= 196  ms  P Horizontal Axis= 48  deg  P Front Axis= 0  deg  QRSD Interval= 95  ms  QT Interval= 298  ms  QRS Axis= -18  deg  T Wave Axis= 14  deg  - ABNORMAL ECG -  Sinus tachycardia  Multiple premature complexes, vent & supraven  When compared with ECG of 01-Jul-2020 23:40:10,  Significant rate decrease  Electronically Signed By:   Date and Time of Study: 2020-07-03 09:45:28    ECG 12 Lead [[id number]] Collected:  07/01/20 2340     Updated:  07/03/20 1140    Narrative:       HEART RATE= 163  bpm  RR Interval= 368  ms  WV Interval=   ms  P Horizontal Axis=   deg  P Front Axis=   deg  QRSD Interval= 78  ms  QT Interval= 283  ms  QRS Axis= 7  deg  T Wave Axis= 42  deg  - ABNORMAL ECG -  Atrial fibrillation with rapid V-rate  No previous ECG available for comparison  Electronically Signed By: Sunny Unger (MILENA) 03-Jul-2020 11:38:35  Date and Time of Study: 2020-07-01  23:40:10    ECG 12 Lead [[id number]] Collected:  07/03/20 2021     Updated:  07/03/20 2023    Narrative:       HEART RATE= 133  bpm  RR Interval= 449  ms  FL Interval=   ms  P Horizontal Axis=   deg  P Front Axis=   deg  QRSD Interval= 92  ms  QT Interval= 296  ms  QRS Axis= -17  deg  T Wave Axis= -11  deg  - ABNORMAL ECG -  Atrial fibrillation  Ventricular premature complex  Probable lateral infarct, old  Electronically Signed By:   Date and Time of Study: 2020-07-03 20:21:30    ECG 12 Lead [[id number]] Collected:  07/04/20 0607     Updated:  07/04/20 0609    Narrative:       HEART RATE= 102  bpm  RR Interval= 589  ms  FL Interval=   ms  P Horizontal Axis=   deg  P Front Axis=   deg  QRSD Interval= 95  ms  QT Interval= 334  ms  QRS Axis= -21  deg  T Wave Axis= 3  deg  - ABNORMAL ECG -  Atrial fibrillation  Ventricular premature complex  Electronically Signed By:   Date and Time of Study: 2020-07-04 06:07:51    ECG 12 Lead [[id number]] Collected:  07/05/20 0709     Updated:  07/05/20 0710    Narrative:       HEART RATE= 113  bpm  RR Interval= 528  ms  FL Interval=   ms  P Horizontal Axis=   deg  P Front Axis=   deg  QRSD Interval= 89  ms  QT Interval= 321  ms  QRS Axis= -36  deg  T Wave Axis= 0  deg  - ABNORMAL ECG -  Atrial fibrillation  Ventricular premature complex  Left axis deviation  When compared with ECG of 04-Jul-2020 6:07:51,  No significant change  Electronically Signed By:   Date and Time of Study: 2020-07-05 07:09:38    ECG 12 Lead [[id number]] Collected:  07/05/20 2052     Updated:  07/05/20 2057    Narrative:       HEART RATE= 114  bpm  RR Interval= 524  ms  FL Interval=   ms  P Horizontal Axis=   deg  P Front Axis=   deg  QRSD Interval= 96  ms  QT Interval= 312  ms  QRS Axis= -28  deg  T Wave Axis= -12  deg  - ABNORMAL ECG -  Atrial fibrillation  Ventricular premature complex  Borderline T abnormalities, diffuse leads  When compared with ECG of 05-Jul-2020 7:09:38,  Significant axis, voltage or  hypertrophy change  Electronically Signed By:   Date and Time of Study: 2020-07-05 20:52:58    ECG 12 Lead [[id number]] Collected:  07/06/20 0609     Updated:  07/06/20 0610    Narrative:       HEART RATE= 97  bpm  RR Interval= 619  ms  MN Interval=   ms  P Horizontal Axis=   deg  P Front Axis=   deg  QRSD Interval= 91  ms  QT Interval= 338  ms  QRS Axis= -22  deg  T Wave Axis= -5  deg  - ABNORMAL ECG -  Atrial fibrillation  Ventricular premature complex  When compared with ECG of 05-Jul-2020 20:52:58,  Significant repolarization change  Electronically Signed By:   Date and Time of Study: 2020-07-06 06:09:24        CBC    Results from last 7 days   Lab Units 07/07/20 0255 07/06/20 0225 07/05/20 0215 07/04/20  0334 07/03/20  0940 07/02/20  0457 07/01/20  2348   WBC 10*3/mm3 14.50* 16.10* 14.50* 18.00* 14.10* 7.50 9.60   HEMOGLOBIN g/dL 13.9 14.3 13.5 12.9* 12.7* 11.6* 11.7*   PLATELETS 10*3/mm3 263 302 286 280 273 227 253     BMP   Results from last 7 days   Lab Units 07/07/20 0255 07/06/20 0225 07/05/20 0215 07/04/20  0334 07/03/20  0940 07/02/20  0457 07/01/20  2348   SODIUM mmol/L 142 143 142 144 145 146* 145   POTASSIUM mmol/L 3.3* 3.5 3.1* 2.8* 2.6* 3.5 4.1   CHLORIDE mmol/L 100 98 95* 101 103 104 104   CO2 mmol/L 30.0* 33.0* 32.0* 26.0 22.0 23.0 22.0   BUN  15 19 15 12 17 22 23   CREATININE mg/dL 0.53* 0.65* 0.64* 0.59* 0.75* 0.80 0.69*   GLUCOSE mg/dL 109* 151* 162* 149* 246* 172* 119*   MAGNESIUM mg/dL 1.9 2.2 1.9 2.2 2.1 2.0 2.4     CMP   Results from last 7 days   Lab Units 07/07/20  0255 07/06/20  0225 07/05/20  0215 07/04/20  0334 07/03/20  0940 07/02/20  0457 07/01/20  2348   SODIUM mmol/L 142 143 142 144 145 146* 145   POTASSIUM mmol/L 3.3* 3.5 3.1* 2.8* 2.6* 3.5 4.1   CHLORIDE mmol/L 100 98 95* 101 103 104 104   CO2 mmol/L 30.0* 33.0* 32.0* 26.0 22.0 23.0 22.0   BUN  15 19 15 12 17 22 23   CREATININE mg/dL 0.53* 0.65* 0.64* 0.59* 0.75* 0.80 0.69*   GLUCOSE mg/dL 109* 151* 162* 149* 246* 172* 119*     ALBUMIN g/dL 3.60 3.80 3.80 3.90 3.80  --  3.50   BILIRUBIN mg/dL 1.5* 1.5* 1.5* 1.2 1.2  --  1.5*   ALK PHOS U/L 51 59 60 55 60  --  54   AST (SGOT) U/L 24 41* 37 45* 37  --  31   ALT (SGPT) U/L 52* 59* 46* 43* 49*  --  15   LIPASE U/L  --   --   --   --  30  --  13     Cardiac Studies:  Echo-   Results for orders placed during the hospital encounter of 07/01/20   Adult Transthoracic Echo Complete W/ Cont if Necessary Per Protocol    Narrative · Left ventricular systolic function is severely decreased.  · Left ventricular wall thickness is consistent with mild concentric   hypertrophy.  · Left atrial cavity size is moderately dilated.  · Mild-to-moderate mitral valve regurgitation is present  · Mild tricuspid valve regurgitation is present.  · Mild aortic valve regurgitation is present.        Stress Myoview-  Cath-      Medication Review:   Scheduled Meds:  atorvastatin 20 mg Oral Daily   [START ON 7/8/2020] digoxin 125 mcg Oral Daily   dilTIAZem 30 mg Oral Q6H   enoxaparin 70 mg Subcutaneous Q12H   fludrocortisone 0.1 mg Oral Q PM   furosemide 40 mg Oral Daily   insulin lispro 0-7 Units Subcutaneous 4x Daily With Meals & Nightly   metoclopramide 5 mg Oral TID AC   metoprolol tartrate 25 mg Oral Q6H   ondansetron 4 mg Intravenous Q6H   pantoprazole 40 mg Oral Q AM   potassium chloride 20 mEq Oral Daily   [START ON 7/8/2020] rivaroxaban 20 mg Oral Daily With Dinner   sodium chloride 10 mL Intravenous Q12H   sucralfate 1 g Oral 4x Daily AC & at Bedtime   Thera 1 tablet Oral Daily   vitamin B-12 1,000 mcg Oral Daily     Continuous Infusions:  Pharmacy Consult - Pharmacy to dose      PRN Meds:.•  acetaminophen **OR** acetaminophen **OR** acetaminophen  •  aluminum-magnesium hydroxide-simethicone  •  bisacodyl  •  dextrose  •  dextrose  •  glucagon (human recombinant)  •  HYDROcodone-acetaminophen  •  insulin lispro **AND** insulin lispro  •  ipratropium-albuterol  •  magnesium hydroxide  •  magnesium sulfate **OR**  magnesium sulfate **OR** magnesium sulfate  •  melatonin  •  Morphine  •  ondansetron  •  Pharmacy Consult - Pharmacy to dose  •  potassium chloride **OR** potassium chloride **OR** potassium chloride  •  potassium phosphate infusion greater than 15 mMoles **OR** potassium phosphate infusion greater than 15 mMoles **OR** potassium phosphate **OR** sodium phosphate IVPB **OR** sodium phosphate IVPB **OR** sodium phosphate IVPB  •  promethazine  •  [COMPLETED] Insert peripheral IV **AND** sodium chloride  •  sodium chloride      Assessment/Plan   Patient Active Problem List   Diagnosis   • Atrial fibrillation (CMS/HCC)   • CAD (coronary artery disease)   • Chronic anticoagulation   • Hyperlipidemia   • Presence of stent in right coronary artery   • Chronic pain   • Sepsis (CMS/HCC)   • Congestive heart failure (CHF) (CMS/HCC)   • Nausea vomiting and diarrhea     Plan:    Patient is sitting up in the chair.  Heart rate is much better.  Continue current therapy.  I would discontinue amiodarone.  Continue Lovenox today.  I would proceed with stress test tomorrow.  If stress test is negative I would start Xarelto tomorrow.    Wade Cronin MD  07/07/20  14:18

## 2020-07-07 NOTE — PROGRESS NOTES
LOS: 4 days   Admiting Physician- Denzel Rich MD    Reason For Followup:    Atrial fibrillation  COPD exacerbation  CAD  Coronary artery stenting      Subjective     No obvious respiratory distress.  No chest pain    Objective     Patient is still in atrial fibrillation.  Heart rate is poorly controlled.    Review of Systems:   Review of Systems   Constitution: Negative for chills and fever.   HENT: Negative for ear discharge and nosebleeds.    Eyes: Negative for discharge and redness.   Cardiovascular: Negative for chest pain, orthopnea, palpitations, paroxysmal nocturnal dyspnea and syncope.   Respiratory: Positive for shortness of breath. Negative for cough and wheezing.    Endocrine: Negative for heat intolerance.   Skin: Negative for rash.   Musculoskeletal: Positive for arthritis and joint pain. Negative for myalgias.   Gastrointestinal: Negative for abdominal pain, melena, nausea and vomiting.   Genitourinary: Negative for dysuria and hematuria.   Neurological: Negative for dizziness, light-headedness, numbness and tremors.   Psychiatric/Behavioral: Negative for depression. The patient is not nervous/anxious.          Vital Signs  Vitals:    07/06/20 1745 07/06/20 1800 07/06/20 1816 07/06/20 1900   BP: 126/91  126/91 159/93   BP Location:       Patient Position:       Pulse: 111 119 90 94   Resp:   26    Temp:   97.8 °F (36.6 °C)    TempSrc:   Oral    SpO2: 95%  94% 97%   Weight:       Height:         Wt Readings from Last 1 Encounters:   07/06/20 67.2 kg (148 lb 2.4 oz)       Intake/Output Summary (Last 24 hours) at 7/6/2020 2008  Last data filed at 7/6/2020 1816  Gross per 24 hour   Intake 792.6 ml   Output 500 ml   Net 292.6 ml     Physical Exam:  Physical Exam   Constitutional: He is oriented to person, place, and time. He appears well-developed and well-nourished.   HENT:   Head: Normocephalic and atraumatic.   Eyes: Right eye exhibits no discharge. No scleral icterus.   Neck: No thyromegaly  present.   Cardiovascular: Normal rate and normal heart sounds. Exam reveals no gallop and no friction rub.   No murmur heard.  Heart rate is irregular   Pulmonary/Chest: Effort normal and breath sounds normal. No respiratory distress. He has no wheezes. He has no rales.   Abdominal: There is no tenderness.   Musculoskeletal: He exhibits no edema.   Lymphadenopathy:     He has no cervical adenopathy.   Neurological: He is alert and oriented to person, place, and time.   Skin: No rash noted. No erythema.   Psychiatric: He has a normal mood and affect.       Results Review:   Lab Results (last 24 hours)     Procedure Component Value Units Date/Time    POC Glucose Once [[id number]]  (Abnormal) Collected:  07/06/20 1634    Specimen:  Blood Updated:  07/06/20 1635     Glucose 130 mg/dL      Comment: Serial Number: [id number]:  [id number]       POC Glucose Once [[id number]]  (Abnormal) Collected:  07/06/20 1127    Specimen:  Blood Updated:  07/06/20 1141     Glucose 159 mg/dL      Comment: Serial Number: [id number]:  [id number]       Tissue Pathology Exam [[id number]] Collected:  07/05/20 1121    Specimen:  Tissue from Gastric, Antrum Updated:  07/06/20 0726    POC Glucose Once [[id number]]  (Abnormal) Collected:  07/06/20 0723    Specimen:  Blood Updated:  07/06/20 0724     Glucose 116 mg/dL      Comment: Serial Number: [id number]:  [id number]       BUN [[id number]]  (Normal) Collected:  07/06/20 0225    Specimen:  Blood Updated:  07/06/20 0701     BUN 19 mg/dL     Digoxin Level [[id number]]  (Abnormal) Collected:  07/06/20 0225    Specimen:  Blood Updated:  07/06/20 0301     Digoxin 1.30 ng/mL     Blood Culture - Blood, Arm, Left [[id number]] Collected:  07/02/20 0249    Specimen:  Blood from Arm, Left Updated:  07/06/20 0300     Blood Culture No growth at 4 days    Comprehensive Metabolic Panel [[id number]]  (Abnormal) Collected:  07/06/20 0225    Specimen:  Blood Updated:  07/06/20 0259     Glucose 151 mg/dL       BUN --     Comment: Testing performed by alternate method        Creatinine 0.65 mg/dL      Sodium 143 mmol/L      Potassium 3.5 mmol/L      Chloride 98 mmol/L      CO2 33.0 mmol/L      Calcium 8.9 mg/dL      Total Protein 6.6 g/dL      Albumin 3.80 g/dL      ALT (SGPT) 59 U/L      AST (SGOT) 41 U/L      Alkaline Phosphatase 59 U/L      Total Bilirubin 1.5 mg/dL      eGFR Non African Amer 118 mL/min/1.73      Globulin 2.8 gm/dL      A/G Ratio 1.4 g/dL      BUN/Creatinine Ratio --     Comment: Testing not performed        Anion Gap 12.0 mmol/L     Narrative:       GFR Normal >60  Chronic Kidney Disease <60  Kidney Failure <15      Magnesium [[id number]]  (Normal) Collected:  07/06/20 0225    Specimen:  Blood Updated:  07/06/20 0259     Magnesium 2.2 mg/dL     CBC & Differential [[id number]] Collected:  07/06/20 0225    Specimen:  Blood Updated:  07/06/20 0242    Narrative:       The following orders were created for panel order CBC & Differential.  Procedure                               Abnormality         Status                     ---------                               -----------         ------                     CBC Auto Differential[[id number]]        Abnormal            Final result                 Please view results for these tests on the individual orders.    CBC Auto Differential [[id number]]  (Abnormal) Collected:  07/06/20 0225    Specimen:  Blood Updated:  07/06/20 0242     WBC 16.10 10*3/mm3      RBC 4.84 10*6/mm3      Hemoglobin 14.3 g/dL      Hematocrit 44.1 %      MCV 91.1 fL      MCH 29.5 pg      MCHC 32.3 g/dL      RDW 14.7 %      RDW-SD 45.9 fl      MPV 8.2 fL      Platelets 302 10*3/mm3      Neutrophil % 87.0 %      Lymphocyte % 4.5 %      Monocyte % 8.3 %      Eosinophil % 0.0 %      Basophil % 0.2 %      Neutrophils, Absolute 14.00 10*3/mm3      Lymphocytes, Absolute 0.70 10*3/mm3      Monocytes, Absolute 1.30 10*3/mm3      Eosinophils, Absolute 0.00 10*3/mm3      Basophils, Absolute 0.00  10*3/mm3      nRBC 0.0 /100 WBC     Blood Culture - Blood, Arm, Right [[id number]] Collected:  07/01/20 2348    Specimen:  Blood from Arm, Right Updated:  07/06/20 0000     Blood Culture No growth at 4 days    POC Glucose Once [[id number]]  (Abnormal) Collected:  07/05/20 2048    Specimen:  Blood Updated:  07/05/20 2050     Glucose 133 mg/dL      Comment: Serial Number: [id number]:  [id number]           Imaging Results (Last 72 Hours)     Procedure Component Value Units Date/Time    XR Chest 1 View [[id number]] Collected:  07/04/20 1831     Updated:  07/04/20 1835    Narrative:       DATE OF EXAM:  7/4/2020 6:05 PM     PROCEDURE:  XR CHEST 1 VW-     INDICATIONS:  worsening hypoxemia; I48.91-Unspecified atrial fibrillation; I50.9-Heart  failure, unspecified; J18.9-Pneumonia, unspecified organism     COMPARISON:  07/01/2020 CT chest 07/02/2020     TECHNIQUE:   Single radiographic view of the chest was obtained.     FINDINGS:  Multifocal groundglass opacities again seen. Slight improvement in the  right mid lung. Mild cardiomegaly. Underlying septal thickening is  slightly improved. No pneumothorax. No pleural effusion. Elevated right  diaphragm.       Impression:       Slight improvement compared to prior radiograph. Groundglass opacity  septal thickening upper lobe predominant with slight improvement on the  right. Question atypical infection including Covid 19. Other bacterial  or atypical pneumonia as can have this appearance.     Electronically Signed By-Sarah Beth Bolden MD On:7/4/2020 6:33 PM  This report was finalized on [id number] by  Sarah Beth Bolden MD.        ECG/EMG Results (most recent)     Procedure Component Value Units Date/Time    Adult Transthoracic Echo Complete W/ Cont if Necessary Per Protocol [[id number]] Collected:  07/02/20 0653     Updated:  07/02/20 1409     BSA 1.9 m^2      RVIDd 2.1 cm      IVSd 1.1 cm      LVIDd 5.6 cm      LVIDs 4.6 cm      LVPWd 1.3 cm      IVS/LVPW 0.84     FS 17.6 %       EDV(Teich) 155.9 ml      ESV(Teich) 99.5 ml      EF(Teich) 36.2 %      EDV(cubed) 179.0 ml      ESV(cubed) 100.1 ml      EF(cubed) 44.1 %      LV mass(C)d 276.4 grams      LV mass(C)dI 142.4 grams/m^2      SV(Teich) 56.4 ml      SI(Teich) 29.1 ml/m^2      SV(cubed) 78.9 ml      SI(cubed) 40.6 ml/m^2      Ao root diam 4.0 cm      Ao root area 12.8 cm^2      ACS 2.1 cm      asc Aorta Diam 3.7 cm      LVOT diam 2.5 cm      LVOT area 5.1 cm^2      RVOT diam 2.1 cm      RVOT area 3.6 cm^2      EDV(MOD-sp4) 68.5 ml      ESV(MOD-sp4) 50.1 ml      EF(MOD-sp4) 26.8 %      SV(MOD-sp4) 18.4 ml      SI(MOD-sp4) 9.5 ml/m^2      Ao root area (BSA corrected) 2.1     LV Dunlap Vol (BSA corrected) 35.3 ml/m^2      LV Sys Vol (BSA corrected) 25.8 ml/m^2      Aortic R-R 0.4 sec      Aortic .6 BPM      MV V2 max 90.3 cm/sec      MV max PG 3.3 mmHg      MV V2 mean 63.4 cm/sec      MV mean PG 1.8 mmHg      MV V2 VTI 9.6 cm      MVA(VTI) 6.7 cm^2      Ao pk sharmila 78.3 cm/sec      Ao max PG 2.5 mmHg      Ao max PG (full) -0.26 mmHg      Ao V2 mean 61.8 cm/sec      Ao mean PG 1.7 mmHg      Ao mean PG (full) 0.39 mmHg      Ao V2 VTI 11.3 cm      AISHWARYA(I,A) 5.7 cm^2      AISHWARYA(I,D) 5.7 cm^2      AISHWARYA(V,A) 5.3 cm^2      AISHWARYA(V,D) 5.3 cm^2      LV V1 max PG 2.7 mmHg      LV V1 mean PG 1.3 mmHg      LV V1 max 82.3 cm/sec      LV V1 mean 53.0 cm/sec      LV V1 VTI 12.7 cm      MR max sharmila 453.8 cm/sec      MR max PG 82.4 mmHg      CO(Ao) 22.0 l/min      CI(Ao) 11.3 l/min/m^2      SV(Ao) 144.9 ml      SI(Ao) 74.6 ml/m^2      CO(LVOT) 9.8 l/min      CI(LVOT) 5.0 l/min/m^2      SV(LVOT) 64.6 ml      SV(RVOT) 42.6 ml      SI(LVOT) 33.3 ml/m^2      PA V2 max 77.1 cm/sec      PA max PG 2.4 mmHg      PA max PG (full) 0.22 mmHg      PA V2 mean 50.6 cm/sec      PA mean PG 1.2 mmHg      PA mean PG (full) 0.15 mmHg      PA V2 VTI 9.1 cm      PVA(I,A) 4.7 cm^2      BH CV ECHO TARYN - PVA(I,D) 4.7 cm^2      BH CV ECHO TARYN - PVA(V,A) 3.4 cm^2      BH CV ECHO TARYN -  PVA(V,D) 3.4 cm^2      PA acc time 0.06 sec      RV V1 max PG 2.2 mmHg      RV V1 mean PG 1.1 mmHg      RV V1 max 73.4 cm/sec      RV V1 mean 46.7 cm/sec      RV V1 VTI 11.9 cm      TR max sharmila 285.8 cm/sec      RVSP(TR) 35.7 mmHg      RAP systole 3.0 mmHg      PA pr(Accel) 49.9 mmHg      Qp/Qs 0.66      CV ECHO TARYN - BZI_BMI 18.7 kilograms/m^2       CV ECHO TARYN - BSA(HAYCOCK) 1.9 m^2       CV ECHO TARYN - BZI_METRIC_WEIGHT 68.0 kg       CV ECHO TARYN - BZI_METRIC_HEIGHT 190.5 cm      EF(MOD-bp) 27.0 %      LA dimension(2D) 4.6 cm     Narrative:       · Left ventricular systolic function is severely decreased.  · Left ventricular wall thickness is consistent with mild concentric   hypertrophy.  · Left atrial cavity size is moderately dilated.  · Mild-to-moderate mitral valve regurgitation is present  · Mild tricuspid valve regurgitation is present.  · Mild aortic valve regurgitation is present.       ECG 12 Lead [[id number]] Collected:  07/03/20 0945     Updated:  07/03/20 0947    Narrative:       HEART RATE= 106  bpm  RR Interval= 568  ms  IA Interval= 196  ms  P Horizontal Axis= 48  deg  P Front Axis= 0  deg  QRSD Interval= 95  ms  QT Interval= 298  ms  QRS Axis= -18  deg  T Wave Axis= 14  deg  - ABNORMAL ECG -  Sinus tachycardia  Multiple premature complexes, vent & supraven  When compared with ECG of 01-Jul-2020 23:40:10,  Significant rate decrease  Electronically Signed By:   Date and Time of Study: 2020-07-03 09:45:28    ECG 12 Lead [[id number]] Collected:  07/01/20 2340     Updated:  07/03/20 1140    Narrative:       HEART RATE= 163  bpm  RR Interval= 368  ms  IA Interval=   ms  P Horizontal Axis=   deg  P Front Axis=   deg  QRSD Interval= 78  ms  QT Interval= 283  ms  QRS Axis= 7  deg  T Wave Axis= 42  deg  - ABNORMAL ECG -  Atrial fibrillation with rapid V-rate  No previous ECG available for comparison  Electronically Signed By: Sunny Unger (MILENA) 03-Jul-2020 11:38:35  Date and Time of Study: 2020-07-01  23:40:10    ECG 12 Lead [[id number]] Collected:  07/03/20 2021     Updated:  07/03/20 2023    Narrative:       HEART RATE= 133  bpm  RR Interval= 449  ms  AL Interval=   ms  P Horizontal Axis=   deg  P Front Axis=   deg  QRSD Interval= 92  ms  QT Interval= 296  ms  QRS Axis= -17  deg  T Wave Axis= -11  deg  - ABNORMAL ECG -  Atrial fibrillation  Ventricular premature complex  Probable lateral infarct, old  Electronically Signed By:   Date and Time of Study: 2020-07-03 20:21:30    ECG 12 Lead [[id number]] Collected:  07/04/20 0607     Updated:  07/04/20 0609    Narrative:       HEART RATE= 102  bpm  RR Interval= 589  ms  AL Interval=   ms  P Horizontal Axis=   deg  P Front Axis=   deg  QRSD Interval= 95  ms  QT Interval= 334  ms  QRS Axis= -21  deg  T Wave Axis= 3  deg  - ABNORMAL ECG -  Atrial fibrillation  Ventricular premature complex  Electronically Signed By:   Date and Time of Study: 2020-07-04 06:07:51    ECG 12 Lead [[id number]] Collected:  07/05/20 0709     Updated:  07/05/20 0710    Narrative:       HEART RATE= 113  bpm  RR Interval= 528  ms  AL Interval=   ms  P Horizontal Axis=   deg  P Front Axis=   deg  QRSD Interval= 89  ms  QT Interval= 321  ms  QRS Axis= -36  deg  T Wave Axis= 0  deg  - ABNORMAL ECG -  Atrial fibrillation  Ventricular premature complex  Left axis deviation  When compared with ECG of 04-Jul-2020 6:07:51,  No significant change  Electronically Signed By:   Date and Time of Study: 2020-07-05 07:09:38    ECG 12 Lead [[id number]] Collected:  07/05/20 2052     Updated:  07/05/20 2057    Narrative:       HEART RATE= 114  bpm  RR Interval= 524  ms  AL Interval=   ms  P Horizontal Axis=   deg  P Front Axis=   deg  QRSD Interval= 96  ms  QT Interval= 312  ms  QRS Axis= -28  deg  T Wave Axis= -12  deg  - ABNORMAL ECG -  Atrial fibrillation  Ventricular premature complex  Borderline T abnormalities, diffuse leads  When compared with ECG of 05-Jul-2020 7:09:38,  Significant axis, voltage or  hypertrophy change  Electronically Signed By:   Date and Time of Study: 2020-07-05 20:52:58    ECG 12 Lead [[id number]] Collected:  07/06/20 0609     Updated:  07/06/20 0610    Narrative:       HEART RATE= 97  bpm  RR Interval= 619  ms  LA Interval=   ms  P Horizontal Axis=   deg  P Front Axis=   deg  QRSD Interval= 91  ms  QT Interval= 338  ms  QRS Axis= -22  deg  T Wave Axis= -5  deg  - ABNORMAL ECG -  Atrial fibrillation  Ventricular premature complex  When compared with ECG of 05-Jul-2020 20:52:58,  Significant repolarization change  Electronically Signed By:   Date and Time of Study: 2020-07-06 06:09:24        CBC    Results from last 7 days   Lab Units 07/06/20 0225 07/05/20 0215 07/04/20 0334 07/03/20 0940 07/02/20 0457 07/01/20  2348   WBC 10*3/mm3 16.10* 14.50* 18.00* 14.10* 7.50 9.60   HEMOGLOBIN g/dL 14.3 13.5 12.9* 12.7* 11.6* 11.7*   PLATELETS 10*3/mm3 302 286 280 273 227 253     BMP   Results from last 7 days   Lab Units 07/06/20 0225 07/05/20 0215 07/04/20 0334 07/03/20 0940 07/02/20 0457 07/01/20  2348   SODIUM mmol/L 143 142 144 145 146* 145   POTASSIUM mmol/L 3.5 3.1* 2.8* 2.6* 3.5 4.1   CHLORIDE mmol/L 98 95* 101 103 104 104   CO2 mmol/L 33.0* 32.0* 26.0 22.0 23.0 22.0   BUN  19 15 12 17 22 23   CREATININE mg/dL 0.65* 0.64* 0.59* 0.75* 0.80 0.69*   GLUCOSE mg/dL 151* 162* 149* 246* 172* 119*   MAGNESIUM mg/dL 2.2 1.9 2.2 2.1 2.0 2.4     CMP   Results from last 7 days   Lab Units 07/06/20 0225 07/05/20 0215 07/04/20 0334 07/03/20 0940 07/02/20 0457 07/01/20  2348   SODIUM mmol/L 143 142 144 145 146* 145   POTASSIUM mmol/L 3.5 3.1* 2.8* 2.6* 3.5 4.1   CHLORIDE mmol/L 98 95* 101 103 104 104   CO2 mmol/L 33.0* 32.0* 26.0 22.0 23.0 22.0   BUN  19 15 12 17 22 23   CREATININE mg/dL 0.65* 0.64* 0.59* 0.75* 0.80 0.69*   GLUCOSE mg/dL 151* 162* 149* 246* 172* 119*   ALBUMIN g/dL 3.80 3.80 3.90 3.80  --  3.50   BILIRUBIN mg/dL 1.5* 1.5* 1.2 1.2  --  1.5*   ALK PHOS U/L 59 60 55 60  --  54   AST  (SGOT) U/L 41* 37 45* 37  --  31   ALT (SGPT) U/L 59* 46* 43* 49*  --  15   LIPASE U/L  --   --   --  30  --  13     Cardiac Studies:  Echo-   Results for orders placed during the hospital encounter of 07/01/20   Adult Transthoracic Echo Complete W/ Cont if Necessary Per Protocol    Narrative · Left ventricular systolic function is severely decreased.  · Left ventricular wall thickness is consistent with mild concentric   hypertrophy.  · Left atrial cavity size is moderately dilated.  · Mild-to-moderate mitral valve regurgitation is present  · Mild tricuspid valve regurgitation is present.  · Mild aortic valve regurgitation is present.        Stress Myoview-  Cath-      Medication Review:   Scheduled Meds:  atorvastatin 20 mg Oral Daily   digoxin 125 mcg Intravenous Daily   enoxaparin 70 mg Subcutaneous Q12H   fludrocortisone 0.1 mg Oral Q PM   furosemide 40 mg Oral Daily   insulin lispro 0-7 Units Subcutaneous 4x Daily With Meals & Nightly   metoclopramide 10 mg Intravenous Q6H   metoprolol tartrate 25 mg Oral Q6H   ondansetron 4 mg Intravenous Q6H   [START ON 7/7/2020] pantoprazole 40 mg Oral Q AM   piperacillin-tazobactam 3.375 g Intravenous Q8H   potassium chloride 20 mEq Oral Daily   sodium chloride 10 mL Intravenous Q12H   sucralfate 1 g Oral 4x Daily AC & at Bedtime   Thera 1 tablet Oral Daily   vitamin B-12 1,000 mcg Oral Daily     Continuous Infusions:  amiodarone 0.5 mg/min Last Rate: 0.5 mg/min (07/06/20 2007)   dilTIAZem 5-15 mg/hr Last Rate: 10 mg/hr (07/06/20 1025)   Pharmacy Consult - Pharmacy to dose     Pharmacy to Dose Zosyn       PRN Meds:.•  acetaminophen **OR** acetaminophen **OR** acetaminophen  •  aluminum-magnesium hydroxide-simethicone  •  bisacodyl  •  dextrose  •  dextrose  •  glucagon (human recombinant)  •  HYDROcodone-acetaminophen  •  insulin lispro **AND** insulin lispro  •  ipratropium-albuterol  •  magnesium hydroxide  •  magnesium sulfate **OR** magnesium sulfate **OR** magnesium  sulfate  •  melatonin  •  Morphine  •  ondansetron  •  Pharmacy Consult - Pharmacy to dose  •  Pharmacy to Dose Zosyn  •  potassium chloride **OR** potassium chloride **OR** potassium chloride  •  potassium phosphate infusion greater than 15 mMoles **OR** potassium phosphate infusion greater than 15 mMoles **OR** potassium phosphate **OR** sodium phosphate IVPB **OR** sodium phosphate IVPB **OR** sodium phosphate IVPB  •  promethazine  •  [COMPLETED] Insert peripheral IV **AND** sodium chloride  •  sodium chloride      Assessment/Plan   Patient Active Problem List   Diagnosis   • Atrial fibrillation (CMS/HCC)   • CAD (coronary artery disease)   • Chronic anticoagulation   • Hyperlipidemia   • Presence of stent in right coronary artery   • Chronic pain   • Sepsis (CMS/HCC)   • Congestive heart failure (CHF) (CMS/HCC)   • Nausea vomiting and diarrhea     Plan:    Patient is still in atrial fibrillation.  According to the notes of Dr. Montelongo, patient is in persistent atrial fibrillation.  At present I would add Cardizem 30 mg every 6.  Continue Lopressor.  I would discontinue intravenous amiodarone tomorrow.  I will start Xarelto.  Patient would need ischemic evaluation.    Wade Cronin MD  07/06/20  20:08

## 2020-07-07 NOTE — PLAN OF CARE
Pt alert and oriented, remains falls risk. No longer on any cardiac drips at this time. Stress test planned for tomorrow, NPO at midnight. Still in afib, running between 's, vitals stable otherwise. Family at bedside. Plan of care discussed, will continue to monitor.     Problem: Patient Care Overview  Goal: Plan of Care Review  Flowsheets (Taken 7/7/2020 1427)  Progress: no change  Plan of Care Reviewed With: patient  Goal: Interprofessional Rounds/Family Conf  Flowsheets (Taken 7/7/2020 1427)  Participants: ; nursing; pharmacy; speech language pathology; social work/services     Problem: Fall Risk (Adult)  Goal: Identify Related Risk Factors and Signs and Symptoms  Flowsheets  Taken 7/6/2020 1721  Related Risk Factors (Fall Risk): age-related changes;gait/mobility problems;environment unfamiliar  Taken 7/7/2020 1427  Signs and Symptoms (Fall Risk): presence of risk factors  Goal: Absence of Fall  Flowsheets (Taken 7/7/2020 1427)  Absence of Fall: making progress toward outcome     Problem: Pain, Chronic (Adult)  Goal: Identify Related Risk Factors and Signs and Symptoms  Flowsheets (Taken 7/7/2020 1427)  Related Risk Factors (Chronic Pain): disease process  Signs and Symptoms (Chronic Pain): verbalization of pain descriptors; fatigue/weakness; verbalization of pain/discomfort for a prolonged time period  Goal: Acceptable Pain/Comfort Level and Functional Ability  Flowsheets (Taken 7/7/2020 1427)  Acceptable Pain/Comfort Level and Functional Ability: making progress toward outcome     Problem: Skin Injury Risk (Adult)  Goal: Identify Related Risk Factors and Signs and Symptoms  Flowsheets (Taken 7/6/2020 1721)  Related Risk Factors (Skin Injury Risk): infection;advanced age;mobility impaired;moisture  Goal: Skin Health and Integrity  Flowsheets (Taken 7/7/2020 1427)  Skin Health and Integrity: making progress toward outcome     Problem: Arrhythmia/Dysrhythmia (Symptomatic) (Adult)  Goal: Signs and  Symptoms of Listed Potential Problems Will be Absent, Minimized or Managed (Arrhythmia/Dysrhythmia)  Flowsheets  Taken 7/7/2020 1427  Problems Assessed (Arrhythmia/Dysrhythmia): all  Taken 7/6/2020 1721  Problems Present (Dysrhythmia): situational response

## 2020-07-07 NOTE — PLAN OF CARE
Problem: Patient Care Overview  Goal: Plan of Care Review  Outcome: Ongoing (interventions implemented as appropriate)  Flowsheets (Taken 7/7/2020 1000)  Outcome Summary: Pt is 78 yo male being followed for Afib with RVR, B PNA, AE COPD, and Chronic back pain. Incontinent of bladder this date upon OT arrivla, and sitting EOB with NSG to be cleaned up. Pt completes transfers with CGA - Min A, and is able to complete LB dressing with set up while seated in chair. Pt continues to be limited by respiratory status and general deconditioning, and fatigues with minimal exertion. Pt completes BUE ROM HEP to facilitate increased strength and activity tolerance. He states fair understanding, and was encouraged to complete outside therapy services.  Pt demos improvement this date, but continues to be limited by weakness and impaired activity tolerance. Continue to recommend IP rehab at discharge. PPE: gloves, mask, goggles.

## 2020-07-07 NOTE — PROGRESS NOTES
Continued Stay Note  [facility]     Patient Name: Shukri Park  MRN: [id number]  Today's Date: 7/7/2020    Admit Date: 7/1/2020    Discharge Plan     Row Name 07/07/20 7819       Plan    Plan  [facility] subacute at d/c. Can transfer under [id number] program if 6-click score remains under 15 and pt is walking less than 50' CGA. No PASRR needed for [facility].     Plan Comments   notified [facility] liaison that pt will be ready for d/c in the next day or two and requested pre-cert. Per liaison, pt is appropriate to admit under [id number] program because his 6-click score is 11.         Nikia Ozuna, ANUELW, LSW  PRN   Phone: [phone]

## 2020-07-07 NOTE — PROGRESS NOTES
[facility]/PULM/CC PROGRESS NOTE       SUBJECTIVE       This is a 79-year-old male with a history of COPD, former smoker, atrial fibrillation on chronic anticoagulation who lives all by himself presented to the hospital for fatigue, tiredness and shortness of breath.  Patient has been feeling puny for the few days with poor appetite.  He has noticed extreme fatigue with malaise.  He denies any fever or chills.  He has noted increased shortness of breath however cough has been mild.  He did cough clear sputum and only one time he had a maroonish sputum.  He denies any wheezing or chest pain or pleurisy.  Denies any sick contacts.  He states that he is visited by a home health nurse only.  He denies any other contacts.     CT scan of the chest was done that showed bilateral groundglass airspace disease with interlobular septal thickening upper lobe predominant.  Patient denies any active tobacco use or vaping.     He has a history of chronic sinus issues.  He is not on any maintenance inhalers.   7/3:  No new fevers  SOA stable  O2 requirement worsened over night and now on 5L  Remains fully awake and responsive  On oral diet  7/4: afebrile. soa at baseline. On 5 L. No n.v/d. Feels weak and fatigued.   7/6: Awake.  Currently on 2 L nasal cannula.  Remains on Cardizem and amiodarone infusions.  No complaints of cough or productive phlegm.  No chest pains.  No vomiting  7/7: Awake.  Currently on 2 L nasal cannula.  Complains of generalized weakness.  Complains of decreased appetite.  Currently on amiodarone infusion.  IV Cardizem has been stopped.  Remains in atrial fibrillation on the monitor.  No cough or productive phlegm.  No chest pains.  Some shortness of breath with activity    OBJECTIVE    Vitals:    07/07/20 0555 07/07/20 0600 07/07/20 0838 07/07/20 0936   BP:  149/88     BP Location:  Left arm     Patient Position:  Lying     Pulse: 94 100 106 74   Resp:  22     Temp:  98 °F (36.7 °C)     TempSrc:  Axillary     SpO2:  93%   98%   Weight: 68 kg (149 lb 14.6 oz)      Height:          Intake/Output last 3 shifts:  I/O last 3 completed shifts:  In: 1147.7 [P.O.:440; I.V.:707.7]  Out: 1000 [Urine:1000]  Intake/Output this shift:  I/O this shift:  In: 240 [P.O.:240]  Out: -     General Appearance:  Alert, cooperative, no distress, appears stated age  Head:  Normocephalic, without obvious abnormality, atraumatic  Eyes:  conjunctivae/corneas clear, EOM's intact     Neck:  Supple,  no adenopathy;      Lungs: Decreased air entry bilaterally, faint bibasilar crackles  Chest wall:  No tenderness  Heart: Irregularly irregular rhythm, S1 and S2 normal, no murmur, rub   or gallop  Abdomen:  Soft, non-tender, bowel sounds active all four quadrants,  no masses, no hepatomegaly, no splenomegaly  Extremities:  Extremities normal, no cyanosis or edema  Pulses: 2+ and symmetric all extremities  Skin:  No rashes or lesions  Neurologic:   Alert and oriented, no focal deficits    Scheduled Meds:    atorvastatin 20 mg Oral Daily   digoxin 125 mcg Intravenous Daily   dilTIAZem 30 mg Oral Q6H   enoxaparin 70 mg Subcutaneous Q12H   fludrocortisone 0.1 mg Oral Q PM   furosemide 40 mg Oral Daily   insulin lispro 0-7 Units Subcutaneous 4x Daily With Meals & Nightly   metoclopramide 10 mg Intravenous Q6H   metoprolol tartrate 25 mg Oral Q6H   ondansetron 4 mg Intravenous Q6H   pantoprazole 40 mg Oral Q AM   potassium chloride 20 mEq Oral Daily   sodium chloride 10 mL Intravenous Q12H   sucralfate 1 g Oral 4x Daily AC & at Bedtime   Thera 1 tablet Oral Daily   vitamin B-12 1,000 mcg Oral Daily       Continuous Infusions:    amiodarone 0.5 mg/min Last Rate: 0.5 mg/min (07/06/20 2007)   Pharmacy Consult - Pharmacy to dose         PRN Meds:•  acetaminophen **OR** acetaminophen **OR** acetaminophen  •  aluminum-magnesium hydroxide-simethicone  •  bisacodyl  •  dextrose  •  dextrose  •  glucagon (human recombinant)  •  HYDROcodone-acetaminophen  •  insulin lispro  **AND** insulin lispro  •  ipratropium-albuterol  •  magnesium hydroxide  •  magnesium sulfate **OR** magnesium sulfate **OR** magnesium sulfate  •  melatonin  •  Morphine  •  ondansetron  •  Pharmacy Consult - Pharmacy to dose  •  potassium chloride **OR** potassium chloride **OR** potassium chloride  •  potassium phosphate infusion greater than 15 mMoles **OR** potassium phosphate infusion greater than 15 mMoles **OR** potassium phosphate **OR** sodium phosphate IVPB **OR** sodium phosphate IVPB **OR** sodium phosphate IVPB  •  promethazine  •  [COMPLETED] Insert peripheral IV **AND** sodium chloride  •  sodium chloride     LABS    CBC  Results from last 7 days   Lab Units 07/07/20  0255 07/06/20 0225 07/05/20 0215 07/04/20  0334 07/03/20  0940 07/02/20  0457 07/01/20  2348   WBC 10*3/mm3 14.50* 16.10* 14.50* 18.00* 14.10* 7.50 9.60   RBC 10*6/mm3 4.62 4.84 4.51 4.26 4.23 3.82* 3.87*   HEMOGLOBIN g/dL 13.9 14.3 13.5 12.9* 12.7* 11.6* 11.7*   HEMATOCRIT % 41.7 44.1 41.1 41.2 39.4 34.3* 35.5*   MCV fL 90.2 91.1 91.0 96.7 93.1 89.9 91.7   PLATELETS 10*3/mm3 263 302 286 280 273 227 253       CMP   Results from last 7 days   Lab Units 07/07/20  0255 07/06/20  0225 07/05/20 0215 07/04/20  0334 07/03/20  0940 07/02/20  0457 07/01/20  2348   SODIUM mmol/L 142 143 142 144 145 146* 145   POTASSIUM mmol/L 3.3* 3.5 3.1* 2.8* 2.6* 3.5 4.1   CHLORIDE mmol/L 100 98 95* 101 103 104 104   CO2 mmol/L 30.0* 33.0* 32.0* 26.0 22.0 23.0 22.0   BUN  15 19 15 12 17 22 23   CREATININE mg/dL 0.53* 0.65* 0.64* 0.59* 0.75* 0.80 0.69*   GLUCOSE mg/dL 109* 151* 162* 149* 246* 172* 119*   ALBUMIN g/dL 3.60 3.80 3.80 3.90 3.80  --  3.50   BILIRUBIN mg/dL 1.5* 1.5* 1.5* 1.2 1.2  --  1.5*   ALK PHOS U/L 51 59 60 55 60  --  54   AST (SGOT) U/L 24 41* 37 45* 37  --  31   ALT (SGPT) U/L 52* 59* 46* 43* 49*  --  15   LIPASE U/L  --   --   --   --  30  --  13       TROPONIN  Results from last 7 days   Lab Units 07/02/20  1027   TROPONIN T ng/mL  <0.010       CoAg        ABG  Results from last 7 days   Lab Units 07/02/20  1049   PH, ARTERIAL pH units 7.531*   PCO2, ARTERIAL mm Hg 31.3*   PO2 ART mm Hg 79.4*   O2 SATURATION ART % 97.1   BASE EXCESS ART mmol/L 3.9*       Microbiology  Microbiology Results (last 10 days)     Procedure Component Value - Date/Time    Gastrointestinal Panel, PCR - Stool, Per Rectum [[id number]]  (Normal) Collected:  07/03/20 0856    Lab Status:  Final result Specimen:  Stool from Per Rectum Updated:  07/03/20 1535     Campylobacter Not Detected     Plesiomonas shigelloides Not Detected     Salmonella Not Detected     Vibrio Not Detected     Vibrio cholerae Not Detected     Yersinia enterocolitica Not Detected     Enteroaggregative E. coli (EAEC) Not Detected     Enteropathogenic E. coli (EPEC) Not Detected     Enterotoxigenic E. coli (ETEC) lt/st Not Detected     Shiga-like toxin-producing E. coli (STEC) stx1/stx2 Not Detected     E. coli O157 Not Detected     Shigella/Enteroinvasive E. coli (EIEC) Not Detected     Cryptosporidium Not Detected     Cyclospora cayetanensis Not Detected     Entamoeba histolytica Not Detected     Giardia lamblia Not Detected     Adenovirus F40/41 Not Detected     Astrovirus Not Detected     Norovirus GI/GII Not Detected     Rotavirus A Not Detected     Sapovirus (I, II, IV or V) Not Detected    Narrative:       If Aeromonas, Staphylococcus aureus or Bacillus cereus are suspected, please order [id number]: Stool Culture, Aeromonas, S aureus, B Cereus.    Clostridium Difficile EIA - Stool, Per Rectum [[id number]]  (Normal) Collected:  07/03/20 0856    Lab Status:  Final result Specimen:  Stool from Per Rectum Updated:  07/04/20 0715     C Diff GDH / Toxin Negative    COVID-19,CEPHEID,COR/MILENA/PAD IN-HOUSE(OR EMERGENT/ADD-ON),NP SWAB IN TRANSPORT MEDIA 3-4 HR TAT - Swab, Nasopharynx [[id number]]  (Normal) Collected:  07/02/20 1659    Lab Status:  Final result Specimen:  Swab from Nasopharynx Updated:  07/02/20  2003     COVID19 Not Detected    Narrative:       Fact sheet for providers: https://www.fda.gov/media/033549/download     Fact sheet for patients: https://www.fda.gov/media/513742/download    Blood Culture - Blood, Arm, Left [[id number]] Collected:  07/02/20 0249    Lab Status:  Final result Specimen:  Blood from Arm, Left Updated:  07/07/20 0300     Blood Culture No growth at 5 days    Respiratory Panel, PCR - Swab, Nasopharynx [[id number]]  (Normal) Collected:  07/02/20 0027    Lab Status:  Final result Specimen:  Swab from Nasopharynx Updated:  07/02/20 0153     ADENOVIRUS, PCR Not Detected     Coronavirus 229E Not Detected     Coronavirus HKU1 Not Detected     Coronavirus NL63 Not Detected     Coronavirus OC43 Not Detected     Human Metapneumovirus Not Detected     Human Rhinovirus/Enterovirus Not Detected     Influenza B PCR Not Detected     Parainfluenza Virus 1 Not Detected     Parainfluenza Virus 2 Not Detected     Parainfluenza Virus 3 Not Detected     Parainfluenza Virus 4 Not Detected     Bordetella pertussis pcr Not Detected     Influenza A H1 2009 PCR Not Detected     Chlamydophila pneumoniae PCR Not Detected     Mycoplasma pneumo by PCR Not Detected     Influenza A PCR Not Detected     Influenza A H3 Not Detected     Influenza A H1 Not Detected     RSV, PCR Not Detected    Narrative:       The coronavirus on the RVP is NOT COVID-19 and is NOT indicative of infection with COVID-19.     COVID-19 [facility] [employer] IN-HOUSE, Nasal Swab No Transport Media - Swab, Nasal Cavity [[id number]]  (Normal) Collected:  07/02/20 0027    Lab Status:  Final result Specimen:  Swab from Nasal Cavity Updated:  07/02/20 0104     COVID19 Not Detected    Narrative:       Fact sheet for providers: https://www.fda.gov/media/687054/download     Fact sheet for patients: https://www.fda.gov/media/313036/download    Blood Culture - Blood, Arm, Right [[id number]] Collected:  07/01/20 2348    Lab Status:  Final result Specimen:  Blood from Arm,  Right Updated:  07/07/20 0000     Blood Culture No growth at 5 days          IMAGING & OTHER STUDIES    Imaging Results (Last 72 Hours)     Procedure Component Value Units Date/Time    XR Chest 1 View [[id number]] Collected:  07/04/20 1831     Updated:  07/04/20 1835    Narrative:       DATE OF EXAM:  7/4/2020 6:05 PM     PROCEDURE:  XR CHEST 1 VW-     INDICATIONS:  worsening hypoxemia; I48.91-Unspecified atrial fibrillation; I50.9-Heart  failure, unspecified; J18.9-Pneumonia, unspecified organism     COMPARISON:  07/01/2020 CT chest 07/02/2020     TECHNIQUE:   Single radiographic view of the chest was obtained.     FINDINGS:  Multifocal groundglass opacities again seen. Slight improvement in the  right mid lung. Mild cardiomegaly. Underlying septal thickening is  slightly improved. No pneumothorax. No pleural effusion. Elevated right  diaphragm.       Impression:       Slight improvement compared to prior radiograph. Groundglass opacity  septal thickening upper lobe predominant with slight improvement on the  right. Question atypical infection including Covid 19. Other bacterial  or atypical pneumonia as can have this appearance.     Electronically Signed By-Sarah Beth Bolden MD On:7/4/2020 6:33 PM  This report was finalized on [id number] by  Sarah Beth Bolden MD.        Results for orders placed during the hospital encounter of 07/01/20   Adult Transthoracic Echo Complete W/ Cont if Necessary Per Protocol    Narrative · Left ventricular systolic function is severely decreased.  · Left ventricular wall thickness is consistent with mild concentric   hypertrophy.  · Left atrial cavity size is moderately dilated.  · Mild-to-moderate mitral valve regurgitation is present  · Mild tricuspid valve regurgitation is present.  · Mild aortic valve regurgitation is present.             ASSESSMENT/PLAN:     Atrial fibrillation (CMS/HCC)    CAD (coronary artery disease)    Chronic anticoagulation    Hyperlipidemia    Presence of stent in  right coronary artery    Chronic pain    Sepsis (CMS/HCC)    Congestive heart failure (CHF) (CMS/HCC)    Nausea vomiting and diarrhea        1.Bilateral PNA (viral vs bacterial) other differentials include Pulmonary edema and possible hemorrhage  2.  A. fib with RVR  3. H/o RML lobectomy  4. CAD  5. Acute hypoxia due to above  6.  Nausea  7. Chronic Hypotension  8. Chronic Anticoagulation with Xarelto  9. Lactic acidosis  10. Hypokalemia  11.  Diarrhea  12.  Gastritis noted on EGD  13.  Acute on chronic systolic CHF exacerbation       PLAN       -Reviewed CT scan of the chest. Covid test is negative X2.  Afebrile.    Status post antibiotic therapy with Zosyn.  Monitor off antibiotics.  WBC count improving  -Blood cultures are negative.  -Patient is a lifelong non-smoker.  No previous history of COPD.  Off steroids and Pulmicort.  Changed nebs to PRN.  -Management of A. fib per cardiology.  Remains on amiodarone infusions.  Cardizem has been switched to p.o.  Remains on p.o. beta-blockers and IV digoxin  - 2D echo results reviewed.  EF 30 to 35%.  Mild to moderate MR noted.  RVSP 35.7  -GI work-up reviewed.  Status post EGD 7/5 with changes of gastritis noted.  Remains on Reglan Carafate and Protonix.  Switched Protonix to p.o. stool for C. difficile analysis is negative.  Will change Reglan to p.o.  -Patient with issues with chronic hypotension.  Remains on Florinef which is being continued.  -Currently on 2 L nasal cannula.  Wean supplemental oxygen as appropriate.  Evaluate need for supplemental oxygen at discharge.  -Remains on therapeutic anticoagulation with Lovenox.  He can be switched back to home Xarelto once appropriate.  -Increase activity.  -Tolerating p.o. diet.  No procedures planned from pulmonary standpoint  We will continue with rest of therapy.    THIS DOCUMENT HAS BEEN ELECTRONICALLY SIGNED BY  Ebenezer Brambila MD

## 2020-07-07 NOTE — PROGRESS NOTES
GENERAL SURGERY PROGRESS NOTE    7/7/2020   LOS: 5 days   Patient Care Team:  Nikki Gonzales MD as PCP - General (Family Medicine)    ESOPHAGOGASTRODUODENOSCOPY  7/5/2020  Chief Complaint: gallbladder sludge, abdominal discomfort, poor appetite    Subjective   HPI: Patient is a 79 y.o. male presents with generalized weakness, body aches, pains, and cough on 7/1/2020.  Of note, the patient reports that he has been having poor appetite with weight loss, nausea, and some mild vomiting over the last few months.  In the ER, the patient was found to be in A. fib with RVR with a heart rate into the 160s was started on a Cardizem bolus and Cardizem drip.  Chest x-ray obtained in the emergency room demonstrated pulmonary edema with pneumonia.  The patient was admitted to the hospital, started on antibiotics, and continued on treatment for his A. fib with RVR.  He does report that he has had some melena.  His anticoagulation was held.  During his hospitalization, the patient was evaluated by cardiology, as well as gastroenterology.  The patient reported that he had been having nausea, vomiting, and diarrhea for about 3 weeks.  Patient was started on a PPI, Reglan, and had a right upper quadrant ultrasound in addition to a CT scan of the abdomen and pelvis.  The CT scan of the abdomen and pelvis, and right upper quadrant ultrasound both demonstrated sludge within the gallbladder without convincing evidence of acute cholecystitis.  An EGD was later performed by gastroenterology yesterday which demonstrated gastritis.  General surgery was consulted as the patient has had persistent nausea and vomiting with poor p.o. intake to see about potentially performing a cholecystectomy.  Of note, the patient still remains on supplemental oxygen with some mild shortness of breath.  He also is noted to be in A. fib with intermittent bouts of RVR controlled with Cardizem and amiodarone.  He has been receiving therapeutic Lovenox for  anticoagulation.    Interval History:   Off drips, but HR remains elevated.  Patient feeling ok. Hasn't eaten yet, but is planning to eat later today.  Mild abdominal discomfort    Objective     Vital Signs  Temp:  [97.2 °F (36.2 °C)-98.1 °F (36.7 °C)] 97.2 °F (36.2 °C)  Heart Rate:  [] 75  Resp:  [16-28] 16  BP: (124-160)/() 124/76    Physical Exam   Constitutional: He is oriented to person, place, and time. No distress.   HENT:   Head: Normocephalic and atraumatic.   Eyes: Pupils are equal, round, and reactive to light. EOM are normal.   Cardiovascular:   Tachycardic in atrial fibrillation   Pulmonary/Chest: Effort normal and breath sounds normal.   On supplemental oxygen   Abdominal: Soft.   Mildly tender in the epigastrium and right upper quadrant without rebound or guarding.  Negative Gomes sign.   Musculoskeletal: Normal range of motion. He exhibits no edema.   Neurological: He is alert and oriented to person, place, and time.   Skin: Skin is warm and dry.   Psychiatric: He has a normal mood and affect. His behavior is normal.   Vitals reviewed.       Results Review:    Lab Results (last 24 hours)     Procedure Component Value Units Date/Time    Tissue Pathology Exam [[id number]] Collected:  07/05/20 1121    Specimen:  Tissue from Gastric, Antrum Updated:  07/07/20 1153     Case Report --     Surgical Pathology Report                         Case: [id number]                                  Authorizing Provider:  Neal Correa MD        Collected:           07/05/2020 11:21 AM          Ordering Location:     [facility]  Received:            07/06/2020 07:26 AM                                 SUITES                                                                       Pathologist:           Clint Harrell MD                                                           Specimen:    Gastric, Antrum                                                                             Final  "Diagnosis --     Gastric antrum, biopsy:    Suggetive of mild reactive gastropathy with focal congestion    Negative for significant inflammatory component    No evidence of Helicobacter pylori on H&E stain    MARYLU/tkd          Gross Description --     Received in a single container of formalin labeled \"Gastric antrum\" is a single tan, irregular soft tissue fragment, 4 mm in maximum dimension. It is filtered and entirely submitted.     MARYLU/sms          POC Glucose Once [[id number]]  (Normal) Collected:  07/07/20 1117    Specimen:  Blood Updated:  07/07/20 1121     Glucose 97 mg/dL      Comment: Serial Number: [id number]:  [id number]       BUN [[id number]]  (Normal) Collected:  07/07/20 0255    Specimen:  Blood Updated:  07/07/20 0755     BUN 15 mg/dL     POC Glucose Once [[id number]]  (Normal) Collected:  07/07/20 0729    Specimen:  Blood Updated:  07/07/20 0731     Glucose 96 mg/dL      Comment: Serial Number: [id number]:  [id number]       Magnesium [[id number]]  (Normal) Collected:  07/07/20 0255    Specimen:  Blood Updated:  07/07/20 0353     Magnesium 1.9 mg/dL     Comprehensive Metabolic Panel [[id number]]  (Abnormal) Collected:  07/07/20 0255    Specimen:  Blood Updated:  07/07/20 0353     Glucose 109 mg/dL      BUN --     Comment: Testing performed by alternate method        Creatinine 0.53 mg/dL      Sodium 142 mmol/L      Potassium 3.3 mmol/L      Chloride 100 mmol/L      CO2 30.0 mmol/L      Calcium 8.5 mg/dL      Total Protein 5.9 g/dL      Albumin 3.60 g/dL      ALT (SGPT) 52 U/L      AST (SGOT) 24 U/L      Alkaline Phosphatase 51 U/L      Total Bilirubin 1.5 mg/dL      eGFR Non African Amer 150 mL/min/1.73      Globulin 2.3 gm/dL      A/G Ratio 1.6 g/dL      BUN/Creatinine Ratio --     Comment: Testing not performed        Anion Gap 12.0 mmol/L     Narrative:       GFR Normal >60  Chronic Kidney Disease <60  Kidney Failure <15      CBC (No Diff) [[id number]]  (Abnormal) Collected:  07/07/20 0255    " Specimen:  Blood Updated:  07/07/20 0334     WBC 14.50 10*3/mm3      RBC 4.62 10*6/mm3      Hemoglobin 13.9 g/dL      Hematocrit 41.7 %      MCV 90.2 fL      MCH 30.1 pg      MCHC 33.4 g/dL      RDW 14.8 %      RDW-SD 46.4 fl      MPV 8.4 fL      Platelets 263 10*3/mm3     Blood Culture - Blood, Arm, Left [[id number]] Collected:  07/02/20 0249    Specimen:  Blood from Arm, Left Updated:  07/07/20 0300     Blood Culture No growth at 5 days    Blood Culture - Blood, Arm, Right [[id number]] Collected:  07/01/20 2348    Specimen:  Blood from Arm, Right Updated:  07/07/20 0000     Blood Culture No growth at 5 days    POC Glucose Once [[id number]]  (Abnormal) Collected:  07/06/20 2017    Specimen:  Blood Updated:  07/06/20 2019     Glucose 117 mg/dL      Comment: Serial Number: [id number]:  [id number]       POC Glucose Once [[id number]]  (Abnormal) Collected:  07/06/20 1634    Specimen:  Blood Updated:  07/06/20 1635     Glucose 130 mg/dL      Comment: Serial Number: [id number]:  [id number]           Imaging Results (Last 24 Hours)     ** No results found for the last 24 hours. **           I have reviewed the above results and noted them below    Medication Review:    Current Facility-Administered Medications:   •  acetaminophen (TYLENOL) tablet 650 mg, 650 mg, Oral, Q4H PRN **OR** acetaminophen (TYLENOL) 160 MG/5ML solution 650 mg, 650 mg, Oral, Q4H PRN **OR** acetaminophen (TYLENOL) suppository 650 mg, 650 mg, Rectal, Q4H PRN, Dami Burleson APRN  •  aluminum-magnesium hydroxide-simethicone (MAALOX MAX) 400-400-40 MG/5ML suspension 15 mL, 15 mL, Oral, Q6H PRN, Dami Burleson APRN  •  atorvastatin (LIPITOR) tablet 20 mg, 20 mg, Oral, Daily, Dami Burleson APRN, 20 mg at 07/07/20 0839  •  bisacodyl (DULCOLAX) EC tablet 5 mg, 5 mg, Oral, Daily PRN, Dami Burleson APRN  •  dextrose (D50W) 25 g/ 50mL Intravenous Solution 25 g, 25 g, Intravenous, Q15 Min PRN, Dami Burleson APRN  •  dextrose (GLUTOSE) oral  gel 15 g, 15 g, Oral, Q15 Min PRN, Dami Burleson APRN  •  digoxin (LANOXIN) injection 125 mcg, 125 mcg, Intravenous, Daily, Dami Burleson APRN, 125 mcg at 07/07/20 1134  •  dilTIAZem (CARDIZEM) tablet 30 mg, 30 mg, Oral, Q6H, Wade Cronin MD, 30 mg at 07/07/20 1133  •  enoxaparin (LOVENOX) syringe 70 mg, 70 mg, Subcutaneous, Q12H, Dami Burleson APRN, 70 mg at 07/07/20 1147  •  fludrocortisone tablet 0.1 mg, 0.1 mg, Oral, Q PM, Dami Burleson APRN, 0.1 mg at 07/06/20 1653  •  furosemide (LASIX) tablet 40 mg, 40 mg, Oral, Daily, Dami Burleson APRN, 40 mg at 07/07/20 0839  •  glucagon (human recombinant) (GLUCAGEN DIAGNOSTIC) injection 1 mg, 1 mg, Subcutaneous, Q15 Min PRN, Dami Burleson APRN  •  HYDROcodone-acetaminophen (NORCO) 7.5-325 MG per tablet 1 tablet, 1 tablet, Oral, Q6H PRN, Dami Burleson APRN, 1 tablet at 07/07/20 1148  •  insulin lispro (humaLOG) injection 0-7 Units, 0-7 Units, Subcutaneous, 4x Daily With Meals & Nightly, 2 Units at 07/06/20 1215 **AND** insulin lispro (humaLOG) injection 0-7 Units, 0-7 Units, Subcutaneous, PRN, Dami Burleson APRN  •  ipratropium-albuterol (DUO-NEB) nebulizer solution 3 mL, 3 mL, Nebulization, Q6H PRN, Ebenezer Brambila MD  •  magnesium hydroxide (MILK OF MAGNESIA) suspension 2400 mg/10mL 10 mL, 10 mL, Oral, Daily PRN, Dami Burleson APRN  •  Magnesium Sulfate 2 gram Bolus, followed by 8 gram infusion (total Mg dose 10 grams)- Mg less than or equal to 1mg/dL, 2 g, Intravenous, PRN **OR** Magnesium Sulfate 2 gram / 50mL Infusion (GIVE X 3 BAGS TO EQUAL 6GM TOTAL DOSE) - Mg 1.1 - 1.5 mg/dl, 2 g, Intravenous, PRN **OR** Magnesium Sulfate 4 gram infusion- Mg 1.6-1.9 mg/dL, 4 g, Intravenous, PRN, Dami Burleson APRN  •  melatonin tablet 5 mg, 5 mg, Oral, Nightly PRN, Dami Burleson APRN, 5 mg at 07/04/20 2001  •  metoclopramide (REGLAN) tablet 5 mg, 5 mg, Oral, TID Kosta HERRERA Zaka Urrehman, MD, 5 mg at 07/07/20 1133  •  metoprolol tartrate  (LOPRESSOR) tablet 25 mg, 25 mg, Oral, Q6H, Wade Cronin MD, 25 mg at 07/07/20 0839  •  Morphine sulfate (PF) injection 2 mg, 2 mg, Intravenous, Q4H PRN, Dami Burleson APRN, 2 mg at 07/05/20 1606  •  ondansetron (ZOFRAN) injection 4 mg, 4 mg, Intravenous, Q6H PRN, Dami Burleson APRN  •  [DISCONTINUED] ondansetron (ZOFRAN) tablet 4 mg, 4 mg, Oral, Q6H PRN **OR** ondansetron (ZOFRAN) injection 4 mg, 4 mg, Intravenous, Q6H, Dami Burleson APRN, 4 mg at 07/07/20 0839  •  pantoprazole (PROTONIX) EC tablet 40 mg, 40 mg, Oral, Q AM, Ebenezer Brambila MD, 40 mg at 07/07/20 0542  •  Pharmacy Consult - Pharmacy to dose, , Does not apply, Continuous PRN, Dami Burleson APRN  •  potassium chloride (K-DUR,KLOR-CON) CR tablet 20 mEq, 20 mEq, Oral, Daily, Dami Burleson APRN, 20 mEq at 07/07/20 0844  •  potassium chloride (K-DUR,KLOR-CON) CR tablet 40 mEq, 40 mEq, Oral, PRN, 40 mEq at 07/07/20 0632 **OR** potassium chloride (KLOR-CON) packet 40 mEq, 40 mEq, Oral, PRN, 40 mEq at 07/06/20 0340 **OR** potassium chloride 10 mEq in 100 mL IVPB, 10 mEq, Intravenous, Q1H PRN, Dami Burleson APRN, Last Rate: 100 mL/hr at 07/03/20 1514, 10 mEq at 07/03/20 1514  •  potassium phosphate 45 mmol in sodium chloride 0.9 % 500 mL infusion, 45 mmol, Intravenous, PRN **OR** potassium phosphate 30 mmol in sodium chloride 0.9 % 250 mL infusion, 30 mmol, Intravenous, PRN **OR** potassium phosphate 15 mmol in sodium chloride 0.9 % 100 mL infusion, 15 mmol, Intravenous, PRN **OR** sodium phosphates 45 mmol in sodium chloride 0.9 % 500 mL IVPB, 45 mmol, Intravenous, PRN **OR** sodium phosphates 30 mmol in sodium chloride 0.9 % 250 mL IVPB, 30 mmol, Intravenous, PRN **OR** sodium phosphates 15 mmol in sodium chloride 0.9 % 250 mL IVPB, 15 mmol, Intravenous, PRN, Dami Burleson APRN  •  promethazine (PHENERGAN) IVPB 12.5 mg, 12.5 mg, Intravenous, Q6H PRN, Dami Burleson APRN, 12.5 mg at 07/03/20 1003  •  [COMPLETED] Insert peripheral  IV, , , Once **AND** sodium chloride 0.9 % flush 10 mL, 10 mL, Intravenous, PRN, Dami Burleson APRN  •  sodium chloride 0.9 % flush 10 mL, 10 mL, Intravenous, Q12H, Dami Burleson APRN, 10 mL at 07/06/20 2002  •  sodium chloride 0.9 % flush 10 mL, 10 mL, Intravenous, PRN, Dami Burleson APRN  •  sucralfate (CARAFATE) tablet 1 g, 1 g, Oral, 4x Daily AC & at Bedtime, Dami Burleson APRN, 1 g at 07/07/20 1133  •  Thera tablet 1 tablet, 1 tablet, Oral, Daily, Dami Burleson APRN, 1 tablet at 07/07/20 0839  •  vitamin B-12 (CYANOCOBALAMIN) tablet 1,000 mcg, 1,000 mcg, Oral, Daily, Dami Burleson APRN, 1,000 mcg at 07/07/20 0839    Assessment/Plan     Principal Problem:    Atrial fibrillation (CMS/[facility])  Active Problems:    CAD (coronary artery disease)    Chronic anticoagulation    Hyperlipidemia    Presence of stent in right coronary artery    Chronic pain    Sepsis (CMS/HCC)    Congestive heart failure (CHF) (CMS/[facility])    Nausea vomiting and diarrhea      Again I discussed with the patient the findings of his EGD as well as the imaging studies which suggest sludge within the gallbladder without evidence of acute cholecystitis.  The patient had a family member at bedside who was also therefore this discussion.  I discussed with Dr. Cronin that the patient continues to be in atrial fibrillation with RVR.  Yesterday he was on multiple drips, today these are off, however his heart rate still remains elevated.  Given his atrial fibrillation, anticoagulated status, pneumonia, and coronary artery disease, the patient is extremely high risk for perioperative complication following a laparoscopic cholecystectomy.  My recommendation would be to treat the pneumonia, and stabilize him from a cardiac standpoint prior to proceeding to the operating room for nonurgent cholecystectomy.  Once this has been done, the patient may follow-up with me as an outpatient to discuss elective removal of his gallbladder.  Continue to  treat gastritis with Protonix, and Carafate.  I will see the patient again as needed during this hospitalization.      Cody Randall MD  07/07/20  13:44

## 2020-07-07 NOTE — PLAN OF CARE
Problem: Patient Care Overview  Goal: Plan of Care Review  Outcome: Ongoing (interventions implemented as appropriate)  Flowsheets (Taken 7/7/2020 0011)  Plan of Care Reviewed With: patient  Goal: Individualization and Mutuality  Outcome: Ongoing (interventions implemented as appropriate)  Goal: Discharge Needs Assessment  Outcome: Ongoing (interventions implemented as appropriate)  Goal: Interprofessional Rounds/Family Conf  Outcome: Ongoing (interventions implemented as appropriate)     Problem: Fall Risk (Adult)  Goal: Identify Related Risk Factors and Signs and Symptoms  Outcome: Ongoing (interventions implemented as appropriate)  Goal: Absence of Fall  Outcome: Ongoing (interventions implemented as appropriate)     Problem: Pain, Chronic (Adult)  Goal: Identify Related Risk Factors and Signs and Symptoms  Outcome: Ongoing (interventions implemented as appropriate)  Goal: Acceptable Pain/Comfort Level and Functional Ability  Outcome: Ongoing (interventions implemented as appropriate)     Problem: Skin Injury Risk (Adult)  Goal: Identify Related Risk Factors and Signs and Symptoms  Outcome: Ongoing (interventions implemented as appropriate)  Goal: Skin Health and Integrity  Outcome: Ongoing (interventions implemented as appropriate)     Problem: Arrhythmia/Dysrhythmia (Symptomatic) (Adult)  Goal: Signs and Symptoms of Listed Potential Problems Will be Absent, Minimized or Managed (Arrhythmia/Dysrhythmia)  Outcome: Ongoing (interventions implemented as appropriate)

## 2020-07-07 NOTE — PROGRESS NOTES
Continued Stay Note  OFELIA Candelario     Patient Name: Shukri Park  MRN: [id number]  Today's Date: 7/7/2020    Admit Date: 7/1/2020    Discharge Plan     Row Name 07/07/20 0922       Plan    Plan  [facility] subacute pending bed availability at time of d/c. Will require pre-cert. No PASRR needed for [facility].     Plan Comments  Per [facility] liaison, pt is appropriate for subacute rehab pending bed availability when pt is ready for d/c.         iNkia Ozuna, ANUELW, LSW  PRN   Phone: [phone]

## 2020-07-08 ENCOUNTER — APPOINTMENT (OUTPATIENT)
Dept: NUCLEAR MEDICINE | Facility: HOSPITAL | Age: 80
End: 2020-07-08

## 2020-07-08 ENCOUNTER — APPOINTMENT (OUTPATIENT)
Dept: GENERAL RADIOLOGY | Facility: HOSPITAL | Age: 80
End: 2020-07-08

## 2020-07-08 LAB
ANION GAP SERPL CALCULATED.3IONS-SCNC: 13 MMOL/L (ref 5–15)
BH CV NUCLEAR PRIOR STUDY: 3
BH CV STRESS BP STAGE 1: NORMAL
BH CV STRESS BP STAGE 2: NORMAL
BH CV STRESS COMMENTS STAGE 1: NORMAL
BH CV STRESS COMMENTS STAGE 2: NORMAL
BH CV STRESS DOSE REGADENOSON STAGE 1: 0.4
BH CV STRESS DURATION MIN STAGE 1: 0
BH CV STRESS DURATION MIN STAGE 2: 4
BH CV STRESS DURATION SEC STAGE 1: 10
BH CV STRESS DURATION SEC STAGE 2: 0
BH CV STRESS HR STAGE 1: 128
BH CV STRESS HR STAGE 2: 131
BH CV STRESS PROTOCOL 1: NORMAL
BH CV STRESS RECOVERY BP: NORMAL MMHG
BH CV STRESS RECOVERY HR: 122 BPM
BH CV STRESS STAGE 1: 1
BH CV STRESS STAGE 2: 2
BILIRUB UR QL STRIP: ABNORMAL
BUN SERPL-MCNC: 14 MG/DL (ref 8–23)
BUN SERPL-MCNC: ABNORMAL MG/DL
BUN/CREAT SERPL: ABNORMAL
CALCIUM SPEC-SCNC: 8.5 MG/DL (ref 8.6–10.5)
CHLORIDE SERPL-SCNC: 97 MMOL/L (ref 98–107)
CLARITY UR: CLEAR
CO2 SERPL-SCNC: 30 MMOL/L (ref 22–29)
COLOR UR: ABNORMAL
CREAT SERPL-MCNC: 0.53 MG/DL (ref 0.76–1.27)
D-LACTATE SERPL-SCNC: 0.7 MMOL/L (ref 0.5–2)
DEPRECATED RDW RBC AUTO: 45.9 FL (ref 37–54)
ERYTHROCYTE [DISTWIDTH] IN BLOOD BY AUTOMATED COUNT: 14.7 % (ref 12.3–15.4)
GFR SERPL CREATININE-BSD FRML MDRD: 150 ML/MIN/1.73
GLUCOSE BLDC GLUCOMTR-MCNC: 126 MG/DL (ref 70–105)
GLUCOSE BLDC GLUCOMTR-MCNC: 162 MG/DL (ref 70–105)
GLUCOSE BLDC GLUCOMTR-MCNC: 93 MG/DL (ref 70–105)
GLUCOSE SERPL-MCNC: 103 MG/DL (ref 65–99)
GLUCOSE UR STRIP-MCNC: NEGATIVE MG/DL
HCT VFR BLD AUTO: 43.5 % (ref 37.5–51)
HGB BLD-MCNC: 14.3 G/DL (ref 13–17.7)
HGB UR QL STRIP.AUTO: NEGATIVE
KETONES UR QL STRIP: ABNORMAL
LEUKOCYTE ESTERASE UR QL STRIP.AUTO: NEGATIVE
LV EF NUC BP: 43 %
MAGNESIUM SERPL-MCNC: 1.9 MG/DL (ref 1.6–2.4)
MAXIMAL PREDICTED HEART RATE: 140 BPM
MCH RBC QN AUTO: 29.3 PG (ref 26.6–33)
MCHC RBC AUTO-ENTMCNC: 32.9 G/DL (ref 31.5–35.7)
MCV RBC AUTO: 89.1 FL (ref 79–97)
NITRITE UR QL STRIP: NEGATIVE
PERCENT MAX PREDICTED HR: 106.43 %
PH UR STRIP.AUTO: 6 [PH] (ref 5–8)
PLATELET # BLD AUTO: 238 10*3/MM3 (ref 140–450)
PMV BLD AUTO: 8.5 FL (ref 6–12)
POTASSIUM SERPL-SCNC: 3.2 MMOL/L (ref 3.5–5.2)
PROCALCITONIN SERPL-MCNC: 0.08 NG/ML (ref 0–0.25)
PROT UR QL STRIP: ABNORMAL
RBC # BLD AUTO: 4.88 10*6/MM3 (ref 4.14–5.8)
SODIUM SERPL-SCNC: 140 MMOL/L (ref 136–145)
SP GR UR STRIP: 1.03 (ref 1–1.03)
STRESS BASELINE BP: NORMAL MMHG
STRESS BASELINE HR: 141 BPM
STRESS PERCENT HR: 125 %
STRESS POST PEAK BP: NORMAL MMHG
STRESS POST PEAK HR: 149 BPM
STRESS TARGET HR: 119 BPM
UROBILINOGEN UR QL STRIP: ABNORMAL
WBC # BLD AUTO: 14.2 10*3/MM3 (ref 3.4–10.8)

## 2020-07-08 PROCEDURE — 93017 CV STRESS TEST TRACING ONLY: CPT

## 2020-07-08 PROCEDURE — 99232 SBSQ HOSP IP/OBS MODERATE 35: CPT | Performed by: HOSPITALIST

## 2020-07-08 PROCEDURE — 84145 PROCALCITONIN (PCT): CPT | Performed by: NURSE PRACTITIONER

## 2020-07-08 PROCEDURE — 87147 CULTURE TYPE IMMUNOLOGIC: CPT | Performed by: NURSE PRACTITIONER

## 2020-07-08 PROCEDURE — 02HV33Z INSERTION OF INFUSION DEVICE INTO SUPERIOR VENA CAVA, PERCUTANEOUS APPROACH: ICD-10-PCS | Performed by: INTERNAL MEDICINE

## 2020-07-08 PROCEDURE — 25010000002 REGADENOSON 0.4 MG/5ML SOLUTION: Performed by: HOSPITALIST

## 2020-07-08 PROCEDURE — 82962 GLUCOSE BLOOD TEST: CPT

## 2020-07-08 PROCEDURE — 71045 X-RAY EXAM CHEST 1 VIEW: CPT

## 2020-07-08 PROCEDURE — 97116 GAIT TRAINING THERAPY: CPT

## 2020-07-08 PROCEDURE — 87150 DNA/RNA AMPLIFIED PROBE: CPT | Performed by: NURSE PRACTITIONER

## 2020-07-08 PROCEDURE — 85027 COMPLETE CBC AUTOMATED: CPT | Performed by: HOSPITALIST

## 2020-07-08 PROCEDURE — 83735 ASSAY OF MAGNESIUM: CPT | Performed by: NURSE PRACTITIONER

## 2020-07-08 PROCEDURE — C1751 CATH, INF, PER/CENT/MIDLINE: HCPCS

## 2020-07-08 PROCEDURE — 97530 THERAPEUTIC ACTIVITIES: CPT

## 2020-07-08 PROCEDURE — 81003 URINALYSIS AUTO W/O SCOPE: CPT | Performed by: NURSE PRACTITIONER

## 2020-07-08 PROCEDURE — 97110 THERAPEUTIC EXERCISES: CPT

## 2020-07-08 PROCEDURE — 83605 ASSAY OF LACTIC ACID: CPT | Performed by: NURSE PRACTITIONER

## 2020-07-08 PROCEDURE — 25010000002 ONDANSETRON PER 1 MG: Performed by: NURSE PRACTITIONER

## 2020-07-08 PROCEDURE — A9500 TC99M SESTAMIBI: HCPCS | Performed by: HOSPITALIST

## 2020-07-08 PROCEDURE — 78452 HT MUSCLE IMAGE SPECT MULT: CPT

## 2020-07-08 PROCEDURE — 0 TECHNETIUM SESTAMIBI: Performed by: HOSPITALIST

## 2020-07-08 PROCEDURE — 99232 SBSQ HOSP IP/OBS MODERATE 35: CPT | Performed by: INTERNAL MEDICINE

## 2020-07-08 PROCEDURE — 80048 BASIC METABOLIC PNL TOTAL CA: CPT | Performed by: HOSPITALIST

## 2020-07-08 PROCEDURE — 93016 CV STRESS TEST SUPVJ ONLY: CPT | Performed by: INTERNAL MEDICINE

## 2020-07-08 PROCEDURE — 78452 HT MUSCLE IMAGE SPECT MULT: CPT | Performed by: INTERNAL MEDICINE

## 2020-07-08 PROCEDURE — 87040 BLOOD CULTURE FOR BACTERIA: CPT | Performed by: NURSE PRACTITIONER

## 2020-07-08 PROCEDURE — 93018 CV STRESS TEST I&R ONLY: CPT | Performed by: INTERNAL MEDICINE

## 2020-07-08 PROCEDURE — B548ZZA ULTRASONOGRAPHY OF SUPERIOR VENA CAVA, GUIDANCE: ICD-10-PCS | Performed by: INTERNAL MEDICINE

## 2020-07-08 RX ORDER — SODIUM CHLORIDE 0.9 % (FLUSH) 0.9 %
10 SYRINGE (ML) INJECTION EVERY 12 HOURS SCHEDULED
Status: DISCONTINUED | OUTPATIENT
Start: 2020-07-08 | End: 2020-07-09 | Stop reason: SDUPTHER

## 2020-07-08 RX ORDER — SODIUM CHLORIDE 0.9 % (FLUSH) 0.9 %
10 SYRINGE (ML) INJECTION AS NEEDED
Status: DISCONTINUED | OUTPATIENT
Start: 2020-07-08 | End: 2020-07-09 | Stop reason: SDUPTHER

## 2020-07-08 RX ORDER — MIDODRINE HYDROCHLORIDE 5 MG/1
5 TABLET ORAL EVERY 8 HOURS
Status: DISCONTINUED | OUTPATIENT
Start: 2020-07-09 | End: 2020-07-09

## 2020-07-08 RX ORDER — PHENYLEPHRINE HCL IN 0.9% NACL 0.5 MG/5ML
.5-3 SYRINGE (ML) INTRAVENOUS
Status: DISCONTINUED | OUTPATIENT
Start: 2020-07-08 | End: 2020-07-09

## 2020-07-08 RX ORDER — SODIUM CHLORIDE 0.9 % (FLUSH) 0.9 %
20 SYRINGE (ML) INJECTION AS NEEDED
Status: DISCONTINUED | OUTPATIENT
Start: 2020-07-08 | End: 2020-07-09 | Stop reason: SDUPTHER

## 2020-07-08 RX ORDER — FUROSEMIDE 20 MG/1
20 TABLET ORAL DAILY
Status: DISCONTINUED | OUTPATIENT
Start: 2020-07-09 | End: 2020-07-08

## 2020-07-08 RX ORDER — MIDODRINE HYDROCHLORIDE 5 MG/1
5 TABLET ORAL
Status: DISCONTINUED | OUTPATIENT
Start: 2020-07-08 | End: 2020-07-08

## 2020-07-08 RX ORDER — SODIUM CHLORIDE 0.9 % (FLUSH) 0.9 %
10 SYRINGE (ML) INJECTION EVERY 12 HOURS SCHEDULED
Status: DISCONTINUED | OUTPATIENT
Start: 2020-07-08 | End: 2020-07-15 | Stop reason: HOSPADM

## 2020-07-08 RX ADMIN — Medication 10 ML: at 21:33

## 2020-07-08 RX ADMIN — HYDROCODONE BITARTRATE AND ACETAMINOPHEN 1 TABLET: 7.5; 325 TABLET ORAL at 13:10

## 2020-07-08 RX ADMIN — Medication 10 ML: at 21:32

## 2020-07-08 RX ADMIN — METOPROLOL TARTRATE 25 MG: 25 TABLET, FILM COATED ORAL at 15:06

## 2020-07-08 RX ADMIN — HYDROCODONE BITARTRATE AND ACETAMINOPHEN 1 TABLET: 7.5; 325 TABLET ORAL at 21:29

## 2020-07-08 RX ADMIN — SUCRALFATE 1 G: 1 TABLET ORAL at 21:38

## 2020-07-08 RX ADMIN — MIDODRINE HYDROCHLORIDE 5 MG: 5 TABLET ORAL at 16:22

## 2020-07-08 RX ADMIN — REGADENOSON 0.4 MG: 0.08 INJECTION, SOLUTION INTRAVENOUS at 12:34

## 2020-07-08 RX ADMIN — POTASSIUM CHLORIDE 40 MEQ: 1500 TABLET, EXTENDED RELEASE ORAL at 05:08

## 2020-07-08 RX ADMIN — ONDANSETRON 4 MG: 2 INJECTION INTRAMUSCULAR; INTRAVENOUS at 15:06

## 2020-07-08 RX ADMIN — MIDODRINE HYDROCHLORIDE 5 MG: 5 TABLET ORAL at 23:27

## 2020-07-08 RX ADMIN — MELATONIN TAB 5 MG 5 MG: 5 TAB at 21:29

## 2020-07-08 RX ADMIN — DILTIAZEM HYDROCHLORIDE 30 MG: 30 TABLET ORAL at 05:08

## 2020-07-08 RX ADMIN — ONDANSETRON 4 MG: 2 INJECTION INTRAMUSCULAR; INTRAVENOUS at 21:29

## 2020-07-08 RX ADMIN — DIGOXIN 125 MCG: 0.12 TABLET ORAL at 13:10

## 2020-07-08 RX ADMIN — PANTOPRAZOLE SODIUM 40 MG: 40 TABLET, DELAYED RELEASE ORAL at 05:08

## 2020-07-08 RX ADMIN — HYDROCODONE BITARTRATE AND ACETAMINOPHEN 1 TABLET: 7.5; 325 TABLET ORAL at 02:36

## 2020-07-08 RX ADMIN — METOPROLOL TARTRATE 12.5 MG: 25 TABLET, FILM COATED ORAL at 21:29

## 2020-07-08 RX ADMIN — ACETAMINOPHEN 650 MG: 325 TABLET, FILM COATED ORAL at 13:11

## 2020-07-08 RX ADMIN — Medication 0.5 MCG/KG/MIN: at 20:08

## 2020-07-08 RX ADMIN — METOPROLOL TARTRATE 25 MG: 25 TABLET, FILM COATED ORAL at 02:36

## 2020-07-08 RX ADMIN — TECHNETIUM TC 99M SESTAMIBI 1 DOSE: 1 INJECTION INTRAVENOUS at 10:10

## 2020-07-08 RX ADMIN — TECHNETIUM TC 99M SESTAMIBI 1 DOSE: 1 INJECTION INTRAVENOUS at 12:35

## 2020-07-08 RX ADMIN — ONDANSETRON 4 MG: 2 INJECTION INTRAMUSCULAR; INTRAVENOUS at 02:36

## 2020-07-08 RX ADMIN — DILTIAZEM HYDROCHLORIDE 30 MG: 30 TABLET ORAL at 13:10

## 2020-07-08 RX ADMIN — FLUDROCORTISONE ACETATE 0.1 MG: 0.1 TABLET ORAL at 16:22

## 2020-07-08 RX ADMIN — METOCLOPRAMIDE 5 MG: 5 TABLET ORAL at 13:10

## 2020-07-08 NOTE — PLAN OF CARE
Problem: Patient Care Overview  Goal: Plan of Care Review  Outcome: Ongoing (interventions implemented as appropriate)  Flowsheets  Taken 7/8/2020 0045 by Georgia Bravo RN  Plan of Care Reviewed With: patient  Taken 7/7/2020 1000 by Shelby Bass OT  Outcome Summary: Pt is 80 yo male being followed for Afib with RVR, B PNA, AE COPD, and Chronic back pain. Incontinent of bladder this date upon OT arrivla, and sitting EOB with NSG to be cleaned up. Pt completes transfers with CGA - Min A, and is able to complete LB dressing with set up while seated in chair. Pt continues to be limited by respiratory status and general deconditioning, and fatigues with minimal exertion. Pt completes BUE ROM HEP to facilitate increased strength and activity tolerance. He states fair understanding, and was encouraged to complete outside therapy services.  Pt demos improvement this date, but continues to be limited by weakness and impaired activity tolerance. Continue to recommend IP rehab at discharge. PPE: gloves, mask, goggles.  Goal: Individualization and Mutuality  Outcome: Ongoing (interventions implemented as appropriate)  Goal: Discharge Needs Assessment  Outcome: Ongoing (interventions implemented as appropriate)  Goal: Interprofessional Rounds/Family Conf  Outcome: Ongoing (interventions implemented as appropriate)     Problem: Fall Risk (Adult)  Goal: Identify Related Risk Factors and Signs and Symptoms  Outcome: Ongoing (interventions implemented as appropriate)  Goal: Absence of Fall  Outcome: Ongoing (interventions implemented as appropriate)     Problem: Pain, Chronic (Adult)  Goal: Identify Related Risk Factors and Signs and Symptoms  Outcome: Ongoing (interventions implemented as appropriate)  Goal: Acceptable Pain/Comfort Level and Functional Ability  Outcome: Ongoing (interventions implemented as appropriate)     Problem: Skin Injury Risk (Adult)  Goal: Identify Related Risk Factors and Signs and  Symptoms  Outcome: Ongoing (interventions implemented as appropriate)  Goal: Skin Health and Integrity  Outcome: Ongoing (interventions implemented as appropriate)     Problem: Arrhythmia/Dysrhythmia (Symptomatic) (Adult)  Goal: Signs and Symptoms of Listed Potential Problems Will be Absent, Minimized or Managed (Arrhythmia/Dysrhythmia)  Outcome: Ongoing (interventions implemented as appropriate)

## 2020-07-08 NOTE — PROGRESS NOTES
"    Chief complaint abdominal pain    Subjective      Tolerated diet better yesterday kept everything down.  Today n.p.o. for stress test    Objective     Vital Signs  Visit Vitals  BP 97/59 (BP Location: Left arm, Patient Position: Lying)   Pulse 73   Temp 97.5 °F (36.4 °C) (Oral)   Resp 15   Ht 185.4 cm (73\")   Wt 67.3 kg (148 lb 5.9 oz)   SpO2 96%   BMI 19.57 kg/m²       Physical Exam:  Physical Exam   Constitutional: He is oriented to person, place, and time. No distress.   HENT:   Head: Normocephalic and atraumatic.   Eyes: Conjunctivae and EOM are normal. Pupils are equal, round, and reactive to light.   Neck: No JVD present. No thyromegaly present.   Cardiovascular: On and off tachycardic, irregular rhythm, normal heart sounds and intact distal pulses. Exam reveals no gallop and no friction rub.   No murmur heard.  Pulmonary/Chest: Effort normal and breath sounds normal. No stridor. No respiratory distress. He has no wheezes. He has no rales. He exhibits no tenderness.   Abdominal: Soft. Bowel sounds are normal. He exhibits no distension and no mass. There is no tenderness. There is no rebound and no guarding. No hernia.   Musculoskeletal: Normal range of motion.   Lymphadenopathy:     He has no cervical adenopathy.   Neurological: He is alert and oriented to person, place, and time. No cranial nerve deficit or sensory deficit. He exhibits normal muscle tone.   Skin: No rash noted. He is not diaphoretic.   Psychiatric: He has a normal mood and affect.   Vitals reviewed.    Physical Exam          Results Review:    CMP:  Lab Results   Component Value Date    BUN  07/08/2020      Comment:      Testing performed by alternate method    BUN 14 07/08/2020    CREATININE 0.53 (L) 07/08/2020    EGFRIFNONA 150 07/08/2020     07/08/2020    K 3.2 (L) 07/08/2020    CL 97 (L) 07/08/2020    CALCIUM 8.5 (L) 07/08/2020    ALBUMIN 3.60 07/07/2020    BILITOT 1.5 (H) 07/07/2020    ALKPHOS 51 07/07/2020    AST 24 07/07/2020 "    ALT 52 (H) 07/07/2020     CBC:  Lab Results   Component Value Date    WBC 14.20 (H) 07/08/2020    RBC 4.88 07/08/2020    HGB 14.3 07/08/2020    HCT 43.5 07/08/2020    MCV 89.1 07/08/2020    MCH 29.3 07/08/2020    MCHC 32.9 07/08/2020    RDW 14.7 07/08/2020     07/08/2020         Medication Review:     Scheduled Meds:    atorvastatin 20 mg Oral Daily   digoxin 125 mcg Oral Daily   dilTIAZem 30 mg Oral Q6H   fludrocortisone 0.1 mg Oral Q PM   furosemide 40 mg Oral Daily   insulin lispro 0-7 Units Subcutaneous 4x Daily With Meals & Nightly   metoclopramide 5 mg Oral TID AC   metoprolol tartrate 25 mg Oral Q6H   ondansetron 4 mg Intravenous Q6H   pantoprazole 40 mg Oral Q AM   potassium chloride 20 mEq Oral Daily   rivaroxaban 20 mg Oral Daily With Dinner   sodium chloride 10 mL Intravenous Q12H   sucralfate 1 g Oral 4x Daily AC & at Bedtime   Thera 1 tablet Oral Daily   vitamin B-12 1,000 mcg Oral Daily     Continuous Infusions:     PRN Meds:.•  acetaminophen **OR** acetaminophen **OR** acetaminophen  •  aluminum-magnesium hydroxide-simethicone  •  bisacodyl  •  dextrose  •  dextrose  •  glucagon (human recombinant)  •  HYDROcodone-acetaminophen  •  insulin lispro **AND** insulin lispro  •  ipratropium-albuterol  •  magnesium hydroxide  •  magnesium sulfate **OR** magnesium sulfate **OR** magnesium sulfate  •  melatonin  •  Morphine  •  ondansetron  •  potassium chloride **OR** potassium chloride **OR** potassium chloride  •  potassium phosphate infusion greater than 15 mMoles **OR** potassium phosphate infusion greater than 15 mMoles **OR** potassium phosphate **OR** sodium phosphate IVPB **OR** sodium phosphate IVPB **OR** sodium phosphate IVPB  •  promethazine  •  [COMPLETED] Insert peripheral IV **AND** sodium chloride  •  sodium chloride    Assessment/Plan   Abdominal pain  Epigastric, associated with nausea and no appetite  Status post EGD showed some gastritis appreciate general surgery evaluation.  At  this time due to overall complex comorbidities we will try to hold off on surgery until patient more stable.  Continue PPI Carafate and anti-nausea meds  Continue to encourage diet    Pneumonia  Has been on Zosyn attributable to wean off in the next day or so to p.o.  Pulmonary on board adjusted his bronchodilators DCd steroids  Micro has been negative including COVID and viral panel    A. fib  Cardiology working on weaning him off to p.o. meds  Currently on Lovenox for anticoagulation plan for Xarelto eventually    CHF  Chronic  EF of 35  Lasix, metoprolol  Plan on stress test today    Diabetes  And insulin sliding scale and Accu-Cheks    Orthostatic hypotension  Fludrocortisone    Dyslipidemia  Continue statin    DVT PUD prophylaxis    Plan as long as a stress test today is non-concerning and he is cleared for discharge by cardiology will plan to switch him to oral medications seeing how he does with diet again today and hopefully discharge tomorrow.    Denzel Rich MD  07/08/20  12:15

## 2020-07-08 NOTE — PROGRESS NOTES
Continued Stay Note   [facility]     Patient Name: Shukri Park  MRN: [id number]  Today's Date: 7/8/2020    Admit Date: 7/1/2020    Discharge Plan     Row Name 07/08/20 1401       Plan    Plan  [facility] subacute at d/c. Bed available on 7/9. Can transfer under [id number] program if 6-click score remains <15 & pt is walking less than 50' CGA. No PASRR needed for [facility].     Plan Comments  Per [facility] liaison, they do not have a subacute bed available today. However, liaison anticipates bed availability tomorrow (7/9). SW notified pt's RN via secure chat. Pt is scheduled for a stress test today.         Nikia Ozuna, ANUELW, LSW  PRN   Phone: [phone]

## 2020-07-08 NOTE — PLAN OF CARE
Problem: Patient Care Overview  Goal: Plan of Care Review  Outcome: Ongoing (interventions implemented as appropriate)  Flowsheets (Taken 7/8/2020 1606)  Outcome Summary: Pt was alert and oriented x 4. Pt's Sister was present during treatment. Pt c/o pain 7/10 head and back. Per Nurse pt completed stress test today. Nursing aware HR unstable. Pt needed MIN A and extra time supine to sit, CGA sit to stand FWW from high bed. Pt needed CGA FWW to ambulate 5 feet to chair. Pt needed VC extra time and rest for gentle LE exercises in recliner with legs elevated. Pt is below baseline Recommendation is IP Rehab. PPE worn mask, gloves, eyeshield.

## 2020-07-08 NOTE — PROGRESS NOTES
KPA/PULM/CC PROGRESS NOTE       SUBJECTIVE       This is a 79-year-old male with a history of COPD, former smoker, atrial fibrillation on chronic anticoagulation who lives all by himself presented to the hospital for fatigue, tiredness and shortness of breath.  Patient has been feeling puny for the few days with poor appetite.  He has noticed extreme fatigue with malaise.  He denies any fever or chills.  He has noted increased shortness of breath however cough has been mild.  He did cough clear sputum and only one time he had a maroonish sputum.  He denies any wheezing or chest pain or pleurisy.  Denies any sick contacts.  He states that he is visited by a home health nurse only.  He denies any other contacts.     CT scan of the chest was done that showed bilateral groundglass airspace disease with interlobular septal thickening upper lobe predominant.  Patient denies any active tobacco use or vaping.     He has a history of chronic sinus issues.  He is not on any maintenance inhalers.   7/3:  No new fevers  SOA stable  O2 requirement worsened over night and now on 5L  Remains fully awake and responsive  On oral diet  7/4: afebrile. soa at baseline. On 5 L. No n.v/d. Feels weak and fatigued.   7/6: Awake.  Currently on 2 L nasal cannula.  Remains on Cardizem and amiodarone infusions.  No complaints of cough or productive phlegm.  No chest pains.  No vomiting  7/7: Awake.  Currently on 2 L nasal cannula.  Complains of generalized weakness.  Complains of decreased appetite.  Currently on amiodarone infusion.  IV Cardizem has been stopped.  Remains in atrial fibrillation on the monitor.  No cough or productive phlegm.  No chest pains.  Some shortness of breath with activity  7/8: Awake.  Currently on 2 L nasal cannula.  Remains short of breath with activity.  Off amiodarone infusion.  Complains of generalized weakness.  No nausea or vomiting    OBJECTIVE    Vitals:    07/07/20 2300 07/08/20 0215 07/08/20 0615 07/08/20  1025   BP:  152/92 107/71 97/59   BP Location:  Left arm Left arm Left arm   Patient Position:  Lying Lying Lying   Pulse: 91 80 91 73   Resp:  16 18 15   Temp:  97.9 °F (36.6 °C) 97.6 °F (36.4 °C) 97.5 °F (36.4 °C)   TempSrc:  Oral Oral Oral   SpO2: 97% 95% 99% 96%   Weight:   67.3 kg (148 lb 5.9 oz)    Height:          Intake/Output last 3 shifts:  I/O last 3 completed shifts:  In: 850.3 [P.O.:700; I.V.:150.3]  Out: 2050 [Urine:2050]  Intake/Output this shift:  I/O this shift:  In: 0   Out: 120 [Urine:120]    General Appearance:  Alert, cooperative, no distress, appears stated age  Head:  Normocephalic, without obvious abnormality, atraumatic  Eyes:  conjunctivae/corneas clear, EOM's intact     Neck:  Supple,  no adenopathy;      Lungs: Decreased air entry bilaterally, faint bibasilar crackles  Chest wall:  No tenderness  Heart: Irregularly irregular rhythm, S1 and S2 normal, no murmur, rub   or gallop  Abdomen:  Soft, non-tender, bowel sounds active all four quadrants,  no masses, no hepatomegaly, no splenomegaly  Extremities:  Extremities normal, no cyanosis or edema  Pulses: 2+ and symmetric all extremities  Skin:  No rashes or lesions  Neurologic:   Alert and oriented, no focal deficits    Scheduled Meds:    atorvastatin 20 mg Oral Daily   digoxin 125 mcg Oral Daily   dilTIAZem 30 mg Oral Q6H   fludrocortisone 0.1 mg Oral Q PM   furosemide 40 mg Oral Daily   insulin lispro 0-7 Units Subcutaneous 4x Daily With Meals & Nightly   metoclopramide 5 mg Oral TID AC   metoprolol tartrate 25 mg Oral Q6H   ondansetron 4 mg Intravenous Q6H   pantoprazole 40 mg Oral Q AM   potassium chloride 20 mEq Oral Daily   rivaroxaban 20 mg Oral Daily With Dinner   sodium chloride 10 mL Intravenous Q12H   sucralfate 1 g Oral 4x Daily AC & at Bedtime   Thera 1 tablet Oral Daily   vitamin B-12 1,000 mcg Oral Daily       Continuous Infusions:       PRN Meds:•  acetaminophen **OR** acetaminophen **OR** acetaminophen  •   aluminum-magnesium hydroxide-simethicone  •  bisacodyl  •  dextrose  •  dextrose  •  glucagon (human recombinant)  •  HYDROcodone-acetaminophen  •  insulin lispro **AND** insulin lispro  •  ipratropium-albuterol  •  magnesium hydroxide  •  magnesium sulfate **OR** magnesium sulfate **OR** magnesium sulfate  •  melatonin  •  Morphine  •  ondansetron  •  potassium chloride **OR** potassium chloride **OR** potassium chloride  •  potassium phosphate infusion greater than 15 mMoles **OR** potassium phosphate infusion greater than 15 mMoles **OR** potassium phosphate **OR** sodium phosphate IVPB **OR** sodium phosphate IVPB **OR** sodium phosphate IVPB  •  promethazine  •  [COMPLETED] Insert peripheral IV **AND** sodium chloride  •  sodium chloride     LABS    CBC  Results from last 7 days   Lab Units 07/08/20  0242 07/07/20  0255 07/06/20 0225 07/05/20 0215 07/04/20  0334 07/03/20  0940 07/02/20  0457   WBC 10*3/mm3 14.20* 14.50* 16.10* 14.50* 18.00* 14.10* 7.50   RBC 10*6/mm3 4.88 4.62 4.84 4.51 4.26 4.23 3.82*   HEMOGLOBIN g/dL 14.3 13.9 14.3 13.5 12.9* 12.7* 11.6*   HEMATOCRIT % 43.5 41.7 44.1 41.1 41.2 39.4 34.3*   MCV fL 89.1 90.2 91.1 91.0 96.7 93.1 89.9   PLATELETS 10*3/mm3 238 263 302 286 280 273 227       CMP   Results from last 7 days   Lab Units 07/08/20  0242 07/07/20  0255 07/06/20  0225 07/05/20  0215 07/04/20  0334 07/03/20  0940 07/02/20  0457 07/01/20  2348   SODIUM mmol/L 140 142 143 142 144 145 146* 145   POTASSIUM mmol/L 3.2* 3.3* 3.5 3.1* 2.8* 2.6* 3.5 4.1   CHLORIDE mmol/L 97* 100 98 95* 101 103 104 104   CO2 mmol/L 30.0* 30.0* 33.0* 32.0* 26.0 22.0 23.0 22.0   BUN  14 15 19 15 12 17 22 23   CREATININE mg/dL 0.53* 0.53* 0.65* 0.64* 0.59* 0.75* 0.80 0.69*   GLUCOSE mg/dL 103* 109* 151* 162* 149* 246* 172* 119*   ALBUMIN g/dL  --  3.60 3.80 3.80 3.90 3.80  --  3.50   BILIRUBIN mg/dL  --  1.5* 1.5* 1.5* 1.2 1.2  --  1.5*   ALK PHOS U/L  --  51 59 60 55 60  --  54   AST (SGOT) U/L  --  24 41* 37 45*  37  --  31   ALT (SGPT) U/L  --  52* 59* 46* 43* 49*  --  15   LIPASE U/L  --   --   --   --   --  30  --  13       TROPONIN  Results from last 7 days   Lab Units 07/02/20  1027   TROPONIN T ng/mL <0.010       CoAg        ABG  Results from last 7 days   Lab Units 07/02/20  1049   PH, ARTERIAL pH units 7.531*   PCO2, ARTERIAL mm Hg 31.3*   PO2 ART mm Hg 79.4*   O2 SATURATION ART % 97.1   BASE EXCESS ART mmol/L 3.9*       Microbiology  Microbiology Results (last 10 days)     Procedure Component Value - Date/Time    Gastrointestinal Panel, PCR - Stool, Per Rectum [[id number]]  (Normal) Collected:  07/03/20 0856    Lab Status:  Final result Specimen:  Stool from Per Rectum Updated:  07/03/20 1535     Campylobacter Not Detected     Plesiomonas shigelloides Not Detected     Salmonella Not Detected     Vibrio Not Detected     Vibrio cholerae Not Detected     Yersinia enterocolitica Not Detected     Enteroaggregative E. coli (EAEC) Not Detected     Enteropathogenic E. coli (EPEC) Not Detected     Enterotoxigenic E. coli (ETEC) lt/st Not Detected     Shiga-like toxin-producing E. coli (STEC) stx1/stx2 Not Detected     E. coli O157 Not Detected     Shigella/Enteroinvasive E. coli (EIEC) Not Detected     Cryptosporidium Not Detected     Cyclospora cayetanensis Not Detected     Entamoeba histolytica Not Detected     Giardia lamblia Not Detected     Adenovirus F40/41 Not Detected     Astrovirus Not Detected     Norovirus GI/GII Not Detected     Rotavirus A Not Detected     Sapovirus (I, II, IV or V) Not Detected    Narrative:       If Aeromonas, Staphylococcus aureus or Bacillus cereus are suspected, please order [id number]: Stool Culture, Aeromonas, S aureus, B Cereus.    Clostridium Difficile EIA - Stool, Per Rectum [[id number]]  (Normal) Collected:  07/03/20 0856    Lab Status:  Final result Specimen:  Stool from Per Rectum Updated:  07/04/20 0715     C Diff GDH / Toxin Negative    COVID-19,CEPHEID,COR/MILENA/PAD IN-HOUSE(OR  EMERGENT/ADD-ON),NP SWAB IN TRANSPORT MEDIA 3-4 HR TAT - Swab, Nasopharynx [[id number]]  (Normal) Collected:  07/02/20 1659    Lab Status:  Final result Specimen:  Swab from Nasopharynx Updated:  07/02/20 2003     COVID19 Not Detected    Narrative:       Fact sheet for providers: https://www.fda.gov/media/324299/download     Fact sheet for patients: https://www.fda.gov/media/910812/download    Blood Culture - Blood, Arm, Left [[id number]] Collected:  07/02/20 0249    Lab Status:  Final result Specimen:  Blood from Arm, Left Updated:  07/07/20 0300     Blood Culture No growth at 5 days    Respiratory Panel, PCR - Swab, Nasopharynx [[id number]]  (Normal) Collected:  07/02/20 0027    Lab Status:  Final result Specimen:  Swab from Nasopharynx Updated:  07/02/20 0153     ADENOVIRUS, PCR Not Detected     Coronavirus 229E Not Detected     Coronavirus HKU1 Not Detected     Coronavirus NL63 Not Detected     Coronavirus OC43 Not Detected     Human Metapneumovirus Not Detected     Human Rhinovirus/Enterovirus Not Detected     Influenza B PCR Not Detected     Parainfluenza Virus 1 Not Detected     Parainfluenza Virus 2 Not Detected     Parainfluenza Virus 3 Not Detected     Parainfluenza Virus 4 Not Detected     Bordetella pertussis pcr Not Detected     Influenza A H1 2009 PCR Not Detected     Chlamydophila pneumoniae PCR Not Detected     Mycoplasma pneumo by PCR Not Detected     Influenza A PCR Not Detected     Influenza A H3 Not Detected     Influenza A H1 Not Detected     RSV, PCR Not Detected    Narrative:       The coronavirus on the RVP is NOT COVID-19 and is NOT indicative of infection with COVID-19.     COVID-19 [employer] IN-HOUSE, Nasal Swab No Transport Media - Swab, Nasal Cavity [[id number]]  (Normal) Collected:  07/02/20 0027    Lab Status:  Final result Specimen:  Swab from Nasal Cavity Updated:  07/02/20 0104     COVID19 Not Detected    Narrative:       Fact sheet for providers:  https://www.fda.gov/media/559270/download     Fact sheet for patients: https://www.fda.gov/media/927924/download    Blood Culture - Blood, Arm, Right [[id number]] Collected:  07/01/20 7388    Lab Status:  Final result Specimen:  Blood from Arm, Right Updated:  07/07/20 0000     Blood Culture No growth at 5 days          IMAGING & OTHER STUDIES    Imaging Results (Last 72 Hours)     ** No results found for the last 72 hours. **        Results for orders placed during the hospital encounter of 07/01/20   Adult Transthoracic Echo Complete W/ Cont if Necessary Per Protocol    Narrative · Left ventricular systolic function is severely decreased.  · Left ventricular wall thickness is consistent with mild concentric   hypertrophy.  · Left atrial cavity size is moderately dilated.  · Mild-to-moderate mitral valve regurgitation is present  · Mild tricuspid valve regurgitation is present.  · Mild aortic valve regurgitation is present.             ASSESSMENT/PLAN:     Atrial fibrillation (CMS/HCC)    CAD (coronary artery disease)    Chronic anticoagulation    Hyperlipidemia    Presence of stent in right coronary artery    Chronic pain    Sepsis (CMS/HCC)    Congestive heart failure (CHF) (CMS/HCC)    Nausea vomiting and diarrhea        1.Bilateral PNA (viral vs bacterial) other differentials include Pulmonary edema and possible hemorrhage  2.  A. fib with RVR  3. H/o RML lobectomy  4. CAD  5. Acute hypoxia due to above  6.  Nausea  7. Chronic Hypotension  8. Chronic Anticoagulation with Xarelto  9. Lactic acidosis  10. Hypokalemia  11.  Diarrhea  12.  Gastritis noted on EGD  13.  Acute on chronic systolic CHF exacerbation       PLAN       -Reviewed CT scan of the chest. Covid test is negative X2.  Afebrile.    Status post antibiotic therapy with Zosyn.  Monitor off antibiotics.  WBC count improving  -Blood cultures are negative.  -Patient is a lifelong non-smoker.  No previous history of COPD.  Off steroids and Pulmicort.   Changed nebs to PRN.  -Management of A. fib per cardiology.  Off amiodarone and Cardizem infusions.  Remains on p.o. beta-blockers and digoxin.  For stress test today  - 2D echo results reviewed.  EF 30 to 35%.  Mild to moderate MR noted.  RVSP 35.7  -GI work-up reviewed.  Status post EGD 7/5 with changes of gastritis noted.  Remains on Reglan Carafate and Protonix.  Switched Protonix to p.o. stool for C. difficile analysis is negative.  Changed Reglan to p.o.  -Patient with issues with chronic hypotension.  Remains on Florinef which is being continued.  -Currently on 2 L nasal cannula.  Wean supplemental oxygen as appropriate.  Evaluate need for supplemental oxygen at discharge.  -Remains on therapeutic anticoagulation with Lovenox.  Cardiology is planning to switch back to home Xarelto after stress test noted  -Increase activity.  -Tolerating p.o. diet.  No procedures planned from pulmonary standpoint  We will continue with rest of therapy.    THIS DOCUMENT HAS BEEN ELECTRONICALLY SIGNED BY  Ebenezer Brambila MD

## 2020-07-08 NOTE — THERAPY TREATMENT NOTE
Patient Name: Shukri Park  : 1940    MRN: [id number]                              Today's Date: 2020       Admit Date: 2020    Visit Dx:     ICD-10-CM ICD-9-CM   1. Atrial fibrillation with rapid ventricular response (CMS/HCC) I48.91 427.31   2. Congestive heart failure, unspecified HF chronicity, unspecified heart failure type (CMS/HCC) I50.9 428.0   3. Pneumonia of both lungs due to infectious organism, unspecified part of lung J18.9 483.8   4. Nausea vomiting and diarrhea R11.2 787.91    R19.7 787.01     Patient Active Problem List   Diagnosis   • Atrial fibrillation (CMS/HCC)   • CAD (coronary artery disease)   • Chronic anticoagulation   • Hyperlipidemia   • Presence of stent in right coronary artery   • Chronic pain   • Sepsis (CMS/HCC)   • Congestive heart failure (CHF) (CMS/HCC)   • Nausea vomiting and diarrhea     Past Medical History:   Diagnosis Date   • A-fib (CMS/[facility])    • Aortic aneurysm (CMS/HCC)    • Appetite loss    • Cancer (CMS/[facility])     right lobe cancer - surgically removed    • Dark stools    • Foot pain, bilateral    • Hyperlipidemia    • Low back pain    • S/P epidural steroid injection     [place] Dr Gomes's - no relief   • Weight loss     acute loss of weight 30 lbs     Past Surgical History:   Procedure Laterality Date   • CATARACT EXTRACTION, BILATERAL     • CORONARY ANGIOPLASTY WITH STENT PLACEMENT     • ENDOSCOPY N/A 2020    Procedure: ESOPHAGOGASTRODUODENOSCOPY;  Surgeon: Neal Correa MD;  Location: [facility] ENDOSCOPY;  Service: Gastroenterology;  Laterality: N/A;   • LUMBAR DECOMPRESSION  2015    L2-L3   • LUMBAR DECOMPRESSION      Dr. Logan   • OTHER SURGICAL HISTORY  2015    left SI fusion with bone graft - Dr. Presley   • OTHER SURGICAL HISTORY      carotid artery repair    • OTHER SURGICAL HISTORY Left 2016    Left SI fusion 2016 Dr. Zendejas   • POSTERIOR SPINAL FUSION  10/24/2016    PSF T12-3/TLIF L3-L4 poss L2-L3 --- Dr. Zendejas   • TUMOR  REMOVAL      carcinoid tumor removed off right lobe tumor     General Information     Row Name 07/08/20 1558          PT Evaluation Time/Intention    Document Type  therapy note (daily note)  -     Mode of Treatment  physical therapy  -     Row Name 07/08/20 1558          General Information    Patient Profile Reviewed?  yes  -     Row Name 07/08/20 1558          Cognitive Assessment/Intervention- PT/OT    Orientation Status (Cognition)  oriented x 4  -       User Key  (r) = Recorded By, (t) = Taken By, (c) = Cosigned By    Initials Name Provider Type    Simi Johnson PT Physical Therapist        Mobility     Row Name 07/08/20 1558          Bed Mobility Assessment/Treatment    Supine-Sit [place] (Bed Mobility)  minimum assist (75% patient effort)  -     Row Name 07/08/20 1558          Sit-Stand Transfer    Sit-Stand [place] (Transfers)  contact guard CGA from high bed  -     Row Name 07/08/20 1558          Gait/Stairs Assessment/Training    Gait/Stairs Assessment/Training  gait/ambulation independence;distance ambulated  -     [place] Level (Gait)  contact guard  -     Assistive Device (Gait)  walker, front-wheeled  -     Distance in Feet (Gait)  5 feet    -       User Key  (r) = Recorded By, (t) = Taken By, (c) = Cosigned By    Initials Name Provider Type     Simi Flores PT Physical Therapist        Obj/Interventions     Row Name 07/08/20 1559          Therapeutic Exercise    Position (Therapeutic Exercise)  seated  -     Sets/Reps (Therapeutic Exercise)  DF, PF,   -     Row Name 07/08/20 1559          Static Sitting Balance    Level of [place] (Unsupported Sitting, Static Balance)  supervision  -     Sitting Position (Unsupported Sitting, Static Balance)  sitting on edge of bed  -     Time Able to Maintain Position (Unsupported Sitting, Static Balance)  more than 5 minutes  -     Row Name 07/08/20 1558          Dynamic Sitting Balance    Level of  [place], Reaches Outside Midline (Sitting, Dynamic Balance)  supervision  -     Sitting Position, Reaches Outside Midline (Sitting, Dynamic Balance)  sitting on edge of bed  -     Row Name 07/08/20 1559          Static Standing Balance    Level of [place] (Supported Standing, Static Balance)  contact guard assist  -     Time Able to Maintain Position (Supported Standing, Static Balance)  1 to 2 minutes  -     Assistive Device Utilized (Supported Standing, Static Balance)  walker, rolling  -     Row Name 07/08/20 1559          Dynamic Standing Balance    Level of [place], Reaches Outside Midline (Standing, Dynamic Balance)  contact guard assist  -WC     Time Able to Maintain Position, Reaches Outside Midline (Standing, Dynamic Balance)  1 to 2 minutes  -     Assistive Device Utilized (Supported Standing, Dynamic Balance)  walker, rolling  -       User Key  (r) = Recorded By, (t) = Taken By, (c) = Cosigned By    Initials Name Provider Type    Simi Johnson, PT Physical Therapist        Goals/Plan    No documentation.       Clinical Impression     [facility] Name 07/08/20 1600          Pain Scale: Numbers Pre/Post-Treatment    Pain Scale: Numbers, Pretreatment  7/10  -     Pain Scale: Numbers, Post-Treatment  7/10  -     Pain Location - Orientation  lower  -     Pain Location  back back and head hurt  -     Row Name 07/08/20 1600          Pain Scale: FACES Pre/Post-Treatment    Pain: FACES Scale, Pretreatment  8-->hurts whole lot  -     Pain: FACES Scale, Post-Treatment  8-->hurts whole lot  -[facility] Name 07/08/20 1600          Plan of Care Review    Plan of Care Reviewed With  patient  -[facility] Name 07/08/20 1600          Physical Therapy Clinical Impression    Criteria for Skilled Interventions Met (PT Clinical Impression)  yes;treatment indicated  -     Rehab Potential (PT Clinical Summary)  good, to achieve stated therapy goals  -     Row Name 07/08/20 1600           Vital Signs    Pre Patient Position  Supine  -WC     Intra Patient Position  Standing  -WC     Post Patient Position  Sitting  -WC     Row Name 07/08/20 1600          Positioning and Restraints    Pre-Treatment Position  in bed  -WC     Post Treatment Position  chair  -WC       User Key  (r) = Recorded By, (t) = Taken By, (c) = Cosigned By    Initials Name Provider Type    Simi Johnson PT Physical Therapist        Outcome Measures     Row Name 07/08/20 1601          How much help from another person do you currently need...    Turning from your back to your side while in flat bed without using bedrails?  3  -WC     Moving from lying on back to sitting on the side of a flat bed without bedrails?  3  -WC     Moving to and from a bed to a chair (including a wheelchair)?  3  -WC     Standing up from a chair using your arms (e.g., wheelchair, bedside chair)?  3  -WC     Climbing 3-5 steps with a railing?  1  -WC     To walk in hospital room?  3  -WC     AM-PAC 6 Clicks Score (PT)  16  -WC       User Key  (r) = Recorded By, (t) = Taken By, (c) = Cosigned By    Initials Name Provider Type    Simi Johnson PT Physical Therapist        Physical Therapy Education                 Title: PT OT SLP Therapies (Done)     Topic: Physical Therapy (Done)     Point: Mobility training (Done)     Description:   Instruct learner(s) on safety and technique for assisting patient out of bed, chair or wheelchair.  Instruct in the proper use of assistive devices, such as walker, crutches, cane or brace.              Patient Friendly Description:   It's important to get you on your feet again, but we need to do so in a way that is safe for you. Falling has serious consequences, and your personal safety is the most important thing of all.        When it's time to get out of bed, one of us or a family member will sit next to you on the bed to give you support.     If your doctor or nurse tells you to use a walker, crutches, a cane, or a  brace, be sure you use it every time you get out of bed, even if you think you don't need it.    Learning Progress Summary           Patient Acceptance, D,E, VU,DU by  at 7/8/2020 1603    Acceptance, E, VU by  at 7/6/2020 1249                   Point: Body mechanics (Done)     Description:   Instruct learner(s) on proper positioning and spine alignment for patient and/or caregiver during mobility tasks and/or exercises.              Learning Progress Summary           Patient Acceptance, D,E, VU,DU by  at 7/8/2020 1603                   Point: Precautions (Done)     Description:   Instruct learner(s) on prescribed precautions during mobility and gait tasks              Learning Progress Summary           Patient Acceptance, D,E, VU,DU by  at 7/8/2020 1603                               User Key     Initials Effective Dates Name Provider Type Discipline     06/19/19 -  Ashlyn Ross, PT Physical Therapist PT     01/07/20 -  Simi Flores, SARA Physical Therapist PT              PT Recommendation and Plan     Outcome Summary/Treatment Plan (PT)  Anticipated Discharge Disposition (PT): inpatient rehabilitation facility  Plan of Care Reviewed With: patient  Outcome Summary: Pt was alert and oriented x 4. Pt's Sister was present during treatment. Pt c/o pain 7/10 head and back. Per Nurse pt completed stress test today. Nursing aware HR unstable. Pt needed MIN A and extra time supine to sit, CGA sit to stand FWW from high bed. Pt needed CGA FWW to ambulate 5 feet to chair. Pt needed VC extra time and rest for gentle LE exercises in recliner with legs elevated. Pt is below baseline Recommendation is IP Rehab. PPE worn mask, gloves, eyeshield.     Time Calculation:   PT Charges     Row Name 07/08/20 5556             Time Calculation    Start Time  1520  -      Stop Time  1556  -      Time Calculation (min)  36 min  -      PT Received On  07/08/20  -      PT - Next Appointment  07/09/20  -          Time Calculation- PT    TCU Minutes- PT  36 min  -BERTHA        User Key  (r) = Recorded By, (t) = Taken By, (c) = Cosigned By    Initials Name Provider Type    Simi Johnson, PT Physical Therapist        Therapy Charges for Today     Code Description Service Date Service Provider Modifiers Qty    08070667825  PT THERAPEUTIC ACT EA 15 MIN 7/8/2020 Simi Flores, PT GP 1    [id number] HC GAIT TRAINING EA 15 MIN 7/8/2020 Simi Flores, PT GP 1    [id number]  PT THER PROC EA 15 MIN 7/8/2020 Simi Flores, PT GP 1          PT G-Codes  Outcome Measure Options: AM-PAC 6 Clicks Basic Mobility (PT)  AM-PAC 6 Clicks Score (PT): 16    Simi Flores, SARA  7/8/2020

## 2020-07-08 NOTE — PLAN OF CARE
Pt alert and oriented. Myoview results pending. Complaining of pain/headache, PRN medication administered. Remains in afib, vitals stable otherwise. High falls risk. Plan of care discussed. Will continue to monitor.     Problem: Patient Care Overview  Goal: Plan of Care Review  Outcome: Ongoing (interventions implemented as appropriate)  Flowsheets  Taken 7/8/2020 1514  Progress: no change  Taken 7/8/2020 0752  Plan of Care Reviewed With: patient  Goal: Individualization and Mutuality  Outcome: Ongoing (interventions implemented as appropriate)  Goal: Discharge Needs Assessment  Outcome: Ongoing (interventions implemented as appropriate)  Goal: Interprofessional Rounds/Family Conf  Outcome: Ongoing (interventions implemented as appropriate)     Problem: Fall Risk (Adult)  Goal: Identify Related Risk Factors and Signs and Symptoms  Outcome: Ongoing (interventions implemented as appropriate)  Goal: Absence of Fall  Outcome: Ongoing (interventions implemented as appropriate)     Problem: Pain, Chronic (Adult)  Goal: Identify Related Risk Factors and Signs and Symptoms  Outcome: Ongoing (interventions implemented as appropriate)  Goal: Acceptable Pain/Comfort Level and Functional Ability  Outcome: Ongoing (interventions implemented as appropriate)     Problem: Skin Injury Risk (Adult)  Goal: Identify Related Risk Factors and Signs and Symptoms  Outcome: Ongoing (interventions implemented as appropriate)  Goal: Skin Health and Integrity  Outcome: Ongoing (interventions implemented as appropriate)     Problem: Arrhythmia/Dysrhythmia (Symptomatic) (Adult)  Goal: Signs and Symptoms of Listed Potential Problems Will be Absent, Minimized or Managed (Arrhythmia/Dysrhythmia)  Outcome: Ongoing (interventions implemented as appropriate)

## 2020-07-08 NOTE — PROGRESS NOTES
LOS: 6 days   Admiting Physician- Denzel Rich MD    Reason For Followup:    Atrial fibrillation  Cardiomyopathy  CAD  Cholelithiasis  COPD  Hypertension    Subjective     Patient overall is stable.  However is blood pressure is low.    Objective     Blood pressure is low.    Review of Systems:   Review of Systems   Constitution: Negative for chills and fever.   HENT: Negative for ear discharge and nosebleeds.    Eyes: Negative for discharge and redness.   Cardiovascular: Negative for chest pain, orthopnea, palpitations, paroxysmal nocturnal dyspnea and syncope.   Respiratory: Positive for shortness of breath. Negative for cough and wheezing.    Endocrine: Negative for heat intolerance.   Skin: Negative for rash.   Musculoskeletal: Positive for arthritis and joint pain. Negative for myalgias.   Gastrointestinal: Negative for abdominal pain, melena, nausea and vomiting.   Genitourinary: Negative for dysuria and hematuria.   Neurological: Negative for dizziness, light-headedness, numbness and tremors.   Psychiatric/Behavioral: Negative for depression. The patient is not nervous/anxious.          Vital Signs  Vitals:    07/08/20 1025 07/08/20 1506 07/08/20 1527 07/08/20 1622   BP: 97/59 102/65 (!) 88/51 (!) 77/43   BP Location: Left arm  Left arm    Patient Position: Lying  Lying    Pulse: 73 (!) 135 76 (!) 128   Resp: 15  20    Temp: 97.5 °F (36.4 °C)  97.3 °F (36.3 °C)    TempSrc: Oral  Oral    SpO2: 96%  97%    Weight:       Height:         Wt Readings from Last 1 Encounters:   07/08/20 67.3 kg (148 lb 5.9 oz)       Intake/Output Summary (Last 24 hours) at 7/8/2020 1652  Last data filed at 7/8/2020 0950  Gross per 24 hour   Intake 120 ml   Output 970 ml   Net -850 ml     Physical Exam:  Physical Exam   Constitutional: He is oriented to person, place, and time. He appears well-developed and well-nourished.   HENT:   Head: Normocephalic and atraumatic.   Eyes: Right eye exhibits no discharge. No scleral icterus.    Neck: No thyromegaly present.   Cardiovascular: Normal rate and normal heart sounds. Exam reveals no gallop and no friction rub.   No murmur heard.  Heart rate is irregular   Pulmonary/Chest: Effort normal and breath sounds normal. No respiratory distress. He has no wheezes. He has no rales.   Abdominal: There is no tenderness.   Musculoskeletal: He exhibits no edema.   Lymphadenopathy:     He has no cervical adenopathy.   Neurological: He is alert and oriented to person, place, and time.   Skin: No rash noted. No erythema.   Psychiatric: He has a normal mood and affect.       Results Review:   Lab Results (last 24 hours)     Procedure Component Value Units Date/Time    POC Glucose Once [[id number]]  (Abnormal) Collected:  07/08/20 1644    Specimen:  Blood Updated:  07/08/20 1647     Glucose 162 mg/dL      Comment: Serial Number: [id number]:  [id number]       BUN [[id number]]  (Normal) Collected:  07/08/20 0242    Specimen:  Blood Updated:  07/08/20 0737     BUN 14 mg/dL     POC Glucose Once [[id number]]  (Normal) Collected:  07/08/20 0723    Specimen:  Blood Updated:  07/08/20 0726     Glucose 93 mg/dL      Comment: Serial Number: [id number]:  [id number]       Magnesium [[id number]]  (Normal) Collected:  07/08/20 0242    Specimen:  Blood Updated:  07/08/20 0404     Magnesium 1.9 mg/dL     Basic Metabolic Panel [[id number]]  (Abnormal) Collected:  07/08/20 0242    Specimen:  Blood Updated:  07/08/20 0404     Glucose 103 mg/dL      BUN --     Comment: Testing performed by alternate method        Creatinine 0.53 mg/dL      Sodium 140 mmol/L      Potassium 3.2 mmol/L      Chloride 97 mmol/L      CO2 30.0 mmol/L      Calcium 8.5 mg/dL      eGFR Non African Amer 150 mL/min/1.73      BUN/Creatinine Ratio --     Comment: Testing not performed        Anion Gap 13.0 mmol/L     Narrative:       GFR Normal >60  Chronic Kidney Disease <60  Kidney Failure <15      CBC (No Diff) [[id number]]  (Abnormal) Collected:   07/08/20 0242    Specimen:  Blood Updated:  07/08/20 0348     WBC 14.20 10*3/mm3      RBC 4.88 10*6/mm3      Hemoglobin 14.3 g/dL      Hematocrit 43.5 %      MCV 89.1 fL      MCH 29.3 pg      MCHC 32.9 g/dL      RDW 14.7 %      RDW-SD 45.9 fl      MPV 8.5 fL      Platelets 238 10*3/mm3     POC Glucose Once [[id number]]  (Abnormal) Collected:  07/07/20 2029    Specimen:  Blood Updated:  07/07/20 2030     Glucose 124 mg/dL      Comment: Serial Number: [id number]:  [id number]           Imaging Results (Last 72 Hours)     ** No results found for the last 72 hours. **        ECG/EMG Results (most recent)     Procedure Component Value Units Date/Time    Adult Transthoracic Echo Complete W/ Cont if Necessary Per Protocol [[id number]] Collected:  07/02/20 0653     Updated:  07/02/20 1409     BSA 1.9 m^2      RVIDd 2.1 cm      IVSd 1.1 cm      LVIDd 5.6 cm      LVIDs 4.6 cm      LVPWd 1.3 cm      IVS/LVPW 0.84     FS 17.6 %      EDV(Teich) 155.9 ml      ESV(Teich) 99.5 ml      EF(Teich) 36.2 %      EDV(cubed) 179.0 ml      ESV(cubed) 100.1 ml      EF(cubed) 44.1 %      LV mass(C)d 276.4 grams      LV mass(C)dI 142.4 grams/m^2      SV(Teich) 56.4 ml      SI(Teich) 29.1 ml/m^2      SV(cubed) 78.9 ml      SI(cubed) 40.6 ml/m^2      Ao root diam 4.0 cm      Ao root area 12.8 cm^2      ACS 2.1 cm      asc Aorta Diam 3.7 cm      LVOT diam 2.5 cm      LVOT area 5.1 cm^2      RVOT diam 2.1 cm      RVOT area 3.6 cm^2      EDV(MOD-sp4) 68.5 ml      ESV(MOD-sp4) 50.1 ml      EF(MOD-sp4) 26.8 %      SV(MOD-sp4) 18.4 ml      SI(MOD-sp4) 9.5 ml/m^2      Ao root area (BSA corrected) 2.1     LV Dunlap Vol (BSA corrected) 35.3 ml/m^2      LV Sys Vol (BSA corrected) 25.8 ml/m^2      Aortic R-R 0.4 sec      Aortic .6 BPM      MV V2 max 90.3 cm/sec      MV max PG 3.3 mmHg      MV V2 mean 63.4 cm/sec      MV mean PG 1.8 mmHg      MV V2 VTI 9.6 cm      MVA(VTI) 6.7 cm^2      Ao pk sharmila 78.3 cm/sec      Ao max PG 2.5 mmHg      Ao max PG  (full) -0.26 mmHg      Ao V2 mean 61.8 cm/sec      Ao mean PG 1.7 mmHg      Ao mean PG (full) 0.39 mmHg      Ao V2 VTI 11.3 cm      AISHWARYA(I,A) 5.7 cm^2      AISHWARYA(I,D) 5.7 cm^2      AISHWARYA(V,A) 5.3 cm^2      AISHWARYA(V,D) 5.3 cm^2      LV V1 max PG 2.7 mmHg      LV V1 mean PG 1.3 mmHg      LV V1 max 82.3 cm/sec      LV V1 mean 53.0 cm/sec      LV V1 VTI 12.7 cm      MR max sharmila 453.8 cm/sec      MR max PG 82.4 mmHg      CO(Ao) 22.0 l/min      CI(Ao) 11.3 l/min/m^2      SV(Ao) 144.9 ml      SI(Ao) 74.6 ml/m^2      CO(LVOT) 9.8 l/min      CI(LVOT) 5.0 l/min/m^2      SV(LVOT) 64.6 ml      SV(RVOT) 42.6 ml      SI(LVOT) 33.3 ml/m^2      PA V2 max 77.1 cm/sec      PA max PG 2.4 mmHg      PA max PG (full) 0.22 mmHg      PA V2 mean 50.6 cm/sec      PA mean PG 1.2 mmHg      PA mean PG (full) 0.15 mmHg      PA V2 VTI 9.1 cm      PVA(I,A) 4.7 cm^2       CV ECHO TARYN - PVA(I,D) 4.7 cm^2       CV ECHO TARYN - PVA(V,A) 3.4 cm^2       CV ECHO TARYN - PVA(V,D) 3.4 cm^2      PA acc time 0.06 sec      RV V1 max PG 2.2 mmHg      RV V1 mean PG 1.1 mmHg      RV V1 max 73.4 cm/sec      RV V1 mean 46.7 cm/sec      RV V1 VTI 11.9 cm      TR max sharmila 285.8 cm/sec      RVSP(TR) 35.7 mmHg      RAP systole 3.0 mmHg      PA pr(Accel) 49.9 mmHg      Qp/Qs 0.66      CV ECHO TARYN - BZI_BMI 18.7 kilograms/m^2       CV ECHO TARYN - BSA([facility]) 1.9 m^2       CV ECHO TARYN - BZI_METRIC_WEIGHT 68.0 kg       CV ECHO TARYN - BZI_METRIC_HEIGHT 190.5 cm      EF(MOD-bp) 27.0 %      LA dimension(2D) 4.6 cm     Narrative:       · Left ventricular systolic function is severely decreased.  · Left ventricular wall thickness is consistent with mild concentric   hypertrophy.  · Left atrial cavity size is moderately dilated.  · Mild-to-moderate mitral valve regurgitation is present  · Mild tricuspid valve regurgitation is present.  · Mild aortic valve regurgitation is present.       ECG 12 Lead [[id number]] Collected:  07/03/20 0945     Updated:  07/03/20 0947     Narrative:       HEART RATE= 106  bpm  RR Interval= 568  ms  ND Interval= 196  ms  P Horizontal Axis= 48  deg  P Front Axis= 0  deg  QRSD Interval= 95  ms  QT Interval= 298  ms  QRS Axis= -18  deg  T Wave Axis= 14  deg  - ABNORMAL ECG -  Sinus tachycardia  Multiple premature complexes, vent & supraven  When compared with ECG of 01-Jul-2020 23:40:10,  Significant rate decrease  Electronically Signed By:   Date and Time of Study: 2020-07-03 09:45:28    ECG 12 Lead [[id number]] Collected:  07/01/20 2340     Updated:  07/03/20 1140    Narrative:       HEART RATE= 163  bpm  RR Interval= 368  ms  ND Interval=   ms  P Horizontal Axis=   deg  P Front Axis=   deg  QRSD Interval= 78  ms  QT Interval= 283  ms  QRS Axis= 7  deg  T Wave Axis= 42  deg  - ABNORMAL ECG -  Atrial fibrillation with rapid V-rate  No previous ECG available for comparison  Electronically Signed By: Sunny Unger (MILENA) 03-Jul-2020 11:38:35  Date and Time of Study: 2020-07-01 23:40:10    ECG 12 Lead [[id number]] Collected:  07/03/20 2021     Updated:  07/03/20 2023    Narrative:       HEART RATE= 133  bpm  RR Interval= 449  ms  ND Interval=   ms  P Horizontal Axis=   deg  P Front Axis=   deg  QRSD Interval= 92  ms  QT Interval= 296  ms  QRS Axis= -17  deg  T Wave Axis= -11  deg  - ABNORMAL ECG -  Atrial fibrillation  Ventricular premature complex  Probable lateral infarct, old  Electronically Signed By:   Date and Time of Study: 2020-07-03 20:21:30    ECG 12 Lead [[id number]] Collected:  07/04/20 0607     Updated:  07/04/20 0609    Narrative:       HEART RATE= 102  bpm  RR Interval= 589  ms  ND Interval=   ms  P Horizontal Axis=   deg  P Front Axis=   deg  QRSD Interval= 95  ms  QT Interval= 334  ms  QRS Axis= -21  deg  T Wave Axis= 3  deg  - ABNORMAL ECG -  Atrial fibrillation  Ventricular premature complex  Electronically Signed By:   Date and Time of Study: 2020-07-04 06:07:51    ECG 12 Lead [[id number]] Collected:  07/05/20 0709     Updated:  07/05/20 0710      Narrative:       HEART RATE= 113  bpm  RR Interval= 528  ms  MA Interval=   ms  P Horizontal Axis=   deg  P Front Axis=   deg  QRSD Interval= 89  ms  QT Interval= 321  ms  QRS Axis= -36  deg  T Wave Axis= 0  deg  - ABNORMAL ECG -  Atrial fibrillation  Ventricular premature complex  Left axis deviation  When compared with ECG of 04-Jul-2020 6:07:51,  No significant change  Electronically Signed By:   Date and Time of Study: 2020-07-05 07:09:38    ECG 12 Lead [[id number]] Collected:  07/05/20 2052     Updated:  07/05/20 2057    Narrative:       HEART RATE= 114  bpm  RR Interval= 524  ms  MA Interval=   ms  P Horizontal Axis=   deg  P Front Axis=   deg  QRSD Interval= 96  ms  QT Interval= 312  ms  QRS Axis= -28  deg  T Wave Axis= -12  deg  - ABNORMAL ECG -  Atrial fibrillation  Ventricular premature complex  Borderline T abnormalities, diffuse leads  When compared with ECG of 05-Jul-2020 7:09:38,  Significant axis, voltage or hypertrophy change  Electronically Signed By:   Date and Time of Study: 2020-07-05 20:52:58    ECG 12 Lead [[id number]] Collected:  07/06/20 0609     Updated:  07/06/20 0610    Narrative:       HEART RATE= 97  bpm  RR Interval= 619  ms  MA Interval=   ms  P Horizontal Axis=   deg  P Front Axis=   deg  QRSD Interval= 91  ms  QT Interval= 338  ms  QRS Axis= -22  deg  T Wave Axis= -5  deg  - ABNORMAL ECG -  Atrial fibrillation  Ventricular premature complex  When compared with ECG of 05-Jul-2020 20:52:58,  Significant repolarization change  Electronically Signed By:   Date and Time of Study: 2020-07-06 06:09:24        CBC    Results from last 7 days   Lab Units 07/08/20  0242 07/07/20  0255 07/06/20  0225 07/05/20  0215 07/04/20  0334 07/03/20  0940 07/02/20  0457   WBC 10*3/mm3 14.20* 14.50* 16.10* 14.50* 18.00* 14.10* 7.50   HEMOGLOBIN g/dL 14.3 13.9 14.3 13.5 12.9* 12.7* 11.6*   PLATELETS 10*3/mm3 238 263 302 286 280 273 227     BMP   Results from last 7 days   Lab Units 07/08/20  1563  07/07/20  0255 07/06/20  0225 07/05/20  0215 07/04/20  0334 07/03/20  0940 07/02/20  0457   SODIUM mmol/L 140 142 143 142 144 145 146*   POTASSIUM mmol/L 3.2* 3.3* 3.5 3.1* 2.8* 2.6* 3.5   CHLORIDE mmol/L 97* 100 98 95* 101 103 104   CO2 mmol/L 30.0* 30.0* 33.0* 32.0* 26.0 22.0 23.0   BUN  14 15 19 15 12 17 22   CREATININE mg/dL 0.53* 0.53* 0.65* 0.64* 0.59* 0.75* 0.80   GLUCOSE mg/dL 103* 109* 151* 162* 149* 246* 172*   MAGNESIUM mg/dL 1.9 1.9 2.2 1.9 2.2 2.1 2.0     CMP   Results from last 7 days   Lab Units 07/08/20  0242 07/07/20  0255 07/06/20  0225 07/05/20  0215 07/04/20  0334 07/03/20  0940 07/02/20  0457 07/01/20  2348   SODIUM mmol/L 140 142 143 142 144 145 146* 145   POTASSIUM mmol/L 3.2* 3.3* 3.5 3.1* 2.8* 2.6* 3.5 4.1   CHLORIDE mmol/L 97* 100 98 95* 101 103 104 104   CO2 mmol/L 30.0* 30.0* 33.0* 32.0* 26.0 22.0 23.0 22.0   BUN  14 15 19 15 12 17 22 23   CREATININE mg/dL 0.53* 0.53* 0.65* 0.64* 0.59* 0.75* 0.80 0.69*   GLUCOSE mg/dL 103* 109* 151* 162* 149* 246* 172* 119*   ALBUMIN g/dL  --  3.60 3.80 3.80 3.90 3.80  --  3.50   BILIRUBIN mg/dL  --  1.5* 1.5* 1.5* 1.2 1.2  --  1.5*   ALK PHOS U/L  --  51 59 60 55 60  --  54   AST (SGOT) U/L  --  24 41* 37 45* 37  --  31   ALT (SGPT) U/L  --  52* 59* 46* 43* 49*  --  15   LIPASE U/L  --   --   --   --   --  30  --  13     Cardiac Studies:  Echo-   Results for orders placed during the hospital encounter of 07/01/20   Adult Transthoracic Echo Complete W/ Cont if Necessary Per Protocol    Narrative · Left ventricular systolic function is severely decreased.  · Left ventricular wall thickness is consistent with mild concentric   hypertrophy.  · Left atrial cavity size is moderately dilated.  · Mild-to-moderate mitral valve regurgitation is present  · Mild tricuspid valve regurgitation is present.  · Mild aortic valve regurgitation is present.        Stress Myoview-  Cath-      Medication Review:   Scheduled Meds:  atorvastatin 20 mg Oral Daily   digoxin 125  mcg Oral Daily   dilTIAZem 30 mg Oral Q6H   fludrocortisone 0.1 mg Oral Q PM   furosemide 40 mg Oral Daily   insulin lispro 0-7 Units Subcutaneous 4x Daily With Meals & Nightly   metoclopramide 5 mg Oral TID AC   metoprolol tartrate 25 mg Oral Q6H   midodrine 5 mg Oral BID AC   ondansetron 4 mg Intravenous Q6H   pantoprazole 40 mg Oral Q AM   potassium chloride 20 mEq Oral Daily   rivaroxaban 20 mg Oral Daily With Dinner   sodium chloride 250 mL Intravenous Once   sodium chloride 10 mL Intravenous Q12H   sucralfate 1 g Oral 4x Daily AC & at Bedtime   Thera 1 tablet Oral Daily   vitamin B-12 1,000 mcg Oral Daily     Continuous Infusions:   PRN Meds:.•  acetaminophen **OR** acetaminophen **OR** acetaminophen  •  aluminum-magnesium hydroxide-simethicone  •  bisacodyl  •  dextrose  •  dextrose  •  glucagon (human recombinant)  •  HYDROcodone-acetaminophen  •  insulin lispro **AND** insulin lispro  •  ipratropium-albuterol  •  magnesium hydroxide  •  magnesium sulfate **OR** magnesium sulfate **OR** magnesium sulfate  •  melatonin  •  Morphine  •  ondansetron  •  potassium chloride **OR** potassium chloride **OR** potassium chloride  •  potassium phosphate infusion greater than 15 mMoles **OR** potassium phosphate infusion greater than 15 mMoles **OR** potassium phosphate **OR** sodium phosphate IVPB **OR** sodium phosphate IVPB **OR** sodium phosphate IVPB  •  promethazine  •  [COMPLETED] Insert peripheral IV **AND** sodium chloride  •  sodium chloride      Assessment/Plan   Patient Active Problem List   Diagnosis   • Atrial fibrillation (CMS/HCC)   • CAD (coronary artery disease)   • Chronic anticoagulation   • Hyperlipidemia   • Presence of stent in right coronary artery   • Chronic pain   • Sepsis (CMS/HCC)   • Congestive heart failure (CHF) (CMS/HCC)   • Nausea vomiting and diarrhea     Plan:    This morning when I saw the patient he was relatively doing well.  His blood pressure was on lower side but reasonable but  however this afternoon his blood pressure is low.  I would start midodrine 5 mg twice daily.  I will give a gentle bolus of normal saline.  His heart rate is still borderline control.  I would decrease Lasix    Patient underwent stress test it was negative for ischemia.  Fixed inferior defect was noted.        Wade Cronin MD  07/08/20  16:52

## 2020-07-08 NOTE — NURSING NOTE
Hypotension issue, non symptomatic. Q30 min BP obtained. RN placed a call to cardiology. See MAR for orders. BP improved after initial bolus. Next BP more hypotensive, pt stated he was feeling dizzy. Spoke to , decided on Fast Team call. Hospitalist notified. Moving pt to ICU for closer monitoring, started on new medications.

## 2020-07-09 ENCOUNTER — APPOINTMENT (OUTPATIENT)
Dept: CARDIOLOGY | Facility: HOSPITAL | Age: 80
End: 2020-07-09

## 2020-07-09 PROBLEM — I48.19 PERSISTENT ATRIAL FIBRILLATION: Status: ACTIVE | Noted: 2020-07-01

## 2020-07-09 PROBLEM — I50.22 CHRONIC SYSTOLIC CONGESTIVE HEART FAILURE: Status: ACTIVE | Noted: 2020-07-01

## 2020-07-09 PROBLEM — I25.10 CORONARY ARTERY DISEASE INVOLVING NATIVE CORONARY ARTERY OF NATIVE HEART WITHOUT ANGINA PECTORIS: Status: ACTIVE | Noted: 2020-07-09

## 2020-07-09 PROBLEM — I48.21 PERMANENT ATRIAL FIBRILLATION: Status: ACTIVE | Noted: 2020-07-09

## 2020-07-09 PROBLEM — I25.5 ISCHEMIC CARDIOMYOPATHY: Status: ACTIVE | Noted: 2020-07-01

## 2020-07-09 LAB
ANION GAP SERPL CALCULATED.3IONS-SCNC: 10 MMOL/L (ref 5–15)
BASOPHILS # BLD AUTO: 0 10*3/MM3 (ref 0–0.2)
BASOPHILS NFR BLD AUTO: 0.4 % (ref 0–1.5)
BH CV XLRA MEAS LEFT CCA RATIO VEL: 109 CM/SEC
BH CV XLRA MEAS LEFT DIST CCA EDV: 12.7 CM/SEC
BH CV XLRA MEAS LEFT DIST CCA PSV: 72 CM/SEC
BH CV XLRA MEAS LEFT DIST ICA EDV: -46.6 CM/SEC
BH CV XLRA MEAS LEFT DIST ICA PSV: -103 CM/SEC
BH CV XLRA MEAS LEFT ICA RATIO VEL: -103 CM/SEC
BH CV XLRA MEAS LEFT ICA/CCA RATIO: -0.94
BH CV XLRA MEAS LEFT PROX CCA EDV: 18.2 CM/SEC
BH CV XLRA MEAS LEFT PROX CCA PSV: 109 CM/SEC
BH CV XLRA MEAS LEFT PROX ECA PSV: -96.7 CM/SEC
BH CV XLRA MEAS LEFT PROX ICA EDV: -23.1 CM/SEC
BH CV XLRA MEAS LEFT PROX ICA PSV: -77.6 CM/SEC
BH CV XLRA MEAS LEFT VERTEBRAL A PSV: 43.9 CM/SEC
BH CV XLRA MEAS RIGHT CCA RATIO VEL: 78.6 CM/SEC
BH CV XLRA MEAS RIGHT DIST CCA EDV: 11.9 CM/SEC
BH CV XLRA MEAS RIGHT DIST CCA PSV: 52.2 CM/SEC
BH CV XLRA MEAS RIGHT DIST ICA EDV: -26.3 CM/SEC
BH CV XLRA MEAS RIGHT DIST ICA PSV: -80.7 CM/SEC
BH CV XLRA MEAS RIGHT ICA RATIO VEL: -80.7 CM/SEC
BH CV XLRA MEAS RIGHT ICA/CCA RATIO: -1
BH CV XLRA MEAS RIGHT PROX CCA EDV: 16.7 CM/SEC
BH CV XLRA MEAS RIGHT PROX CCA PSV: 78.6 CM/SEC
BH CV XLRA MEAS RIGHT PROX ECA PSV: 103 CM/SEC
BH CV XLRA MEAS RIGHT PROX ICA EDV: -16.6 CM/SEC
BH CV XLRA MEAS RIGHT PROX ICA PSV: -43.3 CM/SEC
BH CV XLRA MEAS RIGHT PROX SCLA PSV: 51.1 CM/SEC
BH CV XLRA MEAS RIGHT VERTEBRAL A PSV: 76.8 CM/SEC
BUN SERPL-MCNC: 19 MG/DL (ref 8–23)
BUN SERPL-MCNC: ABNORMAL MG/DL
BUN/CREAT SERPL: ABNORMAL
CALCIUM SPEC-SCNC: 8.3 MG/DL (ref 8.6–10.5)
CHLORIDE SERPL-SCNC: 101 MMOL/L (ref 98–107)
CO2 SERPL-SCNC: 31 MMOL/L (ref 22–29)
CORTIS SERPL-MCNC: 12.78 MCG/DL
CREAT SERPL-MCNC: 0.62 MG/DL (ref 0.76–1.27)
D-LACTATE SERPL-SCNC: 0.7 MMOL/L (ref 0.5–2)
D-LACTATE SERPL-SCNC: 1 MMOL/L (ref 0.5–2)
DEPRECATED RDW RBC AUTO: 47.3 FL (ref 37–54)
EOSINOPHIL # BLD AUTO: 0.2 10*3/MM3 (ref 0–0.4)
EOSINOPHIL NFR BLD AUTO: 1.7 % (ref 0.3–6.2)
ERYTHROCYTE [DISTWIDTH] IN BLOOD BY AUTOMATED COUNT: 14.9 % (ref 12.3–15.4)
GFR SERPL CREATININE-BSD FRML MDRD: 125 ML/MIN/1.73
GLUCOSE BLDC GLUCOMTR-MCNC: 124 MG/DL (ref 70–105)
GLUCOSE BLDC GLUCOMTR-MCNC: 125 MG/DL (ref 70–105)
GLUCOSE BLDC GLUCOMTR-MCNC: 89 MG/DL (ref 70–105)
GLUCOSE BLDC GLUCOMTR-MCNC: 99 MG/DL (ref 70–105)
GLUCOSE SERPL-MCNC: 97 MG/DL (ref 65–99)
HCT VFR BLD AUTO: 38.2 % (ref 37.5–51)
HGB BLD-MCNC: 12.7 G/DL (ref 13–17.7)
LYMPHOCYTES # BLD AUTO: 1.6 10*3/MM3 (ref 0.7–3.1)
LYMPHOCYTES NFR BLD AUTO: 13 % (ref 19.6–45.3)
MAGNESIUM SERPL-MCNC: 2.1 MG/DL (ref 1.6–2.4)
MCH RBC QN AUTO: 29.8 PG (ref 26.6–33)
MCHC RBC AUTO-ENTMCNC: 33.3 G/DL (ref 31.5–35.7)
MCV RBC AUTO: 89.4 FL (ref 79–97)
MONOCYTES # BLD AUTO: 1.3 10*3/MM3 (ref 0.1–0.9)
MONOCYTES NFR BLD AUTO: 10.9 % (ref 5–12)
NEUTROPHILS NFR BLD AUTO: 74 % (ref 42.7–76)
NEUTROPHILS NFR BLD AUTO: 9.2 10*3/MM3 (ref 1.7–7)
NRBC BLD AUTO-RTO: 0 /100 WBC (ref 0–0.2)
NT-PROBNP SERPL-MCNC: 493.9 PG/ML (ref 0–1800)
PHOSPHATE SERPL-MCNC: 2.2 MG/DL (ref 2.5–4.5)
PLATELET # BLD AUTO: 174 10*3/MM3 (ref 140–450)
PMV BLD AUTO: 8.2 FL (ref 6–12)
POTASSIUM SERPL-SCNC: 3.8 MMOL/L (ref 3.5–5.2)
RBC # BLD AUTO: 4.27 10*6/MM3 (ref 4.14–5.8)
SODIUM SERPL-SCNC: 142 MMOL/L (ref 136–145)
WBC # BLD AUTO: 12.4 10*3/MM3 (ref 3.4–10.8)

## 2020-07-09 PROCEDURE — B2111ZZ FLUOROSCOPY OF MULTIPLE CORONARY ARTERIES USING LOW OSMOLAR CONTRAST: ICD-10-PCS | Performed by: INTERNAL MEDICINE

## 2020-07-09 PROCEDURE — 85347 COAGULATION TIME ACTIVATED: CPT

## 2020-07-09 PROCEDURE — 25010000002 MIDAZOLAM PER 1 MG: Performed by: INTERNAL MEDICINE

## 2020-07-09 PROCEDURE — 93880 EXTRACRANIAL BILAT STUDY: CPT

## 2020-07-09 PROCEDURE — 25010000002 ONDANSETRON PER 1 MG: Performed by: INTERNAL MEDICINE

## 2020-07-09 PROCEDURE — 25010000002 HEPARIN (PORCINE) PER 1000 UNITS: Performed by: INTERNAL MEDICINE

## 2020-07-09 PROCEDURE — 82533 TOTAL CORTISOL: CPT | Performed by: INTERNAL MEDICINE

## 2020-07-09 PROCEDURE — 25010000002 MORPHINE PER 10 MG: Performed by: NURSE PRACTITIONER

## 2020-07-09 PROCEDURE — 25010000002 ONDANSETRON PER 1 MG: Performed by: NURSE PRACTITIONER

## 2020-07-09 PROCEDURE — 97535 SELF CARE MNGMENT TRAINING: CPT

## 2020-07-09 PROCEDURE — 99153 MOD SED SAME PHYS/QHP EA: CPT | Performed by: INTERNAL MEDICINE

## 2020-07-09 PROCEDURE — 97530 THERAPEUTIC ACTIVITIES: CPT

## 2020-07-09 PROCEDURE — 85025 COMPLETE CBC W/AUTO DIFF WBC: CPT | Performed by: NURSE PRACTITIONER

## 2020-07-09 PROCEDURE — 02JA3ZZ INSPECTION OF HEART, PERCUTANEOUS APPROACH: ICD-10-PCS | Performed by: INTERNAL MEDICINE

## 2020-07-09 PROCEDURE — 99223 1ST HOSP IP/OBS HIGH 75: CPT | Performed by: INTERNAL MEDICINE

## 2020-07-09 PROCEDURE — 82962 GLUCOSE BLOOD TEST: CPT

## 2020-07-09 PROCEDURE — 83880 ASSAY OF NATRIURETIC PEPTIDE: CPT | Performed by: NURSE PRACTITIONER

## 2020-07-09 PROCEDURE — 99232 SBSQ HOSP IP/OBS MODERATE 35: CPT | Performed by: HOSPITALIST

## 2020-07-09 PROCEDURE — 80048 BASIC METABOLIC PNL TOTAL CA: CPT | Performed by: HOSPITALIST

## 2020-07-09 PROCEDURE — 83605 ASSAY OF LACTIC ACID: CPT | Performed by: NURSE PRACTITIONER

## 2020-07-09 PROCEDURE — 83735 ASSAY OF MAGNESIUM: CPT | Performed by: NURSE PRACTITIONER

## 2020-07-09 PROCEDURE — 93458 L HRT ARTERY/VENTRICLE ANGIO: CPT | Performed by: INTERNAL MEDICINE

## 2020-07-09 PROCEDURE — 84100 ASSAY OF PHOSPHORUS: CPT | Performed by: NURSE PRACTITIONER

## 2020-07-09 PROCEDURE — C1887 CATHETER, GUIDING: HCPCS | Performed by: INTERNAL MEDICINE

## 2020-07-09 PROCEDURE — 0 IOPAMIDOL PER 1 ML: Performed by: INTERNAL MEDICINE

## 2020-07-09 PROCEDURE — 93005 ELECTROCARDIOGRAM TRACING: CPT | Performed by: INTERNAL MEDICINE

## 2020-07-09 PROCEDURE — 99232 SBSQ HOSP IP/OBS MODERATE 35: CPT | Performed by: INTERNAL MEDICINE

## 2020-07-09 PROCEDURE — C1769 GUIDE WIRE: HCPCS | Performed by: INTERNAL MEDICINE

## 2020-07-09 PROCEDURE — 99152 MOD SED SAME PHYS/QHP 5/>YRS: CPT | Performed by: INTERNAL MEDICINE

## 2020-07-09 PROCEDURE — 4A023N7 MEASUREMENT OF CARDIAC SAMPLING AND PRESSURE, LEFT HEART, PERCUTANEOUS APPROACH: ICD-10-PCS | Performed by: INTERNAL MEDICINE

## 2020-07-09 PROCEDURE — C1894 INTRO/SHEATH, NON-LASER: HCPCS | Performed by: INTERNAL MEDICINE

## 2020-07-09 PROCEDURE — 92920 PRQ TRLUML C ANGIOP 1ART&/BR: CPT | Performed by: INTERNAL MEDICINE

## 2020-07-09 RX ORDER — ASPIRIN 81 MG/1
81 TABLET ORAL DAILY
Status: DISCONTINUED | OUTPATIENT
Start: 2020-07-10 | End: 2020-07-14

## 2020-07-09 RX ORDER — ASPIRIN 325 MG
TABLET ORAL AS NEEDED
Status: DISCONTINUED | OUTPATIENT
Start: 2020-07-09 | End: 2020-07-09 | Stop reason: HOSPADM

## 2020-07-09 RX ORDER — SODIUM CHLORIDE 0.9 % (FLUSH) 0.9 %
10 SYRINGE (ML) INJECTION AS NEEDED
Status: CANCELLED | OUTPATIENT
Start: 2020-07-09

## 2020-07-09 RX ORDER — PHENYLEPHRINE HCL IN 0.9% NACL 0.5 MG/5ML
.5-3 SYRINGE (ML) INTRAVENOUS
Status: DISCONTINUED | OUTPATIENT
Start: 2020-07-09 | End: 2020-07-11

## 2020-07-09 RX ORDER — MIDAZOLAM HYDROCHLORIDE 1 MG/ML
INJECTION INTRAMUSCULAR; INTRAVENOUS AS NEEDED
Status: DISCONTINUED | OUTPATIENT
Start: 2020-07-09 | End: 2020-07-09 | Stop reason: HOSPADM

## 2020-07-09 RX ORDER — MIDODRINE HYDROCHLORIDE 5 MG/1
10 TABLET ORAL EVERY 8 HOURS
Status: DISCONTINUED | OUTPATIENT
Start: 2020-07-09 | End: 2020-07-11

## 2020-07-09 RX ORDER — SODIUM CHLORIDE 9 MG/ML
250 INJECTION, SOLUTION INTRAVENOUS ONCE AS NEEDED
Status: COMPLETED | OUTPATIENT
Start: 2020-07-09 | End: 2020-07-10

## 2020-07-09 RX ORDER — HEPARIN SODIUM 1000 [USP'U]/ML
INJECTION, SOLUTION INTRAVENOUS; SUBCUTANEOUS AS NEEDED
Status: DISCONTINUED | OUTPATIENT
Start: 2020-07-09 | End: 2020-07-09 | Stop reason: HOSPADM

## 2020-07-09 RX ORDER — SODIUM CHLORIDE 9 MG/ML
INJECTION, SOLUTION INTRAVENOUS CONTINUOUS PRN
Status: COMPLETED | OUTPATIENT
Start: 2020-07-09 | End: 2020-07-09

## 2020-07-09 RX ORDER — SODIUM CHLORIDE 0.9 % (FLUSH) 0.9 %
3 SYRINGE (ML) INJECTION EVERY 12 HOURS SCHEDULED
Status: CANCELLED | OUTPATIENT
Start: 2020-07-09

## 2020-07-09 RX ORDER — CLOPIDOGREL BISULFATE 75 MG/1
TABLET ORAL AS NEEDED
Status: DISCONTINUED | OUTPATIENT
Start: 2020-07-09 | End: 2020-07-09 | Stop reason: HOSPADM

## 2020-07-09 RX ORDER — DIPHENHYDRAMINE HYDROCHLORIDE 50 MG/ML
25 INJECTION INTRAMUSCULAR; INTRAVENOUS ONCE
Status: CANCELLED | OUTPATIENT
Start: 2020-07-09 | End: 2020-07-09

## 2020-07-09 RX ORDER — LIDOCAINE HYDROCHLORIDE 20 MG/ML
INJECTION, SOLUTION INFILTRATION; PERINEURAL AS NEEDED
Status: DISCONTINUED | OUTPATIENT
Start: 2020-07-09 | End: 2020-07-09 | Stop reason: HOSPADM

## 2020-07-09 RX ORDER — CLOPIDOGREL BISULFATE 75 MG/1
75 TABLET ORAL DAILY
Status: DISCONTINUED | OUTPATIENT
Start: 2020-07-10 | End: 2020-07-15 | Stop reason: HOSPADM

## 2020-07-09 RX ADMIN — METOPROLOL TARTRATE 12.5 MG: 25 TABLET, FILM COATED ORAL at 08:38

## 2020-07-09 RX ADMIN — HYDROCODONE BITARTRATE AND ACETAMINOPHEN 1 TABLET: 7.5; 325 TABLET ORAL at 05:41

## 2020-07-09 RX ADMIN — SUCRALFATE 1 G: 1 TABLET ORAL at 21:31

## 2020-07-09 RX ADMIN — MORPHINE SULFATE 2 MG: 4 INJECTION INTRAVENOUS at 09:42

## 2020-07-09 RX ADMIN — CYANOCOBALAMIN TAB 1000 MCG 1000 MCG: 1000 TAB at 08:38

## 2020-07-09 RX ADMIN — METOCLOPRAMIDE 5 MG: 5 TABLET ORAL at 11:50

## 2020-07-09 RX ADMIN — SUCRALFATE 1 G: 1 TABLET ORAL at 11:50

## 2020-07-09 RX ADMIN — MIDODRINE HYDROCHLORIDE 5 MG: 5 TABLET ORAL at 08:38

## 2020-07-09 RX ADMIN — METOPROLOL TARTRATE 12.5 MG: 25 TABLET, FILM COATED ORAL at 14:17

## 2020-07-09 RX ADMIN — SUCRALFATE 1 G: 1 TABLET ORAL at 08:39

## 2020-07-09 RX ADMIN — METOPROLOL TARTRATE 12.5 MG: 25 TABLET, FILM COATED ORAL at 03:46

## 2020-07-09 RX ADMIN — FLUDROCORTISONE ACETATE 0.1 MG: 0.1 TABLET ORAL at 18:03

## 2020-07-09 RX ADMIN — METOCLOPRAMIDE 5 MG: 5 TABLET ORAL at 08:38

## 2020-07-09 RX ADMIN — MIDODRINE HYDROCHLORIDE 10 MG: 5 TABLET ORAL at 14:17

## 2020-07-09 RX ADMIN — ONDANSETRON 4 MG: 2 INJECTION INTRAMUSCULAR; INTRAVENOUS at 03:46

## 2020-07-09 RX ADMIN — PANTOPRAZOLE SODIUM 40 MG: 40 TABLET, DELAYED RELEASE ORAL at 05:41

## 2020-07-09 RX ADMIN — THERA TABS 1 TABLET: TAB at 08:39

## 2020-07-09 RX ADMIN — ONDANSETRON 4 MG: 2 INJECTION INTRAMUSCULAR; INTRAVENOUS at 21:30

## 2020-07-09 RX ADMIN — ONDANSETRON 4 MG: 2 INJECTION INTRAMUSCULAR; INTRAVENOUS at 08:39

## 2020-07-09 RX ADMIN — SODIUM CHLORIDE 250 ML: 0.9 INJECTION, SOLUTION INTRAVENOUS at 13:03

## 2020-07-09 RX ADMIN — Medication 10 ML: at 21:31

## 2020-07-09 RX ADMIN — ATORVASTATIN CALCIUM 20 MG: 20 TABLET, FILM COATED ORAL at 08:39

## 2020-07-09 RX ADMIN — Medication 10 ML: at 08:40

## 2020-07-09 RX ADMIN — DIGOXIN 125 MCG: 0.12 TABLET ORAL at 11:50

## 2020-07-09 RX ADMIN — HYDROCODONE BITARTRATE AND ACETAMINOPHEN 1 TABLET: 7.5; 325 TABLET ORAL at 13:00

## 2020-07-09 NOTE — PLAN OF CARE
Pt alert and oriented. Requiring minimal pressors. Pt taken to cath lab for clearance to receive pacer/AICD tomorrow. Will continue to monitor.

## 2020-07-09 NOTE — PROGRESS NOTES
"    Chief complaint abdominal pain    Subjective      Patient seen and examined.  Patient developed worsening hypotension last evening.  He was transferred to ICU for pressor support.  He is currently off pressors blood pressure oscillating around 100 systolic.  Patient feels well has no complaints he is wanting to eat breakfast    Objective     Vital Signs  Visit Vitals  BP (!) 82/50   Pulse 101   Temp 97.7 °F (36.5 °C) (Oral)   Resp 14   Ht 185.4 cm (72.99\")   Wt 64 kg (141 lb 1.5 oz)   SpO2 99%   BMI 18.62 kg/m²       Physical Exam:  Physical Exam   Constitutional: He is oriented to person, place, and time. No distress.   HENT:   Head: Normocephalic and atraumatic.   Eyes: Conjunctivae and EOM are normal. Pupils are equal, round, and reactive to light.   Neck: No JVD present. No thyromegaly present.   Cardiovascular: On and off tachycardic, irregular rhythm, normal heart sounds and intact distal pulses. Exam reveals no gallop and no friction rub.   No murmur heard.  Pulmonary/Chest: Effort normal and breath sounds normal. No stridor. No respiratory distress. He has no wheezes. He has no rales. He exhibits no tenderness.   Abdominal: Soft. Bowel sounds are normal. He exhibits no distension and no mass. There is no tenderness. There is no rebound and no guarding. No hernia.   Musculoskeletal: Normal range of motion.   Lymphadenopathy:     He has no cervical adenopathy.   Neurological: He is alert and oriented to person, place, and time. No cranial nerve deficit or sensory deficit. He exhibits normal muscle tone.   Skin: No rash noted. He is not diaphoretic.   Psychiatric: He has a normal mood and affect.   Vitals reviewed.    Physical Exam          Results Review:    CMP:  Lab Results   Component Value Date    BUN  07/09/2020      Comment:      Testing performed by alternate method    BUN 19 07/09/2020    CREATININE 0.62 (L) 07/09/2020    EGFRIFNONA 125 07/09/2020     07/09/2020    K 3.8 07/09/2020     " 07/09/2020    CALCIUM 8.3 (L) 07/09/2020    ALBUMIN 3.60 07/07/2020    BILITOT 1.5 (H) 07/07/2020    ALKPHOS 51 07/07/2020    AST 24 07/07/2020    ALT 52 (H) 07/07/2020     CBC:  Lab Results   Component Value Date    WBC 12.40 (H) 07/09/2020    RBC 4.27 07/09/2020    HGB 12.7 (L) 07/09/2020    HCT 38.2 07/09/2020    MCV 89.4 07/09/2020    MCH 29.8 07/09/2020    MCHC 33.3 07/09/2020    RDW 14.9 07/09/2020     07/09/2020         Medication Review:     Scheduled Meds:    atorvastatin 20 mg Oral Daily   digoxin 125 mcg Oral Daily   fludrocortisone 0.1 mg Oral Q PM   insulin lispro 0-7 Units Subcutaneous 4x Daily With Meals & Nightly   metoclopramide 5 mg Oral TID AC   metoprolol tartrate 12.5 mg Oral Q6H   midodrine 5 mg Oral Q8H   ondansetron 4 mg Intravenous Q6H   pantoprazole 40 mg Oral Q AM   rivaroxaban 20 mg Oral Daily With Dinner   sodium chloride 250 mL Intravenous Once   sodium chloride 10 mL Intravenous Q12H   sodium chloride 10 mL Intravenous Q12H   sodium chloride 10 mL Intravenous Q12H   sodium chloride 10 mL Intravenous Q12H   sucralfate 1 g Oral 4x Daily AC & at Bedtime   Thera 1 tablet Oral Daily   vitamin B-12 1,000 mcg Oral Daily     Continuous Infusions:     PRN Meds:.•  acetaminophen **OR** acetaminophen **OR** acetaminophen  •  aluminum-magnesium hydroxide-simethicone  •  bisacodyl  •  dextrose  •  dextrose  •  glucagon (human recombinant)  •  HYDROcodone-acetaminophen  •  insulin lispro **AND** insulin lispro  •  ipratropium-albuterol  •  magnesium hydroxide  •  magnesium sulfate **OR** magnesium sulfate **OR** magnesium sulfate  •  melatonin  •  Morphine  •  ondansetron  •  potassium chloride **OR** potassium chloride **OR** potassium chloride  •  potassium phosphate infusion greater than 15 mMoles **OR** potassium phosphate infusion greater than 15 mMoles **OR** potassium phosphate **OR** sodium phosphate IVPB **OR** sodium phosphate IVPB **OR** sodium phosphate IVPB  •  promethazine  •   [COMPLETED] Insert peripheral IV **AND** sodium chloride  •  sodium chloride  •  sodium chloride  •  sodium chloride  •  sodium chloride    Assessment/Plan   Abdominal pain  Resolved  Epigastric, associated with nausea and no appetite  Status post EGD showed some gastritis appreciate general surgery evaluation.  At this time due to overall complex comorbidities we will try to hold off on surgery until patient more stable.  Continue PPI Carafate and anti-nausea meds  Continue to encourage diet    Pneumonia  Completed course of Zosyn clinically improved  Also completed a course of steroids  Pulmonary on board  Micro has been negative including COVID and viral panel    A. fib  Has been on IV chronotropic meds, cardiology on board readjusted medication patient back on p.o. therapy  Restarted on Xarelto    CHF  Chronic  EF of 35  Lasix, beta-blocker on hold due to low blood pressure  Stress test done in low risk    Diabetes  And insulin sliding scale and Accu-Cheks    Orthostatic hypotension  Off IV pressure support now  Midodrine added to fludrocortisone    Dyslipidemia  Continue statin    DVT PUD prophylaxis    Plan as long as blood pressure remained stable patient should be able to transfer out of ICU today     Denzel Rich MD  07/09/20  10:18    Remains

## 2020-07-09 NOTE — PLAN OF CARE
Problem: Patient Care Overview  Goal: Plan of Care Review  Outcome: Ongoing (interventions implemented as appropriate)  Flowsheets  Taken 7/9/2020 1017  Progress: no change  Taken 7/9/2020 1000  Outcome Summary: Pt continues to be very limited in his mobility duew to tachycardia & low BP. Functional endurance for basic self care is greatly affected & he needs assist w/ toileting, dressing, bathing. Pt is far from baseline & will require IP rehab at d/c. Pt is noted to have possible stage 1 pressure ulcer on Lt heel & RN was notified. Pt was educated on positioning to lessen pressure & placed on a waffle cushion in his chair. CGA for safe transfers & bed mobility this date, though unable to walk. PPE worn: mask, gloves, safety glasses.

## 2020-07-09 NOTE — PROGRESS NOTES
Malnutrition Severity Assessment    Patient Name:  Shukri Park  YOB: 1940  MRN: [id number]  Admit Date:  7/1/2020    Patient meets criteria for : Severe Malnutrition    Comments:      This RDN is unable to complete a nutrition-focused physical exam in the context of the COVID-19 pandemic. Per hospital policy, dietitians are not entering isolation rooms in an effort to preserve PPE & limit face-to-face visits.     Severe acute disease related malnutrition r/t insufficient energy intake in the context of nausea/vomiting, poor appetite with acute illnesses of pneumonia & gastritis as evidenced by average meal intakes 35% x9 days with daily refusal of 1 meal,  6% loss of body weight in 8 days     Nutrition Intervention:     Encourage meal intakes on low fat diet to help promote tolerance (Pt has need for gallbladder removal). Boost Plus BID ordered to optimize intakes (provides additional 720 kcal & 28 gms of protein daily if consumed). RDN will continue to follow throughout admission for appropriate interventions.      Malnutrition Severity Assessment  Malnutrition Type: Acute Disease or Injury - Related Malnutrition     Malnutrition Type (last 8 hours)      Malnutrition Severity Assessment     Row Name 07/09/20 1313       Malnutrition Severity Assessment    Malnutrition Type  Acute Disease or Injury - Related Malnutrition    Row Name 07/09/20 1313       Insufficient Energy Intake     Insufficient Energy Intake Findings  Severe    Insufficient Energy Intake   < or equal to 50% of est. energy requirement for > or equal to 5d) Average meal intakes of 35% of meals for the past 8 days with patient typically refusing 1 meal daily    Row Name 07/09/20 1313       Unintentional Weight Loss     Unintentional Weight Loss Findings  Severe    Unintentional Weight Loss   Weight loss greater than 2% in one week Admit weight of 150 lb on 7/1/20, current weight 141 lb 7/9/20. 6% loss in 8 days = severe weight loss     Row Name 07/09/20 1313       Criteria Met (Must meet criteria for severity in at least 2 of these categories: M Wasting, Fat Loss, Fluid, Secondary Signs, Wt. Status, Intake)    Patient meets criteria for   Severe Malnutrition          Electronically signed by:  Linnea Sargent RD  07/09/20 13:19

## 2020-07-09 NOTE — PROGRESS NOTES
LOS: 7 days   Admiting Physician- Lynsey Zaldivar MD    Reason For Followup:    Atrial fibrillation  Cardiomyopathy  CAD, status post coronary artery stenting  History of cholelithiasis/sludge  COPD  Congestive heart failure  Hypertension  Hypotension    Subjective     Patient continues to have low blood pressure.  Events are noted overnight when he was transferred to ICU.  Patient is on phenylephrine.    Objective     Blood pressure is low.    Review of Systems:   Review of Systems   Constitution: Negative for chills and fever.   HENT: Negative for ear discharge and nosebleeds.    Eyes: Negative for discharge and redness.   Cardiovascular: Negative for chest pain, orthopnea, palpitations, paroxysmal nocturnal dyspnea and syncope.   Respiratory: Positive for shortness of breath. Negative for cough and wheezing.    Endocrine: Negative for heat intolerance.   Skin: Negative for rash.   Musculoskeletal: Positive for arthritis and joint pain. Negative for myalgias.   Gastrointestinal: Negative for abdominal pain, melena, nausea and vomiting.   Genitourinary: Negative for dysuria and hematuria.   Neurological: Negative for dizziness, light-headedness, numbness and tremors.   Psychiatric/Behavioral: Negative for depression. The patient is not nervous/anxious.          Vital Signs  Vitals:    07/09/20 1541 07/09/20 1556 07/09/20 1600 07/09/20 1626   BP: (!) 85/52 (!) 87/55  109/76   Pulse: 91 89  82   Resp:       Temp:   97.7 °F (36.5 °C)    TempSrc:   Oral    SpO2: 93% 94%  97%   Weight:       Height:         Wt Readings from Last 1 Encounters:   07/09/20 64 kg (141 lb 1.5 oz)       Intake/Output Summary (Last 24 hours) at 7/9/2020 1829  Last data filed at 7/9/2020 1717  Gross per 24 hour   Intake 1080 ml   Output 600 ml   Net 480 ml     Physical Exam:  Physical Exam   Constitutional: He is oriented to person, place, and time. He appears well-developed and well-nourished.   HENT:   Head: Normocephalic and atraumatic.    Eyes: No scleral icterus.   Neck: No thyromegaly present.   Cardiovascular: Regular rhythm and normal heart sounds. Exam reveals no gallop and no friction rub.   No murmur heard.  Heart rate is irregular   Pulmonary/Chest: Effort normal and breath sounds normal. No respiratory distress. He has no wheezes. He has no rales.   Abdominal: There is no tenderness.   Musculoskeletal: He exhibits no edema.   Lymphadenopathy:     He has no cervical adenopathy.   Neurological: He is alert and oriented to person, place, and time.   Skin: No rash noted. No erythema.   Psychiatric: He has a normal mood and affect.       Results Review:   Lab Results (last 24 hours)     Procedure Component Value Units Date/Time    POC Glucose Once [[id number]]  (Abnormal) Collected:  07/09/20 1719    Specimen:  Blood Updated:  07/09/20 1720     Glucose 125 mg/dL      Comment: Serial Number: [id number]:  [id number]       Cortisol [[id number]] Collected:  07/09/20 0345    Specimen:  Blood Updated:  07/09/20 1301     Cortisol 12.78 mcg/dL     Narrative:       Cortisol Reference Ranges:    Cortisol 6AM - 10AM Range: 6.02-18.40 mcg/dl  Cortisol 4PM - 8PM Range: 2.68-10.50 mcg/dl      Results may be falsely increased if patient taking Biotin.      Lactic Acid, Plasma [[id number]]  (Normal) Collected:  07/09/20 1158    Specimen:  Blood Updated:  07/09/20 1236     Lactate 1.0 mmol/L     POC Glucose Once [[id number]]  (Abnormal) Collected:  07/09/20 1126    Specimen:  Blood Updated:  07/09/20 1136     Glucose 124 mg/dL      Comment: Serial Number: [id number]:  [id number]       POC Glucose Once [[id number]]  (Normal) Collected:  07/09/20 0717    Specimen:  Blood Updated:  07/09/20 0719     Glucose 99 mg/dL      Comment: Serial Number: [id number]:  [id number]       Blood Culture - Blood, Arm, Left [[id number]] Collected:  07/08/20 2202    Specimen:  Blood from Arm, Left Updated:  07/09/20 0647    BUN [[id number]]  (Normal) Collected:  07/09/20  0345    Specimen:  Blood Updated:  07/09/20 0440     BUN 19 mg/dL     Magnesium [[id number]]  (Normal) Collected:  07/09/20 0345    Specimen:  Blood Updated:  07/09/20 0432     Magnesium 2.1 mg/dL     Basic Metabolic Panel [[id number]]  (Abnormal) Collected:  07/09/20 0345    Specimen:  Blood Updated:  07/09/20 0432     Glucose 97 mg/dL      BUN --     Comment: Testing performed by alternate method        Creatinine 0.62 mg/dL      Sodium 142 mmol/L      Potassium 3.8 mmol/L      Chloride 101 mmol/L      CO2 31.0 mmol/L      Calcium 8.3 mg/dL      eGFR Non African Amer 125 mL/min/1.73      BUN/Creatinine Ratio --     Comment: Testing not performed        Anion Gap 10.0 mmol/L     Narrative:       GFR Normal >60  Chronic Kidney Disease <60  Kidney Failure <15      Phosphorus [[id number]]  (Abnormal) Collected:  07/09/20 0345    Specimen:  Blood Updated:  07/09/20 0432     Phosphorus 2.2 mg/dL     BNP [[id number]]  (Normal) Collected:  07/09/20 0345    Specimen:  Blood Updated:  07/09/20 0431     proBNP 493.9 pg/mL     Narrative:       Among patients with dyspnea, NT-proBNP is highly sensitive for the detection of acute congestive heart failure. In addition NT-proBNP of <300 pg/ml effectively rules out acute congestive heart failure with 99% negative predictive value.    Results may be falsely decreased if patient taking Biotin.      Lactic Acid, Plasma [[id number]]  (Normal) Collected:  07/09/20 0345    Specimen:  Blood Updated:  07/09/20 0418     Lactate 0.7 mmol/L     CBC & Differential [[id number]] Collected:  07/09/20 0345    Specimen:  Blood Updated:  07/09/20 0358    Narrative:       The following orders were created for panel order CBC & Differential.  Procedure                               Abnormality         Status                     ---------                               -----------         ------                     CBC Auto Differential[[id number]]        Abnormal            Final result                  "    Please view results for these tests on the individual orders.    CBC Auto Differential [[id number]]  (Abnormal) Collected:  07/09/20 0345    Specimen:  Blood Updated:  07/09/20 0358     WBC 12.40 10*3/mm3      RBC 4.27 10*6/mm3      Hemoglobin 12.7 g/dL      Hematocrit 38.2 %      MCV 89.4 fL      MCH 29.8 pg      MCHC 33.3 g/dL      RDW 14.9 %      RDW-SD 47.3 fl      MPV 8.2 fL      Platelets 174 10*3/mm3      Neutrophil % 74.0 %      Lymphocyte % 13.0 %      Monocyte % 10.9 %      Eosinophil % 1.7 %      Basophil % 0.4 %      Neutrophils, Absolute 9.20 10*3/mm3      Lymphocytes, Absolute 1.60 10*3/mm3      Monocytes, Absolute 1.30 10*3/mm3      Eosinophils, Absolute 0.20 10*3/mm3      Basophils, Absolute 0.00 10*3/mm3      nRBC 0.0 /100 WBC     Lactic Acid, Plasma [[id number]]  (Normal) Collected:  07/08/20 2326    Specimen:  Blood Updated:  07/08/20 2358     Lactate 0.7 mmol/L     Procalcitonin [[id number]]  (Normal) Collected:  07/08/20 2147    Specimen:  Blood Updated:  07/08/20 2308     Procalcitonin 0.08 ng/mL     Narrative:       As a Marker for Sepsis (Non-Neonates):   1. <0.5 ng/mL represents a low risk of severe sepsis and/or septic shock.  1. >2 ng/mL represents a high risk of severe sepsis and/or septic shock.    As a Marker for Lower Respiratory Tract Infections that require antibiotic therapy:  PCT on Admission     Antibiotic Therapy             6-12 Hrs later  > 0.5                Strongly Recommended            >0.25 - <0.5         Recommended  0.1 - 0.25           Discouraged                   Remeasure/reassess PCT  <0.1                 Strongly Discouraged          Remeasure/reassess PCT      As 28 day mortality risk marker: \"Change in Procalcitonin Result\" (> 80 % or <=80 %) if Day 0 (or Day 1) and Day 4 values are available. Refer to [email or web address]   Change in PCT <=80 %   A decrease of PCT levels below or equal to 80 % defines a positive change in PCT test result " representing a higher risk for 28-day all-cause mortality of patients diagnosed with severe sepsis or septic shock.  Change in PCT > 80 %   A decrease of PCT levels of more than 80 % defines a negative change in PCT result representing a lower risk for 28-day all-cause mortality of patients diagnosed with severe sepsis or septic shock.                Results may be falsely decreased if patient taking Biotin.     Urinalysis With Culture If Indicated - Urine, Catheter In/Out [[id number]]  (Abnormal) Collected:  07/08/20 2228    Specimen:  Urine, Catheter In/Out Updated:  07/08/20 2253     Color, UA Dark Yellow     Appearance, UA Clear     pH, UA 6.0     Specific Gravity, UA 1.027     Glucose, UA Negative     Ketones, UA Trace     Bilirubin, UA Small (1+)     Comment: Confirmation testing is unavailable.  A serum bilirubin is recommended for further assessment.        Blood, UA Negative     Protein, UA Trace     Leuk Esterase, UA Negative     Nitrite, UA Negative     Urobilinogen, UA 4.0 E.U./dL    Narrative:       Urine microscopic not indicated.    Blood Culture - Blood, Blood, PICC Line [[id number]] Collected:  07/08/20 2147    Specimen:  Blood, PICC Line Updated:  07/08/20 2233    POC Glucose Once [[id number]]  (Abnormal) Collected:  07/08/20 2016    Specimen:  Blood Updated:  07/08/20 2019     Glucose 126 mg/dL      Comment: Serial Number: [id number]:  [id number]           Imaging Results (Last 72 Hours)     Procedure Component Value Units Date/Time    XR Chest 1 View [[id number]] Collected:  07/08/20 2122     Updated:  07/08/20 2127    Narrative:       XR CHEST 1 VW-     Date of Exam: 7/8/2020 9:09 PM     Indication: Status post PICC placement.     Comparison Exams: 07/04/2020     Technique: Single AP chest radiograph     FINDINGS:  A left-sided PICC has its tip at the low SVC. There is elevation of the  right hemidiaphragm. Hazy bilateral airspace opacities appear slightly  improved from prior exam. The heart  and mediastinal contours appear  normal. Surgical hardware is noted at the thoracolumbar junction.       Impression:       1.Left-sided PICC with tip in low SVC.  2.Hazy bilateral airspace opacities appear slightly improved, which  could represent pneumonia or pulmonary edema.     Electronically Signed By-DR. Clint Dotson MD On:7/8/2020 9:25 PM  This report was finalized on [id number] by DR. Clint Dotson MD.        ECG/EMG Results (most recent)     Procedure Component Value Units Date/Time    Adult Transthoracic Echo Complete W/ Cont if Necessary Per Protocol [[id number]] Collected:  07/02/20 0653     Updated:  07/02/20 1409     BSA 1.9 m^2      RVIDd 2.1 cm      IVSd 1.1 cm      LVIDd 5.6 cm      LVIDs 4.6 cm      LVPWd 1.3 cm      IVS/LVPW 0.84     FS 17.6 %      EDV(Teich) 155.9 ml      ESV(Teich) 99.5 ml      EF(Teich) 36.2 %      EDV(cubed) 179.0 ml      ESV(cubed) 100.1 ml      EF(cubed) 44.1 %      LV mass(C)d 276.4 grams      LV mass(C)dI 142.4 grams/m^2      SV(Teich) 56.4 ml      SI(Teich) 29.1 ml/m^2      SV(cubed) 78.9 ml      SI(cubed) 40.6 ml/m^2      Ao root diam 4.0 cm      Ao root area 12.8 cm^2      ACS 2.1 cm      asc Aorta Diam 3.7 cm      LVOT diam 2.5 cm      LVOT area 5.1 cm^2      RVOT diam 2.1 cm      RVOT area 3.6 cm^2      EDV(MOD-sp4) 68.5 ml      ESV(MOD-sp4) 50.1 ml      EF(MOD-sp4) 26.8 %      SV(MOD-sp4) 18.4 ml      SI(MOD-sp4) 9.5 ml/m^2      Ao root area (BSA corrected) 2.1     LV Dunlap Vol (BSA corrected) 35.3 ml/m^2      LV Sys Vol (BSA corrected) 25.8 ml/m^2      Aortic R-R 0.4 sec      Aortic .6 BPM      MV V2 max 90.3 cm/sec      MV max PG 3.3 mmHg      MV V2 mean 63.4 cm/sec      MV mean PG 1.8 mmHg      MV V2 VTI 9.6 cm      MVA(VTI) 6.7 cm^2      Ao pk sharmila 78.3 cm/sec      Ao max PG 2.5 mmHg      Ao max PG (full) -0.26 mmHg      Ao V2 mean 61.8 cm/sec      Ao mean PG 1.7 mmHg      Ao mean PG (full) 0.39 mmHg      Ao V2 VTI 11.3 cm      AISHWARYA(I,A) 5.7 cm^2       AISHWARYA(I,D) 5.7 cm^2      AISHWARYA(V,A) 5.3 cm^2      AISHWARYA(V,D) 5.3 cm^2      LV V1 max PG 2.7 mmHg      LV V1 mean PG 1.3 mmHg      LV V1 max 82.3 cm/sec      LV V1 mean 53.0 cm/sec      LV V1 VTI 12.7 cm      MR max sharmila 453.8 cm/sec      MR max PG 82.4 mmHg      CO(Ao) 22.0 l/min      CI(Ao) 11.3 l/min/m^2      SV(Ao) 144.9 ml      SI(Ao) 74.6 ml/m^2      CO(LVOT) 9.8 l/min      CI(LVOT) 5.0 l/min/m^2      SV(LVOT) 64.6 ml      SV(RVOT) 42.6 ml      SI(LVOT) 33.3 ml/m^2      PA V2 max 77.1 cm/sec      PA max PG 2.4 mmHg      PA max PG (full) 0.22 mmHg      PA V2 mean 50.6 cm/sec      PA mean PG 1.2 mmHg      PA mean PG (full) 0.15 mmHg      PA V2 VTI 9.1 cm      PVA(I,A) 4.7 cm^2       CV ECHO TARYN - PVA(I,D) 4.7 cm^2       CV ECHO TARYN - PVA(V,A) 3.4 cm^2       CV ECHO TARYN - PVA(V,D) 3.4 cm^2      PA acc time 0.06 sec      RV V1 max PG 2.2 mmHg      RV V1 mean PG 1.1 mmHg      RV V1 max 73.4 cm/sec      RV V1 mean 46.7 cm/sec      RV V1 VTI 11.9 cm      TR max sharmila 285.8 cm/sec      RVSP(TR) 35.7 mmHg      RAP systole 3.0 mmHg      PA pr(Accel) 49.9 mmHg      Qp/Qs 0.66      CV ECHO TARYN - BZI_BMI 18.7 kilograms/m^2       CV ECHO TARYN - BSA(HAYCOCK) 1.9 m^2       CV ECHO TARYN - BZI_METRIC_WEIGHT 68.0 kg       CV ECHO TARYN - BZI_METRIC_HEIGHT 190.5 cm      EF(MOD-bp) 27.0 %      LA dimension(2D) 4.6 cm     Narrative:       · Left ventricular systolic function is severely decreased.  · Left ventricular wall thickness is consistent with mild concentric   hypertrophy.  · Left atrial cavity size is moderately dilated.  · Mild-to-moderate mitral valve regurgitation is present  · Mild tricuspid valve regurgitation is present.  · Mild aortic valve regurgitation is present.       ECG 12 Lead [[id number]] Collected:  07/03/20 0945     Updated:  07/03/20 0947    Narrative:       HEART RATE= 106  bpm  RR Interval= 568  ms  IN Interval= 196  ms  P Horizontal Axis= 48  deg  P Front Axis= 0  deg  QRSD Interval= 95  ms  QT  Interval= 298  ms  QRS Axis= -18  deg  T Wave Axis= 14  deg  - ABNORMAL ECG -  Sinus tachycardia  Multiple premature complexes, vent & supraven  When compared with ECG of 01-Jul-2020 23:40:10,  Significant rate decrease  Electronically Signed By:   Date and Time of Study: 2020-07-03 09:45:28    ECG 12 Lead [[id number]] Collected:  07/01/20 2340     Updated:  07/03/20 1140    Narrative:       HEART RATE= 163  bpm  RR Interval= 368  ms  LA Interval=   ms  P Horizontal Axis=   deg  P Front Axis=   deg  QRSD Interval= 78  ms  QT Interval= 283  ms  QRS Axis= 7  deg  T Wave Axis= 42  deg  - ABNORMAL ECG -  Atrial fibrillation with rapid V-rate  No previous ECG available for comparison  Electronically Signed By: Sunny Unger (MILENA) 03-Jul-2020 11:38:35  Date and Time of Study: 2020-07-01 23:40:10    ECG 12 Lead [[id number]] Collected:  07/03/20 2021     Updated:  07/03/20 2023    Narrative:       HEART RATE= 133  bpm  RR Interval= 449  ms  LA Interval=   ms  P Horizontal Axis=   deg  P Front Axis=   deg  QRSD Interval= 92  ms  QT Interval= 296  ms  QRS Axis= -17  deg  T Wave Axis= -11  deg  - ABNORMAL ECG -  Atrial fibrillation  Ventricular premature complex  Probable lateral infarct, old  Electronically Signed By:   Date and Time of Study: 2020-07-03 20:21:30    ECG 12 Lead [[id number]] Collected:  07/04/20 0607     Updated:  07/04/20 0609    Narrative:       HEART RATE= 102  bpm  RR Interval= 589  ms  LA Interval=   ms  P Horizontal Axis=   deg  P Front Axis=   deg  QRSD Interval= 95  ms  QT Interval= 334  ms  QRS Axis= -21  deg  T Wave Axis= 3  deg  - ABNORMAL ECG -  Atrial fibrillation  Ventricular premature complex  Electronically Signed By:   Date and Time of Study: 2020-07-04 06:07:51    ECG 12 Lead [[id number]] Collected:  07/05/20 0709     Updated:  07/05/20 0710    Narrative:       HEART RATE= 113  bpm  RR Interval= 528  ms  LA Interval=   ms  P Horizontal Axis=   deg  P Front Axis=   deg  QRSD Interval= 89  ms  QT  Interval= 321  ms  QRS Axis= -36  deg  T Wave Axis= 0  deg  - ABNORMAL ECG -  Atrial fibrillation  Ventricular premature complex  Left axis deviation  When compared with ECG of 04-Jul-2020 6:07:51,  No significant change  Electronically Signed By:   Date and Time of Study: 2020-07-05 07:09:38    ECG 12 Lead [[id number]] Collected:  07/05/20 2052     Updated:  07/05/20 2057    Narrative:       HEART RATE= 114  bpm  RR Interval= 524  ms  WA Interval=   ms  P Horizontal Axis=   deg  P Front Axis=   deg  QRSD Interval= 96  ms  QT Interval= 312  ms  QRS Axis= -28  deg  T Wave Axis= -12  deg  - ABNORMAL ECG -  Atrial fibrillation  Ventricular premature complex  Borderline T abnormalities, diffuse leads  When compared with ECG of 05-Jul-2020 7:09:38,  Significant axis, voltage or hypertrophy change  Electronically Signed By:   Date and Time of Study: 2020-07-05 20:52:58    ECG 12 Lead [[id number]] Collected:  07/06/20 0609     Updated:  07/06/20 0610    Narrative:       HEART RATE= 97  bpm  RR Interval= 619  ms  WA Interval=   ms  P Horizontal Axis=   deg  P Front Axis=   deg  QRSD Interval= 91  ms  QT Interval= 338  ms  QRS Axis= -22  deg  T Wave Axis= -5  deg  - ABNORMAL ECG -  Atrial fibrillation  Ventricular premature complex  When compared with ECG of 05-Jul-2020 20:52:58,  Significant repolarization change  Electronically Signed By:   Date and Time of Study: 2020-07-06 06:09:24        CBC    Results from last 7 days   Lab Units 07/09/20 0345 07/08/20 0242 07/07/20  0255 07/06/20  0225 07/05/20  0215 07/04/20  0334 07/03/20  0940   WBC 10*3/mm3 12.40* 14.20* 14.50* 16.10* 14.50* 18.00* 14.10*   HEMOGLOBIN g/dL 12.7* 14.3 13.9 14.3 13.5 12.9* 12.7*   PLATELETS 10*3/mm3 174 238 263 302 286 280 273     BMP   Results from last 7 days   Lab Units 07/09/20  0345 07/08/20  0242 07/07/20  0255 07/06/20  0225 07/05/20  0215 07/04/20  0334 07/03/20  0940   SODIUM mmol/L 142 140 142 143 142 144 145   POTASSIUM mmol/L 3.8 3.2*  3.3* 3.5 3.1* 2.8* 2.6*   CHLORIDE mmol/L 101 97* 100 98 95* 101 103   CO2 mmol/L 31.0* 30.0* 30.0* 33.0* 32.0* 26.0 22.0   BUN  19 14 15 19 15 12 17   CREATININE mg/dL 0.62* 0.53* 0.53* 0.65* 0.64* 0.59* 0.75*   GLUCOSE mg/dL 97 103* 109* 151* 162* 149* 246*   MAGNESIUM mg/dL 2.1 1.9 1.9 2.2 1.9 2.2 2.1   PHOSPHORUS mg/dL 2.2*  --   --   --   --   --   --      CMP   Results from last 7 days   Lab Units 07/09/20  0345 07/08/20  0242 07/07/20  0255 07/06/20  0225 07/05/20  0215 07/04/20  0334 07/03/20  0940   SODIUM mmol/L 142 140 142 143 142 144 145   POTASSIUM mmol/L 3.8 3.2* 3.3* 3.5 3.1* 2.8* 2.6*   CHLORIDE mmol/L 101 97* 100 98 95* 101 103   CO2 mmol/L 31.0* 30.0* 30.0* 33.0* 32.0* 26.0 22.0   BUN  19 14 15 19 15 12 17   CREATININE mg/dL 0.62* 0.53* 0.53* 0.65* 0.64* 0.59* 0.75*   GLUCOSE mg/dL 97 103* 109* 151* 162* 149* 246*   ALBUMIN g/dL  --   --  3.60 3.80 3.80 3.90 3.80   BILIRUBIN mg/dL  --   --  1.5* 1.5* 1.5* 1.2 1.2   ALK PHOS U/L  --   --  51 59 60 55 60   AST (SGOT) U/L  --   --  24 41* 37 45* 37   ALT (SGPT) U/L  --   --  52* 59* 46* 43* 49*   LIPASE U/L  --   --   --   --   --   --  30     Cardiac Studies:  Echo-   Results for orders placed during the hospital encounter of 07/01/20   Adult Transthoracic Echo Complete W/ Cont if Necessary Per Protocol    Narrative · Left ventricular systolic function is severely decreased.  · Left ventricular wall thickness is consistent with mild concentric   hypertrophy.  · Left atrial cavity size is moderately dilated.  · Mild-to-moderate mitral valve regurgitation is present  · Mild tricuspid valve regurgitation is present.  · Mild aortic valve regurgitation is present.        Stress Myoview-  Cath-      Medication Review:   Scheduled Meds:  atorvastatin 20 mg Oral Daily   digoxin 125 mcg Oral Daily   fludrocortisone 0.1 mg Oral Q PM   insulin lispro 0-7 Units Subcutaneous 4x Daily With Meals & Nightly   metoclopramide 5 mg Oral TID AC   midodrine 10 mg Oral  Q8H   ondansetron 4 mg Intravenous Q6H   pantoprazole 40 mg Oral Q AM   sodium chloride 250 mL Intravenous Once   sodium chloride 10 mL Intravenous Q12H   sucralfate 1 g Oral 4x Daily AC & at Bedtime   Thera 1 tablet Oral Daily   vitamin B-12 1,000 mcg Oral Daily     Continuous Infusions:  phenylephrine 0.5-3 mcg/kg/min Last Rate: 0.2 mcg/kg/min (07/09/20 3015)     PRN Meds:.•  acetaminophen **OR** acetaminophen **OR** acetaminophen  •  aluminum-magnesium hydroxide-simethicone  •  bisacodyl  •  dextrose  •  dextrose  •  glucagon (human recombinant)  •  HYDROcodone-acetaminophen  •  insulin lispro **AND** insulin lispro  •  ipratropium-albuterol  •  magnesium hydroxide  •  magnesium sulfate **OR** magnesium sulfate **OR** magnesium sulfate  •  melatonin  •  Morphine  •  ondansetron  •  potassium chloride **OR** potassium chloride **OR** potassium chloride  •  potassium phosphate infusion greater than 15 mMoles **OR** potassium phosphate infusion greater than 15 mMoles **OR** potassium phosphate **OR** sodium phosphate IVPB **OR** sodium phosphate IVPB **OR** sodium phosphate IVPB  •  promethazine  •  [COMPLETED] Insert peripheral IV **AND** sodium chloride      Assessment/Plan   Patient Active Problem List   Diagnosis   • Atrial fibrillation (CMS/HCC)   • CAD (coronary artery disease)   • Chronic anticoagulation   • Hyperlipidemia   • Presence of stent in right coronary artery   • Chronic pain   • Sepsis (CMS/HCC)   • Congestive heart failure (CHF) (CMS/HCC)   • Nausea vomiting and diarrhea   • Ischemic cardiomyopathy   • Persistent atrial fibrillation (CMS/HCC)   • Chronic systolic congestive heart failure (CMS/HCC)   • Permanent atrial fibrillation (CMS/HCC)   • Coronary artery disease involving native coronary artery of native heart without angina pectoris     Plan:    Patient developed hypotension on Cardizem and beta-blocker.  Heart rate was still poorly controlled.  These medications were stopped and patient  was started on midodrine as well as phenylephrine last night.  I discussed with the EP service.  They are recommended to proceed with AV kelly ablation with biventricular ICD.  They want to proceed with cardiac catheterization prior to this procedure.  I would proceed with cardiac catheterization this evening.  I discussed in detail with the patient and his son.    Wade Cronin MD  07/09/20  18:29

## 2020-07-09 NOTE — PROGRESS NOTES
"Nutrition Services    Patient Name:  Shukri Park  YOB: 1940  MRN: [id number]  Admit Date:  7/1/2020    Comments: Healthy Heart diet with Boost Plus BID which provides 720 kcal and 28 gms of protein if consumed.    Reason for Assessment                Reason for Assessment    Reason For Assessment Follow up    7/3/20: GUMARO, MST 3        Diagnosis H&P:   79 y.o. male with a history of atrial fibrillation, hyperlipidemia, chronic low back pain, hypotension, chronic anticoagulation presented to the [facility] ED on 7/1/2020 with a complaint of generalized body aches and pains, weakness and a cough.  Patient states he is also had some nausea and vomiting.    PMH also includes  Aortic aneurysm, right lobe cancer s/p RML lobectomy    Current Problems/Procedures:     Bilateral pna  -viral vs bacterial  -possible pulmonary edema and possible hemorrhage  -COVID test negative x2   -antibx completed    AFIB with RVR  -cardiology following    Acute hypoxia  -on supplemental O2    Lactic acidosis    Hypokalemia     Abdominal pain, nausea  -S/p EGD on 7/5 showing gastritis  -Noted gallbladder sludge with gen.sx to consult for possible cholecystectomy, recommending outpatient when appropriate    Acute on chr systolic CHF exacerbation     Transferred to ICU for hypotension  -off pressor         Nutrition/Diet History                Nutrition/Diet History    Narrative     7/9: Attempted to call pt via phone, no answer.    7/6: Attempted to call pt via room telephone, busy signal. Secure message with pt's RN who reports pt did not take the Boost Breeze this morning, but she will attempt again this afternoon.    7/3: Attempted to call pt via room telephone without answer. Noted per recent physician note that patient stated \"I am so weak I can't talk\". Chart reviewed.        Functional Status Mostly independent per ADL's, occassionally uses a cane (PTA)    Pt is currently very limited in his mobility & unable " to walk, needs assistance with ADLs per OT notes       Food Allergies  NKFA       Factors Affecting Nutritional Intake  acute hospitalization with chronic illnesses, N/V, poor appetite         Anthropometrics             Anthropometrics    Height 185.4cm, 73in    Weight 64 kg (141 lb)    7/09/20 - 6% loss in 8 days = severe wt change       Admit Weight    Admit Weight 68 kg (150 lb)    7/1/20       Ideal Body Weight (IBW)    Ideal Body Weight (IBW) 184 lb    % Ideal Body Weight 77%       Usual Body Weight (UBW)    Usual Body Weight TOMY    % Usual Body Weight TOMY    Weight Hx  4/15/20 165lb  1/16/20 158lb  11/20/19 158lb  8/19/19 166lb       Body Mass Index (BMI)    BMI (kg/m2) 18.6           Labs/Medications                Labs    Pertinent Lab Results  Crt 0.62 L, Ca 8.3 L, Phos 2.2 L, Hgb 12.7 L        Medications    Pertinent Medications  Lipitor, humalog, reglan, midodrine, zofran, protonix, carafate, MVI, vitamin B12, norco, morphine         Physical Findings                Physical Findings    Overall Physical Appearance From initial assessment 6/3 to present (6/9): Unable to assess in person related to COVID-19 pandemic and limiting in person visits.     Edema  None documented     Gastrointestinal BMs documented on 7/6 (x3 days ago)     Tubes none     Oral/Mouth Cavity No issues documented     Skin No breakdown noted         Estimated/Assessed Needs              Calorie Requirements     Height Used for Calorie Calculations       Weight Used for Calorie Calculations      Formula chosen for Calorie Calculations       Estimated Calorie Requirements (kcals/day)              Protein Requirements    Weight Used For Protein Calculations       Est Protein Requirement Amount (gms/kg)       Estimated Protein Requirements (gms/day)          Fluid Requirements     Estimated Fluid Requirements (mL/day)            Fluid Deficit       Desired Sodium Level (mEq/L)      Desired Sodium Level (mEq/L)      Estimated Fluid  Deficit Needs (L)           Nutrition Prescription Ordered                Nutrition Prescription PO    Current PO Diet Healthy Heart     Supplement Boost Breeze BID        Nutrition Prescription EN    Enteral Route -     TF modular -     TF Delivery Method -     Current Ordered TF  -     Current Ordered Water flush  -     TF Observation  -        Nutrition Prescription TPN    TPN Route -     Current Ordered TPN Volume  -     Dextrose (gm/kcals)  -     Amino Acid (gm/kcals) -     Lipids (ML/Concentration/Frequency)  -     TPN Observation  -          Evaluation of Received Nutrient/Fluid Intake                PO Evaluation    % PO Intake 7/9: Average intakes of 36% x9 meals documented since last encounter. Pt has typically refused 1 meal daily.  Total average of 35% x15 meals recorded this admission    7/6: 25% x last 4 meals  7/3: 50% x 3 meals        EN Evaluation    TF Changes -     TF Residual -     TF Tolerance      Average EN Delivered  -        TPN Evaluation    Total Number of Days on TPN  -           Clinical Course      Clinical Nutrition Course Details  7/2 HH  7/3 NPO, CL diet  7/4 NPO  7/5 HH  7/6 NPO then HH thru 7/7 7/8 NPO, then HH again thru present (7/9)         Problem/Interventions:                     Nutrition Diagnoses Problem 1      Problem 1   Inadequate oral intake related to nausea as evidenced by 35% meal intakes x8 days     Nutrition Diagnoses Problem 2      Problem 2 Severe acute disease related malnutrition r/t insufficient energy intake in the context of nausea/vomiting, poor appetite with acute illnesses of pneumonia & gastritis AEB average meal intakes 35% x9 days with daily refusal of 1 meal,  6% loss of body weight in 8 days           Intervention Goal                Intervention Goal    General Meal intake greater than 50% of meals.    Pt will drink 1 ONS daily         Nutrition Intervention                Nutrition Intervention    RD/Tech Action  Adjusting ONS to Boost Plus BID  to optimize kcal intake     *Continue HH diet at this time due to possible need for gallbladder removal and low fat diet showing benefit at this time. If PO tolerance improves, consider liberalizing diet         Nutrition Prescription                Nutrition Prescription PO      Diet  Healthy Heart     Supplement Boost Plus BID        Nutrition Prescription EN    Enteral Prescription -        Nutrition Prescription TPN    TPN Prescription -         Monitor/Evaluation                Monitor/Evaluation    Monitor Intake, weight, labs, BM, skin             Electronically signed by:  Linnea Sargent RD  07/09/20 08:19

## 2020-07-09 NOTE — PLAN OF CARE
Problem: Patient Care Overview  Goal: Plan of Care Review  Outcome: Ongoing (interventions implemented as appropriate)   Pt remains on 2 L per n/c and sunday was shut off around midnight, no other changes noted, will cont to monitor

## 2020-07-09 NOTE — PROGRESS NOTES
KPA/PULM/CC PROGRESS NOTE       SUBJECTIVE       This is a 79-year-old male with a history of COPD, former smoker, atrial fibrillation on chronic anticoagulation who lives all by himself presented to the hospital for fatigue, tiredness and shortness of breath.  Patient has been feeling puny for the few days with poor appetite.  He has noticed extreme fatigue with malaise.  He denies any fever or chills.  He has noted increased shortness of breath however cough has been mild.  He did cough clear sputum and only one time he had a maroonish sputum.  He denies any wheezing or chest pain or pleurisy.  Denies any sick contacts.  He states that he is visited by a home health nurse only.  He denies any other contacts.     CT scan of the chest was done that showed bilateral groundglass airspace disease with interlobular septal thickening upper lobe predominant.  Patient denies any active tobacco use or vaping.     He has a history of chronic sinus issues.  He is not on any maintenance inhalers.  7/3: No new fevers, SOA stable, O2 requirement worsened over night and now on 5L, Remains fully awake and responsive, On oral diet  7/4: afebrile. soa at baseline. On 5 L. No n.v/d. Feels weak and fatigued.   7/6: Awake.  Currently on 2 L nasal cannula.  Remains on Cardizem and amiodarone infusions.  No complaints of cough or productive phlegm.  No chest pains.  No vomiting  7/7: Awake.  Currently on 2 L nasal cannula.  Complains of generalized weakness.  Complains of decreased appetite.  Currently on amiodarone infusion.  IV Cardizem has been stopped.  Remains in atrial fibrillation on the monitor.  No cough or productive phlegm.  No chest pains.  Some shortness of breath with activity  7/8: Awake.  Currently on 2 L nasal cannula.  Remains short of breath with activity.  Off amiodarone infusion.  Complains of generalized weakness.  No nausea or vomiting  7/9:  Transferred to ICU last night.  PICC placed and on BRIANA for a few hours.   Medications adjusted and blood pressure is improved.  Lewis is now off. On 2 liters nasal cannula       OBJECTIVE    Vitals:    07/08/20 2000 07/09/20 0000 07/09/20 0400 07/09/20 0800   BP:       BP Location:       Patient Position:       Pulse:       Resp:       Temp: 98.1 °F (36.7 °C) 97.9 °F (36.6 °C) 97.6 °F (36.4 °C) 97.7 °F (36.5 °C)   TempSrc: Oral Oral Oral Oral   SpO2:       Weight:   64 kg (141 lb 1.5 oz)    Height:          Intake/Output last 3 shifts:  I/O last 3 completed shifts:  In: 240 [P.O.:240]  Out: 1245 [Urine:1245]  Intake/Output this shift:  I/O this shift:  In: -   Out: 100 [Urine:100]    General Appearance:  Alert, cooperative, no distress, appears stated age  Head:  Normocephalic, without obvious abnormality, atraumatic  Eyes:  conjunctivae/corneas clear, EOM's intact     Neck:  Supple,  no adenopathy;      Lungs: Decreased air entry bilaterally, faint bibasilar crackles  Chest wall:  No tenderness  Heart: Irregularly irregular rhythm, S1 and S2 normal, no murmur, rub   or gallop  Abdomen:  Soft, non-tender, bowel sounds active all four quadrants,  no masses, no hepatomegaly, no splenomegaly  Extremities:  Extremities normal, no cyanosis or edema  Pulses: 2+ and symmetric all extremities  Skin:  No rashes or lesions  Neurologic:   Alert and oriented, no focal deficits    Scheduled Meds:    atorvastatin 20 mg Oral Daily   digoxin 125 mcg Oral Daily   fludrocortisone 0.1 mg Oral Q PM   insulin lispro 0-7 Units Subcutaneous 4x Daily With Meals & Nightly   metoclopramide 5 mg Oral TID AC   metoprolol tartrate 12.5 mg Oral Q6H   midodrine 5 mg Oral Q8H   ondansetron 4 mg Intravenous Q6H   pantoprazole 40 mg Oral Q AM   rivaroxaban 20 mg Oral Daily With Dinner   sodium chloride 250 mL Intravenous Once   sodium chloride 10 mL Intravenous Q12H   sodium chloride 10 mL Intravenous Q12H   sodium chloride 10 mL Intravenous Q12H   sodium chloride 10 mL Intravenous Q12H   sucralfate 1 g Oral 4x Daily AC &  at Bedtime   Thera 1 tablet Oral Daily   vitamin B-12 1,000 mcg Oral Daily       Continuous Infusions:    phenylephrine 0.5-3 mcg/kg/min Last Rate: Stopped (07/09/20 0000)       PRN Meds:•  acetaminophen **OR** acetaminophen **OR** acetaminophen  •  aluminum-magnesium hydroxide-simethicone  •  bisacodyl  •  dextrose  •  dextrose  •  glucagon (human recombinant)  •  HYDROcodone-acetaminophen  •  insulin lispro **AND** insulin lispro  •  ipratropium-albuterol  •  magnesium hydroxide  •  magnesium sulfate **OR** magnesium sulfate **OR** magnesium sulfate  •  melatonin  •  Morphine  •  ondansetron  •  potassium chloride **OR** potassium chloride **OR** potassium chloride  •  potassium phosphate infusion greater than 15 mMoles **OR** potassium phosphate infusion greater than 15 mMoles **OR** potassium phosphate **OR** sodium phosphate IVPB **OR** sodium phosphate IVPB **OR** sodium phosphate IVPB  •  promethazine  •  [COMPLETED] Insert peripheral IV **AND** sodium chloride  •  sodium chloride  •  sodium chloride  •  sodium chloride  •  sodium chloride     LABS    CBC  Results from last 7 days   Lab Units 07/09/20 0345 07/08/20  0242 07/07/20  0255 07/06/20 0225 07/05/20 0215 07/04/20  0334 07/03/20  0940   WBC 10*3/mm3 12.40* 14.20* 14.50* 16.10* 14.50* 18.00* 14.10*   RBC 10*6/mm3 4.27 4.88 4.62 4.84 4.51 4.26 4.23   HEMOGLOBIN g/dL 12.7* 14.3 13.9 14.3 13.5 12.9* 12.7*   HEMATOCRIT % 38.2 43.5 41.7 44.1 41.1 41.2 39.4   MCV fL 89.4 89.1 90.2 91.1 91.0 96.7 93.1   PLATELETS 10*3/mm3 174 238 263 302 286 280 273       CMP   Results from last 7 days   Lab Units 07/09/20  0345 07/08/20  0242 07/07/20  0255 07/06/20 0225 07/05/20  0215 07/04/20  0334 07/03/20  0940   SODIUM mmol/L 142 140 142 143 142 144 145   POTASSIUM mmol/L 3.8 3.2* 3.3* 3.5 3.1* 2.8* 2.6*   CHLORIDE mmol/L 101 97* 100 98 95* 101 103   CO2 mmol/L 31.0* 30.0* 30.0* 33.0* 32.0* 26.0 22.0   BUN  19 14 15 19 15 12 17   CREATININE mg/dL 0.62* 0.53* 0.53*  0.65* 0.64* 0.59* 0.75*   GLUCOSE mg/dL 97 103* 109* 151* 162* 149* 246*   ALBUMIN g/dL  --   --  3.60 3.80 3.80 3.90 3.80   BILIRUBIN mg/dL  --   --  1.5* 1.5* 1.5* 1.2 1.2   ALK PHOS U/L  --   --  51 59 60 55 60   AST (SGOT) U/L  --   --  24 41* 37 45* 37   ALT (SGPT) U/L  --   --  52* 59* 46* 43* 49*   LIPASE U/L  --   --   --   --   --   --  30       TROPONIN  Results from last 7 days   Lab Units 07/02/20  1027   TROPONIN T ng/mL <0.010       CoAg        ABG  Results from last 7 days   Lab Units 07/02/20  1049   PH, ARTERIAL pH units 7.531*   PCO2, ARTERIAL mm Hg 31.3*   PO2 ART mm Hg 79.4*   O2 SATURATION ART % 97.1   BASE EXCESS ART mmol/L 3.9*       Microbiology  Microbiology Results (last 10 days)     Procedure Component Value - Date/Time    Gastrointestinal Panel, PCR - Stool, Per Rectum [[id number]]  (Normal) Collected:  07/03/20 0856    Lab Status:  Final result Specimen:  Stool from Per Rectum Updated:  07/03/20 1535     Campylobacter Not Detected     Plesiomonas shigelloides Not Detected     Salmonella Not Detected     Vibrio Not Detected     Vibrio cholerae Not Detected     Yersinia enterocolitica Not Detected     Enteroaggregative E. coli (EAEC) Not Detected     Enteropathogenic E. coli (EPEC) Not Detected     Enterotoxigenic E. coli (ETEC) lt/st Not Detected     Shiga-like toxin-producing E. coli (STEC) stx1/stx2 Not Detected     E. coli O157 Not Detected     Shigella/Enteroinvasive E. coli (EIEC) Not Detected     Cryptosporidium Not Detected     Cyclospora cayetanensis Not Detected     Entamoeba histolytica Not Detected     Giardia lamblia Not Detected     Adenovirus F40/41 Not Detected     Astrovirus Not Detected     Norovirus GI/GII Not Detected     Rotavirus A Not Detected     Sapovirus (I, II, IV or V) Not Detected    Narrative:       If Aeromonas, Staphylococcus aureus or Bacillus cereus are suspected, please order [id number]: Stool Culture, Aeromonas, S aureus, B Cereus.    Clostridium Difficile  EIA - Stool, Per Rectum [[id number]]  (Normal) Collected:  07/03/20 0856    Lab Status:  Final result Specimen:  Stool from Per Rectum Updated:  07/04/20 0715     C Diff GDH / Toxin Negative    COVID-19,CEPHEID,COR/MILENA/PAD IN-HOUSE(OR EMERGENT/ADD-ON),NP SWAB IN TRANSPORT MEDIA 3-4 HR TAT - Swab, Nasopharynx [[id number]]  (Normal) Collected:  07/02/20 1659    Lab Status:  Final result Specimen:  Swab from Nasopharynx Updated:  07/02/20 2003     COVID19 Not Detected    Narrative:       Fact sheet for providers: https://www.fda.gov/media/105933/download     Fact sheet for patients: https://www.fda.gov/media/120693/download    Blood Culture - Blood, Arm, Left [[id number]] Collected:  07/02/20 0249    Lab Status:  Final result Specimen:  Blood from Arm, Left Updated:  07/07/20 0300     Blood Culture No growth at 5 days    Respiratory Panel, PCR - Swab, Nasopharynx [[id number]]  (Normal) Collected:  07/02/20 0027    Lab Status:  Final result Specimen:  Swab from Nasopharynx Updated:  07/02/20 0153     ADENOVIRUS, PCR Not Detected     Coronavirus 229E Not Detected     Coronavirus HKU1 Not Detected     Coronavirus NL63 Not Detected     Coronavirus OC43 Not Detected     Human Metapneumovirus Not Detected     Human Rhinovirus/Enterovirus Not Detected     Influenza B PCR Not Detected     Parainfluenza Virus 1 Not Detected     Parainfluenza Virus 2 Not Detected     Parainfluenza Virus 3 Not Detected     Parainfluenza Virus 4 Not Detected     Bordetella pertussis pcr Not Detected     Influenza A H1 2009 PCR Not Detected     Chlamydophila pneumoniae PCR Not Detected     Mycoplasma pneumo by PCR Not Detected     Influenza A PCR Not Detected     Influenza A H3 Not Detected     Influenza A H1 Not Detected     RSV, PCR Not Detected    Narrative:       The coronavirus on the RVP is NOT COVID-19 and is NOT indicative of infection with COVID-19.     COVID-19 [employer] IN-HOUSE, Nasal Swab No Transport Media - Swab, Nasal Cavity [[id number]]   (Normal) Collected:  07/02/20 0027    Lab Status:  Final result Specimen:  Swab from Nasal Cavity Updated:  07/02/20 0104     COVID19 Not Detected    Narrative:       Fact sheet for providers: https://www.fda.gov/media/924653/download     Fact sheet for patients: https://www.fda.gov/media/622312/download    Blood Culture - Blood, Arm, Right [[id number]] Collected:  07/01/20 2348    Lab Status:  Final result Specimen:  Blood from Arm, Right Updated:  07/07/20 0000     Blood Culture No growth at 5 days          IMAGING & OTHER STUDIES    Imaging Results (Last 72 Hours)     Procedure Component Value Units Date/Time    XR Chest 1 View [[id number]] Collected:  07/08/20 2122     Updated:  07/08/20 2127    Narrative:       XR CHEST 1 VW-     Date of Exam: 7/8/2020 9:09 PM     Indication: Status post PICC placement.     Comparison Exams: 07/04/2020     Technique: Single AP chest radiograph     FINDINGS:  A left-sided PICC has its tip at the low SVC. There is elevation of the  right hemidiaphragm. Hazy bilateral airspace opacities appear slightly  improved from prior exam. The heart and mediastinal contours appear  normal. Surgical hardware is noted at the thoracolumbar junction.       Impression:       1.Left-sided PICC with tip in low SVC.  2.Hazy bilateral airspace opacities appear slightly improved, which  could represent pneumonia or pulmonary edema.     Electronically Signed By-DR. Clint Dotson MD On:7/8/2020 9:25 PM  This report was finalized on [id number] by DR. Clint Dotson MD.        Results for orders placed during the hospital encounter of 07/01/20   Adult Transthoracic Echo Complete W/ Cont if Necessary Per Protocol    Narrative · Left ventricular systolic function is severely decreased.  · Left ventricular wall thickness is consistent with mild concentric   hypertrophy.  · Left atrial cavity size is moderately dilated.  · Mild-to-moderate mitral valve regurgitation is present  · Mild tricuspid valve  regurgitation is present.  · Mild aortic valve regurgitation is present.             ASSESSMENT/PLAN:     Atrial fibrillation (CMS/HCC)    CAD (coronary artery disease)    Chronic anticoagulation    Hyperlipidemia    Presence of stent in right coronary artery    Chronic pain    Sepsis (CMS/HCC)    Congestive heart failure (CHF) (CMS/HCC)    Nausea vomiting and diarrhea        1.Bilateral PNA (viral vs bacterial) other differentials include Pulmonary edema and possible hemorrhage  2.  A. fib with RVR  3. H/o RML lobectomy  4. CAD  5. Acute hypoxia due to above  6.  Nausea  7. Chronic Hypotension  8. Chronic Anticoagulation with Xarelto  9. Lactic acidosis  10. Hypokalemia  11.  Diarrhea  12.  Gastritis noted on EGD  13.  Acute on chronic systolic CHF exacerbation       PLAN       -Reviewed CT scan of the chest. Covid test is negative X2.  Afebrile.    Status post antibiotic therapy with Zosyn.  Monitor off antibiotics.  WBC count improving  -Blood cultures are negative.  -Patient is a lifelong non-smoker.  No previous history of COPD.  Off steroids and Pulmicort.  Changed nebs to PRN.  -Management of A. fib per cardiology.  Off amiodarone and Cardizem infusions.  Remains on p.o. beta-blockers and digoxin.    - 2D echo results reviewed.  EF 30 to 35%.  Mild to moderate MR noted.  RVSP 35.7  -GI work-up reviewed.  Status post EGD 7/5 with changes of gastritis noted.  Remains on Reglan Carafate and Protonix.  Switched Protonix to PO stool for C. difficile analysis is negative.  Changed Reglan to PO  -Patient with issues with chronic hypotension.  Remains on Florinef which is being continued.  -Currently on 2 L nasal cannula.  Wean supplemental oxygen as appropriate.  Evaluate need for supplemental oxygen at discharge.  -Remains on therapeutic anticoagulation with Lovenox.  Cardiology is planning to switch back to home Xarelto after stress test noted  -Increase activity.  -Tolerating PO Diet   -D/C Cardizem PO and  decreased metoprolol to 12.5 Q 6 hours.  Stopped diuretics for now due to hypotension  -PICC line placed.  Lewis on for a short time overnight, now OFF.  Midodrine 5 mg Q8 hours     Addendum  I have seen this patient independently and reviewed the medical records, labs and imaging and I agree with the note above as scribed for me by TERRANCE. I have corrected the note above for accuracy.    Blood pressure and vitals remained stable this afternoon okay with transfer out to the PCU.  Lynsey Zaldivar MD

## 2020-07-09 NOTE — CONSULTS
Consult requested by Dr. Cronin    Indication for consultation severe  ischemic cardiomyopathy and uncontrollable longstanding atrial fibrillation    Subject  79-year-old male patient presented to the hospital with a generalized feeling of worsening fatigue, tiredness, palpitations and dyspnea with some amount of paroxysmal nocturnal dyspnea 3 to 4 days prior to presentation and had 1 bout of mild blood-tinged phlegm when he presented to the hospital--patient was found to have uncontrollable atrial fibrillation during this hospitalization and further evaluation in the form of imaging did reveal gallbladder sludge and surgical consultation was recommended and the surgeon recommended medical management of his gallbladder disease to be done prior to cardiovascular optimization  Patient's atrial fibrillation is uncontrollable was tried on intravenous amiodarone and Cardizem and became hypotensive and patient got transferred to ICU and started on inotropic agents and amiodarone and Cardizem have been stopped and started on low-dose of digoxin and metoprolol and my consultation was requested for insertion of a biventricular ICD and AV node ablation  Patient had longstanding atrial fibrillation for several years and had two-vessel coronary artery disease with stenting of his right coronary artery and circumflex artery back in 2004 and no recent ischemia evaluation apart from a stress test which was done during this hospitalization--he denies any angina apart from feeling profoundly tired and fatigued in the last few days  Patient is known to have severe ischemic cardiomyopathy with EF less than 35% and repeat echocardiography during this hospitalization showed EF of 31 to 35%  Patient also had prior carotid surgery  He had issues with orthostatic hypotension for nearly 4 years and has been on Florinef--he denied any urinary incontinence  Patient has significant back surgery from T12-L4 spinal fusion in the past  He also has  severe cervical arthritis with degenerative changes  Patient has significant fall with severe scalp bleeding with a large parietal hematoma last year and was admitted to the hospital and a CT scan done during that time did not reveal any stroke apart from a large scalp hematoma from anticoagulation  He continues to walk with a walker with a tendency to fall  His additional evaluation in the hospital included endoscopy which showed gastritis  COVID testing x2 was negative  He denied any febrile illness or chills  He does have mild hyperlipidemia  No other symptoms suggestive of Parkinson's disease  He also had partial lung resection for localized malignancy several  years ago        Past Medical History:   Diagnosis Date   • A-fib (CMS/HCC)    • Aortic aneurysm (CMS/HCC)    • Appetite loss    • Cancer (CMS/HCC)     right lobe cancer - surgically removed    • Dark stools    • Foot pain, bilateral    • Hyperlipidemia    • Low back pain    • S/P epidural steroid injection     [facility] Gomes's - no relief   • Weight loss     acute loss of weight 30 lbs     Past Surgical History:   Procedure Laterality Date   • CATARACT EXTRACTION, BILATERAL     • CORONARY ANGIOPLASTY WITH STENT PLACEMENT     • ENDOSCOPY N/A 7/5/2020    Procedure: ESOPHAGOGASTRODUODENOSCOPY;  Surgeon: Neal Correa MD;  Location: [facility];  Service: Gastroenterology;  Laterality: N/A;   • LUMBAR DECOMPRESSION  09/2015    L2-L3   • LUMBAR DECOMPRESSION  2006    Dr. Logan   • OTHER SURGICAL HISTORY  05/2015    left SI fusion with bone graft - Dr. Presley   • OTHER SURGICAL HISTORY      carotid artery repair    • OTHER SURGICAL HISTORY Left 02/19/2016    Left SI fusion 2/19/2016 Dr. Zendejas   • POSTERIOR SPINAL FUSION  10/24/2016    PSF T12-3/TLIF L3-L4 poss L2-L3 --- Dr. 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     Family History   Problem Relation Age of Onset   • Cancer Other    • Hyperlipidemia Other    • Heart  disease Other      Social History     Tobacco Use   • Smoking status: Never Smoker   • Smokeless tobacco: Never Used   Substance Use Topics   • Alcohol use: No     Frequency: Never   • Drug use: Never     Medications Prior to Admission   Medication Sig Dispense Refill Last Dose   • fludrocortisone 0.1 MG tablet Take 0.2 mg by mouth Every Morning.   Taking   • fludrocortisone 0.1 MG tablet Take 0.1 mg by mouth Every Evening.      • HYDROcodone-acetaminophen (NORCO) 7.5-325 MG per tablet Take 1 tablet by mouth Every 6 (Six) Hours As Needed.   Taking   • Multiple Vitamin (MULTIVITAMIN) tablet Take 1 tablet by mouth Daily.   Taking   • potassium chloride ER (K-TAB) 20 MEQ tablet controlled-release ER tablet Take 20 mEq by mouth 2 (Two) Times a Day With Meals.   Taking   • rivaroxaban (XARELTO) 20 MG tablet Take 20 mg by mouth Daily.   Taking   • simvastatin (ZOCOR) 40 MG tablet Take 80 mg by mouth Every Night.      • vitamin B-12 (CYANOCOBALAMIN) 1000 MCG tablet Take 1,000 mcg by mouth Daily.   Taking     Allergies:  Ciprofloxacin; Amlodipine; and Rocephin [ceftriaxone]    Review of Systems   General:  positive for fatigue and tiredness  Eyes: No redness  Cardiovascular: No chest pain, no palpitations  Respiratory:   positive for class 3 shortness of breath  Gastrointestinal: No nausea or vomiting, bleeding  Genitourinary: no hematuria or dysuria  Musculoskeletal: No arthralgia or myalgia  Skin: No rash  Neurologic: No numbness, tingling, syncope  Hematologic/Lymphatic: No abnormal bleeding      Physical Exam  VITALS REVIEWED--systolic blood pressure is 90 pulse rate is 115 patient is afebrile respiration 12 times a minute  Left carotid bruit auscultated and mildly diminished carotid upstroke without any subclavian bruits  Right arm is erythematous from infiltration probably from amiodarone  A PICC line is noted in the left upper arm    General:      well developed, well nourished, in no acute distress.    Head:       normocephalic and atraumatic.    Eyes:      PERRL/EOM intact, conjunctiva and sclera clear with out nystagmus.  Mild pallor noted  Neck:      no masses, thyromegaly,  trachea central with normal respiratory effort and PMI displaced laterally  Lungs:      clear bilaterally to auscultation.    Heart:       underlying rapid atrial fibrillation with irregularly irregular rhythm and rapid ventricular rate without any murmurs gallops or rubs  Msk:      no deformity or scoliosis noted of thoracic or lumbar spine.    Pulses:      pulses normal in all 4 extremities.    Extremities:       no cyanosis or clubbing--trace left pedal edema and trace right pedal edema.    Neurologic:      no focal deficits.   alert oriented x3  Skin:      intact without lesions or rashes.    Psych:      alert and cooperative; normal mood and affect; normal attention span and concentration.          CBC    Results from last 7 days   Lab Units 07/09/20  0345 07/08/20  0242 07/07/20  0255 07/06/20  0225 07/05/20  0215 07/04/20  0334 07/03/20  0940   WBC 10*3/mm3 12.40* 14.20* 14.50* 16.10* 14.50* 18.00* 14.10*   HEMOGLOBIN g/dL 12.7* 14.3 13.9 14.3 13.5 12.9* 12.7*   PLATELETS 10*3/mm3 174 238 263 302 286 280 273     BMP   Results from last 7 days   Lab Units 07/09/20  0345 07/08/20  0242 07/07/20  0255 07/06/20  0225 07/05/20  0215 07/04/20  0334 07/03/20  0940   SODIUM mmol/L 142 140 142 143 142 144 145   POTASSIUM mmol/L 3.8 3.2* 3.3* 3.5 3.1* 2.8* 2.6*   CHLORIDE mmol/L 101 97* 100 98 95* 101 103   CO2 mmol/L 31.0* 30.0* 30.0* 33.0* 32.0* 26.0 22.0   BUN  19 14 15 19 15 12 17   CREATININE mg/dL 0.62* 0.53* 0.53* 0.65* 0.64* 0.59* 0.75*   GLUCOSE mg/dL 97 103* 109* 151* 162* 149* 246*   MAGNESIUM mg/dL 2.1 1.9 1.9 2.2 1.9 2.2 2.1   PHOSPHORUS mg/dL 2.2*  --   --   --   --   --   --      Coag     Infection   Results from last 7 days   Lab Units 07/08/20  2147   PROCALCITONIN ng/mL 0.08     CMP   Results from last 7 days   Lab Units 07/09/20  1756  07/08/20  0242 07/07/20  0255 07/06/20  0225 07/05/20  0215 07/04/20  0334 07/03/20  0940   SODIUM mmol/L 142 140 142 143 142 144 145   POTASSIUM mmol/L 3.8 3.2* 3.3* 3.5 3.1* 2.8* 2.6*   CHLORIDE mmol/L 101 97* 100 98 95* 101 103   CO2 mmol/L 31.0* 30.0* 30.0* 33.0* 32.0* 26.0 22.0   BUN  19 14 15 19 15 12 17   CREATININE mg/dL 0.62* 0.53* 0.53* 0.65* 0.64* 0.59* 0.75*   GLUCOSE mg/dL 97 103* 109* 151* 162* 149* 246*   ALBUMIN g/dL  --   --  3.60 3.80 3.80 3.90 3.80   BILIRUBIN mg/dL  --   --  1.5* 1.5* 1.5* 1.2 1.2   ALK PHOS U/L  --   --  51 59 60 55 60   AST (SGOT) U/L  --   --  24 41* 37 45* 37   ALT (SGPT) U/L  --   --  52* 59* 46* 43* 49*   LIPASE U/L  --   --   --   --   --   --  30     Results from last 7 days   Lab Units 07/08/20  2228   NITRITE UA  Negative     Radiology(recent) Xr Chest 1 View    Result Date: 7/8/2020  1.Left-sided PICC with tip in low SVC. 2.Hazy bilateral airspace opacities appear slightly improved, which could represent pneumonia or pulmonary edema.  Electronically Signed By-DR. Clint Dotson MD On:7/8/2020 9:25 PM This report was finalized on [id number] by DR. Clint Dotson MD.      Records extensively evaluated from [employer] [facility] system  Previous CT scans MRIs extensively reviewed  Chest x-ray personally reviewed  EKG rhythm strips rapid atrial fibrillation  TSH is normal  COVID testing x2-  BNP elevated  EF is less than 35%  EGD showing gastritis  Imaging revealing gallbladder sludge  Cortisol within normal limits      Assessment and plan    Tenuous and complex medical patient  Severe ischemic cardiomyopathy with EF less than 35%  Permanent longstanding atrial fibrillation uncontrollable with current medical regimen because of hypotension  Prior two-vessel coronary artery disease in 2004  Prior left carotid surgery  Chronic orthostatic hypotension which is multifactorial on Florinef and currently Midodrin added  Chronic class III systolic heart failure symptoms  Mild  anemia  Severe spinal arthritis  Gallbladder sludge    Recommendations  Consider left heart catheterization to exclude severe left main disease or multivessel coronary artery disease  Consider biventricular ICD insertion and AV node ablation for heart failure management and atrial arrhythmia management  Risks and benefits and outcomes of the procedures educated  This document also serves as a shared decision making document for ICD insertion for primary prevention  Patient will obviously needs a biventricular ICD insertion with AV node ablation because of iatrogenic left bundle branch block  Stop Xarelto  Stop metoprolol  Discussed with the patient and family  Discussed with Dr. Cronin  Orders placed  Check carotid duplex scan to exclude any remote possibility of any subclavian steal  See my orders    Electronically signed by Jonathan Denney MD, 07/09/20, 4:24 PM.

## 2020-07-09 NOTE — THERAPY TREATMENT NOTE
Acute Care - Occupational Therapy Treatment Note  [facility]     Patient Name: Shukri Park  : 1940  MRN: [id number]  Today's Date: 2020             Admit Date: 2020       ICD-10-CM ICD-9-CM   1. Atrial fibrillation with rapid ventricular response (CMS/HCC) I48.91 427.31   2. Congestive heart failure, unspecified HF chronicity, unspecified heart failure type (CMS/HCC) I50.9 428.0   3. Pneumonia of both lungs due to infectious organism, unspecified part of lung J18.9 483.8   4. Nausea vomiting and diarrhea R11.2 787.91    R19.7 787.01     Patient Active Problem List   Diagnosis   • Atrial fibrillation (CMS/HCC)   • CAD (coronary artery disease)   • Chronic anticoagulation   • Hyperlipidemia   • Presence of stent in right coronary artery   • Chronic pain   • Sepsis (CMS/HCC)   • Congestive heart failure (CHF) (CMS/HCC)   • Nausea vomiting and diarrhea     Past Medical History:   Diagnosis Date   • A-fib (CMS/HCC)    • Aortic aneurysm (CMS/HCC)    • Appetite loss    • Cancer (CMS/HCC)     right lobe cancer - surgically removed    • Dark stools    • Foot pain, bilateral    • Hyperlipidemia    • Low back pain    • S/P epidural steroid injection     [place] Dr Gomes's - no relief   • Weight loss     acute loss of weight 30 lbs     Past Surgical History:   Procedure Laterality Date   • CATARACT EXTRACTION, BILATERAL     • CORONARY ANGIOPLASTY WITH STENT PLACEMENT     • ENDOSCOPY N/A 2020    Procedure: ESOPHAGOGASTRODUODENOSCOPY;  Surgeon: Neal Correa MD;  Location: [facility];  Service: Gastroenterology;  Laterality: N/A;   • LUMBAR DECOMPRESSION  2015    L2-L3   • LUMBAR DECOMPRESSION      Dr. Logan   • OTHER SURGICAL HISTORY  2015    left SI fusion with bone graft - Dr. Presley   • OTHER SURGICAL HISTORY      carotid artery repair    • OTHER SURGICAL HISTORY Left 2016    Left SI fusion 2016 Dr. Zendejas   • POSTERIOR SPINAL FUSION  10/24/2016    PSF T12-3/TLIF L3-L4 poss  L2-L3 --- Dr. 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       Therapy Treatment    Rehabilitation Treatment Summary     Row Name 07/09/20 1000             Vital Signs    Pretreatment Heart Rate (beats/min)  89  -MH      Intratreatment Heart Rate (beats/min)  150  -MH      Posttreatment Heart Rate (beats/min)  112  -MH      Pre SpO2 (%)  97  -MH      O2 Delivery Pre Treatment  supplemental O2 1L  -MH      Intra SpO2 (%)  95  -MH      O2 Delivery Intra Treatment  supplemental O2  -MH      Post SpO2 (%)  96  -MH      O2 Delivery Post Treatment  supplemental O2  -MH      Pre Patient Position  Supine  -MH      Intra Patient Position  Standing  -MH      Post Patient Position  Sitting  -MH      Recorded by [] Mana Lucio, OT 07/09/20 1016      Row Name 07/09/20 1000             Cognitive Assessment/Intervention- PT/OT    Orientation Status (Cognition)  oriented x 4  -MH      Recorded by [] Mana Lucio, OT 07/09/20 1016      Row Name 07/09/20 1000             Cognitive Assessment Intervention- SLP    Cognitive Function (Cognition)  WFL  -      Recorded by [] Mana Lucio, OT 07/09/20 1016      Row Name 07/09/20 1000             Bed Mobility Assessment/Treatment    Bed Mobility Assessment/Treatment  supine-sit  -      Supine-Sit [place] (Bed Mobility)  verbal cues;nonverbal cues (demo/gesture)  -      Recorded by [] Mana Lucio, OT 07/09/20 1016      Row Name 07/09/20 1000             Functional Mobility    Functional Mobility- Ind. Level  contact guard assist;1 person + 1 person to manage equipment  -      Functional Mobility- Device  rolling walker  -      Functional Mobility- Comment  short ambulatory transfer bed>chair with turn. HR & fatigue would not support more than this.  -      Recorded by [] Mana Lucio OT 07/09/20 1016      Row Name 07/09/20 1000             Sit-Stand Transfer    Sit-Stand [place] (Transfers)  minimum assist (75%  patient effort)  -      Assistive Device (Sit-Stand Transfers)  walker, front-wheeled  -      Recorded by [] Mana Lucio, OT 07/09/20 1016      Row Name 07/09/20 1000             Stand-Sit Transfer    Stand-Sit [place] (Transfers)  contact guard;verbal cues  -      Assistive Device (Stand-Sit Transfers)  walker, front-wheeled  -MH      Recorded by [] Mana Lucio, OT 07/09/20 1016      Row Name 07/09/20 1000             ADL Assessment/Intervention    BADL Assessment/Intervention  lower body dressing;toileting;grooming;feeding  -MH      Recorded by [] Mana Lucio, OT 07/09/20 1016      Row Name 07/09/20 1000             Lower Body Dressing Assessment/Training    Lower Body Dressing [place] Level  doff;don;socks;dependent (less than 25% patient effort)  -MH      Recorded by [] Mana Lucio, OT 07/09/20 1016      Row Name 07/09/20 1000             Grooming Assessment/Training    [place] Level (Grooming)  wash face, hands;set up;oral care regimen;moderate assist (50% patient effort);hair care, combing/brushing;dependent (less than 25% patient effort)  -      Comment (Grooming)  pt fatigues very quickly after ambulatory transfer & is unable to sustain effort to comb hair. He is assisted to clean dentures & setup provided for other oral care.   -MH      Recorded by [] Mana Lucio, OT 07/09/20 1016      Row Name 07/09/20 1000             Self-Feeding Assessment/Training    [place] Level (Feeding)  prepare tray/open items;maximum assist (25% patient effort);liquids to mouth;set up  -      Recorded by [] Mana Lucio, OT 07/09/20 1016      Row Name 07/09/20 1000             Toileting Assessment/Training    [place] Level (Toileting)  adjust/manage clothing;perform perineal hygiene;dependent (less than 25% patient effort)  -      Comment (Toileting)  pt was anxiuos to let go of the RW to manage use of urinal, clothing, or wipes for hygiene.  -MH      Recorded by  [] Mana Lucio, OT 07/09/20 1016      Row Name 07/09/20 1000             Positioning and Restraints    Pre-Treatment Position  in bed  -      Post Treatment Position  chair  -MH      In Chair  notified nsg;sitting;call light within reach;encouraged to call for assist;exit alarm on  -MH      Recorded by [] Mana Lucio, OT 07/09/20 1016      Row Name 07/09/20 1000             Pain Scale: FACES Pre/Post-Treatment    Pain: FACES Scale, Pretreatment  2-->hurts little bit  -MH      Pain: FACES Scale, Post-Treatment  2-->hurts little bit Rt heel is soft and red, appears like stage 1 pressure sore.  -      Recorded by [] Mana Lucio, OT 07/09/20 1016      Row Name 07/09/20 1000             Coping    Observed Emotional State  accepting;calm;cooperative;apprehensive  -      Verbalized Emotional State  acceptance  -      Recorded by [] Mana Lucio, OT 07/09/20 1016      Row Name 07/09/20 1000             Plan of Care Review    Plan of Care Reviewed With  patient  -      Progress  no change  -      Outcome Summary  Pt continues to be very limited in his mobility duew to tachycardia & low BP. Functional endurance for basic self care is greatly affected & he needs assist w/ toileting, dressing, bathing. Pt is far from baseline & will require IP rehab at d/c. Pt is noted to have possible stage 1 pressure ulcer on Lt heel & RN was notified. Pt was educated on positioning to lessen pressure & placed on a waffle cushion in his chair. CGA for safe transfers & bed mobility this date, though unable to walk.  -      Recorded by [] Mana Lucio, OT 07/09/20 1016      Row Name 07/09/20 1000             Outcome Summary/Treatment Plan (OT)    Anticipated Discharge Disposition (OT)  inpatient rehabilitation facility  -      Recorded by [] Mana Lucio, OT 07/09/20 1016        User Key  (r) = Recorded By, (t) = Taken By, (c) = Cosigned By    Initials Name Effective Dates Discipline     Naveed  CASTILLO [place]air 03/01/19 -  OT             Occupational Therapy Education                 Title: PT OT SLP Therapies (Done)     Topic: Occupational Therapy (Done)     Point: ADL training (Done)     Description:   Instruct learner(s) on proper safety adaptation and remediation techniques during self care or transfers.   Instruct in proper use of assistive devices.              Learning Progress Summary           Patient Eager, E,D, VU,NR by  at 7/9/2020 1017    Acceptance, E,TB, VU by  at 7/7/2020 1201    Acceptance, E,TB, VU,NR by  at 7/3/2020 1100                   Point: Home exercise program (Done)     Description:   Instruct learner(s) on appropriate technique for monitoring, assisting and/or progressing therapeutic exercises/activities.              Learning Progress Summary           Patient Acceptance, E,TB, VU by ES at 7/7/2020 1201    Acceptance, E,TB, VU by TIFFANY at 7/5/2020 0013                   Point: Precautions (Done)     Description:   Instruct learner(s) on prescribed precautions during self-care and functional transfers.              Learning Progress Summary           Patient Eager, E,D, VU,NR by  at 7/9/2020 1017    Acceptance, E,TB, VU by  at 7/7/2020 1201    Acceptance, E, VU,NL,NR by  at 7/6/2020 1537    Acceptance, E,TB, VU,NR by  at 7/3/2020 1100                   Point: Body mechanics (Done)     Description:   Instruct learner(s) on proper positioning and spine alignment during self-care, functional mobility activities and/or exercises.              Learning Progress Summary           Patient Eager, E,D, VU,NR by  at 7/9/2020 1017    Acceptance, E,TB, VU by  at 7/7/2020 1201    Acceptance, E, VU,NL,NR by  at 7/6/2020 1537                               User Key     Initials Effective Dates Name Provider Type Discipline     03/01/19 -  Mana Lucio, OT Occupational Therapist OT    ES 03/01/19 -  Shelby Bass OT Occupational Therapist OT    TIFFANY 03/22/19 -  Renée Luong  RN Registered Nurse Nurse                OT Recommendation and Plan  Outcome Summary/Treatment Plan (OT)  Anticipated Discharge Disposition (OT): inpatient rehabilitation facility  Plan of Care Review  Plan of Care Reviewed With: patient  Plan of Care Reviewed With: patient  Outcome Summary: Pt continues to be very limited in his mobility duew to tachycardia & low BP. Functional endurance for basic self care is greatly affected & he needs assist w/ toileting, dressing, bathing. Pt is far from baseline & will require IP rehab at d/c. Pt is noted to have possible stage 1 pressure ulcer on Lt heel & RN was notified. Pt was educated on positioning to lessen pressure & placed on a waffle cushion in his chair. CGA for safe transfers & bed mobility this date, though unable to walk.  Outcome Measures     Row Name 07/09/20 1000 07/06/20 1500          How much help from another person do you currently need...    Turning from your back to your side while in flat bed without using bedrails?  3  -MH  3  -MH     Moving from lying on back to sitting on the side of a flat bed without bedrails?  3  -MH  2  -MH     Moving to and from a bed to a chair (including a wheelchair)?  3  -MH  2  -MH     Standing up from a chair using your arms (e.g., wheelchair, bedside chair)?  3  -  2  -MH     Climbing 3-5 steps with a railing?  1  -  1  -MH     To walk in hospital room?  1  -MH  1  -MH     AM-PAC 6 Clicks Score (PT)  14  -  11  -MH        Functional Assessment    Outcome Measure Options  --  AM-PAC 6 Clicks Basic Mobility (PT)  -       User Key  (r) = Recorded By, (t) = Taken By, (c) = Cosigned By    Initials Name Provider Type     Mana Lucio OT Occupational Therapist           Time Calculation:   Time Calculation- OT     Row Name 07/09/20 1018             Time Calculation-     OT Start Time  0816  -      OT Stop Time  0842  -      OT Time Calculation (min)  26 min  -      Total Timed Code Minutes- OT  26 minute(s)   -      OT Received On  07/09/20  -      OT - Next Appointment  07/10/20  -        User Key  (r) = Recorded By, (t) = Taken By, (c) = Cosigned By    Initials Name Provider Type    Mana Marrero OT Occupational Therapist        Therapy Charges for Today     Code Description Service Date Service Provider Modifiers Qty    [id number]  OT SELF CARE/MGMT/TRAIN EA 15 MIN 7/9/2020 Mana Lucio OT GO 1    [id number]  OT THERAPEUTIC ACT EA 15 MIN 7/9/2020 Mana Lucio OT GO 1               Mana Lucio OT  7/9/2020

## 2020-07-09 NOTE — CONSULTS
picc team consult:    Procedure explained to patient and informed consent obtained.  picc line placed LUE basilic vessel utilizing US guidance and modified seldinger technique with easily compressible vessel without difficulty.  cxr necessary to confirm tip placement.  X ray called.

## 2020-07-10 ENCOUNTER — APPOINTMENT (OUTPATIENT)
Dept: GENERAL RADIOLOGY | Facility: HOSPITAL | Age: 80
End: 2020-07-10

## 2020-07-10 ENCOUNTER — ANESTHESIA (OUTPATIENT)
Dept: CARDIOLOGY | Facility: HOSPITAL | Age: 80
End: 2020-07-10

## 2020-07-10 ENCOUNTER — APPOINTMENT (OUTPATIENT)
Dept: CARDIOLOGY | Facility: HOSPITAL | Age: 80
End: 2020-07-10

## 2020-07-10 ENCOUNTER — ANESTHESIA EVENT (OUTPATIENT)
Dept: CARDIOLOGY | Facility: HOSPITAL | Age: 80
End: 2020-07-10

## 2020-07-10 LAB
ANION GAP SERPL CALCULATED.3IONS-SCNC: 8 MMOL/L (ref 5–15)
BACTERIA BLD CULT: ABNORMAL
BACTERIA SPEC AEROBE CULT: ABNORMAL
BASOPHILS # BLD AUTO: 0.1 10*3/MM3 (ref 0–0.2)
BASOPHILS NFR BLD AUTO: 0.8 % (ref 0–1.5)
BH CV UPPER VENOUS LEFT INTERNAL JUGULAR AUGMENT: NORMAL
BH CV UPPER VENOUS LEFT INTERNAL JUGULAR COMPETENT: NORMAL
BH CV UPPER VENOUS LEFT INTERNAL JUGULAR COMPRESS: NORMAL
BH CV UPPER VENOUS LEFT INTERNAL JUGULAR PHASIC: NORMAL
BH CV UPPER VENOUS LEFT INTERNAL JUGULAR SPONT: NORMAL
BH CV UPPER VENOUS LEFT SUBCLAVIAN AUGMENT: NORMAL
BH CV UPPER VENOUS LEFT SUBCLAVIAN COMPETENT: NORMAL
BH CV UPPER VENOUS LEFT SUBCLAVIAN PHASIC: NORMAL
BH CV UPPER VENOUS LEFT SUBCLAVIAN SPONT: NORMAL
BH CV UPPER VENOUS RIGHT AXILLARY AUGMENT: NORMAL
BH CV UPPER VENOUS RIGHT AXILLARY COLOR: 1
BH CV UPPER VENOUS RIGHT AXILLARY COMPETENT: NORMAL
BH CV UPPER VENOUS RIGHT AXILLARY COMPRESS: NORMAL
BH CV UPPER VENOUS RIGHT AXILLARY PHASIC: NORMAL
BH CV UPPER VENOUS RIGHT AXILLARY SPONT: NORMAL
BH CV UPPER VENOUS RIGHT AXILLARY THROMBUS: NORMAL
BH CV UPPER VENOUS RIGHT BASILIC FOREARM COMPRESS: NORMAL
BH CV UPPER VENOUS RIGHT BASILIC UPPER COLOR: 1
BH CV UPPER VENOUS RIGHT BASILIC UPPER COMPRESS: NORMAL
BH CV UPPER VENOUS RIGHT BASILIC UPPER THROMBUS: NORMAL
BH CV UPPER VENOUS RIGHT BRACHIAL COMPRESS: NORMAL
BH CV UPPER VENOUS RIGHT CEPHALIC FOREARM COLOR: 1
BH CV UPPER VENOUS RIGHT CEPHALIC FOREARM COMPRESS: NORMAL
BH CV UPPER VENOUS RIGHT CEPHALIC FOREARM THROMBUS: NORMAL
BH CV UPPER VENOUS RIGHT CEPHALIC UPPER COMPRESS: NORMAL
BH CV UPPER VENOUS RIGHT INTERNAL JUGULAR AUGMENT: NORMAL
BH CV UPPER VENOUS RIGHT INTERNAL JUGULAR COMPETENT: NORMAL
BH CV UPPER VENOUS RIGHT INTERNAL JUGULAR COMPRESS: NORMAL
BH CV UPPER VENOUS RIGHT INTERNAL JUGULAR PHASIC: NORMAL
BH CV UPPER VENOUS RIGHT INTERNAL JUGULAR SPONT: NORMAL
BH CV UPPER VENOUS RIGHT RADIAL COMPRESS: NORMAL
BH CV UPPER VENOUS RIGHT SUBCLAVIAN AUGMENT: NORMAL
BH CV UPPER VENOUS RIGHT SUBCLAVIAN COMPETENT: NORMAL
BH CV UPPER VENOUS RIGHT SUBCLAVIAN PHASIC: NORMAL
BH CV UPPER VENOUS RIGHT SUBCLAVIAN SPONT: NORMAL
BH CV UPPER VENOUS RIGHT ULNAR COMPRESS: NORMAL
BOTTLE TYPE: ABNORMAL
BUN SERPL-MCNC: 18 MG/DL (ref 8–23)
BUN SERPL-MCNC: ABNORMAL MG/DL
BUN/CREAT SERPL: ABNORMAL
CALCIUM SPEC-SCNC: 8.3 MG/DL (ref 8.6–10.5)
CHLORIDE SERPL-SCNC: 102 MMOL/L (ref 98–107)
CO2 SERPL-SCNC: 31 MMOL/L (ref 22–29)
CREAT SERPL-MCNC: 0.62 MG/DL (ref 0.76–1.27)
DEPRECATED RDW RBC AUTO: 48.1 FL (ref 37–54)
EOSINOPHIL # BLD AUTO: 0.2 10*3/MM3 (ref 0–0.4)
EOSINOPHIL NFR BLD AUTO: 2.6 % (ref 0.3–6.2)
ERYTHROCYTE [DISTWIDTH] IN BLOOD BY AUTOMATED COUNT: 15.2 % (ref 12.3–15.4)
GFR SERPL CREATININE-BSD FRML MDRD: 125 ML/MIN/1.73
GLUCOSE BLDC GLUCOMTR-MCNC: 102 MG/DL (ref 70–105)
GLUCOSE BLDC GLUCOMTR-MCNC: 103 MG/DL (ref 70–105)
GLUCOSE SERPL-MCNC: 128 MG/DL (ref 65–99)
GRAM STN SPEC: ABNORMAL
HCT VFR BLD AUTO: 35.8 % (ref 37.5–51)
HGB BLD-MCNC: 12.1 G/DL (ref 13–17.7)
ISOLATED FROM: ABNORMAL
LYMPHOCYTES # BLD AUTO: 1.9 10*3/MM3 (ref 0.7–3.1)
LYMPHOCYTES NFR BLD AUTO: 19.5 % (ref 19.6–45.3)
MAGNESIUM SERPL-MCNC: 1.9 MG/DL (ref 1.6–2.4)
MCH RBC QN AUTO: 29.9 PG (ref 26.6–33)
MCHC RBC AUTO-ENTMCNC: 33.7 G/DL (ref 31.5–35.7)
MCV RBC AUTO: 88.9 FL (ref 79–97)
MONOCYTES # BLD AUTO: 1.3 10*3/MM3 (ref 0.1–0.9)
MONOCYTES NFR BLD AUTO: 13.9 % (ref 5–12)
NEUTROPHILS NFR BLD AUTO: 6 10*3/MM3 (ref 1.7–7)
NEUTROPHILS NFR BLD AUTO: 63.2 % (ref 42.7–76)
NRBC BLD AUTO-RTO: 0 /100 WBC (ref 0–0.2)
PHOSPHATE SERPL-MCNC: 2.2 MG/DL (ref 2.5–4.5)
PHOSPHATE SERPL-MCNC: 2.5 MG/DL (ref 2.5–4.5)
PLATELET # BLD AUTO: 194 10*3/MM3 (ref 140–450)
PMV BLD AUTO: 8 FL (ref 6–12)
POTASSIUM SERPL-SCNC: 3.6 MMOL/L (ref 3.5–5.2)
POTASSIUM SERPL-SCNC: 3.9 MMOL/L (ref 3.5–5.2)
RBC # BLD AUTO: 4.03 10*6/MM3 (ref 4.14–5.8)
SODIUM SERPL-SCNC: 141 MMOL/L (ref 136–145)
WBC # BLD AUTO: 9.6 10*3/MM3 (ref 3.4–10.8)

## 2020-07-10 PROCEDURE — 25010000002 HYDROCORTISONE SODIUM SUCCINATE 100 MG RECONSTITUTED SOLUTION: Performed by: ANESTHESIOLOGY

## 2020-07-10 PROCEDURE — 93287 PERI-PX DEVICE EVAL & PRGR: CPT | Performed by: INTERNAL MEDICINE

## 2020-07-10 PROCEDURE — 33225 L VENTRIC PACING LEAD ADD-ON: CPT | Performed by: INTERNAL MEDICINE

## 2020-07-10 PROCEDURE — C1900 LEAD, CORONARY VENOUS: HCPCS | Performed by: INTERNAL MEDICINE

## 2020-07-10 PROCEDURE — 25010000002 ONDANSETRON PER 1 MG: Performed by: INTERNAL MEDICINE

## 2020-07-10 PROCEDURE — 84100 ASSAY OF PHOSPHORUS: CPT | Performed by: INTERNAL MEDICINE

## 2020-07-10 PROCEDURE — 93650 ICAR CATH ABLTJ AV NODE FUNC: CPT | Performed by: INTERNAL MEDICINE

## 2020-07-10 PROCEDURE — 25010000002 PROPOFOL 500 MG/50ML EMULSION: Performed by: ANESTHESIOLOGY

## 2020-07-10 PROCEDURE — 25010000002 VANCOMYCIN 1 G RECONSTITUTED SOLUTION 1 EACH VIAL: Performed by: INTERNAL MEDICINE

## 2020-07-10 PROCEDURE — C1892 INTRO/SHEATH,FIXED,PEEL-AWAY: HCPCS | Performed by: INTERNAL MEDICINE

## 2020-07-10 PROCEDURE — C1882 AICD, OTHER THAN SING/DUAL: HCPCS | Performed by: INTERNAL MEDICINE

## 2020-07-10 PROCEDURE — 25010000002 VANCOMYCIN 1 G RECONSTITUTED SOLUTION 1 EACH VIAL: Performed by: ANESTHESIOLOGY

## 2020-07-10 PROCEDURE — 83735 ASSAY OF MAGNESIUM: CPT | Performed by: INTERNAL MEDICINE

## 2020-07-10 PROCEDURE — 93613 INTRACARDIAC EPHYS 3D MAPG: CPT | Performed by: INTERNAL MEDICINE

## 2020-07-10 PROCEDURE — 80048 BASIC METABOLIC PNL TOTAL CA: CPT | Performed by: INTERNAL MEDICINE

## 2020-07-10 PROCEDURE — 93641 EP EVL 1/2CHMB PAC CVDFB TST: CPT | Performed by: INTERNAL MEDICINE

## 2020-07-10 PROCEDURE — 93971 EXTREMITY STUDY: CPT

## 2020-07-10 PROCEDURE — 99024 POSTOP FOLLOW-UP VISIT: CPT | Performed by: INTERNAL MEDICINE

## 2020-07-10 PROCEDURE — 4A0234Z MEASUREMENT OF CARDIAC ELECTRICAL ACTIVITY, PERCUTANEOUS APPROACH: ICD-10-PCS | Performed by: INTERNAL MEDICINE

## 2020-07-10 PROCEDURE — 25010000002 FENTANYL CITRATE (PF) 100 MCG/2ML SOLUTION: Performed by: ANESTHESIOLOGY

## 2020-07-10 PROCEDURE — C1769 GUIDE WIRE: HCPCS | Performed by: INTERNAL MEDICINE

## 2020-07-10 PROCEDURE — 85025 COMPLETE CBC W/AUTO DIFF WBC: CPT | Performed by: INTERNAL MEDICINE

## 2020-07-10 PROCEDURE — C1730 CATH, EP, 19 OR FEW ELECT: HCPCS | Performed by: INTERNAL MEDICINE

## 2020-07-10 PROCEDURE — 0 IOPAMIDOL PER 1 ML: Performed by: INTERNAL MEDICINE

## 2020-07-10 PROCEDURE — 02583ZZ DESTRUCTION OF CONDUCTION MECHANISM, PERCUTANEOUS APPROACH: ICD-10-PCS | Performed by: INTERNAL MEDICINE

## 2020-07-10 PROCEDURE — 25010000003 MAGNESIUM SULFATE 4 GM/100ML SOLUTION: Performed by: INTERNAL MEDICINE

## 2020-07-10 PROCEDURE — C1894 INTRO/SHEATH, NON-LASER: HCPCS | Performed by: INTERNAL MEDICINE

## 2020-07-10 PROCEDURE — C1732 CATH, EP, DIAG/ABL, 3D/VECT: HCPCS | Performed by: INTERNAL MEDICINE

## 2020-07-10 PROCEDURE — 25010000002 PROPOFOL 10 MG/ML EMULSION: Performed by: ANESTHESIOLOGY

## 2020-07-10 PROCEDURE — C1777 LEAD, AICD, ENDO SINGLE COIL: HCPCS | Performed by: INTERNAL MEDICINE

## 2020-07-10 PROCEDURE — 33249 INSJ/RPLCMT DEFIB W/LEAD(S): CPT | Performed by: INTERNAL MEDICINE

## 2020-07-10 PROCEDURE — 71045 X-RAY EXAM CHEST 1 VIEW: CPT

## 2020-07-10 PROCEDURE — 02HL3KZ INSERTION OF DEFIBRILLATOR LEAD INTO LEFT VENTRICLE, PERCUTANEOUS APPROACH: ICD-10-PCS | Performed by: INTERNAL MEDICINE

## 2020-07-10 PROCEDURE — 4A023FZ MEASUREMENT OF CARDIAC RHYTHM, PERCUTANEOUS APPROACH: ICD-10-PCS | Performed by: INTERNAL MEDICINE

## 2020-07-10 PROCEDURE — 84132 ASSAY OF SERUM POTASSIUM: CPT | Performed by: INTERNAL MEDICINE

## 2020-07-10 PROCEDURE — 02K83ZZ MAP CONDUCTION MECHANISM, PERCUTANEOUS APPROACH: ICD-10-PCS | Performed by: INTERNAL MEDICINE

## 2020-07-10 PROCEDURE — 02HK3KZ INSERTION OF DEFIBRILLATOR LEAD INTO RIGHT VENTRICLE, PERCUTANEOUS APPROACH: ICD-10-PCS | Performed by: INTERNAL MEDICINE

## 2020-07-10 PROCEDURE — 82962 GLUCOSE BLOOD TEST: CPT

## 2020-07-10 PROCEDURE — 0JH609Z INSERTION OF CARDIAC RESYNCHRONIZATION DEFIBRILLATOR PULSE GENERATOR INTO CHEST SUBCUTANEOUS TISSUE AND FASCIA, OPEN APPROACH: ICD-10-PCS | Performed by: INTERNAL MEDICINE

## 2020-07-10 DEVICE — CARDIAC RESYNCHRONIZATION THERAPY DEFIBRILLATOR
Type: IMPLANTABLE DEVICE | Status: FUNCTIONAL
Brand: MOMENTUM™ CRT-D

## 2020-07-10 DEVICE — INTEGRATED BIPOLAR PACE/SENSE AND DEFIBRILLATION LEAD
Type: IMPLANTABLE DEVICE | Status: FUNCTIONAL
Brand: RELIANCE 4-FRONT™

## 2020-07-10 DEVICE — CORONARY VENOUS DEXAMETHASONE ACETATE-ELUTING UNIPOLAR PACE/SENSE LEAD
Type: IMPLANTABLE DEVICE | Status: FUNCTIONAL
Brand: ACUITY™ SPIRAL

## 2020-07-10 RX ORDER — FENTANYL CITRATE 50 UG/ML
INJECTION, SOLUTION INTRAMUSCULAR; INTRAVENOUS AS NEEDED
Status: DISCONTINUED | OUTPATIENT
Start: 2020-07-10 | End: 2020-07-10 | Stop reason: SURG

## 2020-07-10 RX ORDER — LIDOCAINE HYDROCHLORIDE AND EPINEPHRINE BITARTRATE 20; .01 MG/ML; MG/ML
INJECTION, SOLUTION SUBCUTANEOUS AS NEEDED
Status: DISCONTINUED | OUTPATIENT
Start: 2020-07-10 | End: 2020-07-10 | Stop reason: HOSPADM

## 2020-07-10 RX ORDER — PROPOFOL 10 MG/ML
INJECTION, EMULSION INTRAVENOUS AS NEEDED
Status: DISCONTINUED | OUTPATIENT
Start: 2020-07-10 | End: 2020-07-10 | Stop reason: SURG

## 2020-07-10 RX ORDER — KETAMINE HYDROCHLORIDE 10 MG/ML
INJECTION INTRAMUSCULAR; INTRAVENOUS AS NEEDED
Status: DISCONTINUED | OUTPATIENT
Start: 2020-07-10 | End: 2020-07-10 | Stop reason: SURG

## 2020-07-10 RX ORDER — LIDOCAINE HYDROCHLORIDE 20 MG/ML
INJECTION, SOLUTION INFILTRATION; PERINEURAL AS NEEDED
Status: DISCONTINUED | OUTPATIENT
Start: 2020-07-10 | End: 2020-07-10 | Stop reason: HOSPADM

## 2020-07-10 RX ADMIN — PROPOFOL 75 MCG/KG/MIN: 10 INJECTION, EMULSION INTRAVENOUS at 16:49

## 2020-07-10 RX ADMIN — CLOPIDOGREL BISULFATE 75 MG: 75 TABLET ORAL at 08:28

## 2020-07-10 RX ADMIN — MIDODRINE HYDROCHLORIDE 10 MG: 5 TABLET ORAL at 08:28

## 2020-07-10 RX ADMIN — METOCLOPRAMIDE 5 MG: 5 TABLET ORAL at 08:28

## 2020-07-10 RX ADMIN — SODIUM CHLORIDE: 0.9 INJECTION, SOLUTION INTRAVENOUS at 16:48

## 2020-07-10 RX ADMIN — PANTOPRAZOLE SODIUM 40 MG: 40 TABLET, DELAYED RELEASE ORAL at 05:04

## 2020-07-10 RX ADMIN — THERA TABS 1 TABLET: TAB at 08:28

## 2020-07-10 RX ADMIN — PROPOFOL 20 MG: 10 INJECTION, EMULSION INTRAVENOUS at 17:13

## 2020-07-10 RX ADMIN — SUCRALFATE 1 G: 1 TABLET ORAL at 22:55

## 2020-07-10 RX ADMIN — ASPIRIN 81 MG: 81 TABLET, COATED ORAL at 08:28

## 2020-07-10 RX ADMIN — HYDROCORTISONE SODIUM SUCCINATE 100 MG: 100 INJECTION, POWDER, FOR SOLUTION INTRAMUSCULAR; INTRAVENOUS at 18:40

## 2020-07-10 RX ADMIN — FENTANYL CITRATE 25 MCG: 50 INJECTION, SOLUTION INTRAMUSCULAR; INTRAVENOUS at 17:00

## 2020-07-10 RX ADMIN — PROPOFOL 20 MG: 10 INJECTION, EMULSION INTRAVENOUS at 16:58

## 2020-07-10 RX ADMIN — MAGNESIUM SULFATE HEPTAHYDRATE 4 G: 40 INJECTION, SOLUTION INTRAVENOUS at 05:04

## 2020-07-10 RX ADMIN — MIDODRINE HYDROCHLORIDE 10 MG: 5 TABLET ORAL at 00:13

## 2020-07-10 RX ADMIN — FENTANYL CITRATE 25 MCG: 50 INJECTION, SOLUTION INTRAMUSCULAR; INTRAVENOUS at 17:06

## 2020-07-10 RX ADMIN — KETAMINE HYDROCHLORIDE 5 MG: 10 INJECTION INTRAMUSCULAR; INTRAVENOUS at 17:34

## 2020-07-10 RX ADMIN — SODIUM CHLORIDE 1000 MG: 900 INJECTION, SOLUTION INTRAVENOUS at 16:27

## 2020-07-10 RX ADMIN — KETAMINE HYDROCHLORIDE 5 MG: 10 INJECTION INTRAMUSCULAR; INTRAVENOUS at 17:46

## 2020-07-10 RX ADMIN — VANCOMYCIN HYDROCHLORIDE 1 G: 1 INJECTION, POWDER, LYOPHILIZED, FOR SOLUTION INTRAVENOUS at 16:51

## 2020-07-10 RX ADMIN — Medication 10 ML: at 08:29

## 2020-07-10 RX ADMIN — FLUDROCORTISONE ACETATE 0.1 MG: 0.1 TABLET ORAL at 22:53

## 2020-07-10 RX ADMIN — KETAMINE HYDROCHLORIDE 5 MG: 10 INJECTION INTRAMUSCULAR; INTRAVENOUS at 17:21

## 2020-07-10 RX ADMIN — HYDROCODONE BITARTRATE AND ACETAMINOPHEN 1 TABLET: 7.5; 325 TABLET ORAL at 05:11

## 2020-07-10 RX ADMIN — ONDANSETRON 4 MG: 2 INJECTION INTRAMUSCULAR; INTRAVENOUS at 03:06

## 2020-07-10 RX ADMIN — CYANOCOBALAMIN TAB 1000 MCG 1000 MCG: 1000 TAB at 08:28

## 2020-07-10 RX ADMIN — SODIUM CHLORIDE 15 MMOL: 9 INJECTION, SOLUTION INTRAVENOUS at 05:04

## 2020-07-10 RX ADMIN — ATORVASTATIN CALCIUM 20 MG: 20 TABLET, FILM COATED ORAL at 08:28

## 2020-07-10 RX ADMIN — DIGOXIN 125 MCG: 0.12 TABLET ORAL at 11:59

## 2020-07-10 RX ADMIN — PROPOFOL: 10 INJECTION, EMULSION INTRAVENOUS at 19:14

## 2020-07-10 RX ADMIN — Medication 10 ML: at 22:56

## 2020-07-10 RX ADMIN — SUCRALFATE 1 G: 1 TABLET ORAL at 08:28

## 2020-07-10 NOTE — ANESTHESIA PREPROCEDURE EVALUATION
Anesthesia Evaluation     Patient summary reviewed and Nursing notes reviewed   no history of anesthetic complications:  NPO Solid Status: > 8 hours  NPO Liquid Status: > 8 hours           Airway   Mallampati: II  TM distance: >3 FB  Neck ROM: full  No difficulty expected  Dental    (+) edentulous    Pulmonary    (+) a smoker Former, lung cancer, COPD,   Cardiovascular     ECG reviewed  PT is on anticoagulation therapy    (+) hypertension, valvular problems/murmurs AI, TI and MR, CAD, cardiac stents dysrhythmias Atrial Fib, angina, CHF Systolic <55%, hyperlipidemia,       Neuro/Psych  GI/Hepatic/Renal/Endo      Musculoskeletal     (+) back pain, chronic pain,   Abdominal    Substance History      OB/GYN          Other      history of cancer    ROS/Med Hx Other: Ischemic cardiomyopathy, aortic aneurysm, weight loss, foot pain, N/V/diarrhea, h/o sepsis, ? Pneumonia, chronic hypotension, hypoxemia, lactic acidosis, Afib/RVR, acute on chronic CHF with exacerbation, gastritis    Echo  Echocardiogram Findings     Left Ventricle Left ventricular systolic function is severely decreased. Calculated EF = 27.0%. Estimated EF appears to be in the range of 31 - 35%. Normal left ventricular cavity size noted. All left ventricular wall segments contract normally. Left ventricular wall thickness is consistent with mild concentric hypertrophy. Left ventricular diastolic function is normal. There is no evidence of a left ventricular mass or thrombus present.  Right Ventricle Normal right ventricular cavity size noted.  Left Atrium Left atrial cavity size is moderately dilated.  Right Atrium Normal right atrial size noted.  Aortic Valve The aortic valve is structurally normal. Mild aortic valve regurgitation is present. No aortic valve stenosis is present.  Mitral Valve The mitral valve is normal in structure. Mild-to-moderate mitral valve regurgitation is present. No significant mitral valve stenosis is present.  Tricuspid Valve The  tricuspid valve is normal. No evidence of tricuspid valve stenosis is present. Mild tricuspid valve regurgitation is present.  Pulmonic Valve The pulmonic valve is not well visualized.  Greater Vessels No dilation of the aortic root is present.  Pericardium There is no evidence of pericardial effusion.    Stress  · Diaphragmatic attenuation artifact is present.  · Left ventricular ejection fraction is mildly reduced (Calculated EF = 43%).  · Myocardial perfusion imaging indicates a normal myocardial perfusion study with no evidence of ischemia.  · Impressions are consistent with a low risk study.  · This is normal Cardiolite imaging stress test with no evidence of ischemia or myocardial infarction. Small fixed inferior defect was noted which showed normal thickening and contractility on gated SPECT consistent with attenuation artifact, however small myocardial infarction cannot be excluded. Clinical correlation recommended. Left ventricle size and function is normal on gated SPECT imaging. No wall motion abnormality was noted. Clinical correlation recommended. Further recommendation a  · Findings consistent with an equivocal ECG stress test.    Cath  CORONARY ANGIOGRAPHY:            A: Left main coronary artery: Normal            B: Left anterior descending artery: 50% blockage in proximal LAD but no high-grade stenosis.  It wraps around the apex            C: Left circumflex coronary artery: Patent stent in proximal LCx and no high-grade stenosis            D: Right coronary artery: 70 to 80% mid LAD stenosis followed by 95 to 99% stenosis within the previous stent.  It is heavily calcified and it was a complex stenosis with spiral linear calcification.    SUMMARY:      1.  Three-vessel coronary artery disease with patent stent in LCx and 50% stenosis of LAD  2.  High-grade stenosis of RCA within the previous stent and it was heavily calcified  3.  Failed PCI to RCA     RECOMMENDATIONS:      Recommend aspirin and  Plavix    PSH  OTHER SURGICAL HISTORY LUMBAR DECOMPRESSION  TUMOR REMOVAL OTHER SURGICAL HISTORY  CORONARY ANGIOPLASTY WITH STENT PLACEMENT CATARACT EXTRACTION, BILATERAL  LUMBAR DECOMPRESSION OTHER SURGICAL HISTORY  POSTERIOR SPINAL FUSION ENDOSCOPY  CARDIAC CATHETERIZATION CARDIAC CATHETERIZATION  RML lobectomy                    Anesthesia Plan    ASA 4     MAC   (Patient identified; pre-operative vital signs, all relevant labs/studies, complete medical/surgical/anesthetic history, full medication list, full allergy list, and NPO status obtained/reviewed; physical assessment performed; anesthetic options, side effects, potential complications, risks, and benefits discussed; questions answered; written anesthesia consent obtained; patient cleared for procedure; anesthesia machine and equipment checked and functioning)    Anesthetic plan, all risks, benefits, and alternatives have been provided, discussed and informed consent has been obtained with: patient.

## 2020-07-10 NOTE — PLAN OF CARE
Problem: Patient Care Overview  Goal: Plan of Care Review  Outcome: Ongoing (interventions implemented as appropriate)   Pt came back from cath lab about 2000, sheath pulled at 2343 with no complications. Color, pulses, and temperature remains unchanged. Pt has been on and off sunday all night long, at this time sunday is off. Will cont to monitor

## 2020-07-10 NOTE — PLAN OF CARE
Pt with anticipated pacemaker placement 7/10/2020.  PT will follow up at later date.  PT did not enter room.  Tessa Barger PT, DPT, [facility]

## 2020-07-10 NOTE — PROGRESS NOTES
LOS: 13 days   Admiting Physician- No att. providers found    Reason For Followup:    Atrial fibrillation  Cardiomyopathy  CAD, status post coronary artery stenting  History of cholelithiasis/sludge  COPD  Congestive heart failure  Hypertension  Hypotension    Subjective      Patient is sitting up.  Denies any chest pain or shortness of breath.     Objective     Hemodynamics stable       Review of Systems:   Review of Systems   Constitution: Negative for chills and fever.   HENT: Negative for ear discharge and nosebleeds.    Eyes: Negative for discharge and redness.   Cardiovascular: Negative for chest pain, orthopnea, palpitations, paroxysmal nocturnal dyspnea and syncope.   Respiratory: Positive for shortness of breath. Negative for cough and wheezing.    Endocrine: Negative for heat intolerance.   Skin: Negative for rash.   Musculoskeletal: Positive for arthritis and joint pain. Negative for myalgias.   Gastrointestinal: Negative for abdominal pain, melena, nausea and vomiting.   Genitourinary: Negative for dysuria and hematuria.   Neurological: Negative for dizziness, light-headedness, numbness and tremors.   Psychiatric/Behavioral: Negative for depression. The patient is not nervous/anxious.          Vital Signs  Vitals:    07/15/20 1226 07/15/20 1445 07/15/20 1757 07/15/20 1826   BP: (!) 85/54 109/64 102/53 102/53   Pulse: 70 70 70 70   Resp:  16  20   Temp:  98.3 °F (36.8 °C)  98.1 °F (36.7 °C)   TempSrc:       SpO2:  100%  95%   Weight:       Height:         Wt Readings from Last 1 Encounters:   07/15/20 68.2 kg (150 lb 5.7 oz)     No intake or output data in the 24 hours ending 07/21/20 1447  Physical Exam:  Physical Exam   Constitutional: He is oriented to person, place, and time. He appears well-developed and well-nourished.   HENT:   Head: Normocephalic and atraumatic.   Eyes: No scleral icterus.   Neck: No thyromegaly present.   Cardiovascular: Regular rhythm and normal heart sounds. Exam reveals no  gallop and no friction rub.   No murmur heard.  Heart rate is irregular   Pulmonary/Chest: Effort normal and breath sounds normal. No respiratory distress. He has no wheezes. He has no rales.   Abdominal: There is no tenderness.   Musculoskeletal: He exhibits no edema.   Lymphadenopathy:     He has no cervical adenopathy.   Neurological: He is alert and oriented to person, place, and time.   Skin: No rash noted. No erythema.   Psychiatric: He has a normal mood and affect.       Results Review:   Lab Results (last 24 hours)     Procedure Component Value Units Date/Time    BUN [[id number]]  (Normal) Collected:  07/15/20 0212    Specimen:  Blood Updated:  07/15/20 0728     BUN 11 mg/dL     POC Activated Clotting Time [[id number]]  (Abnormal) Collected:  07/09/20 2318    Specimen:  Blood Updated:  07/15/20 0706     Activated Clotting Time  147 Seconds      Comment: Serial Number: [id number]:  [id number]       Magnesium [[id number]]  (Normal) Collected:  07/15/20 0212    Specimen:  Blood Updated:  07/15/20 0323     Magnesium 2.2 mg/dL     Phosphorus [[id number]]  (Normal) Collected:  07/15/20 0212    Specimen:  Blood Updated:  07/15/20 0323     Phosphorus 3.1 mg/dL     Basic Metabolic Panel [[id number]]  (Abnormal) Collected:  07/15/20 0212    Specimen:  Blood Updated:  07/15/20 0323     Glucose 91 mg/dL      BUN --     Comment: Testing performed by alternate method        Creatinine 0.56 mg/dL      Sodium 137 mmol/L      Potassium 4.1 mmol/L      Chloride 102 mmol/L      CO2 23.0 mmol/L      Calcium 8.2 mg/dL      eGFR Non African Amer 140 mL/min/1.73      BUN/Creatinine Ratio --     Comment: Testing not performed        Anion Gap 12.0 mmol/L     Narrative:       GFR Normal >60  Chronic Kidney Disease <60  Kidney Failure <15      CBC & Differential [[id number]] Collected:  07/15/20 0212    Specimen:  Blood Updated:  07/15/20 0300    Narrative:       The following orders were created for panel order CBC &  Differential.  Procedure                               Abnormality         Status                     ---------                               -----------         ------                     CBC Auto Differential[[id number]]        Abnormal            Final result                 Please view results for these tests on the individual orders.    CBC Auto Differential [[id number]]  (Abnormal) Collected:  07/15/20 0212    Specimen:  Blood Updated:  07/15/20 0300     WBC 9.90 10*3/mm3      RBC 3.54 10*6/mm3      Hemoglobin 10.6 g/dL      Hematocrit 31.7 %      MCV 89.4 fL      MCH 30.0 pg      MCHC 33.6 g/dL      RDW 15.1 %      RDW-SD 48.1 fl      MPV 8.2 fL      Platelets 228 10*3/mm3      Neutrophil % 66.4 %      Lymphocyte % 18.1 %      Monocyte % 12.0 %      Eosinophil % 2.9 %      Basophil % 0.6 %      Neutrophils, Absolute 6.50 10*3/mm3      Lymphocytes, Absolute 1.80 10*3/mm3      Monocytes, Absolute 1.20 10*3/mm3      Eosinophils, Absolute 0.30 10*3/mm3      Basophils, Absolute 0.10 10*3/mm3      nRBC 0.1 /100 WBC         Imaging Results (Last 72 Hours)     ** No results found for the last 72 hours. **        ECG/EMG Results (most recent)     Procedure Component Value Units Date/Time    Adult Transthoracic Echo Complete W/ Cont if Necessary Per Protocol [[id number]] Collected:  07/02/20 0653     Updated:  07/02/20 1409     BSA 1.9 m^2      RVIDd 2.1 cm      IVSd 1.1 cm      LVIDd 5.6 cm      LVIDs 4.6 cm      LVPWd 1.3 cm      IVS/LVPW 0.84     FS 17.6 %      EDV(Teich) 155.9 ml      ESV(Teich) 99.5 ml      EF(Teich) 36.2 %      EDV(cubed) 179.0 ml      ESV(cubed) 100.1 ml      EF(cubed) 44.1 %      LV mass(C)d 276.4 grams      LV mass(C)dI 142.4 grams/m^2      SV(Teich) 56.4 ml      SI(Teich) 29.1 ml/m^2      SV(cubed) 78.9 ml      SI(cubed) 40.6 ml/m^2      Ao root diam 4.0 cm      Ao root area 12.8 cm^2      ACS 2.1 cm      asc Aorta Diam 3.7 cm      LVOT diam 2.5 cm      LVOT area 5.1 cm^2      RVOT diam 2.1 cm       RVOT area 3.6 cm^2      EDV(MOD-sp4) 68.5 ml      ESV(MOD-sp4) 50.1 ml      EF(MOD-sp4) 26.8 %      SV(MOD-sp4) 18.4 ml      SI(MOD-sp4) 9.5 ml/m^2      Ao root area (BSA corrected) 2.1     LV Dunlap Vol (BSA corrected) 35.3 ml/m^2      LV Sys Vol (BSA corrected) 25.8 ml/m^2      Aortic R-R 0.4 sec      Aortic .6 BPM      MV V2 max 90.3 cm/sec      MV max PG 3.3 mmHg      MV V2 mean 63.4 cm/sec      MV mean PG 1.8 mmHg      MV V2 VTI 9.6 cm      MVA(VTI) 6.7 cm^2      Ao pk sharmila 78.3 cm/sec      Ao max PG 2.5 mmHg      Ao max PG (full) -0.26 mmHg      Ao V2 mean 61.8 cm/sec      Ao mean PG 1.7 mmHg      Ao mean PG (full) 0.39 mmHg      Ao V2 VTI 11.3 cm      AISHWARYA(I,A) 5.7 cm^2      AISHWARYA(I,D) 5.7 cm^2      AISHWARYA(V,A) 5.3 cm^2      AISHWARYA(V,D) 5.3 cm^2      LV V1 max PG 2.7 mmHg      LV V1 mean PG 1.3 mmHg      LV V1 max 82.3 cm/sec      LV V1 mean 53.0 cm/sec      LV V1 VTI 12.7 cm      MR max sharmila 453.8 cm/sec      MR max PG 82.4 mmHg      CO(Ao) 22.0 l/min      CI(Ao) 11.3 l/min/m^2      SV(Ao) 144.9 ml      SI(Ao) 74.6 ml/m^2      CO(LVOT) 9.8 l/min      CI(LVOT) 5.0 l/min/m^2      SV(LVOT) 64.6 ml      SV(RVOT) 42.6 ml      SI(LVOT) 33.3 ml/m^2      PA V2 max 77.1 cm/sec      PA max PG 2.4 mmHg      PA max PG (full) 0.22 mmHg      PA V2 mean 50.6 cm/sec      PA mean PG 1.2 mmHg      PA mean PG (full) 0.15 mmHg      PA V2 VTI 9.1 cm      PVA(I,A) 4.7 cm^2       CV ECHO TARYN - PVA(I,D) 4.7 cm^2       CV ECHO TARYN - PVA(V,A) 3.4 cm^2       CV ECHO TARYN - PVA(V,D) 3.4 cm^2      PA acc time 0.06 sec      RV V1 max PG 2.2 mmHg      RV V1 mean PG 1.1 mmHg      RV V1 max 73.4 cm/sec      RV V1 mean 46.7 cm/sec      RV V1 VTI 11.9 cm      TR max sharmila 285.8 cm/sec      RVSP(TR) 35.7 mmHg      RAP systole 3.0 mmHg      PA pr(Accel) 49.9 mmHg      Qp/Qs 0.66      CV ECHO TARYN - BZI_BMI 18.7 kilograms/m^2       CV ECHO TARYN - BSA(HAYCOCK) 1.9 m^2       CV ECHO TARYN - BZI_METRIC_WEIGHT 68.0 kg       CV ECHO TARYN -  BZI_METRIC_HEIGHT 190.5 cm      EF(MOD-bp) 27.0 %      LA dimension(2D) 4.6 cm     Narrative:       · Left ventricular systolic function is severely decreased.  · Left ventricular wall thickness is consistent with mild concentric   hypertrophy.  · Left atrial cavity size is moderately dilated.  · Mild-to-moderate mitral valve regurgitation is present  · Mild tricuspid valve regurgitation is present.  · Mild aortic valve regurgitation is present.       ECG 12 Lead [[id number]] Collected:  07/01/20 2340     Updated:  07/03/20 1140    Narrative:       HEART RATE= 163  bpm  RR Interval= 368  ms  PA Interval=   ms  P Horizontal Axis=   deg  P Front Axis=   deg  QRSD Interval= 78  ms  QT Interval= 283  ms  QRS Axis= 7  deg  T Wave Axis= 42  deg  - ABNORMAL ECG -  Atrial fibrillation with rapid V-rate  No previous ECG available for comparison  Electronically Signed By: Sunny Unger (MILENA) 03-Jul-2020 11:38:35  Date and Time of Study: 2020-07-01 23:40:10    ECG 12 Lead [[id number]] Collected:  07/03/20 2021     Updated:  07/03/20 2023    Narrative:       HEART RATE= 133  bpm  RR Interval= 449  ms  PA Interval=   ms  P Horizontal Axis=   deg  P Front Axis=   deg  QRSD Interval= 92  ms  QT Interval= 296  ms  QRS Axis= -17  deg  T Wave Axis= -11  deg  - ABNORMAL ECG -  Atrial fibrillation  Ventricular premature complex  Probable lateral infarct, old  Electronically Signed By:   Date and Time of Study: 2020-07-03 20:21:30    ECG 12 Lead [[id number]] Collected:  07/04/20 0607     Updated:  07/04/20 0609    Narrative:       HEART RATE= 102  bpm  RR Interval= 589  ms  PA Interval=   ms  P Horizontal Axis=   deg  P Front Axis=   deg  QRSD Interval= 95  ms  QT Interval= 334  ms  QRS Axis= -21  deg  T Wave Axis= 3  deg  - ABNORMAL ECG -  Atrial fibrillation  Ventricular premature complex  Electronically Signed By:   Date and Time of Study: 2020-07-04 06:07:51    ECG 12 Lead [[id number]] Collected:  07/05/20 0709     Updated:  07/05/20 0710     Narrative:       HEART RATE= 113  bpm  RR Interval= 528  ms  IL Interval=   ms  P Horizontal Axis=   deg  P Front Axis=   deg  QRSD Interval= 89  ms  QT Interval= 321  ms  QRS Axis= -36  deg  T Wave Axis= 0  deg  - ABNORMAL ECG -  Atrial fibrillation  Ventricular premature complex  Left axis deviation  When compared with ECG of 04-Jul-2020 6:07:51,  No significant change  Electronically Signed By:   Date and Time of Study: 2020-07-05 07:09:38    ECG 12 Lead [[id number]] Collected:  07/06/20 0609     Updated:  07/06/20 0610    Narrative:       HEART RATE= 97  bpm  RR Interval= 619  ms  IL Interval=   ms  P Horizontal Axis=   deg  P Front Axis=   deg  QRSD Interval= 91  ms  QT Interval= 338  ms  QRS Axis= -22  deg  T Wave Axis= -5  deg  - ABNORMAL ECG -  Atrial fibrillation  Ventricular premature complex  When compared with ECG of 05-Jul-2020 20:52:58,  Significant repolarization change  Electronically Signed By:   Date and Time of Study: 2020-07-06 06:09:24    EP/CRM Study [[id number]] Resulted:  07/10/20 1945     Updated:  07/10/20 1945    Narrative:       Procedure indication--- uncontrollable atrial fibrillation with chronic   class III systolic heart failure and severe ischemic cardiomyopathy with   no further targets for revascularization with EF less than 35% and   iatrogenic left bundle branch block and patient recommended biventricular   ICD insertion and AV node ablation for optimization    Sedation anesthesia and vancomycin before procedure    Procedures done  #1 implantation of a biventricular ICD system with placement of   biventricular ICD generator and placement of right ventricle ICD lead   placement of a coronary sinus lead  #2 coronary sinus venography with placement of a complex coronary sinus   lead  #3-lead testing and fluoroscopy  #4 biventricular ICD interrogation    Procedure note  Obtaining valid consent patient was draped in sterile fashion the left   infraclavicular area was infiltrated  local anesthesia and a small incision   was made and hemostasis was acquired by cautery and gentle dissection was   done and a pocket was made in the prepectoral fascia area and by using   micropuncture kit subclavian vein was easily cannulated with placement of   2J wires for placement of leads.  Right ventricular ICD lead was placed in the right ventricle apex after   mapping right ventricle at multiple locations via 8 [place] venous sheath   and after testing the lead the sheath was removed  For placement of left ventricle coronary sinus lead a short 10 [place]   venous sheath was used and long peelable sheath manufactured by changed   company was used with the help of supra CS and later a deflectable   decapolar catheter cannulate coronary sinus with coronary sinus   venography.  Patient had an ideal mid posterior lateral branch--cannulation was done   however diaphragmatic stimulation was noted with a quadripolar lead and   multiple other branches were mapped with poor thresholds  A unipolar lead was used and again mapping of multiple location was done   including distal lateral branch and great cardiac vein with poor   thresholds and henceforth after trying multiple places recanalization of   the mid posterior lateral branch was done with adequate capture with a   narrow margin for diaphragmatic stimulation  Once the lead was tested multiple times the sheaths were removed and the   leads were anchored to underlying muscular area and the pocket was copious   irrigated with antibiotic solution and ICD generator was taken and the   leads were anchored to the generator generator leads were placed in the   pocket and incision was closed in several layers without any immediate   procedure related complications and biventricular programming attached to   chart.   ICD generator is manufactured by [employer] and model number is   G124 and serial #[id number]  Right ventricular ICD lead is manufactured by [employer] model 0673   and she #[id number]  LV lead is  [place] [employer] model 4592 and serial number is   [id number]  Right ventricular sensing is more than 10 mV with threshold less than 1 V  Left ventricle lead threshold is 2.5 V at 1.5 ms    Plan  Proceed with AV node ablation  Eliquis can be resumed tomorrow  Digoxin can be stopped   Electronically signed by Jonathan Denney MD, 07/10/20, 7:16 PM.    EP/CRM Study [[id number]] Resulted:  07/10/20 1945     Updated:  07/10/20 1945    Narrative:       Procedure indication--uncontrollable rapid atrial fibrillation associated   with hypotension , biventricular ICD in situ, severe dilated   cardiomyopathy, class 3 systolic heart failure--patient recommended AV   node ablation after biventricular ICD insertion which was done this   evening     Sedation by anesthesia      Procedures done  1. Radiofrequency ablation of compact AV node  2.  Biventricular ICD reprogramming and interrogation  3. Fluoroscopy  4.  EP study with recording of right atrium, right ventricle and HIS   without induction of arrhythmias  5.  3D mapping with Carto system    Procedure note  After obtaining a valid consent patient was draped in sterile fashion and   local anesthesia was given in the right groin in the right common femoral   vein was cannulated with placement of a 8 [place] venous sheath.  An 8 mm   Biosense Spence catheter was taken and mapping of right atrium right   ventricle and compact AV kelly areas was done--single radiofrequency   ablation was done with HIS cloudy was noted with immediate complete AV   block   Post ablation device was reprogrammed to a baseline rate of 70 beats per   minute without any complications and catheter removed    Findings  HV interval of 54 milliseconds  Underlying appropriately functioning biventricular ICD system  Successful radiofrequency ablation of compact AV node with escape rate   below 30 beats per minute    Plan  Patient can be  transferred back to ICU  Currently device programmed to RV mode only because of infrequent   diaphragmatic stimulation  ICD programming attached to chart    Electronically signed by Jonathan Denney MD, 07/10/20, 7:36 PM.      ECG 12 Lead [[id number]] Collected:  07/03/20 0945     Updated:  07/12/20 1551    Narrative:       HEART RATE= 106  bpm  RR Interval= 568  ms  MA Interval= 196  ms  P Horizontal Axis= 48  deg  P Front Axis= 0  deg  QRSD Interval= 95  ms  QT Interval= 298  ms  QRS Axis= -18  deg  T Wave Axis= 14  deg  - ABNORMAL ECG -  Atrial fibrillationWith rapid ventricular rate  When compared with ECG of 01-Jul-2020 23:40:10,  Ventricular rate decreased  Electronically Signed By: Braulio Fleming ([facility]) 12-Jul-2020 15:50:15  Date and Time of Study: 2020-07-03 09:45:28    ECG 12 Lead [[id number]] Collected:  07/09/20 2035     Updated:  07/13/20 1015    Narrative:       HEART RATE= 61  bpm  RR Interval= 983  ms  MA Interval=   ms  P Horizontal Axis=   deg  P Front Axis=   deg  QRSD Interval= 94  ms  QT Interval= 382  ms  QRS Axis= 37  deg  T Wave Axis= -43  deg  - ABNORMAL ECG -  Atrial fibrillation  When compared with ECG of 06-Jul-2020 6:09:24,  Significant rate decrease  Electronically Signed By: Wade Cronin ([facility]) 13-Jul-2020 10:11:05  Date and Time of Study: 2020-07-09 20:35:36    ECG 12 Lead [[id number]] Collected:  07/05/20 2052     Updated:  07/20/20 2155    Narrative:       HEART RATE= 114  bpm  RR Interval= 524  ms  MA Interval=   ms  P Horizontal Axis=   deg  P Front Axis=   deg  QRSD Interval= 96  ms  QT Interval= 312  ms  QRS Axis= -28  deg  T Wave Axis= -12  deg  - ABNORMAL ECG -  Atrial fibrillation  Ventricular premature complex  Borderline T abnormalities, diffuse leads  When compared with ECG of 05-Jul-2020 7:09:38,  Significant axis, voltage or hypertrophy change  Electronically Signed By: Sedrick Salcedo ([facility]) 20-Jul-2020 21:31:26  Date and Time of Study: 2020-07-05 20:52:58        CBC       Results from last 7 days   Lab Units 07/20/20  0510 07/16/20  0503 07/15/20  0212   WBC 10*3/mm3 8.60 8.40 9.90   HEMOGLOBIN g/dL 10.9* 10.6* 10.6*   PLATELETS 10*3/mm3 214 246 228     BMP   Results from last 7 days   Lab Units 07/20/20  0510 07/16/20  0503 07/15/20  0212   SODIUM mmol/L 142 139 137   POTASSIUM mmol/L 3.8 4.0 4.1   CHLORIDE mmol/L 103 104 102   CO2 mmol/L 25.0 24.0 23.0   BUN  10 12 11   CREATININE mg/dL 0.52* 0.54* 0.56*   GLUCOSE mg/dL 90 90 91   MAGNESIUM mg/dL  --  2.0 2.2   PHOSPHORUS mg/dL  --  3.1 3.1     CMP   Results from last 7 days   Lab Units 07/20/20  0510 07/16/20  0503 07/15/20  0212   SODIUM mmol/L 142 139 137   POTASSIUM mmol/L 3.8 4.0 4.1   CHLORIDE mmol/L 103 104 102   CO2 mmol/L 25.0 24.0 23.0   BUN  10 12 11   CREATININE mg/dL 0.52* 0.54* 0.56*   GLUCOSE mg/dL 90 90 91   ALBUMIN g/dL  --  2.80*  --    BILIRUBIN mg/dL  --  0.4  --    ALK PHOS U/L  --  50  --    AST (SGOT) U/L  --  21  --    ALT (SGPT) U/L  --  35  --      Cardiac Studies:  Echo-   Results for orders placed during the hospital encounter of 07/01/20   Adult Transthoracic Echo Complete W/ Cont if Necessary Per Protocol    Narrative · Left ventricular systolic function is severely decreased.  · Left ventricular wall thickness is consistent with mild concentric   hypertrophy.  · Left atrial cavity size is moderately dilated.  · Mild-to-moderate mitral valve regurgitation is present  · Mild tricuspid valve regurgitation is present.  · Mild aortic valve regurgitation is present.        Stress Myoview-  Cath-      Medication Review:   Scheduled Meds:    Continuous Infusions:    No current facility-administered medications for this encounter.   PRN Meds:.      Assessment/Plan   Patient Active Problem List   Diagnosis   • Chronic anticoagulation   • Hyperlipidemia   • Presence of stent in right coronary artery   • Chronic pain   • Ischemic cardiomyopathy   • Persistent atrial fibrillation (CMS/HCC)   • Acute on chronic  systolic congestive heart failure (CMS/HCC)   • Coronary artery disease involving native coronary artery of native heart without angina pectoris   • Disorder of sacroiliac joint   • Lumbosacral radiculopathy   • Mitral regurgitation   • Neck pain   • Nevus of choroid   • Presbyopia   • Vitamin D deficiency   • History of lung cancer   • Chronic hypotension   • Gastritis     Plan:  Ventricular rates and BP stable currently  Plans for EPS/AV node ablation/BIV ICD implant  On midodrine  RCA occluded by cath  Additional recommendations per Dr. Cronin    Patient is seen and examined and findings are verified.    Patient denies any new symptoms.    Hemodynamics are stable but heart rate is still elevated.    At this stage, patient would go for AV kelly ablation and ICD biventricular device.  We shall follow.     Wade Cronin MD  07/21/20  14:47

## 2020-07-10 NOTE — PLAN OF CARE
Problem: Patient Care Overview  Goal: Plan of Care Review  Outcome: Ongoing (interventions implemented as appropriate)  Flowsheets (Taken 7/10/2020 9673)  Plan of Care Reviewed With: patient; family  Outcome Summary: Patient is currently on no continuous drips and 2L of oxygen. The plan is for him to go down for an ablation and a permanent pacemaker today. Kphos and mag were both replaced today. Patient hasnt complained of any pain or nausea today. Family updated on situation. Will continue to monitor.

## 2020-07-10 NOTE — PROGRESS NOTES
KPA/PULM/CC PROGRESS NOTE       SUBJECTIVE       This is a 79-year-old male with a history of COPD, former smoker, atrial fibrillation on chronic anticoagulation who lives all by himself presented to the hospital for fatigue, tiredness and shortness of breath.  Patient has been feeling puny for the few days with poor appetite.  He has noticed extreme fatigue with malaise.  He denies any fever or chills.  He has noted increased shortness of breath however cough has been mild.  He did cough clear sputum and only one time he had a maroonish sputum.  He denies any wheezing or chest pain or pleurisy.  Denies any sick contacts.  He states that he is visited by a home health nurse only.  He denies any other contacts.     CT scan of the chest was done that showed bilateral groundglass airspace disease with interlobular septal thickening upper lobe predominant.  Patient denies any active tobacco use or vaping.     He has a history of chronic sinus issues.  He is not on any maintenance inhalers.  7/3: No new fevers, SOA stable, O2 requirement worsened over night and now on 5L, Remains fully awake and responsive, On oral diet  7/4: afebrile. soa at baseline. On 5 L. No n.v/d. Feels weak and fatigued.   7/6: Awake.  Currently on 2 L nasal cannula.  Remains on Cardizem and amiodarone infusions.  No complaints of cough or productive phlegm.  No chest pains.  No vomiting  7/7: Awake.  Currently on 2 L nasal cannula.  Complains of generalized weakness.  Complains of decreased appetite.  Currently on amiodarone infusion.  IV Cardizem has been stopped.  Remains in atrial fibrillation on the monitor.  No cough or productive phlegm.  No chest pains.  Some shortness of breath with activity  7/8: Awake.  Currently on 2 L nasal cannula.  Remains short of breath with activity.  Off amiodarone infusion.  Complains of generalized weakness.  No nausea or vomiting  7/9:  Transferred to ICU last night.  PICC placed and on BRIANA for a few hours.   Medications adjusted and blood pressure is improved.  Lewis is now off. On 2 liters nasal cannula   7/10: no acute events overnight. No gtts. Tolerating O2 2L. No chest pain. Plan for cath lab today for ablation and PPM      OBJECTIVE    Vitals:    07/10/20 0520 07/10/20 0525 07/10/20 0800 07/10/20 0828   BP:    91/62   Pulse: 82 74  86   Resp:       Temp:   97.6 °F (36.4 °C)    TempSrc:   Oral    SpO2: 99% 98%     Weight:       Height:          Intake/Output last 3 shifts:  I/O last 3 completed shifts:  In: 1088 [P.O.:1080; I.V.:8]  Out: 1150 [Urine:1150]  Intake/Output this shift:  I/O this shift:  In: 360 [P.O.:360]  Out: -     General Appearance:  Alert, cooperative, no distress, appears stated age  Head:  Normocephalic, without obvious abnormality, atraumatic  Eyes:  conjunctivae/corneas clear, EOM's intact     Neck:  Supple,  no JVD      Lungs: Decreased air entry bilaterally, clear but diminished bases  Chest wall:  No tenderness  Heart: Irregularly irregular rhythm, afib, S1 and S2 normal, no murmur, rub or gallop  Abdomen:  Soft, non-tender, bowel sounds active all four quadrants,  no rebound or guarding  Extremities:  Extremities normal, no cyanosis or edema  Skin:  No rashes or lesions  Neurologic:   Alert and oriented, no focal deficits    Scheduled Meds:    aspirin 81 mg Oral Daily   atorvastatin 20 mg Oral Daily   clopidogrel 75 mg Oral Daily   digoxin 125 mcg Oral Daily   fludrocortisone 0.1 mg Oral Q PM   insulin lispro 0-7 Units Subcutaneous 4x Daily With Meals & Nightly   metoclopramide 5 mg Oral TID AC   midodrine 10 mg Oral Q8H   ondansetron 4 mg Intravenous Q6H   pantoprazole 40 mg Oral Q AM   sodium chloride 250 mL Intravenous Once   sodium chloride 10 mL Intravenous Q12H   sucralfate 1 g Oral 4x Daily AC & at Bedtime   Thera 1 tablet Oral Daily   vitamin B-12 1,000 mcg Oral Daily       Continuous Infusions:    phenylephrine 0.5-3 mcg/kg/min Last Rate: Stopped (07/10/20 0300)       PRN Meds:•   acetaminophen **OR** acetaminophen **OR** acetaminophen  •  aluminum-magnesium hydroxide-simethicone  •  atropine  •  bisacodyl  •  dextrose  •  dextrose  •  glucagon (human recombinant)  •  HYDROcodone-acetaminophen  •  insulin lispro **AND** insulin lispro  •  ipratropium-albuterol  •  magnesium hydroxide  •  magnesium sulfate **OR** magnesium sulfate **OR** magnesium sulfate  •  melatonin  •  Morphine  •  ondansetron  •  potassium chloride **OR** potassium chloride **OR** potassium chloride  •  potassium phosphate infusion greater than 15 mMoles **OR** potassium phosphate infusion greater than 15 mMoles **OR** potassium phosphate **OR** sodium phosphate IVPB **OR** sodium phosphate IVPB **OR** sodium phosphate IVPB  •  promethazine  •  [COMPLETED] Insert peripheral IV **AND** sodium chloride  •  sodium chloride     LABS    CBC  Results from last 7 days   Lab Units 07/10/20  0308 07/09/20 0345 07/08/20 0242 07/07/20  0255 07/06/20 0225 07/05/20  0215 07/04/20  0334   WBC 10*3/mm3 9.60 12.40* 14.20* 14.50* 16.10* 14.50* 18.00*   RBC 10*6/mm3 4.03* 4.27 4.88 4.62 4.84 4.51 4.26   HEMOGLOBIN g/dL 12.1* 12.7* 14.3 13.9 14.3 13.5 12.9*   HEMATOCRIT % 35.8* 38.2 43.5 41.7 44.1 41.1 41.2   MCV fL 88.9 89.4 89.1 90.2 91.1 91.0 96.7   PLATELETS 10*3/mm3 194 174 238 263 302 286 280       CMP   Results from last 7 days   Lab Units 07/10/20  0308 07/09/20 0345 07/08/20 0242 07/07/20  0255 07/06/20 0225 07/05/20  0215 07/04/20  0334 07/03/20  0940   SODIUM mmol/L 141 142 140 142 143 142 144 145   POTASSIUM mmol/L 3.6 3.8 3.2* 3.3* 3.5 3.1* 2.8* 2.6*   CHLORIDE mmol/L 102 101 97* 100 98 95* 101 103   CO2 mmol/L 31.0* 31.0* 30.0* 30.0* 33.0* 32.0* 26.0 22.0   BUN  18 19 14 15 19 15 12 17   CREATININE mg/dL 0.62* 0.62* 0.53* 0.53* 0.65* 0.64* 0.59* 0.75*   GLUCOSE mg/dL 128* 97 103* 109* 151* 162* 149* 246*   ALBUMIN g/dL  --   --   --  3.60 3.80 3.80 3.90 3.80   BILIRUBIN mg/dL  --   --   --  1.5* 1.5* 1.5* 1.2 1.2   ALK  PHOS U/L  --   --   --  51 59 60 55 60   AST (SGOT) U/L  --   --   --  24 41* 37 45* 37   ALT (SGPT) U/L  --   --   --  52* 59* 46* 43* 49*   LIPASE U/L  --   --   --   --   --   --   --  30       TROPONIN        CoAg        ABG        Microbiology  Microbiology Results (last 10 days)     Procedure Component Value - Date/Time    Blood Culture - Blood, Arm, Left [[id number]]  (Abnormal) Collected:  07/08/20 2202    Lab Status:  Final result Specimen:  Blood from Arm, Left Updated:  07/10/20 0658     Blood Culture Staphylococcus, coagulase negative     Comment: Probable contaminant requires clinical correlation, susceptibility not performed unless requested by physician.          Isolated from Anaerobic Bottle     Gram Stain Gram positive cocci in clusters    Blood Culture ID, PCR - Blood, Arm, Left [[id number]]  (Abnormal) Collected:  07/08/20 2202    Lab Status:  Edited Result - FINAL Specimen:  Blood from Arm, Left Updated:  07/10/20 0635     BCID, PCR Staphylococcus spp, not aureus. Identification by BCID PCR.     Comment: Corrected result. Previous result was Staphylococcus aureus. Identification by BCID PCR. on 7/9/2020 at 2147 EDT.        BOTTLE TYPE Anaerobic Bottle    Blood Culture - Blood, Blood, PICC Line [[id number]] Collected:  07/08/20 2147    Lab Status:  Preliminary result Specimen:  Blood, PICC Line Updated:  07/09/20 2245     Blood Culture No growth at 24 hours    Gastrointestinal Panel, PCR - Stool, Per Rectum [[id number]]  (Normal) Collected:  07/03/20 0856    Lab Status:  Final result Specimen:  Stool from Per Rectum Updated:  07/03/20 1535     Campylobacter Not Detected     Plesiomonas shigelloides Not Detected     Salmonella Not Detected     Vibrio Not Detected     Vibrio cholerae Not Detected     Yersinia enterocolitica Not Detected     Enteroaggregative E. coli (EAEC) Not Detected     Enteropathogenic E. coli (EPEC) Not Detected     Enterotoxigenic E. coli (ETEC) lt/st Not Detected     Shiga-like  toxin-producing E. coli (STEC) stx1/stx2 Not Detected     E. coli O157 Not Detected     Shigella/Enteroinvasive E. coli (EIEC) Not Detected     Cryptosporidium Not Detected     Cyclospora cayetanensis Not Detected     Entamoeba histolytica Not Detected     Giardia lamblia Not Detected     Adenovirus F40/41 Not Detected     Astrovirus Not Detected     Norovirus GI/GII Not Detected     Rotavirus A Not Detected     Sapovirus (I, II, IV or V) Not Detected    Narrative:       If Aeromonas, Staphylococcus aureus or Bacillus cereus are suspected, please order [id number]: Stool Culture, Aeromonas, S aureus, B Cereus.    Clostridium Difficile EIA - Stool, Per Rectum [[id number]]  (Normal) Collected:  07/03/20 0856    Lab Status:  Final result Specimen:  Stool from Per Rectum Updated:  07/04/20 0715     C Diff GDH / Toxin Negative    COVID-19,CEPHEID,COR/MILENA/PAD IN-HOUSE(OR EMERGENT/ADD-ON),NP SWAB IN TRANSPORT MEDIA 3-4 HR TAT - Swab, Nasopharynx [[id number]]  (Normal) Collected:  07/02/20 1659    Lab Status:  Final result Specimen:  Swab from Nasopharynx Updated:  07/02/20 2003     COVID19 Not Detected    Narrative:       Fact sheet for providers: https://www.fda.gov/media/915163/download     Fact sheet for patients: https://www.fda.gov/media/926960/download    Blood Culture - Blood, Arm, Left [[id number]] Collected:  07/02/20 0249    Lab Status:  Final result Specimen:  Blood from Arm, Left Updated:  07/07/20 0300     Blood Culture No growth at 5 days    Respiratory Panel, PCR - Swab, Nasopharynx [[id number]]  (Normal) Collected:  07/02/20 0027    Lab Status:  Final result Specimen:  Swab from Nasopharynx Updated:  07/02/20 0153     ADENOVIRUS, PCR Not Detected     Coronavirus 229E Not Detected     Coronavirus HKU1 Not Detected     Coronavirus NL63 Not Detected     Coronavirus OC43 Not Detected     Human Metapneumovirus Not Detected     Human Rhinovirus/Enterovirus Not Detected     Influenza B PCR Not Detected     Parainfluenza  Virus 1 Not Detected     Parainfluenza Virus 2 Not Detected     Parainfluenza Virus 3 Not Detected     Parainfluenza Virus 4 Not Detected     Bordetella pertussis pcr Not Detected     Influenza A H1 2009 PCR Not Detected     Chlamydophila pneumoniae PCR Not Detected     Mycoplasma pneumo by PCR Not Detected     Influenza A PCR Not Detected     Influenza A H3 Not Detected     Influenza A H1 Not Detected     RSV, PCR Not Detected    Narrative:       The coronavirus on the RVP is NOT COVID-19 and is NOT indicative of infection with COVID-19.     COVID-19 [employer] IN-HOUSE, Nasal Swab No Transport Media - Swab, Nasal Cavity [[id number]]  (Normal) Collected:  07/02/20 0027    Lab Status:  Final result Specimen:  Swab from Nasal Cavity Updated:  07/02/20 0104     COVID19 Not Detected    Narrative:       Fact sheet for providers: https://www.fda.gov/media/671494/download     Fact sheet for patients: https://www.fda.gov/media/248311/download    Blood Culture - Blood, Arm, Right [[id number]] Collected:  07/01/20 2348    Lab Status:  Final result Specimen:  Blood from Arm, Right Updated:  07/07/20 0000     Blood Culture No growth at 5 days          IMAGING & OTHER STUDIES    Imaging Results (Last 72 Hours)     Procedure Component Value Units Date/Time    XR Chest 1 View [[id number]] Collected:  07/08/20 2122     Updated:  07/08/20 2127    Narrative:       XR CHEST 1 VW-     Date of Exam: 7/8/2020 9:09 PM     Indication: Status post PICC placement.     Comparison Exams: 07/04/2020     Technique: Single AP chest radiograph     FINDINGS:  A left-sided PICC has its tip at the low SVC. There is elevation of the  right hemidiaphragm. Hazy bilateral airspace opacities appear slightly  improved from prior exam. The heart and mediastinal contours appear  normal. Surgical hardware is noted at the thoracolumbar junction.       Impression:       1.Left-sided PICC with tip in low SVC.  2.Hazy bilateral airspace opacities appear slightly  improved, which  could represent pneumonia or pulmonary edema.     Electronically Signed By-DR. Clint Dotson MD On:7/8/2020 9:25 PM  This report was finalized on [id number] by DR. Clint Dotson MD.        Results for orders placed during the hospital encounter of 07/01/20   Adult Transthoracic Echo Complete W/ Cont if Necessary Per Protocol    Narrative · Left ventricular systolic function is severely decreased.  · Left ventricular wall thickness is consistent with mild concentric   hypertrophy.  · Left atrial cavity size is moderately dilated.  · Mild-to-moderate mitral valve regurgitation is present  · Mild tricuspid valve regurgitation is present.  · Mild aortic valve regurgitation is present.             ASSESSMENT/PLAN:     Atrial fibrillation (CMS/HCC)    CAD (coronary artery disease)    Chronic anticoagulation    Hyperlipidemia    Presence of stent in right coronary artery    Chronic pain    Sepsis (CMS/HCC)    Congestive heart failure (CHF) (CMS/HCC)    Nausea vomiting and diarrhea    Ischemic cardiomyopathy    Persistent atrial fibrillation (CMS/HCC)    Chronic systolic congestive heart failure (CMS/HCC)    Permanent atrial fibrillation (CMS/HCC)    Coronary artery disease involving native coronary artery of native heart without angina pectoris        1.Bilateral PNA (viral vs bacterial) other differentials include Pulmonary edema and possible hemorrhage  2.  A. fib with RVR  3. H/o RML lobectomy  4. CAD  5. Acute hypoxia due to above  6.  Nausea  7. Chronic Hypotension  8. Chronic Anticoagulation with Xarelto  9. Lactic acidosis  10. Hypokalemia  11.  Diarrhea  12.  Gastritis noted on EGD  13.  Acute on chronic systolic CHF exacerbation       PLAN       -Reviewed CT scan of the chest. Covid test is negative X2.  Afebrile.    Status post antibiotic therapy with Zosyn.  Monitor off antibiotics.  WBC count improving  -Blood cultures are negative.  -Patient is a lifelong non-smoker.  No previous  history of COPD.  Off steroids and Pulmicort.  Changed nebs to PRN.  -Management of A. fib per cardiology.  Off amiodarone and Cardizem infusions.  Remains on p.o. beta-blockers and digoxin.    - 2D echo results reviewed.  EF 30 to 35%.  Mild to moderate MR noted.  RVSP 35.7  -GI work-up reviewed.  Status post EGD 7/5 with changes of gastritis noted.  Remains on Reglan Carafate and Protonix.  Switched Protonix to PO stool for C. difficile analysis is negative.  Changed Reglan to PO  -Patient with issues with chronic hypotension.  Remains on Florinef which is being continued.  -Currently on 2 L nasal cannula.  Wean supplemental oxygen as appropriate.  Evaluate need for supplemental oxygen at discharge.  -off therapeutic anticoagulation with Lovenox.  Cardiology is planning to switch back to home Xarelto after procedures  -Increase activity.  -Tolerating PO Diet   -D/C Cardizem PO and metoprolol to 12.5 Q 6 hours.  Stopped diuretics for now due to hypotension  -PICC line placed.  Lewis on for a short time overnight, now OFF.  Midodrine 5 mg Q8 hours       Addendum  I have seen this patient independently and reviewed the medical records, labs and imaging and I agree with the note above as scribed for me by TERRANCE. I have corrected the note above for accuracy.    Plan on AV kelly ablation and pacemaker AICD.  If all goes as planned can be transferred out of the ICU  Lynsey Zaldivar MD

## 2020-07-10 NOTE — PROGRESS NOTES
Continued Stay Note  OFELIA Candelario     Patient Name: Shukri Park  MRN: [id number]  Today's Date: 7/10/2020    Admit Date: 7/1/2020    Discharge Plan     Row Name 07/10/20 1619       Plan    Plan  DC Plan: [facility] subacute following. No PASRR needed for [facility]. Will require pre-cert (unless qualifies [id number] transfer if [id number] remains <15).      KAREN Baca    Phone: [phone]  Cell: [phone]  Fax: [phone]  [email or web address]

## 2020-07-10 NOTE — NURSING NOTE
Spoke with cath lab to obtain an update for family and cath lab stated that they just called family.

## 2020-07-11 LAB
ANION GAP SERPL CALCULATED.3IONS-SCNC: 11 MMOL/L (ref 5–15)
BASOPHILS # BLD AUTO: 0 10*3/MM3 (ref 0–0.2)
BASOPHILS NFR BLD AUTO: 0.1 % (ref 0–1.5)
BUN SERPL-MCNC: 13 MG/DL (ref 8–23)
BUN SERPL-MCNC: ABNORMAL MG/DL
BUN/CREAT SERPL: ABNORMAL
CALCIUM SPEC-SCNC: 8.1 MG/DL (ref 8.6–10.5)
CHLORIDE SERPL-SCNC: 103 MMOL/L (ref 98–107)
CO2 SERPL-SCNC: 27 MMOL/L (ref 22–29)
CREAT SERPL-MCNC: 0.66 MG/DL (ref 0.76–1.27)
DEPRECATED RDW RBC AUTO: 47.3 FL (ref 37–54)
EOSINOPHIL # BLD AUTO: 0 10*3/MM3 (ref 0–0.4)
EOSINOPHIL NFR BLD AUTO: 0 % (ref 0.3–6.2)
ERYTHROCYTE [DISTWIDTH] IN BLOOD BY AUTOMATED COUNT: 14.9 % (ref 12.3–15.4)
GFR SERPL CREATININE-BSD FRML MDRD: 116 ML/MIN/1.73
GLUCOSE BLDC GLUCOMTR-MCNC: 139 MG/DL (ref 70–105)
GLUCOSE BLDC GLUCOMTR-MCNC: 97 MG/DL (ref 70–105)
GLUCOSE SERPL-MCNC: 177 MG/DL (ref 65–99)
HCT VFR BLD AUTO: 33.4 % (ref 37.5–51)
HGB BLD-MCNC: 11.2 G/DL (ref 13–17.7)
LYMPHOCYTES # BLD AUTO: 0.8 10*3/MM3 (ref 0.7–3.1)
LYMPHOCYTES NFR BLD AUTO: 6.4 % (ref 19.6–45.3)
MAGNESIUM SERPL-MCNC: 2.1 MG/DL (ref 1.6–2.4)
MCH RBC QN AUTO: 30.5 PG (ref 26.6–33)
MCHC RBC AUTO-ENTMCNC: 33.6 G/DL (ref 31.5–35.7)
MCV RBC AUTO: 90.7 FL (ref 79–97)
MONOCYTES # BLD AUTO: 1.3 10*3/MM3 (ref 0.1–0.9)
MONOCYTES NFR BLD AUTO: 10.8 % (ref 5–12)
NEUTROPHILS NFR BLD AUTO: 10 10*3/MM3 (ref 1.7–7)
NEUTROPHILS NFR BLD AUTO: 82.7 % (ref 42.7–76)
NRBC BLD AUTO-RTO: 0 /100 WBC (ref 0–0.2)
PHOSPHATE SERPL-MCNC: 2.6 MG/DL (ref 2.5–4.5)
PLATELET # BLD AUTO: 212 10*3/MM3 (ref 140–450)
PMV BLD AUTO: 8.3 FL (ref 6–12)
POTASSIUM SERPL-SCNC: 4 MMOL/L (ref 3.5–5.2)
RBC # BLD AUTO: 3.68 10*6/MM3 (ref 4.14–5.8)
SODIUM SERPL-SCNC: 141 MMOL/L (ref 136–145)
WBC # BLD AUTO: 12 10*3/MM3 (ref 3.4–10.8)

## 2020-07-11 PROCEDURE — 85025 COMPLETE CBC W/AUTO DIFF WBC: CPT | Performed by: INTERNAL MEDICINE

## 2020-07-11 PROCEDURE — 80048 BASIC METABOLIC PNL TOTAL CA: CPT | Performed by: INTERNAL MEDICINE

## 2020-07-11 PROCEDURE — 25010000002 ONDANSETRON PER 1 MG: Performed by: INTERNAL MEDICINE

## 2020-07-11 PROCEDURE — 97530 THERAPEUTIC ACTIVITIES: CPT

## 2020-07-11 PROCEDURE — 97164 PT RE-EVAL EST PLAN CARE: CPT

## 2020-07-11 PROCEDURE — 25010000002 VANCOMYCIN 1 G RECONSTITUTED SOLUTION 1 EACH VIAL: Performed by: INTERNAL MEDICINE

## 2020-07-11 PROCEDURE — 83735 ASSAY OF MAGNESIUM: CPT | Performed by: INTERNAL MEDICINE

## 2020-07-11 PROCEDURE — 82962 GLUCOSE BLOOD TEST: CPT

## 2020-07-11 PROCEDURE — 99024 POSTOP FOLLOW-UP VISIT: CPT | Performed by: INTERNAL MEDICINE

## 2020-07-11 PROCEDURE — 84100 ASSAY OF PHOSPHORUS: CPT | Performed by: INTERNAL MEDICINE

## 2020-07-11 PROCEDURE — 87040 BLOOD CULTURE FOR BACTERIA: CPT | Performed by: INTERNAL MEDICINE

## 2020-07-11 RX ORDER — MIDODRINE HYDROCHLORIDE 5 MG/1
5 TABLET ORAL EVERY 8 HOURS
Status: DISCONTINUED | OUTPATIENT
Start: 2020-07-12 | End: 2020-07-12

## 2020-07-11 RX ORDER — DIGOXIN 125 MCG
125 TABLET ORAL
Status: DISCONTINUED | OUTPATIENT
Start: 2020-07-12 | End: 2020-07-12

## 2020-07-11 RX ORDER — SPIRONOLACTONE 25 MG/1
25 TABLET ORAL EVERY 24 HOURS
Status: DISCONTINUED | OUTPATIENT
Start: 2020-07-11 | End: 2020-07-15 | Stop reason: HOSPADM

## 2020-07-11 RX ADMIN — CYANOCOBALAMIN TAB 1000 MCG 1000 MCG: 1000 TAB at 08:14

## 2020-07-11 RX ADMIN — SPIRONOLACTONE 25 MG: 25 TABLET, FILM COATED ORAL at 17:59

## 2020-07-11 RX ADMIN — METOCLOPRAMIDE 5 MG: 5 TABLET ORAL at 07:48

## 2020-07-11 RX ADMIN — METOCLOPRAMIDE 5 MG: 5 TABLET ORAL at 10:53

## 2020-07-11 RX ADMIN — SODIUM CHLORIDE 1000 MG: 900 INJECTION, SOLUTION INTRAVENOUS at 05:18

## 2020-07-11 RX ADMIN — ATORVASTATIN CALCIUM 20 MG: 20 TABLET, FILM COATED ORAL at 08:14

## 2020-07-11 RX ADMIN — Medication 10 ML: at 21:30

## 2020-07-11 RX ADMIN — SUCRALFATE 1 G: 1 TABLET ORAL at 10:53

## 2020-07-11 RX ADMIN — ONDANSETRON 4 MG: 2 INJECTION INTRAMUSCULAR; INTRAVENOUS at 21:29

## 2020-07-11 RX ADMIN — FLUDROCORTISONE ACETATE 0.1 MG: 0.1 TABLET ORAL at 17:26

## 2020-07-11 RX ADMIN — MIDODRINE HYDROCHLORIDE 10 MG: 5 TABLET ORAL at 07:49

## 2020-07-11 RX ADMIN — Medication 10 ML: at 08:14

## 2020-07-11 RX ADMIN — METOCLOPRAMIDE 5 MG: 5 TABLET ORAL at 17:26

## 2020-07-11 RX ADMIN — SUCRALFATE 1 G: 1 TABLET ORAL at 21:29

## 2020-07-11 RX ADMIN — HYDROCODONE BITARTRATE AND ACETAMINOPHEN 1 TABLET: 7.5; 325 TABLET ORAL at 21:29

## 2020-07-11 RX ADMIN — HYDROCODONE BITARTRATE AND ACETAMINOPHEN 1 TABLET: 7.5; 325 TABLET ORAL at 10:53

## 2020-07-11 RX ADMIN — CLOPIDOGREL BISULFATE 75 MG: 75 TABLET ORAL at 08:14

## 2020-07-11 RX ADMIN — PANTOPRAZOLE SODIUM 40 MG: 40 TABLET, DELAYED RELEASE ORAL at 05:18

## 2020-07-11 RX ADMIN — ONDANSETRON 4 MG: 2 INJECTION INTRAMUSCULAR; INTRAVENOUS at 08:31

## 2020-07-11 RX ADMIN — HYDROCODONE BITARTRATE AND ACETAMINOPHEN 1 TABLET: 7.5; 325 TABLET ORAL at 01:17

## 2020-07-11 RX ADMIN — ASPIRIN 81 MG: 81 TABLET, COATED ORAL at 08:14

## 2020-07-11 RX ADMIN — SUCRALFATE 1 G: 1 TABLET ORAL at 17:26

## 2020-07-11 RX ADMIN — SUCRALFATE 1 G: 1 TABLET ORAL at 07:49

## 2020-07-11 RX ADMIN — THERA TABS 1 TABLET: TAB at 08:14

## 2020-07-11 NOTE — PLAN OF CARE
Patient came back from ablation and pacemaker placement. All checks have been uneventful and VS have been stable. Will continue to monitor.

## 2020-07-11 NOTE — ANESTHESIA POSTPROCEDURE EVALUATION
Patient: Shukri Park    Procedure Summary     Date:  07/10/20 Room / Location:  [place] CATH LAB 3 / [facility] CATH INVASIVE LOCATION    Anesthesia Start:  1643 Anesthesia Stop:  1939    Procedures:       Biventricular Device Insertion (N/A )      ABLATION AV NODE (N/A )      AV node ablation (N/A ) Diagnosis:       Ischemic cardiomyopathy      Persistent atrial fibrillation (CMS/HCC)      Chronic systolic congestive heart failure (CMS/HCC)      (Post biventricular ICD insertion without complications  Post AV node ablation without complications)    Provider:  Jonathan Denney MD Provider:  Irineo Kang MD    Anesthesia Type:  MAC ASA Status:  4          Anesthesia Type: MAC    Vitals  No vitals data found for the desired time range.          Post Anesthesia Care and Evaluation    Patient location during evaluation: PACU  Patient participation: complete - patient participated  Level of consciousness: awake  Pain management: adequate  Airway patency: patent  Anesthetic complications: No anesthetic complications  PONV Status: controlled  Cardiovascular status: acceptable  Respiratory status: acceptable  Hydration status: acceptable

## 2020-07-11 NOTE — PLAN OF CARE
Problem: Patient Care Overview  Goal: Plan of Care Review  Outcome: Ongoing (interventions implemented as appropriate)  Flowsheets (Taken 7/11/2020 6060)  Plan of Care Reviewed With: patient;family  Outcome Summary: Completed re-evaluation as pt is s/p pacemaker placement 7/10.  Pt requires min A to tranfser OOB, stand, up to chair.  Impaired standing balance and not able to use RW with pacemaker precautions.  Will try cane next session with goal to progress to ambulation.  Pt not at baseline, will benefit from cont PT and recommend IP rehab at d/c. PPE worn:  gloves, mask, goggles.

## 2020-07-11 NOTE — PROGRESS NOTES
Cardiology progress note  Jose D Palacios MD, PhD    Patient Care Team:  Nikki Gonzales MD as PCP - General (Family Medicine)    Chief complaint, uncontrolled atrial fibrillation now status post AV kelly ablation and by V ICD upgrade    SUBJECTIVE: Seen and examined, ICD site clean and dry, V paced rate 70 with underlying atrial fibrillation  Not on anticoagulation presently ,will restart tomorrow with apixaban twice daily  No chest pain overnight or angina  Ischemic cardiomyopathy notable, reviewed all cath films with unsuccessful wiring of the heavily calcified in-stent restenosis of the RCA.  Has residual proximal to mid LAD 50 to 60% with poststenotic dilatation at the takeoff of the first diagonal branch.  Also has in segment restenosis of the circumflex midportion.    Today  Restart anticoagulation in the morning with apixaban twice daily, no heparins to avoid hematoma formation in the pocket    Off Lewis-Synephrine presently, continue to observe in ICU overnight  No loss of capture, ICD working well backup rate 70s the paced presently.    Continue to optimize medicines for ischemic cardiomyopathy, creatinine 0.66 potassium of 4  Decrease midodrine to 5 every 8 hours and titrate off as not optimal and systolic heart failure  Aldactone 12.5 mg p.o. daily for heart failure is unable to start ACE inhibitor secondary to hypotension  Not presently on beta-blockers which can be considered with metoprolol XL low-dose 12.5 daily but would not start this acutely today with prior hypotension  Advance as tolerated by hemodynamics    REVIEW OF SYSTEMS: Some 14-point review of systems is negative except what is mentioned in the HPI relative to the current complaint.    Historical data copied forward from previous encounters and EMR is unchanged    PAST MEDICAL HISTORY:   Past Medical History:   Diagnosis Date   • A-fib (CMS/HCC)    • Aortic aneurysm (CMS/HCC)    • Appetite loss    • Cancer (CMS/HCC)     right lobe cancer  - surgically removed    • Dark stools    • Foot pain, bilateral    • Hyperlipidemia    • Low back pain    • S/P epidural steroid injection     Israel Gomes's - no relief   • Weight loss     acute loss of weight 30 lbs       ALLERGIES:   Allergies   Allergen Reactions   • Ciprofloxacin Anaphylaxis   • Amlodipine Unknown (See Comments)   • Rocephin [Ceftriaxone] Other (See Comments)     Mouth sores         PAST SURGICAL HISTORY:   Past Surgical History:   Procedure Laterality Date   • CARDIAC CATHETERIZATION N/A 7/9/2020    Procedure: LEFT HEART CATH with possible PCI;  Surgeon: Wade Cronin MD;  Location: [facility] CATH INVASIVE LOCATION;  Service: Cardiovascular;  Laterality: N/A;  Local and IV sedation   • CARDIAC CATHETERIZATION N/A 7/9/2020    Procedure: Percutaneous Coronary Intervention;  Surgeon: Wade Cronin MD;  Location: [facility] CATH INVASIVE LOCATION;  Service: Cardiovascular;  Laterality: N/A;   • CATARACT EXTRACTION, BILATERAL     • CORONARY ANGIOPLASTY WITH STENT PLACEMENT     • ENDOSCOPY N/A 7/5/2020    Procedure: ESOPHAGOGASTRODUODENOSCOPY;  Surgeon: Neal Correa MD;  Location: [facility] ENDOSCOPY;  Service: Gastroenterology;  Laterality: N/A;   • LUMBAR DECOMPRESSION  09/2015    L2-L3   • LUMBAR DECOMPRESSION  2006    Dr. Logan   • OTHER SURGICAL HISTORY  05/2015    left SI fusion with bone graft - Dr. Presley   • OTHER SURGICAL HISTORY      carotid artery repair    • OTHER SURGICAL HISTORY Left 02/19/2016    Left SI fusion 2/19/2016 Dr. Zendejas   • POSTERIOR SPINAL FUSION  10/24/2016    PSF T12-3/TLIF L3-L4 poss L2-L3 --- Dr. 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          FAMILY HISTORY:   Family History   Problem Relation Age of Onset   • Cancer Other    • Hyperlipidemia Other    • Heart disease Other          SOCIAL HISTORY:   Social History     Socioeconomic History   • Marital status: Legally      Spouse name: Not on file   • Number of children: Not on  file   • Years of education: Not on file   • Highest education level: Not on file   Tobacco Use   • Smoking status: Never Smoker   • Smokeless tobacco: Never Used   Substance and Sexual Activity   • Alcohol use: No     Frequency: Never   • Drug use: Never   • Sexual activity: Defer       CURRENT MEDICATIONS:     Current Facility-Administered Medications:   •  acetaminophen (TYLENOL) tablet 650 mg, 650 mg, Oral, Q4H PRN, 650 mg at 07/08/20 1311 **OR** acetaminophen (TYLENOL) 160 MG/5ML solution 650 mg, 650 mg, Oral, Q4H PRN **OR** acetaminophen (TYLENOL) suppository 650 mg, 650 mg, Rectal, Q4H PRN, Jonathan Denney MD  •  aluminum-magnesium hydroxide-simethicone (MAALOX MAX) 400-400-40 MG/5ML suspension 15 mL, 15 mL, Oral, Q6H PRN, Jonathan Denney MD  •  aspirin EC tablet 81 mg, 81 mg, Oral, Daily, Jonathan Denney MD, 81 mg at 07/11/20 0814  •  atorvastatin (LIPITOR) tablet 20 mg, 20 mg, Oral, Daily, Jonathan Denney MD, 20 mg at 07/11/20 0814  •  atropine sulfate injection 0.5-1 mg, 0.5-1 mg, Intravenous, Q5 Min PRN, Jonathan Denney MD  •  bisacodyl (DULCOLAX) EC tablet 5 mg, 5 mg, Oral, Daily PRN, Jonathan Denney MD  •  clopidogrel (PLAVIX) tablet 75 mg, 75 mg, Oral, Daily, Jonathan Denney MD, 75 mg at 07/11/20 0814  •  dextrose (D50W) 25 g/ 50mL Intravenous Solution 25 g, 25 g, Intravenous, Q15 Min PRN, Jonathan Denney MD  •  dextrose (GLUTOSE) oral gel 15 g, 15 g, Oral, Q15 Min PRN, Jonathan Denney MD  •  fludrocortisone tablet 0.1 mg, 0.1 mg, Oral, Q PM, Jonathan Denney MD, 0.1 mg at 07/10/20 2253  •  glucagon (human recombinant) (GLUCAGEN DIAGNOSTIC) injection 1 mg, 1 mg, Subcutaneous, Q15 Min PRN, Jonathan Denney MD  •  HYDROcodone-acetaminophen (NORCO) 7.5-325 MG per tablet 1 tablet, 1 tablet, Oral, Q6H PRN, Jonathan Denney MD, 1 tablet at 07/11/20 1053  •  insulin lispro (humaLOG) injection 0-7 Units, 0-7 Units, Subcutaneous, 4x Daily With Meals & Nightly,  Stopped at 07/11/20 0750 **AND** insulin lispro (humaLOG) injection 0-7 Units, 0-7 Units, Subcutaneous, PRN, Jonathan Denney MD  •  ipratropium-albuterol (DUO-NEB) nebulizer solution 3 mL, 3 mL, Nebulization, Q6H PRN, Jonathan Denney MD  •  magnesium hydroxide (MILK OF MAGNESIA) suspension 2400 mg/10mL 10 mL, 10 mL, Oral, Daily PRN, Jonathan Denney MD  •  Magnesium Sulfate 2 gram Bolus, followed by 8 gram infusion (total Mg dose 10 grams)- Mg less than or equal to 1mg/dL, 2 g, Intravenous, PRN **OR** Magnesium Sulfate 2 gram / 50mL Infusion (GIVE X 3 BAGS TO EQUAL 6GM TOTAL DOSE) - Mg 1.1 - 1.5 mg/dl, 2 g, Intravenous, PRN **OR** Magnesium Sulfate 4 gram infusion- Mg 1.6-1.9 mg/dL, 4 g, Intravenous, PRN, Jonathan Denney MD, Last Rate: 25 mL/hr at 07/10/20 0504, 4 g at 07/10/20 0504  •  melatonin tablet 5 mg, 5 mg, Oral, Nightly PRN, Jonathan Denney MD, 5 mg at 07/08/20 2129  •  metoclopramide (REGLAN) tablet 5 mg, 5 mg, Oral, TID AC, Jonathan Denney MD, 5 mg at 07/11/20 1053  •  midodrine (PROAMATINE) tablet 10 mg, 10 mg, Oral, Q8H, Jonathan Denney MD, 10 mg at 07/11/20 0749  •  Morphine sulfate (PF) injection 2 mg, 2 mg, Intravenous, Q4H PRN, Jonathan Denney MD, 2 mg at 07/09/20 0942  •  ondansetron (ZOFRAN) injection 4 mg, 4 mg, Intravenous, Q6H PRN, Jonathan Denney MD  •  [DISCONTINUED] ondansetron (ZOFRAN) tablet 4 mg, 4 mg, Oral, Q6H PRN **OR** ondansetron (ZOFRAN) injection 4 mg, 4 mg, Intravenous, Q6H, Jonathan Denney MD, Stopped at 07/11/20 1535  •  pantoprazole (PROTONIX) EC tablet 40 mg, 40 mg, Oral, Q AM, Jonathan Denney MD, 40 mg at 07/11/20 0518  •  potassium chloride (K-DUR,KLOR-CON) CR tablet 40 mEq, 40 mEq, Oral, PRN, 40 mEq at 07/08/20 0508 **OR** potassium chloride (KLOR-CON) packet 40 mEq, 40 mEq, Oral, PRN, 40 mEq at 07/06/20 0340 **OR** potassium chloride 10 mEq in 100 mL IVPB, 10 mEq, Intravenous, Q1H PRN, Jonathan Denney MD, Last Rate: 100 mL/hr  at 07/03/20 1514, 10 mEq at 07/03/20 1514  •  potassium phosphate 45 mmol in sodium chloride 0.9 % 500 mL infusion, 45 mmol, Intravenous, PRN **OR** potassium phosphate 30 mmol in sodium chloride 0.9 % 250 mL infusion, 30 mmol, Intravenous, PRN **OR** potassium phosphate 15 mmol in sodium chloride 0.9 % 100 mL infusion, 15 mmol, Intravenous, PRN, 15 mmol at 07/10/20 0504 **OR** sodium phosphates 45 mmol in sodium chloride 0.9 % 500 mL IVPB, 45 mmol, Intravenous, PRN **OR** sodium phosphates 30 mmol in sodium chloride 0.9 % 250 mL IVPB, 30 mmol, Intravenous, PRN **OR** sodium phosphates 15 mmol in sodium chloride 0.9 % 250 mL IVPB, 15 mmol, Intravenous, PRN, Jonathan Denney MD  •  promethazine (PHENERGAN) IVPB 12.5 mg, 12.5 mg, Intravenous, Q6H PRN, Jonathan Denney MD, 12.5 mg at 07/03/20 1003  •  sodium chloride 0.9 % bolus 250 mL, 250 mL, Intravenous, Once, Jonathan Denney MD  •  [COMPLETED] Insert peripheral IV, , , Once **AND** sodium chloride 0.9 % flush 10 mL, 10 mL, Intravenous, PRN, Jonathan Denney MD  •  sodium chloride 0.9 % flush 10 mL, 10 mL, Intravenous, Q12H, Jonathan Denney MD, 10 mL at 07/11/20 0814  •  sucralfate (CARAFATE) tablet 1 g, 1 g, Oral, 4x Daily AC & at Bedtime, Jonathan Denney MD, 1 g at 07/11/20 1053  •  Thera tablet 1 tablet, 1 tablet, Oral, Daily, Jonathan Denney MD, 1 tablet at 07/11/20 0814  •  vitamin B-12 (CYANOCOBALAMIN) tablet 1,000 mcg, 1,000 mcg, Oral, Daily, Jonathan Denney MD, 1,000 mcg at 07/11/20 0814      DIAGNOSTIC DATA:     I reviewed the patient's new clinical results.    Lab Results (last 24 hours)     Procedure Component Value Units Date/Time    Blood Culture - Blood, Hand, Right [[id number]] Collected:  07/11/20 1353    Specimen:  Blood from Hand, Right Updated:  07/11/20 1418    Blood Culture - Blood, Blood, PICC Line [[id number]] Collected:  07/11/20 1353    Specimen:  Blood, PICC Line Updated:  07/11/20 1417    POC Glucose Once  [[id number]]  (Normal) Collected:  07/11/20 1148    Specimen:  Blood Updated:  07/11/20 1151     Glucose 97 mg/dL      Comment: Serial Number: [id number]:  [id number]       BUN [[id number]]  (Normal) Collected:  07/11/20 0405    Specimen:  Blood Updated:  07/11/20 1135     BUN 13 mg/dL     Basic Metabolic Panel [[id number]]  (Abnormal) Collected:  07/11/20 0405    Specimen:  Blood Updated:  07/11/20 0829     Glucose 177 mg/dL      BUN --     Comment: Testing performed by alternate method        Creatinine 0.66 mg/dL      Sodium 141 mmol/L      Potassium 4.0 mmol/L      Chloride 103 mmol/L      CO2 27.0 mmol/L      Calcium 8.1 mg/dL      eGFR Non African Amer 116 mL/min/1.73      BUN/Creatinine Ratio --     Comment: Testing not performed        Anion Gap 11.0 mmol/L     Narrative:       GFR Normal >60  Chronic Kidney Disease <60  Kidney Failure <15      POC Glucose Once [[id number]]  (Abnormal) Collected:  07/11/20 0752    Specimen:  Blood Updated:  07/11/20 0754     Glucose 139 mg/dL      Comment: Serial Number: [id number]:  [id number]       Magnesium [[id number]]  (Normal) Collected:  07/11/20 0405    Specimen:  Blood Updated:  07/11/20 0426     Magnesium 2.1 mg/dL     Phosphorus [[id number]]  (Normal) Collected:  07/11/20 0405    Specimen:  Blood Updated:  07/11/20 0426     Phosphorus 2.6 mg/dL     CBC & Differential [[id number]] Collected:  07/11/20 0405    Specimen:  Blood Updated:  07/11/20 0411    Narrative:       The following orders were created for panel order CBC & Differential.  Procedure                               Abnormality         Status                     ---------                               -----------         ------                     CBC Auto Differential[[id number]]        Abnormal            Final result                 Please view results for these tests on the individual orders.    CBC Auto Differential [[id number]]  (Abnormal) Collected:  07/11/20 0405    Specimen:  Blood Updated:   07/11/20 0411     WBC 12.00 10*3/mm3      RBC 3.68 10*6/mm3      Hemoglobin 11.2 g/dL      Hematocrit 33.4 %      MCV 90.7 fL      MCH 30.5 pg      MCHC 33.6 g/dL      RDW 14.9 %      RDW-SD 47.3 fl      MPV 8.3 fL      Platelets 212 10*3/mm3      Neutrophil % 82.7 %      Lymphocyte % 6.4 %      Monocyte % 10.8 %      Eosinophil % 0.0 %      Basophil % 0.1 %      Neutrophils, Absolute 10.00 10*3/mm3      Lymphocytes, Absolute 0.80 10*3/mm3      Monocytes, Absolute 1.30 10*3/mm3      Eosinophils, Absolute 0.00 10*3/mm3      Basophils, Absolute 0.00 10*3/mm3      nRBC 0.0 /100 WBC     Blood Culture - Blood, Blood, PICC Line [[id number]] Collected:  07/08/20 2147    Specimen:  Blood, PICC Line Updated:  07/10/20 2245     Blood Culture No growth at 2 days          Imaging Results (Last 24 Hours)     Procedure Component Value Units Date/Time    XR Chest 1 View [[id number]] Collected:  07/10/20 2104     Updated:  07/10/20 2108    Narrative:       XR CHEST 1 VW-     Date of Exam: 7/10/2020 8:45 PM     Indication: Status post pacemaker placement.     Comparison Exams: 07/08/2020     Technique: Single AP chest radiograph     FINDINGS:  A left subclavian pacemaker is in place. No pneumothorax is identified.  Mild hazy opacities bilaterally appear similar to prior exam. The heart  and mediastinal contours appear normal. The osseous structures appear  intact.       Impression:       1.Left subclavian pacemaker in place. No pneumothorax identified.  2.Mild bilateral hazy opacities appear unchanged, which could represent  mild pneumonia or pulmonary edema.     Electronically Signed By-DR. Clint Dotson MD On:7/10/2020 9:06 PM  This report was finalized on [id number] by DR. Clint Dotson MD.          Xray reviewed personally by physician.      ECG reviewed personally by physician  ECG/EMG Results (most recent)     Procedure Component Value Units Date/Time    Adult Transthoracic Echo Complete W/ Cont if Necessary Per  Protocol [[id number]] Collected:  07/02/20 0653     Updated:  07/02/20 1409     BSA 1.9 m^2      RVIDd 2.1 cm      IVSd 1.1 cm      LVIDd 5.6 cm      LVIDs 4.6 cm      LVPWd 1.3 cm      IVS/LVPW 0.84     FS 17.6 %      EDV(Teich) 155.9 ml      ESV(Teich) 99.5 ml      EF(Teich) 36.2 %      EDV(cubed) 179.0 ml      ESV(cubed) 100.1 ml      EF(cubed) 44.1 %      LV mass(C)d 276.4 grams      LV mass(C)dI 142.4 grams/m^2      SV(Teich) 56.4 ml      SI(Teich) 29.1 ml/m^2      SV(cubed) 78.9 ml      SI(cubed) 40.6 ml/m^2      Ao root diam 4.0 cm      Ao root area 12.8 cm^2      ACS 2.1 cm      asc Aorta Diam 3.7 cm      LVOT diam 2.5 cm      LVOT area 5.1 cm^2      RVOT diam 2.1 cm      RVOT area 3.6 cm^2      EDV(MOD-sp4) 68.5 ml      ESV(MOD-sp4) 50.1 ml      EF(MOD-sp4) 26.8 %      SV(MOD-sp4) 18.4 ml      SI(MOD-sp4) 9.5 ml/m^2      Ao root area (BSA corrected) 2.1     LV Dunlap Vol (BSA corrected) 35.3 ml/m^2      LV Sys Vol (BSA corrected) 25.8 ml/m^2      Aortic R-R 0.4 sec      Aortic .6 BPM      MV V2 max 90.3 cm/sec      MV max PG 3.3 mmHg      MV V2 mean 63.4 cm/sec      MV mean PG 1.8 mmHg      MV V2 VTI 9.6 cm      MVA(VTI) 6.7 cm^2      Ao pk sharmila 78.3 cm/sec      Ao max PG 2.5 mmHg      Ao max PG (full) -0.26 mmHg      Ao V2 mean 61.8 cm/sec      Ao mean PG 1.7 mmHg      Ao mean PG (full) 0.39 mmHg      Ao V2 VTI 11.3 cm      AISHWARYA(I,A) 5.7 cm^2      AISHWARYA(I,D) 5.7 cm^2      AISHWARYA(V,A) 5.3 cm^2      AISHWARYA(V,D) 5.3 cm^2      LV V1 max PG 2.7 mmHg      LV V1 mean PG 1.3 mmHg      LV V1 max 82.3 cm/sec      LV V1 mean 53.0 cm/sec      LV V1 VTI 12.7 cm      MR max sharmila 453.8 cm/sec      MR max PG 82.4 mmHg      CO(Ao) 22.0 l/min      CI(Ao) 11.3 l/min/m^2      SV(Ao) 144.9 ml      SI(Ao) 74.6 ml/m^2      CO(LVOT) 9.8 l/min      CI(LVOT) 5.0 l/min/m^2      SV(LVOT) 64.6 ml      SV(RVOT) 42.6 ml      SI(LVOT) 33.3 ml/m^2      PA V2 max 77.1 cm/sec      PA max PG 2.4 mmHg      PA max PG (full) 0.22 mmHg      PA V2 mean  50.6 cm/sec      PA mean PG 1.2 mmHg      PA mean PG (full) 0.15 mmHg      PA V2 VTI 9.1 cm      PVA(I,A) 4.7 cm^2       CV ECHO TARYN - PVA(I,D) 4.7 cm^2       CV ECHO TARYN - PVA(V,A) 3.4 cm^2       CV ECHO TARYN - PVA(V,D) 3.4 cm^2      PA acc time 0.06 sec      RV V1 max PG 2.2 mmHg      RV V1 mean PG 1.1 mmHg      RV V1 max 73.4 cm/sec      RV V1 mean 46.7 cm/sec      RV V1 VTI 11.9 cm      TR max sharmila 285.8 cm/sec      RVSP(TR) 35.7 mmHg      RAP systole 3.0 mmHg      PA pr(Accel) 49.9 mmHg      Qp/Qs 0.66      CV ECHO TARYN - BZI_BMI 18.7 kilograms/m^2       CV ECHO TARYN - BSA(HAYCOCK) 1.9 m^2       CV ECHO TARYN - BZI_METRIC_WEIGHT 68.0 kg       CV ECHO TARYN - BZI_METRIC_HEIGHT 190.5 cm      EF(MOD-bp) 27.0 %      LA dimension(2D) 4.6 cm     Narrative:       · Left ventricular systolic function is severely decreased.  · Left ventricular wall thickness is consistent with mild concentric   hypertrophy.  · Left atrial cavity size is moderately dilated.  · Mild-to-moderate mitral valve regurgitation is present  · Mild tricuspid valve regurgitation is present.  · Mild aortic valve regurgitation is present.       ECG 12 Lead [[id number]] Collected:  07/03/20 0945     Updated:  07/03/20 0947    Narrative:       HEART RATE= 106  bpm  RR Interval= 568  ms  RI Interval= 196  ms  P Horizontal Axis= 48  deg  P Front Axis= 0  deg  QRSD Interval= 95  ms  QT Interval= 298  ms  QRS Axis= -18  deg  T Wave Axis= 14  deg  - ABNORMAL ECG -  Sinus tachycardia  Multiple premature complexes, vent & supraven  When compared with ECG of 01-Jul-2020 23:40:10,  Significant rate decrease  Electronically Signed By:   Date and Time of Study: 2020-07-03 09:45:28    ECG 12 Lead [[id number]] Collected:  07/01/20 2340     Updated:  07/03/20 1140    Narrative:       HEART RATE= 163  bpm  RR Interval= 368  ms  RI Interval=   ms  P Horizontal Axis=   deg  P Front Axis=   deg  QRSD Interval= 78  ms  QT Interval= 283  ms  QRS Axis= 7  deg  T  Wave Axis= 42  deg  - ABNORMAL ECG -  Atrial fibrillation with rapid V-rate  No previous ECG available for comparison  Electronically Signed By: Sunny Unger (MILENA) 03-Jul-2020 11:38:35  Date and Time of Study: 2020-07-01 23:40:10    ECG 12 Lead [[id number]] Collected:  07/03/20 2021     Updated:  07/03/20 2023    Narrative:       HEART RATE= 133  bpm  RR Interval= 449  ms  IL Interval=   ms  P Horizontal Axis=   deg  P Front Axis=   deg  QRSD Interval= 92  ms  QT Interval= 296  ms  QRS Axis= -17  deg  T Wave Axis= -11  deg  - ABNORMAL ECG -  Atrial fibrillation  Ventricular premature complex  Probable lateral infarct, old  Electronically Signed By:   Date and Time of Study: 2020-07-03 20:21:30    ECG 12 Lead [[id number]] Collected:  07/04/20 0607     Updated:  07/04/20 0609    Narrative:       HEART RATE= 102  bpm  RR Interval= 589  ms  IL Interval=   ms  P Horizontal Axis=   deg  P Front Axis=   deg  QRSD Interval= 95  ms  QT Interval= 334  ms  QRS Axis= -21  deg  T Wave Axis= 3  deg  - ABNORMAL ECG -  Atrial fibrillation  Ventricular premature complex  Electronically Signed By:   Date and Time of Study: 2020-07-04 06:07:51    ECG 12 Lead [[id number]] Collected:  07/05/20 0709     Updated:  07/05/20 0710    Narrative:       HEART RATE= 113  bpm  RR Interval= 528  ms  IL Interval=   ms  P Horizontal Axis=   deg  P Front Axis=   deg  QRSD Interval= 89  ms  QT Interval= 321  ms  QRS Axis= -36  deg  T Wave Axis= 0  deg  - ABNORMAL ECG -  Atrial fibrillation  Ventricular premature complex  Left axis deviation  When compared with ECG of 04-Jul-2020 6:07:51,  No significant change  Electronically Signed By:   Date and Time of Study: 2020-07-05 07:09:38    ECG 12 Lead [[id number]] Collected:  07/05/20 2052     Updated:  07/05/20 2057    Narrative:       HEART RATE= 114  bpm  RR Interval= 524  ms  IL Interval=   ms  P Horizontal Axis=   deg  P Front Axis=   deg  QRSD Interval= 96  ms  QT Interval= 312  ms  QRS Axis= -28  deg  T  Wave Axis= -12  deg  - ABNORMAL ECG -  Atrial fibrillation  Ventricular premature complex  Borderline T abnormalities, diffuse leads  When compared with ECG of 05-Jul-2020 7:09:38,  Significant axis, voltage or hypertrophy change  Electronically Signed By:   Date and Time of Study: 2020-07-05 20:52:58    ECG 12 Lead [[id number]] Collected:  07/06/20 0609     Updated:  07/06/20 0610    Narrative:       HEART RATE= 97  bpm  RR Interval= 619  ms  UT Interval=   ms  P Horizontal Axis=   deg  P Front Axis=   deg  QRSD Interval= 91  ms  QT Interval= 338  ms  QRS Axis= -22  deg  T Wave Axis= -5  deg  - ABNORMAL ECG -  Atrial fibrillation  Ventricular premature complex  When compared with ECG of 05-Jul-2020 20:52:58,  Significant repolarization change  Electronically Signed By:   Date and Time of Study: 2020-07-06 06:09:24    ECG 12 Lead [[id number]] Collected:  07/09/20 2035     Updated:  07/09/20 2036    Narrative:       HEART RATE= 61  bpm  RR Interval= 983  ms  UT Interval=   ms  P Horizontal Axis=   deg  P Front Axis=   deg  QRSD Interval= 94  ms  QT Interval= 382  ms  QRS Axis= 37  deg  T Wave Axis= -43  deg  - ABNORMAL ECG -  Atrial fibrillation  Electronically Signed By:   Date and Time of Study: 2020-07-09 20:35:36    EP/CRM Study [[id number]] Resulted:  07/10/20 1945     Updated:  07/10/20 1945    Narrative:       Procedure indication--- uncontrollable atrial fibrillation with chronic   class III systolic heart failure and severe ischemic cardiomyopathy with   no further targets for revascularization with EF less than 35% and   iatrogenic left bundle branch block and patient recommended biventricular   ICD insertion and AV node ablation for optimization    Sedation anesthesia and vancomycin before procedure    Procedures done  #1 implantation of a biventricular ICD system with placement of   biventricular ICD generator and placement of right ventricle ICD lead   placement of a coronary sinus lead  #2 coronary sinus  venography with placement of a complex coronary sinus   lead  #3-lead testing and fluoroscopy  #4 biventricular ICD interrogation    Procedure note  Obtaining valid consent patient was draped in sterile fashion the left   infraclavicular area was infiltrated local anesthesia and a small incision   was made and hemostasis was acquired by cautery and gentle dissection was   done and a pocket was made in the prepectoral fascia area and by using   micropuncture kit subclavian vein was easily cannulated with placement of   2J wires for placement of leads.  Right ventricular ICD lead was placed in the right ventricle apex after   mapping right ventricle at multiple locations via 8 [place] venous sheath   and after testing the lead the sheath was removed  For placement of left ventricle coronary sinus lead a short 10 [place]   venous sheath was used and long peelable sheath manufactured by changed   company was used with the help of supra CS and later a deflectable   decapolar catheter cannulate coronary sinus with coronary sinus   venography.  Patient had an ideal mid posterior lateral branch--cannulation was done   however diaphragmatic stimulation was noted with a quadripolar lead and   multiple other branches were mapped with poor thresholds  A unipolar lead was used and again mapping of multiple location was done   including distal lateral branch and great cardiac vein with poor   thresholds and henceforth after trying multiple places recanalization of   the mid posterior lateral branch was done with adequate capture with a   narrow margin for diaphragmatic stimulation  Once the lead was tested multiple times the sheaths were removed and the   leads were anchored to underlying muscular area and the pocket was copious   irrigated with antibiotic solution and ICD generator was taken and the   leads were anchored to the generator generator leads were placed in the   pocket and incision was closed in several layers without  any immediate   procedure related complications and biventricular programming attached to   chart.   ICD generator is manufactured by [employer] and model number is   G124 and serial #[id number]  Right ventricular ICD lead is manufactured by [employer] model 0673   and she #[id number]  LV lead is  [employer] model 4592 and serial number is   [id number]  Right ventricular sensing is more than 10 mV with threshold less than 1 V  Left ventricle lead threshold is 2.5 V at 1.5 ms    Plan  Proceed with AV node ablation  Eliquis can be resumed tomorrow  Digoxin can be stopped   Electronically signed by Jonathan Denney MD, 07/10/20, 7:16 PM.    EP/CRM Study [[id number]] Resulted:  07/10/20 1945     Updated:  07/10/20 1945    Narrative:       Procedure indication--uncontrollable rapid atrial fibrillation associated   with hypotension , biventricular ICD in situ, severe dilated   cardiomyopathy, class 3 systolic heart failure--patient recommended AV   node ablation after biventricular ICD insertion which was done this   evening     Sedation by anesthesia      Procedures done  1. Radiofrequency ablation of compact AV node  2.  Biventricular ICD reprogramming and interrogation  3. Fluoroscopy  4.  EP study with recording of right atrium, right ventricle and HIS   without induction of arrhythmias  5.  3D mapping with Carto system    Procedure note  After obtaining a valid consent patient was draped in sterile fashion and   local anesthesia was given in the right groin in the right common femoral   vein was cannulated with placement of a 8 [place] venous sheath.  An 8 mm   Biosense Spence catheter was taken and mapping of right atrium right   ventricle and compact AV kelly areas was done--single radiofrequency   ablation was done with HIS cloudy was noted with immediate complete AV   block   Post ablation device was reprogrammed to a baseline rate of 70 beats per   minute without any complications and  "catheter removed    Findings  HV interval of 54 milliseconds  Underlying appropriately functioning biventricular ICD system  Successful radiofrequency ablation of compact AV node with escape rate   below 30 beats per minute    Plan  Patient can be transferred back to ICU  Currently device programmed to RV mode only because of infrequent   diaphragmatic stimulation  ICD programming attached to chart    Electronically signed by Jonathan Denney MD, 07/10/20, 7:36 PM.              PHYSICAL EXAMINATION:  VITAL SIGNS: Temp:  [97.4 °F (36.3 °C)-98 °F (36.7 °C)] 97.8 °F (36.6 °C)  Heart Rate:  [70-71] 71  BP: (113-148)/(72-99) 133/73   Flowsheet Rows      First Filed Value   Admission Height  185.4 cm (73\") Documented at 07/01/2020 2336   Admission Weight  68 kg (150 lb) Documented at 07/01/2020 2336        GENERAL: Alert and oriented x3, afebrile.  Alert and oriented.  Awake alert vital signs stable, appeared comfortable, nondistressed, and appears stated age. Generalized weakness. Responds to questions appropriately.   HEENT: Normocephalic, atraumatic. Pupils equal, round . Extraocular movements intact bilaterally. Trachea midline.  Mucous membranes are moist.   NECK: Supple. No JVD. Trachea midline, no LAD  CHEST: Clear to auscultation bilaterally anteriorly; no rale, rhonchi, or wheezes  HEART: Regular rate and rhythm.  V paced.  No rubs, murmurs or gallops.  Incision clean and dry  ABDOMEN: Soft, nontender, nondistended. Bowel sounds are otherwise positive.   EXTREMITIES: No clubbing, cyanosis, or edema. Moves all extremities  SKIN: Warm and dry. No ecchymoses, petechiae or rashes.   MUSCULOSKELETAL: No bony abnormalities. Range of motion normal. No crepitus. No joint swelling or erythema.   NEUROLOGIC: Cranial nerves II-XII intact bilaterally. No focal neurologic deficits. Mini-Mental status exam was deferred.   LYMPHATICS: No lymphadenopathy.     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 daily  Wean midodrine as not optimal with systolic heart failure with afterload increasing  Can consider digoxin therapy with systolic heart failure, 0.125 mg p.o. daily, especially in light of inability to get optimal afterload reduction such as ACE inhibitor or ARB.  Reecho in 3 months for interval improvement in heart and cardiac function with rate control of atrial fibrillation which was previously uncontrolled with rates  Low-dose beta-blocker when able, calcium channel blockers contraindicated with systolic heart failure  We will defer ACE inhibitor Entresto or ARB at this time    Continue supportive care presently  Blood pressure somewhat improved  Taper midodrine  Advanced heart failure therapies    We will see tomorrow    Jose D Palacios MD, PhD    Jose D Palacios MD  07/11/20  16:40    New patient today with new problems above    Current 35-minute spent face-to-face with the patient with extensive chart review, coordination care on the floor with direct patient interaction today.

## 2020-07-11 NOTE — THERAPY RE-EVALUATION
Patient Name: Shukri Park  : 1940    MRN: [id number]                              Today's Date: 2020       Admit Date: 2020    Visit Dx:     ICD-10-CM ICD-9-CM   1. Atrial fibrillation with rapid ventricular response (CMS/HCC) I48.91 427.31   2. Congestive heart failure, unspecified HF chronicity, unspecified heart failure type (CMS/HCC) I50.9 428.0   3. Pneumonia of both lungs due to infectious organism, unspecified part of lung J18.9 483.8   4. Nausea vomiting and diarrhea R11.2 787.91    R19.7 787.01   5. Ischemic cardiomyopathy I25.5 414.8   6. Persistent atrial fibrillation (CMS/HCC) I48.19 427.31   7. Chronic systolic congestive heart failure (CMS/HCC) I50.22 428.22     428.0   8. Longstanding persistent atrial fibrillation (CMS/HCC) I48.11 427.31   9. Permanent atrial fibrillation (CMS/HCC) I48.21 427.31   10. Coronary artery disease involving native coronary artery of native heart without angina pectoris I25.10 414.01     Patient Active Problem List   Diagnosis   • Atrial fibrillation (CMS/HCC)   • CAD (coronary artery disease)   • Chronic anticoagulation   • Hyperlipidemia   • Presence of stent in right coronary artery   • Chronic pain   • Sepsis (CMS/HCC)   • Congestive heart failure (CHF) (CMS/HCC)   • Nausea vomiting and diarrhea   • Ischemic cardiomyopathy   • Persistent atrial fibrillation (CMS/HCC)   • Chronic systolic congestive heart failure (CMS/HCC)   • Permanent atrial fibrillation (CMS/HCC)   • Coronary artery disease involving native coronary artery of native heart without angina pectoris     Past Medical History:   Diagnosis Date   • A-fib (CMS/HCC)    • Aortic aneurysm (CMS/HCC)    • Appetite loss    • Cancer (CMS/HCC)     right lobe cancer - surgically removed    • Dark stools    • Foot pain, bilateral    • Hyperlipidemia    • Low back pain    • S/P epidural steroid injection     Israel Gomes's - no relief   • Weight loss     acute loss of weight 30 lbs     Past  Surgical History:   Procedure Laterality Date   • CARDIAC CATHETERIZATION N/A 7/9/2020    Procedure: LEFT HEART CATH with possible PCI;  Surgeon: Wade Cronin MD;  Location: [facility] CATH INVASIVE LOCATION;  Service: Cardiovascular;  Laterality: N/A;  Local and IV sedation   • CARDIAC CATHETERIZATION N/A 7/9/2020    Procedure: Percutaneous Coronary Intervention;  Surgeon: Wade Cronin MD;  Location: [facility] CATH INVASIVE LOCATION;  Service: Cardiovascular;  Laterality: N/A;   • CATARACT EXTRACTION, BILATERAL     • CORONARY ANGIOPLASTY WITH STENT PLACEMENT     • ENDOSCOPY N/A 7/5/2020    Procedure: ESOPHAGOGASTRODUODENOSCOPY;  Surgeon: Neal Correa MD;  Location: [facility] ENDOSCOPY;  Service: Gastroenterology;  Laterality: N/A;   • LUMBAR DECOMPRESSION  09/2015    L2-L3   • LUMBAR DECOMPRESSION  2006    Dr. Logan   • OTHER SURGICAL HISTORY  05/2015    left SI fusion with bone graft - Dr. Presley   • OTHER SURGICAL HISTORY      carotid artery repair    • OTHER SURGICAL HISTORY Left 02/19/2016    Left SI fusion 2/19/2016 Dr. Zendejas   • POSTERIOR SPINAL FUSION  10/24/2016    PSF T12-3/TLIF L3-L4 poss L2-L3 --- Dr. 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     General Information     [facility] Name 07/11/20 1719          PT Evaluation Time/Intention    Document Type  re-evaluation  -     Mode of Treatment  physical therapy  -[facility] Name 07/11/20 1719          General Information    Patient Profile Reviewed?  yes  -     Existing Precautions/Restrictions  fall;pacemaker  -JH     Row Name 07/11/20 1719          Cognitive Assessment/Intervention- PT/OT    Orientation Status (Cognition)  oriented to;person;place;verbal cues/prompts needed for orientation  -     Cognitive Assessment/Intervention Comment  mild confusion initially, needs some redirection  -[facility] Name 07/11/20 1719          Safety Issues, Functional Mobility    Safety Issues Affecting Function (Mobility)  insight into  deficits/self awareness;safety precautions follow-through/compliance  -     Impairments Affecting Function (Mobility)  endurance/activity tolerance;strength;balance  -       User Key  (r) = Recorded By, (t) = Taken By, (c) = Cosigned By    Initials Name Provider Type     Mary Ann Lewis, SARA Physical Therapist        Mobility     Row Name 07/11/20 1720          Bed Mobility Assessment/Treatment    Bed Mobility Assessment/Treatment  supine-sit  -     Supine-Sit [place] (Bed Mobility)  minimum assist (75% patient effort);1 person assist  -     Assistive Device (Bed Mobility)  head of bed elevated  -[facility] Name 07/11/20 1720          Bed-Chair Transfer    Bed-Chair [place] (Transfers)  minimum assist (75% patient effort);1 person assist  -[facility] Name 07/11/20 1720          Sit-Stand Transfer    Sit-Stand [place] (Transfers)  minimum assist (75% patient effort);1 person assist  -[facility] Name 07/11/20 1720          Gait/Stairs Assessment/Training    Comment (Gait/Stairs)  may try SPC or QC as not able to use walker with recent pacemaker.    -[facility] Name 07/11/20 1720          Mobility Assessment/Intervention    Extremity Weight-bearing Status  left upper extremity restricted due to pacemaker placement  -       User Key  (r) = Recorded By, (t) = Taken By, (c) = Cosigned By    Initials Name Provider Type     Mary Ann Lewis PT Physical Therapist        Obj/Interventions     [facility] Name 07/11/20 1721          General ROM    GENERAL ROM COMMENTS  aware of L shoulder post pacemaker restrictions.  LE AROM WFLs  -[facility] Name 07/11/20 1721          MMT (Manual Muscle Testing)    General MMT Comments  gross LEs 4/5  -[facility] Name 07/11/20 1721          Static Sitting Balance    Level of [place] (Unsupported Sitting, Static Balance)  supervision  -     Sitting Position (Unsupported Sitting, Static Balance)  sitting on edge of bed  -     Time Able to Maintain Position  (Unsupported Sitting, Static Balance)  more than 5 minutes  -JH     Row Name 07/11/20 1721          Dynamic Sitting Balance    Level of [place], Reaches Outside Midline (Sitting, Dynamic Balance)  contact guard assist  -     Sitting Position, Reaches Outside Midline (Sitting, Dynamic Balance)  sitting on edge of bed  -JH     Row Name 07/11/20 1721          Static Standing Balance    Level of [place] (Supported Standing, Static Balance)  minimal assist, 75% patient effort;1 person assist  -     Time Able to Maintain Position (Supported Standing, Static Balance)  1 to 2 minutes  -JH     Row Name 07/11/20 1721          Dynamic Standing Balance    Level of [place], Reaches Outside Midline (Standing, Dynamic Balance)  minimal assist, 75% patient effort;1 person assist  -     Comment, Reaches Outside Midline (Standing, Dynamic Balance)  challenged with moving away from GEETHA  -JH     Row Name 07/11/20 1721          Sensory Assessment/Intervention    Sensory General Assessment  no sensation deficits identified  -       User Key  (r) = Recorded By, (t) = Taken By, (c) = Cosigned By    Initials Name Provider Type     Mary Ann Lewis, PT Physical Therapist        Goals/Plan     Row Name 07/11/20 1726          Bed Mobility Goal 1 (PT)    Progress/Outcomes (Bed Mobility Goal 1, PT)  goal ongoing  -JH     Row Name 07/11/20 1726          Transfer Goal 1 (PT)    Progress/Outcome (Transfer Goal 1, PT)  goal ongoing  -JH     Row Name 07/11/20 1726          Gait Training Goal 1 (PT)    Progress/Outcome (Gait Training Goal 1, PT)  goal [facility]       User Key  (r) = Recorded By, (t) = Taken By, (c) = Cosigned By    Initials Name Provider Type     Mary Ann Lewis, PT Physical Therapist        Clinical Impression     Row Name 07/11/20 1723          Pain Scale: FACES Pre/Post-Treatment    Pain: FACES Scale, Pretreatment  2-->hurts little bit  -     Pain: FACES Scale, Post-Treatment  2-->hurts little bit  -      Pre/Post Treatment Pain Comment  pacemaker site  -[facility] Name 07/11/20 1723          Plan of Care Review    Plan of Care Reviewed With  patient;family  -     Outcome Summary  Completed re-evaluation as pt is s/p pacemaker placement 7/10.  Pt requires min A to tranfser OOB, stand, up to chair.  Impaired standing balance and not able to use RW with pacemaker precautions.  Will try cane next session with goal to progress to ambulation.  Pt not at baseline, will benefit from cont PT and recommend IP rehab at d/c. PPE worn:  gloves, mask, goggles.   -[facility] Name 07/11/20 1723          Physical Therapy Clinical Impression    Criteria for Skilled Interventions Met (PT Clinical Impression)  yes;treatment indicated  -     Rehab Potential (PT Clinical Summary)  good, to achieve stated therapy goals  -[facility] Name 07/11/20 1723          Vital Signs    O2 Delivery Pre Treatment  room air  -     O2 Delivery Intra Treatment  room air  -     Post SpO2 (%)  94  -     O2 Delivery Post Treatment  room air  -[facility] Name 07/11/20 1723          Positioning and Restraints    Pre-Treatment Position  in bed  -     Post Treatment Position  chair  -     In Chair  notified nsg;sitting;call light within reach;exit alarm on;waffle boot/both;RUE elevated;LUE elevated;with family/caregiver  -       User Key  (r) = Recorded By, (t) = Taken By, (c) = Cosigned By    Initials Name Provider Type     Mary Ann Lewis PT Physical Therapist        Outcome Measures    No documentation.       Physical Therapy Education                 Title: PT OT SLP Therapies (Done)     Topic: Physical Therapy (Done)     Point: Mobility training (Done)     Description:   Instruct learner(s) on safety and technique for assisting patient out of bed, chair or wheelchair.  Instruct in the proper use of assistive devices, such as walker, crutches, cane or brace.              Patient Friendly Description:   It's important to get you on your  feet again, but we need to do so in a way that is safe for you. Falling has serious consequences, and your personal safety is the most important thing of all.        When it's time to get out of bed, one of us or a family member will sit next to you on the bed to give you support.     If your doctor or nurse tells you to use a walker, crutches, a cane, or a brace, be sure you use it every time you get out of bed, even if you think you don't need it.    Learning Progress Summary           Patient Acceptance, E,TB, VU by  at 7/11/2020 1726    Acceptance, D,E, VU,DU by  at 7/8/2020 1603    Acceptance, E, VU by  at 7/6/2020 1249                   Point: Body mechanics (Done)     Description:   Instruct learner(s) on proper positioning and spine alignment for patient and/or caregiver during mobility tasks and/or exercises.              Learning Progress Summary           Patient Acceptance, D,E, VU,DU by  at 7/8/2020 1603                   Point: Precautions (Done)     Description:   Instruct learner(s) on prescribed precautions during mobility and gait tasks              Learning Progress Summary           Patient Acceptance, E,TB, VU by  at 7/11/2020 1726    Acceptance, D,E, VU,DU by  at 7/8/2020 1603                               User Key     Initials Effective Dates Name Provider Type Discipline     06/19/19 -  Ashlyn Ross, PT Physical Therapist PT     03/01/19 -  Mary Ann Lewis PT Physical Therapist PT     01/07/20 -  Simi Flores, PT Physical Therapist PT              PT Recommendation and Plan  Planned Therapy Interventions (PT Eval): balance training, bed mobility training, gait training, transfer training, strengthening, patient/family education  Outcome Summary/Treatment Plan (PT)  Anticipated Discharge Disposition (PT): inpatient rehabilitation facility  Plan of Care Reviewed With: patient, family  Outcome Summary: Completed re-evaluation as pt is s/p pacemaker placement 7/10.  Pt  requires min A to tranfser OOB, stand, up to chair.  Impaired standing balance and not able to use RW with pacemaker precautions.  Will try cane next session with goal to progress to ambulation.  Pt not at baseline, will benefit from cont PT and recommend IP rehab at d/c. PPE worn:  gloves, mask, goggles.      Time Calculation:   PT Charges     Row Name 07/11/20 1727             Time Calculation    Start Time  1345  -      Stop Time  1405  -      Time Calculation (min)  20 min  -      PT Received On  07/11/20  -      PT - Next Appointment  07/13/20  -      PT Goal Re-Cert Due Date  07/25/20  -         Time Calculation- PT    Total Timed Code Minutes- PT  20 minute(s)  -        User Key  (r) = Recorded By, (t) = Taken By, (c) = Cosigned By    Initials Name Provider Type    Mary Ann Washington PT Physical Therapist        Therapy Charges for Today     Code Description Service Date Service Provider Modifiers Qty    [id number]  PT RE-EVAL ESTABLISHED PLAN 2 7/11/2020 Mary Ann Lewis, PT GP 1    [id number]  PT THERAPEUTIC ACT EA 15 MIN 7/11/2020 Mary Ann Lewis, PT GP 1          PT G-Codes  Outcome Measure Options: AM-PAC 6 Clicks Basic Mobility (PT)  AM-PAC 6 Clicks Score (PT): 14    Mary Ann Lewis PT  7/11/2020

## 2020-07-11 NOTE — PROGRESS NOTES
KPA/PULM/CC PROGRESS NOTE       SUBJECTIVE       This is a 79-year-old male with a history of COPD, former smoker, atrial fibrillation on chronic anticoagulation who lives all by himself presented to the hospital for fatigue, tiredness and shortness of breath.  Patient has been feeling puny for the few days with poor appetite.  He has noticed extreme fatigue with malaise.  He denies any fever or chills.  He has noted increased shortness of breath however cough has been mild.  He did cough clear sputum and only one time he had a maroonish sputum.  He denies any wheezing or chest pain or pleurisy.  Denies any sick contacts.  He states that he is visited by a home health nurse only.  He denies any other contacts.     CT scan of the chest was done that showed bilateral groundglass airspace disease with interlobular septal thickening upper lobe predominant.  Patient denies any active tobacco use or vaping.     He has a history of chronic sinus issues.  He is not on any maintenance inhalers.  7/3: No new fevers, SOA stable, O2 requirement worsened over night and now on 5L, Remains fully awake and responsive, On oral diet  7/4: afebrile. soa at baseline. On 5 L. No n.v/d. Feels weak and fatigued.   7/6: Awake.  Currently on 2 L nasal cannula.  Remains on Cardizem and amiodarone infusions.  No complaints of cough or productive phlegm.  No chest pains.  No vomiting  7/7: Awake.  Currently on 2 L nasal cannula.  Complains of generalized weakness.  Complains of decreased appetite.  Currently on amiodarone infusion.  IV Cardizem has been stopped.  Remains in atrial fibrillation on the monitor.  No cough or productive phlegm.  No chest pains.  Some shortness of breath with activity  7/8: Awake.  Currently on 2 L nasal cannula.  Remains short of breath with activity.  Off amiodarone infusion.  Complains of generalized weakness.  No nausea or vomiting  7/9:  Transferred to ICU last night.  PICC placed and on BRIANA for a few hours.   Medications adjusted and blood pressure is improved.  Lewis is now off. On 2 liters nasal cannula   7/10: no acute events overnight. No gtts. Tolerating O2 2L. No chest pain. Plan for cath lab today for ablation and PPM  7/11: Awake, S/P PPM placement. Remains off pressors. Currently on RA, Tolerating PO diet. No C/O CP, No N/V      OBJECTIVE    Vitals:    07/11/20 0122 07/11/20 0401 07/11/20 0803 07/11/20 1207   BP: 133/73      Pulse: 71      Resp:       Temp:  97.5 °F (36.4 °C) 97.8 °F (36.6 °C) 97.8 °F (36.6 °C)   TempSrc:  Oral Oral    SpO2: 100%      Weight:       Height:          Intake/Output last 3 shifts:  I/O last 3 completed shifts:  In: 788 [P.O.:600; I.V.:188]  Out: 1375 [Urine:1375]  Intake/Output this shift:  I/O this shift:  In: 480 [P.O.:480]  Out: -     General Appearance:  Alert, cooperative, no distress, appears stated age  Head:  Normocephalic, without obvious abnormality, atraumatic  Eyes:  conjunctivae/corneas clear, EOM's intact     Neck:  Supple,  no JVD      Lungs: Decreased air entry bilaterally, clear but diminished bases  Chest wall:  No tenderness  Heart: Irregularly irregular rhythm, afib, S1 and S2 normal, no murmur, rub or gallop  Abdomen:  Soft, non-tender, bowel sounds active all four quadrants,  no rebound or guarding  Extremities:  Extremities normal, no cyanosis or edema  Skin:  No rashes or lesions  Neurologic:   Alert and oriented, no focal deficits    Scheduled Meds:    aspirin 81 mg Oral Daily   atorvastatin 20 mg Oral Daily   clopidogrel 75 mg Oral Daily   fludrocortisone 0.1 mg Oral Q PM   insulin lispro 0-7 Units Subcutaneous 4x Daily With Meals & Nightly   metoclopramide 5 mg Oral TID AC   midodrine 10 mg Oral Q8H   ondansetron 4 mg Intravenous Q6H   pantoprazole 40 mg Oral Q AM   sodium chloride 250 mL Intravenous Once   sodium chloride 10 mL Intravenous Q12H   sucralfate 1 g Oral 4x Daily AC & at Bedtime   Thera 1 tablet Oral Daily   vitamin B-12 1,000 mcg Oral Daily            Continuous Infusions:       PRN Meds:•  acetaminophen **OR** acetaminophen **OR** acetaminophen  •  aluminum-magnesium hydroxide-simethicone  •  atropine  •  bisacodyl  •  dextrose  •  dextrose  •  glucagon (human recombinant)  •  HYDROcodone-acetaminophen  •  insulin lispro **AND** insulin lispro  •  ipratropium-albuterol  •  magnesium hydroxide  •  magnesium sulfate **OR** magnesium sulfate **OR** magnesium sulfate  •  melatonin  •  Morphine  •  ondansetron  •  potassium chloride **OR** potassium chloride **OR** potassium chloride  •  potassium phosphate infusion greater than 15 mMoles **OR** potassium phosphate infusion greater than 15 mMoles **OR** potassium phosphate **OR** sodium phosphate IVPB **OR** sodium phosphate IVPB **OR** sodium phosphate IVPB  •  promethazine  •  [COMPLETED] Insert peripheral IV **AND** sodium chloride     LABS    CBC  Results from last 7 days   Lab Units 07/11/20  0405 07/10/20  0308 07/09/20  0345 07/08/20  0242 07/07/20  0255 07/06/20  0225 07/05/20  0215   WBC 10*3/mm3 12.00* 9.60 12.40* 14.20* 14.50* 16.10* 14.50*   RBC 10*6/mm3 3.68* 4.03* 4.27 4.88 4.62 4.84 4.51   HEMOGLOBIN g/dL 11.2* 12.1* 12.7* 14.3 13.9 14.3 13.5   HEMATOCRIT % 33.4* 35.8* 38.2 43.5 41.7 44.1 41.1   MCV fL 90.7 88.9 89.4 89.1 90.2 91.1 91.0   PLATELETS 10*3/mm3 212 194 174 238 263 302 286       CMP   Results from last 7 days   Lab Units 07/11/20  0405 07/10/20  1509 07/10/20  0308 07/09/20  0345 07/08/20  0242 07/07/20  0255 07/06/20  0225 07/05/20  0215   SODIUM mmol/L 141  --  141 142 140 142 143 142   POTASSIUM mmol/L 4.0 3.9 3.6 3.8 3.2* 3.3* 3.5 3.1*   CHLORIDE mmol/L 103  --  102 101 97* 100 98 95*   CO2 mmol/L 27.0  --  31.0* 31.0* 30.0* 30.0* 33.0* 32.0*   BUN  13  --  18 19 14 15 19 15   CREATININE mg/dL 0.66*  --  0.62* 0.62* 0.53* 0.53* 0.65* 0.64*   GLUCOSE mg/dL 177*  --  128* 97 103* 109* 151* 162*   ALBUMIN g/dL  --   --   --   --   --  3.60 3.80 3.80   BILIRUBIN mg/dL  --   --   --    --   --  1.5* 1.5* 1.5*   ALK PHOS U/L  --   --   --   --   --  51 59 60   AST (SGOT) U/L  --   --   --   --   --  24 41* 37   ALT (SGPT) U/L  --   --   --   --   --  52* 59* 46*       TROPONIN        CoAg        ABG        Microbiology  Microbiology Results (last 10 days)     Procedure Component Value - Date/Time    Blood Culture - Blood, Arm, Left [[id number]]  (Abnormal) Collected:  07/08/20 2202    Lab Status:  Final result Specimen:  Blood from Arm, Left Updated:  07/10/20 0658     Blood Culture Staphylococcus, coagulase negative     Comment: Probable contaminant requires clinical correlation, susceptibility not performed unless requested by physician.          Isolated from Anaerobic Bottle     Gram Stain Gram positive cocci in clusters    Blood Culture ID, PCR - Blood, Arm, Left [[id number]]  (Abnormal) Collected:  07/08/20 2202    Lab Status:  Edited Result - FINAL Specimen:  Blood from Arm, Left Updated:  07/10/20 0635     BCID, PCR Staphylococcus spp, not aureus. Identification by BCID PCR.     Comment: Corrected result. Previous result was Staphylococcus aureus. Identification by BCID PCR. on 7/9/2020 at 2147 EDT.        BOTTLE TYPE Anaerobic Bottle    Blood Culture - Blood, Blood, PICC Line [[id number]] Collected:  07/08/20 2147    Lab Status:  Preliminary result Specimen:  Blood, PICC Line Updated:  07/10/20 2245     Blood Culture No growth at 2 days    Gastrointestinal Panel, PCR - Stool, Per Rectum [[id number]]  (Normal) Collected:  07/03/20 0856    Lab Status:  Final result Specimen:  Stool from Per Rectum Updated:  07/03/20 1535     Campylobacter Not Detected     Plesiomonas shigelloides Not Detected     Salmonella Not Detected     Vibrio Not Detected     Vibrio cholerae Not Detected     Yersinia enterocolitica Not Detected     Enteroaggregative E. coli (EAEC) Not Detected     Enteropathogenic E. coli (EPEC) Not Detected     Enterotoxigenic E. coli (ETEC) lt/st Not Detected     Shiga-like  toxin-producing E. coli (STEC) stx1/stx2 Not Detected     E. coli O157 Not Detected     Shigella/Enteroinvasive E. coli (EIEC) Not Detected     Cryptosporidium Not Detected     Cyclospora cayetanensis Not Detected     Entamoeba histolytica Not Detected     Giardia lamblia Not Detected     Adenovirus F40/41 Not Detected     Astrovirus Not Detected     Norovirus GI/GII Not Detected     Rotavirus A Not Detected     Sapovirus (I, II, IV or V) Not Detected    Narrative:       If Aeromonas, Staphylococcus aureus or Bacillus cereus are suspected, please order [id number]: Stool Culture, Aeromonas, S aureus, B Cereus.    Clostridium Difficile EIA - Stool, Per Rectum [[id number]]  (Normal) Collected:  07/03/20 0856    Lab Status:  Final result Specimen:  Stool from Per Rectum Updated:  07/04/20 0715     C Diff GDH / Toxin Negative    COVID-19,CEPHEID,COR/MILENA/PAD IN-HOUSE(OR EMERGENT/ADD-ON),NP SWAB IN TRANSPORT MEDIA 3-4 HR TAT - Swab, Nasopharynx [[id number]]  (Normal) Collected:  07/02/20 1659    Lab Status:  Final result Specimen:  Swab from Nasopharynx Updated:  07/02/20 2003     COVID19 Not Detected    Narrative:       Fact sheet for providers: https://www.fda.gov/media/720005/download     Fact sheet for patients: https://www.fda.gov/media/811427/download    Blood Culture - Blood, Arm, Left [[id number]] Collected:  07/02/20 0249    Lab Status:  Final result Specimen:  Blood from Arm, Left Updated:  07/07/20 0300     Blood Culture No growth at 5 days    Respiratory Panel, PCR - Swab, Nasopharynx [[id number]]  (Normal) Collected:  07/02/20 0027    Lab Status:  Final result Specimen:  Swab from Nasopharynx Updated:  07/02/20 0153     ADENOVIRUS, PCR Not Detected     Coronavirus 229E Not Detected     Coronavirus HKU1 Not Detected     Coronavirus NL63 Not Detected     Coronavirus OC43 Not Detected     Human Metapneumovirus Not Detected     Human Rhinovirus/Enterovirus Not Detected     Influenza B PCR Not Detected     Parainfluenza  Virus 1 Not Detected     Parainfluenza Virus 2 Not Detected     Parainfluenza Virus 3 Not Detected     Parainfluenza Virus 4 Not Detected     Bordetella pertussis pcr Not Detected     Influenza A H1 2009 PCR Not Detected     Chlamydophila pneumoniae PCR Not Detected     Mycoplasma pneumo by PCR Not Detected     Influenza A PCR Not Detected     Influenza A H3 Not Detected     Influenza A H1 Not Detected     RSV, PCR Not Detected    Narrative:       The coronavirus on the RVP is NOT COVID-19 and is NOT indicative of infection with COVID-19.     COVID-19 [employer] IN-HOUSE, Nasal Swab No Transport Media - Swab, Nasal Cavity [[id number]]  (Normal) Collected:  07/02/20 0027    Lab Status:  Final result Specimen:  Swab from Nasal Cavity Updated:  07/02/20 0104     COVID19 Not Detected    Narrative:       Fact sheet for providers: https://www.fda.gov/media/915535/download     Fact sheet for patients: https://www.fda.gov/media/213238/download    Blood Culture - Blood, Arm, Right [[id number]] Collected:  07/01/20 2348    Lab Status:  Final result Specimen:  Blood from Arm, Right Updated:  07/07/20 0000     Blood Culture No growth at 5 days          IMAGING & OTHER STUDIES    Imaging Results (Last 72 Hours)     Procedure Component Value Units Date/Time    XR Chest 1 View [[id number]] Collected:  07/10/20 2104     Updated:  07/10/20 2108    Narrative:       XR CHEST 1 VW-     Date of Exam: 7/10/2020 8:45 PM     Indication: Status post pacemaker placement.     Comparison Exams: 07/08/2020     Technique: Single AP chest radiograph     FINDINGS:  A left subclavian pacemaker is in place. No pneumothorax is identified.  Mild hazy opacities bilaterally appear similar to prior exam. The heart  and mediastinal contours appear normal. The osseous structures appear  intact.       Impression:       1.Left subclavian pacemaker in place. No pneumothorax identified.  2.Mild bilateral hazy opacities appear unchanged, which could represent  mild  pneumonia or pulmonary edema.     Electronically Signed By-DR. Clint Dotson MD On:7/10/2020 9:06 PM  This report was finalized on [id number] by DR. Clint Dotson MD.    XR Chest 1 View [[id number]] Collected:  07/08/20 2122     Updated:  07/08/20 2127    Narrative:       XR CHEST 1 VW-     Date of Exam: 7/8/2020 9:09 PM     Indication: Status post PICC placement.     Comparison Exams: 07/04/2020     Technique: Single AP chest radiograph     FINDINGS:  A left-sided PICC has its tip at the low SVC. There is elevation of the  right hemidiaphragm. Hazy bilateral airspace opacities appear slightly  improved from prior exam. The heart and mediastinal contours appear  normal. Surgical hardware is noted at the thoracolumbar junction.       Impression:       1.Left-sided PICC with tip in low SVC.  2.Hazy bilateral airspace opacities appear slightly improved, which  could represent pneumonia or pulmonary edema.     Electronically Signed By-DR. Clint Dotson MD On:7/8/2020 9:25 PM  This report was finalized on [id number] by DR. Clint Dotson MD.        Results for orders placed during the hospital encounter of 07/01/20   Adult Transthoracic Echo Complete W/ Cont if Necessary Per Protocol    Narrative · Left ventricular systolic function is severely decreased.  · Left ventricular wall thickness is consistent with mild concentric   hypertrophy.  · Left atrial cavity size is moderately dilated.  · Mild-to-moderate mitral valve regurgitation is present  · Mild tricuspid valve regurgitation is present.  · Mild aortic valve regurgitation is present.             ASSESSMENT/PLAN:     Atrial fibrillation (CMS/HCC)    CAD (coronary artery disease)    Chronic anticoagulation    Hyperlipidemia    Presence of stent in right coronary artery    Chronic pain    Sepsis (CMS/HCC)    Congestive heart failure (CHF) (CMS/HCC)    Nausea vomiting and diarrhea    Ischemic cardiomyopathy    Persistent atrial fibrillation  (CMS/HCC)    Chronic systolic congestive heart failure (CMS/HCC)    Permanent atrial fibrillation (CMS/HCC)    Coronary artery disease involving native coronary artery of native heart without angina pectoris        1.Bilateral PNA (viral vs bacterial) other differentials include Pulmonary edema and possible hemorrhage  2.  Chronic A. fib  3. H/o RML lobectomy  4. CAD  5. Acute hypoxia due to above  6.  Nausea  7. Chronic Hypotension  8. Chronic Anticoagulation with Xarelto  9. Lactic acidosis  10. Hypokalemia  11.  Diarrhea  12.  Gastritis noted on EGD  13.  Acute on chronic systolic CHF exacerbation       PLAN    -Reviewed CT scan of the chest. Covid test is negative X2.  Afebrile.    Status post antibiotic therapy with Zosyn. WBC count improving. On Vancomycin for Coag negative staph.Likley contaminant. Will repeat blood cx and if negative will D/C abx  -Patient is a lifelong non-smoker.  No previous history of COPD.  Off steroids and Pulmicort.  Changed nebs to PRN.  -Management of A. fib per cardiology.  Off amiodarone and Cardizem infusions.   - 2D echo results reviewed.  EF 30 to 35%.  Mild to moderate MR noted.  RVSP 35.7  -GI work-up reviewed.  Status post EGD 7/5 with changes of gastritis noted.  Remains on Reglan Carafate and Protonix.  Switched Protonix to PO stool for C. difficile analysis is negative.  Changed Reglan to PO  -Patient with issues with chronic hypotension.  Remains on Florinef which is being continued.  -On RA.   -off therapeutic anticoagulation due to AICD placement.  Cardiology to decide about restarting the anticoagulation  -Increase activity.  -Tolerating PO Diet   -PICC line placed. Remains on midodrine which we can slowly wean to off as BP tolerates. Will D/C PICC line today  Can trsnsfer out of ICU once ok with cardiology  This document has been electronically signed by  Ebenezer Brambila MD  12:22 PM

## 2020-07-12 PROBLEM — I34.0 MITRAL REGURGITATION: Status: ACTIVE | Noted: 2020-07-12

## 2020-07-12 LAB
ALBUMIN SERPL-MCNC: 2.5 G/DL (ref 3.5–5.2)
ALBUMIN/GLOB SERPL: 1.1 G/DL
ALP SERPL-CCNC: 44 U/L (ref 39–117)
ALT SERPL W P-5'-P-CCNC: 25 U/L (ref 1–41)
ANION GAP SERPL CALCULATED.3IONS-SCNC: 9 MMOL/L (ref 5–15)
ANISOCYTOSIS BLD QL: NORMAL
APTT PPP: 19.6 SECONDS (ref 24–31)
AST SERPL-CCNC: 26 U/L (ref 1–40)
BASOPHILS # BLD AUTO: 0 10*3/MM3 (ref 0–0.2)
BASOPHILS NFR BLD AUTO: 0.1 % (ref 0–1.5)
BILIRUB SERPL-MCNC: 0.4 MG/DL (ref 0–1.2)
BUN SERPL-MCNC: 11 MG/DL (ref 8–23)
BUN SERPL-MCNC: ABNORMAL MG/DL
BUN/CREAT SERPL: ABNORMAL
CALCIUM SPEC-SCNC: 8.1 MG/DL (ref 8.6–10.5)
CHLORIDE SERPL-SCNC: 105 MMOL/L (ref 98–107)
CO2 SERPL-SCNC: 27 MMOL/L (ref 22–29)
CREAT SERPL-MCNC: 0.56 MG/DL (ref 0.76–1.27)
DEPRECATED RDW RBC AUTO: 46.4 FL (ref 37–54)
DIGOXIN SERPL-MCNC: 0.4 NG/ML (ref 0.6–1.2)
EOSINOPHIL # BLD AUTO: 0.4 10*3/MM3 (ref 0–0.4)
EOSINOPHIL NFR BLD AUTO: 3.6 % (ref 0.3–6.2)
ERYTHROCYTE [DISTWIDTH] IN BLOOD BY AUTOMATED COUNT: 14.9 % (ref 12.3–15.4)
GFR SERPL CREATININE-BSD FRML MDRD: 140 ML/MIN/1.73
GIANT PLATELETS: NORMAL
GLOBULIN UR ELPH-MCNC: 2.3 GM/DL
GLUCOSE BLDC GLUCOMTR-MCNC: 102 MG/DL (ref 70–105)
GLUCOSE BLDC GLUCOMTR-MCNC: 90 MG/DL (ref 70–105)
GLUCOSE SERPL-MCNC: 111 MG/DL (ref 65–99)
HCT VFR BLD AUTO: 31.5 % (ref 37.5–51)
HGB BLD-MCNC: 10.4 G/DL (ref 13–17.7)
INR PPP: 1.05 (ref 0.9–1.1)
LYMPHOCYTES # BLD AUTO: 2.4 10*3/MM3 (ref 0.7–3.1)
LYMPHOCYTES NFR BLD AUTO: 24.3 % (ref 19.6–45.3)
MAGNESIUM SERPL-MCNC: 1.9 MG/DL (ref 1.6–2.4)
MCH RBC QN AUTO: 29.7 PG (ref 26.6–33)
MCHC RBC AUTO-ENTMCNC: 32.9 G/DL (ref 31.5–35.7)
MCV RBC AUTO: 90.4 FL (ref 79–97)
MONOCYTES # BLD AUTO: 1.5 10*3/MM3 (ref 0.1–0.9)
MONOCYTES NFR BLD AUTO: 14.5 % (ref 5–12)
NEUTROPHILS NFR BLD AUTO: 5.7 10*3/MM3 (ref 1.7–7)
NEUTROPHILS NFR BLD AUTO: 57.5 % (ref 42.7–76)
NRBC BLD AUTO-RTO: 0 /100 WBC (ref 0–0.2)
PHOSPHATE SERPL-MCNC: 2.1 MG/DL (ref 2.5–4.5)
PLATELET # BLD AUTO: 181 10*3/MM3 (ref 140–450)
PMV BLD AUTO: 8.8 FL (ref 6–12)
POTASSIUM SERPL-SCNC: 3.5 MMOL/L (ref 3.5–5.2)
PROT SERPL-MCNC: 4.8 G/DL (ref 6–8.5)
PROTHROMBIN TIME: 10.9 SECONDS (ref 9.6–11.7)
RBC # BLD AUTO: 3.48 10*6/MM3 (ref 4.14–5.8)
SODIUM SERPL-SCNC: 141 MMOL/L (ref 136–145)
WBC # BLD AUTO: 10 10*3/MM3 (ref 3.4–10.8)
WBC MORPH BLD: NORMAL

## 2020-07-12 PROCEDURE — 80053 COMPREHEN METABOLIC PANEL: CPT | Performed by: INTERNAL MEDICINE

## 2020-07-12 PROCEDURE — 25010000002 ONDANSETRON PER 1 MG: Performed by: INTERNAL MEDICINE

## 2020-07-12 PROCEDURE — 99024 POSTOP FOLLOW-UP VISIT: CPT | Performed by: INTERNAL MEDICINE

## 2020-07-12 PROCEDURE — 87070 CULTURE OTHR SPECIMN AEROBIC: CPT | Performed by: NURSE PRACTITIONER

## 2020-07-12 PROCEDURE — 85007 BL SMEAR W/DIFF WBC COUNT: CPT | Performed by: INTERNAL MEDICINE

## 2020-07-12 PROCEDURE — 85610 PROTHROMBIN TIME: CPT | Performed by: INTERNAL MEDICINE

## 2020-07-12 PROCEDURE — 82962 GLUCOSE BLOOD TEST: CPT

## 2020-07-12 PROCEDURE — 80162 ASSAY OF DIGOXIN TOTAL: CPT | Performed by: INTERNAL MEDICINE

## 2020-07-12 PROCEDURE — 83735 ASSAY OF MAGNESIUM: CPT | Performed by: INTERNAL MEDICINE

## 2020-07-12 PROCEDURE — 93010 ELECTROCARDIOGRAM REPORT: CPT | Performed by: INTERNAL MEDICINE

## 2020-07-12 PROCEDURE — 84100 ASSAY OF PHOSPHORUS: CPT | Performed by: INTERNAL MEDICINE

## 2020-07-12 PROCEDURE — 85730 THROMBOPLASTIN TIME PARTIAL: CPT | Performed by: INTERNAL MEDICINE

## 2020-07-12 PROCEDURE — 85025 COMPLETE CBC W/AUTO DIFF WBC: CPT | Performed by: INTERNAL MEDICINE

## 2020-07-12 RX ORDER — METOPROLOL SUCCINATE 25 MG/1
12.5 TABLET, EXTENDED RELEASE ORAL
Status: DISCONTINUED | OUTPATIENT
Start: 2020-07-12 | End: 2020-07-14

## 2020-07-12 RX ORDER — DIGOXIN 250 MCG
250 TABLET ORAL
Status: DISCONTINUED | OUTPATIENT
Start: 2020-07-12 | End: 2020-07-15 | Stop reason: HOSPADM

## 2020-07-12 RX ORDER — POTASSIUM CHLORIDE 20 MEQ/1
40 TABLET, EXTENDED RELEASE ORAL ONCE
Status: COMPLETED | OUTPATIENT
Start: 2020-07-12 | End: 2020-07-12

## 2020-07-12 RX ORDER — DOCUSATE SODIUM 100 MG/1
100 CAPSULE, LIQUID FILLED ORAL 2 TIMES DAILY
Status: DISCONTINUED | OUTPATIENT
Start: 2020-07-12 | End: 2020-07-15 | Stop reason: HOSPADM

## 2020-07-12 RX ORDER — LISINOPRIL 5 MG/1
5 TABLET ORAL
Status: DISCONTINUED | OUTPATIENT
Start: 2020-07-12 | End: 2020-07-14

## 2020-07-12 RX ADMIN — METOCLOPRAMIDE 5 MG: 5 TABLET ORAL at 11:38

## 2020-07-12 RX ADMIN — Medication 10 ML: at 20:22

## 2020-07-12 RX ADMIN — Medication 10 ML: at 08:24

## 2020-07-12 RX ADMIN — HYDROCODONE BITARTRATE AND ACETAMINOPHEN 1 TABLET: 7.5; 325 TABLET ORAL at 06:10

## 2020-07-12 RX ADMIN — METOPROLOL SUCCINATE 12.5 MG: 25 TABLET, EXTENDED RELEASE ORAL at 10:21

## 2020-07-12 RX ADMIN — DIGOXIN 250 MCG: 250 TABLET ORAL at 12:28

## 2020-07-12 RX ADMIN — SUCRALFATE 1 G: 1 TABLET ORAL at 12:28

## 2020-07-12 RX ADMIN — CYANOCOBALAMIN TAB 1000 MCG 1000 MCG: 1000 TAB at 08:23

## 2020-07-12 RX ADMIN — METOCLOPRAMIDE 5 MG: 5 TABLET ORAL at 08:23

## 2020-07-12 RX ADMIN — SPIRONOLACTONE 25 MG: 25 TABLET, FILM COATED ORAL at 17:29

## 2020-07-12 RX ADMIN — DOCUSATE SODIUM 100 MG: 100 CAPSULE, LIQUID FILLED ORAL at 12:28

## 2020-07-12 RX ADMIN — THERA TABS 1 TABLET: TAB at 08:23

## 2020-07-12 RX ADMIN — DOCUSATE SODIUM 100 MG: 100 CAPSULE, LIQUID FILLED ORAL at 20:22

## 2020-07-12 RX ADMIN — POTASSIUM CHLORIDE 40 MEQ: 1500 TABLET, EXTENDED RELEASE ORAL at 10:23

## 2020-07-12 RX ADMIN — SUCRALFATE 1 G: 1 TABLET ORAL at 16:35

## 2020-07-12 RX ADMIN — ONDANSETRON 4 MG: 2 INJECTION INTRAMUSCULAR; INTRAVENOUS at 16:37

## 2020-07-12 RX ADMIN — HYDROCODONE BITARTRATE AND ACETAMINOPHEN 1 TABLET: 7.5; 325 TABLET ORAL at 21:27

## 2020-07-12 RX ADMIN — MELATONIN TAB 5 MG 5 MG: 5 TAB at 20:22

## 2020-07-12 RX ADMIN — ASPIRIN 81 MG: 81 TABLET, COATED ORAL at 08:23

## 2020-07-12 RX ADMIN — METOCLOPRAMIDE 5 MG: 5 TABLET ORAL at 16:35

## 2020-07-12 RX ADMIN — CLOPIDOGREL BISULFATE 75 MG: 75 TABLET ORAL at 08:23

## 2020-07-12 RX ADMIN — LISINOPRIL 5 MG: 5 TABLET ORAL at 10:21

## 2020-07-12 RX ADMIN — SUCRALFATE 1 G: 1 TABLET ORAL at 20:22

## 2020-07-12 RX ADMIN — POLYETHYLENE GLYCOL 3350 17 G: 17 POWDER, FOR SOLUTION ORAL at 12:29

## 2020-07-12 RX ADMIN — FLUDROCORTISONE ACETATE 0.1 MG: 0.1 TABLET ORAL at 16:35

## 2020-07-12 RX ADMIN — PANTOPRAZOLE SODIUM 40 MG: 40 TABLET, DELAYED RELEASE ORAL at 05:10

## 2020-07-12 RX ADMIN — SUCRALFATE 1 G: 1 TABLET ORAL at 08:23

## 2020-07-12 RX ADMIN — ATORVASTATIN CALCIUM 20 MG: 20 TABLET, FILM COATED ORAL at 08:23

## 2020-07-12 NOTE — PROGRESS NOTES
Cardiology progress note  Jose D Palacios MD, PhD    Patient Care Team:  Nikki Gonzales MD as PCP - General (Family Medicine)    Chief complaint, uncontrolled atrial fibrillation now status post AV kelly ablation and by V ICD upgrade    SUBJECTIVE: Seen and examined, ICD site clean and dry, V paced rate 70 with underlying atrial fibrillation, no exit block or failure of capture seen.  Not on anticoagulation presently ,will restart today with apixaban twice daily  No chest pain overnight or angina  Ischemic cardiomyopathy notable, reviewed all cath films with unsuccessful wiring of the heavily calcified in-stent restenosis of the RCA.  Discussed patient with possible repeat attempt in the future as well as revascularization of circumflex and potentially LAD for complete re-vascularization and attempt to improve his underlying cardiomyopathy.  Patient amenable to this approach and will discuss with Dr. Cronin tomorrow.     Today  Okay to get out of ICU today  Optimize medical therapy  Restart anticoagulation  Blood pressure much improved Taper off midodrine with underlying cardiomyopathy  Low-dose lisinopril 5 mg p.o. daily to see if he will tolerate this from a heart failure standpoint, chronically low blood pressures will assess for response.  We will get him out of bed to see if he is orthostatic as all blood pressures have been supine.,  Up to chair today.  Aldactone started yesterday with good results, continue 12.5 p.o. daily titrate up to optimally 25 mg p.o. daily  Metoprolol XL 12.5 daily    As discussed with the patient today I will try to be as aggressive as possible with his heart failure medications for neurohormonal blockade including beta-blocker to decrease adrenergic stimulation, very low-dose ACE inhibitor to see if he will tolerate this from a blood pressure standpoint, stopping midodrine however the patient has been chronically on Florinef.  I would prefer him to be on midodrine and Florinef for  volume and salt expansion being less optimal.  Digoxin can be continued as well as starting Aldactone for renin angiotensin aldosterone axis blockade.    We will discuss the above plan with . Mehrdad tomorrow    Has residual proximal to mid LAD 50 to 60% with poststenotic dilatation at the takeoff of the first diagonal branch.  Also has in segment restenosis of the circumflex midportion.          REVIEW OF SYSTEMS: Some 14-point review of systems is negative except what is mentioned in the HPI relative to the current complaint.    Historical data copied forward from previous encounters and EMR is unchanged    PAST MEDICAL HISTORY:   Past Medical History:   Diagnosis Date   • A-fib (CMS/HCC)    • Aortic aneurysm (CMS/HCC)    • Appetite loss    • Cancer (CMS/HCC)     right lobe cancer - surgically removed    • Dark stools    • Foot pain, bilateral    • Hyperlipidemia    • Low back pain    • S/P epidural steroid injection     Israel Gomes's - no relief   • Weight loss     acute loss of weight 30 lbs       ALLERGIES:   Allergies   Allergen Reactions   • Ciprofloxacin Anaphylaxis   • Amlodipine Unknown (See Comments)   • Rocephin [Ceftriaxone] Other (See Comments)     Mouth sores         PAST SURGICAL HISTORY:   Past Surgical History:   Procedure Laterality Date   • CARDIAC CATHETERIZATION N/A 7/9/2020    Procedure: LEFT HEART CATH with possible PCI;  Surgeon: Wade Cronin MD;  Location: [facility] CATH INVASIVE LOCATION;  Service: Cardiovascular;  Laterality: N/A;  Local and IV sedation   • CARDIAC CATHETERIZATION N/A 7/9/2020    Procedure: Percutaneous Coronary Intervention;  Surgeon: Wade Cronin MD;  Location: [facility] CATH INVASIVE LOCATION;  Service: Cardiovascular;  Laterality: N/A;   • CATARACT EXTRACTION, BILATERAL     • CORONARY ANGIOPLASTY WITH STENT PLACEMENT     • ENDOSCOPY N/A 7/5/2020    Procedure: ESOPHAGOGASTRODUODENOSCOPY;  Surgeon: Neal Correa MD;  Location: [facility] ENDOSCOPY;  Service:  Gastroenterology;  Laterality: N/A;   • LUMBAR DECOMPRESSION  09/2015    L2-L3   • LUMBAR DECOMPRESSION  2006    Dr. Logan   • OTHER SURGICAL HISTORY  05/2015    left SI fusion with bone graft - Dr. Presley   • OTHER SURGICAL HISTORY      carotid artery repair    • OTHER SURGICAL HISTORY Left 02/19/2016    Left SI fusion 2/19/2016 Dr. Zendejas   • POSTERIOR SPINAL FUSION  10/24/2016    PSF T12-3/TLIF L3-L4 poss L2-L3 --- Dr. 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          FAMILY HISTORY:   Family History   Problem Relation Age of Onset   • Cancer Other    • Hyperlipidemia Other    • Heart disease Other          SOCIAL HISTORY:   Social History     Socioeconomic History   • Marital status: Legally      Spouse name: Not on file   • Number of children: Not on file   • Years of education: Not on file   • Highest education level: Not on file   Tobacco Use   • Smoking status: Never Smoker   • Smokeless tobacco: Never Used   Substance and Sexual Activity   • Alcohol use: No     Frequency: Never   • Drug use: Never   • Sexual activity: Defer       CURRENT MEDICATIONS:     Current Facility-Administered Medications:   •  acetaminophen (TYLENOL) tablet 650 mg, 650 mg, Oral, Q4H PRN, 650 mg at 07/08/20 1311 **OR** acetaminophen (TYLENOL) 160 MG/5ML solution 650 mg, 650 mg, Oral, Q4H PRN **OR** acetaminophen (TYLENOL) suppository 650 mg, 650 mg, Rectal, Q4H PRN, Jonathan Denney MD  •  aluminum-magnesium hydroxide-simethicone (MAALOX MAX) 400-400-40 MG/5ML suspension 15 mL, 15 mL, Oral, Q6H PRN, Jonathan Denney MD  •  aspirin EC tablet 81 mg, 81 mg, Oral, Daily, Jonathan Denney MD, 81 mg at 07/12/20 0823  •  atorvastatin (LIPITOR) tablet 20 mg, 20 mg, Oral, Daily, Jonathan Denney MD, 20 mg at 07/12/20 0823  •  atropine sulfate injection 0.5-1 mg, 0.5-1 mg, Intravenous, Q5 Min PRN, Jonathan Denney MD  •  bisacodyl (DULCOLAX) EC tablet 5 mg, 5 mg, Oral, Daily PRN,  Jonathan Denney MD  •  clopidogrel (PLAVIX) tablet 75 mg, 75 mg, Oral, Daily, Jonathan Denney MD, 75 mg at 07/12/20 0823  •  dextrose (D50W) 25 g/ 50mL Intravenous Solution 25 g, 25 g, Intravenous, Q15 Min PRN, Jonathan Denney MD  •  dextrose (GLUTOSE) oral gel 15 g, 15 g, Oral, Q15 Min PRN, Jonathan Denney MD  •  digoxin (LANOXIN) tablet 125 mcg, 125 mcg, Oral, Daily, Jose D Palacios MD  •  fludrocortisone tablet 0.1 mg, 0.1 mg, Oral, Q PM, Jonathan Denney MD, 0.1 mg at 07/11/20 1726  •  glucagon (human recombinant) (GLUCAGEN DIAGNOSTIC) injection 1 mg, 1 mg, Subcutaneous, Q15 Min PRN, Jonathan Denney MD  •  HYDROcodone-acetaminophen (NORCO) 7.5-325 MG per tablet 1 tablet, 1 tablet, Oral, Q6H PRN, Jonathan Denney MD, 1 tablet at 07/12/20 0610  •  insulin lispro (humaLOG) injection 0-7 Units, 0-7 Units, Subcutaneous, 4x Daily With Meals & Nightly, Stopped at 07/11/20 0750 **AND** insulin lispro (humaLOG) injection 0-7 Units, 0-7 Units, Subcutaneous, PRN, Jonathan Denney MD  •  ipratropium-albuterol (DUO-NEB) nebulizer solution 3 mL, 3 mL, Nebulization, Q6H PRN, Jonathan Denney MD  •  magnesium hydroxide (MILK OF MAGNESIA) suspension 2400 mg/10mL 10 mL, 10 mL, Oral, Daily PRN, Jonathan Denney MD  •  Magnesium Sulfate 2 gram Bolus, followed by 8 gram infusion (total Mg dose 10 grams)- Mg less than or equal to 1mg/dL, 2 g, Intravenous, PRN **OR** Magnesium Sulfate 2 gram / 50mL Infusion (GIVE X 3 BAGS TO EQUAL 6GM TOTAL DOSE) - Mg 1.1 - 1.5 mg/dl, 2 g, Intravenous, PRN **OR** Magnesium Sulfate 4 gram infusion- Mg 1.6-1.9 mg/dL, 4 g, Intravenous, PRN, Jonathan Denney MD, Last Rate: 25 mL/hr at 07/10/20 0504, 4 g at 07/10/20 0504  •  melatonin tablet 5 mg, 5 mg, Oral, Nightly PRN, Jonathan Denney MD, 5 mg at 07/08/20 2129  •  metoclopramide (REGLAN) tablet 5 mg, 5 mg, Oral, TID AC, Jonathan Denney MD, 5 mg at 07/12/20 0823  •  midodrine (PROAMATINE) tablet 5  mg, 5 mg, Oral, Q8H, Jose D Palacios MD, Stopped at 07/12/20 0739  •  Morphine sulfate (PF) injection 2 mg, 2 mg, Intravenous, Q4H PRN, Jonathan Denney MD, 2 mg at 07/09/20 0942  •  ondansetron (ZOFRAN) injection 4 mg, 4 mg, Intravenous, Q6H PRN, Jonathan Denney MD  •  [DISCONTINUED] ondansetron (ZOFRAN) tablet 4 mg, 4 mg, Oral, Q6H PRN **OR** ondansetron (ZOFRAN) injection 4 mg, 4 mg, Intravenous, Q6H, Jonathan Denney MD, 4 mg at 07/11/20 2129  •  pantoprazole (PROTONIX) EC tablet 40 mg, 40 mg, Oral, Q AM, Jonathan Denney MD, 40 mg at 07/12/20 0510  •  potassium chloride (K-DUR,KLOR-CON) CR tablet 40 mEq, 40 mEq, Oral, PRN, 40 mEq at 07/08/20 0508 **OR** potassium chloride (KLOR-CON) packet 40 mEq, 40 mEq, Oral, PRN, 40 mEq at 07/06/20 0340 **OR** potassium chloride 10 mEq in 100 mL IVPB, 10 mEq, Intravenous, Q1H PRN, Jonathan Denney MD, Last Rate: 100 mL/hr at 07/03/20 1514, 10 mEq at 07/03/20 1514  •  potassium phosphate 45 mmol in sodium chloride 0.9 % 500 mL infusion, 45 mmol, Intravenous, PRN **OR** potassium phosphate 30 mmol in sodium chloride 0.9 % 250 mL infusion, 30 mmol, Intravenous, PRN **OR** potassium phosphate 15 mmol in sodium chloride 0.9 % 100 mL infusion, 15 mmol, Intravenous, PRN, 15 mmol at 07/10/20 0504 **OR** sodium phosphates 45 mmol in sodium chloride 0.9 % 500 mL IVPB, 45 mmol, Intravenous, PRN **OR** sodium phosphates 30 mmol in sodium chloride 0.9 % 250 mL IVPB, 30 mmol, Intravenous, PRN **OR** sodium phosphates 15 mmol in sodium chloride 0.9 % 250 mL IVPB, 15 mmol, Intravenous, PRN, Jonathan Denney MD  •  promethazine (PHENERGAN) IVPB 12.5 mg, 12.5 mg, Intravenous, Q6H PRN, Jonathan Denney MD, 12.5 mg at 07/03/20 1003  •  sodium chloride 0.9 % bolus 250 mL, 250 mL, Intravenous, Once, Jonathan Denney MD  •  [COMPLETED] Insert peripheral IV, , , Once **AND** sodium chloride 0.9 % flush 10 mL, 10 mL, Intravenous, PRN, Jonathan Denney MD  •   sodium chloride 0.9 % flush 10 mL, 10 mL, Intravenous, Q12H, Jonathan Denney MD, 10 mL at 07/12/20 0824  •  spironolactone (ALDACTONE) tablet 25 mg, 25 mg, Oral, Q24H, Jose D Palacios MD, 25 mg at 07/11/20 1759  •  sucralfate (CARAFATE) tablet 1 g, 1 g, Oral, 4x Daily AC & at Bedtime, Jonathan Denney MD, 1 g at 07/12/20 0823  •  Thera tablet 1 tablet, 1 tablet, Oral, Daily, Jonathan Denney MD, 1 tablet at 07/12/20 0823  •  vitamin B-12 (CYANOCOBALAMIN) tablet 1,000 mcg, 1,000 mcg, Oral, Daily, Jonathan Denney MD, 1,000 mcg at 07/12/20 0823      DIAGNOSTIC DATA:     I reviewed the patient's new clinical results.    Lab Results (last 24 hours)     Procedure Component Value Units Date/Time    POC Glucose Once [[id number]]  (Normal) Collected:  07/12/20 0743    Specimen:  Blood Updated:  07/12/20 0745     Glucose 102 mg/dL      Comment: Serial Number: [id number]:  [id number]       CBC & Differential [[id number]] Collected:  07/12/20 0337    Specimen:  Blood Updated:  07/12/20 0555    Narrative:       The following orders were created for panel order CBC & Differential.  Procedure                               Abnormality         Status                     ---------                               -----------         ------                     CBC Auto Differential[[id number]]        Abnormal            Final result                 Please view results for these tests on the individual orders.    CBC Auto Differential [[id number]]  (Abnormal) Collected:  07/12/20 0337    Specimen:  Blood Updated:  07/12/20 0555     WBC 10.00 10*3/mm3      RBC 3.48 10*6/mm3      Hemoglobin 10.4 g/dL      Hematocrit 31.5 %      MCV 90.4 fL      MCH 29.7 pg      MCHC 32.9 g/dL      RDW 14.9 %      RDW-SD 46.4 fl      MPV 8.8 fL      Platelets 181 10*3/mm3      Comment: Result checked         Neutrophil % 57.5 %      Lymphocyte % 24.3 %      Monocyte % 14.5 %      Eosinophil % 3.6 %      Basophil % 0.1 %       Neutrophils, Absolute 5.70 10*3/mm3      Lymphocytes, Absolute 2.40 10*3/mm3      Monocytes, Absolute 1.50 10*3/mm3      Eosinophils, Absolute 0.40 10*3/mm3      Basophils, Absolute 0.00 10*3/mm3      nRBC 0.0 /100 WBC     Scan Slide [[id number]] Collected:  07/12/20 0337    Specimen:  Blood Updated:  07/12/20 0555     Anisocytosis Slight/1+     WBC Morphology Normal     Giant Platelets Slight/1+    BUN [[id number]]  (Normal) Collected:  07/12/20 0337    Specimen:  Blood Updated:  07/12/20 0431     BUN 11 mg/dL     Magnesium [[id number]]  (Normal) Collected:  07/12/20 0337    Specimen:  Blood Updated:  07/12/20 0421     Magnesium 1.9 mg/dL     Phosphorus [[id number]]  (Abnormal) Collected:  07/12/20 0337    Specimen:  Blood Updated:  07/12/20 0421     Phosphorus 2.1 mg/dL     Comprehensive Metabolic Panel [[id number]]  (Abnormal) Collected:  07/12/20 0337    Specimen:  Blood Updated:  07/12/20 0421     Glucose 111 mg/dL      BUN --     Comment: Testing performed by alternate method        Creatinine 0.56 mg/dL      Sodium 141 mmol/L      Potassium 3.5 mmol/L      Chloride 105 mmol/L      CO2 27.0 mmol/L      Calcium 8.1 mg/dL      Total Protein 4.8 g/dL      Albumin 2.50 g/dL      ALT (SGPT) 25 U/L      AST (SGOT) 26 U/L      Alkaline Phosphatase 44 U/L      Total Bilirubin 0.4 mg/dL      eGFR Non African Amer 140 mL/min/1.73      Globulin 2.3 gm/dL      A/G Ratio 1.1 g/dL      BUN/Creatinine Ratio --     Comment: Testing not performed        Anion Gap 9.0 mmol/L     Narrative:       GFR Normal >60  Chronic Kidney Disease <60  Kidney Failure <15      aPTT [[id number]]  (Abnormal) Collected:  07/12/20 0337    Specimen:  Blood Updated:  07/12/20 0419     PTT 19.6 seconds     Protime-INR [[id number]]  (Normal) Collected:  07/12/20 0337    Specimen:  Blood Updated:  07/12/20 0419     Protime 10.9 Seconds      INR 1.05    Catheter Culture - Cath Tip, Arm, Left [[id number]] Collected:  07/12/20 0337    Specimen:  Cath Tip from  Arm, Left Updated:  07/12/20 0359    Blood Culture - Blood, Blood, PICC Line [[id number]] Collected:  07/08/20 2147    Specimen:  Blood, PICC Line Updated:  07/11/20 2245     Blood Culture No growth at 3 days    Blood Culture - Blood, Hand, Right [[id number]] Collected:  07/11/20 1353    Specimen:  Blood from Hand, Right Updated:  07/11/20 1418    Blood Culture - Blood, Blood, PICC Line [[id number]] Collected:  07/11/20 1353    Specimen:  Blood, PICC Line Updated:  07/11/20 1417    POC Glucose Once [[id number]]  (Normal) Collected:  07/11/20 1148    Specimen:  Blood Updated:  07/11/20 1151     Glucose 97 mg/dL      Comment: Serial Number: [id number]:  [id number]       BUN [[id number]]  (Normal) Collected:  07/11/20 0405    Specimen:  Blood Updated:  07/11/20 1135     BUN 13 mg/dL           Imaging Results (Last 24 Hours)     ** No results found for the last 24 hours. **          Xray reviewed personally by physician.      ECG reviewed personally by physician  ECG/EMG Results (most recent)     Procedure Component Value Units Date/Time    Adult Transthoracic Echo Complete W/ Cont if Necessary Per Protocol [[id number]] Collected:  07/02/20 0653     Updated:  07/02/20 1409     BSA 1.9 m^2      RVIDd 2.1 cm      IVSd 1.1 cm      LVIDd 5.6 cm      LVIDs 4.6 cm      LVPWd 1.3 cm      IVS/LVPW 0.84     FS 17.6 %      EDV(Teich) 155.9 ml      ESV(Teich) 99.5 ml      EF(Teich) 36.2 %      EDV(cubed) 179.0 ml      ESV(cubed) 100.1 ml      EF(cubed) 44.1 %      LV mass(C)d 276.4 grams      LV mass(C)dI 142.4 grams/m^2      SV(Teich) 56.4 ml      SI(Teich) 29.1 ml/m^2      SV(cubed) 78.9 ml      SI(cubed) 40.6 ml/m^2      Ao root diam 4.0 cm      Ao root area 12.8 cm^2      ACS 2.1 cm      asc Aorta Diam 3.7 cm      LVOT diam 2.5 cm      LVOT area 5.1 cm^2      RVOT diam 2.1 cm      RVOT area 3.6 cm^2      EDV(MOD-sp4) 68.5 ml      ESV(MOD-sp4) 50.1 ml      EF(MOD-sp4) 26.8 %      SV(MOD-sp4) 18.4 ml      SI(MOD-sp4) 9.5 ml/m^2       Ao root area (BSA corrected) 2.1     LV Dunlap Vol (BSA corrected) 35.3 ml/m^2      LV Sys Vol (BSA corrected) 25.8 ml/m^2      Aortic R-R 0.4 sec      Aortic .6 BPM      MV V2 max 90.3 cm/sec      MV max PG 3.3 mmHg      MV V2 mean 63.4 cm/sec      MV mean PG 1.8 mmHg      MV V2 VTI 9.6 cm      MVA(VTI) 6.7 cm^2      Ao pk sharmila 78.3 cm/sec      Ao max PG 2.5 mmHg      Ao max PG (full) -0.26 mmHg      Ao V2 mean 61.8 cm/sec      Ao mean PG 1.7 mmHg      Ao mean PG (full) 0.39 mmHg      Ao V2 VTI 11.3 cm      AISHWARYA(I,A) 5.7 cm^2      AISHWARYA(I,D) 5.7 cm^2      AISHWARYA(V,A) 5.3 cm^2      AISHWARYA(V,D) 5.3 cm^2      LV V1 max PG 2.7 mmHg      LV V1 mean PG 1.3 mmHg      LV V1 max 82.3 cm/sec      LV V1 mean 53.0 cm/sec      LV V1 VTI 12.7 cm      MR max sharmila 453.8 cm/sec      MR max PG 82.4 mmHg      CO(Ao) 22.0 l/min      CI(Ao) 11.3 l/min/m^2      SV(Ao) 144.9 ml      SI(Ao) 74.6 ml/m^2      CO(LVOT) 9.8 l/min      CI(LVOT) 5.0 l/min/m^2      SV(LVOT) 64.6 ml      SV(RVOT) 42.6 ml      SI(LVOT) 33.3 ml/m^2      PA V2 max 77.1 cm/sec      PA max PG 2.4 mmHg      PA max PG (full) 0.22 mmHg      PA V2 mean 50.6 cm/sec      PA mean PG 1.2 mmHg      PA mean PG (full) 0.15 mmHg      PA V2 VTI 9.1 cm      PVA(I,A) 4.7 cm^2      BH CV ECHO TARYN - PVA(I,D) 4.7 cm^2      BH CV ECHO TARYN - PVA(V,A) 3.4 cm^2      BH CV ECHO TARYN - PVA(V,D) 3.4 cm^2      PA acc time 0.06 sec      RV V1 max PG 2.2 mmHg      RV V1 mean PG 1.1 mmHg      RV V1 max 73.4 cm/sec      RV V1 mean 46.7 cm/sec      RV V1 VTI 11.9 cm      TR max sharmila 285.8 cm/sec      RVSP(TR) 35.7 mmHg      RAP systole 3.0 mmHg      PA pr(Accel) 49.9 mmHg      Qp/Qs 0.66      CV ECHO TARYN - BZI_BMI 18.7 kilograms/m^2       CV ECHO TARYN - BSA(HAYCOCK) 1.9 m^2       CV ECHO TARYN - BZI_METRIC_WEIGHT 68.0 kg       CV ECHO TARYN - BZI_METRIC_HEIGHT 190.5 cm      EF(MOD-bp) 27.0 %      LA dimension(2D) 4.6 cm     Narrative:       · Left ventricular systolic function is severely  decreased.  · Left ventricular wall thickness is consistent with mild concentric   hypertrophy.  · Left atrial cavity size is moderately dilated.  · Mild-to-moderate mitral valve regurgitation is present  · Mild tricuspid valve regurgitation is present.  · Mild aortic valve regurgitation is present.       ECG 12 Lead [[id number]] Collected:  07/03/20 0945     Updated:  07/03/20 0947    Narrative:       HEART RATE= 106  bpm  RR Interval= 568  ms  AK Interval= 196  ms  P Horizontal Axis= 48  deg  P Front Axis= 0  deg  QRSD Interval= 95  ms  QT Interval= 298  ms  QRS Axis= -18  deg  T Wave Axis= 14  deg  - ABNORMAL ECG -  Sinus tachycardia  Multiple premature complexes, vent & supraven  When compared with ECG of 01-Jul-2020 23:40:10,  Significant rate decrease  Electronically Signed By:   Date and Time of Study: 2020-07-03 09:45:28    ECG 12 Lead [[id number]] Collected:  07/01/20 2340     Updated:  07/03/20 1140    Narrative:       HEART RATE= 163  bpm  RR Interval= 368  ms  AK Interval=   ms  P Horizontal Axis=   deg  P Front Axis=   deg  QRSD Interval= 78  ms  QT Interval= 283  ms  QRS Axis= 7  deg  T Wave Axis= 42  deg  - ABNORMAL ECG -  Atrial fibrillation with rapid V-rate  No previous ECG available for comparison  Electronically Signed By: Sunny Unger (MILENA) 03-Jul-2020 11:38:35  Date and Time of Study: 2020-07-01 23:40:10    ECG 12 Lead [[id number]] Collected:  07/03/20 2021     Updated:  07/03/20 2023    Narrative:       HEART RATE= 133  bpm  RR Interval= 449  ms  AK Interval=   ms  P Horizontal Axis=   deg  P Front Axis=   deg  QRSD Interval= 92  ms  QT Interval= 296  ms  QRS Axis= -17  deg  T Wave Axis= -11  deg  - ABNORMAL ECG -  Atrial fibrillation  Ventricular premature complex  Probable lateral infarct, old  Electronically Signed By:   Date and Time of Study: 2020-07-03 20:21:30    ECG 12 Lead [[id number]] Collected:  07/04/20 0607     Updated:  07/04/20 0609    Narrative:       HEART RATE= 102  bpm  RR  Interval= 589  ms  MA Interval=   ms  P Horizontal Axis=   deg  P Front Axis=   deg  QRSD Interval= 95  ms  QT Interval= 334  ms  QRS Axis= -21  deg  T Wave Axis= 3  deg  - ABNORMAL ECG -  Atrial fibrillation  Ventricular premature complex  Electronically Signed By:   Date and Time of Study: 2020-07-04 06:07:51    ECG 12 Lead [[id number]] Collected:  07/05/20 0709     Updated:  07/05/20 0710    Narrative:       HEART RATE= 113  bpm  RR Interval= 528  ms  MA Interval=   ms  P Horizontal Axis=   deg  P Front Axis=   deg  QRSD Interval= 89  ms  QT Interval= 321  ms  QRS Axis= -36  deg  T Wave Axis= 0  deg  - ABNORMAL ECG -  Atrial fibrillation  Ventricular premature complex  Left axis deviation  When compared with ECG of 04-Jul-2020 6:07:51,  No significant change  Electronically Signed By:   Date and Time of Study: 2020-07-05 07:09:38    ECG 12 Lead [[id number]] Collected:  07/05/20 2052     Updated:  07/05/20 2057    Narrative:       HEART RATE= 114  bpm  RR Interval= 524  ms  MA Interval=   ms  P Horizontal Axis=   deg  P Front Axis=   deg  QRSD Interval= 96  ms  QT Interval= 312  ms  QRS Axis= -28  deg  T Wave Axis= -12  deg  - ABNORMAL ECG -  Atrial fibrillation  Ventricular premature complex  Borderline T abnormalities, diffuse leads  When compared with ECG of 05-Jul-2020 7:09:38,  Significant axis, voltage or hypertrophy change  Electronically Signed By:   Date and Time of Study: 2020-07-05 20:52:58    ECG 12 Lead [[id number]] Collected:  07/06/20 0609     Updated:  07/06/20 0610    Narrative:       HEART RATE= 97  bpm  RR Interval= 619  ms  MA Interval=   ms  P Horizontal Axis=   deg  P Front Axis=   deg  QRSD Interval= 91  ms  QT Interval= 338  ms  QRS Axis= -22  deg  T Wave Axis= -5  deg  - ABNORMAL ECG -  Atrial fibrillation  Ventricular premature complex  When compared with ECG of 05-Jul-2020 20:52:58,  Significant repolarization change  Electronically Signed By:   Date and Time of Study: 2020-07-06 06:09:24       ECG 12 Lead [[id number]] Collected:  07/09/20 2035     Updated:  07/09/20 2036    Narrative:       HEART RATE= 61  bpm  RR Interval= 983  ms  SD Interval=   ms  P Horizontal Axis=   deg  P Front Axis=   deg  QRSD Interval= 94  ms  QT Interval= 382  ms  QRS Axis= 37  deg  T Wave Axis= -43  deg  - ABNORMAL ECG -  Atrial fibrillation  Electronically Signed By:   Date and Time of Study: 2020-07-09 20:35:36    EP/CRM Study [[id number]] Resulted:  07/10/20 1945     Updated:  07/10/20 1945    Narrative:       Procedure indication--- uncontrollable atrial fibrillation with chronic   class III systolic heart failure and severe ischemic cardiomyopathy with   no further targets for revascularization with EF less than 35% and   iatrogenic left bundle branch block and patient recommended biventricular   ICD insertion and AV node ablation for optimization    Sedation anesthesia and vancomycin before procedure    Procedures done  #1 implantation of a biventricular ICD system with placement of   biventricular ICD generator and placement of right ventricle ICD lead   placement of a coronary sinus lead  #2 coronary sinus venography with placement of a complex coronary sinus   lead  #3-lead testing and fluoroscopy  #4 biventricular ICD interrogation    Procedure note  Obtaining valid consent patient was draped in sterile fashion the left   infraclavicular area was infiltrated local anesthesia and a small incision   was made and hemostasis was acquired by cautery and gentle dissection was   done and a pocket was made in the prepectoral fascia area and by using   micropuncture kit subclavian vein was easily cannulated with placement of   2J wires for placement of leads.  Right ventricular ICD lead was placed in the right ventricle apex after   mapping right ventricle at multiple locations via 8 [place] venous sheath   and after testing the lead the sheath was removed  For placement of left ventricle coronary sinus lead a short 10 [place]    venous sheath was used and long peelable sheath manufactured by changed   company was used with the help of supra CS and later a deflectable   decapolar catheter cannulate coronary sinus with coronary sinus   venography.  Patient had an ideal mid posterior lateral branch--cannulation was done   however diaphragmatic stimulation was noted with a quadripolar lead and   multiple other branches were mapped with poor thresholds  A unipolar lead was used and again mapping of multiple location was done   including distal lateral branch and great cardiac vein with poor   thresholds and henceforth after trying multiple places recanalization of   the mid posterior lateral branch was done with adequate capture with a   narrow margin for diaphragmatic stimulation  Once the lead was tested multiple times the sheaths were removed and the   leads were anchored to underlying muscular area and the pocket was copious   irrigated with antibiotic solution and ICD generator was taken and the   leads were anchored to the generator generator leads were placed in the   pocket and incision was closed in several layers without any immediate   procedure related complications and biventricular programming attached to   chart.   ICD generator is manufactured by [employer] and model number is   G124 and serial #[id number]  Right ventricular ICD lead is manufactured by [employer] model 0673   and she #[id number]  LV lead is  [employer] model 4592 and serial number is   [id number]  Right ventricular sensing is more than 10 mV with threshold less than 1 V  Left ventricle lead threshold is 2.5 V at 1.5 ms    Plan  Proceed with AV node ablation  Eliquis can be resumed tomorrow  Digoxin can be stopped   Electronically signed by Jonathan Denney MD, 07/10/20, 7:16 PM.    EP/CRM Study [[id number]] Resulted:  07/10/20 1945     Updated:  07/10/20 1945    Narrative:       Procedure indication--uncontrollable rapid atrial  "fibrillation associated   with hypotension , biventricular ICD in situ, severe dilated   cardiomyopathy, class 3 systolic heart failure--patient recommended AV   node ablation after biventricular ICD insertion which was done this   evening     Sedation by anesthesia      Procedures done  1. Radiofrequency ablation of compact AV node  2.  Biventricular ICD reprogramming and interrogation  3. Fluoroscopy  4.  EP study with recording of right atrium, right ventricle and HIS   without induction of arrhythmias  5.  3D mapping with Carto system    Procedure note  After obtaining a valid consent patient was draped in sterile fashion and   local anesthesia was given in the right groin in the right common femoral   vein was cannulated with placement of a 8 [place] venous sheath.  An 8 mm   [employer] Spence catheter was taken and mapping of right atrium right   ventricle and compact AV kelly areas was done--single radiofrequency   ablation was done with HIS cloudy was noted with immediate complete AV   block   Post ablation device was reprogrammed to a baseline rate of 70 beats per   minute without any complications and catheter removed    Findings  HV interval of 54 milliseconds  Underlying appropriately functioning biventricular ICD system  Successful radiofrequency ablation of compact AV node with escape rate   below 30 beats per minute    Plan  Patient can be transferred back to ICU  Currently device programmed to RV mode only because of infrequent   diaphragmatic stimulation  ICD programming attached to chart    Electronically signed by Jonathan Denney MD, 07/10/20, 7:36 PM.              PHYSICAL EXAMINATION:  VITAL SIGNS: Temp:  [97.8 °F (36.6 °C)-98.1 °F (36.7 °C)] 97.9 °F (36.6 °C)  Heart Rate:  [70-71] 70  BP: ()/(50-87) 166/84   Flowsheet Rows      First Filed Value   Admission Height  185.4 cm (73\") Documented at 07/01/2020 2336   Admission Weight  68 kg (150 lb) Documented at 07/01/2020 2336    "     GENERAL: Alert and oriented x3, afebrile.  Alert and oriented.  Awake alert vital signs stable, appeared comfortable, nondistressed, and appears stated age. Generalized weakness. Responds to questions appropriately.   HEENT: Normocephalic, atraumatic. Pupils equal, round . Extraocular movements intact bilaterally. Trachea midline.  Mucous membranes are moist.   NECK: Supple. No JVD. Trachea midline, no LAD  CHEST: Clear to auscultation bilaterally anteriorly; no rale, rhonchi, or wheezes  HEART: Regular rate and rhythm.  V paced.  No rubs, murmurs or gallops.  Incision clean and dry  ABDOMEN: Soft, nontender, nondistended. Bowel sounds are otherwise positive.   EXTREMITIES: No clubbing, cyanosis, or edema. Moves all extremities  SKIN: Warm and dry. No ecchymoses, petechiae or rashes.   MUSCULOSKELETAL: No bony abnormalities. Range of motion normal. No crepitus. No joint swelling or erythema.   NEUROLOGIC: Cranial nerves II-XII intact bilaterally. No focal neurologic deficits. Mini-Mental status exam was deferred.   LYMPHATICS: No lymphadenopathy.     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 daily continued  Discontinue midodrine with blood pressures in the 140s, last 2 doses of been held for blood pressures greater than 120 systolic essentially there is no midodrine on board with systolics greater than 135.  Would like to get him off Florinef if possible  Add digoxin 0.25 mg p.o. daily for heart failure, digoxin level in the morning  Reecho in 3 months for interval improvement in heart and cardiac function with rate control of atrial fibrillation which was previously  uncontrolled with rates  Very low-dose ACE inhibitor lisinopril 5 daily to see if L tolerate this  Need to get orthostatics as all blood pressures that have been 140 systolic have been obtained in supine position  Up to chair today  Low-dose beta-blocker metoprolol XL 12.5 for sympathomimetic blockade.        Discussed with Dr. Cronin who will assume his care tomorrow.    It is a pleasure to be involved in his cardiovascular care.  Please call with any questions or concerns    Jose D Palacios MD, PhD    Jose D Palacios MD  07/12/20  08:46        Greater than 35-minute spent face-to-face with the patient with extensive chart review, coordination care on the floor with direct patient interaction today.

## 2020-07-12 NOTE — PLAN OF CARE
Patient has remained stable throughout the night. No complaints of chest pain or nausea either. Will continue to monitor.

## 2020-07-13 LAB
BACTERIA SPEC AEROBE CULT: NORMAL
BASOPHILS # BLD AUTO: 0 10*3/MM3 (ref 0–0.2)
BASOPHILS NFR BLD AUTO: 0.3 % (ref 0–1.5)
DEPRECATED RDW RBC AUTO: 45.9 FL (ref 37–54)
DIGOXIN SERPL-MCNC: 0.6 NG/ML (ref 0.6–1.2)
EOSINOPHIL # BLD AUTO: 0.3 10*3/MM3 (ref 0–0.4)
EOSINOPHIL NFR BLD AUTO: 3.1 % (ref 0.3–6.2)
ERYTHROCYTE [DISTWIDTH] IN BLOOD BY AUTOMATED COUNT: 14.6 % (ref 12.3–15.4)
HCT VFR BLD AUTO: 30.9 % (ref 37.5–51)
HGB BLD-MCNC: 10.4 G/DL (ref 13–17.7)
LYMPHOCYTES # BLD AUTO: 1.9 10*3/MM3 (ref 0.7–3.1)
LYMPHOCYTES NFR BLD AUTO: 21.2 % (ref 19.6–45.3)
MAGNESIUM SERPL-MCNC: 1.9 MG/DL (ref 1.6–2.4)
MCH RBC QN AUTO: 30 PG (ref 26.6–33)
MCHC RBC AUTO-ENTMCNC: 33.7 G/DL (ref 31.5–35.7)
MCV RBC AUTO: 89 FL (ref 79–97)
MONOCYTES # BLD AUTO: 1.2 10*3/MM3 (ref 0.1–0.9)
MONOCYTES NFR BLD AUTO: 13.8 % (ref 5–12)
NEUTROPHILS NFR BLD AUTO: 5.6 10*3/MM3 (ref 1.7–7)
NEUTROPHILS NFR BLD AUTO: 61.6 % (ref 42.7–76)
NRBC BLD AUTO-RTO: 0 /100 WBC (ref 0–0.2)
PHOSPHATE SERPL-MCNC: 2.6 MG/DL (ref 2.5–4.5)
PLATELET # BLD AUTO: 213 10*3/MM3 (ref 140–450)
PMV BLD AUTO: 8.6 FL (ref 6–12)
RBC # BLD AUTO: 3.48 10*6/MM3 (ref 4.14–5.8)
WBC # BLD AUTO: 9 10*3/MM3 (ref 3.4–10.8)

## 2020-07-13 PROCEDURE — 85025 COMPLETE CBC W/AUTO DIFF WBC: CPT | Performed by: INTERNAL MEDICINE

## 2020-07-13 PROCEDURE — 84100 ASSAY OF PHOSPHORUS: CPT | Performed by: INTERNAL MEDICINE

## 2020-07-13 PROCEDURE — 80162 ASSAY OF DIGOXIN TOTAL: CPT | Performed by: INTERNAL MEDICINE

## 2020-07-13 PROCEDURE — 97530 THERAPEUTIC ACTIVITIES: CPT

## 2020-07-13 PROCEDURE — 99024 POSTOP FOLLOW-UP VISIT: CPT | Performed by: INTERNAL MEDICINE

## 2020-07-13 PROCEDURE — 97116 GAIT TRAINING THERAPY: CPT

## 2020-07-13 PROCEDURE — 83735 ASSAY OF MAGNESIUM: CPT | Performed by: INTERNAL MEDICINE

## 2020-07-13 PROCEDURE — 93010 ELECTROCARDIOGRAM REPORT: CPT | Performed by: INTERNAL MEDICINE

## 2020-07-13 RX ORDER — ONDANSETRON 2 MG/ML
4 INJECTION INTRAMUSCULAR; INTRAVENOUS EVERY 6 HOURS PRN
Status: DISCONTINUED | OUTPATIENT
Start: 2020-07-13 | End: 2020-07-15 | Stop reason: HOSPADM

## 2020-07-13 RX ADMIN — CYANOCOBALAMIN TAB 1000 MCG 1000 MCG: 1000 TAB at 08:08

## 2020-07-13 RX ADMIN — SUCRALFATE 1 G: 1 TABLET ORAL at 08:08

## 2020-07-13 RX ADMIN — DOCUSATE SODIUM 100 MG: 100 CAPSULE, LIQUID FILLED ORAL at 20:05

## 2020-07-13 RX ADMIN — CLOPIDOGREL BISULFATE 75 MG: 75 TABLET ORAL at 08:07

## 2020-07-13 RX ADMIN — SUCRALFATE 1 G: 1 TABLET ORAL at 20:05

## 2020-07-13 RX ADMIN — HYDROCODONE BITARTRATE AND ACETAMINOPHEN 1 TABLET: 7.5; 325 TABLET ORAL at 04:27

## 2020-07-13 RX ADMIN — METOCLOPRAMIDE 5 MG: 5 TABLET ORAL at 08:08

## 2020-07-13 RX ADMIN — Medication 10 ML: at 08:08

## 2020-07-13 RX ADMIN — METOPROLOL SUCCINATE 12.5 MG: 25 TABLET, EXTENDED RELEASE ORAL at 08:07

## 2020-07-13 RX ADMIN — THERA TABS 1 TABLET: TAB at 08:07

## 2020-07-13 RX ADMIN — ATORVASTATIN CALCIUM 20 MG: 20 TABLET, FILM COATED ORAL at 08:07

## 2020-07-13 RX ADMIN — FLUDROCORTISONE ACETATE 0.1 MG: 0.1 TABLET ORAL at 17:41

## 2020-07-13 RX ADMIN — DIGOXIN 250 MCG: 250 TABLET ORAL at 11:56

## 2020-07-13 RX ADMIN — LISINOPRIL 5 MG: 5 TABLET ORAL at 08:07

## 2020-07-13 RX ADMIN — ASPIRIN 81 MG: 81 TABLET, COATED ORAL at 08:08

## 2020-07-13 RX ADMIN — SUCRALFATE 1 G: 1 TABLET ORAL at 11:56

## 2020-07-13 RX ADMIN — MELATONIN TAB 5 MG 5 MG: 5 TAB at 20:06

## 2020-07-13 RX ADMIN — ACETAMINOPHEN 650 MG: 325 TABLET, FILM COATED ORAL at 11:56

## 2020-07-13 RX ADMIN — SUCRALFATE 1 G: 1 TABLET ORAL at 17:41

## 2020-07-13 RX ADMIN — Medication 10 ML: at 20:17

## 2020-07-13 RX ADMIN — DOCUSATE SODIUM 100 MG: 100 CAPSULE, LIQUID FILLED ORAL at 08:07

## 2020-07-13 NOTE — PROGRESS NOTES
[facility]/PULM/CC PROGRESS NOTE       SUBJECTIVE       This is a 79-year-old male with a history of COPD, former smoker, atrial fibrillation on chronic anticoagulation who lives all by himself presented to the hospital for fatigue, tiredness and shortness of breath.  Patient has been feeling puny for the few days with poor appetite.  He has noticed extreme fatigue with malaise.  He denies any fever or chills.  He has noted increased shortness of breath however cough has been mild.  He did cough clear sputum and only one time he had a maroonish sputum.  He denies any wheezing or chest pain or pleurisy.  Denies any sick contacts.  He states that he is visited by a home health nurse only.  He denies any other contacts.     CT scan of the chest was done that showed bilateral groundglass airspace disease with interlobular septal thickening upper lobe predominant.  Patient denies any active tobacco use or vaping.     He has a history of chronic sinus issues.  He is not on any maintenance inhalers.  7/3: No new fevers, SOA stable, O2 requirement worsened over night and now on 5L, Remains fully awake and responsive, On oral diet  7/4: afebrile. soa at baseline. On 5 L. No n.v/d. Feels weak and fatigued.   7/6: Awake.  Currently on 2 L nasal cannula.  Remains on Cardizem and amiodarone infusions.  No complaints of cough or productive phlegm.  No chest pains.  No vomiting  7/7: Awake.  Currently on 2 L nasal cannula.  Complains of generalized weakness.  Complains of decreased appetite.  Currently on amiodarone infusion.  IV Cardizem has been stopped.  Remains in atrial fibrillation on the monitor.  No cough or productive phlegm.  No chest pains.  Some shortness of breath with activity  7/8: Awake.  Currently on 2 L nasal cannula.  Remains short of breath with activity.  Off amiodarone infusion.  Complains of generalized weakness.  No nausea or vomiting  7/9:  Transferred to ICU last night.  PICC placed and on BRIANA for a few hours.   Medications adjusted and blood pressure is improved.  Lewis is now off. On 2 liters nasal cannula   7/10: no acute events overnight. No gtts. Tolerating O2 2L. No chest pain. Plan for cath lab today for ablation and PPM  7/11: Awake, S/P PPM placement. Remains off pressors. Currently on RA, Tolerating PO diet. No C/O CP, No N/V  7/12: No acute events overnight.  Tolerating room air.  Tolerating p.o. diet.  No complaints of chest pain or shortness of air.  7/13:  OOB to chair, No new issues.  Feels ok.  On RA.       OBJECTIVE    Vitals:    07/13/20 0519 07/13/20 0549 07/13/20 0619 07/13/20 0807   BP: 123/75 112/63 120/67 130/79   Pulse: 70 70 70 70   Resp:       Temp:       TempSrc:       SpO2: 97% 97% 96%    Weight:       Height:          Intake/Output last 3 shifts:  I/O last 3 completed shifts:  In: 1680 [P.O.:1680]  Out: 650 [Urine:650]  Intake/Output this shift:  No intake/output data recorded.    General Appearance:  Alert, cooperative, no distress, appears stated age  Head:  Normocephalic, without obvious abnormality, atraumatic  Eyes:  conjunctivae/corneas clear, EOM's intact     Neck:  Supple,  no JVD      Lungs: Diminished bases bilaterally, clear but diminished bases  Chest wall:  No tenderness.  Left upper chest PPM site  Heart: Irregularly irregular rhythm, afib, S1 and S2 normal, no murmur, rub or gallop  Abdomen:  Soft, non-tender, bowel sounds active all four quadrants,  no rebound or guarding  Extremities:  Extremities normal, no cyanosis or edema.  Left arm in sling   Skin:  No rashes or lesions  Neurologic:   Alert and oriented, no focal deficits    Scheduled Meds:    aspirin 81 mg Oral Daily   atorvastatin 20 mg Oral Daily   clopidogrel 75 mg Oral Daily   digoxin 250 mcg Oral Daily   docusate sodium 100 mg Oral BID   fludrocortisone 0.1 mg Oral Q PM   lisinopril 5 mg Oral Q24H   metoclopramide 5 mg Oral TID AC   metoprolol succinate XL 12.5 mg Oral Q24H   ondansetron 4 mg Intravenous Q6H   sodium  chloride 250 mL Intravenous Once   sodium chloride 10 mL Intravenous Q12H   spironolactone 25 mg Oral Q24H   sucralfate 1 g Oral 4x Daily AC & at Bedtime   Thera 1 tablet Oral Daily   vitamin B-12 1,000 mcg Oral Daily       Continuous Infusions:       PRN Meds:•  acetaminophen **OR** acetaminophen **OR** acetaminophen  •  aluminum-magnesium hydroxide-simethicone  •  atropine  •  bisacodyl  •  HYDROcodone-acetaminophen  •  ipratropium-albuterol  •  magnesium hydroxide  •  magnesium sulfate **OR** magnesium sulfate **OR** magnesium sulfate  •  melatonin  •  Morphine  •  ondansetron  •  polyethylene glycol  •  potassium chloride **OR** potassium chloride **OR** potassium chloride  •  potassium phosphate infusion greater than 15 mMoles **OR** potassium phosphate infusion greater than 15 mMoles **OR** potassium phosphate **OR** sodium phosphate IVPB **OR** sodium phosphate IVPB **OR** sodium phosphate IVPB  •  promethazine  •  [COMPLETED] Insert peripheral IV **AND** sodium chloride     LABS    CBC  Results from last 7 days   Lab Units 07/13/20  0355 07/12/20  0337 07/11/20  0405 07/10/20  0308 07/09/20  0345 07/08/20  0242 07/07/20  0255   WBC 10*3/mm3 9.00 10.00 12.00* 9.60 12.40* 14.20* 14.50*   RBC 10*6/mm3 3.48* 3.48* 3.68* 4.03* 4.27 4.88 4.62   HEMOGLOBIN g/dL 10.4* 10.4* 11.2* 12.1* 12.7* 14.3 13.9   HEMATOCRIT % 30.9* 31.5* 33.4* 35.8* 38.2 43.5 41.7   MCV fL 89.0 90.4 90.7 88.9 89.4 89.1 90.2   PLATELETS 10*3/mm3 213 181 212 194 174 238 263       CMP   Results from last 7 days   Lab Units 07/12/20  0337 07/11/20  0405 07/10/20  1509 07/10/20  0308 07/09/20  0345 07/08/20  0242 07/07/20  0255   SODIUM mmol/L 141 141  --  141 142 140 142   POTASSIUM mmol/L 3.5 4.0 3.9 3.6 3.8 3.2* 3.3*   CHLORIDE mmol/L 105 103  --  102 101 97* 100   CO2 mmol/L 27.0 27.0  --  31.0* 31.0* 30.0* 30.0*   BUN  11 13  --  18 19 14 15   CREATININE mg/dL 0.56* 0.66*  --  0.62* 0.62* 0.53* 0.53*   GLUCOSE mg/dL 111* 177*  --  128* 97  103* 109*   ALBUMIN g/dL 2.50*  --   --   --   --   --  3.60   BILIRUBIN mg/dL 0.4  --   --   --   --   --  1.5*   ALK PHOS U/L 44  --   --   --   --   --  51   AST (SGOT) U/L 26  --   --   --   --   --  24   ALT (SGPT) U/L 25  --   --   --   --   --  52*       TROPONIN        CoAg  Results from last 7 days   Lab Units 07/12/20  0337   INR  1.05   APTT seconds 19.6*       ABG        Microbiology  Microbiology Results (last 10 days)     Procedure Component Value - Date/Time    Blood Culture - Blood, Blood, PICC Line [[id number]] Collected:  07/11/20 1353    Lab Status:  Preliminary result Specimen:  Blood, PICC Line Updated:  07/12/20 1430     Blood Culture No growth at 24 hours    Blood Culture - Blood, Hand, Right [[id number]] Collected:  07/11/20 1353    Lab Status:  Preliminary result Specimen:  Blood from Hand, Right Updated:  07/12/20 1430     Blood Culture No growth at 24 hours    Blood Culture - Blood, Arm, Left [[id number]]  (Abnormal) Collected:  07/08/20 2202    Lab Status:  Final result Specimen:  Blood from Arm, Left Updated:  07/10/20 0658     Blood Culture Staphylococcus, coagulase negative     Comment: Probable contaminant requires clinical correlation, susceptibility not performed unless requested by physician.          Isolated from Anaerobic Bottle     Gram Stain Gram positive cocci in clusters    Blood Culture ID, PCR - Blood, Arm, Left [[id number]]  (Abnormal) Collected:  07/08/20 2202    Lab Status:  Edited Result - FINAL Specimen:  Blood from Arm, Left Updated:  07/10/20 0635     BCID, PCR Staphylococcus spp, not aureus. Identification by BCID PCR.     Comment: Corrected result. Previous result was Staphylococcus aureus. Identification by BCID PCR. on 7/9/2020 at 2147 EDT.        BOTTLE TYPE Anaerobic Bottle    Blood Culture - Blood, Blood, PICC Line [[id number]] Collected:  07/08/20 2147    Lab Status:  Preliminary result Specimen:  Blood, PICC Line Updated:  07/12/20 2245     Blood Culture No  growth at 4 days          IMAGING & OTHER STUDIES    Imaging Results (Last 72 Hours)     Procedure Component Value Units Date/Time    XR Chest 1 View [[id number]] Collected:  07/10/20 2104     Updated:  07/10/20 2108    Narrative:       XR CHEST 1 VW-     Date of Exam: 7/10/2020 8:45 PM     Indication: Status post pacemaker placement.     Comparison Exams: 07/08/2020     Technique: Single AP chest radiograph     FINDINGS:  A left subclavian pacemaker is in place. No pneumothorax is identified.  Mild hazy opacities bilaterally appear similar to prior exam. The heart  and mediastinal contours appear normal. The osseous structures appear  intact.       Impression:       1.Left subclavian pacemaker in place. No pneumothorax identified.  2.Mild bilateral hazy opacities appear unchanged, which could represent  mild pneumonia or pulmonary edema.     Electronically Signed By-DR. Clint Dotson MD On:7/10/2020 9:06 PM  This report was finalized on [id number] by DR. Clint Dotson MD.        Results for orders placed during the hospital encounter of 07/01/20   Adult Transthoracic Echo Complete W/ Cont if Necessary Per Protocol    Narrative · Left ventricular systolic function is severely decreased.  · Left ventricular wall thickness is consistent with mild concentric   hypertrophy.  · Left atrial cavity size is moderately dilated.  · Mild-to-moderate mitral valve regurgitation is present  · Mild tricuspid valve regurgitation is present.  · Mild aortic valve regurgitation is present.             ASSESSMENT/PLAN:     Atrial fibrillation (CMS/HCC)    CAD (coronary artery disease)    Chronic anticoagulation    Hyperlipidemia    Presence of stent in right coronary artery    Chronic pain    Sepsis (CMS/HCC)    Congestive heart failure (CHF) (CMS/HCC)    Nausea vomiting and diarrhea    Ischemic cardiomyopathy    Persistent atrial fibrillation (CMS/HCC)    Chronic systolic congestive heart failure (CMS/HCC)    Permanent atrial  fibrillation (CMS/HCC)    Coronary artery disease involving native coronary artery of native heart without angina pectoris        1.Bilateral PNA (viral vs bacterial) other differentials include Pulmonary edema and possible hemorrhage  2.  Chronic A. fib  3. H/o RML lobectomy  4. CAD  5. Acute hypoxia due to above  6.  Nausea  7. Chronic Hypotension  8. Chronic Anticoagulation with Xarelto  9. Lactic acidosis  10. Hypokalemia  11.  Diarrhea  12.  Gastritis noted on EGD  13.  Acute on chronic systolic CHF exacerbation       PLAN    -Reviewed CT scan of the chest. Covid test is negative X2.  Afebrile.  Status post antibiotic therapy with Zosyn. WBC count improving. On Vancomycin for Coag negative staph.Likley contaminant. Will repeat blood cx and if negative will D/C abx  -Patient is a lifelong non-smoker.  No previous history of COPD.  Off steroids and Pulmicort.  Changed nebs to PRN.  -Management of A. fib per cardiology.  Off amiodarone and Cardizem infusions. Now on digoxin  - 2D echo results reviewed.  EF 30 to 35%.  Mild to moderate MR noted.  RVSP 35.7  -GI work-up reviewed.  Status post EGD 7/5 with changes of gastritis noted.  Remains on Reglan Carafate and Protonix.  Switched Protonix to PO stool for C. difficile analysis is negative.  Changed Reglan to PO  -Patient with issues with chronic hypotension.  Remains on Florinef which is being continued.  -On RA.   -off therapeutic anticoagulation due to AICD placement.  Check with cardiology about resuming Xarelto today  -Increase activity.  -Tolerating PO Diet   -PICC discontinued.   -midodrine weaned off     On bowel regimen.  Stop reglan  PT eval and treat       Transfer out of ICU or discharge to rehab if ok with cardiology

## 2020-07-13 NOTE — THERAPY TREATMENT NOTE
Patient Name: Shukri Park  : 1940    MRN: [id number]                              Today's Date: 2020       Admit Date: 2020    Visit Dx:     ICD-10-CM ICD-9-CM   1. Atrial fibrillation with rapid ventricular response (CMS/HCC) I48.91 427.31   2. Congestive heart failure, unspecified HF chronicity, unspecified heart failure type (CMS/[facility]) I50.9 428.0   3. Pneumonia of both lungs due to infectious organism, unspecified part of lung J18.9 483.8   4. Nausea vomiting and diarrhea R11.2 787.91    R19.7 787.01   5. Ischemic cardiomyopathy I25.5 414.8   6. Persistent atrial fibrillation (CMS/HCC) I48.19 427.31   7. Chronic systolic congestive heart failure (CMS/HCC) I50.22 428.22     428.0   8. Longstanding persistent atrial fibrillation (CMS/HCC) I48.11 427.31   9. Permanent atrial fibrillation (CMS/[facility]) I48.21 427.31   10. Coronary artery disease involving native coronary artery of native heart without angina pectoris I25.10 414.01     Patient Active Problem List   Diagnosis   • Atrial fibrillation (CMS/HCC)   • CAD (coronary artery disease)   • Chronic anticoagulation   • Hyperlipidemia   • Presence of stent in right coronary artery   • Chronic pain   • Sepsis (CMS/HCC)   • Congestive heart failure (CHF) (CMS/[facility])   • Nausea vomiting and diarrhea   • Ischemic cardiomyopathy   • Persistent atrial fibrillation (CMS/HCC)   • Chronic systolic congestive heart failure (CMS/HCC)   • Permanent atrial fibrillation (CMS/[facility])   • Coronary artery disease involving native coronary artery of native heart without angina pectoris   • Disorder of sacroiliac joint   • Lumbosacral radiculopathy   • Mitral regurgitation   • Neck pain   • Nevus of choroid   • Presbyopia   • Vitamin D deficiency     Past Medical History:   Diagnosis Date   • A-fib (CMS/HCC)    • Aortic aneurysm (CMS/HCC)    • Appetite loss    • Cancer (CMS/HCC)     right lobe cancer - surgically removed    • Dark stools    • Foot pain, bilateral    •  Hyperlipidemia    • Low back pain    • S/P epidural steroid injection     Israel Gomes's - no relief   • Weight loss     acute loss of weight 30 lbs     Past Surgical History:   Procedure Laterality Date   • CARDIAC CATHETERIZATION N/A 7/9/2020    Procedure: LEFT HEART CATH with possible PCI;  Surgeon: Wade Cronin MD;  Location: [facility] CATH INVASIVE LOCATION;  Service: Cardiovascular;  Laterality: N/A;  Local and IV sedation   • CARDIAC CATHETERIZATION N/A 7/9/2020    Procedure: Percutaneous Coronary Intervention;  Surgeon: Wade Cronin MD;  Location: [facility] CATH INVASIVE LOCATION;  Service: Cardiovascular;  Laterality: N/A;   • CARDIAC ELECTROPHYSIOLOGY PROCEDURE N/A 7/10/2020    Procedure: Biventricular Device Insertion;  Surgeon: Jonathan Denney MD;  Location: [facility] CATH INVASIVE LOCATION;  Service: Cardiovascular;  Laterality: N/A;   • CARDIAC ELECTROPHYSIOLOGY PROCEDURE N/A 7/10/2020    Procedure: ABLATION AV NODE;  Surgeon: Jonathan Denney MD;  Location: [facility] CATH INVASIVE LOCATION;  Service: Cardiovascular;  Laterality: N/A;   • CARDIAC ELECTROPHYSIOLOGY PROCEDURE N/A 7/10/2020    Procedure: AV node ablation;  Surgeon: Jonathan Denney MD;  Location: [facility] CATH INVASIVE LOCATION;  Service: Cardiovascular;  Laterality: N/A;   • CATARACT EXTRACTION, BILATERAL     • CORONARY ANGIOPLASTY WITH STENT PLACEMENT     • ENDOSCOPY N/A 7/5/2020    Procedure: ESOPHAGOGASTRODUODENOSCOPY;  Surgeon: Neal Correa MD;  Location: [facility] ENDOSCOPY;  Service: Gastroenterology;  Laterality: N/A;   • LUMBAR DECOMPRESSION  09/2015    L2-L3   • LUMBAR DECOMPRESSION  2006    Dr. Logan   • OTHER SURGICAL HISTORY  05/2015    left SI fusion with bone graft - Dr. Presley   • OTHER SURGICAL HISTORY      carotid artery repair    • OTHER SURGICAL HISTORY Left 02/19/2016    Left SI fusion 2/19/2016 Dr. Zendejas   • POSTERIOR SPINAL FUSION  10/24/2016    PSF T12-3/TLIF L3-L4 poss L2-L3 --- Dr. Zendejas   • TUMOR REMOVAL       carcinoid tumor removed off right lobe tumor     General Information     Row Name 07/13/20 1443          PT Evaluation Time/Intention    Document Type  therapy note (daily note)  -CM     Mode of Treatment  physical therapy  -CM     Row Name 07/13/20 1443          General Information    Patient Profile Reviewed?  yes  -CM     Existing Precautions/Restrictions  fall;pacemaker  -CM     Row Name 07/13/20 1443          Cognitive Assessment/Intervention- PT/OT    Orientation Status (Cognition)  oriented x 4  -CM     Cognitive Assessment/Intervention Comment  alert and oriented; very pleasant  -CM     Row Name 07/13/20 1443          Safety Issues, Functional Mobility    Impairments Affecting Function (Mobility)  endurance/activity tolerance;strength;balance  -CM       User Key  (r) = Recorded By, (t) = Taken By, (c) = Cosigned By    Initials Name Provider Type    CM Nicki Walker, PT Physical Therapist        Mobility     Row Name 07/13/20 1444          Bed Mobility Assessment/Treatment    Bed Mobility Assessment/Treatment  supine-sit  -CM     Supine-Sit [place] (Bed Mobility)  minimum assist (75% patient effort);1 person assist  -CM     Assistive Device (Bed Mobility)  head of bed elevated  -CM     Comment (Bed Mobility)  no symptoms in coming to sit at eob  -CM     Row Name 07/13/20 1444          Bed-Chair Transfer    Bed-Chair [place] (Transfers)  not tested  -CM     Row Name 07/13/20 1444          Sit-Stand Transfer    Sit-Stand [place] (Transfers)  minimum assist (75% patient effort);1 person assist;moderate assist (50% patient effort)  -CM     Assistive Device (Sit-Stand Transfers)  other (see comments) one hand held  -CM     Row Name 07/13/20 1444          Gait/Stairs Assessment/Training    Gait/Stairs Assessment/Training  gait/ambulation independence;distance ambulated  -CM     [place] Level (Gait)  minimum assist (75% patient effort);moderate assist (50% patient effort)  -CM     Assistive  Device (Gait)  other (see comments) one hand held  -CM     Distance in Feet (Gait)  50 ft  -CM     Pattern (Gait)  step-through  -CM     Deviations/Abnormal Patterns (Gait)  base of support, wide;bilateral deviations latreral and anterior/posterior sway  -CM     Bilateral Gait Deviations  forward flexed posture  -CM     Row Name 07/13/20 1444          Mobility Assessment/Intervention    Extremity Weight-bearing Status  left upper extremity pacemaker precautions  -CM       User Key  (r) = Recorded By, (t) = Taken By, (c) = Cosigned By    Initials Name Provider Type    Nicki Bravo, PT Physical Therapist        Obj/Interventions     Row Name 07/13/20 1447          General ROM    GENERAL ROM COMMENTS  L hand noted to be edematous, w/ 2+ edema; wedding ring tight on pt's finger, and pt/son worried that it will injury his finger due to edema; asking for assist to remove temporarily; RN plans to use [place] jelly to try to remove ring to give to son for safekeeping  -CM     Row Name 07/13/20 1446          Static Sitting Balance    Level of [place] (Unsupported Sitting, Static Balance)  supervision  -CM     Sitting Position (Unsupported Sitting, Static Balance)  sitting on edge of bed  -CM     Row Name 07/13/20 1446          Dynamic Sitting Balance    Level of [place], Reaches Outside Midline (Sitting, Dynamic Balance)  contact guard assist  -CM     Sitting Position, Reaches Outside Midline (Sitting, Dynamic Balance)  sitting on edge of bed  -CM     Row Name 07/13/20 1446          Static Standing Balance    Level of [place] (Supported Standing, Static Balance)  minimal assist, 75% patient effort;1 person assist  -CM     Assistive Device Utilized (Supported Standing, Static Balance)  other (see comments) one hand held  -CM     Row Name 07/13/20 1446          Dynamic Standing Balance    Level of [place], Reaches Outside Midline (Standing, Dynamic Balance)  minimal assist, 75% patient effort;moderate  assist, 50 to 74% patient effort  -CM     Assistive Device Utilized (Supported Standing, Dynamic Balance)  other (see comments) one hand held  -CM       User Key  (r) = Recorded By, (t) = Taken By, (c) = Cosigned By    Initials Name Provider Type    Nicki Bravo, PT Physical Therapist        Goals/Plan     Row Name 07/13/20 1454          Bed Mobility Goal 1 (PT)    Activity/Assistive Device (Bed Mobility Goal 1, PT)  bed mobility activities, all  -CM     [place] Level/Cues Needed (Bed Mobility Goal 1, PT)  conditional independence  -CM     Time Frame (Bed Mobility Goal 1, PT)  long term goal (LTG);2 weeks  -CM     Progress/Outcomes (Bed Mobility Goal 1, PT)  goal ongoing;continuing progress toward goal  -CM     Row Name 07/13/20 1454          Transfer Goal 1 (PT)    Activity/Assistive Device (Transfer Goal 1, PT)  transfers, all  -CM     [place] Level/Cues Needed (Transfer Goal 1, PT)  conditional independence  -CM     Time Frame (Transfer Goal 1, PT)  long term goal (LTG);2 weeks  -CM     Progress/Outcome (Transfer Goal 1, PT)  goal ongoing  -CM     Row Name 07/13/20 1454          Gait Training Goal 1 (PT)    Activity/Assistive Device (Gait Training Goal 1, PT)  gait (walking locomotion)  -CM     [place] Level (Gait Training Goal 1, PT)  supervision required  -CM     Distance (Gait Goal 1, PT)  75'  -CM     Time Frame (Gait Training Goal 1, PT)  long term goal (LTG);2 weeks  -CM     Progress/Outcome (Gait Training Goal 1, PT)  goal ongoing;good progress toward goal  -CM       User Key  (r) = Recorded By, (t) = Taken By, (c) = Cosigned By    Initials Name Provider Type    Nicki Bravo, PT Physical Therapist        Clinical Impression     Row Name 07/13/20 1441          Pain Assessment    Additional Documentation  Pain Scale: Numbers Pre/Post-Treatment (Group)  -CM     Row Name 07/13/20 1448 07/13/20 1447       Pain Scale: Numbers Pre/Post-Treatment    Pain Scale: Numbers, Pretreatment   --  0/10 - no pain  -CM    Pain Scale: Numbers, Post-Treatment  --  0/10 - no pain  -CM    Pre/Post Treatment Pain Comment  --  reports some mild chronic LBP but no new pain.   -CM    Pain Intervention(s)  Emotional support;Repositioned  -CM  --    Row Name 07/13/20 1448          Plan of Care Review    Plan of Care Reviewed With  patient;family  -CM     Outcome Summary  80 yo male admitted for pna, sepsis; now s/p pacemaker placement. Pt was able to come to sitting today w/ min assist. BP rechecked and found to be stable. Checked several times throughout rx and in various positions. All reading were stable. Comes to stand w/ one hand held and min to mod assist. Ambulated 50 ft w/ min to mod assist. Requires IP rehab, as high risk for injurious falls at home.Will continue to follow for PT.   -CM     Row Name 07/13/20 1448          Physical Therapy Clinical Impression    Criteria for Skilled Interventions Met (PT Clinical Impression)  yes;treatment indicated  -CM     Rehab Potential (PT Clinical Summary)  good, to achieve stated therapy goals  -CM     Row Name 07/13/20 1448          Vital Signs    Pre Systolic BP Rehab  137 supine w/ head of bed elevated  -CM     Pre Treatment Diastolic BP  63 map 88  -CM     Intra Systolic BP Rehab  124 standing  -CM     Intra Treatment Diastolic BP  71 mP 87  -CM     Post Systolic BP Rehab  132 sitting in chair  -CM     Post Treatment Diastolic BP  58 map 84  -CM     Pretreatment Heart Rate (beats/min)  71  -CM     Intratreatment Heart Rate (beats/min)  78  -CM     Posttreatment Heart Rate (beats/min)  70  -CM     Pre SpO2 (%)  97  -CM     O2 Delivery Pre Treatment  room air  -CM     O2 Delivery Intra Treatment  room air  -CM     Post SpO2 (%)  97  -CM     O2 Delivery Post Treatment  room air  -CM     Pre Patient Position  Supine  -CM     Intra Patient Position  Standing  -CM     Post Patient Position  Sitting  -CM     Row Name 07/13/20 1441          Positioning and Restraints     Pre-Treatment Position  in bed  -CM     Post Treatment Position  chair  -CM     In Chair  notified nsg;sitting;call light within reach;encouraged to call for assist;exit alarm on;with family/caregiver  -CM       User Key  (r) = Recorded By, (t) = Taken By, (c) = Cosigned By    Initials Name Provider Type    Nicki Bravo, PT Physical Therapist        Outcome Measures     Row Name 07/13/20 3979          How much help from another person do you currently need...    Turning from your back to your side while in flat bed without using bedrails?  3  -CM     Moving from lying on back to sitting on the side of a flat bed without bedrails?  3  -CM     Moving to and from a bed to a chair (including a wheelchair)?  3  -CM     Standing up from a chair using your arms (e.g., wheelchair, bedside chair)?  3  -CM     Climbing 3-5 steps with a railing?  2  -CM     To walk in hospital room?  2  -CM     AM-PAC 6 Clicks Score (PT)  16  -CM       User Key  (r) = Recorded By, (t) = Taken By, (c) = Cosigned By    Initials Name Provider Type    Nicki Bravo, PT Physical Therapist        Physical Therapy Education                 Title: PT OT SLP Therapies (Done)     Topic: Physical Therapy (Done)     Point: Mobility training (Done)     Description:   Instruct learner(s) on safety and technique for assisting patient out of bed, chair or wheelchair.  Instruct in the proper use of assistive devices, such as walker, crutches, cane or brace.              Patient Friendly Description:   It's important to get you on your feet again, but we need to do so in a way that is safe for you. Falling has serious consequences, and your personal safety is the most important thing of all.        When it's time to get out of bed, one of us or a family member will sit next to you on the bed to give you support.     If your doctor or nurse tells you to use a walker, crutches, a cane, or a brace, be sure you use it every time you get out of bed,  even if you think you don't need it.    Learning Progress Summary           Patient Eager, E,TB, VU,DU by CM at 7/13/2020 1456    Acceptance, E,TB, VU by  at 7/11/2020 1726    Acceptance, D,E, VU,DU by  at 7/8/2020 1603    Acceptance, E, VU by  at 7/6/2020 1249   Family Eager, E,TB, VU,DU by CM at 7/13/2020 1456                   Point: Body mechanics (Done)     Description:   Instruct learner(s) on proper positioning and spine alignment for patient and/or caregiver during mobility tasks and/or exercises.              Learning Progress Summary           Patient Eager, E,TB, VU,DU by  at 7/13/2020 1456    Acceptance, D,E, VU,DU by  at 7/8/2020 1603   Family Eager, E,TB, VU,DU by  at 7/13/2020 1456                   Point: Precautions (Done)     Description:   Instruct learner(s) on prescribed precautions during mobility and gait tasks              Learning Progress Summary           Patient Eager, E,TB, VU,DU by CM at 7/13/2020 1456    Acceptance, E,TB, VU by  at 7/11/2020 1726    Acceptance, D,E, VU,DU by  at 7/8/2020 1603   Family Eager, E,TB, VU,DU by  at 7/13/2020 1456                               User Key     Initials Effective Dates Name Provider Type Discipline     06/19/19 -  Ashlyn Ross, PT Physical Therapist PT     03/01/19 -  Mary Ann Lewis, PT Physical Therapist PT     03/01/19 -  Nicki Walker, PT Physical Therapist PT     01/07/20 -  Simi Flores, PT Physical Therapist PT              PT Recommendation and Plan     Outcome Summary/Treatment Plan (PT)  Anticipated Discharge Disposition (PT): inpatient rehabilitation facility  Plan of Care Reviewed With: patient, family  Outcome Summary: 80 yo male admitted for pna, sepsis; now s/p pacemaker placement. Pt was able to come to sitting today w/ min assist. BP rechecked and found to be stable. Checked several times throughout rx and in various positions. All reading were stable. Comes to stand w/ one hand held and min to  mod assist. Ambulated 50 ft w/ min to mod assist. Requires IP rehab, as high risk for injurious falls at home.Will continue to follow for PT.      Time Calculation:   PT Charges     Row Name 07/13/20 1456             Time Calculation    Start Time  1410  -CM      Stop Time  1428  -CM      Time Calculation (min)  18 min  -CM      PT Received On  07/13/20  -CM      PT - Next Appointment  07/14/20  -CM         Time Calculation- PT    Total Timed Code Minutes- PT  18 minute(s)  -CM        User Key  (r) = Recorded By, (t) = Taken By, (c) = Cosigned By    Initials Name Provider Type    CM Nicki Walker, PT Physical Therapist        Therapy Charges for Today     Code Description Service Date Service Provider Modifiers Qty    88660496611 HC GAIT TRAINING EA 15 MIN 7/13/2020 Nicki Walker, PT GP 1    [id number]  PT THERAPEUTIC ACT EA 15 MIN 7/13/2020 Nicki Walker, PT GP 1          PT G-Codes  Outcome Measure Options: AM-PAC 6 Clicks Basic Mobility (PT)  AM-PAC 6 Clicks Score (PT): 16    Nicki Walker PT  7/13/2020

## 2020-07-13 NOTE — PROGRESS NOTES
Continued Stay Note  OFELIA Candelario     Patient Name: Shukri Park  MRN: [id number]  Today's Date: 7/13/2020    Admit Date: 7/1/2020    Discharge Plan     Row Name 07/13/20 1716       Plan    Plan  DC Plan: Accepted to [facility] subacute pending pre-cert. Pre-cert started 7/13 (score for 6-click needed to be < 15, score 16, therefore requires traditional pre-cert). No PASRR needed for [facility].     Plan Comments  Had PT work with pt today & d/t [id number], will require traditional pre-cert. Requested liaison start today (7/13) - pending.      KAREN Baca    Phone: [phone]  Cell: [phone]  Fax: [phone]  [email or web address]

## 2020-07-13 NOTE — PLAN OF CARE
Pt maintained stable throughout the day. Had a period of low BP today, non-symptomatic. Will continue to monitor.

## 2020-07-13 NOTE — PROGRESS NOTES
LOS: 11 days   Admiting Physician- Lynsey Zaldivar MD    Reason For Followup:    Chronic atrial fibrillation with rapid ventricular response  Difficult to control heart rate  Hypotension      Subjective     Patient is feeling much better.    Objective     Heart rate is better controlled.  Patient is being paced.    Review of Systems:   Review of Systems   Constitution: Negative for chills and fever.   HENT: Negative for ear discharge and nosebleeds.    Eyes: Negative for discharge and redness.   Cardiovascular: Negative for chest pain, orthopnea, palpitations, paroxysmal nocturnal dyspnea and syncope.   Respiratory: Negative for cough, shortness of breath and wheezing.    Endocrine: Negative for heat intolerance.   Skin: Negative for rash.   Musculoskeletal: Negative for arthritis and myalgias.   Gastrointestinal: Negative for abdominal pain, melena, nausea and vomiting.   Genitourinary: Negative for dysuria and hematuria.   Neurological: Negative for dizziness, light-headedness, numbness and tremors.   Psychiatric/Behavioral: Negative for depression. The patient is not nervous/anxious.          Vital Signs  Vitals:    07/13/20 0519 07/13/20 0549 07/13/20 0619 07/13/20 0807   BP: 123/75 112/63 120/67 130/79   Pulse: 70 70 70 70   Resp:       Temp:       TempSrc:       SpO2: 97% 97% 96%    Weight:       Height:         Wt Readings from Last 1 Encounters:   07/13/20 67.3 kg (148 lb 5.9 oz)       Intake/Output Summary (Last 24 hours) at 7/13/2020 0918  Last data filed at 7/13/2020 0900  Gross per 24 hour   Intake 1560 ml   Output 350 ml   Net 1210 ml     Physical Exam:  Physical Exam   Constitutional: He is oriented to person, place, and time. He appears well-developed and well-nourished.   HENT:   Head: Normocephalic and atraumatic.   Eyes: No scleral icterus.   Neck: No thyromegaly present.   Cardiovascular: Normal rate, regular rhythm and normal heart sounds. Exam reveals no gallop and no friction rub.   No  murmur heard.  Pulmonary/Chest: Effort normal and breath sounds normal. No respiratory distress. He has no wheezes. He has no rales.   Abdominal: There is no tenderness.   Musculoskeletal: He exhibits no edema.   Lymphadenopathy:     He has no cervical adenopathy.   Neurological: He is alert and oriented to person, place, and time.   Skin: No rash noted. No erythema.   Psychiatric: He has a normal mood and affect.       Results Review:   Lab Results (last 24 hours)     Procedure Component Value Units Date/Time    Digoxin Level [[id number]]  (Normal) Collected:  07/13/20 0355    Specimen:  Blood Updated:  07/13/20 0549     Digoxin 0.60 ng/mL     Magnesium [[id number]]  (Normal) Collected:  07/13/20 0355    Specimen:  Blood Updated:  07/13/20 0507     Magnesium 1.9 mg/dL     Phosphorus [[id number]]  (Normal) Collected:  07/13/20 0355    Specimen:  Blood Updated:  07/13/20 0507     Phosphorus 2.6 mg/dL     CBC & Differential [[id number]] Collected:  07/13/20 0355    Specimen:  Blood Updated:  07/13/20 0447    Narrative:       The following orders were created for panel order CBC & Differential.  Procedure                               Abnormality         Status                     ---------                               -----------         ------                     CBC Auto Differential[[id number]]        Abnormal            Final result                 Please view results for these tests on the individual orders.    CBC Auto Differential [[id number]]  (Abnormal) Collected:  07/13/20 0355    Specimen:  Blood Updated:  07/13/20 0447     WBC 9.00 10*3/mm3      RBC 3.48 10*6/mm3      Hemoglobin 10.4 g/dL      Hematocrit 30.9 %      MCV 89.0 fL      MCH 30.0 pg      MCHC 33.7 g/dL      RDW 14.6 %      RDW-SD 45.9 fl      MPV 8.6 fL      Platelets 213 10*3/mm3      Neutrophil % 61.6 %      Lymphocyte % 21.2 %      Monocyte % 13.8 %      Eosinophil % 3.1 %      Basophil % 0.3 %      Neutrophils, Absolute 5.60 10*3/mm3       Lymphocytes, Absolute 1.90 10*3/mm3      Monocytes, Absolute 1.20 10*3/mm3      Eosinophils, Absolute 0.30 10*3/mm3      Basophils, Absolute 0.00 10*3/mm3      nRBC 0.0 /100 WBC     Blood Culture - Blood, Blood, PICC Line [[id number]] Collected:  07/08/20 2147    Specimen:  Blood, PICC Line Updated:  07/12/20 2245     Blood Culture No growth at 4 days    POC Glucose Once [[id number]]  (Normal) Collected:  07/12/20 2007    Specimen:  Blood Updated:  07/12/20 2008     Glucose 90 mg/dL      Comment: Serial Number: [id number]:  [id number]       Blood Culture - Blood, Blood, PICC Line [[id number]] Collected:  07/11/20 1353    Specimen:  Blood, PICC Line Updated:  07/12/20 1430     Blood Culture No growth at 24 hours    Blood Culture - Blood, Hand, Right [[id number]] Collected:  07/11/20 1353    Specimen:  Blood from Hand, Right Updated:  07/12/20 1430     Blood Culture No growth at 24 hours    Digoxin Level [[id number]]  (Abnormal) Collected:  07/12/20 0917    Specimen:  Blood Updated:  07/12/20 1027     Digoxin 0.40 ng/mL         Imaging Results (Last 72 Hours)     Procedure Component Value Units Date/Time    XR Chest 1 View [[id number]] Collected:  07/10/20 2104     Updated:  07/10/20 2108    Narrative:       XR CHEST 1 VW-     Date of Exam: 7/10/2020 8:45 PM     Indication: Status post pacemaker placement.     Comparison Exams: 07/08/2020     Technique: Single AP chest radiograph     FINDINGS:  A left subclavian pacemaker is in place. No pneumothorax is identified.  Mild hazy opacities bilaterally appear similar to prior exam. The heart  and mediastinal contours appear normal. The osseous structures appear  intact.       Impression:       1.Left subclavian pacemaker in place. No pneumothorax identified.  2.Mild bilateral hazy opacities appear unchanged, which could represent  mild pneumonia or pulmonary edema.     Electronically Signed By-DR. Clint Dotson MD On:7/10/2020 9:06 PM  This report was finalized on  [id number] by DR. Clint Dotson MD.        ECG/EMG Results (most recent)     Procedure Component Value Units Date/Time    Adult Transthoracic Echo Complete W/ Cont if Necessary Per Protocol [[id number]] Collected:  07/02/20 0653     Updated:  07/02/20 1409     BSA 1.9 m^2      RVIDd 2.1 cm      IVSd 1.1 cm      LVIDd 5.6 cm      LVIDs 4.6 cm      LVPWd 1.3 cm      IVS/LVPW 0.84     FS 17.6 %      EDV(Teich) 155.9 ml      ESV(Teich) 99.5 ml      EF(Teich) 36.2 %      EDV(cubed) 179.0 ml      ESV(cubed) 100.1 ml      EF(cubed) 44.1 %      LV mass(C)d 276.4 grams      LV mass(C)dI 142.4 grams/m^2      SV(Teich) 56.4 ml      SI(Teich) 29.1 ml/m^2      SV(cubed) 78.9 ml      SI(cubed) 40.6 ml/m^2      Ao root diam 4.0 cm      Ao root area 12.8 cm^2      ACS 2.1 cm      asc Aorta Diam 3.7 cm      LVOT diam 2.5 cm      LVOT area 5.1 cm^2      RVOT diam 2.1 cm      RVOT area 3.6 cm^2      EDV(MOD-sp4) 68.5 ml      ESV(MOD-sp4) 50.1 ml      EF(MOD-sp4) 26.8 %      SV(MOD-sp4) 18.4 ml      SI(MOD-sp4) 9.5 ml/m^2      Ao root area (BSA corrected) 2.1     LV Dunlap Vol (BSA corrected) 35.3 ml/m^2      LV Sys Vol (BSA corrected) 25.8 ml/m^2      Aortic R-R 0.4 sec      Aortic .6 BPM      MV V2 max 90.3 cm/sec      MV max PG 3.3 mmHg      MV V2 mean 63.4 cm/sec      MV mean PG 1.8 mmHg      MV V2 VTI 9.6 cm      MVA(VTI) 6.7 cm^2      Ao pk sharmila 78.3 cm/sec      Ao max PG 2.5 mmHg      Ao max PG (full) -0.26 mmHg      Ao V2 mean 61.8 cm/sec      Ao mean PG 1.7 mmHg      Ao mean PG (full) 0.39 mmHg      Ao V2 VTI 11.3 cm      AISHWARYA(I,A) 5.7 cm^2      AISHWARYA(I,D) 5.7 cm^2      AISHWARYA(V,A) 5.3 cm^2      AISHWARYA(V,D) 5.3 cm^2      LV V1 max PG 2.7 mmHg      LV V1 mean PG 1.3 mmHg      LV V1 max 82.3 cm/sec      LV V1 mean 53.0 cm/sec      LV V1 VTI 12.7 cm      MR max sharmila 453.8 cm/sec      MR max PG 82.4 mmHg      CO(Ao) 22.0 l/min      CI(Ao) 11.3 l/min/m^2      SV(Ao) 144.9 ml      SI(Ao) 74.6 ml/m^2      CO(LVOT) 9.8 l/min       CI(LVOT) 5.0 l/min/m^2      SV(LVOT) 64.6 ml      SV(RVOT) 42.6 ml      SI(LVOT) 33.3 ml/m^2      PA V2 max 77.1 cm/sec      PA max PG 2.4 mmHg      PA max PG (full) 0.22 mmHg      PA V2 mean 50.6 cm/sec      PA mean PG 1.2 mmHg      PA mean PG (full) 0.15 mmHg      PA V2 VTI 9.1 cm      PVA(I,A) 4.7 cm^2       CV ECHO TARYN - PVA(I,D) 4.7 cm^2       CV ECHO TARYN - PVA(V,A) 3.4 cm^2       CV ECHO TARYN - PVA(V,D) 3.4 cm^2      PA acc time 0.06 sec      RV V1 max PG 2.2 mmHg      RV V1 mean PG 1.1 mmHg      RV V1 max 73.4 cm/sec      RV V1 mean 46.7 cm/sec      RV V1 VTI 11.9 cm      TR max sharmila 285.8 cm/sec      RVSP(TR) 35.7 mmHg      RAP systole 3.0 mmHg      PA pr(Accel) 49.9 mmHg      Qp/Qs 0.66      CV ECHO TARYN - BZI_BMI 18.7 kilograms/m^2       CV ECHO TARYN - BSA(HAYCOCK) 1.9 m^2       CV ECHO TARYN - BZI_METRIC_WEIGHT 68.0 kg       CV ECHO TARYN - BZI_METRIC_HEIGHT 190.5 cm      EF(MOD-bp) 27.0 %      LA dimension(2D) 4.6 cm     Narrative:       · Left ventricular systolic function is severely decreased.  · Left ventricular wall thickness is consistent with mild concentric   hypertrophy.  · Left atrial cavity size is moderately dilated.  · Mild-to-moderate mitral valve regurgitation is present  · Mild tricuspid valve regurgitation is present.  · Mild aortic valve regurgitation is present.       ECG 12 Lead [[id number]] Collected:  07/01/20 2340     Updated:  07/03/20 1140    Narrative:       HEART RATE= 163  bpm  RR Interval= 368  ms  MA Interval=   ms  P Horizontal Axis=   deg  P Front Axis=   deg  QRSD Interval= 78  ms  QT Interval= 283  ms  QRS Axis= 7  deg  T Wave Axis= 42  deg  - ABNORMAL ECG -  Atrial fibrillation with rapid V-rate  No previous ECG available for comparison  Electronically Signed By: Sunny Unger ([facility]) 03-Jul-2020 11:38:35  Date and Time of Study: 2020-07-01 23:40:10    ECG 12 Lead [[id number]] Collected:  07/03/20 2021     Updated:  07/03/20 2023    Narrative:       HEART RATE=  133  bpm  RR Interval= 449  ms  IN Interval=   ms  P Horizontal Axis=   deg  P Front Axis=   deg  QRSD Interval= 92  ms  QT Interval= 296  ms  QRS Axis= -17  deg  T Wave Axis= -11  deg  - ABNORMAL ECG -  Atrial fibrillation  Ventricular premature complex  Probable lateral infarct, old  Electronically Signed By:   Date and Time of Study: 2020-07-03 20:21:30    ECG 12 Lead [[id number]] Collected:  07/04/20 0607     Updated:  07/04/20 0609    Narrative:       HEART RATE= 102  bpm  RR Interval= 589  ms  IN Interval=   ms  P Horizontal Axis=   deg  P Front Axis=   deg  QRSD Interval= 95  ms  QT Interval= 334  ms  QRS Axis= -21  deg  T Wave Axis= 3  deg  - ABNORMAL ECG -  Atrial fibrillation  Ventricular premature complex  Electronically Signed By:   Date and Time of Study: 2020-07-04 06:07:51    ECG 12 Lead [[id number]] Collected:  07/05/20 0709     Updated:  07/05/20 0710    Narrative:       HEART RATE= 113  bpm  RR Interval= 528  ms  IN Interval=   ms  P Horizontal Axis=   deg  P Front Axis=   deg  QRSD Interval= 89  ms  QT Interval= 321  ms  QRS Axis= -36  deg  T Wave Axis= 0  deg  - ABNORMAL ECG -  Atrial fibrillation  Ventricular premature complex  Left axis deviation  When compared with ECG of 04-Jul-2020 6:07:51,  No significant change  Electronically Signed By:   Date and Time of Study: 2020-07-05 07:09:38    ECG 12 Lead [[id number]] Collected:  07/05/20 2052     Updated:  07/05/20 2057    Narrative:       HEART RATE= 114  bpm  RR Interval= 524  ms  IN Interval=   ms  P Horizontal Axis=   deg  P Front Axis=   deg  QRSD Interval= 96  ms  QT Interval= 312  ms  QRS Axis= -28  deg  T Wave Axis= -12  deg  - ABNORMAL ECG -  Atrial fibrillation  Ventricular premature complex  Borderline T abnormalities, diffuse leads  When compared with ECG of 05-Jul-2020 7:09:38,  Significant axis, voltage or hypertrophy change  Electronically Signed By:   Date and Time of Study: 2020-07-05 20:52:58    ECG 12 Lead [[id number]] Collected:   07/06/20 0609     Updated:  07/06/20 0610    Narrative:       HEART RATE= 97  bpm  RR Interval= 619  ms  LA Interval=   ms  P Horizontal Axis=   deg  P Front Axis=   deg  QRSD Interval= 91  ms  QT Interval= 338  ms  QRS Axis= -22  deg  T Wave Axis= -5  deg  - ABNORMAL ECG -  Atrial fibrillation  Ventricular premature complex  When compared with ECG of 05-Jul-2020 20:52:58,  Significant repolarization change  Electronically Signed By:   Date and Time of Study: 2020-07-06 06:09:24    ECG 12 Lead [[id number]] Collected:  07/09/20 2035     Updated:  07/09/20 2036    Narrative:       HEART RATE= 61  bpm  RR Interval= 983  ms  LA Interval=   ms  P Horizontal Axis=   deg  P Front Axis=   deg  QRSD Interval= 94  ms  QT Interval= 382  ms  QRS Axis= 37  deg  T Wave Axis= -43  deg  - ABNORMAL ECG -  Atrial fibrillation  Electronically Signed By:   Date and Time of Study: 2020-07-09 20:35:36    EP/CRM Study [[id number]] Resulted:  07/10/20 1945     Updated:  07/10/20 1945    Narrative:       Procedure indication--- uncontrollable atrial fibrillation with chronic   class III systolic heart failure and severe ischemic cardiomyopathy with   no further targets for revascularization with EF less than 35% and   iatrogenic left bundle branch block and patient recommended biventricular   ICD insertion and AV node ablation for optimization    Sedation anesthesia and vancomycin before procedure    Procedures done  #1 implantation of a biventricular ICD system with placement of   biventricular ICD generator and placement of right ventricle ICD lead   placement of a coronary sinus lead  #2 coronary sinus venography with placement of a complex coronary sinus   lead  #3-lead testing and fluoroscopy  #4 biventricular ICD interrogation    Procedure note  Obtaining valid consent patient was draped in sterile fashion the left   infraclavicular area was infiltrated local anesthesia and a small incision   was made and hemostasis was acquired by  cautery and gentle dissection was   done and a pocket was made in the prepectoral fascia area and by using   micropuncture kit subclavian vein was easily cannulated with placement of   2J wires for placement of leads.  Right ventricular ICD lead was placed in the right ventricle apex after   mapping right ventricle at multiple locations via 8 [place] venous sheath   and after testing the lead the sheath was removed  For placement of left ventricle coronary sinus lead a short 10 [place]   venous sheath was used and long peelable sheath manufactured by changed   company was used with the help of supra CS and later a deflectable   decapolar catheter cannulate coronary sinus with coronary sinus   venography.  Patient had an ideal mid posterior lateral branch--cannulation was done   however diaphragmatic stimulation was noted with a quadripolar lead and   multiple other branches were mapped with poor thresholds  A unipolar lead was used and again mapping of multiple location was done   including distal lateral branch and great cardiac vein with poor   thresholds and henceforth after trying multiple places recanalization of   the mid posterior lateral branch was done with adequate capture with a   narrow margin for diaphragmatic stimulation  Once the lead was tested multiple times the sheaths were removed and the   leads were anchored to underlying muscular area and the pocket was copious   irrigated with antibiotic solution and ICD generator was taken and the   leads were anchored to the generator generator leads were placed in the   pocket and incision was closed in several layers without any immediate   procedure related complications and biventricular programming attached to   chart.   ICD generator is manufactured by [employer] and model number is   G124 and serial #[id number]  Right ventricular ICD lead is manufactured by [employer] model 0673   and she #[id number]  LV lead is  [employer]  model 4592 and serial number is   [id number]  Right ventricular sensing is more than 10 mV with threshold less than 1 V  Left ventricle lead threshold is 2.5 V at 1.5 ms    Plan  Proceed with AV node ablation  Eliquis can be resumed tomorrow  Digoxin can be stopped   Electronically signed by Jonathan Denney MD, 07/10/20, 7:16 PM.    EP/CRM Study [[id number]] Resulted:  07/10/20 1945     Updated:  07/10/20 1945    Narrative:       Procedure indication--uncontrollable rapid atrial fibrillation associated   with hypotension , biventricular ICD in situ, severe dilated   cardiomyopathy, class 3 systolic heart failure--patient recommended AV   node ablation after biventricular ICD insertion which was done this   evening     Sedation by anesthesia      Procedures done  1. Radiofrequency ablation of compact AV node  2.  Biventricular ICD reprogramming and interrogation  3. Fluoroscopy  4.  EP study with recording of right atrium, right ventricle and HIS   without induction of arrhythmias  5.  3D mapping with Carto system    Procedure note  After obtaining a valid consent patient was draped in sterile fashion and   local anesthesia was given in the right groin in the right common femoral   vein was cannulated with placement of a 8 [place] venous sheath.  An 8 mm   Biosense Spence catheter was taken and mapping of right atrium right   ventricle and compact AV kelly areas was done--single radiofrequency   ablation was done with HIS cloudy was noted with immediate complete AV   block   Post ablation device was reprogrammed to a baseline rate of 70 beats per   minute without any complications and catheter removed    Findings  HV interval of 54 milliseconds  Underlying appropriately functioning biventricular ICD system  Successful radiofrequency ablation of compact AV node with escape rate   below 30 beats per minute    Plan  Patient can be transferred back to ICU  Currently device programmed to RV mode only because of infrequent     diaphragmatic stimulation  ICD programming attached to chart    Electronically signed by Jonathan Denney MD, 07/10/20, 7:36 PM.      ECG 12 Lead [[id number]] Collected:  07/03/20 0945     Updated:  07/12/20 1551    Narrative:       HEART RATE= 106  bpm  RR Interval= 568  ms  ND Interval= 196  ms  P Horizontal Axis= 48  deg  P Front Axis= 0  deg  QRSD Interval= 95  ms  QT Interval= 298  ms  QRS Axis= -18  deg  T Wave Axis= 14  deg  - ABNORMAL ECG -  Atrial fibrillationWith rapid ventricular rate  When compared with ECG of 01-Jul-2020 23:40:10,  Ventricular rate decreased  Electronically Signed By: Braulio Fleming ([facility]) 12-Jul-2020 15:50:15  Date and Time of Study: 2020-07-03 09:45:28        CBC    Results from last 7 days   Lab Units 07/13/20  0355 07/12/20  0337 07/11/20  0405 07/10/20  0308 07/09/20  0345 07/08/20  0242 07/07/20  0255   WBC 10*3/mm3 9.00 10.00 12.00* 9.60 12.40* 14.20* 14.50*   HEMOGLOBIN g/dL 10.4* 10.4* 11.2* 12.1* 12.7* 14.3 13.9   PLATELETS 10*3/mm3 213 181 212 194 174 238 263     BMP   Results from last 7 days   Lab Units 07/13/20  0355 07/12/20  0337 07/11/20  0405 07/10/20  1509 07/10/20  0308 07/09/20  0345 07/08/20  0242 07/07/20  0255   SODIUM mmol/L  --  141 141  --  141 142 140 142   POTASSIUM mmol/L  --  3.5 4.0 3.9 3.6 3.8 3.2* 3.3*   CHLORIDE mmol/L  --  105 103  --  102 101 97* 100   CO2 mmol/L  --  27.0 27.0  --  31.0* 31.0* 30.0* 30.0*   BUN   --  11 13  --  18 19 14 15   CREATININE mg/dL  --  0.56* 0.66*  --  0.62* 0.62* 0.53* 0.53*   GLUCOSE mg/dL  --  111* 177*  --  128* 97 103* 109*   MAGNESIUM mg/dL 1.9 1.9 2.1  --  1.9 2.1 1.9 1.9   PHOSPHORUS mg/dL 2.6 2.1* 2.6 2.5 2.2* 2.2*  --   --      CMP   Results from last 7 days   Lab Units 07/12/20  0337 07/11/20  0405 07/10/20  1509 07/10/20  0308 07/09/20  0345 07/08/20  0242 07/07/20  0255   SODIUM mmol/L 141 141  --  141 142 140 142   POTASSIUM mmol/L 3.5 4.0 3.9 3.6 3.8 3.2* 3.3*   CHLORIDE mmol/L 105 103  --  102 101  97* 100   CO2 mmol/L 27.0 27.0  --  31.0* 31.0* 30.0* 30.0*   BUN  11 13  --  18 19 14 15   CREATININE mg/dL 0.56* 0.66*  --  0.62* 0.62* 0.53* 0.53*   GLUCOSE mg/dL 111* 177*  --  128* 97 103* 109*   ALBUMIN g/dL 2.50*  --   --   --   --   --  3.60   BILIRUBIN mg/dL 0.4  --   --   --   --   --  1.5*   ALK PHOS U/L 44  --   --   --   --   --  51   AST (SGOT) U/L 26  --   --   --   --   --  24   ALT (SGPT) U/L 25  --   --   --   --   --  52*     Cardiac Studies:  Echo-   Results for orders placed during the hospital encounter of 07/01/20   Adult Transthoracic Echo Complete W/ Cont if Necessary Per Protocol    Narrative · Left ventricular systolic function is severely decreased.  · Left ventricular wall thickness is consistent with mild concentric   hypertrophy.  · Left atrial cavity size is moderately dilated.  · Mild-to-moderate mitral valve regurgitation is present  · Mild tricuspid valve regurgitation is present.  · Mild aortic valve regurgitation is present.        Stress Myoview-  Cath-      Medication Review:   Scheduled Meds:  aspirin 81 mg Oral Daily   atorvastatin 20 mg Oral Daily   clopidogrel 75 mg Oral Daily   digoxin 250 mcg Oral Daily   docusate sodium 100 mg Oral BID   fludrocortisone 0.1 mg Oral Q PM   lisinopril 5 mg Oral Q24H   metoprolol succinate XL 12.5 mg Oral Q24H   sodium chloride 250 mL Intravenous Once   sodium chloride 10 mL Intravenous Q12H   spironolactone 25 mg Oral Q24H   sucralfate 1 g Oral 4x Daily AC & at Bedtime   Thera 1 tablet Oral Daily   vitamin B-12 1,000 mcg Oral Daily     Continuous Infusions:   PRN Meds:.•  acetaminophen **OR** acetaminophen **OR** acetaminophen  •  aluminum-magnesium hydroxide-simethicone  •  atropine  •  bisacodyl  •  HYDROcodone-acetaminophen  •  ipratropium-albuterol  •  magnesium hydroxide  •  magnesium sulfate **OR** magnesium sulfate **OR** magnesium sulfate  •  melatonin  •  Morphine  •  ondansetron  •  [DISCONTINUED] ondansetron **OR**  ondansetron  •  polyethylene glycol  •  potassium chloride **OR** potassium chloride **OR** potassium chloride  •  potassium phosphate infusion greater than 15 mMoles **OR** potassium phosphate infusion greater than 15 mMoles **OR** potassium phosphate **OR** sodium phosphate IVPB **OR** sodium phosphate IVPB **OR** sodium phosphate IVPB  •  promethazine  •  [COMPLETED] Insert peripheral IV **AND** sodium chloride      Assessment/Plan   Patient Active Problem List   Diagnosis   • Atrial fibrillation (CMS/HCC)   • CAD (coronary artery disease)   • Chronic anticoagulation   • Hyperlipidemia   • Presence of stent in right coronary artery   • Chronic pain   • Sepsis (CMS/HCC)   • Congestive heart failure (CHF) (CMS/HCC)   • Nausea vomiting and diarrhea   • Ischemic cardiomyopathy   • Persistent atrial fibrillation (CMS/HCC)   • Chronic systolic congestive heart failure (CMS/HCC)   • Permanent atrial fibrillation (CMS/HCC)   • Coronary artery disease involving native coronary artery of native heart without angina pectoris   • Disorder of sacroiliac joint   • Lumbosacral radiculopathy   • Mitral regurgitation   • Neck pain   • Nevus of choroid   • Presbyopia   • Vitamin D deficiency     Plan:    At this stage, patient is stable and doing well.  His heart rate is controlled as he has undergone biventricular AICD with AV kelly ablation.  Continue low-dose beta-blocker and lisinopril.  Blood pressure is stable.  Continue dual antiplatelet therapy    Wade Cronin MD  07/13/20  09:18

## 2020-07-13 NOTE — PLAN OF CARE
Problem: Patient Care Overview  Goal: Plan of Care Review  Flowsheets (Taken 7/13/2020 2053)  Plan of Care Reviewed With: patient;family  Outcome Summary: 80 yo male admitted for pna, sepsis; now s/p pacemaker placement. Pt was able to come to sitting today w/ min assist. BP rechecked and found to be stable. Checked several times throughout rx and in various positions. All reading were stable. Comes to stand w/ one hand held and min to mod assist. Ambulated 50 ft w/ min to mod assist. Requires IP rehab, as high risk for injurious falls at home.Will continue to follow for PT.    PPE: gloves, mask, goggles.

## 2020-07-13 NOTE — PLAN OF CARE
Patient has remained normotensive for the majority of the shift. He has been hemodynamically stable though he has had a difficulty urinating. He has attempted multiple times without any success. Bladder scan revealed 400 mLs in the bladder, he declined to have a  straight cath stating that he would like to wait and see if he can go on his own. Will continue to monitor.

## 2020-07-14 PROBLEM — I95.89 CHRONIC HYPOTENSION: Chronic | Status: ACTIVE | Noted: 2020-07-14

## 2020-07-14 PROBLEM — J18.9 BILATERAL PNEUMONIA: Status: ACTIVE | Noted: 2020-07-01

## 2020-07-14 PROBLEM — A41.9 SEPSIS (HCC): Status: RESOLVED | Noted: 2020-07-02 | Resolved: 2020-07-14

## 2020-07-14 PROBLEM — K29.70 GASTRITIS: Status: ACTIVE | Noted: 2020-07-14

## 2020-07-14 PROBLEM — R19.7 NAUSEA VOMITING AND DIARRHEA: Status: RESOLVED | Noted: 2020-07-01 | Resolved: 2020-07-14

## 2020-07-14 PROBLEM — I50.23 ACUTE ON CHRONIC SYSTOLIC CONGESTIVE HEART FAILURE: Status: ACTIVE | Noted: 2020-07-01

## 2020-07-14 PROBLEM — Z85.118 HISTORY OF LUNG CANCER: Chronic | Status: ACTIVE | Noted: 2020-07-14

## 2020-07-14 PROBLEM — I48.91 ATRIAL FIBRILLATION WITH RAPID VENTRICULAR RESPONSE (HCC): Status: ACTIVE | Noted: 2020-07-01

## 2020-07-14 PROBLEM — J18.9 BILATERAL PNEUMONIA: Status: RESOLVED | Noted: 2020-07-01 | Resolved: 2020-07-14

## 2020-07-14 PROBLEM — R11.2 NAUSEA VOMITING AND DIARRHEA: Status: RESOLVED | Noted: 2020-07-01 | Resolved: 2020-07-14

## 2020-07-14 PROBLEM — I48.91 ATRIAL FIBRILLATION WITH RAPID VENTRICULAR RESPONSE (HCC): Status: RESOLVED | Noted: 2020-07-01 | Resolved: 2020-07-14

## 2020-07-14 PROBLEM — I48.19 PERSISTENT ATRIAL FIBRILLATION: Chronic | Status: ACTIVE | Noted: 2020-07-01

## 2020-07-14 PROBLEM — I25.10 CORONARY ARTERY DISEASE INVOLVING NATIVE CORONARY ARTERY OF NATIVE HEART WITHOUT ANGINA PECTORIS: Chronic | Status: ACTIVE | Noted: 2020-07-09

## 2020-07-14 LAB
BASOPHILS # BLD AUTO: 0 10*3/MM3 (ref 0–0.2)
BASOPHILS NFR BLD AUTO: 0.4 % (ref 0–1.5)
CATHETER CULTURE: NORMAL
DEPRECATED RDW RBC AUTO: 46.4 FL (ref 37–54)
EOSINOPHIL # BLD AUTO: 0.3 10*3/MM3 (ref 0–0.4)
EOSINOPHIL NFR BLD AUTO: 3.1 % (ref 0.3–6.2)
ERYTHROCYTE [DISTWIDTH] IN BLOOD BY AUTOMATED COUNT: 14.7 % (ref 12.3–15.4)
HCT VFR BLD AUTO: 32.4 % (ref 37.5–51)
HGB BLD-MCNC: 11.1 G/DL (ref 13–17.7)
LYMPHOCYTES # BLD AUTO: 1.7 10*3/MM3 (ref 0.7–3.1)
LYMPHOCYTES NFR BLD AUTO: 16 % (ref 19.6–45.3)
MAGNESIUM SERPL-MCNC: 1.9 MG/DL (ref 1.6–2.4)
MCH RBC QN AUTO: 30.4 PG (ref 26.6–33)
MCHC RBC AUTO-ENTMCNC: 34.2 G/DL (ref 31.5–35.7)
MCV RBC AUTO: 88.9 FL (ref 79–97)
MONOCYTES # BLD AUTO: 1.1 10*3/MM3 (ref 0.1–0.9)
MONOCYTES NFR BLD AUTO: 10.7 % (ref 5–12)
NEUTROPHILS NFR BLD AUTO: 69.8 % (ref 42.7–76)
NEUTROPHILS NFR BLD AUTO: 7.5 10*3/MM3 (ref 1.7–7)
NRBC BLD AUTO-RTO: 0.1 /100 WBC (ref 0–0.2)
PHOSPHATE SERPL-MCNC: 2.8 MG/DL (ref 2.5–4.5)
PLATELET # BLD AUTO: 232 10*3/MM3 (ref 140–450)
PMV BLD AUTO: 8.3 FL (ref 6–12)
RBC # BLD AUTO: 3.65 10*6/MM3 (ref 4.14–5.8)
WBC # BLD AUTO: 10.7 10*3/MM3 (ref 3.4–10.8)

## 2020-07-14 PROCEDURE — 84100 ASSAY OF PHOSPHORUS: CPT | Performed by: INTERNAL MEDICINE

## 2020-07-14 PROCEDURE — 25010000003 MAGNESIUM SULFATE 4 GM/100ML SOLUTION: Performed by: INTERNAL MEDICINE

## 2020-07-14 PROCEDURE — 99024 POSTOP FOLLOW-UP VISIT: CPT | Performed by: INTERNAL MEDICINE

## 2020-07-14 PROCEDURE — 97535 SELF CARE MNGMENT TRAINING: CPT

## 2020-07-14 PROCEDURE — 97168 OT RE-EVAL EST PLAN CARE: CPT

## 2020-07-14 PROCEDURE — 83735 ASSAY OF MAGNESIUM: CPT | Performed by: INTERNAL MEDICINE

## 2020-07-14 PROCEDURE — 25010000002 HYDRALAZINE PER 20 MG: Performed by: NURSE PRACTITIONER

## 2020-07-14 PROCEDURE — 85025 COMPLETE CBC W/AUTO DIFF WBC: CPT | Performed by: INTERNAL MEDICINE

## 2020-07-14 PROCEDURE — 97530 THERAPEUTIC ACTIVITIES: CPT

## 2020-07-14 PROCEDURE — 99221 1ST HOSP IP/OBS SF/LOW 40: CPT | Performed by: HOSPITALIST

## 2020-07-14 RX ORDER — METOPROLOL SUCCINATE 25 MG/1
25 TABLET, EXTENDED RELEASE ORAL
Status: DISCONTINUED | OUTPATIENT
Start: 2020-07-15 | End: 2020-07-15 | Stop reason: HOSPADM

## 2020-07-14 RX ORDER — PANTOPRAZOLE SODIUM 40 MG/1
40 TABLET, DELAYED RELEASE ORAL
Status: DISCONTINUED | OUTPATIENT
Start: 2020-07-15 | End: 2020-07-15 | Stop reason: HOSPADM

## 2020-07-14 RX ORDER — LISINOPRIL 5 MG/1
5 TABLET ORAL EVERY 24 HOURS
Status: DISCONTINUED | OUTPATIENT
Start: 2020-07-15 | End: 2020-07-15 | Stop reason: HOSPADM

## 2020-07-14 RX ORDER — HYDRALAZINE HYDROCHLORIDE 20 MG/ML
10 INJECTION INTRAMUSCULAR; INTRAVENOUS EVERY 4 HOURS PRN
Status: DISCONTINUED | OUTPATIENT
Start: 2020-07-14 | End: 2020-07-15 | Stop reason: HOSPADM

## 2020-07-14 RX ADMIN — MELATONIN TAB 5 MG 5 MG: 5 TAB at 20:41

## 2020-07-14 RX ADMIN — MAGNESIUM SULFATE HEPTAHYDRATE 4 G: 40 INJECTION, SOLUTION INTRAVENOUS at 08:10

## 2020-07-14 RX ADMIN — CYANOCOBALAMIN TAB 1000 MCG 1000 MCG: 1000 TAB at 08:10

## 2020-07-14 RX ADMIN — RIVAROXABAN 15 MG: 15 TABLET, FILM COATED ORAL at 17:31

## 2020-07-14 RX ADMIN — LISINOPRIL 5 MG: 5 TABLET ORAL at 08:10

## 2020-07-14 RX ADMIN — HYDROCODONE BITARTRATE AND ACETAMINOPHEN 1 TABLET: 7.5; 325 TABLET ORAL at 20:41

## 2020-07-14 RX ADMIN — HYDRALAZINE HYDROCHLORIDE 10 MG: 20 INJECTION INTRAMUSCULAR; INTRAVENOUS at 03:10

## 2020-07-14 RX ADMIN — DOCUSATE SODIUM 100 MG: 100 CAPSULE, LIQUID FILLED ORAL at 08:10

## 2020-07-14 RX ADMIN — SUCRALFATE 1 G: 1 TABLET ORAL at 20:41

## 2020-07-14 RX ADMIN — THERA TABS 1 TABLET: TAB at 08:10

## 2020-07-14 RX ADMIN — DIGOXIN 250 MCG: 250 TABLET ORAL at 12:14

## 2020-07-14 RX ADMIN — Medication 10 ML: at 08:11

## 2020-07-14 RX ADMIN — DOCUSATE SODIUM 100 MG: 100 CAPSULE, LIQUID FILLED ORAL at 20:41

## 2020-07-14 RX ADMIN — ASPIRIN 81 MG: 81 TABLET, COATED ORAL at 08:10

## 2020-07-14 RX ADMIN — SPIRONOLACTONE 25 MG: 25 TABLET, FILM COATED ORAL at 17:31

## 2020-07-14 RX ADMIN — ATORVASTATIN CALCIUM 20 MG: 20 TABLET, FILM COATED ORAL at 08:10

## 2020-07-14 RX ADMIN — METOPROLOL SUCCINATE 12.5 MG: 25 TABLET, EXTENDED RELEASE ORAL at 08:11

## 2020-07-14 RX ADMIN — CLOPIDOGREL BISULFATE 75 MG: 75 TABLET ORAL at 08:10

## 2020-07-14 RX ADMIN — SUCRALFATE 1 G: 1 TABLET ORAL at 12:14

## 2020-07-14 RX ADMIN — SUCRALFATE 1 G: 1 TABLET ORAL at 17:31

## 2020-07-14 RX ADMIN — Medication 10 ML: at 20:42

## 2020-07-14 RX ADMIN — SUCRALFATE 1 G: 1 TABLET ORAL at 08:10

## 2020-07-14 RX ADMIN — FLUDROCORTISONE ACETATE 0.1 MG: 0.1 TABLET ORAL at 17:31

## 2020-07-14 NOTE — PLAN OF CARE
Problem: Patient Care Overview  Goal: Plan of Care Review  Outcome: Ongoing (interventions implemented as appropriate)  Flowsheets (Taken 7/14/2020 1109)  Outcome Summary: Pt is 78 yo male admitted with PNA, sepsis, now re-evaled secondary to pacemaker placement 7/10.  See Initial Eval for PLOF.  This date, pt supine in bed but eager to complete OOB activity. Min A for bed mobility with cuing for Pacemaker precautions. Max A for toileting. Max A LB ADLs. OT provided edu to pt and daughter regarding paecmaker precautions and importance of compliance. Pt transferring to PCU at end of OT session, left with transport/NSG. Cont to rec IP rehab at [facility]. PPE: gloves, mask, eyewear

## 2020-07-14 NOTE — PROGRESS NOTES
KPA/PULM/CC PROGRESS NOTE       SUBJECTIVE       This is a 79-year-old male with a history of COPD, former smoker, atrial fibrillation on chronic anticoagulation who lives all by himself presented to the hospital for fatigue, tiredness and shortness of breath.  Patient has been feeling puny for the few days with poor appetite.  He has noticed extreme fatigue with malaise.  He denies any fever or chills.  He has noted increased shortness of breath however cough has been mild.  He did cough clear sputum and only one time he had a maroonish sputum.  He denies any wheezing or chest pain or pleurisy.  Denies any sick contacts.  He states that he is visited by a home health nurse only.  He denies any other contacts.     CT scan of the chest was done that showed bilateral groundglass airspace disease with interlobular septal thickening upper lobe predominant.  Patient denies any active tobacco use or vaping.     He has a history of chronic sinus issues.  He is not on any maintenance inhalers.  7/3: No new fevers, SOA stable, O2 requirement worsened over night and now on 5L, Remains fully awake and responsive, On oral diet  7/4: afebrile. soa at baseline. On 5 L. No n.v/d. Feels weak and fatigued.   7/6: Awake.  Currently on 2 L nasal cannula.  Remains on Cardizem and amiodarone infusions.  No complaints of cough or productive phlegm.  No chest pains.  No vomiting  7/7: Awake.  Currently on 2 L nasal cannula.  Complains of generalized weakness.  Complains of decreased appetite.  Currently on amiodarone infusion.  IV Cardizem has been stopped.  Remains in atrial fibrillation on the monitor.  No cough or productive phlegm.  No chest pains.  Some shortness of breath with activity  7/8: Awake.  Currently on 2 L nasal cannula.  Remains short of breath with activity.  Off amiodarone infusion.  Complains of generalized weakness.  No nausea or vomiting  7/9:  Transferred to ICU last night.  PICC placed and on BRIANA for a few hours.   Medications adjusted and blood pressure is improved.  Lewis is now off. On 2 liters nasal cannula   7/10: no acute events overnight. No gtts. Tolerating O2 2L. No chest pain. Plan for cath lab today for ablation and PPM  7/11: Awake, S/P PPM placement. Remains off pressors. Currently on RA, Tolerating PO diet. No C/O CP, No N/V  7/12: No acute events overnight.  Tolerating room air.  Tolerating p.o. diet.  No complaints of chest pain or shortness of air.  7/13:  OOB to chair, No new issues.  Feels ok.  On RA.   7/14:  Transfer out of ICU cancelled yesterday due to hypotension.  RN reports Hypertension overnight.  Feels ok.      OBJECTIVE    Vitals:    07/14/20 0525 07/14/20 0537 07/14/20 0555 07/14/20 0625   BP: 147/70  171/84 175/76   Pulse: 70  70 70   Resp:       Temp:       TempSrc:       SpO2: 95%  95% 94%   Weight:  68.4 kg (150 lb 12.7 oz)     Height:          Intake/Output last 3 shifts:  I/O last 3 completed shifts:  In: 1560 [P.O.:1560]  Out: 1425 [Urine:1425]  Intake/Output this shift:  I/O this shift:  In: -   Out: 450 [Urine:450]    General Appearance:  Alert, cooperative, no distress, appears stated age  Head:  Normocephalic, without obvious abnormality, atraumatic  Eyes:  conjunctivae/corneas clear, EOM's intact     Neck:  Supple,  no JVD      Lungs: Diminished bases bilaterally, clear but diminished bases  Chest wall:  No tenderness.  Left upper chest PPM site  Heart: Irregularly irregular rhythm, afib, S1 and S2 normal, no murmur, rub or gallop  Abdomen:  Soft, non-tender, bowel sounds active all four quadrants,  no rebound or guarding  Extremities:  Extremities normal, no cyanosis or edema.   Skin:  No rashes or lesions  Neurologic:   Alert and oriented, no focal deficits    Scheduled Meds:    aspirin 81 mg Oral Daily   atorvastatin 20 mg Oral Daily   clopidogrel 75 mg Oral Daily   digoxin 250 mcg Oral Daily   docusate sodium 100 mg Oral BID   fludrocortisone 0.1 mg Oral Q PM   lisinopril 5 mg Oral  Q24H   metoprolol succinate XL 12.5 mg Oral Q24H   sodium chloride 250 mL Intravenous Once   sodium chloride 10 mL Intravenous Q12H   spironolactone 25 mg Oral Q24H   sucralfate 1 g Oral 4x Daily AC & at Bedtime   Thera 1 tablet Oral Daily   vitamin B-12 1,000 mcg Oral Daily       Continuous Infusions:       PRN Meds:•  acetaminophen **OR** acetaminophen **OR** acetaminophen  •  aluminum-magnesium hydroxide-simethicone  •  atropine  •  bisacodyl  •  hydrALAZINE  •  HYDROcodone-acetaminophen  •  ipratropium-albuterol  •  magnesium hydroxide  •  magnesium sulfate **OR** magnesium sulfate **OR** magnesium sulfate  •  melatonin  •  ondansetron  •  [DISCONTINUED] ondansetron **OR** ondansetron  •  polyethylene glycol  •  potassium chloride **OR** potassium chloride **OR** potassium chloride  •  potassium phosphate infusion greater than 15 mMoles **OR** potassium phosphate infusion greater than 15 mMoles **OR** potassium phosphate **OR** sodium phosphate IVPB **OR** sodium phosphate IVPB **OR** sodium phosphate IVPB  •  promethazine  •  [COMPLETED] Insert peripheral IV **AND** sodium chloride     LABS    CBC  Results from last 7 days   Lab Units 07/14/20  0353 07/13/20  0355 07/12/20  0337 07/11/20  0405 07/10/20  0308 07/09/20  0345 07/08/20  0242   WBC 10*3/mm3 10.70 9.00 10.00 12.00* 9.60 12.40* 14.20*   RBC 10*6/mm3 3.65* 3.48* 3.48* 3.68* 4.03* 4.27 4.88   HEMOGLOBIN g/dL 11.1* 10.4* 10.4* 11.2* 12.1* 12.7* 14.3   HEMATOCRIT % 32.4* 30.9* 31.5* 33.4* 35.8* 38.2 43.5   MCV fL 88.9 89.0 90.4 90.7 88.9 89.4 89.1   PLATELETS 10*3/mm3 232 213 181 212 194 174 238       CMP   Results from last 7 days   Lab Units 07/12/20  0337 07/11/20  0405 07/10/20  1509 07/10/20  0308 07/09/20  0345 07/08/20  0242   SODIUM mmol/L 141 141  --  141 142 140   POTASSIUM mmol/L 3.5 4.0 3.9 3.6 3.8 3.2*   CHLORIDE mmol/L 105 103  --  102 101 97*   CO2 mmol/L 27.0 27.0  --  31.0* 31.0* 30.0*   BUN  11 13  --  18 19 14   CREATININE mg/dL 0.56*  0.66*  --  0.62* 0.62* 0.53*   GLUCOSE mg/dL 111* 177*  --  128* 97 103*   ALBUMIN g/dL 2.50*  --   --   --   --   --    BILIRUBIN mg/dL 0.4  --   --   --   --   --    ALK PHOS U/L 44  --   --   --   --   --    AST (SGOT) U/L 26  --   --   --   --   --    ALT (SGPT) U/L 25  --   --   --   --   --        TROPONIN        CoAg  Results from last 7 days   Lab Units 07/12/20 0337   INR  1.05   APTT seconds 19.6*       ABG        Microbiology  Microbiology Results (last 10 days)     Procedure Component Value - Date/Time    Catheter Culture - Cath Tip, Arm, Left [[id number]] Collected:  07/12/20 0337    Lab Status:  Final result Specimen:  Cath Tip from Arm, Left Updated:  07/14/20 0734     CATHETER CULTURE No growth at 2 days    Blood Culture - Blood, Blood, PICC Line [[id number]] Collected:  07/11/20 1353    Lab Status:  Preliminary result Specimen:  Blood, PICC Line Updated:  07/13/20 1430     Blood Culture No growth at 2 days    Blood Culture - Blood, Hand, Right [[id number]] Collected:  07/11/20 1353    Lab Status:  Preliminary result Specimen:  Blood from Hand, Right Updated:  07/13/20 1430     Blood Culture No growth at 2 days    Blood Culture - Blood, Arm, Left [[id number]]  (Abnormal) Collected:  07/08/20 2202    Lab Status:  Final result Specimen:  Blood from Arm, Left Updated:  07/10/20 0658     Blood Culture Staphylococcus, coagulase negative     Comment: Probable contaminant requires clinical correlation, susceptibility not performed unless requested by physician.          Isolated from Anaerobic Bottle     Gram Stain Gram positive cocci in clusters    Blood Culture ID, PCR - Blood, Arm, Left [[id number]]  (Abnormal) Collected:  07/08/20 2202    Lab Status:  Edited Result - FINAL Specimen:  Blood from Arm, Left Updated:  07/10/20 0635     BCID, PCR Staphylococcus spp, not aureus. Identification by BCID PCR.     Comment: Corrected result. Previous result was Staphylococcus aureus. Identification by BCID PCR. on  7/9/2020 at 2147 EDT.        BOTTLE TYPE Anaerobic Bottle    Blood Culture - Blood, Blood, PICC Line [[id number]] Collected:  07/08/20 2147    Lab Status:  Final result Specimen:  Blood, PICC Line Updated:  07/13/20 2245     Blood Culture No growth at 5 days          IMAGING & OTHER STUDIES    Imaging Results (Last 72 Hours)     ** No results found for the last 72 hours. **        Results for orders placed during the hospital encounter of 07/01/20   Adult Transthoracic Echo Complete W/ Cont if Necessary Per Protocol    Narrative · Left ventricular systolic function is severely decreased.  · Left ventricular wall thickness is consistent with mild concentric   hypertrophy.  · Left atrial cavity size is moderately dilated.  · Mild-to-moderate mitral valve regurgitation is present  · Mild tricuspid valve regurgitation is present.  · Mild aortic valve regurgitation is present.             ASSESSMENT/PLAN:     Atrial fibrillation (CMS/HCC)    CAD (coronary artery disease)    Chronic anticoagulation    Hyperlipidemia    Presence of stent in right coronary artery    Chronic pain    Sepsis (CMS/HCC)    Congestive heart failure (CHF) (CMS/HCC)    Nausea vomiting and diarrhea    Ischemic cardiomyopathy    Persistent atrial fibrillation (CMS/HCC)    Chronic systolic congestive heart failure (CMS/HCC)    Permanent atrial fibrillation (CMS/HCC)    Coronary artery disease involving native coronary artery of native heart without angina pectoris        1.Bilateral PNA (viral vs bacterial) other differentials include Pulmonary edema and possible hemorrhage  2.  Chronic A. fib  3. H/o RML lobectomy  4. CAD  5. Acute hypoxia due to above  6.  Nausea  7. Chronic Hypotension  8. Chronic Anticoagulation with Xarelto  9. Lactic acidosis  10. Hypokalemia  11.  Diarrhea  12.  Gastritis noted on EGD  13.  Acute on chronic systolic CHF exacerbation       PLAN    -Reviewed CT scan of the chest. Covid test is negative X2.  Afebrile.  Status post  antibiotic therapy with Zosyn. WBC count improving. On Vancomycin for Coag negative staph.Likley contaminant. Will repeat blood cx and if negative will D/C abx  -Patient is a lifelong non-smoker.  No previous history of COPD.  Off steroids and Pulmicort.  Changed nebs to PRN.  -Management of A. fib per cardiology.  Off amiodarone and Cardizem infusions. Now on digoxin  - 2D echo results reviewed.  EF 30 to 35%.  Mild to moderate MR noted.  RVSP 35.7  -GI work-up reviewed.  Status post EGD 7/5 with changes of gastritis noted.  Remains on Reglan Carafate and Protonix.  Switched Protonix to PO stool for C. difficile analysis is negative.  Changed Reglan to PO  -Patient with issues with chronic hypotension.  Remains on Florinef which is being continued.  -On RA.   -off therapeutic anticoagulation due to AICD placement.  Check with cardiology about resuming Xarelto today  -Increase activity  -Tolerating PO Diet   -PICC discontinued  -midodrine weaned off   -will change Lisinopril to nightly     On bowel regimen.  Stop reglan  PT eval and treat       Dispo:  Awaiting placement to rehab  Transfer out of ICU today

## 2020-07-14 NOTE — PROGRESS NOTES
"Nutrition Services    Patient Name:  Shukri Park  YOB: 1940  MRN: [id number]  Admit Date:  7/1/2020    Comments: Liberalizing diet to regular to promote optimal intakes. Offering magic cups at lunch & dinner meals (580 kcal & 18 gms of protein if consumed).    Reason for Assessment                Reason for Assessment    Reason For Assessment Follow up    7/3/20: GUMARO, MST 3        Diagnosis H&P:   79 y.o. male with a history of atrial fibrillation, hyperlipidemia, chronic low back pain, hypotension, chronic anticoagulation presented to the [facility] ED on 7/1/2020 with a complaint of generalized body aches and pains, weakness and a cough.  Patient states he is also had some nausea and vomiting.    PMH also includes  Aortic aneurysm, right lobe cancer s/p RML lobectomy    Current Problems/Procedures:     Bilateral pna  -viral vs bacterial  -possible pulmonary edema and possible hemorrhage  -COVID test negative x2   -antibx completed    AFIB with RVR  -cardiology following    Acute hypoxia  -on supplemental O2    Lactic acidosis    Hypokalemia     Abdominal pain, nausea  -S/p EGD on 7/5 showing gastritis  -Noted gallbladder sludge with gen.sx to consult for possible cholecystectomy, recommending outpatient when appropriate  -nausea    Acute on chr systolic CHF exacerbation     Transferred to ICU for hypotension  -off pressor, resolved         Nutrition/Diet History                Nutrition/Diet History    Narrative     7/14: Called pt via phone in PCU, pt did not answer.    7/9: Attempted to call pt via phone, no answer.    7/6: Attempted to call pt via room telephone, busy signal. Secure message with pt's RN who reports pt did not take the Boost Breeze this morning, but she will attempt again this afternoon.    7/3: Attempted to call pt via room telephone without answer. Noted per recent physician note that patient stated \"I am so weak I can't talk\". Chart reviewed.        Functional " "Status Mostly independent per ADL's, occassionally uses a cane (PTA)    Pt is currently very limited in his mobility & unable to walk, needs assistance with ADLs per OT notes       Food Allergies  NKFA       Factors Affecting Nutritional Intake  Acute hospitalization with chronic illnesses, N/V,         Anthropometrics             Anthropometrics    Height 188 cm (74\")    Weight 68.4 kg (150 lb) 7/14/20  - ?accurate      64 kg (141 lb)  7/09/20 - 6% loss in 8 days = severe wt change       Admit Weight    Admit Weight 68 kg (150 lb)    7/1/20       Ideal Body Weight (IBW)    Ideal Body Weight (IBW) 190 lb    % Ideal Body Weight 79%       Usual Body Weight (UBW)    Usual Body Weight TOMY    % Usual Body Weight TOMY    Weight Hx  4/15/20 165lb  1/16/20 158lb  11/20/19 158lb  8/19/19 166lb       Body Mass Index (BMI)    BMI (kg/m2) 19.9           Labs/Medications                Labs    Pertinent Lab Results  Hgb 11.1 L, Hct 32.4 L    Noted not all labs drawn or resulted yesterday & today        Medications    Pertinent Medications  Lipitor, plavix, colace, aldactone, carafate, MVI, vitamin B12, magnesium sulfate         Physical Findings                Physical Findings    Overall Physical Appearance From initial assessment 7/3 to present (7/14): Unable to assess in person related to COVID-19 pandemic and limiting in person visits.     Edema  None documented     Gastrointestinal BM documented on 7/13     Tubes None     Oral/Mouth Cavity No issues documented     Skin Surgical incision on Left chest         Estimated/Assessed Needs              Calorie Requirements     Height Used for Calorie Calculations       Weight Used for Calorie Calculations      Formula chosen for Calorie Calculations       Estimated Calorie Requirements (kcals/day)              Protein Requirements    Weight Used For Protein Calculations       Est Protein Requirement Amount (gms/kg)       Estimated Protein Requirements (gms/day)          Fluid " Requirements     Estimated Fluid Requirements (mL/day)            Fluid Deficit       Desired Sodium Level (mEq/L)      Desired Sodium Level (mEq/L)      Estimated Fluid Deficit Needs (L)           Nutrition Prescription Ordered                Nutrition Prescription PO    Current PO Diet Healthy Heart     Supplement  *Noted Boost order d/c        Nutrition Prescription EN    Enteral Route -     TF modular -     TF Delivery Method -     Current Ordered TF  -     Current Ordered Water flush  -     TF Observation  -        Nutrition Prescription TPN    TPN Route -     Current Ordered TPN Volume  -     Dextrose (gm/kcals)  -     Amino Acid (gm/kcals) -     Lipids (ML/Concentration/Frequency)  -     TPN Observation  -          Evaluation of Received Nutrient/Fluid Intake                PO Evaluation    % PO Intake 7/14: 77% x12 meals documented. Intakes & meal selections much improved since last encounter    7/9: Average intakes of 36% x9 meals documented since last encounter. Pt has typically refused 1 meal daily.  Total average of 35% x15 meals recorded this admission    7/6: 25% x last 4 meals  7/3: 50% x 3 meals        EN Evaluation    TF Changes -     TF Residual -     TF Tolerance -     Average EN Delivered  -        TPN Evaluation    Total Number of Days on TPN  -           Clinical Course      Clinical Nutrition Course Details  7/2 HH  7/3 NPO, CL diet  7/4 NPO  7/5 HH  7/6 NPO then HH thru 7/7 7/8 NPO, then HH again thru present (7/14)         Problem/Interventions:                     Nutrition Diagnoses Problem 1      Problem 1   Severe acute disease related malnutrition r/t insufficient energy intake in the context of nausea/vomiting, poor appetite with acute illnesses of pneumonia & gastritis AEB average meal intakes 35% x9 days with daily refusal of 1 meal,  6% loss of body weight in 8 days     Nutrition Diagnoses Problem 2      Problem 2            Intervention Goal                Intervention Goal     General Maintain average meal intakes >75%     Pt will eat one magic cup daily         Nutrition Intervention                Nutrition Intervention    RD/Tech Action  Adjusting ONS to magic cups at Lunch/dinner meals    *Adjusting diet to regular to promote flexibility with meal intakes at this time given pt tolerating PO & nausea resolved         Nutrition Prescription                Nutrition Prescription PO      Diet  Regular     Supplement Magic Cups at lunch/dinner        Nutrition Prescription EN    Enteral Prescription -        Nutrition Prescription TPN    TPN Prescription -         Monitor/Evaluation                Monitor/Evaluation    Monitor Intakes, BMs, N/V, abd pain/distention, labs (electrolytes, BUN/Crt, glucose), wt for changes, skin integrity, at next encounter             Electronically signed by:  Linnea Sargent RD  07/14/20 08:24

## 2020-07-14 NOTE — PROGRESS NOTES
"   LOS: 12 days   Admiting Physician- Elena Epps MD    Reason For Followup:    Chronic atrial fibrillation with rapid ventricular response  Difficult to control heart rate  S/p AV kelly ablation with biventricular ICD  Hypertension  Dilated cardiomyopathy    Subjective     Patient is feeling much better.    Objective     Hemodynamics are stable    Review of Systems:   Review of Systems   Constitution: Negative for chills and fever.   HENT: Negative for ear discharge and nosebleeds.    Eyes: Negative for discharge and redness.   Cardiovascular: Negative for chest pain, orthopnea, palpitations, paroxysmal nocturnal dyspnea and syncope.   Respiratory: Negative for cough, shortness of breath and wheezing.    Endocrine: Negative for heat intolerance.   Skin: Negative for rash.   Musculoskeletal: Negative for arthritis and myalgias.   Gastrointestinal: Negative for abdominal pain, melena, nausea and vomiting.   Genitourinary: Negative for dysuria and hematuria.   Neurological: Negative for dizziness, light-headedness, numbness and tremors.   Psychiatric/Behavioral: Negative for depression. The patient is not nervous/anxious.          Vital Signs  Vitals:    07/14/20 0555 07/14/20 0625 07/14/20 0900 07/14/20 1300   BP: 171/84 175/76  130/49   Pulse: 70 70  70   Resp:    20   Temp:   98.2 °F (36.8 °C) 97.3 °F (36.3 °C)   TempSrc:   Oral Oral   SpO2: 95% 94%  94%   Weight:    67.2 kg (148 lb 2.4 oz)   Height:    188 cm (74\")     Wt Readings from Last 1 Encounters:   07/14/20 67.2 kg (148 lb 2.4 oz)       Intake/Output Summary (Last 24 hours) at 7/14/2020 1513  Last data filed at 7/14/2020 1300  Gross per 24 hour   Intake 720 ml   Output 1900 ml   Net -1180 ml     Physical Exam:  Physical Exam   Constitutional: He is oriented to person, place, and time. He appears well-developed and well-nourished.   HENT:   Head: Normocephalic and atraumatic.   Eyes: No scleral icterus.   Neck: No thyromegaly present.   "   Cardiovascular: Normal rate, regular rhythm and normal heart sounds. Exam reveals no gallop and no friction rub.   No murmur heard.  Pulmonary/Chest: Effort normal and breath sounds normal. No respiratory distress. He has no wheezes. He has no rales.   Abdominal: There is no tenderness.   Musculoskeletal: He exhibits no edema.   Lymphadenopathy:     He has no cervical adenopathy.   Neurological: He is alert and oriented to person, place, and time.   Skin: No rash noted. No erythema.   Psychiatric: He has a normal mood and affect.       Results Review:   Lab Results (last 24 hours)     Procedure Component Value Units Date/Time    Blood Culture - Blood, Blood, PICC Line [[id number]] Collected:  07/11/20 1353    Specimen:  Blood, PICC Line Updated:  07/14/20 1430     Blood Culture No growth at 3 days    Blood Culture - Blood, Hand, Right [[id number]] Collected:  07/11/20 1353    Specimen:  Blood from Hand, Right Updated:  07/14/20 1430     Blood Culture No growth at 3 days    Catheter Culture - Cath Tip, Arm, Left [[id number]] Collected:  07/12/20 0337    Specimen:  Cath Tip from Arm, Left Updated:  07/14/20 0734     CATHETER CULTURE No growth at 2 days    Phosphorus [[id number]]  (Normal) Collected:  07/14/20 0353    Specimen:  Blood Updated:  07/14/20 0456     Phosphorus 2.8 mg/dL     Magnesium [[id number]]  (Normal) Collected:  07/14/20 0353    Specimen:  Blood Updated:  07/14/20 0456     Magnesium 1.9 mg/dL     CBC & Differential [[id number]] Collected:  07/14/20 0353    Specimen:  Blood Updated:  07/14/20 0430    Narrative:       The following orders were created for panel order CBC & Differential.  Procedure                               Abnormality         Status                     ---------                               -----------         ------                     CBC Auto Differential[[id number]]        Abnormal            Final result                 Please view results for these tests on the individual orders.     CBC Auto Differential [[id number]]  (Abnormal) Collected:  07/14/20 0353    Specimen:  Blood Updated:  07/14/20 0430     WBC 10.70 10*3/mm3      RBC 3.65 10*6/mm3      Hemoglobin 11.1 g/dL      Hematocrit 32.4 %      MCV 88.9 fL      MCH 30.4 pg      MCHC 34.2 g/dL      RDW 14.7 %      RDW-SD 46.4 fl      MPV 8.3 fL      Platelets 232 10*3/mm3      Neutrophil % 69.8 %      Lymphocyte % 16.0 %      Monocyte % 10.7 %      Eosinophil % 3.1 %      Basophil % 0.4 %      Neutrophils, Absolute 7.50 10*3/mm3      Lymphocytes, Absolute 1.70 10*3/mm3      Monocytes, Absolute 1.10 10*3/mm3      Eosinophils, Absolute 0.30 10*3/mm3      Basophils, Absolute 0.00 10*3/mm3      nRBC 0.1 /100 WBC     Blood Culture - Blood, Blood, PICC Line [[id number]] Collected:  07/08/20 2147    Specimen:  Blood, PICC Line Updated:  07/13/20 2245     Blood Culture No growth at 5 days        Imaging Results (Last 72 Hours)     ** No results found for the last 72 hours. **        ECG/EMG Results (most recent)     Procedure Component Value Units Date/Time    Adult Transthoracic Echo Complete W/ Cont if Necessary Per Protocol [[id number]] Collected:  07/02/20 0653     Updated:  07/02/20 1409     BSA 1.9 m^2      RVIDd 2.1 cm      IVSd 1.1 cm      LVIDd 5.6 cm      LVIDs 4.6 cm      LVPWd 1.3 cm      IVS/LVPW 0.84     FS 17.6 %      EDV(Teich) 155.9 ml      ESV(Teich) 99.5 ml      EF(Teich) 36.2 %      EDV(cubed) 179.0 ml      ESV(cubed) 100.1 ml      EF(cubed) 44.1 %      LV mass(C)d 276.4 grams      LV mass(C)dI 142.4 grams/m^2      SV(Teich) 56.4 ml      SI(Teich) 29.1 ml/m^2      SV(cubed) 78.9 ml      SI(cubed) 40.6 ml/m^2      Ao root diam 4.0 cm      Ao root area 12.8 cm^2      ACS 2.1 cm      asc Aorta Diam 3.7 cm      LVOT diam 2.5 cm      LVOT area 5.1 cm^2      RVOT diam 2.1 cm      RVOT area 3.6 cm^2      EDV(MOD-sp4) 68.5 ml      ESV(MOD-sp4) 50.1 ml      EF(MOD-sp4) 26.8 %      SV(MOD-sp4) 18.4 ml      SI(MOD-sp4) 9.5 ml/m^2      Ao root  area (BSA corrected) 2.1     LV Dunlap Vol (BSA corrected) 35.3 ml/m^2      LV Sys Vol (BSA corrected) 25.8 ml/m^2      Aortic R-R 0.4 sec      Aortic .6 BPM      MV V2 max 90.3 cm/sec      MV max PG 3.3 mmHg      MV V2 mean 63.4 cm/sec      MV mean PG 1.8 mmHg      MV V2 VTI 9.6 cm      MVA(VTI) 6.7 cm^2      Ao pk sharmila 78.3 cm/sec      Ao max PG 2.5 mmHg      Ao max PG (full) -0.26 mmHg      Ao V2 mean 61.8 cm/sec      Ao mean PG 1.7 mmHg      Ao mean PG (full) 0.39 mmHg      Ao V2 VTI 11.3 cm      AISHWARYA(I,A) 5.7 cm^2      AISHWARYA(I,D) 5.7 cm^2      AISHWARYA(V,A) 5.3 cm^2      AISHWARYA(V,D) 5.3 cm^2      LV V1 max PG 2.7 mmHg      LV V1 mean PG 1.3 mmHg      LV V1 max 82.3 cm/sec      LV V1 mean 53.0 cm/sec      LV V1 VTI 12.7 cm      MR max sharmila 453.8 cm/sec      MR max PG 82.4 mmHg      CO(Ao) 22.0 l/min      CI(Ao) 11.3 l/min/m^2      SV(Ao) 144.9 ml      SI(Ao) 74.6 ml/m^2      CO(LVOT) 9.8 l/min      CI(LVOT) 5.0 l/min/m^2      SV(LVOT) 64.6 ml      SV(RVOT) 42.6 ml      SI(LVOT) 33.3 ml/m^2      PA V2 max 77.1 cm/sec      PA max PG 2.4 mmHg      PA max PG (full) 0.22 mmHg      PA V2 mean 50.6 cm/sec      PA mean PG 1.2 mmHg      PA mean PG (full) 0.15 mmHg      PA V2 VTI 9.1 cm      PVA(I,A) 4.7 cm^2      BH CV ECHO TARYN - PVA(I,D) 4.7 cm^2      BH CV ECHO TARYN - PVA(V,A) 3.4 cm^2      BH CV ECHO TARYN - PVA(V,D) 3.4 cm^2      PA acc time 0.06 sec      RV V1 max PG 2.2 mmHg      RV V1 mean PG 1.1 mmHg      RV V1 max 73.4 cm/sec      RV V1 mean 46.7 cm/sec      RV V1 VTI 11.9 cm      TR max sharmila 285.8 cm/sec      RVSP(TR) 35.7 mmHg      RAP systole 3.0 mmHg      PA pr(Accel) 49.9 mmHg      Qp/Qs 0.66      CV ECHO TARYN - BZI_BMI 18.7 kilograms/m^2       CV ECHO TARYN - BSA(HAYCOCK) 1.9 m^2       CV ECHO TARYN - BZI_METRIC_WEIGHT 68.0 kg       CV ECHO TARYN - BZI_METRIC_HEIGHT 190.5 cm      EF(MOD-bp) 27.0 %      LA dimension(2D) 4.6 cm     Narrative:       · Left ventricular systolic function is severely decreased.  ·  Left ventricular wall thickness is consistent with mild concentric   hypertrophy.  · Left atrial cavity size is moderately dilated.  · Mild-to-moderate mitral valve regurgitation is present  · Mild tricuspid valve regurgitation is present.  · Mild aortic valve regurgitation is present.       ECG 12 Lead [[id number]] Collected:  07/01/20 2340     Updated:  07/03/20 1140    Narrative:       HEART RATE= 163  bpm  RR Interval= 368  ms  SD Interval=   ms  P Horizontal Axis=   deg  P Front Axis=   deg  QRSD Interval= 78  ms  QT Interval= 283  ms  QRS Axis= 7  deg  T Wave Axis= 42  deg  - ABNORMAL ECG -  Atrial fibrillation with rapid V-rate  No previous ECG available for comparison  Electronically Signed By: Sunny Unger (MILENA) 03-Jul-2020 11:38:35  Date and Time of Study: 2020-07-01 23:40:10    ECG 12 Lead [[id number]] Collected:  07/03/20 2021     Updated:  07/03/20 2023    Narrative:       HEART RATE= 133  bpm  RR Interval= 449  ms  SD Interval=   ms  P Horizontal Axis=   deg  P Front Axis=   deg  QRSD Interval= 92  ms  QT Interval= 296  ms  QRS Axis= -17  deg  T Wave Axis= -11  deg  - ABNORMAL ECG -  Atrial fibrillation  Ventricular premature complex  Probable lateral infarct, old  Electronically Signed By:   Date and Time of Study: 2020-07-03 20:21:30    ECG 12 Lead [[id number]] Collected:  07/04/20 0607     Updated:  07/04/20 0609    Narrative:       HEART RATE= 102  bpm  RR Interval= 589  ms  SD Interval=   ms  P Horizontal Axis=   deg  P Front Axis=   deg  QRSD Interval= 95  ms  QT Interval= 334  ms  QRS Axis= -21  deg  T Wave Axis= 3  deg  - ABNORMAL ECG -  Atrial fibrillation  Ventricular premature complex  Electronically Signed By:   Date and Time of Study: 2020-07-04 06:07:51    ECG 12 Lead [[id number]] Collected:  07/05/20 0709     Updated:  07/05/20 0710    Narrative:       HEART RATE= 113  bpm  RR Interval= 528  ms  SD Interval=   ms  P Horizontal Axis=   deg  P Front Axis=   deg  QRSD Interval= 89  ms  QT  Interval= 321  ms  QRS Axis= -36  deg  T Wave Axis= 0  deg  - ABNORMAL ECG -  Atrial fibrillation  Ventricular premature complex  Left axis deviation  When compared with ECG of 04-Jul-2020 6:07:51,  No significant change  Electronically Signed By:   Date and Time of Study: 2020-07-05 07:09:38    ECG 12 Lead [[id number]] Collected:  07/05/20 2052     Updated:  07/05/20 2057    Narrative:       HEART RATE= 114  bpm  RR Interval= 524  ms  CA Interval=   ms  P Horizontal Axis=   deg  P Front Axis=   deg  QRSD Interval= 96  ms  QT Interval= 312  ms  QRS Axis= -28  deg  T Wave Axis= -12  deg  - ABNORMAL ECG -  Atrial fibrillation  Ventricular premature complex  Borderline T abnormalities, diffuse leads  When compared with ECG of 05-Jul-2020 7:09:38,  Significant axis, voltage or hypertrophy change  Electronically Signed By:   Date and Time of Study: 2020-07-05 20:52:58    ECG 12 Lead [[id number]] Collected:  07/06/20 0609     Updated:  07/06/20 0610    Narrative:       HEART RATE= 97  bpm  RR Interval= 619  ms  CA Interval=   ms  P Horizontal Axis=   deg  P Front Axis=   deg  QRSD Interval= 91  ms  QT Interval= 338  ms  QRS Axis= -22  deg  T Wave Axis= -5  deg  - ABNORMAL ECG -  Atrial fibrillation  Ventricular premature complex  When compared with ECG of 05-Jul-2020 20:52:58,  Significant repolarization change  Electronically Signed By:   Date and Time of Study: 2020-07-06 06:09:24    EP/CRM Study [[id number]] Resulted:  07/10/20 1945     Updated:  07/10/20 1945    Narrative:       Procedure indication--- uncontrollable atrial fibrillation with chronic   class III systolic heart failure and severe ischemic cardiomyopathy with   no further targets for revascularization with EF less than 35% and   iatrogenic left bundle branch block and patient recommended biventricular   ICD insertion and AV node ablation for optimization    Sedation anesthesia and vancomycin before procedure    Procedures done  #1 implantation of a  biventricular ICD system with placement of   biventricular ICD generator and placement of right ventricle ICD lead   placement of a coronary sinus lead  #2 coronary sinus venography with placement of a complex coronary sinus   lead  #3-lead testing and fluoroscopy  #4 biventricular ICD interrogation    Procedure note  Obtaining valid consent patient was draped in sterile fashion the left   infraclavicular area was infiltrated local anesthesia and a small incision   was made and hemostasis was acquired by cautery and gentle dissection was   done and a pocket was made in the prepectoral fascia area and by using   micropuncture kit subclavian vein was easily cannulated with placement of   2J wires for placement of leads.  Right ventricular ICD lead was placed in the right ventricle apex after   mapping right ventricle at multiple locations via 8 [place] venous sheath   and after testing the lead the sheath was removed  For placement of left ventricle coronary sinus lead a short 10 [place]   venous sheath was used and long peelable sheath manufactured by changed   company was used with the help of supra CS and later a deflectable   decapolar catheter cannulate coronary sinus with coronary sinus   venography.  Patient had an ideal mid posterior lateral branch--cannulation was done   however diaphragmatic stimulation was noted with a quadripolar lead and   multiple other branches were mapped with poor thresholds  A unipolar lead was used and again mapping of multiple location was done   including distal lateral branch and great cardiac vein with poor   thresholds and henceforth after trying multiple places recanalization of   the mid posterior lateral branch was done with adequate capture with a   narrow margin for diaphragmatic stimulation  Once the lead was tested multiple times the sheaths were removed and the   leads were anchored to underlying muscular area and the pocket was copious   irrigated with antibiotic  solution and ICD generator was taken and the   leads were anchored to the generator generator leads were placed in the   pocket and incision was closed in several layers without any immediate   procedure related complications and biventricular programming attached to   chart.   ICD generator is manufactured by [employer] and model number is   G124 and serial #[id number]  Right ventricular ICD lead is manufactured by [employer] model 0673   and she #[id number]  LV lead is  [employer] model 4592 and serial number is   [id number]  Right ventricular sensing is more than 10 mV with threshold less than 1 V  Left ventricle lead threshold is 2.5 V at 1.5 ms    Plan  Proceed with AV node ablation  Eliquis can be resumed tomorrow  Digoxin can be stopped   Electronically signed by Jonathan Denney MD, 07/10/20, 7:16 PM.    EP/CRM Study [[id number]] Resulted:  07/10/20 1945     Updated:  07/10/20 1945    Narrative:       Procedure indication--uncontrollable rapid atrial fibrillation associated   with hypotension , biventricular ICD in situ, severe dilated   cardiomyopathy, class 3 systolic heart failure--patient recommended AV   node ablation after biventricular ICD insertion which was done this   evening     Sedation by anesthesia      Procedures done  1. Radiofrequency ablation of compact AV node  2.  Biventricular ICD reprogramming and interrogation  3. Fluoroscopy  4.  EP study with recording of right atrium, right ventricle and HIS   without induction of arrhythmias  5.  3D mapping with Carto system    Procedure note  After obtaining a valid consent patient was draped in sterile fashion and   local anesthesia was given in the right groin in the right common femoral   vein was cannulated with placement of a 8 [place] venous sheath.  An 8 mm   Biosense Spence catheter was taken and mapping of right atrium right   ventricle and compact AV kelly areas was done--single radiofrequency   ablation was done  with HIS cloudy was noted with immediate complete AV   block   Post ablation device was reprogrammed to a baseline rate of 70 beats per   minute without any complications and catheter removed    Findings  HV interval of 54 milliseconds  Underlying appropriately functioning biventricular ICD system  Successful radiofrequency ablation of compact AV node with escape rate   below 30 beats per minute    Plan  Patient can be transferred back to ICU  Currently device programmed to RV mode only because of infrequent   diaphragmatic stimulation  ICD programming attached to chart    Electronically signed by Jonathan Denney MD, 07/10/20, 7:36 PM.      ECG 12 Lead [[id number]] Collected:  07/03/20 0945     Updated:  07/12/20 1551    Narrative:       HEART RATE= 106  bpm  RR Interval= 568  ms  AK Interval= 196  ms  P Horizontal Axis= 48  deg  P Front Axis= 0  deg  QRSD Interval= 95  ms  QT Interval= 298  ms  QRS Axis= -18  deg  T Wave Axis= 14  deg  - ABNORMAL ECG -  Atrial fibrillationWith rapid ventricular rate  When compared with ECG of 01-Jul-2020 23:40:10,  Ventricular rate decreased  Electronically Signed By: Braulio Fleming ([facility]) 12-Jul-2020 15:50:15  Date and Time of Study: 2020-07-03 09:45:28    ECG 12 Lead [[id number]] Collected:  07/09/20 2035     Updated:  07/13/20 1015    Narrative:       HEART RATE= 61  bpm  RR Interval= 983  ms  AK Interval=   ms  P Horizontal Axis=   deg  P Front Axis=   deg  QRSD Interval= 94  ms  QT Interval= 382  ms  QRS Axis= 37  deg  T Wave Axis= -43  deg  - ABNORMAL ECG -  Atrial fibrillation  When compared with ECG of 06-Jul-2020 6:09:24,  Significant rate decrease  Electronically Signed By: Wade Cronin ([facility]) 13-Jul-2020 10:11:05  Date and Time of Study: 2020-07-09 20:35:36        CBC    Results from last 7 days   Lab Units 07/14/20  0353 07/13/20  0355 07/12/20  0337 07/11/20  0405 07/10/20  0308 07/09/20  0345 07/08/20  0242   WBC 10*3/mm3 10.70 9.00 10.00 12.00* 9.60 12.40* 14.20*    HEMOGLOBIN g/dL 11.1* 10.4* 10.4* 11.2* 12.1* 12.7* 14.3   PLATELETS 10*3/mm3 232 213 181 212 194 174 238     BMP   Results from last 7 days   Lab Units 07/14/20  0353 07/13/20  0355 07/12/20  0337 07/11/20  0405 07/10/20  1509 07/10/20  0308 07/09/20  0345 07/08/20  0242   SODIUM mmol/L  --   --  141 141  --  141 142 140   POTASSIUM mmol/L  --   --  3.5 4.0 3.9 3.6 3.8 3.2*   CHLORIDE mmol/L  --   --  105 103  --  102 101 97*   CO2 mmol/L  --   --  27.0 27.0  --  31.0* 31.0* 30.0*   BUN   --   --  11 13  --  18 19 14   CREATININE mg/dL  --   --  0.56* 0.66*  --  0.62* 0.62* 0.53*   GLUCOSE mg/dL  --   --  111* 177*  --  128* 97 103*   MAGNESIUM mg/dL 1.9 1.9 1.9 2.1  --  1.9 2.1 1.9   PHOSPHORUS mg/dL 2.8 2.6 2.1* 2.6 2.5 2.2* 2.2*  --      CMP   Results from last 7 days   Lab Units 07/12/20  0337 07/11/20  0405 07/10/20  1509 07/10/20  0308 07/09/20  0345 07/08/20  0242   SODIUM mmol/L 141 141  --  141 142 140   POTASSIUM mmol/L 3.5 4.0 3.9 3.6 3.8 3.2*   CHLORIDE mmol/L 105 103  --  102 101 97*   CO2 mmol/L 27.0 27.0  --  31.0* 31.0* 30.0*   BUN  11 13 --  18 19 14   CREATININE mg/dL 0.56* 0.66*  --  0.62* 0.62* 0.53*   GLUCOSE mg/dL 111* 177*  --  128* 97 103*   ALBUMIN g/dL 2.50*  --   --   --   --   --    BILIRUBIN mg/dL 0.4  --   --   --   --   --    ALK PHOS U/L 44  --   --   --   --   --    AST (SGOT) U/L 26  --   --   --   --   --    ALT (SGPT) U/L 25  --   --   --   --   --      Cardiac Studies:  Echo-   Results for orders placed during the hospital encounter of 07/01/20   Adult Transthoracic Echo Complete W/ Cont if Necessary Per Protocol    Narrative · Left ventricular systolic function is severely decreased.  · Left ventricular wall thickness is consistent with mild concentric   hypertrophy.  · Left atrial cavity size is moderately dilated.  · Mild-to-moderate mitral valve regurgitation is present  · Mild tricuspid valve regurgitation is present.  · Mild aortic valve regurgitation is present.             Stress Myoview-  Cath-      Medication Review:   Scheduled Meds:  atorvastatin 20 mg Oral Daily   clopidogrel 75 mg Oral Daily   digoxin 250 mcg Oral Daily   docusate sodium 100 mg Oral BID   fludrocortisone 0.1 mg Oral Q PM   [START ON 7/15/2020] lisinopril 5 mg Oral Q24H   [START ON 7/15/2020] metoprolol succinate XL 25 mg Oral Q24H   rivaroxaban 15 mg Oral Daily With Dinner   sodium chloride 250 mL Intravenous Once   sodium chloride 10 mL Intravenous Q12H   spironolactone 25 mg Oral Q24H   sucralfate 1 g Oral 4x Daily AC & at Bedtime   Thera 1 tablet Oral Daily   vitamin B-12 1,000 mcg Oral Daily     Continuous Infusions:   PRN Meds:.•  acetaminophen **OR** acetaminophen **OR** acetaminophen  •  aluminum-magnesium hydroxide-simethicone  •  atropine  •  bisacodyl  •  hydrALAZINE  •  HYDROcodone-acetaminophen  •  ipratropium-albuterol  •  magnesium hydroxide  •  magnesium sulfate **OR** magnesium sulfate **OR** magnesium sulfate  •  melatonin  •  ondansetron  •  [DISCONTINUED] ondansetron **OR** ondansetron  •  polyethylene glycol  •  potassium chloride **OR** potassium chloride **OR** potassium chloride  •  potassium phosphate infusion greater than 15 mMoles **OR** potassium phosphate infusion greater than 15 mMoles **OR** potassium phosphate **OR** sodium phosphate IVPB **OR** sodium phosphate IVPB **OR** sodium phosphate IVPB  •  promethazine  •  [COMPLETED] Insert peripheral IV **AND** sodium chloride      Assessment/Plan   Patient Active Problem List   Diagnosis   • Atrial fibrillation (CMS/HCC)   • CAD (coronary artery disease)   • Chronic anticoagulation   • Hyperlipidemia   • Presence of stent in right coronary artery   • Chronic pain   • Sepsis (CMS/HCC)   • Congestive heart failure (CHF) (CMS/HCC)   • Nausea vomiting and diarrhea   • Ischemic cardiomyopathy   • Persistent atrial fibrillation (CMS/HCC)   • Chronic systolic congestive heart failure (CMS/HCC)   • Permanent atrial fibrillation (CMS/HCC)    • Coronary artery disease involving native coronary artery of native heart without angina pectoris   • Disorder of sacroiliac joint   • Lumbosacral radiculopathy   • Mitral regurgitation   • Neck pain   • Nevus of choroid   • Presbyopia   • Vitamin D deficiency     Plan:    Patient is feeling much better.  Heart rate is very well controlled after AV kelly ablation.  Patient is having biventricular pacing.  I will continue current therapy.  If blood pressure allows I would like to go up on lisinopril.  At present I will increase Lopressor to 25 mg twice daily.  Xarelto would be continued and started today.  I would discontinue aspirin    Wade Cronin MD  07/14/20  15:13

## 2020-07-14 NOTE — PROGRESS NOTES
Continued Stay Note  OFELIA Candelario     Patient Name: Shukri Park  MRN: [id number]  Today's Date: 7/14/2020    Admit Date: 7/1/2020    Discharge Plan     Row Name 07/14/20 1550       Plan    Plan Comments  Pre-cert for [facility] subacute is still pending as of 1313 today (7/14). Liaison reports bed is ready once pre-cert is approved.      KAREN Baca    Phone: [phone]  Cell: [phone]  Fax: [phone]  [email or web address]

## 2020-07-14 NOTE — CONSULTS
[facility] Medicine Services Daily Progress Note      Hospitalist Team  LOS 12 days      Patient Care Team:  Nikki Gonzales MD as PCP - General (Family Medicine)    Patient Location: 2109/1      Subjective   Subjective     Chief Complaint / Subjective  Chief Complaint   Patient presents with   • Weakness - Generalized         Brief Synopsis of Hospital Course/HPI  Information obtained from review of documentation throughout hospital course, and edited accordingly:    Mr. Park is an 79-year-old male with a history of COPD, a-fib on chronic anticoagulation, lung cancer status post lobectomy, CAD and HLD who presented to the ED on 07/01/2020 complaining of fatigue, tiredness, and shortness of breath. Workup in the ER revealed a-fib with RVR, acute CHF, and bilateral pneumonia. His COVID-19 was negative. He was given Cardizem bolus and started on continuous drip. He was also given diuretics and empiric antibiotics. He was admitted for further evaluation and treatment.     7/2:  Cardiology consulted for rapid a-fib and acute CHF.  Pulmonary consulted for pneumonia and possible bronchoscopy.     7/3:  No new fevers, SOA stable, O2 requirement worsened over night and now on 5L, Remains fully awake and responsive.  GI consulted for nausea.  EF 27% per Echo.     7/4:  afebrile. soa at baseline. On 5 L. No n.v/d. Feels weak and fatigued. Amiodarone drip started for uncontrolled a-fib.     7/5:  bp now much higher, stop 0.2 mg of fludrocortisone, keep 0.1 and monitor.  Low k on replacement.  EGD today.  Start diet hopefuly later today, still with poor appetite and nausea    7/6:  Awake.  Currently on 2 L nasal cannula.  Remains on amiodarone drip.  Cardizem drip discontinued.  No complaints of cough or productive phlegm.  No chest pains.  No vomiting.  General surgery consulted for gallbladder sludge.     7/7:  Awake.  Currently on 2 L nasal cannula.  Complains of generalized weakness.  Complains  "of decreased appetite.  Amiodarone drip discontinued.  Remains in atrial fibrillation on the monitor.  No cough or productive phlegm.  No chest pains.  Some shortness of breath with activity    7/8:  Awake.  Currently on 2 L nasal cannula.  Remains short of breath with activity.  Complains of generalized weakness.  No nausea or vomiting.  Transferred to ICU for hypotension and started on BRIANA drip.    7/9:  PICC placed. Remains on BRIANA.  Medications adjusted and blood pressure is improved.  On 2 liters nasal cannula.  Status post cardiac catheterization with failed PCI to RCA.  EP consulted for BiVICD placement and AV node ablation.     7/10:  no acute events overnight. Briana is now off. Tolerating O2 2L. No chest pain. Status post AV node ablation and BiVICD    7/11:  Awake. Remains off pressors. Currently on RA, Tolerating PO diet. No C/O CP, No N/V    7/12:  No acute events overnight.  Tolerating room air.  Tolerating p.o. diet.  No complaints of chest pain or shortness of air.    7/13:  OOB to chair, No new issues.  Feels ok.  On RA. Pre-cert started for inpatient rehab.    7/14:  Patient downgraded to PCU and Hospitalist team re-consulted for medical management.            Review of Systems   All other systems reviewed and are negative.        Objective   Objective      Vital Signs  Temp:  [97.3 °F (36.3 °C)-98.9 °F (37.2 °C)] 98 °F (36.7 °C)  Heart Rate:  [70-71] 70  Resp:  [20] 20  BP: (102-177)/() 149/79  Oxygen Therapy  SpO2: 97 %  Pulse Oximetry Type: Continuous  Device (Oxygen Therapy): room air  Device (Oxygen Therapy): room air  Flow (L/min): 2  Oxygen Concentration (%): 28  ETCO2 (mmHg): 35 mmHg  Oximetry Probe Site Changed: No  Flowsheet Rows      First Filed Value   Admission Height  185.4 cm (73\") Documented at 07/01/2020 2336   Admission Weight  68 kg (150 lb) Documented at 07/01/2020 2336        Intake & Output (last 3 days)       07/11 0701 - 07/12 0700 07/12 0701 - 07/13 0700 07/13 0701 - " 07/14 0700 07/14 0701 - 07/15 0700    P.O. 1000 1560 1440 240    I.V. (mL/kg)        Total Intake(mL/kg) 1000 (14.7) 1560 (23.2) 1440 (21.1) 240 (3.6)    Urine (mL/kg/hr) 650 (0.4) 350 (0.2) 1075 (0.7) 825 (1.4)    Stool   0 300    Total Output  1125    Net +350 +1210 +365 -885            Urine Unmeasured Occurrence 1 x 201 x 1 x     Stool Unmeasured Occurrence  1 x 1 x 1 x        Lines, Drains & Airways    Active LDAs     Name:   Placement date:   Placement time:   Site:   Days:    Peripheral IV 07/08/20 1800 Right Hand   07/08/20    1800    Hand   5    Peripheral IV 07/03/20 0421 Anterior;Left Forearm   07/03/20    0421    Forearm   11                  Physical Exam:    Physical Exam   Constitutional: He is oriented to person, place, and time. He appears well-developed and well-nourished.   HENT:   Head: Normocephalic and atraumatic.   Mouth/Throat: Oropharynx is clear and moist.   Eyes: Pupils are equal, round, and reactive to light. Conjunctivae and EOM are normal.   Neck: Normal range of motion.   Cardiovascular: Normal rate, regular rhythm and intact distal pulses.   Pulmonary/Chest: Effort normal and breath sounds normal.   Abdominal: Soft. Bowel sounds are normal. He exhibits no distension. There is no tenderness.   Musculoskeletal: Normal range of motion. He exhibits no edema.   Neurological: He is alert and oriented to person, place, and time.   Skin: Skin is warm and dry. Capillary refill takes less than 2 seconds.   Psychiatric: He has a normal mood and affect. His behavior is normal.   Vitals reviewed.           Wounds (last 24 hours)      LDA Wound     Row Name 07/14/20 1200 07/14/20 0800 07/14/20 0422       Wound 07/10/20 1708 Left upper chest Incision    Wound - Properties Group Date first assessed: 07/10/20  -KS Time first assessed: 1708  -KS Present on Hospital Admission: N  -KS Side: Left  -KS Orientation: upper  -KS Location: chest  -KS Primary Wound Type: Incision  -KS    Dressing  Appearance  dried drainage;intact  -AP  dried drainage;intact  -AJ  dried drainage;intact  -AV    Closure  TOMY  -AP  TOMY  -AJ  TOMY  -AV    Base  dressing in place, unable to visualize  -AP  dressing in place, unable to visualize  -AJ  dressing in place, unable to visualize  -AV    Drainage Amount  scant  -AP  scant  -AJ  scant  -AV    Row Name 07/14/20 0030 07/13/20 2000 07/13/20 1638       Wound 07/10/20 1708 Left upper chest Incision    Wound - Properties Group Date first assessed: 07/10/20  -KS Time first assessed: 1708  -KS Present on Hospital Admission: N  -KS Side: Left  -KS Orientation: upper  -KS Location: chest  -KS Primary Wound Type: Incision  -KS    Dressing Appearance  dried drainage;intact  -AV  dried drainage;intact  -AV  dried drainage;intact  -AB    Closure  TOMY  -AV  TOMY  -AV  TOMY  -AB    Base  dressing in place, unable to visualize  -AV  dressing in place, unable to visualize  -AV  --    Drainage Amount  scant  -AV  scant  -AV  scant  -AB      User Key  (r) = Recorded By, (t) = Taken By, (c) = Cosigned By    Initials Name Provider Type    KS Schoenstein, Kayla Technologist    Preeti Casiano RN Registered Nurse    Desiree Fish RN Registered Nurse    Virginia Dickinson RN Registered Nurse    Giovana Irene, RN Registered Nurse          Procedures:    Procedure(s):  Biventricular Device Insertion  ABLATION AV NODE  AV node ablation          Results Review:     I reviewed the patient's new clinical results.      Lab Results (last 24 hours)     Procedure Component Value Units Date/Time    Blood Culture - Blood, Blood, PICC Line [[id number]] Collected:  07/11/20 1353    Specimen:  Blood, PICC Line Updated:  07/14/20 1430     Blood Culture No growth at 3 days    Blood Culture - Blood, Hand, Right [[id number]] Collected:  07/11/20 1353    Specimen:  Blood from Hand, Right Updated:  07/14/20 1430     Blood Culture No growth at 3 days    Catheter Culture - Cath Tip, Arm, Left [[id number]]  Collected:  07/12/20 0337    Specimen:  Cath Tip from Arm, Left Updated:  07/14/20 0734     CATHETER CULTURE No growth at 2 days    Phosphorus [[id number]]  (Normal) Collected:  07/14/20 0353    Specimen:  Blood Updated:  07/14/20 0456     Phosphorus 2.8 mg/dL     Magnesium [[id number]]  (Normal) Collected:  07/14/20 0353    Specimen:  Blood Updated:  07/14/20 0456     Magnesium 1.9 mg/dL     CBC & Differential [[id number]] Collected:  07/14/20 0353    Specimen:  Blood Updated:  07/14/20 0430    Narrative:       The following orders were created for panel order CBC & Differential.  Procedure                               Abnormality         Status                     ---------                               -----------         ------                     CBC Auto Differential[[id number]]        Abnormal            Final result                 Please view results for these tests on the individual orders.    CBC Auto Differential [[id number]]  (Abnormal) Collected:  07/14/20 0353    Specimen:  Blood Updated:  07/14/20 0430     WBC 10.70 10*3/mm3      RBC 3.65 10*6/mm3      Hemoglobin 11.1 g/dL      Hematocrit 32.4 %      MCV 88.9 fL      MCH 30.4 pg      MCHC 34.2 g/dL      RDW 14.7 %      RDW-SD 46.4 fl      MPV 8.3 fL      Platelets 232 10*3/mm3      Neutrophil % 69.8 %      Lymphocyte % 16.0 %      Monocyte % 10.7 %      Eosinophil % 3.1 %      Basophil % 0.4 %      Neutrophils, Absolute 7.50 10*3/mm3      Lymphocytes, Absolute 1.70 10*3/mm3      Monocytes, Absolute 1.10 10*3/mm3      Eosinophils, Absolute 0.30 10*3/mm3      Basophils, Absolute 0.00 10*3/mm3      nRBC 0.1 /100 WBC     Blood Culture - Blood, Blood, PICC Line [[id number]] Collected:  07/08/20 2147    Specimen:  Blood, PICC Line Updated:  07/13/20 2245     Blood Culture No growth at 5 days        No results found for: HGBA1C  Results from last 7 days   Lab Units 07/12/20 0337   INR  1.05           Lab Results   Component Value Date    LIPASE 30 07/03/2020         No results found for: CHOL, CHLPL, TRIG, HDL, LDL, LDLDIRECT    Lab Results   Lab Value Date/Time    FINALDX  07/05/2020 1121     Gastric antrum, biopsy:    Suggetive of mild reactive gastropathy with focal congestion    Negative for significant inflammatory component    No evidence of Helicobacter pylori on H&E stain    MARYLU/tkd            Microbiology Results (last 10 days)     Procedure Component Value - Date/Time    Catheter Culture - Cath Tip, Arm, Left [[id number]] Collected:  07/12/20 0337    Lab Status:  Final result Specimen:  Cath Tip from Arm, Left Updated:  07/14/20 0734     CATHETER CULTURE No growth at 2 days    Blood Culture - Blood, Blood, PICC Line [[id number]] Collected:  07/11/20 1353    Lab Status:  Preliminary result Specimen:  Blood, PICC Line Updated:  07/14/20 1430     Blood Culture No growth at 3 days    Blood Culture - Blood, Hand, Right [[id number]] Collected:  07/11/20 1353    Lab Status:  Preliminary result Specimen:  Blood from Hand, Right Updated:  07/14/20 1430     Blood Culture No growth at 3 days    Blood Culture - Blood, Arm, Left [[id number]]  (Abnormal) Collected:  07/08/20 2202    Lab Status:  Final result Specimen:  Blood from Arm, Left Updated:  07/10/20 0658     Blood Culture Staphylococcus, coagulase negative     Comment: Probable contaminant requires clinical correlation, susceptibility not performed unless requested by physician.          Isolated from Anaerobic Bottle     Gram Stain Gram positive cocci in clusters    Blood Culture ID, PCR - Blood, Arm, Left [[id number]]  (Abnormal) Collected:  07/08/20 2202    Lab Status:  Edited Result - FINAL Specimen:  Blood from Arm, Left Updated:  07/10/20 0635     BCID, PCR Staphylococcus spp, not aureus. Identification by BCID PCR.     Comment: Corrected result. Previous result was Staphylococcus aureus. Identification by BCID PCR. on 7/9/2020 at 2147 EDT.        BOTTLE TYPE Anaerobic Bottle    Blood Culture - Blood, Blood, PICC Line  [[id number]] Collected:  07/08/20 214    Lab Status:  Final result Specimen:  Blood, PICC Line Updated:  07/13/20 2245     Blood Culture No growth at 5 days          ECG/EMG Results (most recent)     Procedure Component Value Units Date/Time    Adult Transthoracic Echo Complete W/ Cont if Necessary Per Protocol [[id number]] Collected:  07/02/20 0653     Updated:  07/02/20 1409     BSA 1.9 m^2      RVIDd 2.1 cm      IVSd 1.1 cm      LVIDd 5.6 cm      LVIDs 4.6 cm      LVPWd 1.3 cm      IVS/LVPW 0.84     FS 17.6 %      EDV(Teich) 155.9 ml      ESV(Teich) 99.5 ml      EF(Teich) 36.2 %      EDV(cubed) 179.0 ml      ESV(cubed) 100.1 ml      EF(cubed) 44.1 %      LV mass(C)d 276.4 grams      LV mass(C)dI 142.4 grams/m^2      SV(Teich) 56.4 ml      SI(Teich) 29.1 ml/m^2      SV(cubed) 78.9 ml      SI(cubed) 40.6 ml/m^2      Ao root diam 4.0 cm      Ao root area 12.8 cm^2      ACS 2.1 cm      asc Aorta Diam 3.7 cm      LVOT diam 2.5 cm      LVOT area 5.1 cm^2      RVOT diam 2.1 cm      RVOT area 3.6 cm^2      EDV(MOD-sp4) 68.5 ml      ESV(MOD-sp4) 50.1 ml      EF(MOD-sp4) 26.8 %      SV(MOD-sp4) 18.4 ml      SI(MOD-sp4) 9.5 ml/m^2      Ao root area (BSA corrected) 2.1     LV Dunlap Vol (BSA corrected) 35.3 ml/m^2      LV Sys Vol (BSA corrected) 25.8 ml/m^2      Aortic R-R 0.4 sec      Aortic .6 BPM      MV V2 max 90.3 cm/sec      MV max PG 3.3 mmHg      MV V2 mean 63.4 cm/sec      MV mean PG 1.8 mmHg      MV V2 VTI 9.6 cm      MVA(VTI) 6.7 cm^2      Ao pk sharmila 78.3 cm/sec      Ao max PG 2.5 mmHg      Ao max PG (full) -0.26 mmHg      Ao V2 mean 61.8 cm/sec      Ao mean PG 1.7 mmHg      Ao mean PG (full) 0.39 mmHg      Ao V2 VTI 11.3 cm      AISHWARYA(I,A) 5.7 cm^2      AISHWARYA(I,D) 5.7 cm^2      AISHWARYA(V,A) 5.3 cm^2      AISHWARYA(V,D) 5.3 cm^2      LV V1 max PG 2.7 mmHg      LV V1 mean PG 1.3 mmHg      LV V1 max 82.3 cm/sec      LV V1 mean 53.0 cm/sec      LV V1 VTI 12.7 cm      MR max sharmila 453.8 cm/sec      MR max PG 82.4 mmHg      CO(Ao)  22.0 l/min      CI(Ao) 11.3 l/min/m^2      SV(Ao) 144.9 ml      SI(Ao) 74.6 ml/m^2      CO(LVOT) 9.8 l/min      CI(LVOT) 5.0 l/min/m^2      SV(LVOT) 64.6 ml      SV(RVOT) 42.6 ml      SI(LVOT) 33.3 ml/m^2      PA V2 max 77.1 cm/sec      PA max PG 2.4 mmHg      PA max PG (full) 0.22 mmHg      PA V2 mean 50.6 cm/sec      PA mean PG 1.2 mmHg      PA mean PG (full) 0.15 mmHg      PA V2 VTI 9.1 cm      PVA(I,A) 4.7 cm^2       CV ECHO TARYN - PVA(I,D) 4.7 cm^2       CV ECHO TARYN - PVA(V,A) 3.4 cm^2       CV ECHO TARYN - PVA(V,D) 3.4 cm^2      PA acc time 0.06 sec      RV V1 max PG 2.2 mmHg      RV V1 mean PG 1.1 mmHg      RV V1 max 73.4 cm/sec      RV V1 mean 46.7 cm/sec      RV V1 VTI 11.9 cm      TR max sharmila 285.8 cm/sec      RVSP(TR) 35.7 mmHg      RAP systole 3.0 mmHg      PA pr(Accel) 49.9 mmHg      Qp/Qs 0.66      CV ECHO TARYN - BZI_BMI 18.7 kilograms/m^2       CV ECHO TARYN - BSA([facility]) 1.9 m^2       CV ECHO TARYN - BZI_METRIC_WEIGHT 68.0 kg       CV ECHO TARYN - BZI_METRIC_HEIGHT 190.5 cm      EF(MOD-bp) 27.0 %      LA dimension(2D) 4.6 cm     Narrative:       · Left ventricular systolic function is severely decreased.  · Left ventricular wall thickness is consistent with mild concentric   hypertrophy.  · Left atrial cavity size is moderately dilated.  · Mild-to-moderate mitral valve regurgitation is present  · Mild tricuspid valve regurgitation is present.  · Mild aortic valve regurgitation is present.       ECG 12 Lead [[id number]] Collected:  07/01/20 2340     Updated:  07/03/20 1140    Narrative:       HEART RATE= 163  bpm  RR Interval= 368  ms  IN Interval=   ms  P Horizontal Axis=   deg  P Front Axis=   deg  QRSD Interval= 78  ms  QT Interval= 283  ms  QRS Axis= 7  deg  T Wave Axis= 42  deg  - ABNORMAL ECG -  Atrial fibrillation with rapid V-rate  No previous ECG available for comparison  Electronically Signed By: Sunny Unger (MILENA) 03-Jul-2020 11:38:35  Date and Time of Study: 2020-07-01 23:40:10     ECG 12 Lead [[id number]] Collected:  07/03/20 2021     Updated:  07/03/20 2023    Narrative:       HEART RATE= 133  bpm  RR Interval= 449  ms  OH Interval=   ms  P Horizontal Axis=   deg  P Front Axis=   deg  QRSD Interval= 92  ms  QT Interval= 296  ms  QRS Axis= -17  deg  T Wave Axis= -11  deg  - ABNORMAL ECG -  Atrial fibrillation  Ventricular premature complex  Probable lateral infarct, old  Electronically Signed By:   Date and Time of Study: 2020-07-03 20:21:30    ECG 12 Lead [[id number]] Collected:  07/04/20 0607     Updated:  07/04/20 0609    Narrative:       HEART RATE= 102  bpm  RR Interval= 589  ms  OH Interval=   ms  P Horizontal Axis=   deg  P Front Axis=   deg  QRSD Interval= 95  ms  QT Interval= 334  ms  QRS Axis= -21  deg  T Wave Axis= 3  deg  - ABNORMAL ECG -  Atrial fibrillation  Ventricular premature complex  Electronically Signed By:   Date and Time of Study: 2020-07-04 06:07:51    ECG 12 Lead [[id number]] Collected:  07/05/20 0709     Updated:  07/05/20 0710    Narrative:       HEART RATE= 113  bpm  RR Interval= 528  ms  OH Interval=   ms  P Horizontal Axis=   deg  P Front Axis=   deg  QRSD Interval= 89  ms  QT Interval= 321  ms  QRS Axis= -36  deg  T Wave Axis= 0  deg  - ABNORMAL ECG -  Atrial fibrillation  Ventricular premature complex  Left axis deviation  When compared with ECG of 04-Jul-2020 6:07:51,  No significant change  Electronically Signed By:   Date and Time of Study: 2020-07-05 07:09:38    ECG 12 Lead [[id number]] Collected:  07/05/20 2052     Updated:  07/05/20 2057    Narrative:       HEART RATE= 114  bpm  RR Interval= 524  ms  OH Interval=   ms  P Horizontal Axis=   deg  P Front Axis=   deg  QRSD Interval= 96  ms  QT Interval= 312  ms  QRS Axis= -28  deg  T Wave Axis= -12  deg  - ABNORMAL ECG -  Atrial fibrillation  Ventricular premature complex  Borderline T abnormalities, diffuse leads  When compared with ECG of 05-Jul-2020 7:09:38,  Significant axis, voltage or hypertrophy  change  Electronically Signed By:   Date and Time of Study: 2020-07-05 20:52:58    ECG 12 Lead [[id number]] Collected:  07/06/20 0609     Updated:  07/06/20 0610    Narrative:       HEART RATE= 97  bpm  RR Interval= 619  ms  [place] Interval=   ms  P Horizontal Axis=   deg  P Front Axis=   deg  QRSD Interval= 91  ms  QT Interval= 338  ms  QRS Axis= -22  deg  T Wave Axis= -5  deg  - ABNORMAL ECG -  Atrial fibrillation  Ventricular premature complex  When compared with ECG of 05-Jul-2020 20:52:58,  Significant repolarization change  Electronically Signed By:   Date and Time of Study: 2020-07-06 06:09:24    EP/CRM Study [[id number]] Resulted:  07/10/20 1945     Updated:  07/10/20 1945    Narrative:       Procedure indication--- uncontrollable atrial fibrillation with chronic   class III systolic heart failure and severe ischemic cardiomyopathy with   no further targets for revascularization with EF less than 35% and   iatrogenic left bundle branch block and patient recommended biventricular   ICD insertion and AV node ablation for optimization    Sedation anesthesia and vancomycin before procedure    Procedures done  #1 implantation of a biventricular ICD system with placement of   biventricular ICD generator and placement of right ventricle ICD lead   placement of a coronary sinus lead  #2 coronary sinus venography with placement of a complex coronary sinus   lead  #3-lead testing and fluoroscopy  #4 biventricular ICD interrogation    Procedure note  Obtaining valid consent patient was draped in sterile fashion the left   infraclavicular area was infiltrated local anesthesia and a small incision   was made and hemostasis was acquired by cautery and gentle dissection was   done and a pocket was made in the prepectoral fascia area and by using   micropuncture kit subclavian vein was easily cannulated with placement of   2J wires for placement of leads.  Right ventricular ICD lead was placed in the right ventricle apex after    mapping right ventricle at multiple locations via 8 [place] venous sheath   and after testing the lead the sheath was removed  For placement of left ventricle coronary sinus lead a short 10 [place]   venous sheath was used and long peelable sheath manufactured by changed   company was used with the help of supra CS and later a deflectable   decapolar catheter cannulate coronary sinus with coronary sinus   venography.  Patient had an ideal mid posterior lateral branch--cannulation was done   however diaphragmatic stimulation was noted with a quadripolar lead and   multiple other branches were mapped with poor thresholds  A unipolar lead was used and again mapping of multiple location was done   including distal lateral branch and great cardiac vein with poor   thresholds and henceforth after trying multiple places recanalization of   the mid posterior lateral branch was done with adequate capture with a   narrow margin for diaphragmatic stimulation  Once the lead was tested multiple times the sheaths were removed and the   leads were anchored to underlying muscular area and the pocket was copious   irrigated with antibiotic solution and ICD generator was taken and the   leads were anchored to the generator generator leads were placed in the   pocket and incision was closed in several layers without any immediate   procedure related complications and biventricular programming attached to   chart.   ICD generator is manufactured by [employer] and model number is   G124 and serial #[id number]  Right ventricular ICD lead is manufactured by [employer] model 0673   and she #[id number]  LV lead is  [place] MC2 model 4592 and serial number is   [id number]  Right ventricular sensing is more than 10 mV with threshold less than 1 V  Left ventricle lead threshold is 2.5 V at 1.5 ms    Plan  Proceed with AV node ablation  Eliquis can be resumed tomorrow  Digoxin can be stopped   Electronically signed by Jonathan  HARRY Denney MD, 07/10/20, 7:16 PM.    EP/CRM Study [[id number]] Resulted:  07/10/20 1945     Updated:  07/10/20 1945    Narrative:       Procedure indication--uncontrollable rapid atrial fibrillation associated   with hypotension , biventricular ICD in situ, severe dilated   cardiomyopathy, class 3 systolic heart failure--patient recommended AV   node ablation after biventricular ICD insertion which was done this   evening     Sedation by anesthesia      Procedures done  1. Radiofrequency ablation of compact AV node  2.  Biventricular ICD reprogramming and interrogation  3. Fluoroscopy  4.  EP study with recording of right atrium, right ventricle and HIS   without induction of arrhythmias  5.  3D mapping with Carto system    Procedure note  After obtaining a valid consent patient was draped in sterile fashion and   local anesthesia was given in the right groin in the right common femoral   vein was cannulated with placement of a 8 [place]de[place] venous sheath.  An 8 mm   BiosADCentricityter catheter was taken and mapping of right atrium right   ventricle and compact AV kelly areas was done--single radiofrequency   ablation was done with HIS cloudy was noted with immediate complete AV   block   Post ablation device was reprogrammed to a baseline rate of 70 beats per   minute without any complications and catheter removed    Findings  HV interval of 54 milliseconds  Underlying appropriately functioning biventricular ICD system  Successful radiofrequency ablation of compact AV node with escape rate   below 30 beats per minute    Plan  Patient can be transferred back to ICU  Currently device programmed to RV mode only because of infrequent   diaphragmatic stimulation  ICD programming attached to chart    Electronically signed by Jonathan Denney MD, 07/10/20, 7:36 PM.      ECG 12 Lead [[id number]] Collected:  07/03/20 0945     Updated:  07/12/20 1551    Narrative:       HEART RATE= 106  bpm  RR Interval= 568  ms  ME Interval=  196  ms  P Horizontal Axis= 48  deg  P Front Axis= 0  deg  QRSD Interval= 95  ms  QT Interval= 298  ms  QRS Axis= -18  deg  T Wave Axis= 14  deg  - ABNORMAL ECG -  Atrial fibrillationWith rapid ventricular rate  When compared with ECG of 01-Jul-2020 23:40:10,  Ventricular rate decreased  Electronically Signed By: Braulio Fleming (MILENA) 12-Jul-2020 15:50:15  Date and Time of Study: 2020-07-03 09:45:28    ECG 12 Lead [[id number]] Collected:  07/09/20 2035     Updated:  07/13/20 1015    Narrative:       HEART RATE= 61  bpm  RR Interval= 983  ms  [place] Interval=   ms  P Horizontal Axis=   deg  P Front Axis=   deg  QRSD Interval= 94  ms  QT Interval= 382  ms  QRS Axis= 37  deg  T Wave Axis= -43  deg  - ABNORMAL ECG -  Atrial fibrillation  When compared with ECG of 06-Jul-2020 6:09:24,  Significant rate decrease  Electronically Signed By: Wade Cronin ([facility]) 13-Jul-2020 10:11:05  Date and Time of Study: 2020-07-09 20:35:36          Results for orders placed during the hospital encounter of 07/01/20   Duplex Venous Upper Extremity - Right CAR    Narrative · Acute right upper extremity deep vein thrombosis noted in the axillary.  · Acute right upper extremity superficial thrombophlebitis noted in the   basilic (upper arm) and cephalic (forearm).  · All other right sided vessels appear normal.          Results for orders placed during the hospital encounter of 07/01/20   Adult Transthoracic Echo Complete W/ Cont if Necessary Per Protocol    Narrative · Left ventricular systolic function is severely decreased.  · Left ventricular wall thickness is consistent with mild concentric   hypertrophy.  · Left atrial cavity size is moderately dilated.  · Mild-to-moderate mitral valve regurgitation is present  · Mild tricuspid valve regurgitation is present.  · Mild aortic valve regurgitation is present.          Xr Chest 1 View    Result Date: 7/10/2020  1.Left subclavian pacemaker in place. No pneumothorax identified. 2.Mild bilateral  hazy opacities appear unchanged, which could represent mild pneumonia or pulmonary edema.  Electronically Signed By-DR. Clint Dotson MD On:7/10/2020 9:06 PM This report was finalized on [id number] by DR. Clint Dotson MD.    Xr Chest 1 View    Result Date: 7/8/2020  1.Left-sided PICC with tip in low SVC. 2.Hazy bilateral airspace opacities appear slightly improved, which could represent pneumonia or pulmonary edema.  Electronically Signed By-DR. Clint Dotson MD On:7/8/2020 9:25 PM This report was finalized on [id number] by DR. Clint Dotson MD.          Xrays, labs reviewed personally by physician.    Medication Review:   I have reviewed the patient's current medication list      Scheduled Meds    atorvastatin 20 mg Oral Daily   clopidogrel 75 mg Oral Daily   digoxin 250 mcg Oral Daily   docusate sodium 100 mg Oral BID   fludrocortisone 0.1 mg Oral Q PM   [START ON 7/15/2020] lisinopril 5 mg Oral Q24H   [START ON 7/15/2020] metoprolol succinate XL 25 mg Oral Q24H   rivaroxaban 15 mg Oral Daily With Dinner   sodium chloride 250 mL Intravenous Once   sodium chloride 10 mL Intravenous Q12H   spironolactone 25 mg Oral Q24H   sucralfate 1 g Oral 4x Daily AC & at Bedtime   Thera 1 tablet Oral Daily   vitamin B-12 1,000 mcg Oral Daily       Meds Infusions       Meds PRN  •  acetaminophen **OR** acetaminophen **OR** acetaminophen  •  aluminum-magnesium hydroxide-simethicone  •  atropine  •  bisacodyl  •  hydrALAZINE  •  HYDROcodone-acetaminophen  •  ipratropium-albuterol  •  magnesium hydroxide  •  magnesium sulfate **OR** magnesium sulfate **OR** magnesium sulfate  •  melatonin  •  ondansetron  •  [DISCONTINUED] ondansetron **OR** ondansetron  •  polyethylene glycol  •  potassium chloride **OR** potassium chloride **OR** potassium chloride  •  potassium phosphate infusion greater than 15 mMoles **OR** potassium phosphate infusion greater than 15 mMoles **OR** potassium phosphate **OR** sodium  phosphate IVPB **OR** sodium phosphate IVPB **OR** sodium phosphate IVPB  •  promethazine  •  [COMPLETED] Insert peripheral IV **AND** sodium chloride          Assessment/Plan   Assessment/Plan     Active Hospital Problems    Diagnosis  POA   • **Acute on chronic systolic congestive heart failure (CMS/HCC) [I50.23]  Yes     Priority: High   • Ischemic cardiomyopathy [I25.5]  Yes     Priority: High   • Gastritis [K29.70]  Clinically Undetermined     Priority: Low   • History of lung cancer [Z85.118]  Not Applicable   • Chronic hypotension [I95.89]  Yes   • Coronary artery disease involving native coronary artery of native heart without angina pectoris [I25.10]  Yes   • Chronic pain [G89.29]  Yes   • Persistent atrial fibrillation (CMS/[facility]) [I48.19]  Yes   • Chronic anticoagulation [Z79.01]  Not Applicable   • Hyperlipidemia [E78.5]  Yes      Resolved Hospital Problems    Diagnosis Date Resolved POA   • Bilateral pneumonia [J18.9] 07/14/2020 Yes     Priority: Medium   • Sepsis (CMS/[facility]) [A41.9] 07/14/2020 Yes   • Atrial fibrillation with rapid ventricular response (CMS/[facility]) [I48.91] 07/14/2020 Yes   • Nausea vomiting and diarrhea [R11.2, R19.7] 07/14/2020 Yes       MEDICAL DECISION MAKING COMPLEXITY BY PROBLEM:       Acute on chronic systolic congestive heart failure secondary to Ischemic cardiomyopathy  -improved  -status post BiVICD placement (07/10/20)  -on spironolactone, BB, and ACE-I  -cardiology and EP following     Gastritis  -per EGD, biopsy pending  -on PPI and Carafate  -needs outpatient follow up with GI    CAD (coronary artery disease) / Hyperlipidemia  -on Plavix, Xarelto, and statin  -07/09/20 LHC:  3v CAD with patent stent in LCx and 50% steonsis of LAD, high-grade stenosis of RCA within previous stent with heavy calcifications; failed PCI to RCA    Chronic hypotension  -on fludrocortisone    Chronic pain  -on PRN Norco    Persistent atrial fibrillation on Chronic anticoagulation  -status post AV node  ablation (07/10/20)  -on digoxin, metoprolol XL, and Xarelto  -cardiology and EP following     History of lung cancer, status post lobectomy          VTE Prophylaxis -   Mechanical Order History:      Ordered        07/08/20 1828  Place Sequential Compression Device  Once         07/08/20 1828  Maintain Sequential Compression Device  Continuous                 Pharmalogical Order History:     Ordered     Dose Route Frequency Stop    07/14/20 1513  rivaroxaban (XARELTO) tablet 15 mg     Question:  Are you ordering rivaroxaban for the prevention of blood clots in an acutely ill medical patient?  Answer:  No    15 mg PO Daily With Dinner --    07/09/20 1541  rivaroxaban (XARELTO) tablet 20 mg  Status:  Discontinued     Question:  Are you ordering rivaroxaban for the prevention of blood clots in an acutely ill medical patient?  Answer:  No    20 mg PO Daily With Dinner 07/09/20 1558    07/09/20 1950  heparin (porcine) injection  Status:  Discontinued      -- -- As Needed 07/09/20 1950    07/09/20 1904  heparin (porcine) injection  Status:  Discontinued      -- -- As Needed 07/09/20 1948    07/07/20 1359  rivaroxaban (XARELTO) tablet 20 mg  Status:  Discontinued     Question:  Are you ordering rivaroxaban for the prevention of blood clots in an acutely ill medical patient?  Answer:  No    20 mg PO Daily With Dinner 07/09/20 1541    07/07/20 1359  enoxaparin (LOVENOX) syringe 70 mg      70 mg SC Every 12 Hours 07/07/20 2304    07/03/20 0957  enoxaparin (LOVENOX) syringe 70 mg  Status:  Discontinued      70 mg SC Every 12 Hours 07/07/20 1359    07/02/20 0414  rivaroxaban (XARELTO) tablet 20 mg  Status:  Discontinued      20 mg PO Daily With Dinner 07/02/20 0951    07/02/20 1221  rivaroxaban (XARELTO) tablet 10 mg  Status:  Discontinued     Note to Pharmacy:  If occult blood negative   Question:  Are you ordering rivaroxaban for the prevention of blood clots in an acutely ill medical patient?  Answer:  No    10 mg PO Daily  With Dinner 07/02/20 1446    07/02/20 1446  rivaroxaban (XARELTO) tablet 20 mg  Status:  Discontinued     Note to Pharmacy:  ## do not start unless fecal occult blood test is negative ##   Question:  Are you ordering rivaroxaban for the prevention of blood clots in an acutely ill medical patient?  Answer:  No    20 mg PO Daily With Dinner 07/03/20 0937            Code Status -   Code Status and Medical Interventions:   Ordered at: 07/09/20 2012     Level Of Support Discussed With:    Patient     Code Status:    CPR     Medical Interventions (Level of Support Prior to Arrest):    Full           Discharge Planning      Destination      Service Provider Request Status Selected Services Address Phone Number Fax Number    [facility] Pending - Request Sent N/A [address] [facility] [place] [address] [phone]      Durable Medical Equipment      Coordination has not been started for this encounter.      Dialysis/Infusion      Coordination has not been started for this encounter.      Home Medical Care      Coordination has not been started for this encounter.      Therapy      Coordination has not been started for this encounter.      Community Resources      Coordination has not been started for this encounter.            Electronically signed by TERRANCE Mak, 07/14/20, 15:30.  [facility] Hospitalist Team    Hospitalist / Attending note   Patient seen and examined, chart reviewed.  Agree with above.  Patient coming in with Acute on chronic systolic congestive heart failure secondary to Ischemic cardiomyopathy, underwent AICD placement.    Vitals reviewed  Chest bilateral entry, normal vesicular breathing  Cardiovascular, S1-S2 there is no murmur  Abdomen ... Soft non tender gut sound audible.   CNS ...grossly intact, no focal lesion present.     Impression  Acute on chronic systolic heart failure  CAD  Persistent atrial fibrillation     Plan  Agree with above.  PTOT  evaluate and treat   for rehab.     Elena christian MD.

## 2020-07-15 VITALS
SYSTOLIC BLOOD PRESSURE: 102 MMHG | RESPIRATION RATE: 20 BRPM | HEIGHT: 74 IN | OXYGEN SATURATION: 95 % | TEMPERATURE: 98.1 F | HEART RATE: 70 BPM | DIASTOLIC BLOOD PRESSURE: 53 MMHG | BODY MASS INDEX: 19.3 KG/M2 | WEIGHT: 150.35 LBS

## 2020-07-15 LAB
ACT BLD: 147 SECONDS (ref 89–137)
ANION GAP SERPL CALCULATED.3IONS-SCNC: 12 MMOL/L (ref 5–15)
BASOPHILS # BLD AUTO: 0.1 10*3/MM3 (ref 0–0.2)
BASOPHILS NFR BLD AUTO: 0.6 % (ref 0–1.5)
BUN SERPL-MCNC: 11 MG/DL (ref 8–23)
BUN SERPL-MCNC: ABNORMAL MG/DL
BUN/CREAT SERPL: ABNORMAL
CALCIUM SPEC-SCNC: 8.2 MG/DL (ref 8.6–10.5)
CHLORIDE SERPL-SCNC: 102 MMOL/L (ref 98–107)
CO2 SERPL-SCNC: 23 MMOL/L (ref 22–29)
CREAT SERPL-MCNC: 0.56 MG/DL (ref 0.76–1.27)
DEPRECATED RDW RBC AUTO: 48.1 FL (ref 37–54)
EOSINOPHIL # BLD AUTO: 0.3 10*3/MM3 (ref 0–0.4)
EOSINOPHIL NFR BLD AUTO: 2.9 % (ref 0.3–6.2)
ERYTHROCYTE [DISTWIDTH] IN BLOOD BY AUTOMATED COUNT: 15.1 % (ref 12.3–15.4)
GFR SERPL CREATININE-BSD FRML MDRD: 140 ML/MIN/1.73
GLUCOSE SERPL-MCNC: 91 MG/DL (ref 65–99)
HCT VFR BLD AUTO: 31.7 % (ref 37.5–51)
HGB BLD-MCNC: 10.6 G/DL (ref 13–17.7)
LYMPHOCYTES # BLD AUTO: 1.8 10*3/MM3 (ref 0.7–3.1)
LYMPHOCYTES NFR BLD AUTO: 18.1 % (ref 19.6–45.3)
MAGNESIUM SERPL-MCNC: 2.2 MG/DL (ref 1.6–2.4)
MCH RBC QN AUTO: 30 PG (ref 26.6–33)
MCHC RBC AUTO-ENTMCNC: 33.6 G/DL (ref 31.5–35.7)
MCV RBC AUTO: 89.4 FL (ref 79–97)
MONOCYTES # BLD AUTO: 1.2 10*3/MM3 (ref 0.1–0.9)
MONOCYTES NFR BLD AUTO: 12 % (ref 5–12)
NEUTROPHILS NFR BLD AUTO: 6.5 10*3/MM3 (ref 1.7–7)
NEUTROPHILS NFR BLD AUTO: 66.4 % (ref 42.7–76)
NRBC BLD AUTO-RTO: 0.1 /100 WBC (ref 0–0.2)
PHOSPHATE SERPL-MCNC: 3.1 MG/DL (ref 2.5–4.5)
PLATELET # BLD AUTO: 228 10*3/MM3 (ref 140–450)
PMV BLD AUTO: 8.2 FL (ref 6–12)
POTASSIUM SERPL-SCNC: 4.1 MMOL/L (ref 3.5–5.2)
RBC # BLD AUTO: 3.54 10*6/MM3 (ref 4.14–5.8)
SODIUM SERPL-SCNC: 137 MMOL/L (ref 136–145)
WBC # BLD AUTO: 9.9 10*3/MM3 (ref 3.4–10.8)

## 2020-07-15 PROCEDURE — 99239 HOSP IP/OBS DSCHRG MGMT >30: CPT | Performed by: HOSPITALIST

## 2020-07-15 PROCEDURE — 85025 COMPLETE CBC W/AUTO DIFF WBC: CPT | Performed by: INTERNAL MEDICINE

## 2020-07-15 PROCEDURE — 83735 ASSAY OF MAGNESIUM: CPT | Performed by: INTERNAL MEDICINE

## 2020-07-15 PROCEDURE — 80048 BASIC METABOLIC PNL TOTAL CA: CPT | Performed by: INTERNAL MEDICINE

## 2020-07-15 PROCEDURE — 99024 POSTOP FOLLOW-UP VISIT: CPT | Performed by: INTERNAL MEDICINE

## 2020-07-15 PROCEDURE — 84100 ASSAY OF PHOSPHORUS: CPT | Performed by: INTERNAL MEDICINE

## 2020-07-15 RX ORDER — LISINOPRIL 5 MG/1
5 TABLET ORAL EVERY 24 HOURS
Qty: 30 TABLET | Refills: 0 | Status: SHIPPED | OUTPATIENT
Start: 2020-07-15 | End: 2020-08-12 | Stop reason: SDUPTHER

## 2020-07-15 RX ORDER — PANTOPRAZOLE SODIUM 40 MG/1
40 TABLET, DELAYED RELEASE ORAL DAILY
Qty: 30 TABLET | Refills: 0 | Status: SHIPPED | OUTPATIENT
Start: 2020-07-15

## 2020-07-15 RX ORDER — CLOPIDOGREL BISULFATE 75 MG/1
75 TABLET ORAL DAILY
Qty: 30 TABLET | Refills: 0 | Status: SHIPPED | OUTPATIENT
Start: 2020-07-16 | End: 2020-08-20

## 2020-07-15 RX ORDER — DIGOXIN 250 MCG
250 TABLET ORAL
Qty: 30 TABLET | Refills: 0 | Status: SHIPPED | OUTPATIENT
Start: 2020-07-15 | End: 2020-08-12

## 2020-07-15 RX ORDER — SPIRONOLACTONE 25 MG/1
25 TABLET ORAL EVERY 24 HOURS
Qty: 30 TABLET | Refills: 0 | Status: SHIPPED | OUTPATIENT
Start: 2020-07-15 | End: 2020-08-12

## 2020-07-15 RX ORDER — METOPROLOL SUCCINATE 25 MG/1
25 TABLET, EXTENDED RELEASE ORAL
Qty: 30 TABLET | Refills: 0 | Status: SHIPPED | OUTPATIENT
Start: 2020-07-16 | End: 2020-08-20

## 2020-07-15 RX ADMIN — DOCUSATE SODIUM 100 MG: 100 CAPSULE, LIQUID FILLED ORAL at 09:16

## 2020-07-15 RX ADMIN — RIVAROXABAN 15 MG: 15 TABLET, FILM COATED ORAL at 17:57

## 2020-07-15 RX ADMIN — SUCRALFATE 1 G: 1 TABLET ORAL at 07:38

## 2020-07-15 RX ADMIN — FLUDROCORTISONE ACETATE 0.1 MG: 0.1 TABLET ORAL at 17:56

## 2020-07-15 RX ADMIN — PANTOPRAZOLE SODIUM 40 MG: 40 TABLET, DELAYED RELEASE ORAL at 05:50

## 2020-07-15 RX ADMIN — SUCRALFATE 1 G: 1 TABLET ORAL at 12:27

## 2020-07-15 RX ADMIN — SUCRALFATE 1 G: 1 TABLET ORAL at 17:56

## 2020-07-15 RX ADMIN — DIGOXIN 250 MCG: 250 TABLET ORAL at 12:26

## 2020-07-15 RX ADMIN — THERA TABS 1 TABLET: TAB at 09:15

## 2020-07-15 RX ADMIN — METOPROLOL SUCCINATE 25 MG: 25 TABLET, EXTENDED RELEASE ORAL at 09:15

## 2020-07-15 RX ADMIN — CLOPIDOGREL BISULFATE 75 MG: 75 TABLET ORAL at 09:15

## 2020-07-15 RX ADMIN — ATORVASTATIN CALCIUM 20 MG: 20 TABLET, FILM COATED ORAL at 09:15

## 2020-07-15 RX ADMIN — Medication 10 ML: at 09:20

## 2020-07-15 RX ADMIN — SPIRONOLACTONE 25 MG: 25 TABLET, FILM COATED ORAL at 17:57

## 2020-07-15 RX ADMIN — CYANOCOBALAMIN TAB 1000 MCG 1000 MCG: 1000 TAB at 09:15

## 2020-07-15 NOTE — PROGRESS NOTES
[facility]/PULM/CC PROGRESS NOTE       SUBJECTIVE       This is a 79-year-old male with a history of COPD, former smoker, atrial fibrillation on chronic anticoagulation who lives all by himself presented to the hospital for fatigue, tiredness and shortness of breath.  Patient has been feeling puny for the few days with poor appetite.  He has noticed extreme fatigue with malaise.  He denies any fever or chills.  He has noted increased shortness of breath however cough has been mild.  He did cough clear sputum and only one time he had a maroonish sputum.  He denies any wheezing or chest pain or pleurisy.  Denies any sick contacts.  He states that he is visited by a home health nurse only.  He denies any other contacts.     CT scan of the chest was done that showed bilateral groundglass airspace disease with interlobular septal thickening upper lobe predominant.  Patient denies any active tobacco use or vaping.     He has a history of chronic sinus issues.  He is not on any maintenance inhalers.  7/3: No new fevers, SOA stable, O2 requirement worsened over night and now on 5L, Remains fully awake and responsive, On oral diet  7/4: afebrile. soa at baseline. On 5 L. No n.v/d. Feels weak and fatigued.   7/6: Awake.  Currently on 2 L nasal cannula.  Remains on Cardizem and amiodarone infusions.  No complaints of cough or productive phlegm.  No chest pains.  No vomiting  7/7: Awake.  Currently on 2 L nasal cannula.  Complains of generalized weakness.  Complains of decreased appetite.  Currently on amiodarone infusion.  IV Cardizem has been stopped.  Remains in atrial fibrillation on the monitor.  No cough or productive phlegm.  No chest pains.  Some shortness of breath with activity  7/8: Awake.  Currently on 2 L nasal cannula.  Remains short of breath with activity.  Off amiodarone infusion.  Complains of generalized weakness.  No nausea or vomiting  7/9:  Transferred to ICU last night.  PICC placed and on BRIANA for a few hours.   Medications adjusted and blood pressure is improved.  Lewis is now off. On 2 liters nasal cannula   7/10: no acute events overnight. No gtts. Tolerating O2 2L. No chest pain. Plan for cath lab today for ablation and PPM  7/11: Awake, S/P PPM placement. Remains off pressors. Currently on RA, Tolerating PO diet. No C/O CP, No N/V  7/12: No acute events overnight.  Tolerating room air.  Tolerating p.o. diet.  No complaints of chest pain or shortness of air.  7/13:  OOB to chair, No new issues.  Feels ok.  On RA.   7/14:  Transfer out of ICU cancelled yesterday due to hypotension.  RN reports Hypertension overnight.  Feels ok.  7/15 No SOA/CP      OBJECTIVE    Vitals:    07/15/20 0546 07/15/20 1101 07/15/20 1226 07/15/20 1445   BP: 139/72  (!) 85/54 109/64   Pulse:  70 70 70   Resp: 16 18  16   Temp: 98 °F (36.7 °C) 97.4 °F (36.3 °C)  98.3 °F (36.8 °C)   TempSrc: Oral      SpO2: 96% 95%  100%   Weight:       Height:          Intake/Output last 3 shifts:  I/O last 3 completed shifts:  In: 960 [P.O.:960]  Out: 2450 [Urine:2150; Stool:300]  Intake/Output this shift:  I/O this shift:  In: 480 [P.O.:480]  Out: 1175 [Urine:1175]    Intake/Output Summary (Last 24 hours) at 7/15/2020 1545  Last data filed at 7/15/2020 1500  Gross per 24 hour   Intake 720 ml   Output 1725 ml   Net -1005 ml       General Appearance:  Alert, cooperative, no distress, appears stated age  Head:  Normocephalic, without obvious abnormality, atraumatic  Eyes:  conjunctivae/corneas clear, EOM's intact     Neck:  Supple,  no JVD      Lungs: Diminished bases bilaterally, clear but diminished bases  Chest wall:  No tenderness.  Left upper chest PPM site  Heart: Irregularly irregular rhythm, afib, S1 and S2 normal, no murmur, rub or gallop  Abdomen:  Soft, non-tender, bowel sounds active all four quadrants,  no rebound or guarding  Extremities:  Extremities normal, no cyanosis or edema.   Skin:  No rashes or lesions  Neurologic:   Alert and oriented, no focal  deficits    Scheduled Meds:    atorvastatin 20 mg Oral Daily   clopidogrel 75 mg Oral Daily   digoxin 250 mcg Oral Daily   docusate sodium 100 mg Oral BID   fludrocortisone 0.1 mg Oral Q PM   lisinopril 5 mg Oral Q24H   metoprolol succinate XL 25 mg Oral Q24H   pantoprazole 40 mg Oral Q AM   rivaroxaban 15 mg Oral Daily With Dinner   sodium chloride 250 mL Intravenous Once   sodium chloride 10 mL Intravenous Q12H   spironolactone 25 mg Oral Q24H   sucralfate 1 g Oral 4x Daily AC & at Bedtime   Thera 1 tablet Oral Daily   vitamin B-12 1,000 mcg Oral Daily       Continuous Infusions:       PRN Meds:•  acetaminophen **OR** acetaminophen **OR** acetaminophen  •  aluminum-magnesium hydroxide-simethicone  •  atropine  •  bisacodyl  •  hydrALAZINE  •  HYDROcodone-acetaminophen  •  ipratropium-albuterol  •  magnesium hydroxide  •  magnesium sulfate **OR** magnesium sulfate **OR** magnesium sulfate  •  melatonin  •  ondansetron  •  [DISCONTINUED] ondansetron **OR** ondansetron  •  polyethylene glycol  •  potassium chloride **OR** potassium chloride **OR** potassium chloride  •  potassium phosphate infusion greater than 15 mMoles **OR** potassium phosphate infusion greater than 15 mMoles **OR** potassium phosphate **OR** sodium phosphate IVPB **OR** sodium phosphate IVPB **OR** sodium phosphate IVPB  •  promethazine  •  [COMPLETED] Insert peripheral IV **AND** sodium chloride     LABS    CBC  Results from last 7 days   Lab Units 07/15/20  0212 07/14/20  0353 07/13/20  0355 07/12/20  0337 07/11/20  0405 07/10/20  0308 07/09/20  0345   WBC 10*3/mm3 9.90 10.70 9.00 10.00 12.00* 9.60 12.40*   RBC 10*6/mm3 3.54* 3.65* 3.48* 3.48* 3.68* 4.03* 4.27   HEMOGLOBIN g/dL 10.6* 11.1* 10.4* 10.4* 11.2* 12.1* 12.7*   HEMATOCRIT % 31.7* 32.4* 30.9* 31.5* 33.4* 35.8* 38.2   MCV fL 89.4 88.9 89.0 90.4 90.7 88.9 89.4   PLATELETS 10*3/mm3 228 232 213 181 212 194 174       CMP   Results from last 7 days   Lab Units 07/15/20  0212 07/12/20  0337  07/11/20  0405 07/10/20  1509 07/10/20  0308 07/09/20  0345   SODIUM mmol/L 137 141 141  --  141 142   POTASSIUM mmol/L 4.1 3.5 4.0 3.9 3.6 3.8   CHLORIDE mmol/L 102 105 103  --  102 101   CO2 mmol/L 23.0 27.0 27.0  --  31.0* 31.0*   BUN  11 11 13  --  18 19   CREATININE mg/dL 0.56* 0.56* 0.66*  --  0.62* 0.62*   GLUCOSE mg/dL 91 111* 177*  --  128* 97   ALBUMIN g/dL  --  2.50*  --   --   --   --    BILIRUBIN mg/dL  --  0.4  --   --   --   --    ALK PHOS U/L  --  44  --   --   --   --    AST (SGOT) U/L  --  26  --   --   --   --    ALT (SGPT) U/L  --  25  --   --   --   --        TROPONIN        CoAg  Results from last 7 days   Lab Units 07/12/20  0337   INR  1.05   APTT seconds 19.6*       ABG        Microbiology  Microbiology Results (last 10 days)     Procedure Component Value - Date/Time    Catheter Culture - Cath Tip, Arm, Left [[id number]] Collected:  07/12/20 0337    Lab Status:  Final result Specimen:  Cath Tip from Arm, Left Updated:  07/14/20 0734     CATHETER CULTURE No growth at 2 days    Blood Culture - Blood, Blood, PICC Line [[id number]] Collected:  07/11/20 1353    Lab Status:  Preliminary result Specimen:  Blood, PICC Line Updated:  07/15/20 1430     Blood Culture No growth at 4 days    Blood Culture - Blood, Hand, Right [[id number]] Collected:  07/11/20 1353    Lab Status:  Preliminary result Specimen:  Blood from Hand, Right Updated:  07/15/20 1430     Blood Culture No growth at 4 days    Blood Culture - Blood, Arm, Left [[id number]]  (Abnormal) Collected:  07/08/20 2202    Lab Status:  Final result Specimen:  Blood from Arm, Left Updated:  07/10/20 0658     Blood Culture Staphylococcus, coagulase negative     Comment: Probable contaminant requires clinical correlation, susceptibility not performed unless requested by physician.          Isolated from Anaerobic Bottle     Gram Stain Gram positive cocci in clusters    Blood Culture ID, PCR - Blood, Arm, Left [[id number]]  (Abnormal) Collected:  07/08/20  2202    Lab Status:  Edited Result - FINAL Specimen:  Blood from Arm, Left Updated:  07/10/20 0635     BCID, PCR Staphylococcus spp, not aureus. Identification by BCID PCR.     Comment: Corrected result. Previous result was Staphylococcus aureus. Identification by BCID PCR. on 7/9/2020 at 2147 EDT.        BOTTLE TYPE Anaerobic Bottle    Blood Culture - Blood, Blood, PICC Line [[id number]] Collected:  07/08/20 2147    Lab Status:  Final result Specimen:  Blood, PICC Line Updated:  07/13/20 2245     Blood Culture No growth at 5 days          IMAGING & OTHER STUDIES    Imaging Results (Last 72 Hours)     ** No results found for the last 72 hours. **        Results for orders placed during the hospital encounter of 07/01/20   Adult Transthoracic Echo Complete W/ Cont if Necessary Per Protocol    Narrative · Left ventricular systolic function is severely decreased.  · Left ventricular wall thickness is consistent with mild concentric   hypertrophy.  · Left atrial cavity size is moderately dilated.  · Mild-to-moderate mitral valve regurgitation is present  · Mild tricuspid valve regurgitation is present.  · Mild aortic valve regurgitation is present.             ASSESSMENT/PLAN:     Acute on chronic systolic congestive heart failure (CMS/HCC)    Chronic anticoagulation    Hyperlipidemia    Chronic pain    Ischemic cardiomyopathy    Persistent atrial fibrillation (CMS/HCC)    Coronary artery disease involving native coronary artery of native heart without angina pectoris    History of lung cancer    Chronic hypotension    Gastritis        1.Bilateral PNA (viral vs bacterial) other differentials include Pulmonary edema and possible hemorrhage  2.  Chronic A. fib  3. H/o RML lobectomy  4. CAD  5. Acute hypoxia due to above  6.  Nausea  7. Chronic Hypotension  8. Chronic Anticoagulation with Xarelto  9. Lactic acidosis  10. Hypokalemia  11.  Diarrhea  12.  Gastritis noted on EGD  13.  Acute on chronic systolic CHF  exacerbation       PLAN    -Reviewed CT scan of the chest. Covid test is negative X2.  Afebrile.  Status post antibiotic therapy with Zosyn. WBC count improving. Was On Vancomycin for Coag negative staph. Likley contaminant. Vanco was discontinued.   -Patient is a lifelong non-smoker.  No previous history of COPD.  Off steroids and Pulmicort.  Changed nebs to PRN.  -Management of A. fib per cardiology.   - 2D echo results reviewed.  EF 30 to 35%.  Mild to moderate MR noted.  RVSP 35.7  -GI work-up reviewed.  Status post EGD 7/5 with changes of gastritis noted.  Remains on Reglan Carafate and Protonix.    -Patient with issues with chronic hypotension.  Remains on Florinef which is being continued.  - therapeutic anticoagulation due to AICD placement.    -Tolerating PO Diet   -PICC discontinued  -midodrine weaned off   -Lisinopril was changed to nightly due to hypotensive episodes.  PT eval and treat

## 2020-07-15 NOTE — PLAN OF CARE
Problem: Patient Care Overview  Goal: Plan of Care Review  Flowsheets (Taken 7/15/2020 6404)  Outcome Summary: patients blood pressure has improved. attempting to discharge patient to [facility]

## 2020-07-15 NOTE — DISCHARGE SUMMARY
[facility] Medicine Services  DISCHARGE SUMMARY        Prepared For PCP:  Nikki Gonzales MD    Patient Name: Shukri Park  : 1940  MRN: [id number]      Date of Admission:   2020    Date of Discharge:  7/15/2020    Length of stay:  LOS: 13 days     Hospital Course     Presenting Problem:   Atrial fibrillation with rapid ventricular response (CMS/HCC) [I48.91]  Pneumonia of both lungs due to infectious organism, unspecified part of lung [J18.9]  Congestive heart failure, unspecified HF chronicity, unspecified heart failure type (CMS/HCC) [I50.9]      Active Hospital Problems    Diagnosis  POA   • **Acute on chronic systolic congestive heart failure (CMS/HCC) [I50.23]  Yes   • History of lung cancer [Z85.118]  Not Applicable   • Chronic hypotension [I95.89]  Yes   • Gastritis [K29.70]  Clinically Undetermined   • Coronary artery disease involving native coronary artery of native heart without angina pectoris [I25.10]  Yes   • Chronic pain [G89.29]  Yes   • Ischemic cardiomyopathy [I25.5]  Yes   • Persistent atrial fibrillation (CMS/HCC) [I48.19]  Yes   • Chronic anticoagulation [Z79.01]  Not Applicable   • Hyperlipidemia [E78.5]  Yes      Resolved Hospital Problems    Diagnosis Date Resolved POA   • Sepsis (CMS/HCC) [A41.9] 2020 Yes   • Atrial fibrillation with rapid ventricular response (CMS/HCC) [I48.91] 2020 Yes   • Nausea vomiting and diarrhea [R11.2, R19.7] 2020 Yes   • Bilateral pneumonia [J18.9] 2020 Yes     HPI.    Mr. Park is an 79-year-old male with a history of COPD, a-fib on chronic anticoagulation, lung cancer status post lobectomy, CAD and HLD who presented to the ED on 2020 complaining of fatigue, tiredness, and shortness of breath. Workup in the ER revealed a-fib with RVR, acute CHF, and bilateral pneumonia. His COVID-19 was negative. He was given Cardizem bolus and started on continuous drip. He was also given diuretics and  empiric antibiotics. He was admitted for further evaluation and treatment.      7/2:  Cardiology consulted for rapid a-fib and acute CHF.  Pulmonary consulted for pneumonia and possible bronchoscopy.      7/3:  No new fevers, SOA stable, O2 requirement worsened over night and now on 5L, Remains fully awake and responsive.  GI consulted for nausea.  EF 27% per Echo.      7/4:  afebrile. soa at baseline. On 5 L. No n.v/d. Feels weak and fatigued. Amiodarone drip started for uncontrolled a-fib.      7/5:  bp now much higher, stop 0.2 mg of fludrocortisone, keep 0.1 and monitor.  Low k on replacement.  EGD today.  Start diet hopefuly later today, still with poor appetite and nausea     7/6:  Awake.  Currently on 2 L nasal cannula.  Remains on amiodarone drip.  Cardizem drip discontinued.  No complaints of cough or productive phlegm.  No chest pains.  No vomiting.  General surgery consulted for gallbladder sludge.      7/7:  Awake.  Currently on 2 L nasal cannula.  Complains of generalized weakness.  Complains of decreased appetite.  Amiodarone drip discontinued.  Remains in atrial fibrillation on the monitor.  No cough or productive phlegm.  No chest pains.  Some shortness of breath with activity     7/8:  Awake.  Currently on 2 L nasal cannula.  Remains short of breath with activity.  Complains of generalized weakness.  No nausea or vomiting.  Transferred to ICU for hypotension and started on BRIANA drip.     7/9:  PICC placed. Remains on BRIANA.  Medications adjusted and blood pressure is improved.  On 2 liters nasal cannula.  Status post cardiac catheterization with failed PCI to RCA.  EP consulted for BiVICD placement and AV node ablation.      7/10:  no acute events overnight. Briana is now off. Tolerating O2 2L. No chest pain. Status post AV node ablation and BiVICD     7/11:  Awake. Remains off pressors. Currently on RA, Tolerating PO diet. No C/O CP, No N/V     7/12:  No acute events overnight.  Tolerating room air.   Tolerating p.o. diet.  No complaints of chest pain or shortness of air.     7/13:  OOB to chair, No new issues.  Feels ok.  On RA. Pre-cert started for inpatient rehab.        Hospital Course by problem list.     Acute on chronic systolic congestive heart failure secondary to Ischemic cardiomyopathy  -improved  -status post BiVICD placement (07/10/20)  -on spironolactone, BB, and ACE-I  -cardiology and EP following      Gastritis  -on PPI and Carafate  -needs outpatient follow up with GI     CAD (coronary artery disease) / Hyperlipidemia  -on Plavix, Xarelto, and statin  -07/09/20 LHC:  3v CAD with patent stent in LCx and 50% steonsis of LAD, high-grade stenosis of RCA within previous stent with heavy calcifications; failed PCI to RCA     Chronic hypotension  -on fludrocortisone     Chronic pain  -on PRN Norco     Persistent atrial fibrillation on Chronic anticoagulation  -status post AV node ablation (07/10/20)  -on digoxin, metoprolol XL, and Xarelto  -cardiology and EP following      History of lung cancer, status post lobectomy             Recommendation for Outpatient Providers:     Follow up with FP in a week time   Follow up with cardiology service in a week time.        Reasons For Change In Medications and Indications for New Medications:        Day of Discharge     HPI:       Vital Signs:   Temp:  [97.3 °F (36.3 °C)-98.8 °F (37.1 °C)] 97.4 °F (36.3 °C)  Heart Rate:  [70] 70  Resp:  [16-20] 18  BP: (107-149)/(49-86) 139/72     Physical Exam:  Physical Exam   Constitutional: He is oriented to person, place, and time. He appears well-developed and well-nourished. No distress.   HENT:   Head: Normocephalic and atraumatic.   Right Ear: External ear normal.   Left Ear: External ear normal.   Nose: Nose normal.   Mouth/Throat: Oropharynx is clear and moist. No oropharyngeal exudate.   Eyes: Pupils are equal, round, and reactive to light. Conjunctivae and EOM are normal. Right eye exhibits no discharge. Left eye  exhibits no discharge. No scleral icterus.   Neck: Normal range of motion. No JVD present. No tracheal deviation present. No thyromegaly present.   Cardiovascular: Normal rate, regular rhythm, normal heart sounds and intact distal pulses. Exam reveals no gallop and no friction rub.   No murmur heard.  Pulmonary/Chest: Effort normal and breath sounds normal. No stridor. No respiratory distress. He has no wheezes. He has no rales. He exhibits no tenderness.   Abdominal: Soft. Bowel sounds are normal. He exhibits no distension and no mass. There is no tenderness. There is no rebound and no guarding. No hernia.   Musculoskeletal: Normal range of motion. He exhibits no edema, tenderness or deformity.   Lymphadenopathy:     He has no cervical adenopathy.   Neurological: He is alert and oriented to person, place, and time. No cranial nerve deficit or sensory deficit. He exhibits normal muscle tone. Coordination normal.   Skin: Skin is warm and dry. No rash noted. He is not diaphoretic. No erythema.   Psychiatric: He has a normal mood and affect. His behavior is normal.   Nursing note and vitals reviewed.      Pertinent  and/or Most Recent Results     Results from last 7 days   Lab Units 07/15/20  0212 07/14/20  0353 07/13/20  0355 07/12/20  0337 07/11/20  0405 07/10/20  1509 07/10/20  0308 07/09/20  0345   WBC 10*3/mm3 9.90 10.70 9.00 10.00 12.00*  --  9.60 12.40*   HEMOGLOBIN g/dL 10.6* 11.1* 10.4* 10.4* 11.2*  --  12.1* 12.7*   HEMATOCRIT % 31.7* 32.4* 30.9* 31.5* 33.4*  --  35.8* 38.2   PLATELETS 10*3/mm3 228 232 213 181 212  --  194 174   SODIUM mmol/L 137  --   --  141 141  --  141 142   POTASSIUM mmol/L 4.1  --   --  3.5 4.0 3.9 3.6 3.8   CHLORIDE mmol/L 102  --   --  105 103  --  102 101   CO2 mmol/L 23.0  --   --  27.0 27.0  --  31.0* 31.0*   BUN  11  --   --  11 13  --  18 19   CREATININE mg/dL 0.56*  --   --  0.56* 0.66*  --  0.62* 0.62*   GLUCOSE mg/dL 91  --   --  111* 177*  --  128* 97   CALCIUM mg/dL 8.2*  --    --  8.1* 8.1*  --  8.3* 8.3*     Results from last 7 days   Lab Units 07/12/20  0337   BILIRUBIN mg/dL 0.4   ALK PHOS U/L 44   ALT (SGPT) U/L 25   AST (SGOT) U/L 26   PROTIME Seconds 10.9   INR  1.05   APTT seconds 19.6*           Invalid input(s): TG, LDLCALC, LDLREALC  Results from last 7 days   Lab Units 07/09/20  1158 07/09/20  0345 07/08/20  2326 07/08/20  2147   CORTISOL mcg/dL  --  12.78  --   --    PROBNP pg/mL  --  493.9  --   --    PROCALCITONIN ng/mL  --   --   --  0.08   LACTATE mmol/L 1.0 0.7 0.7  --        Brief Urine Lab Results  (Last result in the past 365 days)      Color   Clarity   Blood   Leuk Est   Nitrite   Protein   CREAT   Urine HCG        07/08/20 2228 Dark Yellow Clear Negative Negative Negative Trace               Microbiology Results Abnormal     Procedure Component Value - Date/Time    Blood Culture - Blood, Blood, PICC Line [[id number]] Collected:  07/11/20 1353    Lab Status:  Preliminary result Specimen:  Blood, PICC Line Updated:  07/14/20 1430     Blood Culture No growth at 3 days    Blood Culture - Blood, Hand, Right [[id number]] Collected:  07/11/20 1353    Lab Status:  Preliminary result Specimen:  Blood from Hand, Right Updated:  07/14/20 1430     Blood Culture No growth at 3 days    Catheter Culture - Cath Tip, Arm, Left [[id number]] Collected:  07/12/20 0337    Lab Status:  Final result Specimen:  Cath Tip from Arm, Left Updated:  07/14/20 0734     CATHETER CULTURE No growth at 2 days    Blood Culture - Blood, Blood, PICC Line [[id number]] Collected:  07/08/20 2147    Lab Status:  Final result Specimen:  Blood, PICC Line Updated:  07/13/20 2245     Blood Culture No growth at 5 days    Blood Culture - Blood, Arm, Left [[id number]]  (Abnormal) Collected:  07/08/20 2202    Lab Status:  Final result Specimen:  Blood from Arm, Left Updated:  07/10/20 0658     Blood Culture Staphylococcus, coagulase negative     Comment: Probable contaminant requires clinical correlation,  susceptibility not performed unless requested by physician.          Isolated from Anaerobic Bottle     Gram Stain Gram positive cocci in clusters    Blood Culture ID, PCR - Blood, Arm, Left [[id number]]  (Abnormal) Collected:  07/08/20 2202    Lab Status:  Edited Result - FINAL Specimen:  Blood from Arm, Left Updated:  07/10/20 0635     BCID, PCR Staphylococcus spp, not aureus. Identification by BCID PCR.     Comment: Corrected result. Previous result was Staphylococcus aureus. Identification by BCID PCR. on 7/9/2020 at 2147 EDT.        BOTTLE TYPE Anaerobic Bottle    Blood Culture - Blood, Arm, Left [[id number]] Collected:  07/02/20 0249    Lab Status:  Final result Specimen:  Blood from Arm, Left Updated:  07/07/20 0300     Blood Culture No growth at 5 days    Blood Culture - Blood, Arm, Right [[id number]] Collected:  07/01/20 2348    Lab Status:  Final result Specimen:  Blood from Arm, Right Updated:  07/07/20 0000     Blood Culture No growth at 5 days    Clostridium Difficile EIA - Stool, Per Rectum [[id number]]  (Normal) Collected:  07/03/20 0856    Lab Status:  Final result Specimen:  Stool from Per Rectum Updated:  07/04/20 0715     C Diff GDH / Toxin Negative    Gastrointestinal Panel, PCR - Stool, Per Rectum [[id number]]  (Normal) Collected:  07/03/20 0856    Lab Status:  Final result Specimen:  Stool from Per Rectum Updated:  07/03/20 1535     Campylobacter Not Detected     Plesiomonas shigelloides Not Detected     Salmonella Not Detected     Vibrio Not Detected     Vibrio cholerae Not Detected     Yersinia enterocolitica Not Detected     Enteroaggregative E. coli (EAEC) Not Detected     Enteropathogenic E. coli (EPEC) Not Detected     Enterotoxigenic E. coli (ETEC) lt/st Not Detected     Shiga-like toxin-producing E. coli (STEC) stx1/stx2 Not Detected     E. coli O157 Not Detected     Shigella/Enteroinvasive E. coli (EIEC) Not Detected     Cryptosporidium Not Detected     Cyclospora cayetanensis Not Detected       Entamoeba histolytica Not Detected     Giardia lamblia Not Detected     Adenovirus F40/41 Not Detected     Astrovirus Not Detected     Norovirus GI/GII Not Detected     Rotavirus A Not Detected     Sapovirus (I, II, IV or V) Not Detected    Narrative:       If Aeromonas, Staphylococcus aureus or Bacillus cereus are suspected, please order [id number]: Stool Culture, Aeromonas, S aureus, B Cereus.    COVID-19,CEPHEID,COR/MILENA/PAD IN-HOUSE(OR EMERGENT/ADD-ON),NP SWAB IN TRANSPORT MEDIA 3-4 HR TAT - Swab, Nasopharynx [[id number]]  (Normal) Collected:  07/02/20 1659    Lab Status:  Final result Specimen:  Swab from Nasopharynx Updated:  07/02/20 2003     COVID19 Not Detected    Narrative:       Fact sheet for providers: https://www.fda.gov/media/220024/download     Fact sheet for patients: https://www.fda.gov/media/672002/download    Respiratory Panel, PCR - Swab, Nasopharynx [[id number]]  (Normal) Collected:  07/02/20 0027    Lab Status:  Final result Specimen:  Swab from Nasopharynx Updated:  07/02/20 0153     ADENOVIRUS, PCR Not Detected     Coronavirus 229E Not Detected     Coronavirus HKU1 Not Detected     Coronavirus NL63 Not Detected     Coronavirus OC43 Not Detected     Human Metapneumovirus Not Detected     Human Rhinovirus/Enterovirus Not Detected     Influenza B PCR Not Detected     Parainfluenza Virus 1 Not Detected     Parainfluenza Virus 2 Not Detected     Parainfluenza Virus 3 Not Detected     Parainfluenza Virus 4 Not Detected     Bordetella pertussis pcr Not Detected     Influenza A H1 2009 PCR Not Detected     Chlamydophila pneumoniae PCR Not Detected     Mycoplasma pneumo by PCR Not Detected     Influenza A PCR Not Detected     Influenza A H3 Not Detected     Influenza A H1 Not Detected     RSV, PCR Not Detected    Narrative:       The coronavirus on the RVP is NOT COVID-19 and is NOT indicative of infection with COVID-19.     COVID-19 [employer] IN-HOUSE, Nasal Swab No Transport Media - Swab, Nasal Cavity  [[id number]]  (Normal) Collected:  07/02/20 0027    Lab Status:  Final result Specimen:  Swab from Nasal Cavity Updated:  07/02/20 0104     COVID19 Not Detected    Narrative:       Fact sheet for providers: https://www.fda.gov/media/069696/download     Fact sheet for patients: https://www.fda.gov/media/629307/download          Ct Abdomen Pelvis Without Contrast    Result Date: 7/2/2020  Impression:  1.  Bilateral groundglass airspace disease with interlobular septal thickening with upper lobe predominance.  Findings are nonspecific but can be seen with Covid 19 pneumonia.  Other bacterial and atypical pneumonias can give this appearance as well. 2.  Status post right middle lobectomy. 3.  Thickening along the major fissure on the right which could be due to a small amount of pleural fluid or pleural scarring related to treatment change. 4.  No acute process seen within the abdomen. 5.  Abdominal aortic aneurysm measuring 3.8 cm in size.  Electronically Signed ByGabriel Ferrari On:7/2/2020 2:29 PM This report was finalized on [id number] by  Lico Ferrari, .    Ct Chest Without Contrast    Result Date: 7/2/2020  Impression:  1.  Bilateral groundglass airspace disease with interlobular septal thickening with upper lobe predominance.  Findings are nonspecific but can be seen with Covid 19 pneumonia.  Other bacterial and atypical pneumonias can give this appearance as well. 2.  Status post right middle lobectomy. 3.  Thickening along the major fissure on the right which could be due to a small amount of pleural fluid or pleural scarring related to treatment change. 4.  No acute process seen within the abdomen. 5.  Abdominal aortic aneurysm measuring 3.8 cm in size.  Electronically Signed ByGabriel Ferrari On:7/2/2020 2:29 PM This report was finalized on [id number] by  Alexandra Harvey    Us Gallbladder    Result Date: 7/3/2020  Impression: Gallbladder sludge with no evidence of stones. No secondary findings of acute  cholecystitis. Mildly increased echogenicity of liver parenchyma is present likely related to steatosis. A simple cyst is seen arising from the right kidney.  Electronically Signed By-Jessica Myles On:7/3/2020 3:04 PM This report was finalized on [id number] by  Jessica Myles, .    Xr Chest 1 View    Result Date: 7/10/2020  Impression: 1.Left subclavian pacemaker in place. No pneumothorax identified. 2.Mild bilateral hazy opacities appear unchanged, which could represent mild pneumonia or pulmonary edema.  Electronically Signed By-DR. Clint Dotson MD On:7/10/2020 9:06 PM This report was finalized on [id number] by DR. Clint Dotson MD.    Xr Chest 1 View    Result Date: 7/8/2020  Impression: 1.Left-sided PICC with tip in low SVC. 2.Hazy bilateral airspace opacities appear slightly improved, which could represent pneumonia or pulmonary edema.  Electronically Signed By-DR. Clint Dotson MD On:7/8/2020 9:25 PM This report was finalized on [id number] by DR. Clint Dotson MD.    Xr Chest 1 View    Result Date: 7/4/2020  Impression: Slight improvement compared to prior radiograph. Groundglass opacity septal thickening upper lobe predominant with slight improvement on the right. Question atypical infection including Covid 19. Other bacterial or atypical pneumonia as can have this appearance.  Electronically Signed By-Sarah Beth Bolden MD On:7/4/2020 6:33 PM This report was finalized on [id number] by  Sarah Beth Bolden MD.    Xr Chest 1 View    Result Date: 7/2/2020  Impression:   1.  Borderline heart size with pulmonary vascular congestion. 2.  Bilateral interstitial and alveolar airspace disease favored to be due to pulmonary edema or less likely atypical pneumonia.   Electronically Signed By-Lico Ferrari On:7/2/2020 7:26 AM This report was finalized on [id number] by  Lico Ferrari, .    Ct Sinus Without Contrast    Result Date: 7/2/2020  Impression:  1. No evidence of acute/active sinusitis. 2. Minor left  maxillary sinus mucosal thickening. Small mucous retention cysts or polyps within the left maxillary and right sphenoid sinus. 3. Bony nasal septal deviation toward the right.  Electronically Signed By-Dr. Gladys Lau MD On:7/2/2020 2:14 PM This report was finalized on [id number] by Dr. Gladys Lau MD.      Results for orders placed during the hospital encounter of 07/01/20   Duplex Venous Upper Extremity - Right CAR    Narrative · Acute right upper extremity deep vein thrombosis noted in the axillary.  · Acute right upper extremity superficial thrombophlebitis noted in the   basilic (upper arm) and cephalic (forearm).  · All other right sided vessels appear normal.          Results for orders placed during the hospital encounter of 07/01/20   Duplex Venous Upper Extremity - Right CAR    Narrative · Acute right upper extremity deep vein thrombosis noted in the axillary.  · Acute right upper extremity superficial thrombophlebitis noted in the   basilic (upper arm) and cephalic (forearm).  · All other right sided vessels appear normal.          Results for orders placed during the hospital encounter of 07/01/20   Adult Transthoracic Echo Complete W/ Cont if Necessary Per Protocol    Narrative · Left ventricular systolic function is severely decreased.  · Left ventricular wall thickness is consistent with mild concentric   hypertrophy.  · Left atrial cavity size is moderately dilated.  · Mild-to-moderate mitral valve regurgitation is present  · Mild tricuspid valve regurgitation is present.  · Mild aortic valve regurgitation is present.                  Test Results Pending at Discharge   Order Current Status    Blood Culture - Blood, Blood, PICC Line Preliminary result    Blood Culture - Blood, Hand, Right Preliminary result            Procedures Performed  Procedure(s):  Biventricular Device Insertion  ABLATION AV NODE  AV node ablation         Consults:   Consults     Date and Time Order Name Status  Description    7/13/2020 0934 Inpatient Hospitalist Consult Completed     7/9/2020 1247 Inpatient Cardiology Consult      7/6/2020 1123 Inpatient General Surgery Consult Completed     7/3/2020 0930 Inpatient Gastroenterology Consult      7/2/2020 1450 Inpatient Pulmonology Consult Completed     7/2/2020 0442 Inpatient Cardiology Consult Completed     7/2/2020 0201 Hospitalist (on-call MD unless specified) Completed             Discharge Details        Discharge Medications      New Medications      Instructions Start Date   clopidogrel 75 MG tablet  Commonly known as:  PLAVIX   75 mg, Oral, Daily   Start Date:  July 16, 2020     digoxin 250 MCG tablet  Commonly known as:  LANOXIN   250 mcg, Oral, Daily Digoxin      lisinopril 5 MG tablet  Commonly known as:  PRINIVIL,ZESTRIL   5 mg, Oral, Every 24 Hours      metoprolol succinate XL 25 MG 24 hr tablet  Commonly known as:  TOPROL-XL   25 mg, Oral, Every 24 Hours Scheduled   Start Date:  July 16, 2020     spironolactone 25 MG tablet  Commonly known as:  ALDACTONE   25 mg, Oral, Every 24 Hours         Continue These Medications      Instructions Start Date   fludrocortisone 0.1 MG tablet   0.2 mg, Oral, Every Morning      fludrocortisone 0.1 MG tablet   0.1 mg, Oral, Every Evening      HYDROcodone-acetaminophen 7.5-325 MG per tablet  Commonly known as:  NORCO   1 tablet, Oral, Every 6 Hours PRN      multivitamin tablet   1 tablet, Oral, Daily      rivaroxaban 20 MG tablet  Commonly known as:  XARELTO   20 mg, Oral, Daily      simvastatin 40 MG tablet  Commonly known as:  ZOCOR   80 mg, Oral, Nightly      vitamin B-12 1000 MCG tablet  Commonly known as:  CYANOCOBALAMIN   1,000 mcg, Oral, Daily         Stop These Medications    potassium chloride ER 20 MEQ tablet controlled-release ER tablet  Commonly known as:  K-TAB            Allergies   Allergen Reactions   • Ciprofloxacin Anaphylaxis   • Amlodipine Unknown (See Comments)   • Rocephin [Ceftriaxone] Other (See  Comments)     Mouth sores         Discharge Disposition:  Skilled Nursing Facility (DC - External)    Diet:  Hospital:  Diet Order   Procedures   • Diet Regular         Discharge Activity:         CODE STATUS:    Code Status and Medical Interventions:   Ordered at: 07/09/20 2012     Level Of Support Discussed With:    Patient     Code Status:    CPR     Medical Interventions (Level of Support Prior to Arrest):    Full         Follow-up Appointments  No future appointments.          Condition on Discharge:      Stable          Electronically signed by Elena Epps MD, 07/15/20, 12:23 PM.      Time: I spent  32 minutes on this discharge activity which included face-to-face encounter with the patient/reviewing the data in the system/coordination of the care with the nursing staff as well as consultants/documentation/entering orders.

## 2020-07-15 NOTE — PLAN OF CARE
Problem: Patient Care Overview  Goal: Plan of Care Review  Outcome: Ongoing (interventions implemented as appropriate)  Flowsheets  Taken 7/15/2020 0318  Progress: improving  Taken 7/15/2020 0000  Plan of Care Reviewed With: patient  Note:   Pt had some trouble getting to sleep last night with some complaints of pain. Provided quiet environment for pt and giving pain medication as needed. BP and HR stable. Continues to wear sling on left arm following pacer placement. Plan is to go to [facility] following D/C.

## 2020-07-16 ENCOUNTER — LAB REQUISITION (OUTPATIENT)
Dept: LAB | Facility: HOSPITAL | Age: 80
End: 2020-07-16

## 2020-07-16 DIAGNOSIS — Z00.00 ENCOUNTER FOR GENERAL ADULT MEDICAL EXAMINATION WITHOUT ABNORMAL FINDINGS: ICD-10-CM

## 2020-07-16 LAB
ALBUMIN SERPL-MCNC: 2.8 G/DL (ref 3.5–5.2)
ALBUMIN/GLOB SERPL: 1.1 G/DL
ALP SERPL-CCNC: 50 U/L (ref 39–117)
ALT SERPL W P-5'-P-CCNC: 35 U/L (ref 1–41)
ANION GAP SERPL CALCULATED.3IONS-SCNC: 11 MMOL/L (ref 5–15)
AST SERPL-CCNC: 21 U/L (ref 1–40)
BACTERIA SPEC AEROBE CULT: NORMAL
BACTERIA SPEC AEROBE CULT: NORMAL
BILIRUB SERPL-MCNC: 0.4 MG/DL (ref 0–1.2)
BUN SERPL-MCNC: 12 MG/DL (ref 8–23)
BUN SERPL-MCNC: ABNORMAL MG/DL
BUN/CREAT SERPL: ABNORMAL
CALCIUM SPEC-SCNC: 8.4 MG/DL (ref 8.6–10.5)
CHLORIDE SERPL-SCNC: 104 MMOL/L (ref 98–107)
CO2 SERPL-SCNC: 24 MMOL/L (ref 22–29)
CREAT SERPL-MCNC: 0.54 MG/DL (ref 0.76–1.27)
DEPRECATED RDW RBC AUTO: 49 FL (ref 37–54)
ERYTHROCYTE [DISTWIDTH] IN BLOOD BY AUTOMATED COUNT: 15.3 % (ref 12.3–15.4)
GFR SERPL CREATININE-BSD FRML MDRD: 146 ML/MIN/1.73
GLOBULIN UR ELPH-MCNC: 2.5 GM/DL
GLUCOSE SERPL-MCNC: 90 MG/DL (ref 65–99)
HCT VFR BLD AUTO: 32.5 % (ref 37.5–51)
HGB BLD-MCNC: 10.6 G/DL (ref 13–17.7)
MAGNESIUM SERPL-MCNC: 2 MG/DL (ref 1.6–2.4)
MCH RBC QN AUTO: 29.5 PG (ref 26.6–33)
MCHC RBC AUTO-ENTMCNC: 32.6 G/DL (ref 31.5–35.7)
MCV RBC AUTO: 90.3 FL (ref 79–97)
PHOSPHATE SERPL-MCNC: 3.1 MG/DL (ref 2.5–4.5)
PLATELET # BLD AUTO: 246 10*3/MM3 (ref 140–450)
PMV BLD AUTO: 8.1 FL (ref 6–12)
POTASSIUM SERPL-SCNC: 4 MMOL/L (ref 3.5–5.2)
PROT SERPL-MCNC: 5.3 G/DL (ref 6–8.5)
RBC # BLD AUTO: 3.6 10*6/MM3 (ref 4.14–5.8)
SODIUM SERPL-SCNC: 139 MMOL/L (ref 136–145)
WBC # BLD AUTO: 8.4 10*3/MM3 (ref 3.4–10.8)

## 2020-07-16 PROCEDURE — 80053 COMPREHEN METABOLIC PANEL: CPT | Performed by: INTERNAL MEDICINE

## 2020-07-16 PROCEDURE — 83735 ASSAY OF MAGNESIUM: CPT | Performed by: INTERNAL MEDICINE

## 2020-07-16 PROCEDURE — 85027 COMPLETE CBC AUTOMATED: CPT | Performed by: INTERNAL MEDICINE

## 2020-07-16 PROCEDURE — 84100 ASSAY OF PHOSPHORUS: CPT | Performed by: INTERNAL MEDICINE

## 2020-07-16 NOTE — PROGRESS NOTES
Case Management Discharge Note      Final Note: [facility]                       Final Discharge Disposition Code: 62 - inpatient rehab facility

## 2020-07-18 ENCOUNTER — LAB REQUISITION (OUTPATIENT)
Dept: LAB | Facility: HOSPITAL | Age: 80
End: 2020-07-18

## 2020-07-18 DIAGNOSIS — Z00.00 ENCOUNTER FOR GENERAL ADULT MEDICAL EXAMINATION WITHOUT ABNORMAL FINDINGS: ICD-10-CM

## 2020-07-18 LAB — IRON 24H UR-MRATE: 36 MCG/DL (ref 59–158)

## 2020-07-18 PROCEDURE — 83540 ASSAY OF IRON: CPT | Performed by: INTERNAL MEDICINE

## 2020-07-20 ENCOUNTER — LAB REQUISITION (OUTPATIENT)
Dept: LAB | Facility: HOSPITAL | Age: 80
End: 2020-07-20

## 2020-07-20 DIAGNOSIS — Z00.00 ENCOUNTER FOR GENERAL ADULT MEDICAL EXAMINATION WITHOUT ABNORMAL FINDINGS: ICD-10-CM

## 2020-07-20 LAB
ANION GAP SERPL CALCULATED.3IONS-SCNC: 14 MMOL/L (ref 5–15)
BUN SERPL-MCNC: 10 MG/DL (ref 8–23)
BUN SERPL-MCNC: ABNORMAL MG/DL
BUN/CREAT SERPL: ABNORMAL
CALCIUM SPEC-SCNC: 8.5 MG/DL (ref 8.6–10.5)
CHLORIDE SERPL-SCNC: 103 MMOL/L (ref 98–107)
CO2 SERPL-SCNC: 25 MMOL/L (ref 22–29)
CREAT SERPL-MCNC: 0.52 MG/DL (ref 0.76–1.27)
DEPRECATED RDW RBC AUTO: 45.9 FL (ref 37–54)
ERYTHROCYTE [DISTWIDTH] IN BLOOD BY AUTOMATED COUNT: 14.8 % (ref 12.3–15.4)
GFR SERPL CREATININE-BSD FRML MDRD: >150 ML/MIN/1.73
GLUCOSE SERPL-MCNC: 90 MG/DL (ref 65–99)
HCT VFR BLD AUTO: 32.6 % (ref 37.5–51)
HGB BLD-MCNC: 10.9 G/DL (ref 13–17.7)
MCH RBC QN AUTO: 29.8 PG (ref 26.6–33)
MCHC RBC AUTO-ENTMCNC: 33.4 G/DL (ref 31.5–35.7)
MCV RBC AUTO: 89.2 FL (ref 79–97)
PLATELET # BLD AUTO: 214 10*3/MM3 (ref 140–450)
PMV BLD AUTO: 8.2 FL (ref 6–12)
POTASSIUM SERPL-SCNC: 3.8 MMOL/L (ref 3.5–5.2)
RBC # BLD AUTO: 3.65 10*6/MM3 (ref 4.14–5.8)
SODIUM SERPL-SCNC: 142 MMOL/L (ref 136–145)
WBC # BLD AUTO: 8.6 10*3/MM3 (ref 3.4–10.8)

## 2020-07-20 PROCEDURE — 93010 ELECTROCARDIOGRAM REPORT: CPT | Performed by: INTERNAL MEDICINE

## 2020-07-20 PROCEDURE — 85027 COMPLETE CBC AUTOMATED: CPT

## 2020-07-20 PROCEDURE — 80048 BASIC METABOLIC PNL TOTAL CA: CPT

## 2020-07-21 ENCOUNTER — LAB REQUISITION (OUTPATIENT)
Dept: LAB | Facility: HOSPITAL | Age: 80
End: 2020-07-21

## 2020-07-21 DIAGNOSIS — Z00.00 ENCOUNTER FOR GENERAL ADULT MEDICAL EXAMINATION WITHOUT ABNORMAL FINDINGS: ICD-10-CM

## 2020-07-21 LAB
BASOPHILS # BLD AUTO: 0 10*3/MM3 (ref 0–0.2)
BASOPHILS NFR BLD AUTO: 0.5 % (ref 0–1.5)
DEPRECATED RDW RBC AUTO: 48.6 FL (ref 37–54)
EOSINOPHIL # BLD AUTO: 0.4 10*3/MM3 (ref 0–0.4)
EOSINOPHIL NFR BLD AUTO: 5.8 % (ref 0.3–6.2)
ERYTHROCYTE [DISTWIDTH] IN BLOOD BY AUTOMATED COUNT: 15.4 % (ref 12.3–15.4)
HCT VFR BLD AUTO: 32.8 % (ref 37.5–51)
HGB BLD-MCNC: 10.6 G/DL (ref 13–17.7)
LYMPHOCYTES # BLD AUTO: 1.3 10*3/MM3 (ref 0.7–3.1)
LYMPHOCYTES NFR BLD AUTO: 19.2 % (ref 19.6–45.3)
MCH RBC QN AUTO: 28.9 PG (ref 26.6–33)
MCHC RBC AUTO-ENTMCNC: 32.2 G/DL (ref 31.5–35.7)
MCV RBC AUTO: 89.9 FL (ref 79–97)
MONOCYTES # BLD AUTO: 0.9 10*3/MM3 (ref 0.1–0.9)
MONOCYTES NFR BLD AUTO: 12.7 % (ref 5–12)
NEUTROPHILS NFR BLD AUTO: 4.2 10*3/MM3 (ref 1.7–7)
NEUTROPHILS NFR BLD AUTO: 61.8 % (ref 42.7–76)
NRBC BLD AUTO-RTO: 0 /100 WBC (ref 0–0.2)
PLATELET # BLD AUTO: 212 10*3/MM3 (ref 140–450)
PMV BLD AUTO: 8.1 FL (ref 6–12)
RBC # BLD AUTO: 3.65 10*6/MM3 (ref 4.14–5.8)
WBC # BLD AUTO: 6.8 10*3/MM3 (ref 3.4–10.8)

## 2020-07-21 PROCEDURE — 85025 COMPLETE CBC W/AUTO DIFF WBC: CPT

## 2020-07-27 ENCOUNTER — LAB REQUISITION (OUTPATIENT)
Dept: LAB | Facility: HOSPITAL | Age: 80
End: 2020-07-27

## 2020-07-27 DIAGNOSIS — Z00.00 ENCOUNTER FOR GENERAL ADULT MEDICAL EXAMINATION WITHOUT ABNORMAL FINDINGS: ICD-10-CM

## 2020-07-27 LAB
ANION GAP SERPL CALCULATED.3IONS-SCNC: 10 MMOL/L (ref 5–15)
BUN SERPL-MCNC: 10 MG/DL (ref 8–23)
BUN SERPL-MCNC: ABNORMAL MG/DL
BUN/CREAT SERPL: ABNORMAL
CALCIUM SPEC-SCNC: 8.7 MG/DL (ref 8.6–10.5)
CHLORIDE SERPL-SCNC: 105 MMOL/L (ref 98–107)
CO2 SERPL-SCNC: 27 MMOL/L (ref 22–29)
CREAT SERPL-MCNC: 0.5 MG/DL (ref 0.76–1.27)
DEPRECATED RDW RBC AUTO: 50.8 FL (ref 37–54)
ERYTHROCYTE [DISTWIDTH] IN BLOOD BY AUTOMATED COUNT: 16.2 % (ref 12.3–15.4)
GFR SERPL CREATININE-BSD FRML MDRD: >150 ML/MIN/1.73
GLUCOSE SERPL-MCNC: 92 MG/DL (ref 65–99)
HCT VFR BLD AUTO: 31 % (ref 37.5–51)
HGB BLD-MCNC: 10.3 G/DL (ref 13–17.7)
MCH RBC QN AUTO: 29.9 PG (ref 26.6–33)
MCHC RBC AUTO-ENTMCNC: 33.1 G/DL (ref 31.5–35.7)
MCV RBC AUTO: 90.3 FL (ref 79–97)
PLATELET # BLD AUTO: 211 10*3/MM3 (ref 140–450)
PMV BLD AUTO: 8.2 FL (ref 6–12)
POTASSIUM SERPL-SCNC: 4.1 MMOL/L (ref 3.5–5.2)
RBC # BLD AUTO: 3.43 10*6/MM3 (ref 4.14–5.8)
SODIUM SERPL-SCNC: 142 MMOL/L (ref 136–145)
WBC # BLD AUTO: 5.4 10*3/MM3 (ref 3.4–10.8)

## 2020-07-27 PROCEDURE — 85027 COMPLETE CBC AUTOMATED: CPT | Performed by: INTERNAL MEDICINE

## 2020-07-27 PROCEDURE — 80048 BASIC METABOLIC PNL TOTAL CA: CPT | Performed by: INTERNAL MEDICINE

## 2020-08-10 PROCEDURE — 93010 ELECTROCARDIOGRAM REPORT: CPT | Performed by: INTERNAL MEDICINE

## 2020-08-12 ENCOUNTER — OFFICE VISIT (OUTPATIENT)
Dept: CARDIOLOGY | Facility: CLINIC | Age: 80
End: 2020-08-12

## 2020-08-12 ENCOUNTER — CLINICAL SUPPORT NO REQUIREMENTS (OUTPATIENT)
Dept: CARDIOLOGY | Facility: CLINIC | Age: 80
End: 2020-08-12

## 2020-08-12 VITALS
HEIGHT: 74 IN | BODY MASS INDEX: 18.1 KG/M2 | WEIGHT: 141 LBS | HEART RATE: 71 BPM | DIASTOLIC BLOOD PRESSURE: 58 MMHG | SYSTOLIC BLOOD PRESSURE: 92 MMHG

## 2020-08-12 DIAGNOSIS — I50.23 ACUTE ON CHRONIC SYSTOLIC CONGESTIVE HEART FAILURE (HCC): Primary | ICD-10-CM

## 2020-08-12 DIAGNOSIS — I25.10 CORONARY ARTERY DISEASE INVOLVING NATIVE CORONARY ARTERY OF NATIVE HEART WITHOUT ANGINA PECTORIS: Chronic | ICD-10-CM

## 2020-08-12 DIAGNOSIS — I25.5 ISCHEMIC CARDIOMYOPATHY: ICD-10-CM

## 2020-08-12 DIAGNOSIS — Z95.810 PRESENCE OF BIVENTRICULAR IMPLANTABLE CARDIOVERTER-DEFIBRILLATOR (ICD): ICD-10-CM

## 2020-08-12 DIAGNOSIS — E78.2 MIXED HYPERLIPIDEMIA: ICD-10-CM

## 2020-08-12 DIAGNOSIS — I25.5 ISCHEMIC CARDIOMYOPATHY: Primary | ICD-10-CM

## 2020-08-12 DIAGNOSIS — I48.19 PERSISTENT ATRIAL FIBRILLATION (HCC): Chronic | ICD-10-CM

## 2020-08-12 PROCEDURE — 93284 PRGRMG EVAL IMPLANTABLE DFB: CPT | Performed by: NURSE PRACTITIONER

## 2020-08-12 PROCEDURE — 99214 OFFICE O/P EST MOD 30 MIN: CPT | Performed by: INTERNAL MEDICINE

## 2020-08-12 RX ORDER — SPIRONOLACTONE 25 MG/1
12.5 TABLET ORAL EVERY 24 HOURS
Qty: 30 TABLET | Refills: 0 | Status: SHIPPED | OUTPATIENT
Start: 2020-08-12

## 2020-08-12 RX ORDER — MIRTAZAPINE 15 MG/1
7.5 TABLET, FILM COATED ORAL NIGHTLY
COMMUNITY
End: 2020-11-12

## 2020-08-12 RX ORDER — IRON ASPGLY,PS/C/B12/FA/CA/SUC 150-25-1
1 CAPSULE ORAL
COMMUNITY
End: 2020-11-12

## 2020-08-12 RX ORDER — MIDODRINE HYDROCHLORIDE 5 MG/1
5 TABLET ORAL 2 TIMES DAILY
Qty: 60 TABLET | Refills: 3 | Status: SHIPPED | OUTPATIENT
Start: 2020-08-12

## 2020-08-12 RX ORDER — MEGESTROL ACETATE 40 MG/ML
SUSPENSION ORAL DAILY
COMMUNITY
End: 2020-08-20

## 2020-08-12 RX ORDER — CHOLECALCIFEROL (VITAMIN D3) 125 MCG
5 CAPSULE ORAL
COMMUNITY
End: 2020-08-20

## 2020-08-12 RX ORDER — DOCUSATE SODIUM 100 MG/1
100 CAPSULE, LIQUID FILLED ORAL 2 TIMES DAILY
COMMUNITY

## 2020-08-12 RX ORDER — LISINOPRIL 2.5 MG/1
2.5 TABLET ORAL EVERY 24 HOURS
Qty: 90 TABLET | Refills: 1 | Status: SHIPPED | OUTPATIENT
Start: 2020-08-12 | End: 2021-05-24

## 2020-08-12 NOTE — PROGRESS NOTES
Date of Office Visit: 2020  Encounter Provider: Dr. Wade Cronin  Place of Service: [facility] CARDIOLOGY [place]  Patient Name: Shukri Park  :1940  Nikki Gonzales MD    Chief Complaint   Patient presents with   • Atrial Fibrillation     post ablation /post cath /stress test/holter/device check   • Hyperlipidemia   • Coronary Artery Disease   • Congestive Heart Failure     History of Present Illness:    I am pleased to see Mr. Park in my office today as a follow-up.    As you know, patient is 79 years old white gentleman whose past medical history significant for hypertension, atrial fibrillation, cardiomyopathy, CAD, who came today after his recent hospital admission and discharge.    In 2020, patient was admitted with symptom of shortness of breath and palpitation.  He was noted to be in atrial fibrillation with rapid ventricular response.  Patient was initially treated with medication but because of antihypertensive effect of these rate control medications, he became hypotensive.  Subsequently, after discussion with EP, patient underwent AV kelly ablation and was implanted with biventricular AICD device.  Unfortunately, his left-sided lead was not functioning appropriately because of stimulation of diaphragm.  And it was turned off.  Patient also underwent cardiac catheterization which showed high-grade stenosis of RCA but PCI to RCA was unsuccessful.  Nonobstructive disease of left coronary artery was noted.  LVEF was 35% on echocardiogram    Since the discharge, patient is feeling better but his blood pressure is still low.  Patient complain of dizziness.  Patient has decreased appetite.  Patient is having increased ability to walk.  Patient denies any orthopnea or PND.  No syncope or presyncope.  No fall.  No leg edema.    At this stage, pacemaker is interrogated and it is functioning appropriately.  Patient is totally dependent on right ventricular pacing.  I discussed with EP.   Patient probably would need either para Hisian lead or surgical left-sided lead.  At present I would recommend to decrease lisinopril to 2.5 mg daily.  I would also decrease spironolactone to 12.5 mg daily.  Digoxin would be discontinued.  I will decrease Xarelto to 15 mg daily.  I will see the patient in 3 months patient has appointment with EP next week        Past Medical History:   Diagnosis Date   • A-fib (CMS/HCC)    • Aortic aneurysm (CMS/HCC)    • Appetite loss    • Cancer (CMS/HCC)     right lobe cancer - surgically removed    • CHF (congestive heart failure) (CMS/HCC)    • Coronary artery disease    • Dark stools    • Foot pain, bilateral    • Hyperlipidemia    • Low back pain    • S/P epidural steroid injection     Israel Gomes's - no relief   • Weight loss     acute loss of weight 30 lbs         Past Surgical History:   Procedure Laterality Date   • CARDIAC CATHETERIZATION N/A 7/9/2020    Procedure: LEFT HEART CATH with possible PCI;  Surgeon: Wade Cronin MD;  Location: [facility] CATH INVASIVE LOCATION;  Service: Cardiovascular;  Laterality: N/A;  Local and IV sedation   • CARDIAC CATHETERIZATION N/A 7/9/2020    Procedure: Percutaneous Coronary Intervention;  Surgeon: Wade Cronin MD;  Location: [facility] CATH INVASIVE LOCATION;  Service: Cardiovascular;  Laterality: N/A;   • CARDIAC DEFIBRILLATOR PLACEMENT     • CARDIAC ELECTROPHYSIOLOGY PROCEDURE N/A 7/10/2020    Procedure: Biventricular Device Insertion;  Surgeon: Jonathan Denney MD;  Location: [facility] CATH INVASIVE LOCATION;  Service: Cardiovascular;  Laterality: N/A;   • CARDIAC ELECTROPHYSIOLOGY PROCEDURE N/A 7/10/2020    Procedure: ABLATION AV NODE;  Surgeon: Jonathan Denney MD;  Location: [facility] CATH INVASIVE LOCATION;  Service: Cardiovascular;  Laterality: N/A;   • CARDIAC ELECTROPHYSIOLOGY PROCEDURE N/A 7/10/2020    Procedure: AV node ablation;  Surgeon: Jonathan Denney MD;  Location: [facility] CATH INVASIVE LOCATION;  Service:  Cardiovascular;  Laterality: N/A;   • CARDIOVASCULAR STRESS TEST  2020   • CATARACT EXTRACTION, BILATERAL     • CONVERTED (HISTORICAL) HOLTER  2020   • CORONARY ANGIOPLASTY WITH STENT PLACEMENT     • ENDOSCOPY N/A 7/5/2020    Procedure: ESOPHAGOGASTRODUODENOSCOPY;  Surgeon: Neal Correa MD;  Location: [facility] ENDOSCOPY;  Service: Gastroenterology;  Laterality: N/A;   • LUMBAR DECOMPRESSION  09/2015    L2-L3   • LUMBAR DECOMPRESSION  2006    Dr. Logan   • OTHER SURGICAL HISTORY  05/2015    left SI fusion with bone graft - Dr. Presley   • OTHER SURGICAL HISTORY      carotid artery repair    • OTHER SURGICAL HISTORY Left 02/19/2016    Left SI fusion 2/19/2016 Dr. Zendejas   • POSTERIOR SPINAL FUSION  10/24/2016    PSF T12-3/TLIF L3-L4 poss L2-L3 --- Dr. 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           Current Outpatient Medications:   •  clopidogrel (PLAVIX) 75 MG tablet, Take 1 tablet by mouth Daily., Disp: 30 tablet, Rfl: 0  •  docusate sodium (COLACE) 100 MG capsule, Take 100 mg by mouth 2 (Two) Times a Day., Disp: , Rfl:   •  fludrocortisone 0.1 MG tablet, Take 0.2 mg by mouth Every Morning., Disp: , Rfl:   •  HYDROcodone-acetaminophen (NORCO) 7.5-325 MG per tablet, Take 1 tablet by mouth Every 6 (Six) Hours As Needed., Disp: , Rfl:   •  iron polysacch complex-B12-VitC-FA-Succ (Ferrex 150 Forte Plus)  MG capsule capsule, Take 1 capsule by mouth Daily With Breakfast., Disp: , Rfl:   •  lisinopril (PRINIVIL,ZESTRIL) 2.5 MG tablet, Take 1 tablet by mouth Daily., Disp: 90 tablet, Rfl: 1  •  megestrol (MEGACE) 40 MG/ML suspension, Take  by mouth Daily., Disp: , Rfl:   •  melatonin 5 MG tablet tablet, Take 5 mg by mouth., Disp: , Rfl:   •  metoprolol succinate XL (TOPROL-XL) 25 MG 24 hr tablet, Take 1 tablet by mouth Daily., Disp: 30 tablet, Rfl: 0  •  mirtazapine (REMERON) 15 MG tablet, Take 7.5 mg by mouth Every Night., Disp: , Rfl:   •  Multiple Vitamin (MULTIVITAMIN) tablet,  Take 1 tablet by mouth Daily., Disp: , Rfl:   •  pantoprazole (PROTONIX) 40 MG EC tablet, Take 1 tablet by mouth Daily., Disp: 30 tablet, Rfl: 0  •  rivaroxaban (XARELTO) 15 MG tablet, Take 1 tablet by mouth Daily., Disp: 60 tablet, Rfl: 3  •  simvastatin (ZOCOR) 40 MG tablet, Take 80 mg by mouth Every Night., Disp: , Rfl:   •  spironolactone (ALDACTONE) 25 MG tablet, Take 0.5 tablets by mouth Daily., Disp: 30 tablet, Rfl: 0  •  vitamin B-12 (CYANOCOBALAMIN) 1000 MCG tablet, Take 1,000 mcg by mouth Daily., Disp: , Rfl:   •  midodrine (PROAMATINE) 5 MG tablet, Take 1 tablet by mouth 2 (Two) Times a Day., Disp: 60 tablet, Rfl: 3      Social History     Socioeconomic History   • Marital status: Legally      Spouse name: Not on file   • Number of children: Not on file   • Years of education: Not on file   • Highest education level: Not on file   Tobacco Use   • Smoking status: Never Smoker   • Smokeless tobacco: Never Used   Substance and Sexual Activity   • Alcohol use: No     Frequency: Never   • Drug use: Never   • Sexual activity: Defer         Review of Systems   Constitution: Positive for malaise/fatigue. Negative for chills and fever.   HENT: Negative for ear discharge and nosebleeds.    Eyes: Negative for discharge and redness.   Cardiovascular: Negative for chest pain, orthopnea, palpitations, paroxysmal nocturnal dyspnea and syncope.   Respiratory: Positive for shortness of breath. Negative for cough and wheezing.    Endocrine: Negative for heat intolerance.   Skin: Negative for rash.   Musculoskeletal: Positive for arthritis and joint pain. Negative for myalgias.   Gastrointestinal: Positive for anorexia. Negative for abdominal pain, melena, nausea and vomiting.   Genitourinary: Negative for dysuria and hematuria.   Neurological: Negative for dizziness, light-headedness, numbness and tremors.   Psychiatric/Behavioral: Negative for depression. The patient is not nervous/anxious.   "      Procedures    Procedures    No orders to display           Objective:    BP 92/58   Pulse 71   Ht 188 cm (74.02\")   Wt 64 kg (141 lb)   BMI 18.10 kg/m²         Physical Exam   Constitutional: He is oriented to person, place, and time. He appears well-developed and well-nourished.   HENT:   Head: Normocephalic and atraumatic.   Eyes: No scleral icterus.   Neck: No thyromegaly present.   Cardiovascular: Normal rate, regular rhythm and normal heart sounds. Exam reveals no gallop and no friction rub.   No murmur heard.  Pulmonary/Chest: Effort normal and breath sounds normal. No respiratory distress. He has no wheezes. He has no rales.   Abdominal: There is no tenderness.   Musculoskeletal: He exhibits no edema.   Lymphadenopathy:     He has no cervical adenopathy.   Neurological: He is alert and oriented to person, place, and time.   Skin: No rash noted. No erythema.   Psychiatric: He has a normal mood and affect.           Assessment:       Diagnosis Plan   1. Acute on chronic systolic congestive heart failure (CMS/HCC)  lisinopril (PRINIVIL,ZESTRIL) 2.5 MG tablet    midodrine (PROAMATINE) 5 MG tablet    rivaroxaban (XARELTO) 15 MG tablet   2. Coronary artery disease involving native coronary artery of native heart without angina pectoris  lisinopril (PRINIVIL,ZESTRIL) 2.5 MG tablet    midodrine (PROAMATINE) 5 MG tablet    rivaroxaban (XARELTO) 15 MG tablet   3. Persistent atrial fibrillation (CMS/HCC)  lisinopril (PRINIVIL,ZESTRIL) 2.5 MG tablet    midodrine (PROAMATINE) 5 MG tablet    rivaroxaban (XARELTO) 15 MG tablet   4. Mixed hyperlipidemia  lisinopril (PRINIVIL,ZESTRIL) 2.5 MG tablet    midodrine (PROAMATINE) 5 MG tablet    rivaroxaban (XARELTO) 15 MG tablet   5. Ischemic cardiomyopathy  lisinopril (PRINIVIL,ZESTRIL) 2.5 MG tablet    midodrine (PROAMATINE) 5 MG tablet    rivaroxaban (XARELTO) 15 MG tablet            Plan:       At present I will start midodrine.  I will decrease lisinopril to 2.5 mg " daily.  I shall also decrease Xarelto.  I will see the patient in 3 months

## 2020-08-14 PROBLEM — Z95.810 PRESENCE OF BIVENTRICULAR IMPLANTABLE CARDIOVERTER-DEFIBRILLATOR (ICD): Status: ACTIVE | Noted: 2020-08-14

## 2020-08-14 NOTE — PROGRESS NOTES
DEVICE INTERROGATION:  IN OFFICE    DEVICE TYPE:   Bi V ICD    :   [email or web address]    BATTERY:  Stable    TIME TO ELECTIVE REPLACEMENT INDICATORS:   8.5 years    CHARGE TIME:            LEAD DATA:    Atrial:   A. fib    Ventricular:     Paced mV, 426 ohms, 1.0 V@0.4 ms    LV: LV lead is programmed off for Dr. Denney    Atrial pacing percentage: Nonapplicable %    Ventricular pacing percentage: 100 RV pacing %      Arrhythmia Logbook Reviewed: No ventricular high rate episodes        Summary:    Stable Device Function    No significant arhythmia burden.     Battery status is stable.    Reviewed with: Dr. Cronin      NEXT IN OFFICE DEVICE CHECK DUE: Follow-up plan with Dr. Denney    REMOTE DEVICE INTERROGATIONS: Every 3 months

## 2020-08-20 ENCOUNTER — OFFICE VISIT (OUTPATIENT)
Dept: CARDIOLOGY | Facility: CLINIC | Age: 80
End: 2020-08-20

## 2020-08-20 ENCOUNTER — CLINICAL SUPPORT NO REQUIREMENTS (OUTPATIENT)
Dept: CARDIOLOGY | Facility: CLINIC | Age: 80
End: 2020-08-20

## 2020-08-20 VITALS
DIASTOLIC BLOOD PRESSURE: 58 MMHG | SYSTOLIC BLOOD PRESSURE: 99 MMHG | WEIGHT: 140 LBS | BODY MASS INDEX: 17.97 KG/M2 | OXYGEN SATURATION: 98 % | HEART RATE: 69 BPM

## 2020-08-20 DIAGNOSIS — I48.19 PERSISTENT ATRIAL FIBRILLATION (HCC): Primary | Chronic | ICD-10-CM

## 2020-08-20 DIAGNOSIS — I50.22 CHRONIC SYSTOLIC CONGESTIVE HEART FAILURE (HCC): ICD-10-CM

## 2020-08-20 DIAGNOSIS — Z95.810 PRESENCE OF BIVENTRICULAR IMPLANTABLE CARDIOVERTER-DEFIBRILLATOR (ICD): ICD-10-CM

## 2020-08-20 DIAGNOSIS — I48.19 PERSISTENT ATRIAL FIBRILLATION (HCC): ICD-10-CM

## 2020-08-20 DIAGNOSIS — I25.10 CORONARY ARTERY DISEASE INVOLVING NATIVE CORONARY ARTERY OF NATIVE HEART WITHOUT ANGINA PECTORIS: ICD-10-CM

## 2020-08-20 DIAGNOSIS — I25.5 ISCHEMIC CARDIOMYOPATHY: ICD-10-CM

## 2020-08-20 DIAGNOSIS — I25.5 ISCHEMIC CARDIOMYOPATHY: Primary | ICD-10-CM

## 2020-08-20 PROCEDURE — 99024 POSTOP FOLLOW-UP VISIT: CPT | Performed by: INTERNAL MEDICINE

## 2020-08-20 PROCEDURE — 93284 PRGRMG EVAL IMPLANTABLE DFB: CPT | Performed by: INTERNAL MEDICINE

## 2020-08-20 PROCEDURE — 93000 ELECTROCARDIOGRAM COMPLETE: CPT | Performed by: INTERNAL MEDICINE

## 2020-08-20 RX ORDER — ASPIRIN 81 MG/1
81 TABLET ORAL DAILY
Qty: 90 TABLET | Refills: 3 | Status: SHIPPED | OUTPATIENT
Start: 2020-08-20

## 2020-08-20 NOTE — PROGRESS NOTES
CC severe  ischemic cardiomyopathy and uncontrollable longstanding atrial fibrillation     Subject--79-year-old male patient presented to the hospital with a generalized feeling of worsening fatigue, tiredness, palpitations and dyspnea with some amount of paroxysmal nocturnal dyspnea 3 to 4 days prior to presentation and had 1 bout of mild blood-tinged phlegm when he presented to the hospital--patient was found to have uncontrollable atrial fibrillation during this hospitalization and further evaluation in the form of imaging did reveal gallbladder sludge and surgical consultation was recommended and the surgeon recommended medical management of his gallbladder disease to be done prior to cardiovascular optimization--Patient's atrial fibrillation is uncontrollable was tried on intravenous amiodarone and Cardizem and became hypotensive and patient got transferred to ICU and started on inotropic agents and amiodarone and Cardizem have been stopped and started on low-dose of digoxin and metoprolol and underwent  insertion of a biventricular ICD and AV node ablation--Patient had longstanding atrial fibrillation for several years and had two-vessel coronary artery disease with stenting of his right coronary artery and circumflex artery back in 2004 and no recent ischemia evaluation apart from a stress test which was done during this hospitalization--he denies any angina .Patient is known to have severe ischemic cardiomyopathy with EF less than 35% and repeat echocardiography during this hospitalization showed EF of 31 to 35%  Patient also had prior carotid surgery.He had issues with orthostatic hypotension for nearly 4 years and has been on Florinef--he denied any urinary incontinence  Patient has significant back surgery from T12-L4 spinal fusion in the past  He also has severe cervical arthritis with degenerative changes  Patient has significant fall with severe scalp bleeding with a large parietal hematoma last year  and was admitted to the hospital and a CT scan done during that time did not reveal any stroke apart from a large scalp hematoma from anticoagulation  He continues to walk with a walker with a tendency to fall  His additional evaluation in the hospital included endoscopy which showed gastritis  COVID testing x2 was negative  He denied any febrile illness or chills  He does have mild hyperlipidemia  No other symptoms suggestive of Parkinson's disease  He also had partial lung resection for localized malignancy several  years ago  Patient is clinically improved since AV node ablation was performed--complains of significant fatigue with poor appetite  Patient was noted to have diaphragmatic stimulation with LV pacing and sent for evaluation  Denies any further leg edema or any worsening shortness of breath since we did AV node ablation for his atrial arrhythmia with insertion of biventricular ICD        Past Medical History:   Diagnosis Date   • A-fib (CMS/HCC)    • Aortic aneurysm (CMS/HCC)    • Appetite loss    • Cancer (CMS/HCC)     right lobe cancer - surgically removed    • CHF (congestive heart failure) (CMS/HCC)    • Coronary artery disease    • Dark stools    • Foot pain, bilateral    • Hyperlipidemia    • Low back pain    • S/P epidural steroid injection     Israel Gomes's - no relief   • Weight loss     acute loss of weight 30 lbs     Past Surgical History:   Procedure Laterality Date   • CARDIAC CATHETERIZATION N/A 7/9/2020    Procedure: LEFT HEART CATH with possible PCI;  Surgeon: Wade Cronin MD;  Location:  [facility] CATH INVASIVE LOCATION;  Service: Cardiovascular;  Laterality: N/A;  Local and IV sedation   • CARDIAC CATHETERIZATION N/A 7/9/2020    Procedure: Percutaneous Coronary Intervention;  Surgeon: Wade Cronin MD;  Location:  [facility] CATH INVASIVE LOCATION;  Service: Cardiovascular;  Laterality: N/A;   • CARDIAC DEFIBRILLATOR PLACEMENT     • CARDIAC ELECTROPHYSIOLOGY PROCEDURE N/A 7/10/2020     Procedure: Biventricular Device Insertion;  Surgeon: Jonathan Denney MD;  Location: [facility] CATH INVASIVE LOCATION;  Service: Cardiovascular;  Laterality: N/A;   • CARDIAC ELECTROPHYSIOLOGY PROCEDURE N/A 7/10/2020    Procedure: ABLATION AV NODE;  Surgeon: Jonathan Denney MD;  Location: [facility] CATH INVASIVE LOCATION;  Service: Cardiovascular;  Laterality: N/A;   • CARDIAC ELECTROPHYSIOLOGY PROCEDURE N/A 7/10/2020    Procedure: AV node ablation;  Surgeon: Jonathan Denney MD;  Location: [facility] CATH INVASIVE LOCATION;  Service: Cardiovascular;  Laterality: N/A;   • CARDIOVASCULAR STRESS TEST  2020   • CATARACT EXTRACTION, BILATERAL     • CONVERTED (HISTORICAL) HOLTER  2020   • CORONARY ANGIOPLASTY WITH STENT PLACEMENT     • ENDOSCOPY N/A 7/5/2020    Procedure: ESOPHAGOGASTRODUODENOSCOPY;  Surgeon: Neal Correa MD;  Location: [facility] ENDOSCOPY;  Service: Gastroenterology;  Laterality: N/A;   • LUMBAR DECOMPRESSION  09/2015    L2-L3   • LUMBAR DECOMPRESSION  2006    Dr. Logan   • OTHER SURGICAL HISTORY  05/2015    left SI fusion with bone graft - Dr. Presley   • OTHER SURGICAL HISTORY      carotid artery repair    • OTHER SURGICAL HISTORY Left 02/19/2016    Left SI fusion 2/19/2016 Dr. Zendejas   • POSTERIOR SPINAL FUSION  10/24/2016    PSF T12-3/TLIF L3-L4 poss L2-L3 --- Dr. 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     Family History   Problem Relation Age of Onset   • Cancer Other    • Hyperlipidemia Other    • Heart disease Other      Social History     Tobacco Use   • Smoking status: Never Smoker   • Smokeless tobacco: Never Used   Substance Use Topics   • Alcohol use: No     Frequency: Never   • Drug use: Never       Review of Systems   General:  positive for fatigue and tiredness  Eyes: No redness  Cardiovascular: No chest pain, no palpitations  Respiratory:   positive for class 2 shortness of breath  Gastrointestinal: No nausea or vomiting, bleeding  Genitourinary: no  hematuria or dysuria  Musculoskeletal: No arthralgia or myalgia  Skin: No rash  Neurologic: No numbness, tingling, syncope  Hematologic/Lymphatic: No abnormal bleeding      Physical Exam  VITALS REVIEWED--blood pressure 99/58 pulse rate is 70 patient is afebrile respiration 12 times a minute  EKG shows underlying atrial fibrillation with ventricular pacing and nonspecific ST-T wave changes with a heart rate 70 and no significant changes compared to prior EKG and EKG indication includes atrial arrhythmia and cardiomyopathy    General:      well developed, well nourished, in no acute distress.    Head:      normocephalic and atraumatic.    Eyes:      PERRL/EOM intact, conjunctiva and sclera clear with out nystagmus.    Neck:      no masses, thyromegaly,  trachea central with normal respiratory effort and PMI displaced laterally  Lungs:      clear bilaterally to auscultation.    Heart:       Paced rhythm and ICD site is clean  without any murmurs gallops or rubs  Msk:      no deformity or scoliosis noted of thoracic or lumbar spine.    Pulses:      pulses normal in all 4 extremities.    Extremities:       no cyanosis or clubbing--no edema   neurologic:      no focal deficits.   alert oriented x3  Skin:      intact without lesions or rashes.    Psych:      alert and cooperative; normal mood and affect; normal attention span and concentration.             Assessment plan    New clinical problem of LV lead dysfunction  New clinical problem of profound fatigue and poor appetite  Biventricular ICD personally interrogated--LV pacing causing diaphragmatic stimulation henceforth patient left on RV pacing alone  Clinically patient is euvolemic with rate control  I would not advocate at this point any epicardial lead placement  Patient's nutrition needs to be improved to reduce his fatigue  Clearly patient has orthostatic syndrome currently on midodrine and off Florinef  Patient rate is completely controlled and metoprolol can  be stopped  Stop Plavix and take aspirin  Patient educated to contact primary care physician for poor appetite and consider initiation of liquid Megace to help his appetite--apparently this medication has helped him in the past  Cardiovascular wise patient is stable with ischemic cardiomyopathy  Permanent atrial fibrillation  Biventricular ICD was interrogated personally and reprogrammed for adequate threshold in the right ventricle to increase the longevity of the battery  disussed with patient and family and Dr. Cronin    Electronically signed by Jonathan Denney MD, 08/20/20, 6:15 PM.

## 2020-09-21 ENCOUNTER — OFFICE VISIT (OUTPATIENT)
Dept: CARDIOLOGY | Facility: CLINIC | Age: 80
End: 2020-09-21

## 2020-09-21 VITALS
OXYGEN SATURATION: 96 % | WEIGHT: 140 LBS | HEIGHT: 73 IN | DIASTOLIC BLOOD PRESSURE: 70 MMHG | SYSTOLIC BLOOD PRESSURE: 126 MMHG | HEART RATE: 77 BPM | RESPIRATION RATE: 18 BRPM | BODY MASS INDEX: 18.55 KG/M2

## 2020-09-21 DIAGNOSIS — Z95.810 PRESENCE OF BIVENTRICULAR IMPLANTABLE CARDIOVERTER-DEFIBRILLATOR (ICD): ICD-10-CM

## 2020-09-21 DIAGNOSIS — I48.19 PERSISTENT ATRIAL FIBRILLATION (HCC): Primary | ICD-10-CM

## 2020-09-21 DIAGNOSIS — I25.5 ISCHEMIC CARDIOMYOPATHY: ICD-10-CM

## 2020-09-21 DIAGNOSIS — I50.22 CHRONIC SYSTOLIC CONGESTIVE HEART FAILURE (HCC): ICD-10-CM

## 2020-09-21 PROCEDURE — 99024 POSTOP FOLLOW-UP VISIT: CPT | Performed by: INTERNAL MEDICINE

## 2020-09-21 PROCEDURE — 93000 ELECTROCARDIOGRAM COMPLETE: CPT | Performed by: INTERNAL MEDICINE

## 2020-09-21 RX ORDER — FERROUS SULFATE 325(65) MG
325 TABLET ORAL
COMMUNITY
Start: 2020-09-02

## 2020-09-21 NOTE — PROGRESS NOTES
CC severe  ischemic cardiomyopathy and uncontrollable longstanding atrial fibrillation     Subject--79-year-old male patient presented to the hospital with a generalized feeling of worsening fatigue, tiredness, palpitations and dyspnea with some amount of paroxysmal nocturnal dyspnea 3 to 4 days prior to presentation and had 1 bout of mild blood-tinged phlegm when he presented to the hospital--patient was found to have uncontrollable atrial fibrillation during this hospitalization and further evaluation in the form of imaging did reveal gallbladder sludge and surgical consultation was recommended and the surgeon recommended medical management of his gallbladder disease to be done prior to cardiovascular optimization--Patient's atrial fibrillation is uncontrollable was tried on intravenous amiodarone and Cardizem and became hypotensive and patient got transferred to ICU and started on inotropic agents and amiodarone and Cardizem have been stopped and started on low-dose of digoxin and metoprolol and underwent  insertion of a biventricular ICD and AV node ablation--Patient had longstanding atrial fibrillation for several years and had two-vessel coronary artery disease with stenting of his right coronary artery and circumflex artery back in 2004 and no recent ischemia evaluation apart from a stress test which was done during this hospitalization--he denies any angina .Patient is known to have severe ischemic cardiomyopathy with EF less than 35% and repeat echocardiography during this hospitalization showed EF of 31 to 35%  Patient also had prior carotid surgery.He had issues with orthostatic hypotension for nearly 4 years and has been on Florinef--he denied any urinary incontinence  Patient has significant back surgery from T12-L4 spinal fusion in the past  He also has severe cervical arthritis with degenerative changes  Patient has significant fall with severe scalp bleeding with a large parietal hematoma last year  and was admitted to the hospital and a CT scan done during that time did not reveal any stroke apart from a large scalp hematoma from anticoagulation  He continues to walk with a walker with a tendency to fall  His additional evaluation in the hospital included endoscopy which showed gastritis  COVID testing x2 was negative  He denied any febrile illness or chills  He does have mild hyperlipidemia  No other symptoms suggestive of Parkinson's disease  He also had partial lung resection for localized malignancy several  years ago  Patient is clinically improved since AV node ablation was performed--Taking Megace to improve his appetite  Patient was noted to have diaphragmatic stimulation with LV pacing and LV lead is turned off with last visit and no new symptoms        Past Medical History:   Diagnosis Date   • A-fib (CMS/HCC)    • Aortic aneurysm (CMS/HCC)    • Appetite loss    • Cancer (CMS/HCC)     right lobe cancer - surgically removed    • CHF (congestive heart failure) (CMS/HCC)    • Coronary artery disease    • Dark stools    • Foot pain, bilateral    • Hyperlipidemia    • Low back pain    • S/P epidural steroid injection     Israel Gomes's - no relief   • Weight loss     acute loss of weight 30 lbs     Past Surgical History:   Procedure Laterality Date   • CARDIAC CATHETERIZATION N/A 7/9/2020    Procedure: LEFT HEART CATH with possible PCI;  Surgeon: Wade Cronin MD;  Location: [facility] CATH INVASIVE LOCATION;  Service: Cardiovascular;  Laterality: N/A;  Local and IV sedation   • CARDIAC CATHETERIZATION N/A 7/9/2020    Procedure: Percutaneous Coronary Intervention;  Surgeon: Wade Cronin MD;  Location: [facility] CATH INVASIVE LOCATION;  Service: Cardiovascular;  Laterality: N/A;   • CARDIAC DEFIBRILLATOR PLACEMENT     • CARDIAC ELECTROPHYSIOLOGY PROCEDURE N/A 7/10/2020    Procedure: Biventricular Device Insertion;  Surgeon: Jonathan Denney MD;  Location: [facility] CATH INVASIVE LOCATION;  Service:  Cardiovascular;  Laterality: N/A;   • CARDIAC ELECTROPHYSIOLOGY PROCEDURE N/A 7/10/2020    Procedure: ABLATION AV NODE;  Surgeon: Jonathan Denney MD;  Location: [facility] CATH INVASIVE LOCATION;  Service: Cardiovascular;  Laterality: N/A;   • CARDIAC ELECTROPHYSIOLOGY PROCEDURE N/A 7/10/2020    Procedure: AV node ablation;  Surgeon: Joanthan Denney MD;  Location: [facility] CATH INVASIVE LOCATION;  Service: Cardiovascular;  Laterality: N/A;   • CARDIOVASCULAR STRESS TEST  2020   • CATARACT EXTRACTION, BILATERAL     • CONVERTED (HISTORICAL) HOLTER  2020   • CORONARY ANGIOPLASTY WITH STENT PLACEMENT     • ENDOSCOPY N/A 7/5/2020    Procedure: ESOPHAGOGASTRODUODENOSCOPY;  Surgeon: Neal Correa MD;  Location: [facility] ENDOSCOPY;  Service: Gastroenterology;  Laterality: N/A;   • LUMBAR DECOMPRESSION  09/2015    L2-L3   • LUMBAR DECOMPRESSION  2006    Dr. Logan   • OTHER SURGICAL HISTORY  05/2015    left SI fusion with bone graft - Dr. Presley   • OTHER SURGICAL HISTORY      carotid artery repair    • OTHER SURGICAL HISTORY Left 02/19/2016    Left SI fusion 2/19/2016 Dr. Zendejas   • POSTERIOR SPINAL FUSION  10/24/2016    PSF T12-3/TLIF L3-L4 poss L2-L3 --- Dr. 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     Family History   Problem Relation Age of Onset   • Cancer Other    • Hyperlipidemia Other    • Heart disease Other      Social History     Tobacco Use   • Smoking status: Never Smoker   • Smokeless tobacco: Never Used   Substance Use Topics   • Alcohol use: No     Frequency: Never   • Drug use: Never       Review of Systems   General:  positive for fatigue and tiredness  Eyes: No redness  Cardiovascular: No chest pain, no palpitations  Respiratory:   positive for class 2 shortness of breath  Gastrointestinal: No nausea or vomiting, bleeding  Genitourinary: no hematuria or dysuria  Musculoskeletal: No arthralgia or myalgia  Skin: No rash  Neurologic: No numbness, tingling,  syncope  Hematologic/Lymphatic: No abnormal bleeding      Physical Exam  VITALS REVIEWED--blood pressure 126/70 pulse rate is 77 patient is afebrile respiration 12 times a minute      General:      well developed, well nourished, in no acute distress.    Head:      normocephalic and atraumatic.    Eyes:      PERRL/EOM intact, conjunctiva and sclera clear with out nystagmus.    Neck:      no masses, thyromegaly,  trachea central with normal respiratory effort and PMI displaced laterally  Lungs:      clear bilaterally to auscultation.    Heart:       Paced rhythm and ICD site is clean  without any murmurs gallops or rubs  Msk:      no deformity or scoliosis noted of thoracic or lumbar spine.    Pulses:      pulses normal in all 4 extremities.    Extremities:       no cyanosis or clubbing--no edema   neurologic:      no focal deficits.   alert oriented x3  Skin:      intact without lesions or rashes.    Psych:      alert and cooperative; normal mood and affect; normal attention span and concentration.             Assessment plan  Biventricular ICD personally interrogated--LV pacing causing diaphragmatic stimulation henceforth patient left on RV pacing alone  Clinically patient is euvolemic with rate control  I would not advocate at this point any epicardial lead placement  Patient's nutrition needs to be improved to reduce his fatigue  Clearly patient has orthostatic syndrome currently on midodrine and off Florinef  Cardiovascular wise patient is stable with ischemic cardiomyopathy  Permanent atrial fibrillation  Biventricular ICD was interrogated personally and reprogrammed for adequate threshold in the right ventricle to increase the longevity of the battery  Patient is off metoprolol and feeling better  Follow-up in 3 months        ECG 12 Lead    Date/Time: 9/21/2020 3:07 PM  Performed by: Jonathan Denney MD  Authorized by: Jonathan Denney MD   Comparison: compared with previous ECG   Rhythm: atrial  fibrillation and paced  Comments: Underlying rhythm is atrial fibrillation with RV pacing and compared to prior EKG which showed biventricular pacing patient has only RV pacing at this point          Electronically signed by Jonathan Denney MD, 09/21/20, 3:07 PM EDT.

## 2020-11-09 ENCOUNTER — OFFICE VISIT (OUTPATIENT)
Dept: SURGERY | Facility: CLINIC | Age: 80
End: 2020-11-09

## 2020-11-09 VITALS
DIASTOLIC BLOOD PRESSURE: 73 MMHG | HEART RATE: 70 BPM | SYSTOLIC BLOOD PRESSURE: 114 MMHG | BODY MASS INDEX: 20.52 KG/M2 | WEIGHT: 154.8 LBS | OXYGEN SATURATION: 97 % | HEIGHT: 73 IN | RESPIRATION RATE: 20 BRPM | TEMPERATURE: 97.1 F

## 2020-11-09 DIAGNOSIS — R10.31 INGUINODYNIA, RIGHT: Primary | ICD-10-CM

## 2020-11-09 PROCEDURE — 99214 OFFICE O/P EST MOD 30 MIN: CPT | Performed by: SURGERY

## 2020-11-11 NOTE — PROGRESS NOTES
GENERAL SURGERY CONSULTATION NOTE    Consult requested by: Dr. Carrizales    Patient Care Team:  Nikki Gonzales MD as PCP - General (Family Medicine)    Reason for consult: Right inguinal hernia    Subjective     Patient is a 79 y.o. male presents with a bulge and pain is started in his right groin approximately 3 to 4 weeks ago.  He states that the pain is usually not bad in the morning, but worsens throughout the day especially when he is walking.  He has a near constant pain in his right groin which is worse when he flexes his thigh.  He goes from a dull pain to sharp shooting pains.  He does not have pain when he is lying down.  He has not had any problems eating, with no nausea, vomiting or difficulty having bowel movements.  The patient does state that he urinates approximately 6 times a night.  He also had a fall in last November and had a pacemaker placed in July 2020.     Review of Systems   Constitutional: Negative for appetite change, chills and fever.   HENT: Negative for congestion and sore throat.    Respiratory: Negative for cough and shortness of breath.    Cardiovascular: Negative for chest pain and palpitations.   Gastrointestinal: Negative for abdominal pain, constipation, diarrhea, nausea, vomiting and GERD.   Genitourinary: Positive for frequency and urgency. Negative for difficulty urinating, dysuria and urinary incontinence.   Musculoskeletal: Positive for arthralgias and back pain.   Skin: Negative for rash and skin lesions.   Neurological: Negative for dizziness, seizures and memory problem.   Hematological: Negative for adenopathy. Bruises/bleeds easily.   Psychiatric/Behavioral: Negative for sleep disturbance and depressed mood.        History  Past Medical History:   Diagnosis Date   • A-fib (CMS/HCC)    • Aortic aneurysm (CMS/HCC)    • Appetite loss    • Cancer (CMS/HCC)     right lobe cancer - surgically removed    • CHF (congestive heart failure) (CMS/HCC)    • Coronary artery  disease    • Dark stools    • Foot pain, bilateral    • Hyperlipidemia    • Low back pain    • S/P epidural steroid injection     Israel Gomes's - no relief   • Weight loss     acute loss of weight 30 lbs     Past Surgical History:   Procedure Laterality Date   • CARDIAC CATHETERIZATION N/A 7/9/2020    Procedure: LEFT HEART CATH with possible PCI;  Surgeon: Wade Cronin MD;  Location: [facility] CATH INVASIVE LOCATION;  Service: Cardiovascular;  Laterality: N/A;  Local and IV sedation   • CARDIAC CATHETERIZATION N/A 7/9/2020    Procedure: Percutaneous Coronary Intervention;  Surgeon: Wade Cronin MD;  Location: [facility] CATH INVASIVE LOCATION;  Service: Cardiovascular;  Laterality: N/A;   • CARDIAC DEFIBRILLATOR PLACEMENT     • CARDIAC ELECTROPHYSIOLOGY PROCEDURE N/A 7/10/2020    Procedure: Biventricular Device Insertion;  Surgeon: Jonathan Denney MD;  Location: [facility] CATH INVASIVE LOCATION;  Service: Cardiovascular;  Laterality: N/A;   • CARDIAC ELECTROPHYSIOLOGY PROCEDURE N/A 7/10/2020    Procedure: ABLATION AV NODE;  Surgeon: Jonathan Denney MD;  Location: [facility] CATH INVASIVE LOCATION;  Service: Cardiovascular;  Laterality: N/A;   • CARDIAC ELECTROPHYSIOLOGY PROCEDURE N/A 7/10/2020    Procedure: AV node ablation;  Surgeon: Jonathan Denney MD;  Location: [facility] CATH INVASIVE LOCATION;  Service: Cardiovascular;  Laterality: N/A;   • CARDIOVASCULAR STRESS TEST  2020   • CATARACT EXTRACTION, BILATERAL     • CONVERTED (HISTORICAL) HOLTER  2020   • CORONARY ANGIOPLASTY WITH STENT PLACEMENT     • ENDOSCOPY N/A 7/5/2020    Procedure: ESOPHAGOGASTRODUODENOSCOPY;  Surgeon: Neal Correa MD;  Location: [facility] ENDOSCOPY;  Service: Gastroenterology;  Laterality: N/A;   • LUMBAR DECOMPRESSION  09/2015    L2-L3   • LUMBAR DECOMPRESSION  2006    Dr. Logan   • OTHER SURGICAL HISTORY  05/2015    left SI fusion with bone graft - Dr. Presley   • OTHER SURGICAL HISTORY      carotid artery repair    • OTHER SURGICAL  HISTORY Left 02/19/2016    Left SI fusion 2/19/2016 Dr. Zendejas   • POSTERIOR SPINAL FUSION  10/24/2016    PSF T12-3/TLIF L3-L4 poss L2-L3 --- Dr. 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   Constitutional:       Appearance: He is well-developed.   HENT:      Head: Normocephalic and atraumatic.   Eyes:      Pupils: Pupils are equal, round, and reactive to light.   Neck:      Musculoskeletal: Normal range of motion.   Cardiovascular:      Rate and Rhythm: Normal rate and regular rhythm.   Pulmonary:      Effort: Pulmonary effort is normal.      Breath sounds: Normal breath sounds.   Abdominal:      General: There is no distension.      Palpations: Abdomen is soft.      Tenderness: There is no abdominal tenderness.      Hernia: No hernia is present. There is no hernia in the left inguinal area or right inguinal area.   Genitourinary:     Penis: Normal and circumcised.       Scrotum/Testes: Normal.   Musculoskeletal: Normal range of motion.   Lymphadenopathy:      Cervical: No cervical adenopathy.   Skin:     General: Skin is warm and dry.      Findings: No rash.   Neurological:      Mental Status: He is alert and oriented to person, place, and time.         Results Review:   Lab Results (last 24 hours)     ** No results found for the last 24 hours. **        No radiology results for the last day      I reviewed the patient's new imaging results and agree with the interpretation.  I reviewed the patient's other test  results and agree with the interpretation    Assessment/Plan     Active Problems:  Right inguinodynia    Unfortunately physical exam today I do not appreciate an inguinal hernia.  The patient does have inguinodynia, and it is difficult to discern whether this is due to a very small discrete hernia or whether or not it is due to referred nerve pain coming from his back.  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  Especially in light of the fact that I did not appreciate an inguinal hernia on today's exam.  Patient does state that his swelling is usually worse at night and in the morning and this would make sense as gravity would have a tendency to play more of a factor on the hernia and the longer he is standing.  Prior to proceeding to the operating room for any type of hernia repair however, I would want the patient to be evaluated by urology given his symptoms of frequency and urgency with regards to his urination.  I suspect that the patient likely has an enlarged prostate contributing to his symptoms which could pose a significant problem postoperatively with urinary retention.  I would recommend that the patient attempt nonsteroidal anti-inflammatories and rest to see if this makes his pain any better.  I will have him follow-up with urology.  If his pain is no better, or if he continues to have swelling and discomfort in his groin that he should return to see me to discuss possible further imaging or surgery at that time if a hernia is evident.  The patient is high risk for surgery given his age and multiple medical comorbidities.      Cody Randall MD  11/11/20  13:38 EST

## 2020-11-12 ENCOUNTER — OFFICE VISIT (OUTPATIENT)
Dept: CARDIOLOGY | Facility: CLINIC | Age: 80
End: 2020-11-12

## 2020-11-12 VITALS
HEIGHT: 73 IN | SYSTOLIC BLOOD PRESSURE: 112 MMHG | HEART RATE: 70 BPM | OXYGEN SATURATION: 97 % | DIASTOLIC BLOOD PRESSURE: 64 MMHG | BODY MASS INDEX: 20.81 KG/M2 | WEIGHT: 157 LBS

## 2020-11-12 DIAGNOSIS — I48.19 PERSISTENT ATRIAL FIBRILLATION (HCC): Primary | Chronic | ICD-10-CM

## 2020-11-12 DIAGNOSIS — I50.23 ACUTE ON CHRONIC SYSTOLIC CONGESTIVE HEART FAILURE (HCC): ICD-10-CM

## 2020-11-12 DIAGNOSIS — I34.0 MITRAL VALVE INSUFFICIENCY, UNSPECIFIED ETIOLOGY: ICD-10-CM

## 2020-11-12 DIAGNOSIS — I25.5 ISCHEMIC CARDIOMYOPATHY: ICD-10-CM

## 2020-11-12 DIAGNOSIS — I25.10 CORONARY ARTERY DISEASE INVOLVING NATIVE CORONARY ARTERY OF NATIVE HEART WITHOUT ANGINA PECTORIS: Chronic | ICD-10-CM

## 2020-11-12 DIAGNOSIS — E78.2 MIXED HYPERLIPIDEMIA: ICD-10-CM

## 2020-11-12 PROCEDURE — 93000 ELECTROCARDIOGRAM COMPLETE: CPT | Performed by: INTERNAL MEDICINE

## 2020-11-12 PROCEDURE — 99214 OFFICE O/P EST MOD 30 MIN: CPT | Performed by: INTERNAL MEDICINE

## 2020-11-12 RX ORDER — MEGESTROL ACETATE 40 MG/ML
400 SUSPENSION ORAL 2 TIMES DAILY
COMMUNITY
Start: 2020-11-03

## 2020-11-12 NOTE — PROGRESS NOTES
Date of Office Visit: 2020  Encounter Provider: Dr. Wade Cronin    Place of Service: [facility] CARDIOLOGY [place]  Patient Name: Shukri Park  :1940  Nikki Gonzales MD    Chief Complaint   Patient presents with   • Follow-up   • Atrial Fibrillation   • Congestive Heart Failure     History of Present Illness    I am pleased to see Mr. Park in my office today as a follow-up.    As you know, patient is 79 years old white gentleman whose past medical history significant for hypertension, atrial fibrillation, cardiomyopathy, CAD, who came today after his recent hospital admission and discharge.    In 2020, patient was admitted with symptom of shortness of breath and palpitation.  He was noted to be in atrial fibrillation with rapid ventricular response.  Patient was initially treated with medication but because of antihypertensive effect of these rate control medications, he became hypotensive.  Subsequently, after discussion with EP, patient underwent AV kelly ablation and was implanted with biventricular AICD device.  Unfortunately, his left-sided lead was not functioning appropriately because of stimulation of diaphragm.  And it was turned off.  Patient also underwent cardiac catheterization which showed high-grade stenosis of RCA but PCI to RCA was unsuccessful.  Nonobstructive disease of left coronary artery was noted.  LVEF was 35% on echocardiogram    Since the previous visit, patient is overall stable.  Patient denies any symptom of angina pectoris or congestive heart failure.  Patient does not have any leg edema.  No orthopnea or PND no syncope and presyncope.  Patient is eating better now he is gained weight which is not due to fluid.    EKG showed atrial fibrillation with ventricular pacing.  No change from previous EKG.    His pacemaker was interrogated by Dr. Denney recently and patient is totally dependent on RV pacing.  Left-sided lead is turned off because of  diaphragmatic stimulation.  Dr. Denney does not recommend epicardial lead at this stage because of age and frailty of the patient.  If needed in future I would consider possible para hasian pacing.  I will discuss with Dr. Denney.  Patient is complaining of right inguinal hernia and is being evaluated by surgery.  Patient can be cleared for surgery if needed.          Past Medical History:   Diagnosis Date   • A-fib (CMS/HCC)    • Aortic aneurysm (CMS/HCC)    • Appetite loss    • Cancer (CMS/HCC)     right lobe cancer - surgically removed    • CHF (congestive heart failure) (CMS/HCC)    • Coronary artery disease    • Dark stools    • Foot pain, bilateral    • Hyperlipidemia    • Low back pain    • S/P epidural steroid injection     [place] Dr Gomes's - no relief   • Weight loss     acute loss of weight 30 lbs         Past Surgical History:   Procedure Laterality Date   • CARDIAC CATHETERIZATION N/A 7/9/2020    Procedure: LEFT HEART CATH with possible PCI;  Surgeon: Wade Cronin MD;  Location: [facility] CATH INVASIVE LOCATION;  Service: Cardiovascular;  Laterality: N/A;  Local and IV sedation   • CARDIAC CATHETERIZATION N/A 7/9/2020    Procedure: Percutaneous Coronary Intervention;  Surgeon: Wade Cronin MD;  Location: [facility] CATH INVASIVE LOCATION;  Service: Cardiovascular;  Laterality: N/A;   • CARDIAC DEFIBRILLATOR PLACEMENT     • CARDIAC ELECTROPHYSIOLOGY PROCEDURE N/A 7/10/2020    Procedure: Biventricular Device Insertion;  Surgeon: Jonathan Denney MD;  Location: [facility] CATH INVASIVE LOCATION;  Service: Cardiovascular;  Laterality: N/A;   • CARDIAC ELECTROPHYSIOLOGY PROCEDURE N/A 7/10/2020    Procedure: ABLATION AV NODE;  Surgeon: Jonathan Denney MD;  Location: [facility] CATH INVASIVE LOCATION;  Service: Cardiovascular;  Laterality: N/A;   • CARDIAC ELECTROPHYSIOLOGY PROCEDURE N/A 7/10/2020    Procedure: AV node ablation;  Surgeon: Jonathan Denney MD;  Location: [facility] CATH INVASIVE LOCATION;   Service: Cardiovascular;  Laterality: N/A;   • CARDIOVASCULAR STRESS TEST  2020   • CATARACT EXTRACTION, BILATERAL     • CONVERTED (HISTORICAL) HOLTER  2020   • CORONARY ANGIOPLASTY WITH STENT PLACEMENT     • ENDOSCOPY N/A 7/5/2020    Procedure: ESOPHAGOGASTRODUODENOSCOPY;  Surgeon: Neal Correa MD;  Location: [facility] ENDOSCOPY;  Service: Gastroenterology;  Laterality: N/A;   • LUMBAR DECOMPRESSION  09/2015    L2-L3   • LUMBAR DECOMPRESSION  2006    Dr. Logan   • OTHER SURGICAL HISTORY  05/2015    left SI fusion with bone graft - Dr. Presley   • OTHER SURGICAL HISTORY      carotid artery repair    • OTHER SURGICAL HISTORY Left 02/19/2016    Left SI fusion 2/19/2016 Dr. Zendejas   • POSTERIOR SPINAL FUSION  10/24/2016    PSF T12-3/TLIF L3-L4 poss L2-L3 --- Dr. 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           Current Outpatient Medications:   •  aspirin (aspirin) 81 MG EC tablet, Take 1 tablet by mouth Daily., Disp: 90 tablet, Rfl: 3  •  diclofenac (VOLTAREN) 1 % gel gel, APPLY TOPICALLY FOUR TIMES A DAY TO LEFT HEEL., Disp: , Rfl:   •  docusate sodium (COLACE) 100 MG capsule, Take 100 mg by mouth 2 (Two) Times a Day., Disp: , Rfl:   •  ferrous sulfate 325 (65 FE) MG tablet, ONE TABLET ORALLY DAILY NOON OR BEFORE DINNER, Disp: , Rfl:   •  HYDROcodone-acetaminophen (NORCO) 7.5-325 MG per tablet, Take 1 tablet by mouth Every 6 (Six) Hours As Needed., Disp: , Rfl:   •  lisinopril (PRINIVIL,ZESTRIL) 2.5 MG tablet, Take 1 tablet by mouth Daily., Disp: 90 tablet, Rfl: 1  •  megestrol (MEGACE) 40 MG/ML suspension, TAKE 10MLS BY MOUTH TWO TIMES DAILY., Disp: , Rfl:   •  midodrine (PROAMATINE) 5 MG tablet, Take 1 tablet by mouth 2 (Two) Times a Day., Disp: 60 tablet, Rfl: 3  •  Multiple Vitamin (MULTIVITAMIN) tablet, Take 1 tablet by mouth Daily., Disp: , Rfl:   •  pantoprazole (PROTONIX) 40 MG EC tablet, Take 1 tablet by mouth Daily., Disp: 30 tablet, Rfl: 0  •  rivaroxaban (XARELTO) 15 MG  tablet, Take 1 tablet by mouth Daily., Disp: 60 tablet, Rfl: 3  •  simvastatin (ZOCOR) 40 MG tablet, Take 80 mg by mouth Every Night., Disp: , Rfl:   •  spironolactone (ALDACTONE) 25 MG tablet, Take 0.5 tablets by mouth Daily. (Patient taking differently: Take 12.5 mg by mouth Every Other Day.), Disp: 30 tablet, Rfl: 0  •  vitamin B-12 (CYANOCOBALAMIN) 1000 MCG tablet, Take 1,000 mcg by mouth Daily., Disp: , Rfl:       Social History     Socioeconomic History   • Marital status: Legally      Spouse name: Not on file   • Number of children: Not on file   • Years of education: Not on file   • Highest education level: Not on file   Tobacco Use   • Smoking status: Never Smoker   • Smokeless tobacco: Never Used   Substance and Sexual Activity   • Alcohol use: No     Frequency: Never   • Drug use: Never   • Sexual activity: Defer         Review of Systems   Constitution: Negative for chills and fever.   HENT: Negative for ear discharge and nosebleeds.    Eyes: Negative for discharge and redness.   Cardiovascular: Negative for chest pain, orthopnea, palpitations, paroxysmal nocturnal dyspnea and syncope.   Respiratory: Positive for shortness of breath. Negative for cough and wheezing.    Endocrine: Negative for heat intolerance.   Skin: Negative for rash.   Musculoskeletal: Positive for arthritis and joint pain. Negative for myalgias.   Gastrointestinal: Negative for abdominal pain, melena, nausea and vomiting.   Genitourinary: Negative for dysuria and hematuria.   Neurological: Negative for dizziness, light-headedness, numbness and tremors.   Psychiatric/Behavioral: Negative for depression. The patient is not nervous/anxious.        Procedures      ECG 12 Lead    Date/Time: 11/12/2020 2:16 PM  Performed by: Wade Cronin MD  Authorized by: Wade Cronin MD   Comparison: compared with previous ECG   Similar to previous ECG  Rhythm: atrial fibrillation and paced    Clinical impression: abnormal EKG            ECG  "12 Lead    (Results Pending)           Objective:    /64   Pulse 70   Ht 185.4 cm (72.99\")   Wt 71.2 kg (157 lb)   SpO2 97%   BMI 20.72 kg/m²         Constitutional:       Appearance: Well-developed.   Eyes:      General: No scleral icterus.        Right eye: No discharge.   HENT:      Head: Normocephalic and atraumatic.   Neck:      Thyroid: No thyromegaly.      Lymphadenopathy: No cervical adenopathy.   Pulmonary:      Effort: Pulmonary effort is normal. No respiratory distress.      Breath sounds: Normal breath sounds. No wheezing. No rales.   Cardiovascular:      Normal rate. Regular rhythm.      No gallop.   Abdominal:      Tenderness: There is no abdominal tenderness.   Skin:     Findings: No erythema or rash.   Neurological:      Mental Status: Alert and oriented to person, place, and time.             Assessment:       Diagnosis Plan   1. Persistent atrial fibrillation (CMS/HCC)     2. Coronary artery disease involving native coronary artery of native heart without angina pectoris     3. Mixed hyperlipidemia     4. Ischemic cardiomyopathy     5. Acute on chronic systolic congestive heart failure (CMS/HCC)     6. Mitral valve insufficiency, unspecified etiology              Plan:       Patient is doing well from cardiac standpoint clinically.  No sign and symptom of congestive heart failure.  Blood pressure is controlled.  Continue current therapy.  I recommend pacemaker interrogation on next visit.  "

## 2020-12-21 ENCOUNTER — OFFICE VISIT (OUTPATIENT)
Dept: SURGERY | Facility: CLINIC | Age: 80
End: 2020-12-21

## 2020-12-21 VITALS
TEMPERATURE: 97 F | DIASTOLIC BLOOD PRESSURE: 84 MMHG | OXYGEN SATURATION: 96 % | HEART RATE: 72 BPM | BODY MASS INDEX: 20.54 KG/M2 | SYSTOLIC BLOOD PRESSURE: 138 MMHG | HEIGHT: 73 IN | WEIGHT: 155 LBS

## 2020-12-21 DIAGNOSIS — R10.31 INGUINODYNIA, RIGHT: ICD-10-CM

## 2020-12-21 DIAGNOSIS — K40.90 RIGHT INGUINAL HERNIA: Primary | ICD-10-CM

## 2020-12-21 PROCEDURE — 99214 OFFICE O/P EST MOD 30 MIN: CPT | Performed by: SURGERY

## 2020-12-21 RX ORDER — CHLORHEXIDINE GLUCONATE 0.12 MG/ML
15 RINSE ORAL EVERY 12 HOURS SCHEDULED
Status: CANCELLED | OUTPATIENT
Start: 2020-12-21

## 2020-12-21 RX ORDER — SODIUM CHLORIDE 9 MG/ML
100 INJECTION, SOLUTION INTRAVENOUS CONTINUOUS
Status: CANCELLED | OUTPATIENT
Start: 2020-12-21

## 2020-12-21 NOTE — PROGRESS NOTES
GENERAL SURGERY ESTABLISHED PATIENT NOTE    Consult requested by: Dr. Carrizales    Patient Care Team:  Nikki Gonzales MD as PCP - General (Family Medicine)    Reason for consult: Right inguinal hernia    Subjective   From 11/12/2020:  Patient is a 80 y.o. male presents with a bulge and pain is started in his right groin approximately 3 to 4 weeks ago.  He states that the pain is usually not bad in the morning, but worsens throughout the day especially when he is walking.  He has a near constant pain in his right groin which is worse when he flexes his thigh.  He goes from a dull pain to sharp shooting pains.  He does not have pain when he is lying down.  He has not had any problems eating, with no nausea, vomiting or difficulty having bowel movements.  The patient does state that he urinates approximately 6 times a night.  He also had a fall in last November and had a pacemaker placed in July 2020.     From today:  Patient reports that he was seen by urology who noted his urgency and frequency, and did not feel any further urologic procedures were needed prior to proceeding with inguinal hernia repair.  He continues to have a dull aching pain in his right groin, and wishes to have his inguinal hernia repaired.  He continues to have urinary frequency, but is not having any difficulties passing his urine.    Review of Systems   Constitutional: Negative for appetite change, chills and fever.   HENT: Negative for congestion and sore throat.    Respiratory: Negative for cough and shortness of breath.    Cardiovascular: Negative for chest pain and palpitations.   Gastrointestinal: Negative for abdominal pain, constipation, diarrhea, nausea, vomiting and GERD.   Genitourinary: Positive for frequency and urgency. Negative for difficulty urinating, dysuria and urinary incontinence.   Musculoskeletal: Positive for arthralgias and back pain.   Skin: Negative for rash and skin lesions.   Neurological: Negative for dizziness,  seizures and memory problem.   Hematological: Negative for adenopathy. Bruises/bleeds easily.   Psychiatric/Behavioral: Negative for sleep disturbance and depressed mood.        History  Past Medical History:   Diagnosis Date   • A-fib (CMS/HCC)    • Aortic aneurysm (CMS/HCC)    • Appetite loss    • Cancer (CMS/HCC)     right lobe cancer - surgically removed    • CHF (congestive heart failure) (CMS/HCC)    • Coronary artery disease    • Dark stools    • Foot pain, bilateral    • Hyperlipidemia    • Low back pain    • S/P epidural steroid injection     [facility] - no relief   • Weight loss     acute loss of weight 30 lbs     Past Surgical History:   Procedure Laterality Date   • CARDIAC CATHETERIZATION N/A 7/9/2020    Procedure: LEFT HEART CATH with possible PCI;  Surgeon: Wade Cronin MD;  Location: [facility] CATH INVASIVE LOCATION;  Service: Cardiovascular;  Laterality: N/A;  Local and IV sedation   • CARDIAC CATHETERIZATION N/A 7/9/2020    Procedure: Percutaneous Coronary Intervention;  Surgeon: Wade Crnoin MD;  Location: [facility] CATH INVASIVE LOCATION;  Service: Cardiovascular;  Laterality: N/A;   • CARDIAC DEFIBRILLATOR PLACEMENT     • CARDIAC ELECTROPHYSIOLOGY PROCEDURE N/A 7/10/2020    Procedure: Biventricular Device Insertion;  Surgeon: Jonathan Denney MD;  Location: [facility] CATH INVASIVE LOCATION;  Service: Cardiovascular;  Laterality: N/A;   • CARDIAC ELECTROPHYSIOLOGY PROCEDURE N/A 7/10/2020    Procedure: ABLATION AV NODE;  Surgeon: Jonathan Denney MD;  Location: [facility] CATH INVASIVE LOCATION;  Service: Cardiovascular;  Laterality: N/A;   • CARDIAC ELECTROPHYSIOLOGY PROCEDURE N/A 7/10/2020    Procedure: AV node ablation;  Surgeon: Jonathan Denney MD;  Location: [facility] CATH INVASIVE LOCATION;  Service: Cardiovascular;  Laterality: N/A;   • CARDIOVASCULAR STRESS TEST  2020   • CATARACT EXTRACTION, BILATERAL     • CONVERTED (HISTORICAL) HOLTER  2020   • CORONARY ANGIOPLASTY WITH STENT  PLACEMENT     • ENDOSCOPY N/A 2020    Procedure: ESOPHAGOGASTRODUODENOSCOPY;  Surgeon: Neal Correa MD;  Location: [facility] ENDOSCOPY;  Service: Gastroenterology;  Laterality: N/A;   • LUMBAR DECOMPRESSION  2015    L2-L3   • LUMBAR DECOMPRESSION      Dr. Logan   • OTHER SURGICAL HISTORY  2015    left SI fusion with bone graft - Dr. Presley   • OTHER SURGICAL HISTORY      carotid artery repair    • OTHER SURGICAL HISTORY Left 2016    Left SI fusion 2016 Dr. Zendejas   • POSTERIOR SPINAL FUSION  10/24/2016    PSF T12-3/TLIF L3-L4 poss L2-L3 --- Dr. 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     Family History   Problem Relation Age of Onset   • Cancer Other    • Hyperlipidemia Other    • Heart disease Other      Social History     Tobacco Use   • Smoking status: Former Smoker     Quit date: 1989     Years since quittin.0   • Smokeless tobacco: Never Used   Substance Use Topics   • Alcohol use: No     Frequency: Never   • Drug use: Never     (Not in a hospital admission)    Allergies:  Ciprofloxacin, Amlodipine, and Rocephin [ceftriaxone]    Objective     Vital Signs  Temp:  [97 °F (36.1 °C)] 97 °F (36.1 °C)  Heart Rate:  [72] 72  BP: (138)/(84) 138/84    Physical Exam  Vitals signs reviewed.   Constitutional:       Appearance: He is well-developed.   HENT:      Head: Normocephalic and atraumatic.   Eyes:      Pupils: Pupils are equal, round, and reactive to light.   Neck:      Musculoskeletal: Normal range of motion.   Cardiovascular:      Rate and Rhythm: Normal rate and regular rhythm.   Pulmonary:      Effort: Pulmonary effort is normal.      Breath sounds: Normal breath sounds.   Abdominal:      General: There is no distension.      Palpations: Abdomen is soft.      Tenderness: There is no abdominal tenderness.      Hernia: A hernia is present. Hernia is present in the right inguinal area. There is no hernia in the left inguinal area.    Genitourinary:     Penis: Normal and circumcised.       Scrotum/Testes: Normal.      Comments: Patient has a large, soft, easily reducible, but readily recurring right inguinal hernia noted today on physical exam.  Musculoskeletal: Normal range of motion.   Lymphadenopathy:      Cervical: No cervical adenopathy.   Skin:     General: Skin is warm and dry.      Findings: No rash.   Neurological:      Mental Status: He is alert and oriented to person, place, and time.         Results Review:   Lab Results (last 24 hours)     ** No results found for the last 24 hours. **        No radiology results for the last day      I reviewed the patient's new imaging results and agree with the interpretation.  I reviewed the patient's other test results and agree with the interpretation    Assessment/Plan     Active Problems:  Right inguinodynia  Right inguinal hernia    Today on physical exam, the patient's right inguinal hernia is evident.  Given the persistence of symptoms, the patient would likely benefit from open inguinal hernia repair.  The patient does take Xarelto and aspirin at home.  These will need to be stopped prior to proceeding to the operating room for repair.  He also is followed by cardiology, Dr. Cronin and Dr. Denney, for his atrial fibrillation and pacemaker.  He will need to obtain cardiac clearance prior to proceeding to the operating room.  I discussed with the patient the natural history of inguinal hernias, and we discussed their surgical management which includes both nonoperative and operative management.  We discussed watchful waiting as a potential management strategy for asymptomatic inguinal hernias.  We discussed operative management with both laparoscopic and open techniques.  The risks of these surgeries were discussed with the patient which include, but are not limited to bleeding, infection, damage to the spermatic cord vessels, damage to the ilioinguinal nerve resulting in chronic pain,  mesh infection, and recurrence.  The patient understands, and agrees to proceed with open right inguinal hernia repair with mesh and tap block.  We discussed signs and symptoms of incarceration and strangulation, and the patient understands that should he develop any such symptoms he should call my office immediately or present to the emergency room for prompt evaluation.    Cody Randall MD  12/21/20  14:52 EST

## 2020-12-23 PROBLEM — K40.90 RIGHT INGUINAL HERNIA: Status: ACTIVE | Noted: 2020-12-23

## 2020-12-23 PROBLEM — R10.31 INGUINODYNIA, RIGHT: Status: ACTIVE | Noted: 2020-12-23

## 2020-12-29 ENCOUNTER — LAB (OUTPATIENT)
Dept: LAB | Facility: HOSPITAL | Age: 80
End: 2020-12-29

## 2020-12-29 ENCOUNTER — HOSPITAL ENCOUNTER (OUTPATIENT)
Dept: CARDIOLOGY | Facility: HOSPITAL | Age: 80
Discharge: HOME | End: 2020-12-29

## 2020-12-29 ENCOUNTER — HOSPITAL ENCOUNTER (OUTPATIENT)
Dept: GENERAL RADIOLOGY | Facility: HOSPITAL | Age: 80
Discharge: HOME | End: 2020-12-29

## 2020-12-29 DIAGNOSIS — K40.90 RIGHT INGUINAL HERNIA: ICD-10-CM

## 2020-12-29 DIAGNOSIS — R10.31 INGUINODYNIA, RIGHT: ICD-10-CM

## 2020-12-29 LAB
ALBUMIN SERPL-MCNC: 4.3 G/DL (ref 3.5–5.2)
ALBUMIN/GLOB SERPL: 2.2 G/DL
ALP SERPL-CCNC: 53 U/L (ref 39–117)
ALT SERPL W P-5'-P-CCNC: 12 U/L (ref 1–41)
ANION GAP SERPL CALCULATED.3IONS-SCNC: 8.4 MMOL/L (ref 5–15)
APTT PPP: 27.3 SECONDS (ref 24–31)
AST SERPL-CCNC: 20 U/L (ref 1–40)
BASOPHILS # BLD AUTO: 0.05 10*3/MM3 (ref 0–0.2)
BASOPHILS NFR BLD AUTO: 0.6 % (ref 0–1.5)
BILIRUB SERPL-MCNC: 0.7 MG/DL (ref 0–1.2)
BILIRUB UR QL STRIP: NEGATIVE
BUN SERPL-MCNC: 11 MG/DL (ref 8–23)
BUN/CREAT SERPL: 12.9 (ref 7–25)
CALCIUM SPEC-SCNC: 9.2 MG/DL (ref 8.6–10.5)
CHLORIDE SERPL-SCNC: 105 MMOL/L (ref 98–107)
CLARITY UR: CLEAR
CO2 SERPL-SCNC: 26.6 MMOL/L (ref 22–29)
COLOR UR: ABNORMAL
CREAT SERPL-MCNC: 0.85 MG/DL (ref 0.76–1.27)
DEPRECATED RDW RBC AUTO: 48.1 FL (ref 37–54)
EOSINOPHIL # BLD AUTO: 0.21 10*3/MM3 (ref 0–0.4)
EOSINOPHIL NFR BLD AUTO: 2.4 % (ref 0.3–6.2)
ERYTHROCYTE [DISTWIDTH] IN BLOOD BY AUTOMATED COUNT: 13.4 % (ref 12.3–15.4)
GFR SERPL CREATININE-BSD FRML MDRD: 87 ML/MIN/1.73
GLOBULIN UR ELPH-MCNC: 2 GM/DL
GLUCOSE SERPL-MCNC: 87 MG/DL (ref 65–99)
GLUCOSE UR STRIP-MCNC: NEGATIVE MG/DL
HCT VFR BLD AUTO: 39.2 % (ref 37.5–51)
HGB BLD-MCNC: 13 G/DL (ref 13–17.7)
HGB UR QL STRIP.AUTO: NEGATIVE
IMM GRANULOCYTES # BLD AUTO: 0.04 10*3/MM3 (ref 0–0.05)
IMM GRANULOCYTES NFR BLD AUTO: 0.5 % (ref 0–0.5)
KETONES UR QL STRIP: NEGATIVE
LEUKOCYTE ESTERASE UR QL STRIP.AUTO: NEGATIVE
LYMPHOCYTES # BLD AUTO: 2.64 10*3/MM3 (ref 0.7–3.1)
LYMPHOCYTES NFR BLD AUTO: 30.2 % (ref 19.6–45.3)
MCH RBC QN AUTO: 31.7 PG (ref 26.6–33)
MCHC RBC AUTO-ENTMCNC: 33.2 G/DL (ref 31.5–35.7)
MCV RBC AUTO: 95.6 FL (ref 79–97)
MONOCYTES # BLD AUTO: 0.88 10*3/MM3 (ref 0.1–0.9)
MONOCYTES NFR BLD AUTO: 10.1 % (ref 5–12)
NEUTROPHILS NFR BLD AUTO: 4.91 10*3/MM3 (ref 1.7–7)
NEUTROPHILS NFR BLD AUTO: 56.2 % (ref 42.7–76)
NITRITE UR QL STRIP: NEGATIVE
NRBC BLD AUTO-RTO: 0 /100 WBC (ref 0–0.2)
PH UR STRIP.AUTO: 5.5 [PH] (ref 5–8)
PLATELET # BLD AUTO: 209 10*3/MM3 (ref 140–450)
PMV BLD AUTO: 10.1 FL (ref 6–12)
POTASSIUM SERPL-SCNC: 4.1 MMOL/L (ref 3.5–5.2)
PROT SERPL-MCNC: 6.3 G/DL (ref 6–8.5)
PROT UR QL STRIP: ABNORMAL
QT INTERVAL: 443 MS
RBC # BLD AUTO: 4.1 10*6/MM3 (ref 4.14–5.8)
SODIUM SERPL-SCNC: 140 MMOL/L (ref 136–145)
SP GR UR STRIP: 1.03 (ref 1–1.03)
UROBILINOGEN UR QL STRIP: ABNORMAL
WBC # BLD AUTO: 8.73 10*3/MM3 (ref 3.4–10.8)

## 2020-12-29 PROCEDURE — 85025 COMPLETE CBC W/AUTO DIFF WBC: CPT

## 2020-12-29 PROCEDURE — 80053 COMPREHEN METABOLIC PANEL: CPT

## 2020-12-29 PROCEDURE — 93010 ELECTROCARDIOGRAM REPORT: CPT | Performed by: INTERNAL MEDICINE

## 2020-12-29 PROCEDURE — 36415 COLL VENOUS BLD VENIPUNCTURE: CPT

## 2020-12-29 PROCEDURE — 81003 URINALYSIS AUTO W/O SCOPE: CPT

## 2020-12-29 PROCEDURE — 93005 ELECTROCARDIOGRAM TRACING: CPT | Performed by: SURGERY

## 2020-12-29 PROCEDURE — 85730 THROMBOPLASTIN TIME PARTIAL: CPT

## 2020-12-29 PROCEDURE — 71046 X-RAY EXAM CHEST 2 VIEWS: CPT

## 2021-01-05 ENCOUNTER — LAB (OUTPATIENT)
Dept: LAB | Facility: HOSPITAL | Age: 81
End: 2021-01-05

## 2021-01-05 DIAGNOSIS — K40.90 RIGHT INGUINAL HERNIA: ICD-10-CM

## 2021-01-05 DIAGNOSIS — R10.31 INGUINODYNIA, RIGHT: ICD-10-CM

## 2021-01-05 LAB — SARS-COV-2 ORF1AB RESP QL NAA+PROBE: NOT DETECTED

## 2021-01-05 PROCEDURE — C9803 HOPD COVID-19 SPEC COLLECT: HCPCS

## 2021-01-05 PROCEDURE — U0004 COV-19 TEST NON-CDC HGH THRU: HCPCS

## 2021-01-07 ENCOUNTER — HOSPITAL ENCOUNTER (OUTPATIENT)
Facility: HOSPITAL | Age: 81
Setting detail: HOSPITAL OUTPATIENT SURGERY
Discharge: HOME | End: 2021-01-07
Attending: SURGERY | Admitting: SURGERY

## 2021-01-07 ENCOUNTER — ANESTHESIA (OUTPATIENT)
Dept: PERIOP | Facility: HOSPITAL | Age: 81
End: 2021-01-07

## 2021-01-07 ENCOUNTER — ANESTHESIA EVENT (OUTPATIENT)
Dept: PERIOP | Facility: HOSPITAL | Age: 81
End: 2021-01-07

## 2021-01-07 VITALS
BODY MASS INDEX: 20.94 KG/M2 | SYSTOLIC BLOOD PRESSURE: 143 MMHG | TEMPERATURE: 97.6 F | OXYGEN SATURATION: 97 % | HEIGHT: 73 IN | RESPIRATION RATE: 13 BRPM | DIASTOLIC BLOOD PRESSURE: 81 MMHG | WEIGHT: 158 LBS | HEART RATE: 70 BPM

## 2021-01-07 DIAGNOSIS — K40.90 RIGHT INGUINAL HERNIA: ICD-10-CM

## 2021-01-07 DIAGNOSIS — R10.31 INGUINODYNIA, RIGHT: ICD-10-CM

## 2021-01-07 PROCEDURE — 88304 TISSUE EXAM BY PATHOLOGIST: CPT | Performed by: SURGERY

## 2021-01-07 PROCEDURE — C1781 MESH (IMPLANTABLE): HCPCS | Performed by: SURGERY

## 2021-01-07 PROCEDURE — 25010000002 ONDANSETRON PER 1 MG: Performed by: NURSE ANESTHETIST, CERTIFIED REGISTERED

## 2021-01-07 PROCEDURE — 25010000002 FENTANYL CITRATE (PF) 100 MCG/2ML SOLUTION: Performed by: NURSE ANESTHETIST, CERTIFIED REGISTERED

## 2021-01-07 PROCEDURE — 25010000002 DEXAMETHASONE PER 1 MG: Performed by: ANESTHESIOLOGY

## 2021-01-07 PROCEDURE — 49505 PRP I/HERN INIT REDUC >5 YR: CPT | Performed by: SURGERY

## 2021-01-07 PROCEDURE — 25010000002 ROPIVACAINE PER 1 MG: Performed by: ANESTHESIOLOGY

## 2021-01-07 PROCEDURE — 49505 PRP I/HERN INIT REDUC >5 YR: CPT

## 2021-01-07 PROCEDURE — 25010000002 HYDROMORPHONE PER 4 MG: Performed by: NURSE ANESTHETIST, CERTIFIED REGISTERED

## 2021-01-07 PROCEDURE — 25010000002 PROPOFOL 10 MG/ML EMULSION: Performed by: NURSE ANESTHETIST, CERTIFIED REGISTERED

## 2021-01-07 PROCEDURE — 88302 TISSUE EXAM BY PATHOLOGIST: CPT | Performed by: SURGERY

## 2021-01-07 DEVICE — MESH FLUT SHT 3X6IN: Type: IMPLANTABLE DEVICE | Site: GROIN | Status: FUNCTIONAL

## 2021-01-07 RX ORDER — CLINDAMYCIN PHOSPHATE 900 MG/50ML
900 INJECTION, SOLUTION INTRAVENOUS ONCE
Status: DISCONTINUED | OUTPATIENT
Start: 2021-01-07 | End: 2021-01-07 | Stop reason: HOSPADM

## 2021-01-07 RX ORDER — KETAMINE HYDROCHLORIDE 10 MG/ML
INJECTION INTRAMUSCULAR; INTRAVENOUS AS NEEDED
Status: DISCONTINUED | OUTPATIENT
Start: 2021-01-07 | End: 2021-01-07 | Stop reason: SURG

## 2021-01-07 RX ORDER — ONDANSETRON 2 MG/ML
INJECTION INTRAMUSCULAR; INTRAVENOUS AS NEEDED
Status: DISCONTINUED | OUTPATIENT
Start: 2021-01-07 | End: 2021-01-07 | Stop reason: SURG

## 2021-01-07 RX ORDER — LIDOCAINE HYDROCHLORIDE 20 MG/ML
INJECTION, SOLUTION EPIDURAL; INFILTRATION; INTRACAUDAL; PERINEURAL AS NEEDED
Status: DISCONTINUED | OUTPATIENT
Start: 2021-01-07 | End: 2021-01-07 | Stop reason: SURG

## 2021-01-07 RX ORDER — BUPIVACAINE HYDROCHLORIDE 2.5 MG/ML
INJECTION, SOLUTION EPIDURAL; INFILTRATION; INTRACAUDAL AS NEEDED
Status: DISCONTINUED | OUTPATIENT
Start: 2021-01-07 | End: 2021-01-07 | Stop reason: HOSPADM

## 2021-01-07 RX ORDER — DEXAMETHASONE SODIUM PHOSPHATE 4 MG/ML
INJECTION, SOLUTION INTRA-ARTICULAR; INTRALESIONAL; INTRAMUSCULAR; INTRAVENOUS; SOFT TISSUE
Status: DISCONTINUED | OUTPATIENT
Start: 2021-01-07 | End: 2021-01-07 | Stop reason: SURG

## 2021-01-07 RX ORDER — ETOMIDATE 2 MG/ML
INJECTION INTRAVENOUS AS NEEDED
Status: DISCONTINUED | OUTPATIENT
Start: 2021-01-07 | End: 2021-01-07 | Stop reason: SURG

## 2021-01-07 RX ORDER — ACETAMINOPHEN 650 MG/1
650 SUPPOSITORY RECTAL ONCE AS NEEDED
Status: DISCONTINUED | OUTPATIENT
Start: 2021-01-07 | End: 2021-01-07 | Stop reason: HOSPADM

## 2021-01-07 RX ORDER — ROPIVACAINE HYDROCHLORIDE 5 MG/ML
INJECTION, SOLUTION EPIDURAL; INFILTRATION; PERINEURAL
Status: DISCONTINUED | OUTPATIENT
Start: 2021-01-07 | End: 2021-01-07 | Stop reason: SURG

## 2021-01-07 RX ORDER — ACETAMINOPHEN 325 MG/1
650 TABLET ORAL ONCE AS NEEDED
Status: DISCONTINUED | OUTPATIENT
Start: 2021-01-07 | End: 2021-01-07 | Stop reason: HOSPADM

## 2021-01-07 RX ORDER — SODIUM CHLORIDE, SODIUM LACTATE, POTASSIUM CHLORIDE, CALCIUM CHLORIDE 600; 310; 30; 20 MG/100ML; MG/100ML; MG/100ML; MG/100ML
1000 INJECTION, SOLUTION INTRAVENOUS CONTINUOUS
Status: DISCONTINUED | OUTPATIENT
Start: 2021-01-07 | End: 2021-01-07 | Stop reason: HOSPADM

## 2021-01-07 RX ORDER — HYDROMORPHONE HCL 110MG/55ML
0.25 PATIENT CONTROLLED ANALGESIA SYRINGE INTRAVENOUS
Status: DISCONTINUED | OUTPATIENT
Start: 2021-01-07 | End: 2021-01-07 | Stop reason: HOSPADM

## 2021-01-07 RX ORDER — ONDANSETRON 2 MG/ML
4 INJECTION INTRAMUSCULAR; INTRAVENOUS ONCE AS NEEDED
Status: DISCONTINUED | OUTPATIENT
Start: 2021-01-07 | End: 2021-01-07 | Stop reason: HOSPADM

## 2021-01-07 RX ORDER — HYDROCODONE BITARTRATE AND ACETAMINOPHEN 5; 325 MG/1; MG/1
1-2 TABLET ORAL EVERY 4 HOURS PRN
Qty: 30 TABLET | Refills: 0 | Status: SHIPPED | OUTPATIENT
Start: 2021-01-07

## 2021-01-07 RX ORDER — PROPOFOL 10 MG/ML
VIAL (ML) INTRAVENOUS AS NEEDED
Status: DISCONTINUED | OUTPATIENT
Start: 2021-01-07 | End: 2021-01-07 | Stop reason: SURG

## 2021-01-07 RX ORDER — FENTANYL CITRATE 50 UG/ML
INJECTION, SOLUTION INTRAMUSCULAR; INTRAVENOUS AS NEEDED
Status: DISCONTINUED | OUTPATIENT
Start: 2021-01-07 | End: 2021-01-07 | Stop reason: SURG

## 2021-01-07 RX ORDER — HYDROCODONE BITARTRATE AND ACETAMINOPHEN 5; 325 MG/1; MG/1
1 TABLET ORAL ONCE AS NEEDED
Status: COMPLETED | OUTPATIENT
Start: 2021-01-07 | End: 2021-01-07

## 2021-01-07 RX ORDER — CLINDAMYCIN PHOSPHATE 900 MG/50ML
INJECTION, SOLUTION INTRAVENOUS AS NEEDED
Status: DISCONTINUED | OUTPATIENT
Start: 2021-01-07 | End: 2021-01-07 | Stop reason: SURG

## 2021-01-07 RX ADMIN — FENTANYL CITRATE 25 MCG: 50 INJECTION, SOLUTION INTRAMUSCULAR; INTRAVENOUS at 10:45

## 2021-01-07 RX ADMIN — FENTANYL CITRATE 25 MCG: 50 INJECTION, SOLUTION INTRAMUSCULAR; INTRAVENOUS at 09:57

## 2021-01-07 RX ADMIN — ETOMIDATE 8 MG: 2 INJECTION, SOLUTION INTRAVENOUS at 09:20

## 2021-01-07 RX ADMIN — CLINDAMYCIN PHOSPHATE 900 MG: 900 INJECTION, SOLUTION INTRAVENOUS at 09:25

## 2021-01-07 RX ADMIN — SODIUM CHLORIDE, POTASSIUM CHLORIDE, SODIUM LACTATE AND CALCIUM CHLORIDE 1000 ML: 600; 310; 30; 20 INJECTION, SOLUTION INTRAVENOUS at 07:54

## 2021-01-07 RX ADMIN — HYDROMORPHONE HYDROCHLORIDE 0.25 MG: 2 INJECTION, SOLUTION INTRAMUSCULAR; INTRAVENOUS; SUBCUTANEOUS at 11:17

## 2021-01-07 RX ADMIN — PROPOFOL 50 MG: 10 INJECTION, EMULSION INTRAVENOUS at 09:20

## 2021-01-07 RX ADMIN — PROPOFOL 20 MG: 10 INJECTION, EMULSION INTRAVENOUS at 09:28

## 2021-01-07 RX ADMIN — HYDROCODONE BITARTRATE AND ACETAMINOPHEN 1 TABLET: 5; 325 TABLET ORAL at 11:19

## 2021-01-07 RX ADMIN — SODIUM CHLORIDE, POTASSIUM CHLORIDE, SODIUM LACTATE AND CALCIUM CHLORIDE: 600; 310; 30; 20 INJECTION, SOLUTION INTRAVENOUS at 09:13

## 2021-01-07 RX ADMIN — LIDOCAINE HYDROCHLORIDE 40 MG: 20 INJECTION, SOLUTION EPIDURAL; INFILTRATION; INTRACAUDAL; PERINEURAL at 09:20

## 2021-01-07 RX ADMIN — KETAMINE HYDROCHLORIDE 20 MG: 10 INJECTION INTRAMUSCULAR; INTRAVENOUS at 09:28

## 2021-01-07 RX ADMIN — FENTANYL CITRATE 25 MCG: 50 INJECTION, SOLUTION INTRAMUSCULAR; INTRAVENOUS at 09:38

## 2021-01-07 RX ADMIN — ROPIVACAINE HYDROCHLORIDE 30 ML: 5 INJECTION, SOLUTION EPIDURAL; INFILTRATION; PERINEURAL at 10:35

## 2021-01-07 RX ADMIN — DEXAMETHASONE SODIUM PHOSPHATE 4 MG: 4 INJECTION, SOLUTION INTRAMUSCULAR; INTRAVENOUS at 10:35

## 2021-01-07 RX ADMIN — ONDANSETRON 4 MG: 2 INJECTION INTRAMUSCULAR; INTRAVENOUS at 10:16

## 2021-01-07 NOTE — DISCHARGE INSTRUCTIONS
Ok for discharge  Follow-up with me (Dr. Randall) in 2 weeks  Regular diet as tolerated  Ok to shower starting tomorrow. No tub baths, pools, lakes, or streams for 1 week.  No heavy lifting (greater than 20 lbs) for 6 weeks after surgery  Call my office or present to the ED with: fevers greater than 101.5, redness around the incisions, drainage from the incisions, intractable nausea/vomiting, or pain that is getting worse instead of better    PATIENT INSTRUCTIONS  HERNIA    FOLLOW-UP: Please make an appointment with your physician in 2 weeks. Call your physician immediately if you have any fevers greater than 102.5 degrees Fahrenheit, drainage from your wound that is not clear or looks infected, persistent bleeding, increasing abdominal pain, problems urinating, or persistent nausea/vomiting.     WOUND CARE INSTRUCTIONS: Keep a dry clean dressing on the wound if there is drainage. The initial bandage may be removed after 24 hours. Once the wound has quit draining, you may leave it open to air. If clothing rubs against the wound or causes irritation and the wound is not draining, you may cover it with a dry dressing during the daytime. Try to keep the wound dry, and avoid ointments on the wound unless directed to do so. If the wound becomes bright red and painful or starts to drain infected material that is not clear, please contact your physician immediately. If the wound is mildly pink and has a thick firm ridge underneath it, this is normal and is referred to as a healing ridge. This will resolve over the next 4-6 weeks.    DIET: You may eat any foods that you can tolerate. It is a good idea to eat a high fiber diet, and take in plenty of fluids to prevent constipation. If you do become constipated, you may want to take a mild laxative or take Dulcolax tablets on a daily basis until your bowel habits are regular. After recent abdominal surgery, constipation can be very uncomfortable and straining.    ACTIVITY:  You are encouraged to cough and take deep breaths, or if you were given one, use your incentive spirometer every 15-30 minutes when awake. This will help prevent respiratory complications and low grade fevers post-operatively. You may want to hug a pillow when coughing and sneezing to add additional support to the surgical area which will decrease pain during these times. You are encouraged to walk and engage in light activity for the next two weeks. You should not lift more than 20 pounds during this time frame as it could put you at increased risk for a hernia recurrence. Twenty pounds is roughly equivalent to a plastic bag of groceries.     MEDICATIONS: Try to take narcotic medications and anti-inflammatory medications, such as Tylenol, ibuprofen, Naprosyn, etc., with food. This will minimize stomach upset from the medication. Should you develop nausea and vomiting from the pain medication, or develop a rash, please discontinue the medication and contact your physician. You should not drive, make important decisions, or operate machinery when taking narcotic pain medication.    QUESTIONS: Please feel free to call your physician or the hospital  if you have any questions, and they will be glad to assist you.    Inguinal Hernia, Adult  An inguinal hernia is when fat or your intestines push through a weak spot in a muscle where your leg meets your lower belly (groin). This causes a rounded lump (bulge). This kind of hernia could also be:  · In your scrotum, if you are male.  · In folds of skin around your vagina, if you are female.  There are three types of inguinal hernias. These include:  · Hernias that can be pushed back into the belly (are reducible). This type rarely causes pain.  · Hernias that cannot be pushed back into the belly (are incarcerated).  · Hernias that cannot be pushed back into the belly and lose their blood supply (are strangulated). This type needs emergency surgery.  If you do not  have symptoms, you may not need treatment. If you have symptoms or a large hernia, you may need surgery.  Follow these instructions at home:  Lifestyle  · Do these things if told by your doctor so you do not have trouble pooping (constipation):  ? Drink enough fluid to keep your pee (urine) pale yellow.  ? Eat foods that have a lot of fiber. These include fresh fruits and vegetables, whole grains, and beans.  ? Limit foods that are high in fat and processed sugars. These include foods that are fried or sweet.  ? Take medicine for trouble pooping.  · Avoid lifting heavy objects.  · Avoid standing for long amounts of time.  · Do not use any products that contain nicotine or tobacco. These include cigarettes and e-cigarettes. If you need help quitting, ask your doctor.  · Stay at a healthy weight.  General instructions  · You may try to push your hernia in by very gently pressing on it when you are lying down. Do not try to force the bulge back in if it will not push in easily.  · Watch your hernia for any changes in shape, size, or color. Tell your doctor if you see any changes.  · Take over-the-counter and prescription medicines only as told by your doctor.  · Keep all follow-up visits as told by your doctor. This is important.  Contact a doctor if:  · You have a fever.  · You have new symptoms.  · Your symptoms get worse.  Get help right away if:  · The area where your leg meets your lower belly has:  ? Pain that gets worse suddenly.  ? A bulge that gets bigger suddenly, and it does not get smaller after that.  ? A bulge that turns red or purple.  ? A bulge that is painful when you touch it.  · You are a man, and your scrotum:  ? Suddenly feels painful.  ? Suddenly changes in size.  · You cannot push the hernia in by very gently pressing on it when you are lying down. Do not try to force the bulge back in if it will not push in easily.  · You feel sick to your stomach (nauseous), and that feeling does not go  away.  · You throw up (vomit), and that keeps happening.  · You have a fast heartbeat.  · You cannot poop (have a bowel movement) or pass gas.  These symptoms may be an emergency. Do not wait to see if the symptoms will go away. Get medical help right away. Call your local emergency services (911 in the [place]  Summary  · An inguinal hernia is when fat or your intestines push through a weak spot in a muscle where your leg meets your lower belly (groin). This causes a rounded lump (bulge).  · If you do not have symptoms, you may not need treatment. If you have symptoms or a large hernia, you may need surgery.  · Avoid lifting heavy objects. Also avoid standing for long amounts of time.  · Do not try to force the bulge back in if it will not push in easily.  This information is not intended to replace advice given to you by your health care provider. Make sure you discuss any questions you have with your health care provider.  Document Revised: 01/19/2019 Document Reviewed: 09/19/2018  Elsevier Patient Education © 2020 [employer]

## 2021-01-07 NOTE — ANESTHESIA PROCEDURE NOTES
Peripheral Block      Patient reassessed immediately prior to procedure    Patient location during procedure: OR  Start time: 1/7/2021 10:30 AM  Stop time: 1/7/2021 10:35 AM  Reason for block: at surgeon's request and post-op pain management  Performed by  Anesthesiologist: Irineo Kang MD  Preanesthetic Checklist  Completed: patient identified, site marked, surgical consent, pre-op evaluation, timeout performed, IV checked, risks and benefits discussed and monitors and equipment checked  Prep:  Pt Position: supine  Sterile barriers:cap, gloves, mask and washed/disinfected hands  Prep: ChloraPrep  Patient monitoring: blood pressure monitoring, continuous pulse oximetry and EKG  Procedure  Performed under: general  Guidance:ultrasound guided  Images:still images obtained, printed/placed on chart    Laterality:right  Block Type:TAP  Injection Technique:single-shot  Needle Type:echogenic  Resistance on Injection: none    Medications Used: dexamethasone (DECADRON) injection, 4 mg  ropivacaine (NAROPIN) 0.5 % injection, 30 mL  Med admintered at 1/7/2021 10:35 AM

## 2021-01-07 NOTE — OP NOTE
INGUINAL HERNIA REPAIR  Operative Note    Patient Name:  Shukri Park  YOB: 1940    Date of Surgery:  1/7/2021     Indications: The patient is a 80 y.o. [unfilled] who presents with a symptomatic inguinal hernia on the right side.  We discussed the risk, benefits, and alternatives to surgery and the patient agreed to proceed to the operating room for open inguinal hernia repair with mesh.    Pre-op Diagnosis:   Inguinodynia, right [R10.31]  Right inguinal hernia [K40.90]    Post-op Diagnosis:  Post-Op Diagnosis Codes:     * Inguinodynia, right [R10.31]     * Right inguinal hernia [K40.90]    Procedure/CPT® Codes:      Procedure(s):  INGUINAL HERNIA REPAIR    Staff:  Surgeon(s):  Cody Randall MD    Assistant: Coby Salamanca RN    Anesthesia: General with Block    Assistant: Coby Salamanca RN  was responsible for performing the following activities: Retraction, Suction, Suturing and Closing and their skilled assistance was necessary for the success of this case.    Estimated Blood Loss: minimal    Implants:    Implant Name Type Inv. Item Serial No.  Lot No. LRB No. Used Action   MESH FLUT SHT 3X6IN - [id number] Implant MESH FLUT SHT 3X6IN  [employer] [id number] Right 1 Implanted       Specimen:          Specimens     ID Source Type Tests Collected By Collected At Frozen?      A Groin, right Tissue · TISSUE PATHOLOGY EXAM   Cody Randall MD 1/7/21 6809      Description: lipoma of right inguinal cor    This specimen was not marked as sent.    B Groin, right Tissue · TISSUE PATHOLOGY EXAM   Cody Randall MD 1/7/21 3656      Description: right  hernia  sac    This specimen was not marked as sent.          Findings: indirect and direct right inguinal hernia    Complications: none, immediately    Description of Procedure: After obtaining informed consent in the preop holding area the patient was brought to the operating room and placed in the supine  position.  SCDs were applied, and preoperative antibiotics were administered.  The patient then underwent uncomplicated induction of general endotracheal anesthesia. We began by prepping and draping the right  groin in the usual sterile fashion.  After a brief timeout the procedure began.  I began by making a transverse incision approximately 2 cm above the inguinal ligament.  I then dissected down through the subcutaneous fat layers to the external oblique aponeurosis.  The external oblique aponeurosis was then opened along its fibers taking care to not injure the ilioinguinal nerve which was running deep to the layer.  We are then able to encircle the spermatic cord at the level of the pubic tubercle.  A Penrose drain was used to control the spermatic cord. There was an indirect hernia sac which was running along the spermatic cord, and this was dissected free using a combination of blunt dissection and electrocautery.  Once freed to the level of the internal ring, the hernia sac was then opened, and its contents examined.  I then performed a high ligation using 2-0 Vicryl suture.  This allowed for adequate reduction of the hernia sac into the abdomen. After performing my high ligation of the hernia sac, I turned my attention towards reinforcement of the inguinal floor. There was noted to be a direct inguinal hernia.  A piece of mesh was selected and cut into a surfboard fashion with the lateral edge of the mesh split to wrap around the spermatic cord.  I then secured the mesh to the pubic tubercle medially, the conjoined tendon superiorly, and the inguinal ligament inferiorly using 0 Ethibond suture in an interrupted fashion.  The tails were then wrapped around the spermatic cord and secured again using 0 Ethibond suture.  We then tested our repair by performing a Valsalva maneuver, there was no evidence of any recurrent hernia.  The external oblique aponeurosis was then reapproximated using running 2-0 Vicryl  suture, again taking care not to damage the ilioinguinal nerve running deep to this layer.  Lucy's fascia was reapproximated using 3-0 Vicryl suture in an interrupted fashion.  The wound was closed using 4-0 Monocryl, and dressed using skin affix skin glue.  The patient tolerated the procedure well and remained in the operating room for a TYRONE block.    Cody Randall MD     Date: 1/7/2021  Time: 10:23 EST

## 2021-01-07 NOTE — ANESTHESIA PROCEDURE NOTES
Airway  Date/Time: 1/7/2021 9:23 AM  Airway not difficult    General Information and Staff    Patient location during procedure: OR  Anesthesiologist: Irineo Kang MD  CRNA: Jenna Pereyra CRNA    Indications and Patient Condition  Indications for airway management: airway protection    Preoxygenated: yes  Mask difficulty assessment: 0 - not attempted    Final Airway Details  Final airway type: supraglottic airway      Successful airway: unique and LMA  Size 5    Number of attempts at approach: 1  Assessment: lips, teeth, and gum same as pre-op

## 2021-01-07 NOTE — NURSING NOTE
HOLLY Pereyra voiced concern for need to interrogate Pt. Pacer, as during surgery a magnet was placed on his pacemaker. Roddy states there was a continuous beeping, which she is not used to hearing. Spoke to [employer] rep, He stated this is normal activity for a Defibrillator, and did not appear concerned for malfunction.

## 2021-01-07 NOTE — ANESTHESIA PREPROCEDURE EVALUATION
Anesthesia Evaluation     Patient summary reviewed and Nursing notes reviewed   NPO Solid Status: > 8 hours  NPO Liquid Status: > 8 hours           Airway   Mallampati: II  TM distance: >3 FB  Neck ROM: full  No difficulty expected  Dental    (+) edentulous    Pulmonary    Cardiovascular   Exercise tolerance: poor (<4 METS)    (+) valvular problems/murmurs MR, CAD, dysrhythmias Atrial Fib, CHF Systolic <55%, hyperlipidemia,   (-) JVD      Neuro/Psych  GI/Hepatic/Renal/Endo    (+)  GERD,      Musculoskeletal     (+) neck pain,   Abdominal    Substance History      OB/GYN          Other      history of cancer    ROS/Med Hx Other: multvessel CAD per chart, severly reduced EF, around 26%.  ICD in situ.  No signs of chf exacerbation on exam.  Cardiac clearance on chart.      Phys Exam Other: No edema              Anesthesia Plan    ASA 4     general with block   (Tap block in or.  A line if needed.)  intravenous induction     Anesthetic plan, all risks, benefits, and alternatives have been provided, discussed and informed consent has been obtained with: patient.

## 2021-01-07 NOTE — ANESTHESIA POSTPROCEDURE EVALUATION
Patient: Shukri Park    Procedure Summary     Date: 01/07/21 Room / Location: [facility] OR 09 / [facility] MAIN OR    Anesthesia Start: 0913 Anesthesia Stop: 1045    Procedure: INGUINAL HERNIA REPAIR OPEN WITH MESH (Right Groin) Diagnosis:       Inguinodynia, right      Right inguinal hernia      (Inguinodynia, right [R10.31])      (Right inguinal hernia [K40.90])    Surgeon: Cody Randall MD Provider: Irineo Kang MD    Anesthesia Type: general with block ASA Status: 4          Anesthesia Type: general with block    Vitals  Vitals Value Taken Time   /77 01/07/21 1141   Temp 97.2 °F (36.2 °C) 01/07/21 1141   Pulse 70 01/07/21 1142   Resp 14 01/07/21 1128   SpO2 99 % 01/07/21 1142   Vitals shown include unvalidated device data.        Post Anesthesia Care and Evaluation    Pain management: adequate  Airway patency: patent  Anesthetic complications: No anesthetic complications  PONV Status: controlled  Cardiovascular status: acceptable  Respiratory status: acceptable  Hydration status: acceptable

## 2021-01-08 LAB
LAB AP CASE REPORT: NORMAL
PATH REPORT.FINAL DX SPEC: NORMAL
PATH REPORT.GROSS SPEC: NORMAL

## 2021-01-25 ENCOUNTER — OFFICE VISIT (OUTPATIENT)
Dept: SURGERY | Facility: CLINIC | Age: 81
End: 2021-01-25

## 2021-01-25 VITALS
HEIGHT: 73 IN | SYSTOLIC BLOOD PRESSURE: 143 MMHG | BODY MASS INDEX: 21.47 KG/M2 | OXYGEN SATURATION: 98 % | WEIGHT: 162 LBS | RESPIRATION RATE: 18 BRPM | HEART RATE: 73 BPM | DIASTOLIC BLOOD PRESSURE: 94 MMHG | TEMPERATURE: 97.1 F

## 2021-01-25 DIAGNOSIS — K40.90 RIGHT INGUINAL HERNIA: ICD-10-CM

## 2021-01-25 DIAGNOSIS — R10.31 INGUINODYNIA, RIGHT: Primary | ICD-10-CM

## 2021-01-25 PROCEDURE — 99024 POSTOP FOLLOW-UP VISIT: CPT | Performed by: SURGERY

## 2021-01-25 NOTE — PROGRESS NOTES
"Post-op Note    Subjective   Shukri Park is a 80 y.o. male status post open right inguinal hernia repair with mesh performed on 1/7/2021.  Overall, the patient is doing okay.  He does report that he has some dull aching pain in his right groin which occasionally radiates across to his left and down the front of his right leg.  He states that the pain is not severe enough to limit his daily activities, but it is somewhat of an annoyance.  He has not noticed any wound complications, is not running any fevers, or difficulties going to the bathroom.      Objective   /94 (BP Location: Right arm, Patient Position: Sitting, Cuff Size: Adult)   Pulse 73   Temp 97.1 °F (36.2 °C) (Temporal)   Resp 18   Ht 185.4 cm (73\")   Wt 73.5 kg (162 lb)   SpO2 98%   BMI 21.37 kg/m²   Incision is well-healed without any surrounding erythema, induration, or drainage to suggest infection.  There is a normal amount of postoperative induration.  No evidence of recurrent hernia.      Assessment/Plan   Patient is an 80-year-old gentleman status post open right inguinal hernia repair with mesh performed on 1/7/2021.    Surgical incision appears to be healing as expected at this time.  Discussed with patient that he seems to have irritation of the ilioinguinal nerve which can account for his symptoms.  I anticipate this should get better in the next month or so.  If it continues to persist, we can try physical therapy and/or nerve injections in an effort to relieve his pain.  I did not discuss this with the patient today, but should his pain persist despite maximum nonoperative management, I may elect to return to the operating room and section his ilioinguinal nerve if the pain becomes life limiting.  Right now, the patient states that the pain is not severe, but more of an annoyance.  He will follow-up with me as needed    Cody Randall MD  1/25/2021  16:01 EST  "

## 2021-03-18 ENCOUNTER — OFFICE VISIT (OUTPATIENT)
Dept: CARDIOLOGY | Facility: CLINIC | Age: 81
End: 2021-03-18

## 2021-03-18 VITALS
OXYGEN SATURATION: 96 % | SYSTOLIC BLOOD PRESSURE: 140 MMHG | DIASTOLIC BLOOD PRESSURE: 83 MMHG | BODY MASS INDEX: 20.85 KG/M2 | WEIGHT: 158 LBS | HEART RATE: 70 BPM

## 2021-03-18 DIAGNOSIS — Z95.810 PRESENCE OF BIVENTRICULAR IMPLANTABLE CARDIOVERTER-DEFIBRILLATOR (ICD): ICD-10-CM

## 2021-03-18 DIAGNOSIS — I48.19 PERSISTENT ATRIAL FIBRILLATION (HCC): Primary | ICD-10-CM

## 2021-03-18 DIAGNOSIS — I25.5 ISCHEMIC CARDIOMYOPATHY: ICD-10-CM

## 2021-03-18 DIAGNOSIS — I50.22 CHRONIC SYSTOLIC CONGESTIVE HEART FAILURE (HCC): ICD-10-CM

## 2021-03-18 PROCEDURE — 93000 ELECTROCARDIOGRAM COMPLETE: CPT | Performed by: INTERNAL MEDICINE

## 2021-03-18 PROCEDURE — 99214 OFFICE O/P EST MOD 30 MIN: CPT | Performed by: INTERNAL MEDICINE

## 2021-03-18 NOTE — PROGRESS NOTES
CC severe  ischemic cardiomyopathy and uncontrollable longstanding atrial fibrillation     Subject--80-year-old male patient presented to the hospital with a generalized feeling of worsening fatigue, tiredness, palpitations and dyspnea with some amount of paroxysmal nocturnal dyspnea 3 to 4 days prior to presentation and had 1 bout of mild blood-tinged phlegm when he presented to the hospital--patient was found to have uncontrollable atrial fibrillation during this hospitalization and further evaluation in the form of imaging did reveal gallbladder sludge and surgical consultation was recommended and the surgeon recommended medical management of his gallbladder disease to be done prior to cardiovascular optimization--Patient's atrial fibrillation is uncontrollable was tried on intravenous amiodarone and Cardizem and became hypotensive and patient got transferred to ICU and started on inotropic agents and amiodarone and Cardizem have been stopped and started on low-dose of digoxin and metoprolol and underwent  insertion of a biventricular ICD and AV node ablation--Patient had longstanding atrial fibrillation for several years and had two-vessel coronary artery disease with stenting of his right coronary artery and circumflex artery back in 2004 and no recent ischemia evaluation apart from a stress test which was done during this hospitalization--he denies any angina .Patient is known to have severe ischemic cardiomyopathy with EF less than 35% and repeat echocardiography during this hospitalization showed EF of 31 to 35%  Patient also had prior carotid surgery.He had issues with orthostatic hypotension for nearly 4 years and has been on Florinef--he denied any urinary incontinence  Patient has significant back surgery from T12-L4 spinal fusion in the past  He also has severe cervical arthritis with degenerative changes  Patient has significant fall with severe scalp bleeding with a large parietal hematoma last year  and was admitted to the hospital and a CT scan done during that time did not reveal any stroke apart from a large scalp hematoma from anticoagulation  He continues to walk with a walker with a tendency to fall  His additional evaluation in the hospital included endoscopy which showed gastritis  COVID testing x2 was negative  He denied any febrile illness or chills  He does have mild hyperlipidemia  No other symptoms suggestive of Parkinson's disease  He also had partial lung resection for localized malignancy several  years ago  Patient is clinically improved since AV node ablation was performed--Taking Megace to improve his appetite  Patient was noted to have diaphragmatic stimulation with LV pacing and LV lead is turned off with last visit and no new symptoms        Past Medical History:   Diagnosis Date   • A-fib (CMS/HCC)    • Aortic aneurysm (CMS/HCC)    • Appetite loss    • Cancer (CMS/HCC)     right lobe cancer - surgically removed    • CHF (congestive heart failure) (CMS/HCC)    • Coronary artery disease    • Dark stools    • Foot pain, bilateral    • Hyperlipidemia    • Low back pain    • S/P epidural steroid injection     Israel Gomes's - no relief   • Weight loss     acute loss of weight 30 lbs     Past Surgical History:   Procedure Laterality Date   • CARDIAC CATHETERIZATION N/A 7/9/2020    Procedure: LEFT HEART CATH with possible PCI;  Surgeon: Wade Cronin MD;  Location: [facility] CATH INVASIVE LOCATION;  Service: Cardiovascular;  Laterality: N/A;  Local and IV sedation   • CARDIAC CATHETERIZATION N/A 7/9/2020    Procedure: Percutaneous Coronary Intervention;  Surgeon: Wade Cronin MD;  Location: [facility] CATH INVASIVE LOCATION;  Service: Cardiovascular;  Laterality: N/A;   • CARDIAC DEFIBRILLATOR PLACEMENT     • CARDIAC ELECTROPHYSIOLOGY PROCEDURE N/A 7/10/2020    Procedure: Biventricular Device Insertion;  Surgeon: Jonathan Denney MD;  Location: [facility] CATH INVASIVE LOCATION;  Service:  Cardiovascular;  Laterality: N/A;   • CARDIAC ELECTROPHYSIOLOGY PROCEDURE N/A 7/10/2020    Procedure: ABLATION AV NODE;  Surgeon: Jonathan Denney MD;  Location: [facility] CATH INVASIVE LOCATION;  Service: Cardiovascular;  Laterality: N/A;   • CARDIAC ELECTROPHYSIOLOGY PROCEDURE N/A 7/10/2020    Procedure: AV node ablation;  Surgeon: Jonathan Denney MD;  Location: [facility] CATH INVASIVE LOCATION;  Service: Cardiovascular;  Laterality: N/A;   • CARDIOVASCULAR STRESS TEST  2020   • CATARACT EXTRACTION, BILATERAL     • CONVERTED (HISTORICAL) HOLTER  2020   • CORONARY ANGIOPLASTY WITH STENT PLACEMENT     • ENDOSCOPY N/A 7/5/2020    Procedure: ESOPHAGOGASTRODUODENOSCOPY;  Surgeon: Neal Correa MD;  Location: [facility] ENDOSCOPY;  Service: Gastroenterology;  Laterality: N/A;   • LUMBAR DECOMPRESSION  09/2015    L2-L3   • LUMBAR DECOMPRESSION  2006    Dr. Logan   • OTHER SURGICAL HISTORY  05/2015    left SI fusion with bone graft - Dr. Presley   • OTHER SURGICAL HISTORY      carotid artery repair    • OTHER SURGICAL HISTORY Left 02/19/2016    Left SI fusion 2/19/2016 Dr. Zendejas   • POSTERIOR SPINAL FUSION  10/24/2016    PSF T12-3/TLIF L3-L4 poss L2-L3 --- Dr. 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       Physical Exam    General:      well developed, well nourished, in no acute distress.    Head:      normocephalic and atraumatic.    Eyes:      PERRL/EOM intact, conjunctiva and sclera clear with out nystagmus.    Neck:      no masses, thyromegaly,  trachea central with normal respiratory effort and PMI displaced laterally  Lungs:      clear bilaterally to auscultation.    Heart:       Paced rhythm and ICD site is clean  without any murmurs gallops or rubs         Assessment plan  Biventricular ICD personally interrogated--LV pacing causing diaphragmatic stimulation henceforth patient left on RV pacing alone  Clinically patient is euvolemic with rate control  I would not advocate at this  point any epicardial lead placement  Clearly patient has orthostatic syndrome currently on midodrine and off Florinef  Cardiovascular wise patient is stable with ischemic cardiomyopathy  Permanent atrial fibrillation  Biventricular ICD in situ and home monitoring reviewed with appropriate function  Medications reviewed and follow-up in 6 months  Clinically doing very well after AV node ablation and placement of device  Device interrogation on a regular basis with Deidre Lawson/Dr. Cronin      ECG 12 Lead    Date/Time: 3/18/2021 5:42 PM  Performed by: Jonathan Denney MD  Authorized by: Jonathan Denney MD   Comparison: compared with previous ECG   Rhythm: atrial fibrillation and paced  Rate: normal          Electronically signed by Jonathan Denney MD, 03/18/21, 5:42 PM EDT.

## 2021-04-16 PROCEDURE — 93295 DEV INTERROG REMOTE 1/2/MLT: CPT | Performed by: NURSE PRACTITIONER

## 2021-04-16 PROCEDURE — 93296 REM INTERROG EVL PM/IDS: CPT | Performed by: NURSE PRACTITIONER

## 2021-05-12 NOTE — PROGRESS NOTES
Date of Office Visit: 2021  Encounter Provider: Dr. Wade Cronin  Place of Service: [facility] CARDIOLOGY [place]  Patient Name: Shukri Park  :1940  Homa Carrizales MD    Chief Complaint   Patient presents with   • Atrial Fibrillation     6 month follow up/device check   • Coronary Artery Disease   • Hyperlipidemia   • Cardiomyopathy   • Congestive Heart Failure     History of Present Illness    I am pleased to see Mr. Park in my office today as a follow-up.    As you know, patient is 80 years old white gentleman whose past medical history significant for hypertension, atrial fibrillation, cardiomyopathy, CAD, who came today.    In 2020, patient was admitted with symptom of shortness of breath and palpitation.  He was noted to be in atrial fibrillation with rapid ventricular response.  Patient was initially treated with medication but because of antihypertensive effect of these rate control medications, he became hypotensive.  Subsequently, after discussion with EP, patient underwent AV kelly ablation and was implanted with biventricular AICD device.  Unfortunately, his left-sided lead was not functioning appropriately because of stimulation of diaphragm.  And it was turned off.  Patient also underwent cardiac catheterization which showed high-grade stenosis of RCA but PCI to RCA was unsuccessful.  Nonobstructive disease of left coronary artery was noted.  LVEF was 35% on echocardiogram.    Patient came today and he is doing well.  Patient has not had any further falls.  Patient has not had any episode of dizziness and lightheadedness.  No orthopnea or PND no syncope or presyncope.  No chest pain.    EKG showed ventricular paced rhythm.    Overall I am pleased with patient progress.  Patient had a biventricular device but because of diaphragmatic stimulation patient left-sided lead has been turned off.  Patient is totally dependent on pacemaker.  At present current treatment would be  continued.  If needed patient may need para his serum lead.  I would repeat echocardiogram in future.  Patient wants to use Viagra for ED.  I advised him that would not be safe for him.      Past Medical History:   Diagnosis Date   • A-fib (CMS/HCC)    • Aortic aneurysm (CMS/HCC)    • Appetite loss    • Cancer (CMS/HCC)     right lobe cancer - surgically removed    • CHF (congestive heart failure) (CMS/HCC)    • Coronary artery disease    • Dark stools    • Foot pain, bilateral    • GERD (gastroesophageal reflux disease)    • Hyperlipidemia    • Low back pain    • S/P epidural steroid injection     [place] Dr Gomes's - no relief   • Weight loss     acute loss of weight 30 lbs         Past Surgical History:   Procedure Laterality Date   • CARDIAC CATHETERIZATION N/A 7/9/2020    Procedure: LEFT HEART CATH with possible PCI;  Surgeon: Wade Cronin MD;  Location: [facility] CATH INVASIVE LOCATION;  Service: Cardiovascular;  Laterality: N/A;  Local and IV sedation   • CARDIAC CATHETERIZATION N/A 7/9/2020    Procedure: Percutaneous Coronary Intervention;  Surgeon: Wade Cronin MD;  Location: [facility] CATH INVASIVE LOCATION;  Service: Cardiovascular;  Laterality: N/A;   • CARDIAC DEFIBRILLATOR PLACEMENT     • CARDIAC ELECTROPHYSIOLOGY PROCEDURE N/A 7/10/2020    Procedure: Biventricular Device Insertion;  Surgeon: Jonathan Denney MD;  Location: [facility] CATH INVASIVE LOCATION;  Service: Cardiovascular;  Laterality: N/A;   • CARDIAC ELECTROPHYSIOLOGY PROCEDURE N/A 7/10/2020    Procedure: ABLATION AV NODE;  Surgeon: Jonathan Denney MD;  Location: [facility] CATH INVASIVE LOCATION;  Service: Cardiovascular;  Laterality: N/A;   • CARDIAC ELECTROPHYSIOLOGY PROCEDURE N/A 7/10/2020    Procedure: AV node ablation;  Surgeon: Jonathan Denney MD;  Location: [facility] CATH INVASIVE LOCATION;  Service: Cardiovascular;  Laterality: N/A;   • CARDIOVASCULAR STRESS TEST  2020   • CATARACT EXTRACTION, BILATERAL     • CONVERTED (HISTORICAL)  HOLTER 2020   • CORONARY ANGIOPLASTY WITH STENT PLACEMENT     • ENDOSCOPY N/A 7/5/2020    Procedure: ESOPHAGOGASTRODUODENOSCOPY;  Surgeon: Neal Correa MD;  Location: [facility] ENDOSCOPY;  Service: Gastroenterology;  Laterality: N/A;   • INGUINAL HERNIA REPAIR Right 1/7/2021    Procedure: INGUINAL HERNIA REPAIR OPEN WITH MESH;  Surgeon: Cody Randall MD;  Location: [facility] MAIN OR;  Service: General;  Laterality: Right;   • LUMBAR DECOMPRESSION  09/2015    L2-L3   • LUMBAR DECOMPRESSION  2006    Dr. Logan   • OTHER SURGICAL HISTORY  05/2015    left SI fusion with bone graft - Dr. Presley   • OTHER SURGICAL HISTORY      carotid artery repair    • OTHER SURGICAL HISTORY Left 02/19/2016    Left SI fusion 2/19/2016 Dr. Zendejas   • POSTERIOR SPINAL FUSION  10/24/2016    PSF T12-3/TLIF L3-L4 poss L2-L3 --- Dr. 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 (Patient taking differently: Take 81 mg by mouth Daily. LD 1/2/21), Disp: 90 tablet, Rfl: 3  •  docusate sodium (COLACE) 100 MG capsule, Take 100 mg by mouth 2 (Two) Times a Day., Disp: , Rfl:   •  ferrous sulfate 325 (65 FE) MG tablet, Take 325 mg by mouth Daily With Breakfast. Hold DOs, Disp: , Rfl:   •  HYDROcodone-acetaminophen (NORCO) 5-325 MG per tablet, Take 1-2 tablets by mouth Every 4 (Four) Hours As Needed (Pain)., Disp: 30 tablet, Rfl: 0  •  lisinopril (PRINIVIL,ZESTRIL) 2.5 MG tablet, Take 1 tablet by mouth Daily. (Patient taking differently: Take 2.5 mg by mouth Daily. LD 1/5), Disp: 90 tablet, Rfl: 1  •  megestrol (MEGACE) 40 MG/ML suspension, Take 400 mg by mouth 2 (Two) Times a Day., Disp: , Rfl:   •  midodrine (PROAMATINE) 5 MG tablet, Take 1 tablet by mouth 2 (Two) Times a Day. (Patient taking differently: Take 5 mg by mouth 2 (Two) Times a Day. Pt no longer taking), Disp: 60 tablet, Rfl: 3  •  Multiple  Vitamin (MULTIVITAMIN) tablet, Take 1 tablet by mouth Daily. LD , Disp: , Rfl:   •  pantoprazole (PROTONIX) 40 MG EC tablet, Take 1 tablet by mouth Daily., Disp: 30 tablet, Rfl: 0  •  rivaroxaban (XARELTO) 15 MG tablet, Take 1 tablet by mouth Daily. (Patient taking differently: Take 15 mg by mouth Daily. LD 21), Disp: 60 tablet, Rfl: 3  •  simvastatin (ZOCOR) 40 MG tablet, Take 80 mg by mouth Every Night., Disp: , Rfl:   •  spironolactone (ALDACTONE) 25 MG tablet, Take 0.5 tablets by mouth Daily. (Patient taking differently: Take 12.5 mg by mouth Every Other Day. Hold DOS), Disp: 30 tablet, Rfl: 0  •  vitamin B-12 (CYANOCOBALAMIN) 1000 MCG tablet, Take 1,000 mcg by mouth Daily., Disp: , Rfl:       Social History     Socioeconomic History   • Marital status:      Spouse name: Not on file   • Number of children: Not on file   • Years of education: Not on file   • Highest education level: Not on file   Tobacco Use   • Smoking status: Former Smoker     Quit date: 1989     Years since quittin.4   • Smokeless tobacco: Never Used   Vaping Use   • Vaping Use: Never used   Substance and Sexual Activity   • Alcohol use: No   • Drug use: Never   • Sexual activity: Defer         Review of Systems   Constitutional: Negative for chills and fever.   HENT: Negative for ear discharge and nosebleeds.    Eyes: Negative for discharge and redness.   Cardiovascular: Negative for chest pain, orthopnea, palpitations, paroxysmal nocturnal dyspnea and syncope.   Respiratory: Positive for shortness of breath. Negative for cough and wheezing.    Endocrine: Negative for heat intolerance.   Skin: Negative for rash.   Musculoskeletal: Positive for arthritis and joint pain. Negative for myalgias.   Gastrointestinal: Negative for abdominal pain, melena, nausea and vomiting.   Genitourinary: Negative for dysuria and hematuria.   Neurological: Negative for dizziness, light-headedness, numbness and tremors.  "  Psychiatric/Behavioral: Negative for depression. The patient is not nervous/anxious.        Procedures      ECG 12 Lead    Date/Time: 5/13/2021 4:18 PM  Performed by: Wade Cronin MD  Authorized by: Wade Cronin MD   Comparison: not compared with previous ECG   Rhythm: paced    Clinical impression: abnormal EKG            ECG 12 Lead    (Results Pending)           Objective:    /82   Pulse 70   Ht 185.4 cm (72.99\")   Wt 72.1 kg (159 lb)   BMI 20.98 kg/m²         Constitutional:       Appearance: Well-developed.   Eyes:      General: No scleral icterus.        Right eye: No discharge.   HENT:      Head: Normocephalic and atraumatic.   Neck:      Thyroid: No thyromegaly.      Lymphadenopathy: No cervical adenopathy.   Pulmonary:      Effort: Pulmonary effort is normal. No respiratory distress.      Breath sounds: Normal breath sounds. No wheezing. No rales.   Cardiovascular:      Normal rate. Regular rhythm.      No gallop.   Abdominal:      Tenderness: There is no abdominal tenderness.   Skin:     Findings: No erythema or rash.   Neurological:      Mental Status: Alert and oriented to person, place, and time.             Assessment:       Diagnosis Plan   1. Persistent atrial fibrillation (CMS/HCC)  ECG 12 Lead   2. Coronary artery disease involving native coronary artery of native heart without angina pectoris  ECG 12 Lead   3. Mixed hyperlipidemia  ECG 12 Lead   4. Ischemic cardiomyopathy  ECG 12 Lead   5. Acute on chronic systolic congestive heart failure (CMS/HCC)  ECG 12 Lead   6. Mitral valve insufficiency, unspecified etiology  ECG 12 Lead   7. Presence of biventricular implantable cardioverter-defibrillator (ICD)  ECG 12 Lead            Plan:       MDM:    1.  Chronic atrial fibrillation:    Patient is in persistent atrial fibrillation with ventricular pacing.  Patient is on Xarelto.    2.  Cardiomyopathy:    I would repeat echocardiogram in future    3.  S/p ICD:    Patient had biventricular " device but unfortunately left-sided lead is causing diaphragmatic stimulation.  At present I would continue to observe.  Patient may need para history and lead.  Patient is following Dr. Denney and he was recently interrogated for ICD about 2 to 3 months ago    4.  Orthostatic hypotension:    Patient is on midodrine.  At present blood pressure is stable.  I would continue to monitor

## 2021-05-13 ENCOUNTER — OFFICE VISIT (OUTPATIENT)
Dept: CARDIOLOGY | Facility: CLINIC | Age: 81
End: 2021-05-13

## 2021-05-13 VITALS
DIASTOLIC BLOOD PRESSURE: 82 MMHG | HEART RATE: 70 BPM | HEIGHT: 73 IN | SYSTOLIC BLOOD PRESSURE: 134 MMHG | BODY MASS INDEX: 21.07 KG/M2 | WEIGHT: 159 LBS

## 2021-05-13 DIAGNOSIS — I48.19 PERSISTENT ATRIAL FIBRILLATION (HCC): Primary | Chronic | ICD-10-CM

## 2021-05-13 DIAGNOSIS — I25.10 CORONARY ARTERY DISEASE INVOLVING NATIVE CORONARY ARTERY OF NATIVE HEART WITHOUT ANGINA PECTORIS: Chronic | ICD-10-CM

## 2021-05-13 DIAGNOSIS — I50.23 ACUTE ON CHRONIC SYSTOLIC CONGESTIVE HEART FAILURE (HCC): ICD-10-CM

## 2021-05-13 DIAGNOSIS — E78.2 MIXED HYPERLIPIDEMIA: ICD-10-CM

## 2021-05-13 DIAGNOSIS — Z95.810 PRESENCE OF BIVENTRICULAR IMPLANTABLE CARDIOVERTER-DEFIBRILLATOR (ICD): ICD-10-CM

## 2021-05-13 DIAGNOSIS — I25.5 ISCHEMIC CARDIOMYOPATHY: ICD-10-CM

## 2021-05-13 DIAGNOSIS — I34.0 MITRAL VALVE INSUFFICIENCY, UNSPECIFIED ETIOLOGY: ICD-10-CM

## 2021-05-13 PROCEDURE — 99214 OFFICE O/P EST MOD 30 MIN: CPT | Performed by: INTERNAL MEDICINE

## 2021-05-13 PROCEDURE — 93000 ELECTROCARDIOGRAM COMPLETE: CPT | Performed by: INTERNAL MEDICINE

## 2021-05-22 DIAGNOSIS — E78.2 MIXED HYPERLIPIDEMIA: ICD-10-CM

## 2021-05-22 DIAGNOSIS — I48.19 PERSISTENT ATRIAL FIBRILLATION (HCC): Chronic | ICD-10-CM

## 2021-05-22 DIAGNOSIS — I50.23 ACUTE ON CHRONIC SYSTOLIC CONGESTIVE HEART FAILURE (HCC): ICD-10-CM

## 2021-05-22 DIAGNOSIS — I25.10 CORONARY ARTERY DISEASE INVOLVING NATIVE CORONARY ARTERY OF NATIVE HEART WITHOUT ANGINA PECTORIS: Chronic | ICD-10-CM

## 2021-05-22 DIAGNOSIS — I25.5 ISCHEMIC CARDIOMYOPATHY: ICD-10-CM

## 2021-05-24 RX ORDER — LISINOPRIL 2.5 MG/1
TABLET ORAL
Qty: 90 TABLET | Refills: 1 | Status: SHIPPED | OUTPATIENT
Start: 2021-05-24 | End: 2022-05-16

## 2021-06-05 DIAGNOSIS — I50.23 ACUTE ON CHRONIC SYSTOLIC CONGESTIVE HEART FAILURE (HCC): ICD-10-CM

## 2021-06-05 DIAGNOSIS — I48.19 PERSISTENT ATRIAL FIBRILLATION (HCC): Chronic | ICD-10-CM

## 2021-06-05 DIAGNOSIS — E78.2 MIXED HYPERLIPIDEMIA: ICD-10-CM

## 2021-06-05 DIAGNOSIS — I25.10 CORONARY ARTERY DISEASE INVOLVING NATIVE CORONARY ARTERY OF NATIVE HEART WITHOUT ANGINA PECTORIS: Chronic | ICD-10-CM

## 2021-06-05 DIAGNOSIS — I25.5 ISCHEMIC CARDIOMYOPATHY: ICD-10-CM

## 2021-06-09 RX ORDER — RIVAROXABAN 15 MG/1
TABLET, FILM COATED ORAL
Qty: 60 TABLET | Refills: 3 | Status: SHIPPED | OUTPATIENT
Start: 2021-06-09 | End: 2022-07-05

## 2021-07-16 PROCEDURE — 93295 DEV INTERROG REMOTE 1/2/MLT: CPT | Performed by: NURSE PRACTITIONER

## 2021-07-16 PROCEDURE — 93296 REM INTERROG EVL PM/IDS: CPT | Performed by: NURSE PRACTITIONER

## 2021-08-04 ENCOUNTER — HOSPITAL ENCOUNTER (OUTPATIENT)
Dept: CT IMAGING | Facility: HOSPITAL | Age: 81
Discharge: HOME | End: 2021-08-04

## 2021-08-04 ENCOUNTER — HOSPITAL ENCOUNTER (OUTPATIENT)
Dept: GENERAL RADIOLOGY | Facility: HOSPITAL | Age: 81
Discharge: HOME | End: 2021-08-04

## 2021-08-04 VITALS
DIASTOLIC BLOOD PRESSURE: 67 MMHG | OXYGEN SATURATION: 97 % | HEART RATE: 70 BPM | BODY MASS INDEX: 19.88 KG/M2 | SYSTOLIC BLOOD PRESSURE: 99 MMHG | WEIGHT: 150 LBS | RESPIRATION RATE: 12 BRPM | HEIGHT: 73 IN | TEMPERATURE: 97.8 F

## 2021-08-04 DIAGNOSIS — M54.50 LOW BACK PAIN, NON-SPECIFIC: ICD-10-CM

## 2021-08-04 LAB
APTT PPP: 27 SECONDS (ref 24–31)
BASOPHILS # BLD AUTO: 0.2 10*3/MM3 (ref 0–0.2)
BASOPHILS NFR BLD AUTO: 2.1 % (ref 0–1.5)
DEPRECATED RDW RBC AUTO: 46.4 FL (ref 37–54)
EOSINOPHIL # BLD AUTO: 0.3 10*3/MM3 (ref 0–0.4)
EOSINOPHIL NFR BLD AUTO: 3.6 % (ref 0.3–6.2)
ERYTHROCYTE [DISTWIDTH] IN BLOOD BY AUTOMATED COUNT: 13.9 % (ref 12.3–15.4)
HCT VFR BLD AUTO: 42.8 % (ref 37.5–51)
HGB BLD-MCNC: 14.4 G/DL (ref 13–17.7)
INR PPP: 1.03 (ref 0.93–1.1)
LYMPHOCYTES # BLD AUTO: 1.5 10*3/MM3 (ref 0.7–3.1)
LYMPHOCYTES NFR BLD AUTO: 17.6 % (ref 19.6–45.3)
MCH RBC QN AUTO: 31.9 PG (ref 26.6–33)
MCHC RBC AUTO-ENTMCNC: 33.6 G/DL (ref 31.5–35.7)
MCV RBC AUTO: 94.9 FL (ref 79–97)
MONOCYTES # BLD AUTO: 0.9 10*3/MM3 (ref 0.1–0.9)
MONOCYTES NFR BLD AUTO: 10.7 % (ref 5–12)
NEUTROPHILS NFR BLD AUTO: 5.7 10*3/MM3 (ref 1.7–7)
NEUTROPHILS NFR BLD AUTO: 66 % (ref 42.7–76)
NRBC BLD AUTO-RTO: 0 /100 WBC (ref 0–0.2)
PLATELET # BLD AUTO: 217 10*3/MM3 (ref 140–450)
PMV BLD AUTO: 7.4 FL (ref 6–12)
PROTHROMBIN TIME: 11.4 SECONDS (ref 9.6–11.7)
RBC # BLD AUTO: 4.51 10*6/MM3 (ref 4.14–5.8)
WBC # BLD AUTO: 8.7 10*3/MM3 (ref 3.4–10.8)

## 2021-08-04 PROCEDURE — 85730 THROMBOPLASTIN TIME PARTIAL: CPT | Performed by: RADIOLOGY

## 2021-08-04 PROCEDURE — 25010000002 IOPAMIDOL 61 % SOLUTION: Performed by: ORTHOPAEDIC SURGERY

## 2021-08-04 PROCEDURE — 72132 CT LUMBAR SPINE W/DYE: CPT

## 2021-08-04 PROCEDURE — 85025 COMPLETE CBC W/AUTO DIFF WBC: CPT | Performed by: RADIOLOGY

## 2021-08-04 PROCEDURE — 62304 MYELOGRAPHY LUMBAR INJECTION: CPT

## 2021-08-04 PROCEDURE — 85610 PROTHROMBIN TIME: CPT | Performed by: RADIOLOGY

## 2021-08-04 PROCEDURE — 25010000003 LIDOCAINE 1 % SOLUTION: Performed by: ORTHOPAEDIC SURGERY

## 2021-08-04 PROCEDURE — 72240 MYELOGRAPHY NECK SPINE: CPT

## 2021-08-04 RX ORDER — SODIUM CHLORIDE 0.9 % (FLUSH) 0.9 %
10 SYRINGE (ML) INJECTION AS NEEDED
Status: DISCONTINUED | OUTPATIENT
Start: 2021-08-04 | End: 2021-08-04 | Stop reason: HOSPADM

## 2021-08-04 RX ORDER — LIDOCAINE HYDROCHLORIDE 10 MG/ML
20 INJECTION, SOLUTION INFILTRATION; PERINEURAL
Status: COMPLETED | OUTPATIENT
Start: 2021-08-04 | End: 2021-08-04

## 2021-08-04 RX ORDER — SODIUM CHLORIDE 0.9 % (FLUSH) 0.9 %
10 SYRINGE (ML) INJECTION EVERY 12 HOURS SCHEDULED
Status: DISCONTINUED | OUTPATIENT
Start: 2021-08-04 | End: 2021-08-04 | Stop reason: HOSPADM

## 2021-08-04 RX ADMIN — IOPAMIDOL 15 ML: 612 INJECTION, SOLUTION INTRATHECAL at 11:33

## 2021-08-04 RX ADMIN — Medication 10 ML: at 09:23

## 2021-08-04 RX ADMIN — LIDOCAINE HYDROCHLORIDE 20 ML: 10 INJECTION, SOLUTION INFILTRATION; PERINEURAL at 11:33

## 2021-08-04 NOTE — DISCHARGE INSTRUCTIONS
A responsible adult should stay with you and you should rest quietly for the rest of the day. Do not drink alcohol, drive or cook for 24 hours following your procedure.  Progress your diet as tolerated.  Increase your fluid intake over the next 24 hours. Resume your usually medications including aspirin EXCEPT Xarelto.  You may resume Xarelto tomorrow 8/5.  When you remove your dressing in 24 hours, a small amount of blood is to be expected. Do not be alarmed.  If you feel it is bleeding excessively apply pressure and proceed to the Emergency room.  Do not shower, bath, or get your dressing wet at all for 24 hours.  You may shower after the dressing is removed. No lifting more that 10 pounds for 24 hours.  Keep your head elevated at least 30 degrees for the next 24 hours.  If severe pain or headache, increased shortness of air or racing heartbeat occur, seek immediate medical attention.  Follow up with Dr. Blanca for results.

## 2021-08-24 ENCOUNTER — APPOINTMENT (OUTPATIENT)
Dept: VASCULAR SURGERY | Facility: HOSPITAL | Age: 81
End: 2021-08-24

## 2021-08-24 PROCEDURE — G0463 HOSPITAL OUTPT CLINIC VISIT: HCPCS

## 2021-10-15 PROCEDURE — 93295 DEV INTERROG REMOTE 1/2/MLT: CPT | Performed by: NURSE PRACTITIONER

## 2021-10-15 PROCEDURE — 93296 REM INTERROG EVL PM/IDS: CPT | Performed by: NURSE PRACTITIONER

## 2021-10-20 ENCOUNTER — TRANSCRIBE ORDERS (OUTPATIENT)
Dept: ADMINISTRATIVE | Facility: HOSPITAL | Age: 81
End: 2021-10-20

## 2021-10-20 DIAGNOSIS — I72.3 ANEURYSM OF ILIAC ARTERY (HCC): ICD-10-CM

## 2021-10-20 DIAGNOSIS — I65.23 BILATERAL CAROTID ARTERY OCCLUSION: Primary | ICD-10-CM

## 2021-10-28 ENCOUNTER — OFFICE VISIT (OUTPATIENT)
Dept: CARDIOLOGY | Facility: CLINIC | Age: 81
End: 2021-10-28

## 2021-10-28 VITALS
WEIGHT: 154 LBS | SYSTOLIC BLOOD PRESSURE: 122 MMHG | OXYGEN SATURATION: 98 % | DIASTOLIC BLOOD PRESSURE: 68 MMHG | HEIGHT: 73 IN | BODY MASS INDEX: 20.41 KG/M2 | HEART RATE: 70 BPM

## 2021-10-28 DIAGNOSIS — I50.22 CHRONIC SYSTOLIC CONGESTIVE HEART FAILURE (HCC): ICD-10-CM

## 2021-10-28 DIAGNOSIS — I48.19 PERSISTENT ATRIAL FIBRILLATION (HCC): ICD-10-CM

## 2021-10-28 DIAGNOSIS — I25.5 ISCHEMIC CARDIOMYOPATHY: ICD-10-CM

## 2021-10-28 DIAGNOSIS — Z95.810 PRESENCE OF BIVENTRICULAR IMPLANTABLE CARDIOVERTER-DEFIBRILLATOR (ICD): Primary | ICD-10-CM

## 2021-10-28 DIAGNOSIS — N52.8 OTHER MALE ERECTILE DYSFUNCTION: ICD-10-CM

## 2021-10-28 PROCEDURE — 99214 OFFICE O/P EST MOD 30 MIN: CPT | Performed by: INTERNAL MEDICINE

## 2021-10-28 PROCEDURE — 93000 ELECTROCARDIOGRAM COMPLETE: CPT | Performed by: INTERNAL MEDICINE

## 2021-10-28 RX ORDER — SILDENAFIL 100 MG/1
100 TABLET, FILM COATED ORAL DAILY PRN
Qty: 10 TABLET | Refills: 0 | Status: SHIPPED | OUTPATIENT
Start: 2021-10-28 | End: 2022-11-30 | Stop reason: SDUPTHER

## 2021-10-28 NOTE — PROGRESS NOTES
CC severe  ischemic cardiomyopathy and uncontrollable longstanding atrial fibrillation     Subject--80-year-old male patient presented to the hospital with a generalized feeling of worsening fatigue, tiredness, palpitations and dyspnea with some amount of paroxysmal nocturnal dyspnea 3 to 4 days prior to presentation and had 1 bout of mild blood-tinged phlegm when he presented to the hospital--patient was found to have uncontrollable atrial fibrillation during this hospitalization and further evaluation in the form of imaging did reveal gallbladder sludge and surgical consultation was recommended and the surgeon recommended medical management of his gallbladder disease to be done prior to cardiovascular optimization--Patient's atrial fibrillation is uncontrollable was tried on intravenous amiodarone and Cardizem and became hypotensive and patient got transferred to ICU and started on inotropic agents and amiodarone and Cardizem have been stopped and started on low-dose of digoxin and metoprolol and underwent  insertion of a biventricular ICD and AV node ablation--Patient had longstanding atrial fibrillation for several years and had two-vessel coronary artery disease with stenting of his right coronary artery and circumflex artery back in 2004 and no recent ischemia evaluation apart from a stress test which was done during this hospitalization--he denies any angina .Patient is known to have severe ischemic cardiomyopathy with EF less than 35% and repeat echocardiography during this hospitalization showed EF of 31 to 35%  Patient also had prior carotid surgery.He had issues with orthostatic hypotension for nearly 4 years and has been on Florinef--he denied any urinary incontinence  Patient has significant back surgery from T12-L4 spinal fusion in the past  He also has severe cervical arthritis with degenerative changes  Patient has significant fall with severe scalp bleeding with a large parietal hematoma last year  and was admitted to the hospital and a CT scan done during that time did not reveal any stroke apart from a large scalp hematoma from anticoagulation  He continues to walk with a walker with a tendency to fall  His additional evaluation in the hospital included endoscopy which showed gastritis  COVID testing x2 was negative  He denied any febrile illness or chills  He does have mild hyperlipidemia  No other symptoms suggestive of Parkinson's disease  He also had partial lung resection for localized malignancy several  years ago  Patient is clinically improved since AV node ablation was performed--Taking Megace to improve his appetite  Patient was noted to have diaphragmatic stimulation with LV pacing and LV lead is turned off with last visit and no new symptoms  Patient complains of erectile dysfunction        Past Medical History:   Diagnosis Date   • A-fib (CMS/HCC)    • Aortic aneurysm (CMS/HCC)    • Appetite loss    • Cancer (CMS/HCC)     right lobe cancer - surgically removed    • CHF (congestive heart failure) (CMS/HCC)    • Coronary artery disease    • Dark stools    • Foot pain, bilateral    • Hyperlipidemia    • Low back pain    • S/P epidural steroid injection     Israel Gomes's - no relief   • Weight loss     acute loss of weight 30 lbs     Past Surgical History:   Procedure Laterality Date   • CARDIAC CATHETERIZATION N/A 7/9/2020    Procedure: LEFT HEART CATH with possible PCI;  Surgeon: Wade Cronin MD;  Location: [facility] CATH INVASIVE LOCATION;  Service: Cardiovascular;  Laterality: N/A;  Local and IV sedation   • CARDIAC CATHETERIZATION N/A 7/9/2020    Procedure: Percutaneous Coronary Intervention;  Surgeon: Wade Cronin MD;  Location: [facility] CATH INVASIVE LOCATION;  Service: Cardiovascular;  Laterality: N/A;   • CARDIAC DEFIBRILLATOR PLACEMENT     • CARDIAC ELECTROPHYSIOLOGY PROCEDURE N/A 7/10/2020    Procedure: Biventricular Device Insertion;  Surgeon: Jonathan Denney MD;  Location: [facility]  CATH INVASIVE LOCATION;  Service: Cardiovascular;  Laterality: N/A;   • CARDIAC ELECTROPHYSIOLOGY PROCEDURE N/A 7/10/2020    Procedure: ABLATION AV NODE;  Surgeon: Jonathan Denney MD;  Location: [facility] CATH INVASIVE LOCATION;  Service: Cardiovascular;  Laterality: N/A;   • CARDIAC ELECTROPHYSIOLOGY PROCEDURE N/A 7/10/2020    Procedure: AV node ablation;  Surgeon: Jonathan Denney MD;  Location: [facility] CATH INVASIVE LOCATION;  Service: Cardiovascular;  Laterality: N/A;   • CARDIOVASCULAR STRESS TEST  2020   • CATARACT EXTRACTION, BILATERAL     • CONVERTED (HISTORICAL) HOLTER  2020   • CORONARY ANGIOPLASTY WITH STENT PLACEMENT     • ENDOSCOPY N/A 7/5/2020    Procedure: ESOPHAGOGASTRODUODENOSCOPY;  Surgeon: Neal Correa MD;  Location: [facility] ENDOSCOPY;  Service: Gastroenterology;  Laterality: N/A;   • LUMBAR DECOMPRESSION  09/2015    L2-L3   • LUMBAR DECOMPRESSION  2006    Dr. Logan   • OTHER SURGICAL HISTORY  05/2015    left SI fusion with bone graft - Dr. Presley   • OTHER SURGICAL HISTORY      carotid artery repair    • OTHER SURGICAL HISTORY Left 02/19/2016    Left SI fusion 2/19/2016 Dr. Zendejas   • POSTERIOR SPINAL FUSION  10/24/2016    PSF T12-3/TLIF L3-L4 poss L2-L3 --- Dr. 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       Physical Exam    General:      well developed, well nourished, in no acute distress.    Head:      normocephalic and atraumatic.    Eyes:      PERRL/EOM intact, conjunctiva and sclera clear with out nystagmus.    Neck:      no masses, thyromegaly,  trachea central with normal respiratory effort and PMI displaced laterally  Lungs:      clear bilaterally to auscultation.    Heart:       Paced rhythm and ICD site is clean  without any murmurs gallops or rubs         Assessment plan  Biventricular ICD personally interrogated--LV pacing causing diaphragmatic stimulation henceforth patient left on RV pacing alone  Clinically patient is euvolemic with rate  control  I would not advocate at this point any epicardial lead placement  Clearly patient has orthostatic syndrome currently on midodrine and off Florinef  Cardiovascular wise patient is stable with ischemic cardiomyopathy  Permanent atrial fibrillation  Biventricular ICD in situ and home monitoring reviewed with appropriate function  Medications reviewed and follow-up in 6 months  Clinically doing very well after AV node ablation and placement of device  Device interrogation on a regular basis with Deidre Lawson/Dr. Cronin  Erectile dysfunction--prescription for Viagra given to the patient        ECG 12 Lead    Date/Time: 10/28/2021 5:16 PM  Performed by: Jonathan Denney MD  Authorized by: Jonathan Denney MD   Comparison: compared with previous ECG   Similar to previous ECG  Rhythm: atrial fibrillation and paced  Rate: normal  Pacing: ventricular paced rhythm        Electronically signed by Jonathan Denney MD, 10/28/21, 5:15 PM EDT.

## 2022-01-14 PROCEDURE — 93295 DEV INTERROG REMOTE 1/2/MLT: CPT | Performed by: NURSE PRACTITIONER

## 2022-01-14 PROCEDURE — 93296 REM INTERROG EVL PM/IDS: CPT | Performed by: NURSE PRACTITIONER

## 2022-01-25 NOTE — PROGRESS NOTES
Date of Office Visit: 2022  Encounter Provider: Dr. Wade Cronin  Place of Service: [facility] CARDIOLOGY [place]  Patient Name: Shukri Park  :1940  Homa Carrizales MD    Chief Complaint   Patient presents with   • Atrial Fibrillation     6 month follow up   • Coronary Artery Disease   • Cardiomyopathy   • Congestive Heart Failure     History of Present Illness    I am pleased to see Mr. Park in my office today as a follow-up.    As you know, patient is 81 years old white gentleman whose past medical history significant for hypertension, atrial fibrillation, cardiomyopathy, CAD, who came today.    In 2020, patient was admitted with symptom of shortness of breath and palpitation.  He was noted to be in atrial fibrillation with rapid ventricular response.  Patient was initially treated with medication but because of antihypertensive effect of these rate control medications, he became hypotensive.  Subsequently, after discussion with EP, patient underwent AV kelly ablation and was implanted with biventricular AICD device.  Unfortunately, his left-sided lead was not functioning appropriately because of stimulation of diaphragm.  And it was turned off.  Patient also underwent cardiac catheterization which showed high-grade stenosis of RCA but PCI to RCA was unsuccessful.  Nonobstructive disease of left coronary artery was noted.  LVEF was 35% on echocardiogram.    Patient came today and patient is doing well.  Patient symptom of dizziness and lightheadedness has improved.  Patient denies any chest pain.  Patient denies any orthopnea, PND, syncope or presyncope.  No leg edema noted.    EKG showed ventricular paced rhythm    Patient is overall doing well.  Since the addition of midodrine, blood pressure has been stable and patient symptom of dizziness and lightheadedness have improved.  Pacemaker is functioning appropriately.  I will continue current treatment.  Repeat echocardiogram on next  visit      Past Medical History:   Diagnosis Date   • A-fib (HCC)    • Aortic aneurysm (HCC)    • Appetite loss    • Cancer (HCC)     right lobe cancer - surgically removed    • CHF (congestive heart failure) (HCC)    • Coronary artery disease    • Dark stools    • Foot pain, bilateral    • GERD (gastroesophageal reflux disease)    • Hyperlipidemia    • Low back pain    • S/P epidural steroid injection     Israel Gomes's - no relief   • Weight loss     acute loss of weight 30 lbs         Past Surgical History:   Procedure Laterality Date   • CARDIAC CATHETERIZATION N/A 7/9/2020    Procedure: LEFT HEART CATH with possible PCI;  Surgeon: Wade Cronin MD;  Location: [facility] CATH INVASIVE LOCATION;  Service: Cardiovascular;  Laterality: N/A;  Local and IV sedation   • CARDIAC CATHETERIZATION N/A 7/9/2020    Procedure: Percutaneous Coronary Intervention;  Surgeon: Wade Cronin MD;  Location: [facility] CATH INVASIVE LOCATION;  Service: Cardiovascular;  Laterality: N/A;   • CARDIAC DEFIBRILLATOR PLACEMENT     • CARDIAC ELECTROPHYSIOLOGY PROCEDURE N/A 7/10/2020    Procedure: Biventricular Device Insertion;  Surgeon: Jonathan Denney MD;  Location: [facility] CATH INVASIVE LOCATION;  Service: Cardiovascular;  Laterality: N/A;   • CARDIAC ELECTROPHYSIOLOGY PROCEDURE N/A 7/10/2020    Procedure: ABLATION AV NODE;  Surgeon: Jonathan Denney MD;  Location: [facility] CATH INVASIVE LOCATION;  Service: Cardiovascular;  Laterality: N/A;   • CARDIAC ELECTROPHYSIOLOGY PROCEDURE N/A 7/10/2020    Procedure: AV node ablation;  Surgeon: Jonathan Denney MD;  Location: [facility] CATH INVASIVE LOCATION;  Service: Cardiovascular;  Laterality: N/A;   • CARDIOVASCULAR STRESS TEST  2020   • CATARACT EXTRACTION, BILATERAL     • CONVERTED (HISTORICAL) HOLTER  2020   • CORONARY ANGIOPLASTY WITH STENT PLACEMENT     • ENDOSCOPY N/A 7/5/2020    Procedure: ESOPHAGOGASTRODUODENOSCOPY;  Surgeon: Neal Correa MD;  Location: [facility] ENDOSCOPY;  Service:  Gastroenterology;  Laterality: N/A;   • INGUINAL HERNIA REPAIR Right 1/7/2021    Procedure: INGUINAL HERNIA REPAIR OPEN WITH MESH;  Surgeon: Cody Randall MD;  Location: [facility] MAIN OR;  Service: General;  Laterality: Right;   • LUMBAR DECOMPRESSION  09/2015    L2-L3   • LUMBAR DECOMPRESSION  2006    Dr. Logan   • OTHER SURGICAL HISTORY  05/2015    left SI fusion with bone graft - Dr. Presley   • OTHER SURGICAL HISTORY      carotid artery repair    • OTHER SURGICAL HISTORY Left 02/19/2016    Left SI fusion 2/19/2016 Dr. Zendejas   • POSTERIOR SPINAL FUSION  10/24/2016    PSF T12-3/TLIF L3-L4 poss L2-L3 --- Dr. 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 (Patient taking differently: Take 81 mg by mouth Daily. LD 1/2/21), Disp: 90 tablet, Rfl: 3  •  docusate sodium (COLACE) 100 MG capsule, Take 100 mg by mouth 2 (Two) Times a Day., Disp: , Rfl:   •  ferrous sulfate 325 (65 FE) MG tablet, Take 325 mg by mouth Daily With Breakfast. Hold DOs, Disp: , Rfl:   •  HYDROcodone-acetaminophen (NORCO) 5-325 MG per tablet, Take 1-2 tablets by mouth Every 4 (Four) Hours As Needed (Pain)., Disp: 30 tablet, Rfl: 0  •  lisinopril (PRINIVIL,ZESTRIL) 2.5 MG tablet, TAKE 1 TABLET BY MOUTH EVERY DAY, Disp: 90 tablet, Rfl: 1  •  megestrol (MEGACE) 40 MG/ML suspension, Take 400 mg by mouth 2 (Two) Times a Day., Disp: , Rfl:   •  midodrine (PROAMATINE) 5 MG tablet, Take 1 tablet by mouth 2 (Two) Times a Day. (Patient taking differently: Take 5 mg by mouth 2 (Two) Times a Day. Pt no longer taking), Disp: 60 tablet, Rfl: 3  •  Multiple Vitamin (MULTIVITAMIN) tablet, Take 1 tablet by mouth Daily. LD 1/2, Disp: , Rfl:   •  pantoprazole (PROTONIX) 40 MG EC tablet, Take 1 tablet by mouth Daily., Disp: 30 tablet, Rfl: 0  •  sildenafil (Viagra) 100 MG tablet, Take 1 tablet by mouth Daily As Needed for  Erectile Dysfunction., Disp: 10 tablet, Rfl: 0  •  simvastatin (ZOCOR) 40 MG tablet, Take 80 mg by mouth Every Night., Disp: , Rfl:   •  spironolactone (ALDACTONE) 25 MG tablet, Take 0.5 tablets by mouth Daily. (Patient taking differently: Take 12.5 mg by mouth Every Other Day. Hold DOS), Disp: 30 tablet, Rfl: 0  •  vitamin B-12 (CYANOCOBALAMIN) 1000 MCG tablet, Take 1,000 mcg by mouth Daily., Disp: , Rfl:   •  Xarelto 15 MG tablet, TAKE 1 TABLET BY MOUTH EVERY DAY, Disp: 60 tablet, Rfl: 3      Social History     Socioeconomic History   • Marital status:    Tobacco Use   • Smoking status: Former Smoker     Quit date: 1989     Years since quittin.1   • Smokeless tobacco: Never Used   Vaping Use   • Vaping Use: Never used   Substance and Sexual Activity   • Alcohol use: No   • Drug use: Never   • Sexual activity: Defer         Review of Systems   Constitutional: Negative for chills and fever.   HENT: Negative for ear discharge and nosebleeds.    Eyes: Negative for discharge and redness.   Cardiovascular: Negative for chest pain, orthopnea, palpitations, paroxysmal nocturnal dyspnea and syncope.   Respiratory: Positive for shortness of breath. Negative for cough and wheezing.    Endocrine: Negative for heat intolerance.   Skin: Negative for rash.   Musculoskeletal: Positive for arthritis and joint pain. Negative for myalgias.   Gastrointestinal: Negative for abdominal pain, melena, nausea and vomiting.   Genitourinary: Negative for dysuria and hematuria.   Neurological: Negative for dizziness, light-headedness, numbness and tremors.   Psychiatric/Behavioral: Negative for depression. The patient is not nervous/anxious.        Procedures      ECG 12 Lead    Date/Time: 2022 3:29 PM  Performed by: Wade Cronin MD  Authorized by: Wade Cronin MD   Comparison: compared with previous ECG   Similar to previous ECG  Rhythm: paced    Clinical impression: abnormal EKG            ECG 12 Lead    (Results  "Pending)           Objective:    /68   Pulse 70   Ht 185.4 cm (72.99\")   Wt 69.9 kg (154 lb)   BMI 20.32 kg/m²         Constitutional:       Appearance: Well-developed.   Eyes:      General: No scleral icterus.        Right eye: No discharge.   HENT:      Head: Normocephalic and atraumatic.   Neck:      Thyroid: No thyromegaly.      Lymphadenopathy: No cervical adenopathy.   Pulmonary:      Effort: Pulmonary effort is normal. No respiratory distress.      Breath sounds: Normal breath sounds. No wheezing. No rales.   Cardiovascular:      Normal rate. Regular rhythm.      No gallop.   Abdominal:      Tenderness: There is no abdominal tenderness.   Skin:     Findings: No erythema or rash.   Neurological:      Mental Status: Alert and oriented to person, place, and time.             Assessment:       Diagnosis Plan   1. Coronary artery disease involving native coronary artery of native heart without angina pectoris  ECG 12 Lead   2. Persistent atrial fibrillation (HCC)  ECG 12 Lead   3. Mixed hyperlipidemia  ECG 12 Lead   4. Ischemic cardiomyopathy  ECG 12 Lead   5. Acute on chronic systolic congestive heart failure (HCC)  ECG 12 Lead            Plan:       MDM:    1.  CAD:    Patient is doing fairly well from a cardiovascular standpoint.  Patient denies any angina pectoris.    2.  Cardiomyopathy:    Repeat echocardiogram on next visit    3.  Atrial fibrillation:    Patient has persistent atrial fibrillation.  Patient is on Xarelto.  Patient had AV kelly ablation and BiV ventricular pacing    4.  Hyperlipidemia:    Patient is on simvastatin.  Repeat lipid panel in future    5.  Hypotension:    Since addition of midodrine blood pressure has improved  "

## 2022-01-26 ENCOUNTER — OFFICE VISIT (OUTPATIENT)
Dept: CARDIOLOGY | Facility: CLINIC | Age: 82
End: 2022-01-26

## 2022-01-26 VITALS
HEART RATE: 70 BPM | BODY MASS INDEX: 20.41 KG/M2 | SYSTOLIC BLOOD PRESSURE: 116 MMHG | HEIGHT: 73 IN | WEIGHT: 154 LBS | DIASTOLIC BLOOD PRESSURE: 68 MMHG

## 2022-01-26 DIAGNOSIS — I50.23 ACUTE ON CHRONIC SYSTOLIC CONGESTIVE HEART FAILURE: ICD-10-CM

## 2022-01-26 DIAGNOSIS — I48.19 PERSISTENT ATRIAL FIBRILLATION: Chronic | ICD-10-CM

## 2022-01-26 DIAGNOSIS — I25.5 ISCHEMIC CARDIOMYOPATHY: ICD-10-CM

## 2022-01-26 DIAGNOSIS — I25.10 CORONARY ARTERY DISEASE INVOLVING NATIVE CORONARY ARTERY OF NATIVE HEART WITHOUT ANGINA PECTORIS: Primary | Chronic | ICD-10-CM

## 2022-01-26 DIAGNOSIS — E78.2 MIXED HYPERLIPIDEMIA: ICD-10-CM

## 2022-01-26 PROCEDURE — 93000 ELECTROCARDIOGRAM COMPLETE: CPT | Performed by: INTERNAL MEDICINE

## 2022-01-26 PROCEDURE — 99214 OFFICE O/P EST MOD 30 MIN: CPT | Performed by: INTERNAL MEDICINE

## 2022-02-15 ENCOUNTER — HOSPITAL ENCOUNTER (OUTPATIENT)
Dept: CARDIOLOGY | Facility: HOSPITAL | Age: 82
Discharge: HOME | End: 2022-02-15

## 2022-02-15 ENCOUNTER — APPOINTMENT (OUTPATIENT)
Dept: VASCULAR SURGERY | Facility: HOSPITAL | Age: 82
End: 2022-02-15

## 2022-02-15 DIAGNOSIS — I65.23 BILATERAL CAROTID ARTERY OCCLUSION: ICD-10-CM

## 2022-02-15 DIAGNOSIS — I72.3 ANEURYSM OF ILIAC ARTERY: ICD-10-CM

## 2022-02-15 LAB
ABDOMINAL DIST AORTA AP: 3 CM
ABDOMINAL DIST AORTA TRANS: 3.2 CM
ABDOMINAL DIST AORTA VEL: 32 CM/S
ABDOMINAL LT COM ILIAC AP: 1.8 CM
ABDOMINAL LT COM ILIAC TRANS: 1.8 CM
ABDOMINAL LT COM ILIAC VEL: 86 CM/S
ABDOMINAL MID AORTA AP: 4.1 CM
ABDOMINAL MID AORTA TRANS: 3.9 CM
ABDOMINAL MID AORTA VEL: 27 CM/S
ABDOMINAL PROX AORTA AP: 3.3 CM
ABDOMINAL PROX AORTA TRANS: 3 CM
ABDOMINAL PROX AORTA VEL: 43 CM/S
ABDOMINAL RT COM ILIAC AP: 2.7 CM
ABDOMINAL RT COM ILIAC TRANS: 2.6 CM
ABDOMINAL RT COM ILIAC VEL: 72 CM/S
BH CV XLRA MEAS LEFT BULB PSV: -24.1 CM/SEC
BH CV XLRA MEAS LEFT CCA RATIO VEL: 96 CM/SEC
BH CV XLRA MEAS LEFT DIST CCA EDV: 11.5 CM/SEC
BH CV XLRA MEAS LEFT DIST CCA PSV: 45.5 CM/SEC
BH CV XLRA MEAS LEFT DIST ICA EDV: -16.1 CM/SEC
BH CV XLRA MEAS LEFT DIST ICA PSV: -35.7 CM/SEC
BH CV XLRA MEAS LEFT ICA RATIO VEL: -99.4 CM/SEC
BH CV XLRA MEAS LEFT ICA/CCA RATIO: -1
BH CV XLRA MEAS LEFT MID ICA EDV: -21.5 CM/SEC
BH CV XLRA MEAS LEFT MID ICA PSV: -58.8 CM/SEC
BH CV XLRA MEAS LEFT PROX CCA EDV: 13.2 CM/SEC
BH CV XLRA MEAS LEFT PROX CCA PSV: 96 CM/SEC
BH CV XLRA MEAS LEFT PROX ECA PSV: -81.8 CM/SEC
BH CV XLRA MEAS LEFT PROX ICA EDV: -42.9 CM/SEC
BH CV XLRA MEAS LEFT PROX ICA PSV: -99.4 CM/SEC
BH CV XLRA MEAS LEFT PROX SCLA PSV: 56.2 CM/SEC
BH CV XLRA MEAS LEFT VERTEBRAL A PSV: -35.7 CM/SEC
BH CV XLRA MEAS RIGHT BULB PSV: 66.5 CM/SEC
BH CV XLRA MEAS RIGHT CCA RATIO VEL: 69 CM/SEC
BH CV XLRA MEAS RIGHT DIST CCA EDV: 23.6 CM/SEC
BH CV XLRA MEAS RIGHT DIST CCA PSV: 67.7 CM/SEC
BH CV XLRA MEAS RIGHT DIST ICA EDV: -32.3 CM/SEC
BH CV XLRA MEAS RIGHT DIST ICA PSV: -79.5 CM/SEC
BH CV XLRA MEAS RIGHT ICA RATIO VEL: -79.5 CM/SEC
BH CV XLRA MEAS RIGHT ICA/CCA RATIO: -1.2
BH CV XLRA MEAS RIGHT PROX CCA EDV: 16.8 CM/SEC
BH CV XLRA MEAS RIGHT PROX CCA PSV: 69 CM/SEC
BH CV XLRA MEAS RIGHT PROX ECA PSV: 68.3 CM/SEC
BH CV XLRA MEAS RIGHT PROX ICA EDV: -26.1 CM/SEC
BH CV XLRA MEAS RIGHT PROX ICA PSV: -69 CM/SEC
BH CV XLRA MEAS RIGHT PROX SCLA PSV: 42.1 CM/SEC
BH CV XLRA MEAS RIGHT VERTEBRAL A PSV: -47.2 CM/SEC
LEFT ARM BP: NORMAL MMHG
MAXIMAL PREDICTED HEART RATE: 138 BPM
MAXIMAL PREDICTED HEART RATE: 138 BPM
RIGHT ARM BP: NORMAL MMHG
STRESS TARGET HR: 117 BPM
STRESS TARGET HR: 117 BPM

## 2022-02-15 PROCEDURE — 93880 EXTRACRANIAL BILAT STUDY: CPT

## 2022-02-15 PROCEDURE — G0463 HOSPITAL OUTPT CLINIC VISIT: HCPCS

## 2022-02-15 PROCEDURE — 93978 VASCULAR STUDY: CPT

## 2022-02-17 ENCOUNTER — TRANSCRIBE ORDERS (OUTPATIENT)
Dept: ADMINISTRATIVE | Facility: HOSPITAL | Age: 82
End: 2022-02-17

## 2022-02-17 DIAGNOSIS — I71.40 ABDOMINAL AORTIC ANEURYSM WITHOUT RUPTURE: Primary | ICD-10-CM

## 2022-04-15 PROCEDURE — 93296 REM INTERROG EVL PM/IDS: CPT | Performed by: NURSE PRACTITIONER

## 2022-04-15 PROCEDURE — 93295 DEV INTERROG REMOTE 1/2/MLT: CPT | Performed by: NURSE PRACTITIONER

## 2022-05-14 DIAGNOSIS — I25.5 ISCHEMIC CARDIOMYOPATHY: ICD-10-CM

## 2022-05-14 DIAGNOSIS — E78.2 MIXED HYPERLIPIDEMIA: ICD-10-CM

## 2022-05-14 DIAGNOSIS — I50.23 ACUTE ON CHRONIC SYSTOLIC CONGESTIVE HEART FAILURE: ICD-10-CM

## 2022-05-14 DIAGNOSIS — I48.19 PERSISTENT ATRIAL FIBRILLATION: Chronic | ICD-10-CM

## 2022-05-14 DIAGNOSIS — I25.10 CORONARY ARTERY DISEASE INVOLVING NATIVE CORONARY ARTERY OF NATIVE HEART WITHOUT ANGINA PECTORIS: Chronic | ICD-10-CM

## 2022-05-16 RX ORDER — LISINOPRIL 2.5 MG/1
TABLET ORAL
Qty: 90 TABLET | Refills: 1 | Status: SHIPPED | OUTPATIENT
Start: 2022-05-16

## 2022-07-02 DIAGNOSIS — I25.5 ISCHEMIC CARDIOMYOPATHY: ICD-10-CM

## 2022-07-02 DIAGNOSIS — I25.10 CORONARY ARTERY DISEASE INVOLVING NATIVE CORONARY ARTERY OF NATIVE HEART WITHOUT ANGINA PECTORIS: Chronic | ICD-10-CM

## 2022-07-02 DIAGNOSIS — I48.19 PERSISTENT ATRIAL FIBRILLATION: Chronic | ICD-10-CM

## 2022-07-02 DIAGNOSIS — I50.23 ACUTE ON CHRONIC SYSTOLIC CONGESTIVE HEART FAILURE: ICD-10-CM

## 2022-07-02 DIAGNOSIS — E78.2 MIXED HYPERLIPIDEMIA: ICD-10-CM

## 2022-07-05 RX ORDER — RIVAROXABAN 15 MG/1
TABLET, FILM COATED ORAL
Qty: 60 TABLET | Refills: 3 | Status: SHIPPED | OUTPATIENT
Start: 2022-07-05

## 2022-07-06 ENCOUNTER — TRANSCRIBE ORDERS (OUTPATIENT)
Dept: ADMINISTRATIVE | Facility: HOSPITAL | Age: 82
End: 2022-07-06

## 2022-07-06 DIAGNOSIS — I65.23 BILATERAL CAROTID ARTERY OCCLUSION: Primary | ICD-10-CM

## 2022-07-26 NOTE — PROGRESS NOTES
Date of Office Visit: 2022  Encounter Provider: Dr. Wade Cronin  Place of Service: [facility] CARDIOLOGY [place]  Patient Name: Shukri Park  :1940  Homa Carrizales MD    Chief Complaint   Patient presents with   • Coronary Artery Disease   • Hyperlipidemia   • Cardiomyopathy   • Follow-up   • Atrial Fibrillation     History of Present Illness    I am pleased to see Mr. Park in my office today as a follow-up.    As you know, patient is 81 years old white gentleman whose past medical history significant for hypertension, atrial fibrillation, cardiomyopathy, CAD, who came today.    In 2020, patient was admitted with symptom of shortness of breath and palpitation.  He was noted to be in atrial fibrillation with rapid ventricular response.  Patient was initially treated with medication but because of antihypertensive effect of these rate control medications, he became hypotensive.  Subsequently, after discussion with EP, patient underwent AV kelly ablation and was implanted with biventricular AICD device.  Unfortunately, his left-sided lead was not functioning appropriately because of stimulation of diaphragm.  And it was turned off.  Patient also underwent cardiac catheterization which showed high-grade stenosis of RCA but PCI to RCA was unsuccessful.  Nonobstructive disease of left coronary artery was noted.  LVEF was 35% on echocardiogram.    Overall patient is doing very well.  Patient denies any symptom of angina pectoris or congestive heart failure.  Patient denies any orthopnea, PND, syncope or presyncope.  No leg edema noted.    Pacemaker is interrogated and it is functioning appropriately.  Patient has biventricular AICD but ventricular rate is turned off because of diaphragmatic stimulation.  Patient is being paced in the ventricle.    At this stage, I would recommend that patient should proceed with current treatment.  His blood pressure is low.  I have advised him to continue  midodrine.  I may have to discontinue lisinopril in future.  Blood pressure monitoring is recommended.    Consider echocardiogram in future        Past Medical History:   Diagnosis Date   • A-fib (HCC)    • Aortic aneurysm (HCC)    • Appetite loss    • Cancer (HCC)     right lobe cancer - surgically removed    • CHF (congestive heart failure) (HCC)    • Coronary artery disease    • Dark stools    • Foot pain, bilateral    • GERD (gastroesophageal reflux disease)    • Hyperlipidemia    • Low back pain    • S/P epidural steroid injection     [place] Dr Gomes's - no relief   • Weight loss     acute loss of weight 30 lbs         Past Surgical History:   Procedure Laterality Date   • CARDIAC CATHETERIZATION N/A 7/9/2020    Procedure: LEFT HEART CATH with possible PCI;  Surgeon: Wade Cronin MD;  Location: [facility] CATH INVASIVE LOCATION;  Service: Cardiovascular;  Laterality: N/A;  Local and IV sedation   • CARDIAC CATHETERIZATION N/A 7/9/2020    Procedure: Percutaneous Coronary Intervention;  Surgeon: Wade Cronin MD;  Location: [facility] CATH INVASIVE LOCATION;  Service: Cardiovascular;  Laterality: N/A;   • CARDIAC DEFIBRILLATOR PLACEMENT     • CARDIAC ELECTROPHYSIOLOGY PROCEDURE N/A 7/10/2020    Procedure: Biventricular Device Insertion;  Surgeon: Jonathan Denney MD;  Location: [facility] CATH INVASIVE LOCATION;  Service: Cardiovascular;  Laterality: N/A;   • CARDIAC ELECTROPHYSIOLOGY PROCEDURE N/A 7/10/2020    Procedure: ABLATION AV NODE;  Surgeon: Jonathan Denney MD;  Location: [facility] CATH INVASIVE LOCATION;  Service: Cardiovascular;  Laterality: N/A;   • CARDIAC ELECTROPHYSIOLOGY PROCEDURE N/A 7/10/2020    Procedure: AV node ablation;  Surgeon: Jonathan Denney MD;  Location: [facility] CATH INVASIVE LOCATION;  Service: Cardiovascular;  Laterality: N/A;   • CARDIOVASCULAR STRESS TEST  2020   • CATARACT EXTRACTION, BILATERAL     • CONVERTED (HISTORICAL) HOLTER  2020   • CORONARY ANGIOPLASTY WITH STENT PLACEMENT      • ENDOSCOPY N/A 7/5/2020    Procedure: ESOPHAGOGASTRODUODENOSCOPY;  Surgeon: Neal Correa MD;  [facility];  Service: Gastroenterology;  Laterality: N/A;   • INGUINAL HERNIA REPAIR Right 1/7/2021    Procedure: INGUINAL HERNIA REPAIR OPEN WITH MESH;  Surgeon: Cody Randall MD;  [facility];  Service: General;  Laterality: Right;   • LUMBAR DECOMPRESSION  09/2015    L2-L3   • LUMBAR DECOMPRESSION  2006    Dr. Logan   • OTHER SURGICAL HISTORY  05/2015    left SI fusion with bone graft - Dr. Presley   • OTHER SURGICAL HISTORY      carotid artery repair    • OTHER SURGICAL HISTORY Left 02/19/2016    Left SI fusion 2/19/2016 Dr. Zendejas   • POSTERIOR SPINAL FUSION  10/24/2016    PSF T12-3/TLIF L3-L4 poss L2-L3 --- Dr. 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 (Patient taking differently: Take 81 mg by mouth Daily. LD 1/2/21), Disp: 90 tablet, Rfl: 3  •  docusate sodium (COLACE) 100 MG capsule, Take 100 mg by mouth 2 (Two) Times a Day., Disp: , Rfl:   •  ferrous sulfate 325 (65 FE) MG tablet, Take 325 mg by mouth Daily With Breakfast. Hold DOs, Disp: , Rfl:   •  HYDROcodone-acetaminophen (NORCO) 5-325 MG per tablet, Take 1-2 tablets by mouth Every 4 (Four) Hours As Needed (Pain)., Disp: 30 tablet, Rfl: 0  •  lisinopril (PRINIVIL,ZESTRIL) 2.5 MG tablet, TAKE 1 TABLET BY MOUTH EVERY DAY, Disp: 90 tablet, Rfl: 1  •  megestrol (MEGACE) 40 MG/ML suspension, Take 400 mg by mouth 2 (Two) Times a Day., Disp: , Rfl:   •  midodrine (PROAMATINE) 5 MG tablet, Take 1 tablet by mouth 2 (Two) Times a Day. (Patient taking differently: Take 5 mg by mouth 2 (Two) Times a Day. Pt no longer taking), Disp: 60 tablet, Rfl: 3  •  Multiple Vitamin (MULTIVITAMIN) tablet, Take 1 tablet by mouth Daily. LD 1/2, Disp: , Rfl:   •  pantoprazole (PROTONIX) 40 MG EC tablet,  Take 1 tablet by mouth Daily., Disp: 30 tablet, Rfl: 0  •  sildenafil (Viagra) 100 MG tablet, Take 1 tablet by mouth Daily As Needed for Erectile Dysfunction., Disp: 10 tablet, Rfl: 0  •  simvastatin (ZOCOR) 40 MG tablet, Take 80 mg by mouth Every Night., Disp: , Rfl:   •  spironolactone (ALDACTONE) 25 MG tablet, Take 0.5 tablets by mouth Daily. (Patient taking differently: Take 12.5 mg by mouth Every Other Day. Hold DOS), Disp: 30 tablet, Rfl: 0  •  vitamin B-12 (CYANOCOBALAMIN) 1000 MCG tablet, Take 1,000 mcg by mouth Daily., Disp: , Rfl:   •  Xarelto 15 MG tablet, TAKE 1 TABLET BY MOUTH EVERY DAY, Disp: 60 tablet, Rfl: 3      Social History     Socioeconomic History   • Marital status:    Tobacco Use   • Smoking status: Former Smoker     Quit date: 1989     Years since quittin.6   • Smokeless tobacco: Never Used   Vaping Use   • Vaping Use: Never used   Substance and Sexual Activity   • Alcohol use: No   • Drug use: Never   • Sexual activity: Defer         Review of Systems   Constitutional: Negative for chills and fever.   HENT: Negative for ear discharge and nosebleeds.    Eyes: Negative for discharge and redness.   Cardiovascular: Negative for chest pain, orthopnea, palpitations, paroxysmal nocturnal dyspnea and syncope.   Respiratory: Positive for shortness of breath. Negative for cough and wheezing.    Endocrine: Negative for heat intolerance.   Skin: Negative for rash.   Musculoskeletal: Positive for arthritis and joint pain. Negative for myalgias.   Gastrointestinal: Negative for abdominal pain, melena, nausea and vomiting.   Genitourinary: Negative for dysuria and hematuria.   Neurological: Negative for dizziness, light-headedness, numbness and tremors.   Psychiatric/Behavioral: Negative for depression. The patient is not nervous/anxious.        Procedures    Procedures    No orders to display           Objective:    /64 (BP Location: Left arm, Patient Position: Sitting, Cuff  "Size: Adult)   Pulse 69   Ht 185.4 cm (72.99\")   Wt 66.2 kg (146 lb)   SpO2 96%   BMI 19.27 kg/m²         Constitutional:       Appearance: Well-developed.   Eyes:      General: No scleral icterus.        Right eye: No discharge.   HENT:      Head: Normocephalic and atraumatic.   Neck:      Thyroid: No thyromegaly.      Lymphadenopathy: No cervical adenopathy.   Pulmonary:      Effort: Pulmonary effort is normal. No respiratory distress.      Breath sounds: Normal breath sounds. No wheezing. No rales.   Cardiovascular:      Normal rate. Regular rhythm.      No gallop.   Abdominal:      Tenderness: There is no abdominal tenderness.   Skin:     Findings: No erythema or rash.   Neurological:      Mental Status: Alert and oriented to person, place, and time.             Assessment:       Diagnosis Plan   1. Acute on chronic systolic congestive heart failure (HCC)     2. Coronary artery disease involving native coronary artery of native heart without angina pectoris     3. Ischemic cardiomyopathy     4. Mixed hyperlipidemia     5. Persistent atrial fibrillation (HCC)     6. Presence of biventricular implantable cardioverter-defibrillator (ICD)              Plan:       MDM:    1.  CAD:    Patient had 100% occlusion of RCA.  It is chronic.  Patient is asymptomatic recommend observation    2.  Ischemic cardiomyopathy:    His last EF is 35%.  Repeat echocardiogram in future.  Patient is on lisinopril but because of blood pressure patient is not on higher dose or beta-blockers.    3.  Chronic systolic congestive heart failure:    Patient is in euvolemic status    4.  Chronic persistent atrial fibrillation:    Patient is on Xarelto.  Patient had AV kelly ablation.  Patient is on permanent pacemaker.    5.  Hyperlipidemia:    Patient is on simvastatin.  Repeat lipid panel in future  "

## 2022-07-27 ENCOUNTER — CLINICAL SUPPORT NO REQUIREMENTS (OUTPATIENT)
Dept: CARDIOLOGY | Facility: CLINIC | Age: 82
End: 2022-07-27

## 2022-07-27 ENCOUNTER — OFFICE VISIT (OUTPATIENT)
Dept: CARDIOLOGY | Facility: CLINIC | Age: 82
End: 2022-07-27

## 2022-07-27 VITALS
HEIGHT: 73 IN | WEIGHT: 146 LBS | OXYGEN SATURATION: 96 % | SYSTOLIC BLOOD PRESSURE: 102 MMHG | HEART RATE: 69 BPM | DIASTOLIC BLOOD PRESSURE: 64 MMHG | BODY MASS INDEX: 19.35 KG/M2

## 2022-07-27 DIAGNOSIS — I25.10 CORONARY ARTERY DISEASE INVOLVING NATIVE CORONARY ARTERY OF NATIVE HEART WITHOUT ANGINA PECTORIS: Chronic | ICD-10-CM

## 2022-07-27 DIAGNOSIS — E78.2 MIXED HYPERLIPIDEMIA: ICD-10-CM

## 2022-07-27 DIAGNOSIS — I48.19 PERSISTENT ATRIAL FIBRILLATION: Chronic | ICD-10-CM

## 2022-07-27 DIAGNOSIS — Z95.810 PRESENCE OF BIVENTRICULAR IMPLANTABLE CARDIOVERTER-DEFIBRILLATOR (ICD): ICD-10-CM

## 2022-07-27 DIAGNOSIS — I50.23 ACUTE ON CHRONIC SYSTOLIC CONGESTIVE HEART FAILURE: Primary | ICD-10-CM

## 2022-07-27 DIAGNOSIS — I25.5 ISCHEMIC CARDIOMYOPATHY: ICD-10-CM

## 2022-07-27 DIAGNOSIS — I48.19 PERSISTENT ATRIAL FIBRILLATION: Primary | Chronic | ICD-10-CM

## 2022-07-27 PROCEDURE — 93284 PRGRMG EVAL IMPLANTABLE DFB: CPT | Performed by: NURSE PRACTITIONER

## 2022-07-27 PROCEDURE — 99214 OFFICE O/P EST MOD 30 MIN: CPT | Performed by: INTERNAL MEDICINE

## 2022-07-29 NOTE — PROGRESS NOTES
DEVICE INTERROGATION:  IN OFFICE    DEVICE TYPE:   Biventricular ICD    :   [employer]    BATTERY:  Stable    TIME TO ELECTIVE REPLACEMENT INDICATORS:   11 years    CHARGE TIME:   9.7 seconds        LEAD DATA:       DEVICE REPROGRAMMED FOR TESTING          Ventricular:     Paced mV, 424 ohms, 1.1 V@0.4 ms    LV: Programmed off      Pacemaker dependent:   Yes      Atrial pacing percentage: Not applicable %    Ventricular pacing percentage: 100%      Arrhythmia Logbook Reviewed: No ventricular high rate episodes        Summary:    Stable Device Function    No significant arhythmia burden.     Battery status is stable.    Reviewed with: Dr. Cronin      NEXT IN OFFICE DEVICE CHECK DUE: Next visit    REMOTE DEVICE INTERROGATIONS: Ongoing

## 2022-08-23 ENCOUNTER — APPOINTMENT (OUTPATIENT)
Dept: VASCULAR SURGERY | Facility: HOSPITAL | Age: 82
End: 2022-08-23

## 2022-08-23 ENCOUNTER — APPOINTMENT (OUTPATIENT)
Dept: CARDIOLOGY | Facility: HOSPITAL | Age: 82
End: 2022-08-23

## 2022-08-31 ENCOUNTER — APPOINTMENT (OUTPATIENT)
Dept: VASCULAR SURGERY | Facility: HOSPITAL | Age: 82
End: 2022-08-31

## 2022-08-31 ENCOUNTER — HOSPITAL ENCOUNTER (OUTPATIENT)
Dept: CARDIOLOGY | Facility: HOSPITAL | Age: 82
Discharge: HOME | End: 2022-08-31

## 2022-08-31 DIAGNOSIS — I71.40 ABDOMINAL AORTIC ANEURYSM WITHOUT RUPTURE: ICD-10-CM

## 2022-08-31 LAB
ABDOMINAL DIST AORTA AP: 2.9 CM
ABDOMINAL DIST AORTA TRANS: 3 CM
ABDOMINAL DIST AORTA VEL: 31 CM/S
ABDOMINAL LT COM ILIAC AP: 1.9 CM
ABDOMINAL LT COM ILIAC TRANS: 2 CM
ABDOMINAL LT COM ILIAC VEL: 43 CM/S
ABDOMINAL MID AORTA AP: 4.1 CM
ABDOMINAL MID AORTA TRANS: 4 CM
ABDOMINAL MID AORTA VEL: 31 CM/S
ABDOMINAL PROX AORTA AP: 2.7 CM
ABDOMINAL PROX AORTA TRANS: 2.5 CM
ABDOMINAL PROX AORTA VEL: 47 CM/S
ABDOMINAL RT COM ILIAC AP: 2.5 CM
ABDOMINAL RT COM ILIAC TRANS: 2.9 CM
ABDOMINAL RT COM ILIAC VEL: 61 CM/S
MAXIMAL PREDICTED HEART RATE: 138 BPM
STRESS TARGET HR: 117 BPM

## 2022-08-31 PROCEDURE — G0463 HOSPITAL OUTPT CLINIC VISIT: HCPCS

## 2022-08-31 PROCEDURE — 93978 VASCULAR STUDY: CPT

## 2022-09-06 ENCOUNTER — TRANSCRIBE ORDERS (OUTPATIENT)
Dept: ADMINISTRATIVE | Facility: HOSPITAL | Age: 82
End: 2022-09-06

## 2022-09-06 DIAGNOSIS — I71.40 ABDOMINAL AORTIC ANEURYSM WITHOUT RUPTURE: Primary | ICD-10-CM

## 2022-09-06 DIAGNOSIS — I65.23 BILATERAL CAROTID ARTERY OCCLUSION: ICD-10-CM

## 2022-10-14 PROCEDURE — 93296 REM INTERROG EVL PM/IDS: CPT | Performed by: NURSE PRACTITIONER

## 2022-10-14 PROCEDURE — 93295 DEV INTERROG REMOTE 1/2/MLT: CPT | Performed by: NURSE PRACTITIONER

## 2022-11-18 ENCOUNTER — TELEPHONE (OUTPATIENT)
Dept: CARDIOLOGY | Facility: CLINIC | Age: 82
End: 2022-11-18

## 2022-11-18 RX ORDER — SILDENAFIL 100 MG/1
100 TABLET, FILM COATED ORAL DAILY PRN
Qty: 10 TABLET | Refills: 0 | OUTPATIENT
Start: 2022-11-18

## 2022-11-18 NOTE — TELEPHONE ENCOUNTER
Caller: Shukri Park    Relationship: Self    Best call back number: [phone]    What medications are you currently taking:   Current Outpatient Medications on File Prior to Visit   Medication Sig Dispense Refill   • aspirin (aspirin) 81 MG EC tablet Take 1 tablet by mouth Daily. (Patient taking differently: Take 81 mg by mouth Daily. LD 1/2/21) 90 tablet 3   • docusate sodium (COLACE) 100 MG capsule Take 100 mg by mouth 2 (Two) Times a Day.     • ferrous sulfate 325 (65 FE) MG tablet Take 325 mg by mouth Daily With Breakfast. Hold DOs     • HYDROcodone-acetaminophen (NORCO) 5-325 MG per tablet Take 1-2 tablets by mouth Every 4 (Four) Hours As Needed (Pain). 30 tablet 0   • lisinopril (PRINIVIL,ZESTRIL) 2.5 MG tablet TAKE 1 TABLET BY MOUTH EVERY DAY 90 tablet 1   • megestrol (MEGACE) 40 MG/ML suspension Take 400 mg by mouth 2 (Two) Times a Day.     • midodrine (PROAMATINE) 5 MG tablet Take 1 tablet by mouth 2 (Two) Times a Day. (Patient taking differently: Take 5 mg by mouth 2 (Two) Times a Day. Pt no longer taking) 60 tablet 3   • Multiple Vitamin (MULTIVITAMIN) tablet Take 1 tablet by mouth Daily. LD 1/2     • pantoprazole (PROTONIX) 40 MG EC tablet Take 1 tablet by mouth Daily. 30 tablet 0   • sildenafil (Viagra) 100 MG tablet Take 1 tablet by mouth Daily As Needed for Erectile Dysfunction. 10 tablet 0   • simvastatin (ZOCOR) 40 MG tablet Take 80 mg by mouth Every Night.     • spironolactone (ALDACTONE) 25 MG tablet Take 0.5 tablets by mouth Daily. (Patient taking differently: Take 12.5 mg by mouth Every Other Day. Hold DOS) 30 tablet 0   • vitamin B-12 (CYANOCOBALAMIN) 1000 MCG tablet Take 1,000 mcg by mouth Daily.     • Xarelto 15 MG tablet TAKE 1 TABLET BY MOUTH EVERY DAY 60 tablet 3     No current facility-administered medications on file prior to visit.      Who prescribed you this medication: DR MICHAEL    What are your concerns: PT REPORTS HE CALLED IN REFILL TO PHARMACY WHO SENT REQUEST AND THEY  TOLD PT THEY HAVE NOT RECEIVED A RESPONSE - PT REPORTS HE SPOKE WITH LAURA AT PHARMACY

## 2023-01-13 PROCEDURE — 93295 DEV INTERROG REMOTE 1/2/MLT: CPT | Performed by: NURSE PRACTITIONER

## 2023-01-13 PROCEDURE — 93296 REM INTERROG EVL PM/IDS: CPT | Performed by: NURSE PRACTITIONER

## 2023-02-01 ENCOUNTER — CLINICAL SUPPORT NO REQUIREMENTS (OUTPATIENT)
Dept: CARDIOLOGY | Facility: CLINIC | Age: 83
End: 2023-02-01
Payer: MEDICARE

## 2023-02-01 ENCOUNTER — OFFICE VISIT (OUTPATIENT)
Dept: CARDIOLOGY | Facility: CLINIC | Age: 83
End: 2023-02-01
Payer: MEDICARE

## 2023-02-01 VITALS
HEART RATE: 73 BPM | DIASTOLIC BLOOD PRESSURE: 68 MMHG | BODY MASS INDEX: 19.35 KG/M2 | HEIGHT: 73 IN | WEIGHT: 146 LBS | OXYGEN SATURATION: 95 % | SYSTOLIC BLOOD PRESSURE: 99 MMHG

## 2023-02-01 DIAGNOSIS — I25.10 CORONARY ARTERY DISEASE INVOLVING NATIVE CORONARY ARTERY OF NATIVE HEART WITHOUT ANGINA PECTORIS: Chronic | ICD-10-CM

## 2023-02-01 DIAGNOSIS — Z95.810 PRESENCE OF BIVENTRICULAR IMPLANTABLE CARDIOVERTER-DEFIBRILLATOR (ICD): ICD-10-CM

## 2023-02-01 DIAGNOSIS — I25.5 ISCHEMIC CARDIOMYOPATHY: ICD-10-CM

## 2023-02-01 DIAGNOSIS — I48.19 PERSISTENT ATRIAL FIBRILLATION: Primary | Chronic | ICD-10-CM

## 2023-02-01 DIAGNOSIS — E78.2 MIXED HYPERLIPIDEMIA: ICD-10-CM

## 2023-02-01 DIAGNOSIS — I25.5 ISCHEMIC CARDIOMYOPATHY: Primary | ICD-10-CM

## 2023-02-01 DIAGNOSIS — I34.0 MITRAL VALVE INSUFFICIENCY, UNSPECIFIED ETIOLOGY: ICD-10-CM

## 2023-02-01 PROCEDURE — 93284 PRGRMG EVAL IMPLANTABLE DFB: CPT | Performed by: NURSE PRACTITIONER

## 2023-02-01 PROCEDURE — 99214 OFFICE O/P EST MOD 30 MIN: CPT | Performed by: INTERNAL MEDICINE

## 2023-02-03 NOTE — PROGRESS NOTES
DEVICE INTERROGATION:  IN OFFICE    DEVICE TYPE:   Biventricular ICD    :   [employer]    BATTERY:  Stable    TIME TO ELECTIVE REPLACEMENT INDICATORS:   11 years    CHARGE TIME:   9.8 seconds        LEAD DATA:       DEVICE REPROGRAMMED FOR TESTING      Atrial:        Ventricular:     Paced mV, 451 ohms, 1.1 V@0.4 ms    LV: LV lead programmed off her Dr. Denney      Pacemaker dependent:   Yes      Atrial pacing percentage: 0%    Ventricular pacing percentage: 99%      Arrhythmia Logbook Reviewed: No VT or VF episodes        Summary:    Stable Device Function    No VT or VF episode    Battery status is stable.    Reviewed with: Dr. Cronni      NEXT IN OFFICE DEVICE CHECK DUE: 6-month    REMOTE DEVICE INTERROGATIONS: Ongoing

## 2023-02-24 ENCOUNTER — HOSPITAL ENCOUNTER (OUTPATIENT)
Dept: CARDIOLOGY | Facility: HOSPITAL | Age: 83
Discharge: HOME | End: 2023-02-24
Admitting: INTERNAL MEDICINE
Payer: MEDICARE

## 2023-02-24 DIAGNOSIS — I25.10 CORONARY ARTERY DISEASE INVOLVING NATIVE CORONARY ARTERY OF NATIVE HEART WITHOUT ANGINA PECTORIS: Chronic | ICD-10-CM

## 2023-02-24 DIAGNOSIS — I34.0 MITRAL VALVE INSUFFICIENCY, UNSPECIFIED ETIOLOGY: ICD-10-CM

## 2023-02-24 DIAGNOSIS — E78.2 MIXED HYPERLIPIDEMIA: ICD-10-CM

## 2023-02-24 DIAGNOSIS — I48.19 PERSISTENT ATRIAL FIBRILLATION: Chronic | ICD-10-CM

## 2023-02-24 DIAGNOSIS — I25.5 ISCHEMIC CARDIOMYOPATHY: ICD-10-CM

## 2023-02-24 DIAGNOSIS — Z95.810 PRESENCE OF BIVENTRICULAR IMPLANTABLE CARDIOVERTER-DEFIBRILLATOR (ICD): ICD-10-CM

## 2023-02-24 PROCEDURE — 93306 TTE W/DOPPLER COMPLETE: CPT

## 2023-02-24 PROCEDURE — 93306 TTE W/DOPPLER COMPLETE: CPT | Performed by: INTERNAL MEDICINE

## 2023-02-26 LAB
BH CV ECHO MEAS - ACS: 1.85 CM
BH CV ECHO MEAS - AO MAX PG: 2.6 MMHG
BH CV ECHO MEAS - AO MEAN PG: 1.58 MMHG
BH CV ECHO MEAS - AO ROOT DIAM: 3.9 CM
BH CV ECHO MEAS - AO V2 MAX: 81.3 CM/SEC
BH CV ECHO MEAS - AO V2 VTI: 15.4 CM
BH CV ECHO MEAS - AVA(I,D): 2.26 CM2
BH CV ECHO MEAS - EDV(CUBED): 70.1 ML
BH CV ECHO MEAS - EDV(MOD-SP4): 79.7 ML
BH CV ECHO MEAS - EF(MOD-SP4): 38.5 %
BH CV ECHO MEAS - ESV(CUBED): 15.9 ML
BH CV ECHO MEAS - ESV(MOD-SP4): 49 ML
BH CV ECHO MEAS - FS: 39.1 %
BH CV ECHO MEAS - IVS/LVPW: 1.01 CM
BH CV ECHO MEAS - IVSD: 1.01 CM
BH CV ECHO MEAS - LA DIMENSION: 3.5 CM
BH CV ECHO MEAS - LV DIASTOLIC VOL/BSA (35-75): 42.8 CM2
BH CV ECHO MEAS - LV MASS(C)D: 133.1 GRAMS
BH CV ECHO MEAS - LV MAX PG: 1 MMHG
BH CV ECHO MEAS - LV MEAN PG: 0.51 MMHG
BH CV ECHO MEAS - LV SYSTOLIC VOL/BSA (12-30): 26.3 CM2
BH CV ECHO MEAS - LV V1 MAX: 50.1 CM/SEC
BH CV ECHO MEAS - LV V1 VTI: 9.7 CM
BH CV ECHO MEAS - LVIDD: 4.1 CM
BH CV ECHO MEAS - LVIDS: 2.5 CM
BH CV ECHO MEAS - LVOT AREA: 3.6 CM2
BH CV ECHO MEAS - LVOT DIAM: 2.13 CM
BH CV ECHO MEAS - LVPWD: 0.99 CM
BH CV ECHO MEAS - MR MAX PG: 123.1 MMHG
BH CV ECHO MEAS - MR MAX VEL: 554.8 CM/SEC
BH CV ECHO MEAS - MV A MAX VEL: 19.3 CM/SEC
BH CV ECHO MEAS - MV DEC SLOPE: 289.4 CM/SEC2
BH CV ECHO MEAS - MV DEC TIME: 0.18 MSEC
BH CV ECHO MEAS - MV E MAX VEL: 51.8 CM/SEC
BH CV ECHO MEAS - MV E/A: 2.7
BH CV ECHO MEAS - MV MAX PG: 3.1 MMHG
BH CV ECHO MEAS - MV MEAN PG: 0.86 MMHG
BH CV ECHO MEAS - MV V2 VTI: 15.5 CM
BH CV ECHO MEAS - MVA(VTI): 2.25 CM2
BH CV ECHO MEAS - PA ACC TIME: 0.13 SEC
BH CV ECHO MEAS - PA PR(ACCEL): 21.4 MMHG
BH CV ECHO MEAS - PA V2 MAX: 83.5 CM/SEC
BH CV ECHO MEAS - RAP SYSTOLE: 3 MMHG
BH CV ECHO MEAS - RV MAX PG: 1.4 MMHG
BH CV ECHO MEAS - RV V1 MAX: 59.2 CM/SEC
BH CV ECHO MEAS - RV V1 VTI: 11.3 CM
BH CV ECHO MEAS - RVDD: 5.5 CM
BH CV ECHO MEAS - RVSP: 33.6 MMHG
BH CV ECHO MEAS - SI(MOD-SP4): 16.5 ML/M2
BH CV ECHO MEAS - SV(LVOT): 34.9 ML
BH CV ECHO MEAS - SV(MOD-SP4): 30.7 ML
BH CV ECHO MEAS - TR MAX PG: 30.6 MMHG
BH CV ECHO MEAS - TR MAX VEL: 275.6 CM/SEC
MAXIMAL PREDICTED HEART RATE: 137 BPM
STRESS TARGET HR: 116 BPM

## 2023-02-27 ENCOUNTER — TELEPHONE (OUTPATIENT)
Dept: CARDIOLOGY | Facility: CLINIC | Age: 83
End: 2023-02-27
Payer: MEDICARE

## 2023-02-27 NOTE — TELEPHONE ENCOUNTER
Echo looked ok keep follow up appt to discuss in detail    Hub can read     L/m for patient to call back

## 2023-03-22 ENCOUNTER — HOSPITAL ENCOUNTER (OUTPATIENT)
Dept: CARDIOLOGY | Facility: HOSPITAL | Age: 83
Discharge: HOME | End: 2023-03-22
Payer: MEDICARE

## 2023-03-22 ENCOUNTER — TRANSCRIBE ORDERS (OUTPATIENT)
Dept: ADMINISTRATIVE | Facility: HOSPITAL | Age: 83
End: 2023-03-22
Payer: MEDICARE

## 2023-03-22 ENCOUNTER — OFFICE VISIT (OUTPATIENT)
Dept: VASCULAR SURGERY | Facility: HOSPITAL | Age: 83
End: 2023-03-22
Payer: MEDICARE

## 2023-03-22 DIAGNOSIS — I65.23 BILATERAL CAROTID ARTERY OCCLUSION: ICD-10-CM

## 2023-03-22 DIAGNOSIS — I71.40 ABDOMINAL AORTIC ANEURYSM (AAA) WITHOUT RUPTURE, UNSPECIFIED PART: Primary | ICD-10-CM

## 2023-03-22 DIAGNOSIS — I71.40 ABDOMINAL AORTIC ANEURYSM WITHOUT RUPTURE: ICD-10-CM

## 2023-03-22 LAB
ABDOMINAL DIST AORTA AP: 2.8 CM
ABDOMINAL DIST AORTA TRANS: 3.2 CM
ABDOMINAL DIST AORTA VEL: 30 CM/S
ABDOMINAL LT COM ILIAC AP: 1.9 CM
ABDOMINAL LT COM ILIAC TRANS: 1.9 CM
ABDOMINAL LT COM ILIAC VEL: 38 CM/S
ABDOMINAL LT EXT ILIAC VEL: 63 CM/S
ABDOMINAL MID AORTA AP: 4.2 CM
ABDOMINAL MID AORTA TRANS: 4.1 CM
ABDOMINAL MID AORTA VEL: 30 CM/S
ABDOMINAL PROX AORTA AP: 2.8 CM
ABDOMINAL PROX AORTA TRANS: 2.6 CM
ABDOMINAL PROX AORTA VEL: 36 CM/S
ABDOMINAL RT COM ILIAC AP: 2.5 CM
ABDOMINAL RT COM ILIAC TRANS: 2.6 CM
ABDOMINAL RT COM ILIAC VEL: 53 CM/S
ABDOMINAL RT EXT ILIAC VEL: 71 CM/S
BH CV XLRA MEAS LEFT DIST CCA EDV: 9.3 CM/SEC
BH CV XLRA MEAS LEFT DIST CCA PSV: 41.6 CM/SEC
BH CV XLRA MEAS LEFT DIST ICA EDV: -20.4 CM/SEC
BH CV XLRA MEAS LEFT DIST ICA PSV: -48.6 CM/SEC
BH CV XLRA MEAS LEFT ICA/CCA RATIO: -1.58
BH CV XLRA MEAS LEFT PROX CCA EDV: 12.4 CM/SEC
BH CV XLRA MEAS LEFT PROX CCA PSV: 75.2 CM/SEC
BH CV XLRA MEAS LEFT PROX ECA PSV: -82 CM/SEC
BH CV XLRA MEAS LEFT PROX ICA EDV: -41 CM/SEC
BH CV XLRA MEAS LEFT PROX ICA PSV: -119 CM/SEC
BH CV XLRA MEAS LEFT PROX SCLA PSV: 43.5 CM/SEC
BH CV XLRA MEAS LEFT VERTEBRAL A EDV: 10.2 CM/SEC
BH CV XLRA MEAS LEFT VERTEBRAL A PSV: 37.2 CM/SEC
BH CV XLRA MEAS RIGHT DIST CCA EDV: 14.3 CM/SEC
BH CV XLRA MEAS RIGHT DIST CCA PSV: 71.4 CM/SEC
BH CV XLRA MEAS RIGHT DIST ICA EDV: -24.2 CM/SEC
BH CV XLRA MEAS RIGHT DIST ICA PSV: -75.8 CM/SEC
BH CV XLRA MEAS RIGHT ICA/CCA RATIO: -0.96
BH CV XLRA MEAS RIGHT PROX CCA EDV: 16.2 CM/SEC
BH CV XLRA MEAS RIGHT PROX CCA PSV: 78.9 CM/SEC
BH CV XLRA MEAS RIGHT PROX ECA PSV: 77 CM/SEC
BH CV XLRA MEAS RIGHT PROX ICA EDV: 19.9 CM/SEC
BH CV XLRA MEAS RIGHT PROX ICA PSV: 70.8 CM/SEC
BH CV XLRA MEAS RIGHT PROX SCLA PSV: 59 CM/SEC
BH CV XLRA MEAS RIGHT VERTEBRAL A EDV: 16.2 CM/SEC
BH CV XLRA MEAS RIGHT VERTEBRAL A PSV: 57.2 CM/SEC
MAXIMAL PREDICTED HEART RATE: 137 BPM
MAXIMAL PREDICTED HEART RATE: 137 BPM
STRESS TARGET HR: 116 BPM
STRESS TARGET HR: 116 BPM

## 2023-03-22 PROCEDURE — 93880 EXTRACRANIAL BILAT STUDY: CPT

## 2023-03-22 PROCEDURE — 93978 VASCULAR STUDY: CPT

## 2023-04-14 PROCEDURE — 93295 DEV INTERROG REMOTE 1/2/MLT: CPT | Performed by: NURSE PRACTITIONER

## 2023-04-14 PROCEDURE — 93296 REM INTERROG EVL PM/IDS: CPT | Performed by: NURSE PRACTITIONER

## 2023-06-13 ENCOUNTER — TRANSCRIBE ORDERS (OUTPATIENT)
Dept: ADMINISTRATIVE | Facility: HOSPITAL | Age: 83
End: 2023-06-13
Payer: MEDICARE

## 2023-06-13 DIAGNOSIS — R09.89 BRUIT: Primary | ICD-10-CM

## 2023-07-14 PROCEDURE — 93296 REM INTERROG EVL PM/IDS: CPT | Performed by: NURSE PRACTITIONER

## 2023-07-14 PROCEDURE — 93295 DEV INTERROG REMOTE 1/2/MLT: CPT | Performed by: NURSE PRACTITIONER

## 2023-08-02 ENCOUNTER — CLINICAL SUPPORT NO REQUIREMENTS (OUTPATIENT)
Dept: CARDIOLOGY | Facility: CLINIC | Age: 83
End: 2023-08-02
Payer: MEDICARE

## 2023-08-02 ENCOUNTER — OFFICE VISIT (OUTPATIENT)
Dept: CARDIOLOGY | Facility: CLINIC | Age: 83
End: 2023-08-02
Payer: MEDICARE

## 2023-08-02 VITALS
WEIGHT: 142 LBS | OXYGEN SATURATION: 97 % | DIASTOLIC BLOOD PRESSURE: 61 MMHG | BODY MASS INDEX: 18.82 KG/M2 | HEART RATE: 78 BPM | SYSTOLIC BLOOD PRESSURE: 88 MMHG | HEIGHT: 73 IN

## 2023-08-02 DIAGNOSIS — E78.2 MIXED HYPERLIPIDEMIA: ICD-10-CM

## 2023-08-02 DIAGNOSIS — Z95.810 PRESENCE OF BIVENTRICULAR IMPLANTABLE CARDIOVERTER-DEFIBRILLATOR (ICD): ICD-10-CM

## 2023-08-02 DIAGNOSIS — I25.5 ISCHEMIC CARDIOMYOPATHY: ICD-10-CM

## 2023-08-02 DIAGNOSIS — I48.19 PERSISTENT ATRIAL FIBRILLATION: Primary | Chronic | ICD-10-CM

## 2023-08-02 DIAGNOSIS — I50.23 ACUTE ON CHRONIC SYSTOLIC CONGESTIVE HEART FAILURE: ICD-10-CM

## 2023-08-02 DIAGNOSIS — Z95.810 PRESENCE OF BIVENTRICULAR IMPLANTABLE CARDIOVERTER-DEFIBRILLATOR (ICD): Primary | ICD-10-CM

## 2023-08-02 DIAGNOSIS — I25.10 CORONARY ARTERY DISEASE INVOLVING NATIVE CORONARY ARTERY OF NATIVE HEART WITHOUT ANGINA PECTORIS: Chronic | ICD-10-CM

## 2023-08-02 PROCEDURE — 99214 OFFICE O/P EST MOD 30 MIN: CPT | Performed by: INTERNAL MEDICINE

## 2023-08-02 PROCEDURE — 1160F RVW MEDS BY RX/DR IN RCRD: CPT | Performed by: INTERNAL MEDICINE

## 2023-08-02 PROCEDURE — 1159F MED LIST DOCD IN RCRD: CPT | Performed by: INTERNAL MEDICINE

## 2023-08-02 RX ORDER — MIDODRINE HYDROCHLORIDE 5 MG/1
5 TABLET ORAL
Qty: 60 TABLET | Refills: 3 | Status: SHIPPED | OUTPATIENT
Start: 2023-08-02

## 2023-08-23 NOTE — PROGRESS NOTES
DEVICE INTERROGATION:  IN OFFICE    DEVICE TYPE:   Biventricular ICD    :   [employer]    BATTERY:  Stable    TIME TO ELECTIVE REPLACEMENT INDICATORS:   11 years    CHARGE TIME:   9.8 seconds        LEAD DATA:       DEVICE REPROGRAMMED FOR TESTING      Atrial:        Ventricular:     paced mV, 438 ohms, 1.1 V@0.4 ms    LV: Paced, 564, 2.5 V at 2.0 ms      Pacemaker dependent:   Yes      Atrial pacing percentage: Not applicable %    Ventricular pacing percentage: 99% RV pacing only %      Arrhythmia Logbook Reviewed: No VT or VF episodes        Summary:    Stable Device Function; RV pacing only    No significant arhythmia burden.     Battery status is stable.    Reviewed with: Dr. Cronin      NEXT IN OFFICE DEVICE CHECK DUE: 6 months    REMOTE DEVICE INTERROGATIONS:  Ongoing

## 2023-10-06 NOTE — PROGRESS NOTES
LOS: 13 days   Admiting Physician- Elena Epps MD    Reason For Followup:    Atrial fibrillation  Cardiomyopathy  CAD, status post coronary artery stenting  History of cholelithiasis/sludge  COPD  Congestive heart failure  Hypertension  Hypotension    Subjective      Denies pain or dyspnea     Objective     Hemodynamics stable       Review of Systems:   Review of Systems   Constitution: Negative for chills and fever.   HENT: Negative for ear discharge and nosebleeds.    Eyes: Negative for discharge and redness.   Cardiovascular: Negative for chest pain, orthopnea, palpitations, paroxysmal nocturnal dyspnea and syncope.   Respiratory: Positive for shortness of breath. Negative for cough and wheezing.    Endocrine: Negative for heat intolerance.   Skin: Negative for rash.   Musculoskeletal: Positive for arthritis and joint pain. Negative for myalgias.   Gastrointestinal: Negative for abdominal pain, melena, nausea and vomiting.   Genitourinary: Negative for dysuria and hematuria.   Neurological: Negative for dizziness, light-headedness, numbness and tremors.   Psychiatric/Behavioral: Negative for depression. The patient is not nervous/anxious.          Vital Signs  Vitals:    07/14/20 2213 07/15/20 0208 07/15/20 0500 07/15/20 0546   BP:  107/86  139/72   Pulse:  70     Resp: 16 16  16   Temp: 98.2 °F (36.8 °C) 98 °F (36.7 °C)  98 °F (36.7 °C)   TempSrc: Oral Oral  Oral   SpO2: 98% 95%  96%   Weight:   68.2 kg (150 lb 5.7 oz)    Height:         Wt Readings from Last 1 Encounters:   07/15/20 68.2 kg (150 lb 5.7 oz)       Intake/Output Summary (Last 24 hours) at 7/15/2020 1012  Last data filed at 7/15/2020 0900  Gross per 24 hour   Intake 480 ml   Output 950 ml   Net -470 ml     Physical Exam:    Physical Exam   Constitutional: He is oriented to person, place, and time. He appears well-developed and well-nourished.   HENT:   Head: Normocephalic and atraumatic.   Eyes: Pupils are equal, round, and reactive to  light.   Neck: Normal range of motion.   Cardiovascular: Normal rate and regular rhythm.   Pulmonary/Chest: Effort normal and breath sounds normal.   Abdominal: Soft. Bowel sounds are normal.   Musculoskeletal: Normal range of motion.   Neurological: He is alert and oriented to person, place, and time.   Skin: Skin is warm and dry.   Psychiatric: He has a normal mood and affect. His behavior is normal.         Results Review:   Lab Results (last 24 hours)     Procedure Component Value Units Date/Time    BUN [[id number]]  (Normal) Collected:  07/15/20 0212    Specimen:  Blood Updated:  07/15/20 0728     BUN 11 mg/dL     POC Activated Clotting Time [[id number]]  (Abnormal) Collected:  07/09/20 2318    Specimen:  Blood Updated:  07/15/20 0706     Activated Clotting Time  147 Seconds      Comment: Serial Number: [id number]:  [id number]       Magnesium [[id number]]  (Normal) Collected:  07/15/20 0212    Specimen:  Blood Updated:  07/15/20 0323     Magnesium 2.2 mg/dL     Phosphorus [[id number]]  (Normal) Collected:  07/15/20 0212    Specimen:  Blood Updated:  07/15/20 0323     Phosphorus 3.1 mg/dL     Basic Metabolic Panel [[id number]]  (Abnormal) Collected:  07/15/20 0212    Specimen:  Blood Updated:  07/15/20 0323     Glucose 91 mg/dL      BUN --     Comment: Testing performed by alternate method        Creatinine 0.56 mg/dL      Sodium 137 mmol/L      Potassium 4.1 mmol/L      Chloride 102 mmol/L      CO2 23.0 mmol/L      Calcium 8.2 mg/dL      eGFR Non African Amer 140 mL/min/1.73      BUN/Creatinine Ratio --     Comment: Testing not performed        Anion Gap 12.0 mmol/L     Narrative:       GFR Normal >60  Chronic Kidney Disease <60  Kidney Failure <15      CBC & Differential [[id number]] Collected:  07/15/20 0212    Specimen:  Blood Updated:  07/15/20 0300    Narrative:       The following orders were created for panel order CBC & Differential.  Procedure                               Abnormality         Status                      ---------                               -----------         ------                     CBC Auto Differential[[id number]]        Abnormal            Final result                 Please view results for these tests on the individual orders.    CBC Auto Differential [[id number]]  (Abnormal) Collected:  07/15/20 0212    Specimen:  Blood Updated:  07/15/20 0300     WBC 9.90 10*3/mm3      RBC 3.54 10*6/mm3      Hemoglobin 10.6 g/dL      Hematocrit 31.7 %      MCV 89.4 fL      MCH 30.0 pg      MCHC 33.6 g/dL      RDW 15.1 %      RDW-SD 48.1 fl      MPV 8.2 fL      Platelets 228 10*3/mm3      Neutrophil % 66.4 %      Lymphocyte % 18.1 %      Monocyte % 12.0 %      Eosinophil % 2.9 %      Basophil % 0.6 %      Neutrophils, Absolute 6.50 10*3/mm3      Lymphocytes, Absolute 1.80 10*3/mm3      Monocytes, Absolute 1.20 10*3/mm3      Eosinophils, Absolute 0.30 10*3/mm3      Basophils, Absolute 0.10 10*3/mm3      nRBC 0.1 /100 WBC     Blood Culture - Blood, Blood, PICC Line [[id number]] Collected:  07/11/20 1353    Specimen:  Blood, PICC Line Updated:  07/14/20 1430     Blood Culture No growth at 3 days    Blood Culture - Blood, Hand, Right [[id number]] Collected:  07/11/20 1353    Specimen:  Blood from Hand, Right Updated:  07/14/20 1430     Blood Culture No growth at 3 days        Imaging Results (Last 72 Hours)     ** No results found for the last 72 hours. **        ECG/EMG Results (most recent)     Procedure Component Value Units Date/Time    Adult Transthoracic Echo Complete W/ Cont if Necessary Per Protocol [[id number]] Collected:  07/02/20 0653     Updated:  07/02/20 1409     BSA 1.9 m^2      RVIDd 2.1 cm      IVSd 1.1 cm      LVIDd 5.6 cm      LVIDs 4.6 cm      LVPWd 1.3 cm      IVS/LVPW 0.84     FS 17.6 %      EDV(Teich) 155.9 ml      ESV(Teich) 99.5 ml      EF(Teich) 36.2 %      EDV(cubed) 179.0 ml      ESV(cubed) 100.1 ml      EF(cubed) 44.1 %      LV mass(C)d 276.4 grams      LV mass(C)dI 142.4 grams/m^2       SV(Teich) 56.4 ml      SI(Teich) 29.1 ml/m^2      SV(cubed) 78.9 ml      SI(cubed) 40.6 ml/m^2      Ao root diam 4.0 cm      Ao root area 12.8 cm^2      ACS 2.1 cm      asc Aorta Diam 3.7 cm      LVOT diam 2.5 cm      LVOT area 5.1 cm^2      RVOT diam 2.1 cm      RVOT area 3.6 cm^2      EDV(MOD-sp4) 68.5 ml      ESV(MOD-sp4) 50.1 ml      EF(MOD-sp4) 26.8 %      SV(MOD-sp4) 18.4 ml      SI(MOD-sp4) 9.5 ml/m^2      Ao root area (BSA corrected) 2.1     LV Dunlap Vol (BSA corrected) 35.3 ml/m^2      LV Sys Vol (BSA corrected) 25.8 ml/m^2      Aortic R-R 0.4 sec      Aortic .6 BPM      MV V2 max 90.3 cm/sec      MV max PG 3.3 mmHg      MV V2 mean 63.4 cm/sec      MV mean PG 1.8 mmHg      MV V2 VTI 9.6 cm      MVA(VTI) 6.7 cm^2      Ao pk sharmila 78.3 cm/sec      Ao max PG 2.5 mmHg      Ao max PG (full) -0.26 mmHg      Ao V2 mean 61.8 cm/sec      Ao mean PG 1.7 mmHg      Ao mean PG (full) 0.39 mmHg      Ao V2 VTI 11.3 cm      AISHWARYA(I,A) 5.7 cm^2      AISHWARYA(I,D) 5.7 cm^2      AISHWARYA(V,A) 5.3 cm^2      AISHWARYA(V,D) 5.3 cm^2      LV V1 max PG 2.7 mmHg      LV V1 mean PG 1.3 mmHg      LV V1 max 82.3 cm/sec      LV V1 mean 53.0 cm/sec      LV V1 VTI 12.7 cm      MR max sharmila 453.8 cm/sec      MR max PG 82.4 mmHg      CO(Ao) 22.0 l/min      CI(Ao) 11.3 l/min/m^2      SV(Ao) 144.9 ml      SI(Ao) 74.6 ml/m^2      CO(LVOT) 9.8 l/min      CI(LVOT) 5.0 l/min/m^2      SV(LVOT) 64.6 ml      SV(RVOT) 42.6 ml      SI(LVOT) 33.3 ml/m^2      PA V2 max 77.1 cm/sec      PA max PG 2.4 mmHg      PA max PG (full) 0.22 mmHg      PA V2 mean 50.6 cm/sec      PA mean PG 1.2 mmHg      PA mean PG (full) 0.15 mmHg      PA V2 VTI 9.1 cm      PVA(I,A) 4.7 cm^2       CV ECHO TARYN - PVA(I,D) 4.7 cm^2       CV ECHO TARYN - PVA(V,A) 3.4 cm^2       CV ECHO TARYN - PVA(V,D) 3.4 cm^2      PA acc time 0.06 sec      RV V1 max PG 2.2 mmHg      RV V1 mean PG 1.1 mmHg      RV V1 max 73.4 cm/sec      RV V1 mean 46.7 cm/sec      RV V1 VTI 11.9 cm      TR max sharmila 285.8 cm/sec       RVSP(TR) 35.7 mmHg      RAP systole 3.0 mmHg      PA pr(Accel) 49.9 mmHg      Qp/Qs 0.66      CV ECHO TARYN - BZI_BMI 18.7 kilograms/m^2       CV ECHO TARYN - BSA(HAYCOCK) 1.9 m^2       CV ECHO TARYN - BZI_METRIC_WEIGHT 68.0 kg       CV ECHO TARYN - BZI_METRIC_HEIGHT 190.5 cm      EF(MOD-bp) 27.0 %      LA dimension(2D) 4.6 cm     Narrative:       · Left ventricular systolic function is severely decreased.  · Left ventricular wall thickness is consistent with mild concentric   hypertrophy.  · Left atrial cavity size is moderately dilated.  · Mild-to-moderate mitral valve regurgitation is present  · Mild tricuspid valve regurgitation is present.  · Mild aortic valve regurgitation is present.       ECG 12 Lead [[id number]] Collected:  07/01/20 2340     Updated:  07/03/20 1140    Narrative:       HEART RATE= 163  bpm  RR Interval= 368  ms  AZ Interval=   ms  P Horizontal Axis=   deg  P Front Axis=   deg  QRSD Interval= 78  ms  QT Interval= 283  ms  QRS Axis= 7  deg  T Wave Axis= 42  deg  - ABNORMAL ECG -  Atrial fibrillation with rapid V-rate  No previous ECG available for comparison  Electronically Signed By: Sunny Unger (MILENA) 03-Jul-2020 11:38:35  Date and Time of Study: 2020-07-01 23:40:10    ECG 12 Lead [[id number]] Collected:  07/03/20 2021     Updated:  07/03/20 2023    Narrative:       HEART RATE= 133  bpm  RR Interval= 449  ms  AZ Interval=   ms  P Horizontal Axis=   deg  P Front Axis=   deg  QRSD Interval= 92  ms  QT Interval= 296  ms  QRS Axis= -17  deg  T Wave Axis= -11  deg  - ABNORMAL ECG -  Atrial fibrillation  Ventricular premature complex  Probable lateral infarct, old  Electronically Signed By:   Date and Time of Study: 2020-07-03 20:21:30    ECG 12 Lead [[id number]] Collected:  07/04/20 0607     Updated:  07/04/20 0609    Narrative:       HEART RATE= 102  bpm  RR Interval= 589  ms  AZ Interval=   ms  P Horizontal Axis=   deg  P Front Axis=   deg  QRSD Interval= 95  ms  QT Interval= 334  ms  QRS  Axis= -21  deg  T Wave Axis= 3  deg  - ABNORMAL ECG -  Atrial fibrillation  Ventricular premature complex  Electronically Signed By:   Date and Time of Study: 2020-07-04 06:07:51    ECG 12 Lead [[id number]] Collected:  07/05/20 0709     Updated:  07/05/20 0710    Narrative:       HEART RATE= 113  bpm  RR Interval= 528  ms  MI Interval=   ms  P Horizontal Axis=   deg  P Front Axis=   deg  QRSD Interval= 89  ms  QT Interval= 321  ms  QRS Axis= -36  deg  T Wave Axis= 0  deg  - ABNORMAL ECG -  Atrial fibrillation  Ventricular premature complex  Left axis deviation  When compared with ECG of 04-Jul-2020 6:07:51,  No significant change  Electronically Signed By:   Date and Time of Study: 2020-07-05 07:09:38    ECG 12 Lead [[id number]] Collected:  07/05/20 2052     Updated:  07/05/20 2057    Narrative:       HEART RATE= 114  bpm  RR Interval= 524  ms  MI Interval=   ms  P Horizontal Axis=   deg  P Front Axis=   deg  QRSD Interval= 96  ms  QT Interval= 312  ms  QRS Axis= -28  deg  T Wave Axis= -12  deg  - ABNORMAL ECG -  Atrial fibrillation  Ventricular premature complex  Borderline T abnormalities, diffuse leads  When compared with ECG of 05-Jul-2020 7:09:38,  Significant axis, voltage or hypertrophy change  Electronically Signed By:   Date and Time of Study: 2020-07-05 20:52:58    ECG 12 Lead [[id number]] Collected:  07/06/20 0609     Updated:  07/06/20 0610    Narrative:       HEART RATE= 97  bpm  RR Interval= 619  ms  MI Interval=   ms  P Horizontal Axis=   deg  P Front Axis=   deg  QRSD Interval= 91  ms  QT Interval= 338  ms  QRS Axis= -22  deg  T Wave Axis= -5  deg  - ABNORMAL ECG -  Atrial fibrillation  Ventricular premature complex  When compared with ECG of 05-Jul-2020 20:52:58,  Significant repolarization change  Electronically Signed By:   Date and Time of Study: 2020-07-06 06:09:24    EP/CRM Study [[id number]] Resulted:  07/10/20 1945     Updated:  07/10/20 1945    Narrative:       Procedure indication---  uncontrollable atrial fibrillation with chronic   class III systolic heart failure and severe ischemic cardiomyopathy with   no further targets for revascularization with EF less than 35% and   iatrogenic left bundle branch block and patient recommended biventricular   ICD insertion and AV node ablation for optimization    Sedation anesthesia and vancomycin before procedure    Procedures done  #1 implantation of a biventricular ICD system with placement of   biventricular ICD generator and placement of right ventricle ICD lead   placement of a coronary sinus lead  #2 coronary sinus venography with placement of a complex coronary sinus   lead  #3-lead testing and fluoroscopy  #4 biventricular ICD interrogation    Procedure note  Obtaining valid consent patient was draped in sterile fashion the left   infraclavicular area was infiltrated local anesthesia and a small incision   was made and hemostasis was acquired by cautery and gentle dissection was   done and a pocket was made in the prepectoral fascia area and by using   micropuncture kit subclavian vein was easily cannulated with placement of   2J wires for placement of leads.  Right ventricular ICD lead was placed in the right ventricle apex after   mapping right ventricle at multiple locations via 8 Cam[place]onian venous sheath   and after testing the lead the sheath was removed  For placement of left ventricle coronary sinus lead a short 10 Cam[place]onian   venous sheath was used and long peelable sheath manufactured by changed   company was used with the help of supra CS and later a deflectable   decapolar catheter cannulate coronary sinus with coronary sinus   venography.  Patient had an ideal mid posterior lateral branch--cannulation was done   however diaphragmatic stimulation was noted with a quadripolar lead and   multiple other branches were mapped with poor thresholds  A unipolar lead was used and again mapping of multiple location was done   including distal lateral  branch and great cardiac vein with poor   thresholds and henceforth after trying multiple places recanalization of   the mid posterior lateral branch was done with adequate capture with a   narrow margin for diaphragmatic stimulation  Once the lead was tested multiple times the sheaths were removed and the   leads were anchored to underlying muscular area and the pocket was copious   irrigated with antibiotic solution and ICD generator was taken and the   leads were anchored to the generator generator leads were placed in the   pocket and incision was closed in several layers without any immediate   procedure related complications and biventricular programming attached to   chart.   ICD generator is manufactured by [employer] and model number is   G124 and serial #[id number]  Right ventricular ICD lead is manufactured by [employer] model 0673   and she #[id number]  LV lead is  [employer] model 4592 and serial number is   [id number]  Right ventricular sensing is more than 10 mV with threshold less than 1 V  Left ventricle lead threshold is 2.5 V at 1.5 ms    Plan  Proceed with AV node ablation  Eliquis can be resumed tomorrow  Digoxin can be stopped   Electronically signed by Jonathan Denney MD, 07/10/20, 7:16 PM.    EP/CRM Study [[id number]] Resulted:  07/10/20 1945     Updated:  07/10/20 1945    Narrative:       Procedure indication--uncontrollable rapid atrial fibrillation associated   with hypotension , biventricular ICD in situ, severe dilated   cardiomyopathy, class 3 systolic heart failure--patient recommended AV   node ablation after biventricular ICD insertion which was done this   evening     Sedation by anesthesia      Procedures done  1. Radiofrequency ablation of compact AV node  2.  Biventricular ICD reprogramming and interrogation  3. Fluoroscopy  4.  EP study with recording of right atrium, right ventricle and HIS   without induction of arrhythmias  5.  3D mapping with Carto  system    Procedure note  After obtaining a valid consent patient was draped in sterile fashion and   local anesthesia was given in the right groin in the right common femoral   vein was cannulated with placement of a 8 Arabic venous sheath.  An 8 mm   Biosense Spence catheter was taken and mapping of right atrium right   ventricle and compact AV kelly areas was done--single radiofrequency   ablation was done with HIS cloudy was noted with immediate complete AV   block   Post ablation device was reprogrammed to a baseline rate of 70 beats per   minute without any complications and catheter removed    Findings  HV interval of 54 milliseconds  Underlying appropriately functioning biventricular ICD system  Successful radiofrequency ablation of compact AV node with escape rate   below 30 beats per minute    Plan  Patient can be transferred back to ICU  Currently device programmed to RV mode only because of infrequent   diaphragmatic stimulation  ICD programming attached to chart    Electronically signed by Jonathan Denney MD, 07/10/20, 7:36 PM.      ECG 12 Lead [[id number]] Collected:  07/03/20 0945     Updated:  07/12/20 1551    Narrative:       HEART RATE= 106  bpm  RR Interval= 568  ms  MT Interval= 196  ms  P Horizontal Axis= 48  deg  P Front Axis= 0  deg  QRSD Interval= 95  ms  QT Interval= 298  ms  QRS Axis= -18  deg  T Wave Axis= 14  deg  - ABNORMAL ECG -  Atrial fibrillationWith rapid ventricular rate  When compared with ECG of 01-Jul-2020 23:40:10,  Ventricular rate decreased  Electronically Signed By: Braulio Fleming (MILENA) 12-Jul-2020 15:50:15  Date and Time of Study: 2020-07-03 09:45:28    ECG 12 Lead [[id number]] Collected:  07/09/20 2035     Updated:  07/13/20 1015    Narrative:       HEART RATE= 61  bpm  RR Interval= 983  ms  MT Interval=   ms  P Horizontal Axis=   deg  P Front Axis=   deg  QRSD Interval= 94  ms  QT Interval= 382  ms  QRS Axis= 37  deg  T Wave Axis= -43  deg  - ABNORMAL ECG  -  Atrial fibrillation  When compared with ECG of 06-Jul-2020 6:09:24,  Significant rate decrease  Electronically Signed By: Wade Cronin (MILENA) 13-Jul-2020 10:11:05  Date and Time of Study: 2020-07-09 20:35:36        CBC    Results from last 7 days   Lab Units 07/15/20  0212 07/14/20  0353 07/13/20  0355 07/12/20  0337 07/11/20  0405 07/10/20  0308 07/09/20  0345   WBC 10*3/mm3 9.90 10.70 9.00 10.00 12.00* 9.60 12.40*   HEMOGLOBIN g/dL 10.6* 11.1* 10.4* 10.4* 11.2* 12.1* 12.7*   PLATELETS 10*3/mm3 228 232 213 181 212 194 174     BMP   Results from last 7 days   Lab Units 07/15/20  0212 07/14/20  0353 07/13/20  0355 07/12/20  0337 07/11/20  0405 07/10/20  1509 07/10/20  0308 07/09/20  0345   SODIUM mmol/L 137  --   --  141 141  --  141 142   POTASSIUM mmol/L 4.1  --   --  3.5 4.0 3.9 3.6 3.8   CHLORIDE mmol/L 102  --   --  105 103  --  102 101   CO2 mmol/L 23.0  --   --  27.0 27.0  --  31.0* 31.0*   BUN  11  --   --  11 13  --  18 19   CREATININE mg/dL 0.56*  --   --  0.56* 0.66*  --  0.62* 0.62*   GLUCOSE mg/dL 91  --   --  111* 177*  --  128* 97   MAGNESIUM mg/dL 2.2 1.9 1.9 1.9 2.1  --  1.9 2.1   PHOSPHORUS mg/dL 3.1 2.8 2.6 2.1* 2.6 2.5 2.2* 2.2*     CMP   Results from last 7 days   Lab Units 07/15/20  0212 07/12/20  0337 07/11/20  0405 07/10/20  1509 07/10/20  0308 07/09/20  0345   SODIUM mmol/L 137 141 141  --  141 142   POTASSIUM mmol/L 4.1 3.5 4.0 3.9 3.6 3.8   CHLORIDE mmol/L 102 105 103  --  102 101   CO2 mmol/L 23.0 27.0 27.0  --  31.0* 31.0*   BUN  11 11 13  --  18 19   CREATININE mg/dL 0.56* 0.56* 0.66*  --  0.62* 0.62*   GLUCOSE mg/dL 91 111* 177*  --  128* 97   ALBUMIN g/dL  --  2.50*  --   --   --   --    BILIRUBIN mg/dL  --  0.4  --   --   --   --    ALK PHOS U/L  --  44  --   --   --   --    AST (SGOT) U/L  --  26  --   --   --   --    ALT (SGPT) U/L  --  25  --   --   --   --      Cardiac Studies:  Echo-   Results for orders placed during the hospital encounter of 07/01/20   Adult Transthoracic Echo  Complete W/ Cont if Necessary Per Protocol    Narrative · Left ventricular systolic function is severely decreased.  · Left ventricular wall thickness is consistent with mild concentric   hypertrophy.  · Left atrial cavity size is moderately dilated.  · Mild-to-moderate mitral valve regurgitation is present  · Mild tricuspid valve regurgitation is present.  · Mild aortic valve regurgitation is present.        Stress Myoview-  Cath-      Medication Review:   Scheduled Meds:    atorvastatin 20 mg Oral Daily   clopidogrel 75 mg Oral Daily   digoxin 250 mcg Oral Daily   docusate sodium 100 mg Oral BID   fludrocortisone 0.1 mg Oral Q PM   lisinopril 5 mg Oral Q24H   metoprolol succinate XL 25 mg Oral Q24H   pantoprazole 40 mg Oral Q AM   rivaroxaban 15 mg Oral Daily With Dinner   sodium chloride 250 mL Intravenous Once   sodium chloride 10 mL Intravenous Q12H   spironolactone 25 mg Oral Q24H   sucralfate 1 g Oral 4x Daily AC & at Bedtime   Thera 1 tablet Oral Daily   vitamin B-12 1,000 mcg Oral Daily     Continuous Infusions:     PRN Meds:.•  acetaminophen **OR** acetaminophen **OR** acetaminophen  •  aluminum-magnesium hydroxide-simethicone  •  atropine  •  bisacodyl  •  hydrALAZINE  •  HYDROcodone-acetaminophen  •  ipratropium-albuterol  •  magnesium hydroxide  •  magnesium sulfate **OR** magnesium sulfate **OR** magnesium sulfate  •  melatonin  •  ondansetron  •  [DISCONTINUED] ondansetron **OR** ondansetron  •  polyethylene glycol  •  potassium chloride **OR** potassium chloride **OR** potassium chloride  •  potassium phosphate infusion greater than 15 mMoles **OR** potassium phosphate infusion greater than 15 mMoles **OR** potassium phosphate **OR** sodium phosphate IVPB **OR** sodium phosphate IVPB **OR** sodium phosphate IVPB  •  promethazine  •  [COMPLETED] Insert peripheral IV **AND** sodium chloride      Assessment/Plan   Patient Active Problem List   Diagnosis   • Chronic anticoagulation   • Hyperlipidemia   •  Presence of stent in right coronary artery   • Chronic pain   • Ischemic cardiomyopathy   • Persistent atrial fibrillation (CMS/HCC)   • Acute on chronic systolic congestive heart failure (CMS/HCC)   • Coronary artery disease involving native coronary artery of native heart without angina pectoris   • Disorder of sacroiliac joint   • Lumbosacral radiculopathy   • Mitral regurgitation   • Neck pain   • Nevus of choroid   • Presbyopia   • Vitamin D deficiency   • History of lung cancer   • Chronic hypotension   • Gastritis     Plan:   Hemodynamics stable  A/c has been resumed  Continue BB  Continue current Rx and supportive care  Anticipate d/c to rehab soon  Additional recommendations per Dr. Cronin    Patient is seen and examined and findings are verified.  Patient is sitting up in the chair eating breakfast.  Patient denies any symptom.  Patient feels weak.    Hemodynamics are stable.    Normal S1 and S2.  Heart rate is regular.  Patient has paced rhythm.  Chest is clear to auscultation.  No leg edema noted.    I would recommend to increase lisinopril to 10 mg daily.  Patient is doing well.  I would increase level of activity.  Patient can be discharged to rehab     Wade Cronin MD  07/15/20  10:12         Speaking Coherently

## 2023-10-13 PROCEDURE — 93296 REM INTERROG EVL PM/IDS: CPT | Performed by: NURSE PRACTITIONER

## 2023-10-13 PROCEDURE — 93295 DEV INTERROG REMOTE 1/2/MLT: CPT | Performed by: NURSE PRACTITIONER

## 2023-10-18 ENCOUNTER — HOSPITAL ENCOUNTER (OUTPATIENT)
Dept: CARDIOLOGY | Facility: HOSPITAL | Age: 83
Discharge: HOME | End: 2023-10-18
Payer: MEDICARE

## 2023-10-18 ENCOUNTER — TRANSCRIBE ORDERS (OUTPATIENT)
Dept: ADMINISTRATIVE | Facility: HOSPITAL | Age: 83
End: 2023-10-18
Payer: MEDICARE

## 2023-10-18 ENCOUNTER — APPOINTMENT (OUTPATIENT)
Dept: VASCULAR SURGERY | Facility: HOSPITAL | Age: 83
End: 2023-10-18
Payer: MEDICARE

## 2023-10-18 DIAGNOSIS — R09.89 BRUIT: ICD-10-CM

## 2023-10-18 DIAGNOSIS — I71.40 ABDOMINAL AORTIC ANEURYSM (AAA) WITHOUT RUPTURE, UNSPECIFIED PART: ICD-10-CM

## 2023-10-18 DIAGNOSIS — I71.43 INFRARENAL ABDOMINAL AORTIC ANEURYSM (AAA) WITHOUT RUPTURE: Primary | ICD-10-CM

## 2023-10-18 LAB
ABDOMINAL DIST AORTA AP: 3.4 CM
ABDOMINAL DIST AORTA TRANS: 3.7 CM
ABDOMINAL DIST AORTA VEL: 38 CM/S
ABDOMINAL LT COM ILIAC AP: 2 CM
ABDOMINAL LT COM ILIAC TRANS: 2.2 CM
ABDOMINAL LT COM ILIAC VEL: 80 CM/S
ABDOMINAL MID AORTA AP: 4.75 CM
ABDOMINAL MID AORTA TRANS: 4.3 CM
ABDOMINAL MID AORTA VEL: 30 CM/S
ABDOMINAL PROX AORTA AP: 3.3 CM
ABDOMINAL PROX AORTA TRANS: 2.7 CM
ABDOMINAL PROX AORTA VEL: 38 CM/S
ABDOMINAL RT COM ILIAC AP: 2.8 CM
ABDOMINAL RT COM ILIAC TRANS: 3 CM
ABDOMINAL RT COM ILIAC VEL: 59 CM/S

## 2023-10-18 PROCEDURE — 93978 VASCULAR STUDY: CPT

## 2023-10-18 PROCEDURE — G0463 HOSPITAL OUTPT CLINIC VISIT: HCPCS

## 2024-01-12 PROCEDURE — 93296 REM INTERROG EVL PM/IDS: CPT | Performed by: NURSE PRACTITIONER

## 2024-01-12 PROCEDURE — 93295 DEV INTERROG REMOTE 1/2/MLT: CPT | Performed by: NURSE PRACTITIONER

## 2024-01-22 RX ORDER — SILDENAFIL 100 MG/1
100 TABLET, FILM COATED ORAL DAILY PRN
Qty: 10 TABLET | Refills: 1 | OUTPATIENT
Start: 2024-01-22

## 2024-02-05 NOTE — PROGRESS NOTES
Date of Office Visit: 2024  Encounter Provider: Dr. Wade Cronin  Place of Service: [facility] CARDIOLOGY [place]  Patient Name: Shukri Park  :1940  Homa Carrizales MD    Chief Complaint   Patient presents with    Atrial Fibrillation    Congestive Heart Failure    Follow-up    Pacemaker Check     History of Present Illness    I am pleased to see Mr. Park in my office today as a follow-up.    As you know, patient is 83 years old white gentleman whose past medical history significant for hypertension, atrial fibrillation, cardiomyopathy, CAD, who came today for follow-up..    In 2020, patient was admitted with symptom of shortness of breath and palpitation.  He was noted to be in atrial fibrillation with rapid ventricular response.  Patient was initially treated with medication but because of antihypertensive effect of these rate control medications, he became hypotensive.  Subsequently, after discussion with EP, patient underwent AV kelly ablation and was implanted with biventricular AICD device.  Unfortunately, his left-sided lead was not functioning appropriately because of stimulation of diaphragm.  And it was turned off.  Patient also underwent cardiac catheterization which showed high-grade stenosis of RCA but PCI to RCA was unsuccessful.  Nonobstructive disease of left coronary artery was noted.  LVEF was 35% on echocardiogram.    In 2023, patient underwent echocardiogram which showed EF of 55% and no significant valvular heart disease noted.  Mild LVH was noted.    Patient came today for follow-up.  Patient is overall doing well.  He has not had any fall.  Patient denies any symptom of chest pain or tightness or heaviness.  No orthopnea PND no syncope or presyncope.  No leg edema noted.    Device is interrogated and it is functioning appropriately.  No change in programming.  Patient is having RV pacing.  Left-sided lead has been turned off    Overall patient is doing well.   Unfortunately left-sided lead is not functioning.  Patient is 100% paced in the RV.  Patient had history of ADD and wants to use Viagra.  I gave all the information about the use of Viagra.  Patient should be careful.  Patient has a history of orthostatic hypotension in the past.      Past Medical History:   Diagnosis Date    A-fib     Aortic aneurysm     Appetite loss     Cancer     right lobe cancer - surgically removed     CHF (congestive heart failure)     Coronary artery disease     Dark stools     Foot pain, bilateral     GERD (gastroesophageal reflux disease)     Hyperlipidemia     Low back pain     S/P epidural steroid injection     Israel Gomes's - no relief    Weight loss     acute loss of weight 30 lbs         Past Surgical History:   Procedure Laterality Date    CARDIAC CATHETERIZATION N/A 7/9/2020    Procedure: LEFT HEART CATH with possible PCI;  Surgeon: Wade Cronin MD;  Location: [facility] CATH INVASIVE LOCATION;  Service: Cardiovascular;  Laterality: N/A;  Local and IV sedation    CARDIAC CATHETERIZATION N/A 7/9/2020    Procedure: Percutaneous Coronary Intervention;  Surgeon: Wade Cronin MD;  Location: [facility] CATH INVASIVE LOCATION;  Service: Cardiovascular;  Laterality: N/A;    CARDIAC DEFIBRILLATOR PLACEMENT      CARDIAC ELECTROPHYSIOLOGY PROCEDURE N/A 7/10/2020    Procedure: Biventricular Device Insertion;  Surgeon: Jonathan Denney MD;  Location: [facility] CATH INVASIVE LOCATION;  Service: Cardiovascular;  Laterality: N/A;    CARDIAC ELECTROPHYSIOLOGY PROCEDURE N/A 7/10/2020    Procedure: ABLATION AV NODE;  Surgeon: Jonathan Denney MD;  Location: [facility] CATH INVASIVE LOCATION;  Service: Cardiovascular;  Laterality: N/A;    CARDIAC ELECTROPHYSIOLOGY PROCEDURE N/A 7/10/2020    Procedure: AV node ablation;  Surgeon: Jonathan Denney MD;  Location: [facility] CATH INVASIVE LOCATION;  Service: Cardiovascular;  Laterality: N/A;    CARDIOVASCULAR STRESS TEST  2020    CATARACT EXTRACTION,  BILATERAL      CONVERTED (HISTORICAL) HOLTER  2020    CORONARY ANGIOPLASTY WITH STENT PLACEMENT      ENDOSCOPY N/A 7/5/2020    Procedure: ESOPHAGOGASTRODUODENOSCOPY;  Surgeon: Neal Correa MD;  Location: [facility] ENDOSCOPY;  Service: Gastroenterology;  Laterality: N/A;    INGUINAL HERNIA REPAIR Right 1/7/2021    Procedure: INGUINAL HERNIA REPAIR OPEN WITH MESH;  Surgeon: Cody Randall MD;  Location: [facility] MAIN OR;  Service: General;  Laterality: Right;    LUMBAR DECOMPRESSION  09/2015    L2-L3    LUMBAR DECOMPRESSION  2006    Dr. Logan    OTHER SURGICAL HISTORY  05/2015    left SI fusion with bone graft - Dr. Presley    OTHER SURGICAL HISTORY      carotid artery repair     OTHER SURGICAL HISTORY Left 02/19/2016    Left SI fusion 2/19/2016 Dr. Zendejas    POSTERIOR SPINAL FUSION  10/24/2016    PSF T12-3/TLIF L3-L4 poss L2-L3 --- Dr. 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           Current Outpatient Medications:     aspirin (aspirin) 81 MG EC tablet, Take 1 tablet by mouth Daily. (Patient taking differently: Take 1 tablet by mouth Daily. LD 1/2/21), Disp: 90 tablet, Rfl: 3    docusate sodium (COLACE) 100 MG capsule, Take 1 capsule by mouth 2 (Two) Times a Day., Disp: , Rfl:     ferrous sulfate 325 (65 FE) MG tablet, Take 1 tablet by mouth Daily With Breakfast. Hold DOs, Disp: , Rfl:     HYDROcodone-acetaminophen (NORCO) 5-325 MG per tablet, Take 1-2 tablets by mouth Every 4 (Four) Hours As Needed (Pain)., Disp: 30 tablet, Rfl: 0    lisinopril (PRINIVIL,ZESTRIL) 2.5 MG tablet, TAKE 1 TABLET BY MOUTH EVERY DAY, Disp: 90 tablet, Rfl: 1    megestrol (MEGACE) 40 MG/ML suspension, Take 10 mL by mouth 2 (Two) Times a Day., Disp: , Rfl:     midodrine (PROAMATINE) 5 MG tablet, Take 1 tablet by mouth 3 (Three) Times a Day Before Meals., Disp: 60 tablet, Rfl: 3    Multiple Vitamin (MULTIVITAMIN) tablet, Take 1 tablet by mouth Daily. LD 1/2, Disp: , Rfl:     pantoprazole (PROTONIX) 40  "MG EC tablet, Take 1 tablet by mouth Daily., Disp: 30 tablet, Rfl: 0    sildenafil (Viagra) 100 MG tablet, Take 1 tablet by mouth Daily As Needed for Erectile Dysfunction., Disp: 10 tablet, Rfl: 1    simvastatin (ZOCOR) 40 MG tablet, Take 2 tablets by mouth Every Night., Disp: , Rfl:     vitamin B-12 (CYANOCOBALAMIN) 1000 MCG tablet, Take 1 tablet by mouth Daily., Disp: , Rfl:     Xarelto 15 MG tablet, TAKE 1 TABLET BY MOUTH EVERY DAY, Disp: 60 tablet, Rfl: 3      Social History     Socioeconomic History    Marital status:    Tobacco Use    Smoking status: Former     Packs/day: 1     Types: Cigarettes     Quit date: 1989     Years since quittin.1    Smokeless tobacco: Never   Vaping Use    Vaping Use: Never used   Substance and Sexual Activity    Alcohol use: No    Drug use: Never    Sexual activity: Defer         Review of Systems   Constitutional: Negative for chills and fever.   HENT:  Negative for ear discharge and nosebleeds.    Eyes:  Negative for discharge and redness.   Cardiovascular:  Negative for chest pain, orthopnea, palpitations, paroxysmal nocturnal dyspnea and syncope.   Respiratory:  Positive for shortness of breath. Negative for cough and wheezing.    Endocrine: Negative for heat intolerance.   Skin:  Negative for rash.   Musculoskeletal:  Negative for arthritis and myalgias.   Gastrointestinal:  Negative for abdominal pain, melena, nausea and vomiting.   Genitourinary:  Negative for dysuria and hematuria.   Neurological:  Negative for dizziness, light-headedness, numbness and tremors.   Psychiatric/Behavioral:  Negative for depression. The patient is not nervous/anxious.        Procedures    Procedures    No orders to display           Objective:    /84 (BP Location: Right arm, Patient Position: Sitting, Cuff Size: Large Adult)   Pulse 69   Resp 16   Ht 185.4 cm (72.99\")   Wt 65.8 kg (145 lb)   SpO2 97%   BMI 19.13 kg/m²         Constitutional:       Appearance: " Well-developed.   Eyes:      General: No scleral icterus.        Right eye: No discharge.   HENT:      Head: Normocephalic and atraumatic.   Neck:      Thyroid: No thyromegaly.      Lymphadenopathy: No cervical adenopathy.   Pulmonary:      Effort: Pulmonary effort is normal. No respiratory distress.      Breath sounds: Normal breath sounds. No wheezing. No rales.   Cardiovascular:      Normal rate. Regular rhythm.      No gallop.    Edema:     Peripheral edema absent.   Abdominal:      Tenderness: There is no abdominal tenderness.   Skin:     Findings: No erythema or rash.   Neurological:      Mental Status: Alert and oriented to person, place, and time.             Assessment:       Diagnosis Plan   1. Coronary artery disease involving native coronary artery of native heart without angina pectoris        2. Persistent atrial fibrillation        3. Chronic hypotension        4. Mixed hyperlipidemia        5. Ischemic cardiomyopathy        6. Presence of biventricular implantable cardioverter-defibrillator (ICD)                 Plan:       MDM:    1.  Persistent atrial fibrillation:    Patient is on Xarelto current treatment would be continued patient has AV kelly ablation.    2.  CAD:    Patient had balloon angioplasty of RCA but stent could not be delivered.  Patient is clinically doing well.  No chest pain.  Continue to observe    3.  Chronic hypotension:    Patient is on midodrine blood pressure is desirable do not recommend aggressive blood pressure control in this patient    4.  Ischemic dilated cardiomyopathy:    Recent echocardiogram so showed improvement in LV function.  Continue current therapy    5.  S/p ICD:    Patient ICD is functioning appropriately no change in stenting

## 2024-02-07 ENCOUNTER — OFFICE VISIT (OUTPATIENT)
Dept: CARDIOLOGY | Facility: CLINIC | Age: 84
End: 2024-02-07
Payer: MEDICARE

## 2024-02-07 ENCOUNTER — CLINICAL SUPPORT NO REQUIREMENTS (OUTPATIENT)
Dept: CARDIOLOGY | Facility: CLINIC | Age: 84
End: 2024-02-07
Payer: MEDICARE

## 2024-02-07 VITALS
DIASTOLIC BLOOD PRESSURE: 84 MMHG | RESPIRATION RATE: 16 BRPM | HEIGHT: 73 IN | OXYGEN SATURATION: 97 % | SYSTOLIC BLOOD PRESSURE: 145 MMHG | WEIGHT: 145 LBS | HEART RATE: 69 BPM | BODY MASS INDEX: 19.22 KG/M2

## 2024-02-07 DIAGNOSIS — Z95.810 PRESENCE OF BIVENTRICULAR IMPLANTABLE CARDIOVERTER-DEFIBRILLATOR (ICD): ICD-10-CM

## 2024-02-07 DIAGNOSIS — I95.89 CHRONIC HYPOTENSION: Chronic | ICD-10-CM

## 2024-02-07 DIAGNOSIS — E78.2 MIXED HYPERLIPIDEMIA: ICD-10-CM

## 2024-02-07 DIAGNOSIS — I48.19 PERSISTENT ATRIAL FIBRILLATION: Chronic | ICD-10-CM

## 2024-02-07 DIAGNOSIS — I25.10 CORONARY ARTERY DISEASE INVOLVING NATIVE CORONARY ARTERY OF NATIVE HEART WITHOUT ANGINA PECTORIS: Primary | Chronic | ICD-10-CM

## 2024-02-07 DIAGNOSIS — I25.5 ISCHEMIC CARDIOMYOPATHY: ICD-10-CM

## 2024-02-07 DIAGNOSIS — I25.5 ISCHEMIC CARDIOMYOPATHY: Primary | ICD-10-CM

## 2024-02-07 PROCEDURE — 1160F RVW MEDS BY RX/DR IN RCRD: CPT | Performed by: INTERNAL MEDICINE

## 2024-02-07 PROCEDURE — 99214 OFFICE O/P EST MOD 30 MIN: CPT | Performed by: INTERNAL MEDICINE

## 2024-02-07 PROCEDURE — 1159F MED LIST DOCD IN RCRD: CPT | Performed by: INTERNAL MEDICINE

## 2024-02-08 NOTE — PROGRESS NOTES
DEVICE INTERROGATION:  IN OFFICE    DEVICE TYPE:   Biventricular ICD    :   Doorman    BATTERY:  Stable    TIME TO ELECTIVE REPLACEMENT INDICATORS:   10 years    CHARGE TIME:   10 seconds        LEAD DATA:       DEVICE REPROGRAMMED FOR TESTING      Atrial:   Not applicable    Ventricular:     Paced mV, 431 ohms, 1.1 V@0.4 ms    LV: Paced, 45 ohms, 2.7 V at 2.0 ms      Pacemaker dependent:   Yes.  RV lead only LV lead programmed off by EP      Atrial pacing percentage: 0%    Ventricular pacing percentage: 100%      Arrhythmia Logbook Reviewed: No regular high rate episodes, no VT or VF episodes        Summary:    Stable Device Function    No significant arhythmia burden.     Battery status is stable.    Reviewed with: Dr. Cronin      NEXT IN OFFICE DEVICE CHECK DUE: 6 months    REMOTE DEVICE INTERROGATIONS: Ongoing

## 2024-08-13 RX ORDER — SILDENAFIL 100 MG/1
100 TABLET, FILM COATED ORAL DAILY PRN
Qty: 10 TABLET | Refills: 1 | OUTPATIENT
Start: 2024-08-13

## 2024-08-16 ENCOUNTER — TELEPHONE (OUTPATIENT)
Dept: CARDIOLOGY | Facility: CLINIC | Age: 84
End: 2024-08-16
Payer: MEDICARE

## 2024-08-16 NOTE — TELEPHONE ENCOUNTER
Patient was called to check monitor for visit on 8/30/24 provider is not in office that day. LVM asking he press button to send transmission.

## 2024-08-30 ENCOUNTER — OFFICE VISIT (OUTPATIENT)
Dept: CARDIOLOGY | Facility: CLINIC | Age: 84
End: 2024-08-30
Payer: MEDICARE

## 2024-08-30 VITALS
BODY MASS INDEX: 18.69 KG/M2 | SYSTOLIC BLOOD PRESSURE: 124 MMHG | HEIGHT: 73 IN | HEART RATE: 70 BPM | DIASTOLIC BLOOD PRESSURE: 78 MMHG | RESPIRATION RATE: 18 BRPM | OXYGEN SATURATION: 98 % | WEIGHT: 141 LBS

## 2024-08-30 DIAGNOSIS — I25.5 ISCHEMIC CARDIOMYOPATHY: ICD-10-CM

## 2024-08-30 DIAGNOSIS — Z09 FOLLOW-UP EXAM: Primary | ICD-10-CM

## 2024-08-30 DIAGNOSIS — E78.2 MIXED HYPERLIPIDEMIA: ICD-10-CM

## 2024-08-30 DIAGNOSIS — I25.10 CORONARY ARTERY DISEASE INVOLVING NATIVE CORONARY ARTERY OF NATIVE HEART WITHOUT ANGINA PECTORIS: Chronic | ICD-10-CM

## 2024-08-30 DIAGNOSIS — I48.19 PERSISTENT ATRIAL FIBRILLATION: Chronic | ICD-10-CM

## 2024-08-30 DIAGNOSIS — Z79.01 CHRONIC ANTICOAGULATION: Chronic | ICD-10-CM

## 2024-08-30 DIAGNOSIS — Z95.810 PRESENCE OF BIVENTRICULAR IMPLANTABLE CARDIOVERTER-DEFIBRILLATOR (ICD): ICD-10-CM

## 2024-08-30 PROCEDURE — 93000 ELECTROCARDIOGRAM COMPLETE: CPT | Performed by: NURSE PRACTITIONER

## 2024-08-30 PROCEDURE — 99213 OFFICE O/P EST LOW 20 MIN: CPT | Performed by: NURSE PRACTITIONER

## 2024-08-30 RX ORDER — SILDENAFIL 100 MG/1
100 TABLET, FILM COATED ORAL DAILY PRN
Qty: 10 TABLET | Refills: 1 | OUTPATIENT
Start: 2024-08-30

## 2024-08-30 RX ORDER — LATANOPROST 50 UG/ML
1 SOLUTION/ DROPS OPHTHALMIC DAILY
COMMUNITY
Start: 2024-06-20

## 2024-10-21 DIAGNOSIS — I65.23 BILATERAL CAROTID ARTERY STENOSIS: Primary | ICD-10-CM

## 2024-10-21 DIAGNOSIS — I71.43 INFRARENAL ABDOMINAL AORTIC ANEURYSM (AAA) WITHOUT RUPTURE: ICD-10-CM

## 2024-11-06 ENCOUNTER — TELEPHONE (OUTPATIENT)
Dept: CARDIOLOGY | Facility: CLINIC | Age: 84
End: 2024-11-06
Payer: MEDICARE

## 2024-11-06 NOTE — TELEPHONE ENCOUNTER
Message left for call back.    I would like to speak with patient to check in with him based on the Heart logic index of his pacemaker.    Yung.    Patient returned call.  He is not having any swelling.  He does have some SOB that he states is chronic and has not gotten any worse.  Patient advise to let us know if he starts to have any symptoms.

## 2025-01-01 ENCOUNTER — HOSPITAL ENCOUNTER (INPATIENT)
Facility: HOSPITAL | Age: 85
LOS: 1 days | End: 2025-06-20
Attending: INTERNAL MEDICINE | Admitting: INTERNAL MEDICINE
Payer: COMMERCIAL

## 2025-01-01 ENCOUNTER — APPOINTMENT (OUTPATIENT)
Dept: GENERAL RADIOLOGY | Facility: HOSPITAL | Age: 85
End: 2025-01-01
Payer: MEDICARE

## 2025-01-01 ENCOUNTER — ANESTHESIA (OUTPATIENT)
Dept: PERIOP | Facility: HOSPITAL | Age: 85
End: 2025-01-01
Payer: MEDICARE

## 2025-01-01 ENCOUNTER — APPOINTMENT (OUTPATIENT)
Dept: CT IMAGING | Facility: HOSPITAL | Age: 85
End: 2025-01-01
Payer: MEDICARE

## 2025-01-01 ENCOUNTER — ANESTHESIA EVENT (OUTPATIENT)
Dept: PERIOP | Facility: HOSPITAL | Age: 85
End: 2025-01-01
Payer: MEDICARE

## 2025-01-01 ENCOUNTER — APPOINTMENT (OUTPATIENT)
Dept: CARDIOLOGY | Facility: HOSPITAL | Age: 85
End: 2025-01-01
Payer: MEDICARE

## 2025-01-01 ENCOUNTER — HOSPITAL ENCOUNTER (INPATIENT)
Facility: HOSPITAL | Age: 85
LOS: 7 days | Discharge: HOSPICE | End: 2025-06-20
Attending: INTERNAL MEDICINE | Admitting: INTERNAL MEDICINE
Payer: MEDICARE

## 2025-01-01 VITALS
HEART RATE: 70 BPM | HEIGHT: 74 IN | DIASTOLIC BLOOD PRESSURE: 71 MMHG | BODY MASS INDEX: 19.44 KG/M2 | OXYGEN SATURATION: 80 % | SYSTOLIC BLOOD PRESSURE: 122 MMHG | RESPIRATION RATE: 10 BRPM | TEMPERATURE: 98.5 F | WEIGHT: 151.46 LBS

## 2025-01-01 DIAGNOSIS — I65.22 SYMPTOMATIC STENOSIS OF LEFT CAROTID ARTERY: Primary | ICD-10-CM

## 2025-01-01 LAB
ABO GROUP BLD: NORMAL
ACT BLD: 141 SECONDS (ref 82–152)
ACT BLD: 141 SECONDS (ref 82–152)
ACT BLD: 216 SECONDS (ref 82–152)
ACT BLD: 222 SECONDS (ref 82–152)
ALBUMIN SERPL-MCNC: 3 G/DL (ref 3.5–5.2)
ALBUMIN SERPL-MCNC: 3.1 G/DL (ref 3.5–5.2)
ALBUMIN SERPL-MCNC: 3.3 G/DL (ref 3.5–5.2)
ANION GAP SERPL CALCULATED.3IONS-SCNC: 10.4 MMOL/L (ref 5–15)
ANION GAP SERPL CALCULATED.3IONS-SCNC: 11 MMOL/L (ref 5–15)
ANION GAP SERPL CALCULATED.3IONS-SCNC: 12 MMOL/L (ref 5–15)
ANION GAP SERPL CALCULATED.3IONS-SCNC: 12.2 MMOL/L (ref 5–15)
ANION GAP SERPL CALCULATED.3IONS-SCNC: 13 MMOL/L (ref 5–15)
ANION GAP SERPL CALCULATED.3IONS-SCNC: 14 MMOL/L (ref 5–15)
ANION GAP SERPL CALCULATED.3IONS-SCNC: 9 MMOL/L (ref 5–15)
ANION GAP SERPL CALCULATED.3IONS-SCNC: 9.5 MMOL/L (ref 5–15)
APTT PPP: 101.3 SECONDS (ref 22.7–35.4)
APTT PPP: 103.3 SECONDS (ref 22.7–35.4)
APTT PPP: 57.3 SECONDS (ref 22.7–35.4)
APTT PPP: 92.4 SECONDS (ref 22.7–35.4)
APTT PPP: 93 SECONDS (ref 22.7–35.4)
APTT PPP: 94 SECONDS (ref 22.7–35.4)
APTT PPP: 95.1 SECONDS (ref 22.7–35.4)
APTT PPP: 95.8 SECONDS (ref 22.7–35.4)
ARTERIAL PATENCY WRIST A: ABNORMAL
ARTERIAL PATENCY WRIST A: POSITIVE
ATMOSPHERIC PRESS: 746.9 MMHG
ATMOSPHERIC PRESS: 747.3 MMHG
ATMOSPHERIC PRESS: 748.8 MMHG
ATMOSPHERIC PRESS: 749 MMHG
ATMOSPHERIC PRESS: 750.6 MMHG
BACTERIA SPEC AEROBE CULT: NORMAL
BACTERIA SPEC AEROBE CULT: NORMAL
BASE EXCESS BLDA CALC-SCNC: -0.5 MMOL/L (ref 0–2)
BASE EXCESS BLDA CALC-SCNC: -3.1 MMOL/L (ref 0–2)
BASE EXCESS BLDA CALC-SCNC: -3.3 MMOL/L (ref 0–2)
BASE EXCESS BLDA CALC-SCNC: 1.5 MMOL/L (ref 0–2)
BASE EXCESS BLDA CALC-SCNC: 7.7 MMOL/L (ref 0–2)
BASOPHILS # BLD AUTO: 0.01 10*3/MM3 (ref 0–0.2)
BASOPHILS NFR BLD AUTO: 0.1 % (ref 0–1.5)
BDY SITE: ABNORMAL
BLD GP AB SCN SERPL QL: NEGATIVE
BUN SERPL-MCNC: 15 MG/DL (ref 8–23)
BUN SERPL-MCNC: 21 MG/DL (ref 8–23)
BUN SERPL-MCNC: 22 MG/DL (ref 8–23)
BUN SERPL-MCNC: 23 MG/DL (ref 8–23)
BUN SERPL-MCNC: 24 MG/DL (ref 8–23)
BUN SERPL-MCNC: 25 MG/DL (ref 8–23)
BUN/CREAT SERPL: 21.4 (ref 7–25)
BUN/CREAT SERPL: 26.4 (ref 7–25)
BUN/CREAT SERPL: 27.4 (ref 7–25)
BUN/CREAT SERPL: 30.8 (ref 7–25)
BUN/CREAT SERPL: 31.1 (ref 7–25)
BUN/CREAT SERPL: 31.3 (ref 7–25)
BUN/CREAT SERPL: 34.4 (ref 7–25)
BUN/CREAT SERPL: 35 (ref 7–25)
CALCIUM SPEC-SCNC: 7.3 MG/DL (ref 8.6–10.5)
CALCIUM SPEC-SCNC: 7.6 MG/DL (ref 8.6–10.5)
CALCIUM SPEC-SCNC: 8 MG/DL (ref 8.6–10.5)
CALCIUM SPEC-SCNC: 8.1 MG/DL (ref 8.6–10.5)
CALCIUM SPEC-SCNC: 8.2 MG/DL (ref 8.6–10.5)
CALCIUM SPEC-SCNC: 8.2 MG/DL (ref 8.6–10.5)
CALCIUM SPEC-SCNC: 8.4 MG/DL (ref 8.6–10.5)
CALCIUM SPEC-SCNC: 8.6 MG/DL (ref 8.6–10.5)
CHLORIDE SERPL-SCNC: 110 MMOL/L (ref 98–107)
CHLORIDE SERPL-SCNC: 111 MMOL/L (ref 98–107)
CHLORIDE SERPL-SCNC: 111 MMOL/L (ref 98–107)
CHLORIDE SERPL-SCNC: 112 MMOL/L (ref 98–107)
CHLORIDE SERPL-SCNC: 113 MMOL/L (ref 98–107)
CHLORIDE SERPL-SCNC: 115 MMOL/L (ref 98–107)
CO2 SERPL-SCNC: 19 MMOL/L (ref 22–29)
CO2 SERPL-SCNC: 23 MMOL/L (ref 22–29)
CO2 SERPL-SCNC: 24 MMOL/L (ref 22–29)
CO2 SERPL-SCNC: 25.6 MMOL/L (ref 22–29)
CO2 SERPL-SCNC: 27.8 MMOL/L (ref 22–29)
CO2 SERPL-SCNC: 28 MMOL/L (ref 22–29)
CO2 SERPL-SCNC: 28.5 MMOL/L (ref 22–29)
CO2 SERPL-SCNC: 29 MMOL/L (ref 22–29)
CREAT SERPL-MCNC: 0.6 MG/DL (ref 0.76–1.27)
CREAT SERPL-MCNC: 0.64 MG/DL (ref 0.76–1.27)
CREAT SERPL-MCNC: 0.7 MG/DL (ref 0.76–1.27)
CREAT SERPL-MCNC: 0.74 MG/DL (ref 0.76–1.27)
CREAT SERPL-MCNC: 0.78 MG/DL (ref 0.76–1.27)
CREAT SERPL-MCNC: 0.8 MG/DL (ref 0.76–1.27)
CREAT SERPL-MCNC: 0.84 MG/DL (ref 0.76–1.27)
CREAT SERPL-MCNC: 0.87 MG/DL (ref 0.76–1.27)
DEPRECATED RDW RBC AUTO: 43.8 FL (ref 37–54)
DEPRECATED RDW RBC AUTO: 43.8 FL (ref 37–54)
DEPRECATED RDW RBC AUTO: 43.9 FL (ref 37–54)
DEPRECATED RDW RBC AUTO: 44.9 FL (ref 37–54)
DEPRECATED RDW RBC AUTO: 45.1 FL (ref 37–54)
DEPRECATED RDW RBC AUTO: 45.2 FL (ref 37–54)
DEPRECATED RDW RBC AUTO: 46.3 FL (ref 37–54)
DEPRECATED RDW RBC AUTO: 46.4 FL (ref 37–54)
DEPRECATED RDW RBC AUTO: 48.9 FL (ref 37–54)
DEVICE COMMENT: ABNORMAL
EGFRCR SERPLBLD CKD-EPI 2021: 84.6 ML/MIN/1.73
EGFRCR SERPLBLD CKD-EPI 2021: 85.5 ML/MIN/1.73
EGFRCR SERPLBLD CKD-EPI 2021: 86.7 ML/MIN/1.73
EGFRCR SERPLBLD CKD-EPI 2021: 87.4 ML/MIN/1.73
EGFRCR SERPLBLD CKD-EPI 2021: 88.8 ML/MIN/1.73
EGFRCR SERPLBLD CKD-EPI 2021: 90.3 ML/MIN/1.73
EGFRCR SERPLBLD CKD-EPI 2021: 92.8 ML/MIN/1.73
EGFRCR SERPLBLD CKD-EPI 2021: 94.6 ML/MIN/1.73
EOSINOPHIL # BLD AUTO: 0 10*3/MM3 (ref 0–0.4)
EOSINOPHIL NFR BLD AUTO: 0 % (ref 0.3–6.2)
ERYTHROCYTE [DISTWIDTH] IN BLOOD BY AUTOMATED COUNT: 12.3 % (ref 12.3–15.4)
ERYTHROCYTE [DISTWIDTH] IN BLOOD BY AUTOMATED COUNT: 12.3 % (ref 12.3–15.4)
ERYTHROCYTE [DISTWIDTH] IN BLOOD BY AUTOMATED COUNT: 12.4 % (ref 12.3–15.4)
ERYTHROCYTE [DISTWIDTH] IN BLOOD BY AUTOMATED COUNT: 12.5 % (ref 12.3–15.4)
ERYTHROCYTE [DISTWIDTH] IN BLOOD BY AUTOMATED COUNT: 12.8 % (ref 12.3–15.4)
ERYTHROCYTE [DISTWIDTH] IN BLOOD BY AUTOMATED COUNT: 13 % (ref 12.3–15.4)
ERYTHROCYTE [DISTWIDTH] IN BLOOD BY AUTOMATED COUNT: 13.1 % (ref 12.3–15.4)
ERYTHROCYTE [DISTWIDTH] IN BLOOD BY AUTOMATED COUNT: 13.2 % (ref 12.3–15.4)
ERYTHROCYTE [DISTWIDTH] IN BLOOD BY AUTOMATED COUNT: 13.4 % (ref 12.3–15.4)
GAS FLOW AIRWAY: 3 LPM
GAS FLOW AIRWAY: 4 LPM
GAS FLOW AIRWAY: 4 LPM
GEN 5 1HR TROPONIN T REFLEX: 26 NG/L
GLUCOSE BLDC GLUCOMTR-MCNC: 101 MG/DL (ref 70–130)
GLUCOSE BLDC GLUCOMTR-MCNC: 103 MG/DL (ref 70–130)
GLUCOSE BLDC GLUCOMTR-MCNC: 105 MG/DL (ref 70–130)
GLUCOSE BLDC GLUCOMTR-MCNC: 106 MG/DL (ref 70–130)
GLUCOSE BLDC GLUCOMTR-MCNC: 113 MG/DL (ref 70–130)
GLUCOSE BLDC GLUCOMTR-MCNC: 114 MG/DL (ref 70–130)
GLUCOSE BLDC GLUCOMTR-MCNC: 114 MG/DL (ref 70–130)
GLUCOSE BLDC GLUCOMTR-MCNC: 121 MG/DL (ref 70–130)
GLUCOSE BLDC GLUCOMTR-MCNC: 125 MG/DL (ref 70–130)
GLUCOSE BLDC GLUCOMTR-MCNC: 126 MG/DL (ref 70–130)
GLUCOSE BLDC GLUCOMTR-MCNC: 127 MG/DL (ref 70–130)
GLUCOSE BLDC GLUCOMTR-MCNC: 131 MG/DL (ref 70–130)
GLUCOSE BLDC GLUCOMTR-MCNC: 138 MG/DL (ref 70–130)
GLUCOSE BLDC GLUCOMTR-MCNC: 143 MG/DL (ref 70–130)
GLUCOSE BLDC GLUCOMTR-MCNC: 153 MG/DL (ref 70–130)
GLUCOSE BLDC GLUCOMTR-MCNC: 154 MG/DL (ref 70–130)
GLUCOSE BLDC GLUCOMTR-MCNC: 174 MG/DL (ref 70–130)
GLUCOSE BLDC GLUCOMTR-MCNC: 190 MG/DL (ref 70–130)
GLUCOSE BLDC GLUCOMTR-MCNC: 190 MG/DL (ref 70–130)
GLUCOSE BLDC GLUCOMTR-MCNC: 203 MG/DL (ref 70–130)
GLUCOSE BLDC GLUCOMTR-MCNC: 79 MG/DL (ref 70–130)
GLUCOSE BLDC GLUCOMTR-MCNC: 80 MG/DL (ref 70–130)
GLUCOSE BLDC GLUCOMTR-MCNC: 89 MG/DL (ref 70–130)
GLUCOSE BLDC GLUCOMTR-MCNC: 93 MG/DL (ref 70–130)
GLUCOSE BLDC GLUCOMTR-MCNC: 98 MG/DL (ref 70–130)
GLUCOSE BLDC GLUCOMTR-MCNC: 98 MG/DL (ref 70–130)
GLUCOSE BLDC GLUCOMTR-MCNC: 99 MG/DL (ref 70–130)
GLUCOSE SERPL-MCNC: 101 MG/DL (ref 65–99)
GLUCOSE SERPL-MCNC: 110 MG/DL (ref 65–99)
GLUCOSE SERPL-MCNC: 111 MG/DL (ref 65–99)
GLUCOSE SERPL-MCNC: 142 MG/DL (ref 65–99)
GLUCOSE SERPL-MCNC: 156 MG/DL (ref 65–99)
GLUCOSE SERPL-MCNC: 175 MG/DL (ref 65–99)
GLUCOSE SERPL-MCNC: 202 MG/DL (ref 65–99)
GLUCOSE SERPL-MCNC: 94 MG/DL (ref 65–99)
HCO3 BLDA-SCNC: 26.6 MMOL/L (ref 22–28)
HCO3 BLDA-SCNC: 27 MMOL/L (ref 22–28)
HCO3 BLDA-SCNC: 27.3 MMOL/L (ref 22–28)
HCO3 BLDA-SCNC: 30.4 MMOL/L (ref 22–28)
HCO3 BLDA-SCNC: 31.9 MMOL/L (ref 22–28)
HCT VFR BLD AUTO: 31.5 % (ref 37.5–51)
HCT VFR BLD AUTO: 33.6 % (ref 37.5–51)
HCT VFR BLD AUTO: 34.3 % (ref 37.5–51)
HCT VFR BLD AUTO: 34.5 % (ref 37.5–51)
HCT VFR BLD AUTO: 34.9 % (ref 37.5–51)
HCT VFR BLD AUTO: 35 % (ref 37.5–51)
HCT VFR BLD AUTO: 35.2 % (ref 37.5–51)
HCT VFR BLD AUTO: 37 % (ref 37.5–51)
HCT VFR BLD AUTO: 37.9 % (ref 37.5–51)
HEMODILUTION: NO
HGB BLD-MCNC: 10.1 G/DL (ref 13–17.7)
HGB BLD-MCNC: 11 G/DL (ref 13–17.7)
HGB BLD-MCNC: 11.2 G/DL (ref 13–17.7)
HGB BLD-MCNC: 11.3 G/DL (ref 13–17.7)
HGB BLD-MCNC: 11.3 G/DL (ref 13–17.7)
HGB BLD-MCNC: 11.7 G/DL (ref 13–17.7)
HGB BLD-MCNC: 11.9 G/DL (ref 13–17.7)
HGB BLD-MCNC: 12 G/DL (ref 13–17.7)
HGB BLD-MCNC: 12.1 G/DL (ref 13–17.7)
IMM GRANULOCYTES # BLD AUTO: 0.03 10*3/MM3 (ref 0–0.05)
IMM GRANULOCYTES # BLD AUTO: 0.09 10*3/MM3 (ref 0–0.05)
IMM GRANULOCYTES # BLD AUTO: 0.18 10*3/MM3 (ref 0–0.05)
IMM GRANULOCYTES NFR BLD AUTO: 0.3 % (ref 0–0.5)
IMM GRANULOCYTES NFR BLD AUTO: 0.6 % (ref 0–0.5)
IMM GRANULOCYTES NFR BLD AUTO: 1.1 % (ref 0–0.5)
INHALED O2 CONCENTRATION: 40 %
INHALED O2 CONCENTRATION: 40 %
LYMPHOCYTES # BLD AUTO: 0.52 10*3/MM3 (ref 0.7–3.1)
LYMPHOCYTES # BLD AUTO: 1.54 10*3/MM3 (ref 0.7–3.1)
LYMPHOCYTES # BLD AUTO: 2.09 10*3/MM3 (ref 0.7–3.1)
LYMPHOCYTES NFR BLD AUTO: 13 % (ref 19.6–45.3)
LYMPHOCYTES NFR BLD AUTO: 13.7 % (ref 19.6–45.3)
LYMPHOCYTES NFR BLD AUTO: 3.2 % (ref 19.6–45.3)
Lab: ABNORMAL
MAGNESIUM SERPL-MCNC: 1.7 MG/DL (ref 1.6–2.4)
MCH RBC QN AUTO: 31.5 PG (ref 26.6–33)
MCH RBC QN AUTO: 31.6 PG (ref 26.6–33)
MCH RBC QN AUTO: 31.6 PG (ref 26.6–33)
MCH RBC QN AUTO: 31.7 PG (ref 26.6–33)
MCH RBC QN AUTO: 32 PG (ref 26.6–33)
MCH RBC QN AUTO: 32.3 PG (ref 26.6–33)
MCH RBC QN AUTO: 32.8 PG (ref 26.6–33)
MCHC RBC AUTO-ENTMCNC: 31.9 G/DL (ref 31.5–35.7)
MCHC RBC AUTO-ENTMCNC: 31.9 G/DL (ref 31.5–35.7)
MCHC RBC AUTO-ENTMCNC: 32.1 G/DL (ref 31.5–35.7)
MCHC RBC AUTO-ENTMCNC: 32.1 G/DL (ref 31.5–35.7)
MCHC RBC AUTO-ENTMCNC: 32.4 G/DL (ref 31.5–35.7)
MCHC RBC AUTO-ENTMCNC: 32.7 G/DL (ref 31.5–35.7)
MCHC RBC AUTO-ENTMCNC: 33.4 G/DL (ref 31.5–35.7)
MCHC RBC AUTO-ENTMCNC: 33.6 G/DL (ref 31.5–35.7)
MCHC RBC AUTO-ENTMCNC: 34.1 G/DL (ref 31.5–35.7)
MCV RBC AUTO: 95.6 FL (ref 79–97)
MCV RBC AUTO: 96 FL (ref 79–97)
MCV RBC AUTO: 96.1 FL (ref 79–97)
MCV RBC AUTO: 97.2 FL (ref 79–97)
MCV RBC AUTO: 97.4 FL (ref 79–97)
MCV RBC AUTO: 98.7 FL (ref 79–97)
MCV RBC AUTO: 98.7 FL (ref 79–97)
MCV RBC AUTO: 98.9 FL (ref 79–97)
MCV RBC AUTO: 99.1 FL (ref 79–97)
MODALITY: ABNORMAL
MONOCYTES # BLD AUTO: 0.68 10*3/MM3 (ref 0.1–0.9)
MONOCYTES # BLD AUTO: 1.1 10*3/MM3 (ref 0.1–0.9)
MONOCYTES # BLD AUTO: 1.11 10*3/MM3 (ref 0.1–0.9)
MONOCYTES NFR BLD AUTO: 5.7 % (ref 5–12)
MONOCYTES NFR BLD AUTO: 6.9 % (ref 5–12)
MONOCYTES NFR BLD AUTO: 7.2 % (ref 5–12)
MRSA DNA SPEC QL NAA+PROBE: NORMAL
NEUTROPHILS NFR BLD AUTO: 11.98 10*3/MM3 (ref 1.7–7)
NEUTROPHILS NFR BLD AUTO: 14.32 10*3/MM3 (ref 1.7–7)
NEUTROPHILS NFR BLD AUTO: 78.4 % (ref 42.7–76)
NEUTROPHILS NFR BLD AUTO: 80.9 % (ref 42.7–76)
NEUTROPHILS NFR BLD AUTO: 88.7 % (ref 42.7–76)
NEUTROPHILS NFR BLD AUTO: 9.61 10*3/MM3 (ref 1.7–7)
NIGHT BLUE STAIN TISS: NORMAL
NOTIFIED WHO: ABNORMAL
NRBC BLD AUTO-RTO: 0 /100 WBC (ref 0–0.2)
NRBC BLD AUTO-RTO: 0 /100 WBC (ref 0–0.2)
NT-PROBNP SERPL-MCNC: 7934 PG/ML (ref 0–1800)
O2 A-A PPRESDIFF RESPIRATORY: 0.4 MMHG
O2 A-A PPRESDIFF RESPIRATORY: 0.5 MMHG
PCO2 BLDA: 115.7 MM HG (ref 35–45)
PCO2 BLDA: 42.5 MM HG (ref 35–45)
PCO2 BLDA: 43.6 MM HG (ref 35–45)
PCO2 BLDA: 55.9 MM HG (ref 35–45)
PCO2 BLDA: 77 MM HG (ref 35–45)
PEEP RESPIRATORY: 5 CM[H2O]
PEEP RESPIRATORY: 8 CM[H2O]
PH BLDA: 7.03 PH UNITS (ref 7.35–7.45)
PH BLDA: 7.16 PH UNITS (ref 7.35–7.45)
PH BLDA: 7.29 PH UNITS (ref 7.35–7.45)
PH BLDA: 7.39 PH UNITS (ref 7.35–7.45)
PH BLDA: 7.48 PH UNITS (ref 7.35–7.45)
PHOSPHATE SERPL-MCNC: 1.4 MG/DL (ref 2.5–4.5)
PHOSPHATE SERPL-MCNC: 1.7 MG/DL (ref 2.5–4.5)
PHOSPHATE SERPL-MCNC: 1.9 MG/DL (ref 2.5–4.5)
PHOSPHATE SERPL-MCNC: 2.2 MG/DL (ref 2.5–4.5)
PHOSPHATE SERPL-MCNC: 2.2 MG/DL (ref 2.5–4.5)
PHOSPHATE SERPL-MCNC: 2.5 MG/DL (ref 2.5–4.5)
PHOSPHATE SERPL-MCNC: 2.5 MG/DL (ref 2.5–4.5)
PHOSPHATE SERPL-MCNC: 2.9 MG/DL (ref 2.5–4.5)
PHOSPHATE SERPL-MCNC: 2.9 MG/DL (ref 2.5–4.5)
PLATELET # BLD AUTO: 100 10*3/MM3 (ref 140–450)
PLATELET # BLD AUTO: 103 10*3/MM3 (ref 140–450)
PLATELET # BLD AUTO: 111 10*3/MM3 (ref 140–450)
PLATELET # BLD AUTO: 113 10*3/MM3 (ref 140–450)
PLATELET # BLD AUTO: 120 10*3/MM3 (ref 140–450)
PLATELET # BLD AUTO: 141 10*3/MM3 (ref 140–450)
PLATELET # BLD AUTO: 159 10*3/MM3 (ref 140–450)
PLATELET # BLD AUTO: 80 10*3/MM3 (ref 140–450)
PLATELET # BLD AUTO: 88 10*3/MM3 (ref 140–450)
PMV BLD AUTO: 10.8 FL (ref 6–12)
PMV BLD AUTO: 10.9 FL (ref 6–12)
PMV BLD AUTO: 10.9 FL (ref 6–12)
PMV BLD AUTO: 11 FL (ref 6–12)
PMV BLD AUTO: 11 FL (ref 6–12)
PMV BLD AUTO: 11.2 FL (ref 6–12)
PMV BLD AUTO: 11.3 FL (ref 6–12)
PMV BLD AUTO: 11.3 FL (ref 6–12)
PMV BLD AUTO: 11.6 FL (ref 6–12)
PO2 BLD: 226 MM[HG] (ref 0–500)
PO2 BLD: 233 MM[HG] (ref 0–500)
PO2 BLDA: 121.2 MM HG (ref 80–100)
PO2 BLDA: 47.3 MM HG (ref 80–100)
PO2 BLDA: 56.5 MM HG (ref 80–100)
PO2 BLDA: 90.4 MM HG (ref 80–100)
PO2 BLDA: 93.3 MM HG (ref 80–100)
POTASSIUM SERPL-SCNC: 3.2 MMOL/L (ref 3.5–5.2)
POTASSIUM SERPL-SCNC: 3.2 MMOL/L (ref 3.5–5.2)
POTASSIUM SERPL-SCNC: 3.3 MMOL/L (ref 3.5–5.2)
POTASSIUM SERPL-SCNC: 3.3 MMOL/L (ref 3.5–5.2)
POTASSIUM SERPL-SCNC: 3.4 MMOL/L (ref 3.5–5.2)
POTASSIUM SERPL-SCNC: 3.6 MMOL/L (ref 3.5–5.2)
POTASSIUM SERPL-SCNC: 3.6 MMOL/L (ref 3.5–5.2)
POTASSIUM SERPL-SCNC: 3.8 MMOL/L (ref 3.5–5.2)
POTASSIUM SERPL-SCNC: 3.8 MMOL/L (ref 3.5–5.2)
POTASSIUM SERPL-SCNC: 3.9 MMOL/L (ref 3.5–5.2)
POTASSIUM SERPL-SCNC: 4.2 MMOL/L (ref 3.5–5.2)
POTASSIUM SERPL-SCNC: 4.6 MMOL/L (ref 3.5–5.2)
POTASSIUM SERPL-SCNC: NORMAL MMOL/L
PROCALCITONIN SERPL-MCNC: 0.34 NG/ML (ref 0–0.25)
RBC # BLD AUTO: 3.19 10*6/MM3 (ref 4.14–5.8)
RBC # BLD AUTO: 3.48 10*6/MM3 (ref 4.14–5.8)
RBC # BLD AUTO: 3.5 10*6/MM3 (ref 4.14–5.8)
RBC # BLD AUTO: 3.53 10*6/MM3 (ref 4.14–5.8)
RBC # BLD AUTO: 3.56 10*6/MM3 (ref 4.14–5.8)
RBC # BLD AUTO: 3.63 10*6/MM3 (ref 4.14–5.8)
RBC # BLD AUTO: 3.66 10*6/MM3 (ref 4.14–5.8)
RBC # BLD AUTO: 3.8 10*6/MM3 (ref 4.14–5.8)
RBC # BLD AUTO: 3.84 10*6/MM3 (ref 4.14–5.8)
READ BACK: YES
RH BLD: NEGATIVE
SAO2 % BLDCOA: 85.6 % (ref 92–98.5)
SAO2 % BLDCOA: 88.6 % (ref 92–99)
SAO2 % BLDCOA: 94 % (ref 92–98.5)
SAO2 % BLDCOA: 95.6 % (ref 92–98.5)
SAO2 % BLDCOA: 95.7 % (ref 92–98.5)
SET MECH RESP RATE: 16
SET MECH RESP RATE: 20
SODIUM SERPL-SCNC: 147 MMOL/L (ref 136–145)
SODIUM SERPL-SCNC: 148 MMOL/L (ref 136–145)
SODIUM SERPL-SCNC: 149 MMOL/L (ref 136–145)
SODIUM SERPL-SCNC: 151 MMOL/L (ref 136–145)
SODIUM SERPL-SCNC: 152 MMOL/L (ref 136–145)
SODIUM SERPL-SCNC: 155 MMOL/L (ref 136–145)
T&S EXPIRATION DATE: NORMAL
TOTAL RATE: 16 BREATHS/MINUTE
TOTAL RATE: 20 BREATHS/MINUTE
TOTAL RATE: 20 BREATHS/MINUTE
TOTAL RATE: 28 BREATHS/MINUTE
TOTAL RATE: 31 BREATHS/MINUTE
TROPONIN T % DELTA: 8
TROPONIN T NUMERIC DELTA: 2 NG/L
TROPONIN T SERPL HS-MCNC: 24 NG/L
VENTILATOR MODE: ABNORMAL
VENTILATOR MODE: ABNORMAL
VT ON VENT VENT: 450 ML
WBC NRBC COR # BLD AUTO: 10.55 10*3/MM3 (ref 3.4–10.8)
WBC NRBC COR # BLD AUTO: 11.87 10*3/MM3 (ref 3.4–10.8)
WBC NRBC COR # BLD AUTO: 13.27 10*3/MM3 (ref 3.4–10.8)
WBC NRBC COR # BLD AUTO: 14.91 10*3/MM3 (ref 3.4–10.8)
WBC NRBC COR # BLD AUTO: 15.27 10*3/MM3 (ref 3.4–10.8)
WBC NRBC COR # BLD AUTO: 16.14 10*3/MM3 (ref 3.4–10.8)
WBC NRBC COR # BLD AUTO: 17.14 10*3/MM3 (ref 3.4–10.8)
WBC NRBC COR # BLD AUTO: 18.32 10*3/MM3 (ref 3.4–10.8)
WBC NRBC COR # BLD AUTO: 6.91 10*3/MM3 (ref 3.4–10.8)

## 2025-01-01 PROCEDURE — 82948 REAGENT STRIP/BLOOD GLUCOSE: CPT

## 2025-01-01 PROCEDURE — 25010000002 HYDRALAZINE PER 20 MG: Performed by: ANESTHESIOLOGY

## 2025-01-01 PROCEDURE — 94799 UNLISTED PULMONARY SVC/PX: CPT

## 2025-01-01 PROCEDURE — 63710000001 PREDNISONE PER 1 MG: Performed by: INTERNAL MEDICINE

## 2025-01-01 PROCEDURE — 94660 CPAP INITIATION&MGMT: CPT

## 2025-01-01 PROCEDURE — 25010000002 PIPERACILLIN SOD-TAZOBACTAM PER 1 G: Performed by: STUDENT IN AN ORGANIZED HEALTH CARE EDUCATION/TRAINING PROGRAM

## 2025-01-01 PROCEDURE — 82803 BLOOD GASES ANY COMBINATION: CPT | Performed by: INTERNAL MEDICINE

## 2025-01-01 PROCEDURE — 94664 DEMO&/EVAL PT USE INHALER: CPT

## 2025-01-01 PROCEDURE — 25010000002 ONDANSETRON PER 1 MG: Performed by: NURSE ANESTHETIST, CERTIFIED REGISTERED

## 2025-01-01 PROCEDURE — 99024 POSTOP FOLLOW-UP VISIT: CPT | Performed by: STUDENT IN AN ORGANIZED HEALTH CARE EDUCATION/TRAINING PROGRAM

## 2025-01-01 PROCEDURE — 86900 BLOOD TYPING SEROLOGIC ABO: CPT | Performed by: SURGERY

## 2025-01-01 PROCEDURE — 25010000002 METHYLPREDNISOLONE PER 40 MG: Performed by: INTERNAL MEDICINE

## 2025-01-01 PROCEDURE — C1768 GRAFT, VASCULAR: HCPCS | Performed by: STUDENT IN AN ORGANIZED HEALTH CARE EDUCATION/TRAINING PROGRAM

## 2025-01-01 PROCEDURE — 85730 THROMBOPLASTIN TIME PARTIAL: CPT

## 2025-01-01 PROCEDURE — 94761 N-INVAS EAR/PLS OXIMETRY MLT: CPT

## 2025-01-01 PROCEDURE — 84145 PROCALCITONIN (PCT): CPT | Performed by: INTERNAL MEDICINE

## 2025-01-01 PROCEDURE — 03QJ0ZZ REPAIR LEFT COMMON CAROTID ARTERY, OPEN APPROACH: ICD-10-PCS | Performed by: STUDENT IN AN ORGANIZED HEALTH CARE EDUCATION/TRAINING PROGRAM

## 2025-01-01 PROCEDURE — 80048 BASIC METABOLIC PNL TOTAL CA: CPT | Performed by: STUDENT IN AN ORGANIZED HEALTH CARE EDUCATION/TRAINING PROGRAM

## 2025-01-01 PROCEDURE — 97162 PT EVAL MOD COMPLEX 30 MIN: CPT

## 2025-01-01 PROCEDURE — 97110 THERAPEUTIC EXERCISES: CPT

## 2025-01-01 PROCEDURE — 25810000003 SODIUM CHLORIDE 0.9 % SOLUTION: Performed by: INTERNAL MEDICINE

## 2025-01-01 PROCEDURE — 25010000002 METHYLPREDNISOLONE PER 40 MG: Performed by: STUDENT IN AN ORGANIZED HEALTH CARE EDUCATION/TRAINING PROGRAM

## 2025-01-01 PROCEDURE — 99232 SBSQ HOSP IP/OBS MODERATE 35: CPT | Performed by: INTERNAL MEDICINE

## 2025-01-01 PROCEDURE — 94760 N-INVAS EAR/PLS OXIMETRY 1: CPT

## 2025-01-01 PROCEDURE — 25010000002 BUPIVACAINE 0.25 % SOLUTION: Performed by: STUDENT IN AN ORGANIZED HEALTH CARE EDUCATION/TRAINING PROGRAM

## 2025-01-01 PROCEDURE — 37600 LIGATION XTRNL CAROTID ART: CPT | Performed by: STUDENT IN AN ORGANIZED HEALTH CARE EDUCATION/TRAINING PROGRAM

## 2025-01-01 PROCEDURE — 25010000002 PIPERACILLIN SOD-TAZOBACTAM PER 1 G: Performed by: INTERNAL MEDICINE

## 2025-01-01 PROCEDURE — 25010000002 FUROSEMIDE PER 20 MG: Performed by: INTERNAL MEDICINE

## 2025-01-01 PROCEDURE — 83880 ASSAY OF NATRIURETIC PEPTIDE: CPT | Performed by: INTERNAL MEDICINE

## 2025-01-01 PROCEDURE — 84484 ASSAY OF TROPONIN QUANT: CPT | Performed by: STUDENT IN AN ORGANIZED HEALTH CARE EDUCATION/TRAINING PROGRAM

## 2025-01-01 PROCEDURE — 80048 BASIC METABOLIC PNL TOTAL CA: CPT | Performed by: INTERNAL MEDICINE

## 2025-01-01 PROCEDURE — 84132 ASSAY OF SERUM POTASSIUM: CPT

## 2025-01-01 PROCEDURE — 25810000003 SODIUM CHLORIDE 0.9 % SOLUTION 250 ML FLEX CONT: Performed by: ANESTHESIOLOGY

## 2025-01-01 PROCEDURE — 37600 LIGATION XTRNL CAROTID ART: CPT | Performed by: SURGERY

## 2025-01-01 PROCEDURE — 25010000002 POTASSIUM CHLORIDE 10 MEQ/100ML SOLUTION

## 2025-01-01 PROCEDURE — 25010000002 CEFAZOLIN PER 500 MG: Performed by: STUDENT IN AN ORGANIZED HEALTH CARE EDUCATION/TRAINING PROGRAM

## 2025-01-01 PROCEDURE — 97530 THERAPEUTIC ACTIVITIES: CPT

## 2025-01-01 PROCEDURE — 25010000002 HEPARIN (PORCINE) 25000-0.45 UT/250ML-% SOLUTION: Performed by: NURSE PRACTITIONER

## 2025-01-01 PROCEDURE — 84132 ASSAY OF SERUM POTASSIUM: CPT | Performed by: INTERNAL MEDICINE

## 2025-01-01 PROCEDURE — 84100 ASSAY OF PHOSPHORUS: CPT | Performed by: INTERNAL MEDICINE

## 2025-01-01 PROCEDURE — 36600 WITHDRAWAL OF ARTERIAL BLOOD: CPT | Performed by: INTERNAL MEDICINE

## 2025-01-01 PROCEDURE — 92611 MOTION FLUOROSCOPY/SWALLOW: CPT

## 2025-01-01 PROCEDURE — 25010000003 DEXTROSE 5 % SOLUTION: Performed by: INTERNAL MEDICINE

## 2025-01-01 PROCEDURE — 97164 PT RE-EVAL EST PLAN CARE: CPT

## 2025-01-01 PROCEDURE — 25010000002 PHENTOLAMINE MESYLATE PER 5 MG: Performed by: INTERNAL MEDICINE

## 2025-01-01 PROCEDURE — 03CJ0ZZ EXTIRPATION OF MATTER FROM LEFT COMMON CAROTID ARTERY, OPEN APPROACH: ICD-10-PCS | Performed by: STUDENT IN AN ORGANIZED HEALTH CARE EDUCATION/TRAINING PROGRAM

## 2025-01-01 PROCEDURE — 97166 OT EVAL MOD COMPLEX 45 MIN: CPT

## 2025-01-01 PROCEDURE — 70450 CT HEAD/BRAIN W/O DYE: CPT

## 2025-01-01 PROCEDURE — 82803 BLOOD GASES ANY COMBINATION: CPT

## 2025-01-01 PROCEDURE — 25010000002 POTASSIUM CHLORIDE 10 MEQ/100ML SOLUTION: Performed by: INTERNAL MEDICINE

## 2025-01-01 PROCEDURE — 85027 COMPLETE CBC AUTOMATED: CPT | Performed by: STUDENT IN AN ORGANIZED HEALTH CARE EDUCATION/TRAINING PROGRAM

## 2025-01-01 PROCEDURE — 85025 COMPLETE CBC W/AUTO DIFF WBC: CPT | Performed by: STUDENT IN AN ORGANIZED HEALTH CARE EDUCATION/TRAINING PROGRAM

## 2025-01-01 PROCEDURE — 25010000002 LORAZEPAM PER 2 MG: Performed by: INTERNAL MEDICINE

## 2025-01-01 PROCEDURE — 85347 COAGULATION TIME ACTIVATED: CPT

## 2025-01-01 PROCEDURE — 83735 ASSAY OF MAGNESIUM: CPT | Performed by: INTERNAL MEDICINE

## 2025-01-01 PROCEDURE — 74230 X-RAY XM SWLNG FUNCJ C+: CPT

## 2025-01-01 PROCEDURE — 36600 WITHDRAWAL OF ARTERIAL BLOOD: CPT

## 2025-01-01 PROCEDURE — 84100 ASSAY OF PHOSPHORUS: CPT

## 2025-01-01 PROCEDURE — 92610 EVALUATE SWALLOWING FUNCTION: CPT

## 2025-01-01 PROCEDURE — 99222 1ST HOSP IP/OBS MODERATE 55: CPT | Performed by: STUDENT IN AN ORGANIZED HEALTH CARE EDUCATION/TRAINING PROGRAM

## 2025-01-01 PROCEDURE — 87206 SMEAR FLUORESCENT/ACID STAI: CPT

## 2025-01-01 PROCEDURE — 25010000002 MORPHINE PER 10 MG: Performed by: INTERNAL MEDICINE

## 2025-01-01 PROCEDURE — 80069 RENAL FUNCTION PANEL: CPT | Performed by: STUDENT IN AN ORGANIZED HEALTH CARE EDUCATION/TRAINING PROGRAM

## 2025-01-01 PROCEDURE — 25010000002 HEPARIN (PORCINE) PER 1000 UNITS: Performed by: STUDENT IN AN ORGANIZED HEALTH CARE EDUCATION/TRAINING PROGRAM

## 2025-01-01 PROCEDURE — 99232 SBSQ HOSP IP/OBS MODERATE 35: CPT | Performed by: SURGERY

## 2025-01-01 PROCEDURE — 25010000002 VASOPRESSIN 20 UNIT/ML SOLUTION: Performed by: ANESTHESIOLOGY

## 2025-01-01 PROCEDURE — 86901 BLOOD TYPING SEROLOGIC RH(D): CPT | Performed by: SURGERY

## 2025-01-01 PROCEDURE — 35601 BPG COM CRTD-IPSI INT CRTD: CPT | Performed by: STUDENT IN AN ORGANIZED HEALTH CARE EDUCATION/TRAINING PROGRAM

## 2025-01-01 PROCEDURE — 25010000003 DEXTROSE 5 % SOLUTION: Performed by: HOSPITALIST

## 2025-01-01 PROCEDURE — 71045 X-RAY EXAM CHEST 1 VIEW: CPT

## 2025-01-01 PROCEDURE — 87040 BLOOD CULTURE FOR BACTERIA: CPT | Performed by: INTERNAL MEDICINE

## 2025-01-01 PROCEDURE — 03CL0ZZ EXTIRPATION OF MATTER FROM LEFT INTERNAL CAROTID ARTERY, OPEN APPROACH: ICD-10-PCS | Performed by: STUDENT IN AN ORGANIZED HEALTH CARE EDUCATION/TRAINING PROGRAM

## 2025-01-01 PROCEDURE — 85730 THROMBOPLASTIN TIME PARTIAL: CPT | Performed by: NURSE PRACTITIONER

## 2025-01-01 PROCEDURE — 25010000002 FUROSEMIDE PER 20 MG: Performed by: STUDENT IN AN ORGANIZED HEALTH CARE EDUCATION/TRAINING PROGRAM

## 2025-01-01 PROCEDURE — 87116 MYCOBACTERIA CULTURE: CPT

## 2025-01-01 PROCEDURE — 25010000002 SUGAMMADEX 200 MG/2ML SOLUTION: Performed by: NURSE ANESTHETIST, CERTIFIED REGISTERED

## 2025-01-01 PROCEDURE — 82803 BLOOD GASES ANY COMBINATION: CPT | Performed by: ANESTHESIOLOGY

## 2025-01-01 PROCEDURE — 25010000002 HEPARIN (PORCINE) PER 1000 UNITS: Performed by: ANESTHESIOLOGY

## 2025-01-01 PROCEDURE — 85025 COMPLETE CBC W/AUTO DIFF WBC: CPT

## 2025-01-01 PROCEDURE — 25010000002 GLYCOPYRROLATE 0.2 MG/ML SOLUTION: Performed by: INTERNAL MEDICINE

## 2025-01-01 PROCEDURE — 25010000002 CEFAZOLIN PER 500 MG: Performed by: ANESTHESIOLOGY

## 2025-01-01 PROCEDURE — 25010000002 PROTAMINE SULFATE PER 10 MG: Performed by: ANESTHESIOLOGY

## 2025-01-01 PROCEDURE — 25010000002 PHENYLEPHRINE 10 MG/ML SOLUTION: Performed by: ANESTHESIOLOGY

## 2025-01-01 PROCEDURE — 25810000003 SODIUM CHLORIDE 0.9 % SOLUTION

## 2025-01-01 PROCEDURE — 87641 MR-STAPH DNA AMP PROBE: CPT | Performed by: INTERNAL MEDICINE

## 2025-01-01 PROCEDURE — 25810000003 LACTATED RINGERS PER 1000 ML: Performed by: ANESTHESIOLOGY

## 2025-01-01 PROCEDURE — 85027 COMPLETE CBC AUTOMATED: CPT | Performed by: INTERNAL MEDICINE

## 2025-01-01 PROCEDURE — 25010000002 PHENYLEPHRINE 10 MG/ML SOLUTION 5 ML VIAL: Performed by: ANESTHESIOLOGY

## 2025-01-01 PROCEDURE — 25010000002 HYDROMORPHONE PER 4 MG: Performed by: STUDENT IN AN ORGANIZED HEALTH CARE EDUCATION/TRAINING PROGRAM

## 2025-01-01 PROCEDURE — 80069 RENAL FUNCTION PANEL: CPT | Performed by: INTERNAL MEDICINE

## 2025-01-01 PROCEDURE — 25010000002 NICARDIPINE 2.5 MG/ML SOLUTION: Performed by: NURSE ANESTHETIST, CERTIFIED REGISTERED

## 2025-01-01 PROCEDURE — 25010000002 LIDOCAINE PF 1% 1 % SOLUTION: Performed by: STUDENT IN AN ORGANIZED HEALTH CARE EDUCATION/TRAINING PROGRAM

## 2025-01-01 PROCEDURE — 25010000002 CEFTRIAXONE PER 250 MG: Performed by: INTERNAL MEDICINE

## 2025-01-01 PROCEDURE — 85025 COMPLETE CBC W/AUTO DIFF WBC: CPT | Performed by: NURSE PRACTITIONER

## 2025-01-01 PROCEDURE — 25010000002 PROPOFOL 10 MG/ML EMULSION: Performed by: ANESTHESIOLOGY

## 2025-01-01 PROCEDURE — 86850 RBC ANTIBODY SCREEN: CPT | Performed by: SURGERY

## 2025-01-01 PROCEDURE — 03QL0ZZ REPAIR LEFT INTERNAL CAROTID ARTERY, OPEN APPROACH: ICD-10-PCS | Performed by: STUDENT IN AN ORGANIZED HEALTH CARE EDUCATION/TRAINING PROGRAM

## 2025-01-01 PROCEDURE — 99222 1ST HOSP IP/OBS MODERATE 55: CPT | Performed by: INTERNAL MEDICINE

## 2025-01-01 PROCEDURE — 99024 POSTOP FOLLOW-UP VISIT: CPT | Performed by: SURGERY

## 2025-01-01 PROCEDURE — 35601 BPG COM CRTD-IPSI INT CRTD: CPT | Performed by: SURGERY

## 2025-01-01 PROCEDURE — 25010000002 LIDOCAINE 2% SOLUTION: Performed by: ANESTHESIOLOGY

## 2025-01-01 DEVICE — LIGACLIP MCA MULTIPLE CLIP APPLIERS, 20 MEDIUM CLIPS
Type: IMPLANTABLE DEVICE | Site: NECK | Status: FUNCTIONAL
Brand: LIGACLIP

## 2025-01-01 DEVICE — ABSORBABLE HEMOSTAT (OXIDIZED REGENERATED CELLULOSE, U.S.P.)
Type: IMPLANTABLE DEVICE | Site: NECK | Status: FUNCTIONAL
Brand: SURGICEL FIBRILLAR

## 2025-01-01 DEVICE — INTERING VASC GRAFT STR SW IR 4-7MMX45CM 38CM RINGS TAPERED
Type: IMPLANTABLE DEVICE | Site: CAROTID | Status: FUNCTIONAL
Brand: GORE INTERING VASCULAR GRAFT

## 2025-01-01 RX ORDER — HYDROCODONE BITARTRATE AND ACETAMINOPHEN 5; 325 MG/1; MG/1
1 TABLET ORAL ONCE AS NEEDED
Status: DISCONTINUED | OUTPATIENT
Start: 2025-01-01 | End: 2025-01-01 | Stop reason: HOSPADM

## 2025-01-01 RX ORDER — LORAZEPAM 2 MG/ML
0.5 CONCENTRATE ORAL
Status: DISCONTINUED | OUTPATIENT
Start: 2025-01-01 | End: 2025-01-01

## 2025-01-01 RX ORDER — ACETAMINOPHEN 325 MG/1
650 TABLET ORAL EVERY 4 HOURS PRN
Status: DISCONTINUED | OUTPATIENT
Start: 2025-01-01 | End: 2025-01-01

## 2025-01-01 RX ORDER — SODIUM CHLORIDE 0.9 % (FLUSH) 0.9 %
10 SYRINGE (ML) INJECTION AS NEEDED
Status: DISCONTINUED | OUTPATIENT
Start: 2025-01-01 | End: 2025-01-01 | Stop reason: HOSPADM

## 2025-01-01 RX ORDER — DEXTROSE MONOHYDRATE AND SODIUM CHLORIDE 5; .45 G/100ML; G/100ML
75 INJECTION, SOLUTION INTRAVENOUS CONTINUOUS
Status: ACTIVE | OUTPATIENT
Start: 2025-01-01 | End: 2025-01-01

## 2025-01-01 RX ORDER — ACETAMINOPHEN 650 MG/1
650 SUPPOSITORY RECTAL EVERY 4 HOURS PRN
Status: DISCONTINUED | OUTPATIENT
Start: 2025-01-01 | End: 2025-01-01

## 2025-01-01 RX ORDER — LORAZEPAM 2 MG/ML
1 CONCENTRATE ORAL
Status: DISCONTINUED | OUTPATIENT
Start: 2025-01-01 | End: 2025-01-01

## 2025-01-01 RX ORDER — ALBUTEROL SULFATE 0.83 MG/ML
2.5 SOLUTION RESPIRATORY (INHALATION)
Status: DISCONTINUED | OUTPATIENT
Start: 2025-01-01 | End: 2025-01-01

## 2025-01-01 RX ORDER — BISACODYL 5 MG/1
5 TABLET, DELAYED RELEASE ORAL DAILY PRN
Status: DISCONTINUED | OUTPATIENT
Start: 2025-01-01 | End: 2025-01-01

## 2025-01-01 RX ORDER — DEXTROSE MONOHYDRATE 50 MG/ML
125 INJECTION, SOLUTION INTRAVENOUS CONTINUOUS
Status: DISCONTINUED | OUTPATIENT
Start: 2025-01-01 | End: 2025-01-01

## 2025-01-01 RX ORDER — ATORVASTATIN CALCIUM 20 MG/1
40 TABLET, FILM COATED ORAL DAILY
Status: DISCONTINUED | OUTPATIENT
Start: 2025-01-01 | End: 2025-01-01

## 2025-01-01 RX ORDER — POLYETHYLENE GLYCOL 3350 17 G/17G
17 POWDER, FOR SOLUTION ORAL DAILY PRN
Status: DISCONTINUED | OUTPATIENT
Start: 2025-01-01 | End: 2025-01-01

## 2025-01-01 RX ORDER — PHENYLEPHRINE HCL IN 0.9% NACL 0.5 MG/5ML
.5-3 SYRINGE (ML) INTRAVENOUS
Status: DISCONTINUED | OUTPATIENT
Start: 2025-01-01 | End: 2025-01-01

## 2025-01-01 RX ORDER — SODIUM CHLORIDE 0.9 % (FLUSH) 0.9 %
10 SYRINGE (ML) INJECTION AS NEEDED
Status: DISCONTINUED | OUTPATIENT
Start: 2025-01-01 | End: 2025-01-01

## 2025-01-01 RX ORDER — EPHEDRINE SULFATE 50 MG/ML
INJECTION, SOLUTION INTRAVENOUS AS NEEDED
Status: DISCONTINUED | OUTPATIENT
Start: 2025-01-01 | End: 2025-01-01 | Stop reason: SURG

## 2025-01-01 RX ORDER — LIDOCAINE HYDROCHLORIDE 10 MG/ML
INJECTION, SOLUTION EPIDURAL; INFILTRATION; INTRACAUDAL; PERINEURAL AS NEEDED
Status: DISCONTINUED | OUTPATIENT
Start: 2025-01-01 | End: 2025-01-01 | Stop reason: HOSPADM

## 2025-01-01 RX ORDER — GLYCOPYRROLATE 0.2 MG/ML
0.4 INJECTION INTRAMUSCULAR; INTRAVENOUS
Status: CANCELLED | OUTPATIENT
Start: 2025-01-01

## 2025-01-01 RX ORDER — OXYCODONE HCL 5 MG/5 ML
5 SOLUTION, ORAL ORAL EVERY 4 HOURS PRN
Refills: 0 | Status: DISCONTINUED | OUTPATIENT
Start: 2025-01-01 | End: 2025-01-01

## 2025-01-01 RX ORDER — ONDANSETRON 2 MG/ML
INJECTION INTRAMUSCULAR; INTRAVENOUS AS NEEDED
Status: DISCONTINUED | OUTPATIENT
Start: 2025-01-01 | End: 2025-01-01 | Stop reason: SURG

## 2025-01-01 RX ORDER — FUROSEMIDE 10 MG/ML
40 INJECTION INTRAMUSCULAR; INTRAVENOUS ONCE
Status: COMPLETED | OUTPATIENT
Start: 2025-01-01 | End: 2025-01-01

## 2025-01-01 RX ORDER — KETOROLAC TROMETHAMINE 30 MG/ML
15 INJECTION, SOLUTION INTRAMUSCULAR; INTRAVENOUS EVERY 6 HOURS PRN
Status: DISCONTINUED | OUTPATIENT
Start: 2025-01-01 | End: 2025-01-01

## 2025-01-01 RX ORDER — ONDANSETRON 4 MG/1
4 TABLET, ORALLY DISINTEGRATING ORAL EVERY 6 HOURS PRN
Status: DISCONTINUED | OUTPATIENT
Start: 2025-01-01 | End: 2025-01-01 | Stop reason: SDUPTHER

## 2025-01-01 RX ORDER — LORAZEPAM 2 MG/ML
2 INJECTION INTRAMUSCULAR
Status: DISCONTINUED | OUTPATIENT
Start: 2025-01-01 | End: 2025-01-01

## 2025-01-01 RX ORDER — BISACODYL 10 MG
10 SUPPOSITORY, RECTAL RECTAL DAILY PRN
Status: DISCONTINUED | OUTPATIENT
Start: 2025-01-01 | End: 2025-01-01 | Stop reason: HOSPADM

## 2025-01-01 RX ORDER — FLUMAZENIL 0.1 MG/ML
0.2 INJECTION INTRAVENOUS AS NEEDED
Status: DISCONTINUED | OUTPATIENT
Start: 2025-01-01 | End: 2025-01-01 | Stop reason: HOSPADM

## 2025-01-01 RX ORDER — POTASSIUM CHLORIDE 7.45 MG/ML
10 INJECTION INTRAVENOUS
Status: COMPLETED | OUTPATIENT
Start: 2025-01-01 | End: 2025-01-01

## 2025-01-01 RX ORDER — LORAZEPAM 2 MG/ML
1 INJECTION INTRAMUSCULAR
Status: DISCONTINUED | OUTPATIENT
Start: 2025-01-01 | End: 2025-01-01 | Stop reason: HOSPADM

## 2025-01-01 RX ORDER — ASPIRIN 81 MG/1
81 TABLET, CHEWABLE ORAL DAILY
Status: DISCONTINUED | OUTPATIENT
Start: 2025-01-01 | End: 2025-01-01

## 2025-01-01 RX ORDER — PROMETHAZINE HYDROCHLORIDE 25 MG/1
25 TABLET ORAL ONCE AS NEEDED
Status: DISCONTINUED | OUTPATIENT
Start: 2025-01-01 | End: 2025-01-01 | Stop reason: HOSPADM

## 2025-01-01 RX ORDER — SODIUM CHLORIDE 0.9 % (FLUSH) 0.9 %
10 SYRINGE (ML) INJECTION EVERY 12 HOURS SCHEDULED
Status: DISCONTINUED | OUTPATIENT
Start: 2025-01-01 | End: 2025-01-01

## 2025-01-01 RX ORDER — MIDODRINE HYDROCHLORIDE 5 MG/1
10 TABLET ORAL EVERY 8 HOURS SCHEDULED
Status: DISCONTINUED | OUTPATIENT
Start: 2025-01-01 | End: 2025-01-01

## 2025-01-01 RX ORDER — PHENYLEPHRINE HYDROCHLORIDE 10 MG/ML
INJECTION INTRAVENOUS AS NEEDED
Status: DISCONTINUED | OUTPATIENT
Start: 2025-01-01 | End: 2025-01-01 | Stop reason: SURG

## 2025-01-01 RX ORDER — ACETAMINOPHEN 160 MG/5ML
1000 SOLUTION ORAL EVERY 8 HOURS SCHEDULED
Status: DISCONTINUED | OUTPATIENT
Start: 2025-01-01 | End: 2025-01-01

## 2025-01-01 RX ORDER — BUPIVACAINE HYDROCHLORIDE 2.5 MG/ML
INJECTION, SOLUTION INFILTRATION; PERINEURAL AS NEEDED
Status: DISCONTINUED | OUTPATIENT
Start: 2025-01-01 | End: 2025-01-01 | Stop reason: HOSPADM

## 2025-01-01 RX ORDER — BUDESONIDE 0.5 MG/2ML
0.5 INHALANT ORAL
Status: DISCONTINUED | OUTPATIENT
Start: 2025-01-01 | End: 2025-01-01

## 2025-01-01 RX ORDER — GLYCOPYRROLATE 0.2 MG/ML
0.2 INJECTION INTRAMUSCULAR; INTRAVENOUS
Status: DISCONTINUED | OUTPATIENT
Start: 2025-01-01 | End: 2025-01-01

## 2025-01-01 RX ORDER — ACETAMINOPHEN 650 MG/1
650 SUPPOSITORY RECTAL EVERY 4 HOURS PRN
Status: CANCELLED | OUTPATIENT
Start: 2025-01-01

## 2025-01-01 RX ORDER — NALOXONE HCL 0.4 MG/ML
0.2 VIAL (ML) INJECTION AS NEEDED
Status: DISCONTINUED | OUTPATIENT
Start: 2025-01-01 | End: 2025-01-01 | Stop reason: HOSPADM

## 2025-01-01 RX ORDER — FENTANYL/ROPIVACAINE/NS/PF 2-625MCG/1
15 PLASTIC BAG, INJECTION (ML) EPIDURAL ONCE
Status: COMPLETED | OUTPATIENT
Start: 2025-01-01 | End: 2025-01-01

## 2025-01-01 RX ORDER — ONDANSETRON 2 MG/ML
4 INJECTION INTRAMUSCULAR; INTRAVENOUS EVERY 6 HOURS PRN
Status: DISCONTINUED | OUTPATIENT
Start: 2025-01-01 | End: 2025-01-01 | Stop reason: SDUPTHER

## 2025-01-01 RX ORDER — FUROSEMIDE 10 MG/ML
20 INJECTION INTRAMUSCULAR; INTRAVENOUS ONCE
Status: COMPLETED | OUTPATIENT
Start: 2025-01-01 | End: 2025-01-01

## 2025-01-01 RX ORDER — LORAZEPAM 2 MG/ML
1 INJECTION INTRAMUSCULAR
Status: CANCELLED | OUTPATIENT
Start: 2025-01-01 | End: 2025-06-25

## 2025-01-01 RX ORDER — CLOPIDOGREL BISULFATE 75 MG/1
75 TABLET ORAL DAILY
Status: DISCONTINUED | OUTPATIENT
Start: 2025-01-01 | End: 2025-01-01

## 2025-01-01 RX ORDER — PREDNISONE 20 MG/1
20 TABLET ORAL
Status: DISCONTINUED | OUTPATIENT
Start: 2025-01-01 | End: 2025-01-01

## 2025-01-01 RX ORDER — DEXMEDETOMIDINE HYDROCHLORIDE 4 UG/ML
.2-1.5 INJECTION, SOLUTION INTRAVENOUS
Status: DISPENSED | OUTPATIENT
Start: 2025-01-01 | End: 2025-01-01

## 2025-01-01 RX ORDER — POTASSIUM CHLORIDE 7.45 MG/ML
10 INJECTION INTRAVENOUS
Status: DISPENSED | OUTPATIENT
Start: 2025-01-01 | End: 2025-01-01

## 2025-01-01 RX ORDER — MORPHINE SULFATE 2 MG/ML
4 INJECTION, SOLUTION INTRAMUSCULAR; INTRAVENOUS
Status: DISCONTINUED | OUTPATIENT
Start: 2025-01-01 | End: 2025-01-01

## 2025-01-01 RX ORDER — DIPHENOXYLATE HYDROCHLORIDE AND ATROPINE SULFATE 2.5; .025 MG/1; MG/1
1 TABLET ORAL
Status: DISCONTINUED | OUTPATIENT
Start: 2025-01-01 | End: 2025-01-01

## 2025-01-01 RX ORDER — OXYMETAZOLINE HYDROCHLORIDE 0.05 G/100ML
2 SPRAY NASAL 2 TIMES DAILY
Status: DISCONTINUED | OUTPATIENT
Start: 2025-01-01 | End: 2025-01-01

## 2025-01-01 RX ORDER — ACETAMINOPHEN 650 MG/1
650 SUPPOSITORY RECTAL EVERY 4 HOURS PRN
Status: DISCONTINUED | OUTPATIENT
Start: 2025-01-01 | End: 2025-01-01 | Stop reason: HOSPADM

## 2025-01-01 RX ORDER — BISACODYL 10 MG
10 SUPPOSITORY, RECTAL RECTAL DAILY PRN
Status: DISCONTINUED | OUTPATIENT
Start: 2025-01-01 | End: 2025-01-01

## 2025-01-01 RX ORDER — ONDANSETRON 2 MG/ML
4 INJECTION INTRAMUSCULAR; INTRAVENOUS EVERY 6 HOURS PRN
Status: DISCONTINUED | OUTPATIENT
Start: 2025-01-01 | End: 2025-01-01

## 2025-01-01 RX ORDER — SODIUM CHLORIDE 0.9 % (FLUSH) 0.9 %
10 SYRINGE (ML) INJECTION AS NEEDED
Status: CANCELLED | OUTPATIENT
Start: 2025-01-01

## 2025-01-01 RX ORDER — ROCURONIUM BROMIDE 10 MG/ML
INJECTION, SOLUTION INTRAVENOUS AS NEEDED
Status: DISCONTINUED | OUTPATIENT
Start: 2025-01-01 | End: 2025-01-01 | Stop reason: SURG

## 2025-01-01 RX ORDER — PROPOFOL 10 MG/ML
VIAL (ML) INTRAVENOUS AS NEEDED
Status: DISCONTINUED | OUTPATIENT
Start: 2025-01-01 | End: 2025-01-01 | Stop reason: SURG

## 2025-01-01 RX ORDER — LORAZEPAM 2 MG/ML
2 CONCENTRATE ORAL
Status: DISCONTINUED | OUTPATIENT
Start: 2025-01-01 | End: 2025-01-01

## 2025-01-01 RX ORDER — NITROGLYCERIN 0.4 MG/1
0.4 TABLET SUBLINGUAL
Status: DISCONTINUED | OUTPATIENT
Start: 2025-01-01 | End: 2025-01-01

## 2025-01-01 RX ORDER — HYDROCODONE BITARTRATE AND ACETAMINOPHEN 7.5; 325 MG/1; MG/1
1 TABLET ORAL EVERY 4 HOURS PRN
Status: DISCONTINUED | OUTPATIENT
Start: 2025-01-01 | End: 2025-01-01 | Stop reason: HOSPADM

## 2025-01-01 RX ORDER — LABETALOL HYDROCHLORIDE 5 MG/ML
5 INJECTION, SOLUTION INTRAVENOUS
Status: DISCONTINUED | OUTPATIENT
Start: 2025-01-01 | End: 2025-01-01 | Stop reason: HOSPADM

## 2025-01-01 RX ORDER — AMOXICILLIN 250 MG
2 CAPSULE ORAL 2 TIMES DAILY
Status: DISCONTINUED | OUTPATIENT
Start: 2025-01-01 | End: 2025-01-01

## 2025-01-01 RX ORDER — POTASSIUM CHLORIDE 7.45 MG/ML
10 INJECTION INTRAVENOUS ONCE
Status: COMPLETED | OUTPATIENT
Start: 2025-01-01 | End: 2025-01-01

## 2025-01-01 RX ORDER — SODIUM CHLORIDE, SODIUM LACTATE, POTASSIUM CHLORIDE, CALCIUM CHLORIDE 600; 310; 30; 20 MG/100ML; MG/100ML; MG/100ML; MG/100ML
100 INJECTION, SOLUTION INTRAVENOUS CONTINUOUS
Status: DISCONTINUED | OUTPATIENT
Start: 2025-01-01 | End: 2025-01-01

## 2025-01-01 RX ORDER — FERROUS SULFATE 325(65) MG
325 TABLET ORAL
Status: DISCONTINUED | OUTPATIENT
Start: 2025-01-01 | End: 2025-01-01

## 2025-01-01 RX ORDER — GLYCOPYRROLATE 0.2 MG/ML
0.4 INJECTION INTRAMUSCULAR; INTRAVENOUS
Status: DISCONTINUED | OUTPATIENT
Start: 2025-01-01 | End: 2025-01-01 | Stop reason: HOSPADM

## 2025-01-01 RX ORDER — ONDANSETRON 2 MG/ML
4 INJECTION INTRAMUSCULAR; INTRAVENOUS ONCE AS NEEDED
Status: DISCONTINUED | OUTPATIENT
Start: 2025-01-01 | End: 2025-01-01 | Stop reason: HOSPADM

## 2025-01-01 RX ORDER — BISACODYL 10 MG
10 SUPPOSITORY, RECTAL RECTAL DAILY PRN
Status: CANCELLED | OUTPATIENT
Start: 2025-01-01

## 2025-01-01 RX ORDER — DROPERIDOL 2.5 MG/ML
0.62 INJECTION, SOLUTION INTRAMUSCULAR; INTRAVENOUS
Status: DISCONTINUED | OUTPATIENT
Start: 2025-01-01 | End: 2025-01-01 | Stop reason: HOSPADM

## 2025-01-01 RX ORDER — DIPHENHYDRAMINE HYDROCHLORIDE 50 MG/ML
12.5 INJECTION, SOLUTION INTRAMUSCULAR; INTRAVENOUS
Status: DISCONTINUED | OUTPATIENT
Start: 2025-01-01 | End: 2025-01-01 | Stop reason: HOSPADM

## 2025-01-01 RX ORDER — LISINOPRIL 2.5 MG/1
2.5 TABLET ORAL DAILY
COMMUNITY

## 2025-01-01 RX ORDER — MULTIVITAMIN WITH IRON
1000 TABLET ORAL DAILY
Status: DISCONTINUED | OUTPATIENT
Start: 2025-01-01 | End: 2025-01-01

## 2025-01-01 RX ORDER — HEPARIN SODIUM 10000 [USP'U]/100ML
18 INJECTION, SOLUTION INTRAVENOUS
Status: DISCONTINUED | OUTPATIENT
Start: 2025-01-01 | End: 2025-01-01

## 2025-01-01 RX ORDER — ONDANSETRON 4 MG/1
4 TABLET, ORALLY DISINTEGRATING ORAL EVERY 6 HOURS PRN
Status: DISCONTINUED | OUTPATIENT
Start: 2025-01-01 | End: 2025-01-01

## 2025-01-01 RX ORDER — MORPHINE SULFATE 20 MG/ML
10 SOLUTION ORAL
Refills: 0 | Status: DISCONTINUED | OUTPATIENT
Start: 2025-01-01 | End: 2025-01-01

## 2025-01-01 RX ORDER — IPRATROPIUM BROMIDE AND ALBUTEROL SULFATE 2.5; .5 MG/3ML; MG/3ML
3 SOLUTION RESPIRATORY (INHALATION) ONCE AS NEEDED
Status: COMPLETED | OUTPATIENT
Start: 2025-01-01 | End: 2025-01-01

## 2025-01-01 RX ORDER — SODIUM CHLORIDE 9 MG/ML
40 INJECTION, SOLUTION INTRAVENOUS AS NEEDED
Status: DISCONTINUED | OUTPATIENT
Start: 2025-01-01 | End: 2025-01-01

## 2025-01-01 RX ORDER — LATANOPROST 50 UG/ML
1 SOLUTION/ DROPS OPHTHALMIC NIGHTLY
Status: DISCONTINUED | OUTPATIENT
Start: 2025-01-01 | End: 2025-01-01

## 2025-01-01 RX ORDER — VASOPRESSIN 20 [USP'U]/ML
INJECTION, SOLUTION INTRAVENOUS AS NEEDED
Status: DISCONTINUED | OUTPATIENT
Start: 2025-01-01 | End: 2025-01-01 | Stop reason: SURG

## 2025-01-01 RX ORDER — MIDODRINE HYDROCHLORIDE 5 MG/1
5 TABLET ORAL EVERY 8 HOURS SCHEDULED
Status: DISCONTINUED | OUTPATIENT
Start: 2025-01-01 | End: 2025-01-01

## 2025-01-01 RX ORDER — HYDROMORPHONE HYDROCHLORIDE 1 MG/ML
0.25 INJECTION, SOLUTION INTRAMUSCULAR; INTRAVENOUS; SUBCUTANEOUS
Status: DISCONTINUED | OUTPATIENT
Start: 2025-01-01 | End: 2025-01-01 | Stop reason: HOSPADM

## 2025-01-01 RX ORDER — LORAZEPAM 2 MG/ML
0.5 INJECTION INTRAMUSCULAR ONCE
Status: COMPLETED | OUTPATIENT
Start: 2025-01-01 | End: 2025-01-01

## 2025-01-01 RX ORDER — NEOMYCIN SULFATE, POLYMYXIN B SULFATE AND BACITRACIN ZINC 3.5; 10000; 4 MG/G; [USP'U]/G; [USP'U]/G
OINTMENT OPHTHALMIC
Status: DISCONTINUED | OUTPATIENT
Start: 2025-01-01 | End: 2025-01-01

## 2025-01-01 RX ORDER — ATROPINE SULFATE 0.4 MG/ML
0.4 INJECTION, SOLUTION INTRAMUSCULAR; INTRAVENOUS; SUBCUTANEOUS ONCE AS NEEDED
Status: DISCONTINUED | OUTPATIENT
Start: 2025-01-01 | End: 2025-01-01 | Stop reason: HOSPADM

## 2025-01-01 RX ORDER — PROMETHAZINE HYDROCHLORIDE 25 MG/1
25 SUPPOSITORY RECTAL ONCE AS NEEDED
Status: DISCONTINUED | OUTPATIENT
Start: 2025-01-01 | End: 2025-01-01 | Stop reason: HOSPADM

## 2025-01-01 RX ORDER — DEXTROSE MONOHYDRATE 50 MG/ML
50 INJECTION, SOLUTION INTRAVENOUS CONTINUOUS
Status: ACTIVE | OUTPATIENT
Start: 2025-01-01 | End: 2025-01-01

## 2025-01-01 RX ORDER — POTASSIUM CHLORIDE 1.5 G/1.58G
40 POWDER, FOR SOLUTION ORAL EVERY 4 HOURS
Status: COMPLETED | OUTPATIENT
Start: 2025-01-01 | End: 2025-01-01

## 2025-01-01 RX ORDER — FENTANYL CITRATE 50 UG/ML
25 INJECTION, SOLUTION INTRAMUSCULAR; INTRAVENOUS
Status: DISCONTINUED | OUTPATIENT
Start: 2025-01-01 | End: 2025-01-01 | Stop reason: HOSPADM

## 2025-01-01 RX ORDER — OXYCODONE HYDROCHLORIDE 5 MG/1
5 TABLET ORAL EVERY 4 HOURS PRN
Refills: 0 | Status: DISCONTINUED | OUTPATIENT
Start: 2025-01-01 | End: 2025-01-01

## 2025-01-01 RX ORDER — LORAZEPAM 2 MG/ML
0.5 INJECTION INTRAMUSCULAR
Status: DISCONTINUED | OUTPATIENT
Start: 2025-01-01 | End: 2025-01-01

## 2025-01-01 RX ORDER — HYDROMORPHONE HYDROCHLORIDE 1 MG/ML
0.5 INJECTION, SOLUTION INTRAMUSCULAR; INTRAVENOUS; SUBCUTANEOUS
Refills: 0 | Status: DISCONTINUED | OUTPATIENT
Start: 2025-01-01 | End: 2025-01-01

## 2025-01-01 RX ORDER — HEPARIN SODIUM 1000 [USP'U]/ML
INJECTION, SOLUTION INTRAVENOUS; SUBCUTANEOUS AS NEEDED
Status: DISCONTINUED | OUTPATIENT
Start: 2025-01-01 | End: 2025-01-01 | Stop reason: SURG

## 2025-01-01 RX ORDER — NICARDIPINE HYDROCHLORIDE 2.5 MG/ML
INJECTION INTRAVENOUS AS NEEDED
Status: DISCONTINUED | OUTPATIENT
Start: 2025-01-01 | End: 2025-01-01 | Stop reason: SURG

## 2025-01-01 RX ORDER — HYDRALAZINE HYDROCHLORIDE 20 MG/ML
5 INJECTION INTRAMUSCULAR; INTRAVENOUS
Status: DISCONTINUED | OUTPATIENT
Start: 2025-01-01 | End: 2025-01-01 | Stop reason: HOSPADM

## 2025-01-01 RX ORDER — MORPHINE SULFATE 20 MG/ML
5 SOLUTION ORAL
Refills: 0 | Status: DISCONTINUED | OUTPATIENT
Start: 2025-01-01 | End: 2025-01-01

## 2025-01-01 RX ORDER — MORPHINE SULFATE 2 MG/ML
2 INJECTION, SOLUTION INTRAMUSCULAR; INTRAVENOUS
Status: DISCONTINUED | OUTPATIENT
Start: 2025-01-01 | End: 2025-01-01 | Stop reason: HOSPADM

## 2025-01-01 RX ORDER — ACETAMINOPHEN 160 MG/5ML
650 SOLUTION ORAL EVERY 4 HOURS PRN
Status: DISCONTINUED | OUTPATIENT
Start: 2025-01-01 | End: 2025-01-01

## 2025-01-01 RX ORDER — ARFORMOTEROL TARTRATE 15 UG/2ML
15 SOLUTION RESPIRATORY (INHALATION)
Status: DISCONTINUED | OUTPATIENT
Start: 2025-01-01 | End: 2025-01-01

## 2025-01-01 RX ORDER — POTASSIUM CHLORIDE 7.45 MG/ML
10 INJECTION INTRAVENOUS
Status: DISCONTINUED | OUTPATIENT
Start: 2025-01-01 | End: 2025-01-01

## 2025-01-01 RX ORDER — METHYLPREDNISOLONE SODIUM SUCCINATE 40 MG/ML
40 INJECTION, POWDER, LYOPHILIZED, FOR SOLUTION INTRAMUSCULAR; INTRAVENOUS EVERY 12 HOURS
Status: DISCONTINUED | OUTPATIENT
Start: 2025-01-01 | End: 2025-01-01

## 2025-01-01 RX ORDER — HYDROMORPHONE HYDROCHLORIDE 1 MG/ML
0.5 INJECTION, SOLUTION INTRAMUSCULAR; INTRAVENOUS; SUBCUTANEOUS
Status: DISCONTINUED | OUTPATIENT
Start: 2025-01-01 | End: 2025-01-01

## 2025-01-01 RX ORDER — ASPIRIN 81 MG/1
81 TABLET ORAL DAILY
Status: DISCONTINUED | OUTPATIENT
Start: 2025-01-01 | End: 2025-01-01

## 2025-01-01 RX ORDER — LIDOCAINE HYDROCHLORIDE 20 MG/ML
INJECTION, SOLUTION INFILTRATION; PERINEURAL AS NEEDED
Status: DISCONTINUED | OUTPATIENT
Start: 2025-01-01 | End: 2025-01-01 | Stop reason: SURG

## 2025-01-01 RX ORDER — PROTAMINE SULFATE 10 MG/ML
INJECTION, SOLUTION INTRAVENOUS AS NEEDED
Status: DISCONTINUED | OUTPATIENT
Start: 2025-01-01 | End: 2025-01-01 | Stop reason: SURG

## 2025-01-01 RX ORDER — ATORVASTATIN CALCIUM 20 MG/1
10 TABLET, FILM COATED ORAL DAILY
Status: DISCONTINUED | OUTPATIENT
Start: 2025-01-01 | End: 2025-01-01

## 2025-01-01 RX ORDER — OXYCODONE HYDROCHLORIDE 10 MG/1
10 TABLET ORAL EVERY 4 HOURS PRN
Refills: 0 | Status: DISCONTINUED | OUTPATIENT
Start: 2025-01-01 | End: 2025-01-01

## 2025-01-01 RX ORDER — NOREPINEPHRINE BITARTRATE 0.03 MG/ML
.02-.5 INJECTION, SOLUTION INTRAVENOUS
Status: DISCONTINUED | OUTPATIENT
Start: 2025-01-01 | End: 2025-01-01

## 2025-01-01 RX ORDER — PANTOPRAZOLE SODIUM 40 MG/1
40 TABLET, DELAYED RELEASE ORAL DAILY
Status: DISCONTINUED | OUTPATIENT
Start: 2025-01-01 | End: 2025-01-01

## 2025-01-01 RX ORDER — SODIUM CHLORIDE, SODIUM LACTATE, POTASSIUM CHLORIDE, CALCIUM CHLORIDE 600; 310; 30; 20 MG/100ML; MG/100ML; MG/100ML; MG/100ML
INJECTION, SOLUTION INTRAVENOUS CONTINUOUS PRN
Status: DISCONTINUED | OUTPATIENT
Start: 2025-01-01 | End: 2025-01-01 | Stop reason: SURG

## 2025-01-01 RX ORDER — OXYCODONE HCL 5 MG/5 ML
10 SOLUTION, ORAL ORAL EVERY 4 HOURS PRN
Refills: 0 | Status: DISCONTINUED | OUTPATIENT
Start: 2025-01-01 | End: 2025-01-01

## 2025-01-01 RX ORDER — MAGNESIUM HYDROXIDE 1200 MG/15ML
LIQUID ORAL AS NEEDED
Status: DISCONTINUED | OUTPATIENT
Start: 2025-01-01 | End: 2025-01-01 | Stop reason: HOSPADM

## 2025-01-01 RX ORDER — EPHEDRINE SULFATE 50 MG/ML
5 INJECTION, SOLUTION INTRAVENOUS ONCE AS NEEDED
Status: DISCONTINUED | OUTPATIENT
Start: 2025-01-01 | End: 2025-01-01 | Stop reason: HOSPADM

## 2025-01-01 RX ADMIN — Medication 10 ML: at 09:19

## 2025-01-01 RX ADMIN — NEOMYCIN SULFATE, POLYMYXIN B SULFATE AND BACITRACIN ZINC: 3.5; 10000; 4 OINTMENT OPHTHALMIC at 12:30

## 2025-01-01 RX ADMIN — DEXMEDETOMIDINE HYDROCHLORIDE 0.8 MCG/KG/HR: 400 INJECTION INTRAVENOUS at 01:26

## 2025-01-01 RX ADMIN — POTASSIUM CHLORIDE 10 MEQ: 7.46 INJECTION, SOLUTION INTRAVENOUS at 20:51

## 2025-01-01 RX ADMIN — POTASSIUM CHLORIDE 10 MEQ: 7.46 INJECTION, SOLUTION INTRAVENOUS at 09:18

## 2025-01-01 RX ADMIN — HYDROMORPHONE HYDROCHLORIDE 0.5 MG: 1 INJECTION, SOLUTION INTRAMUSCULAR; INTRAVENOUS; SUBCUTANEOUS at 23:50

## 2025-01-01 RX ADMIN — ALBUTEROL SULFATE 2.5 MG: 2.5 SOLUTION RESPIRATORY (INHALATION) at 19:52

## 2025-01-01 RX ADMIN — NEOMYCIN SULFATE, POLYMYXIN B SULFATE AND BACITRACIN ZINC: 3.5; 10000; 4 OINTMENT OPHTHALMIC at 20:12

## 2025-01-01 RX ADMIN — MUPIROCIN 1 APPLICATION: 20 OINTMENT TOPICAL at 11:47

## 2025-01-01 RX ADMIN — NEOMYCIN SULFATE, POLYMYXIN B SULFATE AND BACITRACIN ZINC: 3.5; 10000; 4 OINTMENT OPHTHALMIC at 16:00

## 2025-01-01 RX ADMIN — PROTAMINE SULFATE 10 MG: 10 INJECTION, SOLUTION INTRAVENOUS at 12:57

## 2025-01-01 RX ADMIN — Medication 10 ML: at 09:25

## 2025-01-01 RX ADMIN — ALBUTEROL SULFATE 2.5 MG: 2.5 SOLUTION RESPIRATORY (INHALATION) at 19:56

## 2025-01-01 RX ADMIN — Medication 10 ML: at 20:51

## 2025-01-01 RX ADMIN — DEXTROSE MONOHYDRATE 50 ML/HR: 50 INJECTION, SOLUTION INTRAVENOUS at 11:03

## 2025-01-01 RX ADMIN — MUPIROCIN 1 APPLICATION: 20 OINTMENT TOPICAL at 20:55

## 2025-01-01 RX ADMIN — PHENYLEPHRINE HYDROCHLORIDE 200 MCG: 10 INJECTION INTRAVENOUS at 10:54

## 2025-01-01 RX ADMIN — PIPERACILLIN AND TAZOBACTAM 3.38 G: 3; .375 INJECTION, POWDER, FOR SOLUTION INTRAVENOUS at 10:00

## 2025-01-01 RX ADMIN — DEXMEDETOMIDINE HYDROCHLORIDE 0.2 MCG/KG/HR: 400 INJECTION INTRAVENOUS at 20:50

## 2025-01-01 RX ADMIN — POTASSIUM CHLORIDE 10 MEQ: 7.46 INJECTION, SOLUTION INTRAVENOUS at 11:28

## 2025-01-01 RX ADMIN — Medication 10 ML: at 21:00

## 2025-01-01 RX ADMIN — DEXTROSE MONOHYDRATE AND SODIUM CHLORIDE 75 ML/HR: 5; .45 INJECTION, SOLUTION INTRAVENOUS at 17:36

## 2025-01-01 RX ADMIN — FUROSEMIDE 20 MG: 10 INJECTION, SOLUTION INTRAMUSCULAR; INTRAVENOUS at 18:14

## 2025-01-01 RX ADMIN — SENNOSIDES AND DOCUSATE SODIUM 2 TABLET: 50; 8.6 TABLET ORAL at 08:58

## 2025-01-01 RX ADMIN — POTASSIUM CHLORIDE 10 MEQ: 7.46 INJECTION, SOLUTION INTRAVENOUS at 09:23

## 2025-01-01 RX ADMIN — ALBUTEROL SULFATE 2.5 MG: 2.5 SOLUTION RESPIRATORY (INHALATION) at 12:39

## 2025-01-01 RX ADMIN — CLOPIDOGREL BISULFATE 75 MG: 75 TABLET, FILM COATED ORAL at 11:46

## 2025-01-01 RX ADMIN — FUROSEMIDE 40 MG: 10 INJECTION, SOLUTION INTRAMUSCULAR; INTRAVENOUS at 10:20

## 2025-01-01 RX ADMIN — PIPERACILLIN AND TAZOBACTAM 3.38 G: 3; .375 INJECTION, POWDER, FOR SOLUTION INTRAVENOUS at 08:44

## 2025-01-01 RX ADMIN — BUDESONIDE 0.5 MG: 0.5 SUSPENSION RESPIRATORY (INHALATION) at 08:05

## 2025-01-01 RX ADMIN — SUGAMMADEX 200 MG: 100 INJECTION, SOLUTION INTRAVENOUS at 13:11

## 2025-01-01 RX ADMIN — Medication 10 ML: at 09:00

## 2025-01-01 RX ADMIN — NEOMYCIN SULFATE, POLYMYXIN B SULFATE AND BACITRACIN ZINC: 3.5; 10000; 4 OINTMENT OPHTHALMIC at 09:24

## 2025-01-01 RX ADMIN — DEXTROSE MONOHYDRATE 50 ML/HR: 50 INJECTION, SOLUTION INTRAVENOUS at 06:08

## 2025-01-01 RX ADMIN — MUPIROCIN 1 APPLICATION: 20 OINTMENT TOPICAL at 20:59

## 2025-01-01 RX ADMIN — CEFTRIAXONE 2000 MG: 2 INJECTION, POWDER, FOR SOLUTION INTRAMUSCULAR; INTRAVENOUS at 18:54

## 2025-01-01 RX ADMIN — PREDNISONE 20 MG: 20 TABLET ORAL at 08:59

## 2025-01-01 RX ADMIN — ALBUTEROL SULFATE 2.5 MG: 2.5 SOLUTION RESPIRATORY (INHALATION) at 15:04

## 2025-01-01 RX ADMIN — METHYLPREDNISOLONE SODIUM SUCCINATE 40 MG: 40 INJECTION, POWDER, FOR SOLUTION INTRAMUSCULAR; INTRAVENOUS at 09:18

## 2025-01-01 RX ADMIN — SODIUM PHOSPHATE, MONOBASIC, MONOHYDRATE AND SODIUM PHOSPHATE, DIBASIC, ANHYDROUS 15 MMOL: 276; 142 INJECTION, SOLUTION INTRAVENOUS at 17:46

## 2025-01-01 RX ADMIN — NEOMYCIN SULFATE, POLYMYXIN B SULFATE AND BACITRACIN ZINC: 3.5; 10000; 4 OINTMENT OPHTHALMIC at 20:07

## 2025-01-01 RX ADMIN — HEPARIN SODIUM 4000 UNITS: 1000 INJECTION, SOLUTION INTRAVENOUS; SUBCUTANEOUS at 11:53

## 2025-01-01 RX ADMIN — MUPIROCIN 1 APPLICATION: 20 OINTMENT TOPICAL at 20:50

## 2025-01-01 RX ADMIN — MIDODRINE HYDROCHLORIDE 10 MG: 5 TABLET ORAL at 21:06

## 2025-01-01 RX ADMIN — NEOMYCIN SULFATE, POLYMYXIN B SULFATE AND BACITRACIN ZINC: 3.5; 10000; 4 OINTMENT OPHTHALMIC at 16:32

## 2025-01-01 RX ADMIN — NEOMYCIN SULFATE, POLYMYXIN B SULFATE AND BACITRACIN ZINC: 3.5; 10000; 4 OINTMENT OPHTHALMIC at 04:38

## 2025-01-01 RX ADMIN — LATANOPROST 1 DROP: 50 SOLUTION/ DROPS OPHTHALMIC at 20:07

## 2025-01-01 RX ADMIN — NEOMYCIN SULFATE, POLYMYXIN B SULFATE AND BACITRACIN ZINC: 3.5; 10000; 4 OINTMENT OPHTHALMIC at 12:00

## 2025-01-01 RX ADMIN — METHYLPREDNISOLONE SODIUM SUCCINATE 40 MG: 40 INJECTION, POWDER, FOR SOLUTION INTRAMUSCULAR; INTRAVENOUS at 21:59

## 2025-01-01 RX ADMIN — NEOMYCIN SULFATE, POLYMYXIN B SULFATE AND BACITRACIN ZINC: 3.5; 10000; 4 OINTMENT OPHTHALMIC at 01:12

## 2025-01-01 RX ADMIN — ALBUTEROL SULFATE 2.5 MG: 2.5 SOLUTION RESPIRATORY (INHALATION) at 03:40

## 2025-01-01 RX ADMIN — FUROSEMIDE 20 MG: 10 INJECTION, SOLUTION INTRAMUSCULAR; INTRAVENOUS at 14:59

## 2025-01-01 RX ADMIN — NEOMYCIN SULFATE, POLYMYXIN B SULFATE AND BACITRACIN ZINC: 3.5; 10000; 4 OINTMENT OPHTHALMIC at 10:11

## 2025-01-01 RX ADMIN — MORPHINE SULFATE 2 MG: 2 INJECTION, SOLUTION INTRAMUSCULAR; INTRAVENOUS at 14:22

## 2025-01-01 RX ADMIN — PHENYLEPHRINE HYDROCHLORIDE 200 MCG: 10 INJECTION INTRAVENOUS at 11:40

## 2025-01-01 RX ADMIN — NEOMYCIN SULFATE, POLYMYXIN B SULFATE AND BACITRACIN ZINC: 3.5; 10000; 4 OINTMENT OPHTHALMIC at 23:44

## 2025-01-01 RX ADMIN — METHYLPREDNISOLONE SODIUM SUCCINATE 40 MG: 40 INJECTION, POWDER, FOR SOLUTION INTRAMUSCULAR; INTRAVENOUS at 09:24

## 2025-01-01 RX ADMIN — PROTAMINE SULFATE 30 MG: 10 INJECTION, SOLUTION INTRAVENOUS at 12:43

## 2025-01-01 RX ADMIN — ASPIRIN 81 MG CHEWABLE TABLET 81 MG: 81 TABLET CHEWABLE at 15:07

## 2025-01-01 RX ADMIN — ACETAMINOPHEN 1000 MG: 650 SOLUTION ORAL at 14:47

## 2025-01-01 RX ADMIN — MUPIROCIN 1 APPLICATION: 20 OINTMENT TOPICAL at 21:20

## 2025-01-01 RX ADMIN — LANSOPRAZOLE 30 MG: 15 TABLET, ORALLY DISINTEGRATING ORAL at 05:16

## 2025-01-01 RX ADMIN — POTASSIUM CHLORIDE 10 MEQ: 7.46 INJECTION, SOLUTION INTRAVENOUS at 06:38

## 2025-01-01 RX ADMIN — HYDROMORPHONE HYDROCHLORIDE 0.5 MG: 1 INJECTION, SOLUTION INTRAMUSCULAR; INTRAVENOUS; SUBCUTANEOUS at 17:36

## 2025-01-01 RX ADMIN — NEOMYCIN SULFATE, POLYMYXIN B SULFATE AND BACITRACIN ZINC: 3.5; 10000; 4 OINTMENT OPHTHALMIC at 23:56

## 2025-01-01 RX ADMIN — ARFORMOTEROL TARTRATE 15 MCG: 15 SOLUTION RESPIRATORY (INHALATION) at 08:06

## 2025-01-01 RX ADMIN — SODIUM CHLORIDE 5 MG: 9 INJECTION, SOLUTION INTRAMUSCULAR; INTRAVENOUS; SUBCUTANEOUS at 12:30

## 2025-01-01 RX ADMIN — ALBUTEROL SULFATE 2.5 MG: 2.5 SOLUTION RESPIRATORY (INHALATION) at 15:31

## 2025-01-01 RX ADMIN — SODIUM PHOSPHATE, MONOBASIC, MONOHYDRATE AND SODIUM PHOSPHATE, DIBASIC, ANHYDROUS 15 MMOL: 276; 142 INJECTION, SOLUTION INTRAVENOUS at 06:29

## 2025-01-01 RX ADMIN — POTASSIUM CHLORIDE 10 MEQ: 7.46 INJECTION, SOLUTION INTRAVENOUS at 21:07

## 2025-01-01 RX ADMIN — Medication 10 ML: at 08:33

## 2025-01-01 RX ADMIN — PIPERACILLIN AND TAZOBACTAM 3.38 G: 3; .375 INJECTION, POWDER, FOR SOLUTION INTRAVENOUS at 12:26

## 2025-01-01 RX ADMIN — ALBUTEROL SULFATE 2.5 MG: 2.5 SOLUTION RESPIRATORY (INHALATION) at 07:33

## 2025-01-01 RX ADMIN — CLOPIDOGREL BISULFATE 75 MG: 75 TABLET, FILM COATED ORAL at 08:58

## 2025-01-01 RX ADMIN — Medication 10 ML: at 21:20

## 2025-01-01 RX ADMIN — Medication 0.5 MCG/KG/MIN: at 19:31

## 2025-01-01 RX ADMIN — ACETAMINOPHEN 1000 MG: 650 SOLUTION ORAL at 14:51

## 2025-01-01 RX ADMIN — Medication 10 ML: at 20:55

## 2025-01-01 RX ADMIN — DOCUSATE SODIUM 50 MG AND SENNOSIDES 8.6 MG 2 TABLET: 8.6; 5 TABLET, FILM COATED ORAL at 20:11

## 2025-01-01 RX ADMIN — ALBUTEROL SULFATE 2.5 MG: 2.5 SOLUTION RESPIRATORY (INHALATION) at 16:00

## 2025-01-01 RX ADMIN — POTASSIUM CHLORIDE 10 MEQ: 7.46 INJECTION, SOLUTION INTRAVENOUS at 10:28

## 2025-01-01 RX ADMIN — PIPERACILLIN AND TAZOBACTAM 3.38 G: 3; .375 INJECTION, POWDER, FOR SOLUTION INTRAVENOUS at 01:11

## 2025-01-01 RX ADMIN — ACETAMINOPHEN 1000 MG: 650 SOLUTION ORAL at 05:15

## 2025-01-01 RX ADMIN — PIPERACILLIN AND TAZOBACTAM 3.38 G: 3; .375 INJECTION, POWDER, FOR SOLUTION INTRAVENOUS at 17:07

## 2025-01-01 RX ADMIN — POTASSIUM CHLORIDE 10 MEQ: 7.46 INJECTION, SOLUTION INTRAVENOUS at 22:16

## 2025-01-01 RX ADMIN — NEOMYCIN SULFATE, POLYMYXIN B SULFATE AND BACITRACIN ZINC: 3.5; 10000; 4 OINTMENT OPHTHALMIC at 20:54

## 2025-01-01 RX ADMIN — Medication 10 ML: at 08:44

## 2025-01-01 RX ADMIN — GLYCOPYRROLATE 0.2 MG: 0.2 INJECTION INTRAMUSCULAR; INTRAVENOUS at 14:22

## 2025-01-01 RX ADMIN — POTASSIUM CHLORIDE 10 MEQ: 7.46 INJECTION, SOLUTION INTRAVENOUS at 20:03

## 2025-01-01 RX ADMIN — ALBUTEROL SULFATE 2.5 MG: 2.5 SOLUTION RESPIRATORY (INHALATION) at 21:10

## 2025-01-01 RX ADMIN — POTASSIUM CHLORIDE 10 MEQ: 7.46 INJECTION, SOLUTION INTRAVENOUS at 22:17

## 2025-01-01 RX ADMIN — LATANOPROST 1 DROP: 50 SOLUTION/ DROPS OPHTHALMIC at 20:12

## 2025-01-01 RX ADMIN — NEOMYCIN SULFATE, POLYMYXIN B SULFATE AND BACITRACIN ZINC: 3.5; 10000; 4 OINTMENT OPHTHALMIC at 17:08

## 2025-01-01 RX ADMIN — CEFAZOLIN 2000 MG: 2 INJECTION, POWDER, FOR SOLUTION INTRAMUSCULAR; INTRAVENOUS at 20:06

## 2025-01-01 RX ADMIN — IPRATROPIUM BROMIDE AND ALBUTEROL SULFATE 3 ML: .5; 3 SOLUTION RESPIRATORY (INHALATION) at 13:48

## 2025-01-01 RX ADMIN — ASPIRIN 81 MG CHEWABLE TABLET 81 MG: 81 TABLET CHEWABLE at 08:58

## 2025-01-01 RX ADMIN — ALBUTEROL SULFATE 2.5 MG: 2.5 SOLUTION RESPIRATORY (INHALATION) at 03:22

## 2025-01-01 RX ADMIN — LATANOPROST 1 DROP: 50 SOLUTION/ DROPS OPHTHALMIC at 20:50

## 2025-01-01 RX ADMIN — Medication 10 ML: at 10:12

## 2025-01-01 RX ADMIN — ALBUTEROL SULFATE 2.5 MG: 2.5 SOLUTION RESPIRATORY (INHALATION) at 08:14

## 2025-01-01 RX ADMIN — MIDODRINE HYDROCHLORIDE 10 MG: 5 TABLET ORAL at 14:47

## 2025-01-01 RX ADMIN — POTASSIUM CHLORIDE 10 MEQ: 7.46 INJECTION, SOLUTION INTRAVENOUS at 18:51

## 2025-01-01 RX ADMIN — NEOMYCIN SULFATE, POLYMYXIN B SULFATE AND BACITRACIN ZINC: 3.5; 10000; 4 OINTMENT OPHTHALMIC at 04:44

## 2025-01-01 RX ADMIN — HEPARIN SODIUM 2500 UNITS: 1000 INJECTION, SOLUTION INTRAVENOUS; SUBCUTANEOUS at 11:36

## 2025-01-01 RX ADMIN — ALBUTEROL SULFATE 2.5 MG: 2.5 SOLUTION RESPIRATORY (INHALATION) at 08:06

## 2025-01-01 RX ADMIN — Medication 10 ML: at 08:51

## 2025-01-01 RX ADMIN — ALBUTEROL SULFATE 2.5 MG: 2.5 SOLUTION RESPIRATORY (INHALATION) at 00:06

## 2025-01-01 RX ADMIN — POTASSIUM PHOSPHATE, MONOBASIC POTASSIUM PHOSPHATE, DIBASIC 15 MMOL: 224; 236 INJECTION, SOLUTION, CONCENTRATE INTRAVENOUS at 08:33

## 2025-01-01 RX ADMIN — PHENYLEPHRINE HYDROCHLORIDE 0.5 MCG/KG/MIN: 10 INJECTION, SOLUTION INTRAVENOUS at 11:07

## 2025-01-01 RX ADMIN — HYDRALAZINE HYDROCHLORIDE 5 MG: 20 INJECTION INTRAMUSCULAR; INTRAVENOUS at 14:46

## 2025-01-01 RX ADMIN — ALBUTEROL SULFATE 2.5 MG: 2.5 SOLUTION RESPIRATORY (INHALATION) at 12:20

## 2025-01-01 RX ADMIN — Medication 10 ML: at 10:11

## 2025-01-01 RX ADMIN — PROPOFOL 20 MG: 10 INJECTION, EMULSION INTRAVENOUS at 10:55

## 2025-01-01 RX ADMIN — ALBUTEROL SULFATE 2.5 MG: 2.5 SOLUTION RESPIRATORY (INHALATION) at 00:22

## 2025-01-01 RX ADMIN — POTASSIUM CHLORIDE 10 MEQ: 7.46 INJECTION, SOLUTION INTRAVENOUS at 08:44

## 2025-01-01 RX ADMIN — NEOMYCIN SULFATE, POLYMYXIN B SULFATE AND BACITRACIN ZINC: 3.5; 10000; 4 OINTMENT OPHTHALMIC at 20:32

## 2025-01-01 RX ADMIN — SENNOSIDES AND DOCUSATE SODIUM 2 TABLET: 50; 8.6 TABLET ORAL at 21:19

## 2025-01-01 RX ADMIN — NEOMYCIN SULFATE, POLYMYXIN B SULFATE AND BACITRACIN ZINC: 3.5; 10000; 4 OINTMENT OPHTHALMIC at 19:52

## 2025-01-01 RX ADMIN — MIDODRINE HYDROCHLORIDE 10 MG: 5 TABLET ORAL at 15:08

## 2025-01-01 RX ADMIN — ALBUTEROL SULFATE 2.5 MG: 2.5 SOLUTION RESPIRATORY (INHALATION) at 04:10

## 2025-01-01 RX ADMIN — METHYLPREDNISOLONE SODIUM SUCCINATE 40 MG: 40 INJECTION, POWDER, FOR SOLUTION INTRAMUSCULAR; INTRAVENOUS at 11:03

## 2025-01-01 RX ADMIN — VASOPRESSIN 2 UNITS: 20 INJECTION INTRAVENOUS at 11:49

## 2025-01-01 RX ADMIN — POTASSIUM PHOSPHATE, MONOBASIC AND POTASSIUM PHOSPHATE, DIBASIC 15 MMOL: 224; 236 INJECTION, SOLUTION, CONCENTRATE INTRAVENOUS at 12:48

## 2025-01-01 RX ADMIN — ALBUTEROL SULFATE 2.5 MG: 2.5 SOLUTION RESPIRATORY (INHALATION) at 04:09

## 2025-01-01 RX ADMIN — FUROSEMIDE 40 MG: 10 INJECTION, SOLUTION INTRAMUSCULAR; INTRAVENOUS at 08:58

## 2025-01-01 RX ADMIN — DOCUSATE SODIUM 50 MG AND SENNOSIDES 8.6 MG 2 TABLET: 8.6; 5 TABLET, FILM COATED ORAL at 11:46

## 2025-01-01 RX ADMIN — PIPERACILLIN AND TAZOBACTAM 3.38 G: 3; .375 INJECTION, POWDER, FOR SOLUTION INTRAVENOUS at 09:18

## 2025-01-01 RX ADMIN — ATORVASTATIN CALCIUM 40 MG: 20 TABLET, FILM COATED ORAL at 08:58

## 2025-01-01 RX ADMIN — Medication 10 ML: at 20:08

## 2025-01-01 RX ADMIN — CEFAZOLIN 2 G: 2 INJECTION, POWDER, LYOPHILIZED, FOR SOLUTION INTRAVENOUS at 11:12

## 2025-01-01 RX ADMIN — BARIUM SULFATE 4 ML: 980 POWDER, FOR SUSPENSION ORAL at 11:56

## 2025-01-01 RX ADMIN — BUDESONIDE 0.5 MG: 0.5 SUSPENSION RESPIRATORY (INHALATION) at 07:59

## 2025-01-01 RX ADMIN — PIPERACILLIN AND TAZOBACTAM 3.38 G: 3; .375 INJECTION, POWDER, FOR SOLUTION INTRAVENOUS at 01:23

## 2025-01-01 RX ADMIN — HEPARIN SODIUM 8500 UNITS: 1000 INJECTION, SOLUTION INTRAVENOUS; SUBCUTANEOUS at 11:21

## 2025-01-01 RX ADMIN — ALBUTEROL SULFATE 2.5 MG: 2.5 SOLUTION RESPIRATORY (INHALATION) at 23:28

## 2025-01-01 RX ADMIN — NEOMYCIN SULFATE, POLYMYXIN B SULFATE AND BACITRACIN ZINC 1 APPLICATION: 3.5; 10000; 4 OINTMENT OPHTHALMIC at 20:50

## 2025-01-01 RX ADMIN — EPHEDRINE SULFATE 10 MG: 50 INJECTION INTRAVENOUS at 11:46

## 2025-01-01 RX ADMIN — POTASSIUM CHLORIDE 10 MEQ: 7.46 INJECTION, SOLUTION INTRAVENOUS at 23:43

## 2025-01-01 RX ADMIN — MORPHINE SULFATE 2 MG: 2 INJECTION, SOLUTION INTRAMUSCULAR; INTRAVENOUS at 16:17

## 2025-01-01 RX ADMIN — LATANOPROST 1 DROP: 50 SOLUTION/ DROPS OPHTHALMIC at 20:32

## 2025-01-01 RX ADMIN — HEPARIN SODIUM 12.24 UNITS/KG/HR: 10000 INJECTION, SOLUTION INTRAVENOUS at 20:05

## 2025-01-01 RX ADMIN — PIPERACILLIN AND TAZOBACTAM 3.38 G: 3; .375 INJECTION, POWDER, FOR SOLUTION INTRAVENOUS at 01:12

## 2025-01-01 RX ADMIN — HEPARIN SODIUM 14.24 UNITS/KG/HR: 10000 INJECTION, SOLUTION INTRAVENOUS at 17:54

## 2025-01-01 RX ADMIN — POTASSIUM CHLORIDE 10 MEQ: 7.46 INJECTION, SOLUTION INTRAVENOUS at 04:35

## 2025-01-01 RX ADMIN — Medication 1.6 MCG/KG/MIN: at 05:11

## 2025-01-01 RX ADMIN — ALBUTEROL SULFATE 2.5 MG: 2.5 SOLUTION RESPIRATORY (INHALATION) at 12:31

## 2025-01-01 RX ADMIN — ATORVASTATIN CALCIUM 40 MG: 20 TABLET, FILM COATED ORAL at 11:46

## 2025-01-01 RX ADMIN — SODIUM CHLORIDE, POTASSIUM CHLORIDE, SODIUM LACTATE AND CALCIUM CHLORIDE: 600; 310; 30; 20 INJECTION, SOLUTION INTRAVENOUS at 10:52

## 2025-01-01 RX ADMIN — MUPIROCIN 1 APPLICATION: 20 OINTMENT TOPICAL at 09:19

## 2025-01-01 RX ADMIN — ARFORMOTEROL TARTRATE 15 MCG: 15 SOLUTION RESPIRATORY (INHALATION) at 15:21

## 2025-01-01 RX ADMIN — PIPERACILLIN AND TAZOBACTAM 3.38 G: 3; .375 INJECTION, POWDER, FOR SOLUTION INTRAVENOUS at 09:23

## 2025-01-01 RX ADMIN — NEOMYCIN SULFATE, POLYMYXIN B SULFATE AND BACITRACIN ZINC: 3.5; 10000; 4 OINTMENT OPHTHALMIC at 09:02

## 2025-01-01 RX ADMIN — ALBUTEROL SULFATE 2.5 MG: 2.5 SOLUTION RESPIRATORY (INHALATION) at 14:40

## 2025-01-01 RX ADMIN — VASOPRESSIN 2 UNITS: 20 INJECTION INTRAVENOUS at 12:24

## 2025-01-01 RX ADMIN — PIPERACILLIN AND TAZOBACTAM 3.38 G: 3; .375 INJECTION, POWDER, FOR SOLUTION INTRAVENOUS at 18:22

## 2025-01-01 RX ADMIN — BARIUM SULFATE 55 ML: 0.81 POWDER, FOR SUSPENSION ORAL at 11:56

## 2025-01-01 RX ADMIN — FUROSEMIDE 20 MG: 10 INJECTION, SOLUTION INTRAMUSCULAR; INTRAVENOUS at 17:31

## 2025-01-01 RX ADMIN — NEOMYCIN SULFATE, POLYMYXIN B SULFATE AND BACITRACIN ZINC: 3.5; 10000; 4 OINTMENT OPHTHALMIC at 03:37

## 2025-01-01 RX ADMIN — POTASSIUM CHLORIDE 10 MEQ: 7.46 INJECTION, SOLUTION INTRAVENOUS at 12:04

## 2025-01-01 RX ADMIN — MIDODRINE HYDROCHLORIDE 10 MG: 5 TABLET ORAL at 21:19

## 2025-01-01 RX ADMIN — MIDODRINE HYDROCHLORIDE 10 MG: 5 TABLET ORAL at 13:48

## 2025-01-01 RX ADMIN — NEOMYCIN SULFATE, POLYMYXIN B SULFATE AND BACITRACIN ZINC: 3.5; 10000; 4 OINTMENT OPHTHALMIC at 23:54

## 2025-01-01 RX ADMIN — CEFAZOLIN 2000 MG: 2 INJECTION, POWDER, FOR SOLUTION INTRAMUSCULAR; INTRAVENOUS at 03:32

## 2025-01-01 RX ADMIN — POTASSIUM PHOSPHATE, MONOBASIC AND POTASSIUM PHOSPHATE, DIBASIC 15 MMOL: 224; 236 INJECTION, SOLUTION, CONCENTRATE INTRAVENOUS at 09:23

## 2025-01-01 RX ADMIN — BUDESONIDE 0.5 MG: 0.5 SUSPENSION RESPIRATORY (INHALATION) at 20:12

## 2025-01-01 RX ADMIN — PIPERACILLIN AND TAZOBACTAM 3.38 G: 3; .375 INJECTION, POWDER, FOR SOLUTION INTRAVENOUS at 11:07

## 2025-01-01 RX ADMIN — POTASSIUM CHLORIDE 10 MEQ: 7.46 INJECTION, SOLUTION INTRAVENOUS at 23:19

## 2025-01-01 RX ADMIN — NEOMYCIN SULFATE, POLYMYXIN B SULFATE AND BACITRACIN ZINC 1 APPLICATION: 3.5; 10000; 4 OINTMENT OPHTHALMIC at 00:44

## 2025-01-01 RX ADMIN — LATANOPROST 1 DROP: 50 SOLUTION/ DROPS OPHTHALMIC at 20:54

## 2025-01-01 RX ADMIN — ALBUTEROL SULFATE 2.5 MG: 2.5 SOLUTION RESPIRATORY (INHALATION) at 07:57

## 2025-01-01 RX ADMIN — Medication 10 ML: at 20:11

## 2025-01-01 RX ADMIN — MUPIROCIN 1 APPLICATION: 20 OINTMENT TOPICAL at 09:24

## 2025-01-01 RX ADMIN — LIDOCAINE HYDROCHLORIDE 100 MG: 20 INJECTION, SOLUTION INFILTRATION; PERINEURAL at 10:54

## 2025-01-01 RX ADMIN — NEOMYCIN SULFATE, POLYMYXIN B SULFATE AND BACITRACIN ZINC: 3.5; 10000; 4 OINTMENT OPHTHALMIC at 16:38

## 2025-01-01 RX ADMIN — ACETAMINOPHEN 1000 MG: 650 SOLUTION ORAL at 21:19

## 2025-01-01 RX ADMIN — EPHEDRINE SULFATE 10 MG: 50 INJECTION INTRAVENOUS at 12:00

## 2025-01-01 RX ADMIN — LORAZEPAM 0.5 MG: 2 INJECTION INTRAMUSCULAR; INTRAVENOUS at 20:01

## 2025-01-01 RX ADMIN — BUDESONIDE 0.5 MG: 0.5 SUSPENSION RESPIRATORY (INHALATION) at 19:17

## 2025-01-01 RX ADMIN — DEXTROSE MONOHYDRATE 125 ML/HR: 50 INJECTION, SOLUTION INTRAVENOUS at 08:27

## 2025-01-01 RX ADMIN — ALBUTEROL SULFATE 2.5 MG: 2.5 SOLUTION RESPIRATORY (INHALATION) at 11:59

## 2025-01-01 RX ADMIN — PIPERACILLIN AND TAZOBACTAM 3.38 G: 3; .375 INJECTION, POWDER, FOR SOLUTION INTRAVENOUS at 17:35

## 2025-01-01 RX ADMIN — ROCURONIUM BROMIDE 100 MG: 10 INJECTION INTRAVENOUS at 10:54

## 2025-01-01 RX ADMIN — MIDODRINE HYDROCHLORIDE 10 MG: 5 TABLET ORAL at 05:16

## 2025-01-01 RX ADMIN — BARIUM SULFATE 50 ML: 400 SUSPENSION ORAL at 11:56

## 2025-01-01 RX ADMIN — PIPERACILLIN AND TAZOBACTAM 3.38 G: 3; .375 INJECTION, POWDER, FOR SOLUTION INTRAVENOUS at 01:05

## 2025-01-01 RX ADMIN — NEOMYCIN SULFATE, POLYMYXIN B SULFATE AND BACITRACIN ZINC: 3.5; 10000; 4 OINTMENT OPHTHALMIC at 08:59

## 2025-01-01 RX ADMIN — PROPOFOL 80 MG: 10 INJECTION, EMULSION INTRAVENOUS at 10:54

## 2025-01-01 RX ADMIN — MUPIROCIN 1 APPLICATION: 20 OINTMENT TOPICAL at 20:33

## 2025-01-01 RX ADMIN — ALBUTEROL SULFATE 2.5 MG: 2.5 SOLUTION RESPIRATORY (INHALATION) at 08:19

## 2025-01-01 RX ADMIN — VASOPRESSIN 2 UNITS: 20 INJECTION INTRAVENOUS at 11:58

## 2025-01-01 RX ADMIN — MUPIROCIN 1 APPLICATION: 20 OINTMENT TOPICAL at 20:06

## 2025-01-01 RX ADMIN — VASOPRESSIN 2 UNITS: 20 INJECTION INTRAVENOUS at 11:46

## 2025-01-01 RX ADMIN — ACETAMINOPHEN 1000 MG: 650 SOLUTION ORAL at 13:48

## 2025-01-01 RX ADMIN — Medication 0.5 MCG/KG/MIN: at 20:05

## 2025-01-01 RX ADMIN — NEOMYCIN SULFATE, POLYMYXIN B SULFATE AND BACITRACIN ZINC: 3.5; 10000; 4 OINTMENT OPHTHALMIC at 03:40

## 2025-01-01 RX ADMIN — LATANOPROST 1 DROP: 50 SOLUTION/ DROPS OPHTHALMIC at 21:20

## 2025-01-01 RX ADMIN — NEOMYCIN SULFATE, POLYMYXIN B SULFATE AND BACITRACIN ZINC: 3.5; 10000; 4 OINTMENT OPHTHALMIC at 11:47

## 2025-01-01 RX ADMIN — POTASSIUM CHLORIDE 40 MEQ: 1.5 POWDER, FOR SOLUTION ORAL at 05:16

## 2025-01-01 RX ADMIN — VASOPRESSIN 1 UNITS: 20 INJECTION INTRAVENOUS at 11:43

## 2025-01-01 RX ADMIN — PIPERACILLIN AND TAZOBACTAM 3.38 G: 3; .375 INJECTION, POWDER, FOR SOLUTION INTRAVENOUS at 17:21

## 2025-01-01 RX ADMIN — PHENYLEPHRINE HYDROCHLORIDE 200 MCG: 10 INJECTION INTRAVENOUS at 11:47

## 2025-01-01 RX ADMIN — ALBUTEROL SULFATE 2.5 MG: 2.5 SOLUTION RESPIRATORY (INHALATION) at 19:19

## 2025-01-01 RX ADMIN — ROCURONIUM BROMIDE 20 MG: 10 INJECTION INTRAVENOUS at 12:14

## 2025-01-01 RX ADMIN — MUPIROCIN 1 APPLICATION: 20 OINTMENT TOPICAL at 09:00

## 2025-01-01 RX ADMIN — ALBUTEROL SULFATE 2.5 MG: 2.5 SOLUTION RESPIRATORY (INHALATION) at 23:24

## 2025-01-01 RX ADMIN — SODIUM PHOSPHATE, MONOBASIC, MONOHYDRATE AND SODIUM PHOSPHATE, DIBASIC, ANHYDROUS 15 MMOL: 276; 142 INJECTION, SOLUTION INTRAVENOUS at 19:51

## 2025-01-01 RX ADMIN — Medication 10 ML: at 20:32

## 2025-01-01 RX ADMIN — HEPARIN SODIUM 16.24 UNITS/KG/HR: 10000 INJECTION, SOLUTION INTRAVENOUS at 18:07

## 2025-01-01 RX ADMIN — POTASSIUM CHLORIDE 10 MEQ: 7.46 INJECTION, SOLUTION INTRAVENOUS at 05:38

## 2025-01-01 RX ADMIN — EPHEDRINE SULFATE 10 MG: 50 INJECTION INTRAVENOUS at 12:24

## 2025-01-01 RX ADMIN — ASPIRIN 81 MG CHEWABLE TABLET 81 MG: 81 TABLET CHEWABLE at 11:45

## 2025-01-01 RX ADMIN — Medication 0.5 MCG/KG/MIN: at 18:05

## 2025-01-01 RX ADMIN — LATANOPROST 1 DROP: 50 SOLUTION/ DROPS OPHTHALMIC at 20:59

## 2025-01-01 RX ADMIN — PREDNISONE 20 MG: 20 TABLET ORAL at 11:46

## 2025-01-01 RX ADMIN — LORAZEPAM 2 MG: 2 INJECTION INTRAMUSCULAR; INTRAVENOUS at 14:22

## 2025-01-01 RX ADMIN — MUPIROCIN 1 APPLICATION: 20 OINTMENT TOPICAL at 20:11

## 2025-01-01 RX ADMIN — DEXTROSE MONOHYDRATE AND SODIUM CHLORIDE 75 ML/HR: 5; .45 INJECTION, SOLUTION INTRAVENOUS at 18:22

## 2025-01-01 RX ADMIN — ARFORMOTEROL TARTRATE 15 MCG: 15 SOLUTION RESPIRATORY (INHALATION) at 20:15

## 2025-01-01 RX ADMIN — METHYLPREDNISOLONE SODIUM SUCCINATE 40 MG: 40 INJECTION, POWDER, FOR SOLUTION INTRAMUSCULAR; INTRAVENOUS at 22:16

## 2025-01-01 RX ADMIN — ONDANSETRON 4 MG: 2 INJECTION, SOLUTION INTRAMUSCULAR; INTRAVENOUS at 13:00

## 2025-01-01 RX ADMIN — ACETAMINOPHEN 650 MG: 325 TABLET ORAL at 22:33

## 2025-01-01 RX ADMIN — HYDROMORPHONE HYDROCHLORIDE 0.5 MG: 1 INJECTION, SOLUTION INTRAMUSCULAR; INTRAVENOUS; SUBCUTANEOUS at 22:00

## 2025-01-01 RX ADMIN — OXYMETAZOLINE HYDROCHLORIDE 2 SPRAY: 0.5 SPRAY NASAL at 11:24

## 2025-01-01 RX ADMIN — ALBUTEROL SULFATE 2.5 MG: 2.5 SOLUTION RESPIRATORY (INHALATION) at 20:01

## 2025-01-01 RX ADMIN — ALBUTEROL SULFATE 2.5 MG: 2.5 SOLUTION RESPIRATORY (INHALATION) at 03:03

## 2025-01-01 RX ADMIN — NICARDIPINE HYDROCHLORIDE 200 MCG: 25 INJECTION, SOLUTION INTRAVENOUS at 13:03

## 2025-01-01 RX ADMIN — MIDODRINE HYDROCHLORIDE 10 MG: 5 TABLET ORAL at 22:33

## 2025-01-01 RX ADMIN — NEOMYCIN SULFATE, POLYMYXIN B SULFATE AND BACITRACIN ZINC: 3.5; 10000; 4 OINTMENT OPHTHALMIC at 09:19

## 2025-01-01 RX ADMIN — POTASSIUM CHLORIDE 40 MEQ: 1.5 POWDER, FOR SOLUTION ORAL at 10:05

## 2025-01-01 RX ADMIN — PHENYLEPHRINE HYDROCHLORIDE 200 MCG: 10 INJECTION INTRAVENOUS at 11:44

## 2025-01-01 RX ADMIN — ALBUTEROL SULFATE 2.5 MG: 2.5 SOLUTION RESPIRATORY (INHALATION) at 23:30

## 2025-01-01 RX ADMIN — ACETAMINOPHEN 650 MG: 325 TABLET ORAL at 11:06

## 2025-01-01 RX ADMIN — PIPERACILLIN AND TAZOBACTAM 3.38 G: 3; .375 INJECTION, POWDER, FOR SOLUTION INTRAVENOUS at 10:20

## 2025-01-01 RX ADMIN — PIPERACILLIN AND TAZOBACTAM 3.38 G: 3; .375 INJECTION, POWDER, FOR SOLUTION INTRAVENOUS at 17:09

## 2025-01-01 RX ADMIN — RIVAROXABAN 15 MG: 15 TABLET, FILM COATED ORAL at 18:09

## 2025-01-01 RX ADMIN — Medication 10 ML: at 20:33

## 2025-04-28 ENCOUNTER — CLINICAL SUPPORT NO REQUIREMENTS (OUTPATIENT)
Dept: CARDIOLOGY | Facility: CLINIC | Age: 85
End: 2025-04-28
Payer: MEDICARE

## 2025-04-28 ENCOUNTER — OFFICE VISIT (OUTPATIENT)
Dept: CARDIOLOGY | Facility: CLINIC | Age: 85
End: 2025-04-28
Payer: MEDICARE

## 2025-04-28 VITALS
OXYGEN SATURATION: 96 % | BODY MASS INDEX: 19.48 KG/M2 | SYSTOLIC BLOOD PRESSURE: 105 MMHG | HEIGHT: 73 IN | WEIGHT: 147 LBS | RESPIRATION RATE: 16 BRPM | DIASTOLIC BLOOD PRESSURE: 71 MMHG | HEART RATE: 70 BPM

## 2025-04-28 DIAGNOSIS — I95.89 CHRONIC HYPOTENSION: Chronic | ICD-10-CM

## 2025-04-28 DIAGNOSIS — I25.5 ISCHEMIC CARDIOMYOPATHY: Primary | ICD-10-CM

## 2025-04-28 DIAGNOSIS — E78.2 MIXED HYPERLIPIDEMIA: ICD-10-CM

## 2025-04-28 DIAGNOSIS — Z95.810 PRESENCE OF BIVENTRICULAR IMPLANTABLE CARDIOVERTER-DEFIBRILLATOR (ICD): ICD-10-CM

## 2025-04-28 DIAGNOSIS — I25.10 CORONARY ARTERY DISEASE INVOLVING NATIVE CORONARY ARTERY OF NATIVE HEART WITHOUT ANGINA PECTORIS: Primary | Chronic | ICD-10-CM

## 2025-04-28 DIAGNOSIS — I48.19 PERSISTENT ATRIAL FIBRILLATION: Chronic | ICD-10-CM

## 2025-04-28 PROCEDURE — 99213 OFFICE O/P EST LOW 20 MIN: CPT | Performed by: NURSE PRACTITIONER

## 2025-04-28 PROCEDURE — 93000 ELECTROCARDIOGRAM COMPLETE: CPT | Performed by: NURSE PRACTITIONER

## 2025-05-02 NOTE — PROGRESS NOTES
Encounter Date:04/28/2025        Patient ID: Shukri Park is a 84 y.o. male.      Chief Complaint: f/u atrial fibrillation, ICM, CAD, s/p ICD      History of Present Illness    Shukri Park is a 83 y.o. male who is a patient of Dr. Cronin. Pmh includes HTN, atrial fibrillation, ICM, CAD, s/p ICD.     Per prior records:   In July 2020, patient was admitted with symptom of shortness of breath and palpitation.  He was noted to be in atrial fibrillation with rapid ventricular response.  Patient was initially treated with medication but because of antihypertensive effect of these rate control medications, he became hypotensive.  Subsequently, after discussion with EP, patient underwent AV kelly ablation and was implanted with biventricular AICD device.  Unfortunately, his left-sided lead was not functioning appropriately because of stimulation of diaphragm.  And it was turned off.  Patient also underwent cardiac catheterization which showed high-grade stenosis of RCA but PCI to RCA was unsuccessful.  Nonobstructive disease of left coronary artery was noted.  LVEF was 35% on echocardiogram.     In February 2023, patient underwent echocardiogram which showed EF of 55% and no significant valvular heart disease noted.  Mild LVH was noted.     Mr. Park presents today for routine follow up. He is doing well. Denies chest pain or shortness of breath. No volume overload. No syncope or pre syncope. Device is followed remotely- no new events. Interrogated today, normal device function, permanent atrial fibrillation  He has had orthostatic hypotension in past and is on midodrine. He has GDMT on board for HF to include low dose lisinopril. He is on ASA / statin and xarelto for a/c.  No acute complaints. Overall doing well for his age.        The following portions of the patient's history were reviewed and updated as appropriate: allergies, current medications, past family history, past medical history, past social history, past  surgical history, and problem list.    Review of Systems   Constitutional: Negative for chills, diaphoresis and malaise/fatigue.   Cardiovascular:  Negative for chest pain, dyspnea on exertion, irregular heartbeat, leg swelling, near-syncope, orthopnea, palpitations, paroxysmal nocturnal dyspnea and syncope.   Respiratory:  Negative for cough, shortness of breath, sleep disturbances due to breathing and sputum production.    Gastrointestinal:  Negative for change in bowel habit.   Genitourinary:  Negative for urgency.   Neurological:  Negative for dizziness and headaches.   Psychiatric/Behavioral:  Negative for altered mental status.          Current Outpatient Medications:     aspirin (aspirin) 81 MG EC tablet, Take 1 tablet by mouth Daily. (Patient taking differently: Take 1 tablet by mouth Daily. LD 1/2/21), Disp: 90 tablet, Rfl: 3    docusate sodium (COLACE) 100 MG capsule, Take 1 capsule by mouth 2 (Two) Times a Day., Disp: , Rfl:     ferrous sulfate 325 (65 FE) MG tablet, Take 1 tablet by mouth Daily With Breakfast. Hold DOs, Disp: , Rfl:     HYDROcodone-acetaminophen (NORCO) 5-325 MG per tablet, Take 1-2 tablets by mouth Every 4 (Four) Hours As Needed (Pain)., Disp: 30 tablet, Rfl: 0    latanoprost (XALATAN) 0.005 % ophthalmic solution, Administer 1 drop to both eyes Daily., Disp: , Rfl:     lisinopril (PRINIVIL,ZESTRIL) 2.5 MG tablet, TAKE 1 TABLET BY MOUTH EVERY DAY, Disp: 90 tablet, Rfl: 1    midodrine (PROAMATINE) 5 MG tablet, Take 1 tablet by mouth 3 (Three) Times a Day Before Meals., Disp: 60 tablet, Rfl: 3    Multiple Vitamin (MULTIVITAMIN) tablet, Take 1 tablet by mouth Daily. LD 1/2, Disp: , Rfl:     pantoprazole (PROTONIX) 40 MG EC tablet, Take 1 tablet by mouth Daily., Disp: 30 tablet, Rfl: 0    sildenafil (Viagra) 100 MG tablet, Take 1 tablet by mouth Daily As Needed for Erectile Dysfunction., Disp: 10 tablet, Rfl: 1    simvastatin (ZOCOR) 40 MG tablet, Take 2 tablets by mouth Every Night., Disp:  , Rfl:     vitamin B-12 (CYANOCOBALAMIN) 1000 MCG tablet, Take 1 tablet by mouth Daily., Disp: , Rfl:     Xarelto 15 MG tablet, TAKE 1 TABLET BY MOUTH EVERY DAY, Disp: 60 tablet, Rfl: 3    Current outpatient and discharge medications have been reconciled for the patient.  Reviewed by: TERRANCE Carter       Allergies   Allergen Reactions    Ciprofloxacin Anaphylaxis    Amlodipine Unknown (See Comments)    Rocephin [Ceftriaxone] Other (See Comments)     Mouth sores       Family History   Problem Relation Age of Onset    Cancer Other     Hyperlipidemia Other     Heart disease Other        Past Surgical History:   Procedure Laterality Date    CARDIAC CATHETERIZATION N/A 7/9/2020    Procedure: LEFT HEART CATH with possible PCI;  Surgeon: Wade Cronin MD;  Location: [facility] CATH INVASIVE LOCATION;  Service: Cardiovascular;  Laterality: N/A;  Local and IV sedation    CARDIAC CATHETERIZATION N/A 7/9/2020    Procedure: Percutaneous Coronary Intervention;  Surgeon: Wade Cronin MD;  Location: [facility] CATH INVASIVE LOCATION;  Service: Cardiovascular;  Laterality: N/A;    CARDIAC DEFIBRILLATOR PLACEMENT      CARDIAC ELECTROPHYSIOLOGY PROCEDURE N/A 7/10/2020    Procedure: Biventricular Device Insertion;  Surgeon: Jonathan Denney MD;  Location: [facility] CATH INVASIVE LOCATION;  Service: Cardiovascular;  Laterality: N/A;    CARDIAC ELECTROPHYSIOLOGY PROCEDURE N/A 7/10/2020    Procedure: ABLATION AV NODE;  Surgeon: Jonathan Denney MD;  Location: [facility] CATH INVASIVE LOCATION;  Service: Cardiovascular;  Laterality: N/A;    CARDIAC ELECTROPHYSIOLOGY PROCEDURE N/A 7/10/2020    Procedure: AV node ablation;  Surgeon: Jonathan Denney MD;  Location: [facility] CATH INVASIVE LOCATION;  Service: Cardiovascular;  Laterality: N/A;    CARDIOVASCULAR STRESS TEST  2020    CATARACT EXTRACTION, BILATERAL      CONVERTED (HISTORICAL) HOLTER  2020    CORONARY ANGIOPLASTY WITH STENT PLACEMENT      ENDOSCOPY N/A 7/5/2020    Procedure:  "ESOPHAGOGASTRODUODENOSCOPY;  Surgeon: Neal Correa MD;  Location: [facility] ENDOSCOPY;  Service: Gastroenterology;  Laterality: N/A;    INGUINAL HERNIA REPAIR Right 2021    Procedure: INGUINAL HERNIA REPAIR OPEN WITH MESH;  Surgeon: Cody Randall MD;  Location: [facility] MAIN OR;  Service: General;  Laterality: Right;    LUMBAR DECOMPRESSION  2015    L2-L3    LUMBAR DECOMPRESSION  2006    Dr. Logan    OTHER SURGICAL HISTORY  2015    left SI fusion with bone graft - Dr. Presley    OTHER SURGICAL HISTORY      carotid artery repair     OTHER SURGICAL HISTORY Left 2016    Left SI fusion 2016 Dr. Zendejas    POSTERIOR SPINAL FUSION  10/24/2016    PSF T12-3/TLIF L3-L4 poss L2-L3 --- Dr. 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       Past Medical History:   Diagnosis Date    A-fib     Aortic aneurysm     Appetite loss     Cancer     right lobe cancer - surgically removed     CHF (congestive heart failure)     Coronary artery disease     Dark stools     Foot pain, bilateral     GERD (gastroesophageal reflux disease)     Hyperlipidemia     Low back pain     S/P epidural steroid injection     Israel Gomes's - no relief    Weight loss     acute loss of weight 30 lbs       Family History   Problem Relation Age of Onset    Cancer Other     Hyperlipidemia Other     Heart disease Other        Social History     Socioeconomic History    Marital status:    Tobacco Use    Smoking status: Former     Current packs/day: 0.00     Types: Cigarettes     Quit date: 1989     Years since quittin.3     Passive exposure: Past    Smokeless tobacco: Never   Vaping Use    Vaping status: Never Used   Substance and Sexual Activity    Alcohol use: No    Drug use: Never    Sexual activity: Defer               Objective:       Physical Exam    /71 (BP Location: Right arm, Patient Position: Sitting, Cuff Size: Large Adult)   Pulse 70   Resp 16   Ht 185.4 cm (73\")   " Wt 66.7 kg (147 lb)   SpO2 96%   BMI 19.39 kg/m²   The patient is alert, oriented and in no distress.    Vital signs as noted above.    Head and neck revealed no carotid bruits or jugular venous distension.  No thyromegaly or lymphadenopathy is present.    Lungs clear.  No wheezing.  Breath sounds are normal bilaterally.    Heart normal first and second heart sounds.  No murmur..  No pericardial rub is present.  No gallop is present.    Abdomen soft and nontender.  No organomegaly is present.    Extremities revealed good peripheral pulses without any pedal edema.    Skin warm and dry.    Musculoskeletal system is grossly normal.    CNS grossly normal.           Diagnosis Plan   1. Coronary artery disease involving native coronary artery of native heart without angina pectoris        2. Persistent atrial fibrillation        3. Chronic hypotension        4. Mixed hyperlipidemia        5. Presence of biventricular implantable cardioverter-defibrillator (ICD)        LAB RESULTS (LAST 7 DAYS)    CBC        BMP        CMP         BNP        TROPONIN        CoAg        Creatinine Clearance  CrCl cannot be calculated (Patient's most recent lab result is older than the maximum 30 days allowed.).    ABG        Radiology  No radiology results for the last day    EKG    ECG 12 Lead    Date/Time: 4/28/2025 1:48 PM  Performed by: Machelle Ulloa APRN    Authorized by: Machelle Ulloa APRN  Comparison: not compared with previous ECG   Previous ECG: no previous ECG available  Rhythm: atrial fibrillation and paced  Rate: normal  BPM: 70          Stress test  Results for orders placed during the hospital encounter of 07/01/20    Stress Test With Myocardial Perfusion One Day    Interpretation Summary  · Diaphragmatic attenuation artifact is present.  · Left ventricular ejection fraction is mildly reduced (Calculated EF = 43%).  · Myocardial perfusion imaging indicates a normal myocardial perfusion study with no evidence of ischemia.  ·  Impressions are consistent with a low risk study.  · This is normal Cardiolite imaging stress test with no evidence of ischemia or myocardial infarction. Small fixed inferior defect was noted which showed normal thickening and contractility on gated SPECT consistent with attenuation artifact, however small myocardial infarction cannot be excluded. Clinical correlation recommended. Left ventricle size and function is normal on gated SPECT imaging. No wall motion abnormality was noted. Clinical correlation recommended. Further recommendation a  · Findings consistent with an equivocal ECG stress test.      Echocardiogram  Results for orders placed during the hospital encounter of 02/24/23    Adult Transthoracic Echo Complete W/ Cont if Necessary Per Protocol    Interpretation Summary    Left ventricular systolic function is low normal.    Left ventricle ejection is 55%    Left ventricular wall thickness is consistent with mild concentric hypertrophy.    Left ventricular diastolic function was normal.    Estimated right ventricular systolic pressure from tricuspid regurgitation is normal (<35 mmHg).      Cardiac catheterization  Results for orders placed during the hospital encounter of 07/01/20    Cardiac Catheterization/Vascular Study    Narrative  Cardiac catheterization/PCI report    DATE OF PROCEDURE: July 9, 2020    INDICATION FOR PROCEDURE:    Cardiomyopathy  CAD  S/p previous cardiac coronary artery stenting  Congestive heart failure acute on chronic systolic  Atrial fibrillation      PROCEDURE PERFORMED:    Left heart catheterization  coronary angiography  Failed percutaneous intervention    FINDINGS:    1. HEMODYNAMICS:    Aortic pressure: 123/72 mmHg    LVEDP: 0 to 5 mmHg    Gradient across aortic valve on pullback: No gradient across pullback      2. LEFT VENTRICULOGRAPHY: Not done      3. CORONARY ANGIOGRAPHY:    A: Left main coronary artery: Normal    B: Left anterior descending artery: 50% blockage in  proximal LAD but no high-grade stenosis.  It wraps around the apex    C: Left circumflex coronary artery: Patent stent in proximal LCx and no high-grade stenosis    D: Right coronary artery: 70 to 80% mid LAD stenosis followed by 95 to 99% stenosis within the previous stent.  It is heavily calcified and it was a complex stenosis with spiral linear calcification.      PROCEDURE COMMENTS:    After informed consent was obtained, the patient was prepped and draped in the usual sterile manner.  Mild to moderate sedation was administered.  Right femoral artery was accessed without difficulty and 6 Chinese arterial sheath was inserted.  Sheath was flushed with heparinized saline.    Using 6 Chinese Cierra catheters, first left coronary artery and the right coronary was electively engaged and appropriate views were taken.  A 6 Chinese pigtail catheter was used to cross aortic valve and left heart catheterization was performed.  Left ventriculography was done in a review.    After diagnostic catheterization, we proceeded to the intervention of the right coronary artery.    We advanced a 6 Chinese hockey-stick with sideholes interventional guide and selective engagement of the right coronary artery was achieved.  We advanced interventional guidewire 0.014 whisper wire but it was unsuccessful.  After that we tried  200 and try to cross the lesion but it was also unsuccessful.  We changed to Choice PT and was not able to cross the lesion.  At that time we aborted the procedure.  Patient still had YOVANNY-3 flow.  Right coronary artery was dominant vessel.  YOVANNY II-III flow was present before and YOVANNY II-III flow was present after the attempt of PCI.    Patient was given aspirin, Plavix and heparin during the procedure.  ACT at the end of procedure was 180 and 2000 units of unfractionated heparin was given    Patient tolerated the procedure well. No significant blood loss noted.      SUMMARY:    1.  Three-vessel coronary artery  disease with patent stent in LCx and 50% stenosis of LAD  2.  High-grade stenosis of RCA within the previous stent and it was heavily calcified  3.  Failed PCI to RCA    RECOMMENDATIONS:    Recommend aspirin and Plavix          Assessment and Plan       Diagnoses and all orders for this visit:    1. Coronary artery disease involving native coronary artery of native heart without angina pectoris (Primary)  Overview:  Added automatically from request for surgery [id number]      2. Persistent atrial fibrillation  Overview:  Added automatically from request for surgery [id number]      3. Chronic hypotension    4. Mixed hyperlipidemia    5. Presence of biventricular implantable cardioverter-defibrillator (ICD)       CAD: no angina. Continue medical therapy with ASA / statin   Persistent atrial fibrillation: rate controlled, on Xarelto for a/c  Chronic hypotension continue midodrine  HLD statin therapy  ICM / ICD in place: volume status stable, ICD interrogated normal device function, no changes made. On GDMT low dose ACEi, also on midodrine for orthostatic hypotension. Doing well overall  RTC in 6 mo, no changes made

## 2025-05-02 NOTE — PROGRESS NOTES
DEVICE INTERROGATION:  IN OFFICE    DEVICE TYPE:   Biventricular ICD    :   datatracker    BATTERY:  Stable    TIME TO ELECTIVE REPLACEMENT INDICATORS:   9 years    CHARGE TIME:   10.2 seconds        LEAD DATA:       DEVICE REPROGRAMMED FOR TESTING      Atrial:         Ventricular:     PAC ED mV, 4 to ohms, 1.1 V@0.4 ms    LV: 2.8 V at 2.0 ms, 562 ohms      Pacemaker dependent:   Yes      Atrial pacing percentage: 0%    Ventricular pacing percentage: 99% RV paced  RV pacing only %      Arrhythmia Logbook Reviewed: 59 no ventricular high rate episodes        Summary:    Stable Device Function    Underlying A-fib    Battery status is stable.    Reviewed with: Provider  NEXT IN OFFICE DEVICE CHECK DUE: Next visit    REMOTE DEVICE INTERROGATIONS: Ongoing

## 2025-06-10 ENCOUNTER — HOSPITAL ENCOUNTER (INPATIENT)
Facility: HOSPITAL | Age: 85
LOS: 3 days | DRG: 065 | End: 2025-06-13
Attending: STUDENT IN AN ORGANIZED HEALTH CARE EDUCATION/TRAINING PROGRAM | Admitting: STUDENT IN AN ORGANIZED HEALTH CARE EDUCATION/TRAINING PROGRAM
Payer: MEDICARE

## 2025-06-10 ENCOUNTER — APPOINTMENT (OUTPATIENT)
Dept: CT IMAGING | Facility: HOSPITAL | Age: 85
DRG: 065 | End: 2025-06-10
Payer: MEDICARE

## 2025-06-10 ENCOUNTER — APPOINTMENT (OUTPATIENT)
Dept: GENERAL RADIOLOGY | Facility: HOSPITAL | Age: 85
DRG: 065 | End: 2025-06-10
Payer: MEDICARE

## 2025-06-10 DIAGNOSIS — R41.82 ALTERED MENTAL STATUS, UNSPECIFIED ALTERED MENTAL STATUS TYPE: Primary | ICD-10-CM

## 2025-06-10 DIAGNOSIS — J18.9 PNEUMONIA OF RIGHT LUNG DUE TO INFECTIOUS ORGANISM, UNSPECIFIED PART OF LUNG: ICD-10-CM

## 2025-06-10 DIAGNOSIS — T79.6XXA TRAUMATIC RHABDOMYOLYSIS, INITIAL ENCOUNTER: ICD-10-CM

## 2025-06-10 PROBLEM — G93.40 ACUTE ENCEPHALOPATHY: Status: ACTIVE | Noted: 2025-06-10

## 2025-06-10 LAB
ALBUMIN SERPL-MCNC: 5 G/DL (ref 3.5–5.2)
ALBUMIN/GLOB SERPL: 1.6 G/DL
ALP SERPL-CCNC: 83 U/L (ref 39–117)
ALT SERPL W P-5'-P-CCNC: 33 U/L (ref 1–41)
ANION GAP SERPL CALCULATED.3IONS-SCNC: 18.9 MMOL/L (ref 5–15)
AST SERPL-CCNC: 77 U/L (ref 1–40)
B PARAPERT DNA SPEC QL NAA+PROBE: NOT DETECTED
B PERT DNA SPEC QL NAA+PROBE: NOT DETECTED
BACTERIA UR QL AUTO: NORMAL /HPF
BASOPHILS # BLD AUTO: 0.01 10*3/MM3 (ref 0–0.2)
BASOPHILS NFR BLD AUTO: 0.1 % (ref 0–1.5)
BILIRUB SERPL-MCNC: 1.5 MG/DL (ref 0–1.2)
BILIRUB UR QL STRIP: NEGATIVE
BUN SERPL-MCNC: 33 MG/DL (ref 8–23)
BUN/CREAT SERPL: 28.7 (ref 7–25)
C PNEUM DNA NPH QL NAA+NON-PROBE: NOT DETECTED
CALCIUM SPEC-SCNC: 10.1 MG/DL (ref 8.6–10.5)
CHLORIDE SERPL-SCNC: 102 MMOL/L (ref 98–107)
CK SERPL-CCNC: 2133 U/L (ref 20–200)
CLARITY UR: CLEAR
CO2 SERPL-SCNC: 23.1 MMOL/L (ref 22–29)
COLOR UR: YELLOW
CREAT SERPL-MCNC: 1.15 MG/DL (ref 0.76–1.27)
D-LACTATE SERPL-SCNC: 1.3 MMOL/L (ref 0.3–2)
DEPRECATED RDW RBC AUTO: 48.2 FL (ref 37–54)
EGFRCR SERPLBLD CKD-EPI 2021: 62.4 ML/MIN/1.73
EOSINOPHIL # BLD AUTO: 0 10*3/MM3 (ref 0–0.4)
EOSINOPHIL NFR BLD AUTO: 0 % (ref 0.3–6.2)
ERYTHROCYTE [DISTWIDTH] IN BLOOD BY AUTOMATED COUNT: 13.4 % (ref 12.3–15.4)
FLUAV SUBTYP SPEC NAA+PROBE: NOT DETECTED
FLUBV RNA ISLT QL NAA+PROBE: NOT DETECTED
GEN 5 1HR TROPONIN T REFLEX: 32 NG/L
GLOBULIN UR ELPH-MCNC: 3.2 GM/DL
GLUCOSE SERPL-MCNC: 93 MG/DL (ref 65–99)
GLUCOSE UR STRIP-MCNC: NEGATIVE MG/DL
HADV DNA SPEC NAA+PROBE: NOT DETECTED
HCOV 229E RNA SPEC QL NAA+PROBE: NOT DETECTED
HCOV HKU1 RNA SPEC QL NAA+PROBE: NOT DETECTED
HCOV NL63 RNA SPEC QL NAA+PROBE: NOT DETECTED
HCOV OC43 RNA SPEC QL NAA+PROBE: NOT DETECTED
HCT VFR BLD AUTO: 44.1 % (ref 37.5–51)
HGB BLD-MCNC: 14.2 G/DL (ref 13–17.7)
HGB UR QL STRIP.AUTO: NEGATIVE
HMPV RNA NPH QL NAA+NON-PROBE: NOT DETECTED
HPIV1 RNA ISLT QL NAA+PROBE: NOT DETECTED
HPIV2 RNA SPEC QL NAA+PROBE: NOT DETECTED
HPIV3 RNA NPH QL NAA+PROBE: NOT DETECTED
HPIV4 P GENE NPH QL NAA+PROBE: NOT DETECTED
HYALINE CASTS UR QL AUTO: NORMAL /LPF
IMM GRANULOCYTES # BLD AUTO: 0.07 10*3/MM3 (ref 0–0.05)
IMM GRANULOCYTES NFR BLD AUTO: 0.5 % (ref 0–0.5)
KETONES UR QL STRIP: ABNORMAL
LEUKOCYTE ESTERASE UR QL STRIP.AUTO: NEGATIVE
LYMPHOCYTES # BLD AUTO: 1.69 10*3/MM3 (ref 0.7–3.1)
LYMPHOCYTES NFR BLD AUTO: 11.5 % (ref 19.6–45.3)
M PNEUMO IGG SER IA-ACNC: NOT DETECTED
MAGNESIUM SERPL-MCNC: 2.5 MG/DL (ref 1.6–2.4)
MCH RBC QN AUTO: 31.6 PG (ref 26.6–33)
MCHC RBC AUTO-ENTMCNC: 32.2 G/DL (ref 31.5–35.7)
MCV RBC AUTO: 98 FL (ref 79–97)
MONOCYTES # BLD AUTO: 1.32 10*3/MM3 (ref 0.1–0.9)
MONOCYTES NFR BLD AUTO: 9 % (ref 5–12)
NEUTROPHILS NFR BLD AUTO: 11.65 10*3/MM3 (ref 1.7–7)
NEUTROPHILS NFR BLD AUTO: 78.9 % (ref 42.7–76)
NITRITE UR QL STRIP: NEGATIVE
NRBC BLD AUTO-RTO: 0 /100 WBC (ref 0–0.2)
PH UR STRIP.AUTO: 5.5 [PH] (ref 5–8)
PLATELET # BLD AUTO: 152 10*3/MM3 (ref 140–450)
PMV BLD AUTO: 10.2 FL (ref 6–12)
POTASSIUM SERPL-SCNC: 4.4 MMOL/L (ref 3.5–5.2)
PROCALCITONIN SERPL-MCNC: 0.07 NG/ML (ref 0–0.25)
PROT SERPL-MCNC: 8.2 G/DL (ref 6–8.5)
PROT UR QL STRIP: ABNORMAL
RBC # BLD AUTO: 4.5 10*6/MM3 (ref 4.14–5.8)
RBC # UR STRIP: NORMAL /HPF
REF LAB TEST METHOD: NORMAL
RHINOVIRUS RNA SPEC NAA+PROBE: NOT DETECTED
RSV RNA NPH QL NAA+NON-PROBE: NOT DETECTED
SARS-COV-2 RNA RESP QL NAA+PROBE: NOT DETECTED
SODIUM SERPL-SCNC: 144 MMOL/L (ref 136–145)
SP GR UR STRIP: 1.02 (ref 1–1.03)
SQUAMOUS #/AREA URNS HPF: NORMAL /HPF
TROPONIN T % DELTA: -9
TROPONIN T NUMERIC DELTA: -3 NG/L
TROPONIN T SERPL HS-MCNC: 35 NG/L
TSH SERPL DL<=0.05 MIU/L-ACNC: 0.91 UIU/ML (ref 0.27–4.2)
UROBILINOGEN UR QL STRIP: ABNORMAL
WBC # UR STRIP: NORMAL /HPF
WBC NRBC COR # BLD AUTO: 14.74 10*3/MM3 (ref 3.4–10.8)
WHOLE BLOOD HOLD COAG: NORMAL

## 2025-06-10 PROCEDURE — 87040 BLOOD CULTURE FOR BACTERIA: CPT | Performed by: NURSE PRACTITIONER

## 2025-06-10 PROCEDURE — 93005 ELECTROCARDIOGRAM TRACING: CPT | Performed by: NURSE PRACTITIONER

## 2025-06-10 PROCEDURE — 84484 ASSAY OF TROPONIN QUANT: CPT | Performed by: NURSE PRACTITIONER

## 2025-06-10 PROCEDURE — 25010000002 DOXYCYCLINE 100 MG RECONSTITUTED SOLUTION 1 EACH VIAL: Performed by: NURSE PRACTITIONER

## 2025-06-10 PROCEDURE — 84443 ASSAY THYROID STIM HORMONE: CPT | Performed by: STUDENT IN AN ORGANIZED HEALTH CARE EDUCATION/TRAINING PROGRAM

## 2025-06-10 PROCEDURE — 72125 CT NECK SPINE W/O DYE: CPT

## 2025-06-10 PROCEDURE — 72131 CT LUMBAR SPINE W/O DYE: CPT

## 2025-06-10 PROCEDURE — 84443 ASSAY THYROID STIM HORMONE: CPT | Performed by: NURSE PRACTITIONER

## 2025-06-10 PROCEDURE — 25010000002 CEFTRIAXONE PER 250 MG: Performed by: NURSE PRACTITIONER

## 2025-06-10 PROCEDURE — 87449 NOS EACH ORGANISM AG IA: CPT | Performed by: STUDENT IN AN ORGANIZED HEALTH CARE EDUCATION/TRAINING PROGRAM

## 2025-06-10 PROCEDURE — 71045 X-RAY EXAM CHEST 1 VIEW: CPT

## 2025-06-10 PROCEDURE — 73080 X-RAY EXAM OF ELBOW: CPT

## 2025-06-10 PROCEDURE — 82550 ASSAY OF CK (CPK): CPT | Performed by: NURSE PRACTITIONER

## 2025-06-10 PROCEDURE — 85025 COMPLETE CBC W/AUTO DIFF WBC: CPT | Performed by: NURSE PRACTITIONER

## 2025-06-10 PROCEDURE — 83605 ASSAY OF LACTIC ACID: CPT

## 2025-06-10 PROCEDURE — 36415 COLL VENOUS BLD VENIPUNCTURE: CPT

## 2025-06-10 PROCEDURE — 0202U NFCT DS 22 TRGT SARS-COV-2: CPT | Performed by: NURSE PRACTITIONER

## 2025-06-10 PROCEDURE — 72128 CT CHEST SPINE W/O DYE: CPT

## 2025-06-10 PROCEDURE — 84145 PROCALCITONIN (PCT): CPT | Performed by: NURSE PRACTITIONER

## 2025-06-10 PROCEDURE — 25810000003 SODIUM CHLORIDE 0.9 % SOLUTION: Performed by: NURSE PRACTITIONER

## 2025-06-10 PROCEDURE — 83735 ASSAY OF MAGNESIUM: CPT | Performed by: NURSE PRACTITIONER

## 2025-06-10 PROCEDURE — P9612 CATHETERIZE FOR URINE SPEC: HCPCS

## 2025-06-10 PROCEDURE — 99285 EMERGENCY DEPT VISIT HI MDM: CPT

## 2025-06-10 PROCEDURE — 70450 CT HEAD/BRAIN W/O DYE: CPT

## 2025-06-10 PROCEDURE — 80053 COMPREHEN METABOLIC PANEL: CPT | Performed by: NURSE PRACTITIONER

## 2025-06-10 PROCEDURE — 81001 URINALYSIS AUTO W/SCOPE: CPT | Performed by: NURSE PRACTITIONER

## 2025-06-10 RX ORDER — MIDODRINE HYDROCHLORIDE 5 MG/1
5 TABLET ORAL 2 TIMES DAILY
COMMUNITY
End: 2025-06-13 | Stop reason: HOSPADM

## 2025-06-10 RX ORDER — SODIUM CHLORIDE 9 MG/ML
40 INJECTION, SOLUTION INTRAVENOUS AS NEEDED
Status: DISCONTINUED | OUTPATIENT
Start: 2025-06-10 | End: 2025-06-13 | Stop reason: HOSPADM

## 2025-06-10 RX ORDER — SODIUM CHLORIDE 0.9 % (FLUSH) 0.9 %
10 SYRINGE (ML) INJECTION EVERY 12 HOURS SCHEDULED
Status: DISCONTINUED | OUTPATIENT
Start: 2025-06-10 | End: 2025-06-13 | Stop reason: HOSPADM

## 2025-06-10 RX ORDER — SODIUM CHLORIDE 0.9 % (FLUSH) 0.9 %
10 SYRINGE (ML) INJECTION AS NEEDED
Status: DISCONTINUED | OUTPATIENT
Start: 2025-06-10 | End: 2025-06-13 | Stop reason: HOSPADM

## 2025-06-10 RX ORDER — NITROGLYCERIN 0.4 MG/1
0.4 TABLET SUBLINGUAL
Status: DISCONTINUED | OUTPATIENT
Start: 2025-06-10 | End: 2025-06-13 | Stop reason: HOSPADM

## 2025-06-10 RX ORDER — IPRATROPIUM BROMIDE 21 UG/1
2 SPRAY, METERED NASAL 3 TIMES DAILY
COMMUNITY

## 2025-06-10 RX ADMIN — CEFTRIAXONE SODIUM 1000 MG: 1 INJECTION, POWDER, FOR SOLUTION INTRAMUSCULAR; INTRAVENOUS at 21:35

## 2025-06-10 RX ADMIN — DOXYCYCLINE 100 MG: 100 INJECTION, POWDER, LYOPHILIZED, FOR SOLUTION INTRAVENOUS at 21:46

## 2025-06-10 RX ADMIN — SODIUM CHLORIDE 500 ML: 9 INJECTION, SOLUTION INTRAVENOUS at 19:27

## 2025-06-10 RX ADMIN — Medication 10 ML: at 21:50

## 2025-06-10 NOTE — ED PROVIDER NOTES
Subjective   History of Present Illness  Patient is an 84-year-old white male with a history of A-fib currently on Xarelto.  He presents to the ED today from home by EMS with reports of being found down at home.  Patient is unable to provide any history due to his mental status.  It is reported by EMS that family called the police for a welfare check as they have not heard from the patient in a couple of days.  When they got to the house they found the patient laying on the floor covered in feces.  Appears to be altered.  Unknown last well.      Later family arrived at the bedside and reports that they spoke with the patient on Sunday and at that time he had some slurred speech and stated that he had some visual changes in his left eye but refused medical treatment at that time.  They report that he has not since answered their phone calls and they called the police today for a wellness check.      Review of Systems   Unable to perform ROS: Mental status change       Past Medical History:   Diagnosis Date    A-fib     Aortic aneurysm     Appetite loss     Cancer     right lobe cancer - surgically removed     CHF (congestive heart failure)     Coronary artery disease     Dark stools     Foot pain, bilateral     GERD (gastroesophageal reflux disease)     Hyperlipidemia     Low back pain     S/P epidural steroid injection     Israel Gomes's - no relief    Weight loss     acute loss of weight 30 lbs       Allergies   Allergen Reactions    Ciprofloxacin Anaphylaxis    Amlodipine Unknown (See Comments)    Rocephin [Ceftriaxone] Other (See Comments)     Mouth sores       Past Surgical History:   Procedure Laterality Date    CARDIAC CATHETERIZATION N/A 7/9/2020    Procedure: LEFT HEART CATH with possible PCI;  Surgeon: Wade Cronin MD;  Location: [facility] CATH INVASIVE LOCATION;  Service: Cardiovascular;  Laterality: N/A;  Local and IV sedation    CARDIAC CATHETERIZATION N/A 7/9/2020    Procedure: Percutaneous Coronary  Intervention;  Surgeon: Wade Cronin MD;  Location: [facility] CATH INVASIVE LOCATION;  Service: Cardiovascular;  Laterality: N/A;    CARDIAC DEFIBRILLATOR PLACEMENT      CARDIAC ELECTROPHYSIOLOGY PROCEDURE N/A 7/10/2020    Procedure: Biventricular Device Insertion;  Surgeon: Jonathan Denney MD;  Location: [facility] CATH INVASIVE LOCATION;  Service: Cardiovascular;  Laterality: N/A;    CARDIAC ELECTROPHYSIOLOGY PROCEDURE N/A 7/10/2020    Procedure: ABLATION AV NODE;  Surgeon: Jonathan Denney MD;  Location: [facility] CATH INVASIVE LOCATION;  Service: Cardiovascular;  Laterality: N/A;    CARDIAC ELECTROPHYSIOLOGY PROCEDURE N/A 7/10/2020    Procedure: AV node ablation;  Surgeon: Jonathan Denney MD;  Location: [facility] CATH INVASIVE LOCATION;  Service: Cardiovascular;  Laterality: N/A;    CARDIOVASCULAR STRESS TEST  2020    CATARACT EXTRACTION, BILATERAL      CONVERTED (HISTORICAL) HOLTER  2020    CORONARY ANGIOPLASTY WITH STENT PLACEMENT      ENDOSCOPY N/A 7/5/2020    Procedure: ESOPHAGOGASTRODUODENOSCOPY;  Surgeon: Neal Correa MD;  Location: [facility] ENDOSCOPY;  Service: Gastroenterology;  Laterality: N/A;    INGUINAL HERNIA REPAIR Right 1/7/2021    Procedure: INGUINAL HERNIA REPAIR OPEN WITH MESH;  Surgeon: Cody Randall MD;  Location: [facility] MAIN OR;  Service: General;  Laterality: Right;    LUMBAR DECOMPRESSION  09/2015    L2-L3    LUMBAR DECOMPRESSION  2006    Dr. Logan    OTHER SURGICAL HISTORY  05/2015    left SI fusion with bone graft - Dr. Presley    OTHER SURGICAL HISTORY      carotid artery repair     OTHER SURGICAL HISTORY Left 02/19/2016    Left SI fusion 2/19/2016 Dr. Zendejas    POSTERIOR SPINAL FUSION  10/24/2016    PSF T12-3/TLIF L3-L4 poss L2-L3 --- Dr. 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       Family History   Problem Relation Age of Onset    Cancer Other     Hyperlipidemia Other     Heart disease Other        Social History     Socioeconomic History     Marital status:    Tobacco Use    Smoking status: Former     Current packs/day: 0.00     Types: Cigarettes     Quit date: 1989     Years since quittin.4     Passive exposure: Past    Smokeless tobacco: Never   Vaping Use    Vaping status: Never Used   Substance and Sexual Activity    Alcohol use: No    Drug use: Never    Sexual activity: Defer           Objective   Physical Exam  Vital signs and triage nurse note reviewed.  Constitutional: Awake, alert; well-developed.  Thin and frail appearing.  Dried stool noted over the patient.  HEENT: Normocephalic, atraumatic; pupils are PERRL with intact EOM; oropharynx is pink and dry without exudate or erythema.  No drooling or pooling of oral secretions.  No visible sign of trauma to the face or scalp.  No Oneill sign noted.  No raccoon eyes.  No hemotympanum.  Neck: Supple.  Cardiovascular: Regular rate and rhythm, normal S1-S2.  Pulmonary: Respiratory effort regular nonlabored, breath sounds clear to auscultation all fields.  Abdomen: Soft, nontender, nondistended with normoactive bowel sounds; no rebound or guarding.  Musculoskeletal: Independent range of motion of all extremities.  Patient moving all extremities.  There is some bruising noted to the right elbow and both knees with some superficial abrasions.  Small linear bruise noted to the left posterior thorax.  No crepitus or midline stepoff.  Neuro: Awake, responds to his name and follows commands, speech is slurred and garbled, GCS 14.    Skin: Flesh tone, warm, dry.     Procedures           ED Course  ED Course as of 25 1432   Tue Krzysztof 10, 2025   2129 Assumed care of this patient from GEORGE Morales [DT]    Admitted to Dr. Richardson.  Pacemaker interrogation requested and ordered while still in ED. [DT]      ED Course User Index  [DT] Elaine Carrillo, APRN      Labs Reviewed   COMPREHENSIVE METABOLIC PANEL - Abnormal; Notable for the following components:       Result Value    BUN 33.0 (*)      AST (SGOT) 77 (*)     Total Bilirubin 1.5 (*)     BUN/Creatinine Ratio 28.7 (*)     Anion Gap 18.9 (*)     All other components within normal limits    Narrative:     GFR Categories in Chronic Kidney Disease (CKD)              GFR Category          GFR (mL/min/1.73)    Interpretation  G1                    90 or greater        Normal or high (1)  G2                    60-89                Mild decrease (1)  G3a                   45-59                Mild to moderate decrease  G3b                   30-44                Moderate to severe decrease  G4                    15-29                Severe decrease  G5                    14 or less           Kidney failure    (1)In the absence of evidence of kidney disease, neither GFR category G1 or G2 fulfill the criteria for CKD.    eGFR calculation 2021 CKD-EPI creatinine equation, which does not include race as a factor   URINALYSIS W/ CULTURE IF INDICATED - Abnormal; Notable for the following components:    Ketones, UA 15 mg/dL (1+) (*)     Protein, UA 30 mg/dL (1+) (*)     All other components within normal limits    Narrative:     In absence of clinical symptoms, the presence of pyuria, bacteria, and/or nitrites on the urinalysis result does not correlate with infection.   TROPONIN - Abnormal; Notable for the following components:    HS Troponin T 35 (*)     All other components within normal limits    Narrative:     High Sensitive Troponin T Reference Range:  <14.0 ng/L- Negative Female for AMI  <22.0 ng/L- Negative Male for AMI  >=14 - Abnormal Female indicating possible myocardial injury.  >=22 - Abnormal Male indicating possible myocardial injury.   Clinicians would have to utilize clinical acumen, EKG, Troponin, and serial changes to determine if it is an Acute Myocardial Infarction or myocardial injury due to an underlying chronic condition.        CK - Abnormal; Notable for the following components:    Creatine Kinase 2,133 (*)     All other components within  normal limits   MAGNESIUM - Abnormal; Notable for the following components:    Magnesium 2.5 (*)     All other components within normal limits   CBC WITH AUTO DIFFERENTIAL - Abnormal; Notable for the following components:    WBC 14.74 (*)     MCV 98.0 (*)     Neutrophil % 78.9 (*)     Lymphocyte % 11.5 (*)     Eosinophil % 0.0 (*)     Neutrophils, Absolute 11.65 (*)     Monocytes, Absolute 1.32 (*)     Immature Grans, Absolute 0.07 (*)     All other components within normal limits   HIGH SENSITIVITIY TROPONIN T 1HR - Abnormal; Notable for the following components:    HS Troponin T 32 (*)     All other components within normal limits    Narrative:     High Sensitive Troponin T Reference Range:  <14.0 ng/L- Negative Female for AMI  <22.0 ng/L- Negative Male for AMI  >=14 - Abnormal Female indicating possible myocardial injury.  >=22 - Abnormal Male indicating possible myocardial injury.   Clinicians would have to utilize clinical acumen, EKG, Troponin, and serial changes to determine if it is an Acute Myocardial Infarction or myocardial injury due to an underlying chronic condition.        COMPREHENSIVE METABOLIC PANEL - Abnormal; Notable for the following components:    Glucose 103 (*)     BUN 37.0 (*)     CO2 20.3 (*)     AST (SGOT) 62 (*)     BUN/Creatinine Ratio 34.6 (*)     Anion Gap 16.7 (*)     All other components within normal limits    Narrative:     GFR Categories in Chronic Kidney Disease (CKD)              GFR Category          GFR (mL/min/1.73)    Interpretation  G1                    90 or greater        Normal or high (1)  G2                    60-89                Mild decrease (1)  G3a                   45-59                Mild to moderate decrease  G3b                   30-44                Moderate to severe decrease  G4                    15-29                Severe decrease  G5                    14 or less           Kidney failure    (1)In the absence of evidence of kidney disease, neither GFR  category G1 or G2 fulfill the criteria for CKD.    eGFR calculation 2021 CKD-EPI creatinine equation, which does not include race as a factor   MAGNESIUM - Abnormal; Notable for the following components:    Magnesium 2.5 (*)     All other components within normal limits   CBC WITH AUTO DIFFERENTIAL - Abnormal; Notable for the following components:    WBC 12.16 (*)     MCV 98.1 (*)     Platelets 131 (*)     Lymphocyte % 16.9 (*)     Eosinophil % 0.0 (*)     Neutrophils, Absolute 8.86 (*)     Monocytes, Absolute 1.18 (*)     All other components within normal limits   HEMOGLOBIN A1C - Abnormal; Notable for the following components:    Hemoglobin A1C 5.76 (*)     All other components within normal limits    Narrative:     Hemoglobin A1C Ranges:    Increased Risk for Diabetes  5.7% to 6.4%  Diabetes                     >= 6.5%  Diabetic Goal                < 7.0%   RESPIRATORY PANEL PCR W/ COVID-19 (SARS-COV-2), NP SWAB IN UTM/VTP, 2 HR TAT - Normal    Narrative:     In the setting of a positive respiratory panel with a viral infection PLUS a negative procalcitonin without other underlying concern for bacterial infection, consider observing off antibiotics or discontinuation of antibiotics and continue supportive care. If the respiratory panel is positive for atypical bacterial infection (Bordetella pertussis, Chlamydophila pneumoniae, or Mycoplasma pneumoniae), consider antibiotic de-escalation to target atypical bacterial infection.   TSH RFX ON ABNORMAL TO FREE T4 - Normal   PROCALCITONIN - Normal    Narrative:     As a Marker for Sepsis (Non-Neonates):    1. <0.5 ng/mL represents a low risk of severe sepsis and/or septic shock.  2. >2 ng/mL represents a high risk of severe sepsis and/or septic shock.    As a Marker for Lower Respiratory Tract Infections that require antibiotic therapy:    PCT on Admission    Antibiotic Therapy       6-12 Hrs later    >0.5                Strongly Recommended  >0.25 - <0.5         "Recommended   0.1 - 0.25          Discouraged              Remeasure/reassess PCT  <0.1                Strongly Discouraged     Remeasure/reassess PCT    As 28 day mortality risk marker: \"Change in Procalcitonin Result\" (>80% or <=80%) if Day 0 (or Day 1) and Day 4 values are available. Refer to http://www.[employer])[facility] [email or web address]-[email or web address]    Change in PCT <=80%  A decrease of PCT levels below or equal to 80% defines a positive change in PCT test result representing a higher risk for 28-day all-cause mortality of patients diagnosed with severe sepsis for septic shock.    Change in PCT >80%  A decrease of PCT levels of more than 80% defines a negative change in PCT result representing a lower risk for 28-day all-cause mortality of patients diagnosed with severe sepsis or septic shock.      T4, FREE - Normal   TSH - Normal   PHOSPHORUS - Normal   AMMONIA - Normal   VITAMIN B12 - Normal    Narrative:     Results may be falsely increased if patient taking Biotin.     POC LACTATE - Normal   BLOOD CULTURE   BLOOD CULTURE   LEGIONELLA ANTIGEN, URINE   STREP PNEUMO AG, URINE OR CSF   URINALYSIS, MICROSCOPIC ONLY   LIPID PANEL    Narrative:     Cholesterol Reference Ranges  (U.S. Department of Health and Human Services ATP III Classifications)    Desirable          <200 mg/dL  Borderline High    200-239 mg/dL  High Risk          >240 mg/dL      Triglyceride Reference Ranges  (U.S. Department of Health and Human Services ATP III Classifications)    Normal           <150 mg/dL  Borderline High  150-199 mg/dL  High             200-499 mg/dL  Very High        >500 mg/dL    HDL Reference Ranges  (U.S. Department of Health and Human Services ATP III Classifications)    Low     <40 mg/dl (major risk factor for CHD)  High    >60 mg/dl ('negative' risk factor for CHD)        LDL Reference Ranges  (U.S. Department of Health and Human Services ATP III Classifications)    Optimal          <100 mg/dL  Near Optimal     100-129 mg/dL  Borderline " High  130-159 mg/dL  High             160-189 mg/dL  Very High        >189 mg/dL    LDL is calculated using the NIH LDL-C calculation.     POC LACTATE   POCT GLUCOSE FINGERSTICK   POCT GLUCOSE FINGERSTICK   POCT GLUCOSE FINGERSTICK   POCT GLUCOSE FINGERSTICK   POCT GLUCOSE FINGERSTICK   CBC AND DIFFERENTIAL    Narrative:     The following orders were created for panel order CBC & Differential.  Procedure                               Abnormality         Status                     ---------                               -----------         ------                     CBC Auto Differential[[id number]]        Abnormal            Final result                 Please view results for these tests on the individual orders.   EXTRA TUBES    Narrative:     The following orders were created for panel order Extra Tubes.  Procedure                               Abnormality         Status                     ---------                               -----------         ------                     Light Blue Top[[id number]]                                   Final result                 Please view results for these tests on the individual orders.   LIGHT BLUE TOP   CBC AND DIFFERENTIAL    Narrative:     The following orders were created for panel order CBC & Differential.  Procedure                               Abnormality         Status                     ---------                               -----------         ------                     CBC Auto Differential[[id number]]        Abnormal            Final result                 Please view results for these tests on the individual orders.     CT Head Without Contrast  Result Date: 6/10/2025  Impression: HEAD CT: 1.No acute intracranial abnormality. 2.There is a 2.5 cm hypodensity in the posterior left temporal lobe which does not appear acute but is new since 2023. This is likely a subacute or chronic infarct. There is generalized parenchymal volume loss and chronic small vessel  ischemic disease. CT C-SPINE: 1.No acute fracture or malalignment. 2.There is multilevel degenerative disc disease and facet arthropathy as described above. 3.There is evidence of previous left carotid surgery. The overall appearance of the cervical spine is unchanged since 2019. CT T-SPINE: 1.No acute fracture or malalignment. 2.There is multilevel degenerative disc disease with progression of degenerative changes at the T9-10 level. 3.There is marked cardiomegaly and a partially visualized pacemaker/AICD. 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 There is emphysema with numerous areas of scarring in the visualized lung fields. CT L-SPINE: 1.No acute fracture or malalignment. 2.There is extensive multilevel degenerative disc disease and facet arthropathy with postsurgical changes as described above. 3.There is a 5.1 cm fusiform infrarenal abdominal aortic aneurysm which has increased in size since 2021. 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 Electronically Signed: Sanjiv Roberts DO  6/10/2025 9:06 PM EDT  Workstation ID: [id number]    CT Cervical Spine Without Contrast  Result Date: 6/10/2025  Impression: HEAD CT: 1.No acute intracranial abnormality. 2.There is a 2.5 cm hypodensity in the posterior left temporal lobe which does not appear acute but is new since 2023. This is likely a subacute or chronic infarct. There is generalized parenchymal volume loss and chronic small vessel ischemic disease. CT C-SPINE: 1.No acute fracture or malalignment. 2.There is multilevel degenerative disc disease and facet arthropathy as described above. 3.There is evidence of previous left carotid surgery. The overall appearance of the cervical spine is unchanged since 2019. CT T-SPINE: 1.No acute fracture or malalignment. 2.There is multilevel degenerative disc disease with progression of degenerative changes at the T9-10 level. 3.There is marked cardiomegaly and a  partially visualized pacemaker/AICD. 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 There is emphysema with numerous areas of scarring in the visualized lung fields. CT L-SPINE: 1.No acute fracture or malalignment. 2.There is extensive multilevel degenerative disc disease and facet arthropathy with postsurgical changes as described above. 3.There is a 5.1 cm fusiform infrarenal abdominal aortic aneurysm which has increased in size since 2021. 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 Electronically Signed: Sanjiv Roberts DO  6/10/2025 9:06 PM EDT  Workstation ID: [id number]    CT Thoracic Spine Without Contrast  Result Date: 6/10/2025  Impression: HEAD CT: 1.No acute intracranial abnormality. 2.There is a 2.5 cm hypodensity in the posterior left temporal lobe which does not appear acute but is new since 2023. This is likely a subacute or chronic infarct. There is generalized parenchymal volume loss and chronic small vessel ischemic disease. CT C-SPINE: 1.No acute fracture or malalignment. 2.There is multilevel degenerative disc disease and facet arthropathy as described above. 3.There is evidence of previous left carotid surgery. The overall appearance of the cervical spine is unchanged since 2019. CT T-SPINE: 1.No acute fracture or malalignment. 2.There is multilevel degenerative disc disease with progression of degenerative changes at the T9-10 level. 3.There is marked cardiomegaly and a partially visualized pacemaker/AICD. 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 There is emphysema with numerous areas of scarring in the visualized lung fields. CT L-SPINE: 1.No acute fracture or malalignment. 2.There is extensive multilevel degenerative disc disease and facet arthropathy with postsurgical changes as described above. 3.There is a 5.1 cm fusiform infrarenal abdominal aortic aneurysm which has increased in size since 2021. 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 Electronically Signed: Sanjiv Roberts DO  6/10/2025 9:06 PM EDT  Workstation ID: [id number]    CT Lumbar Spine Without Contrast  Result Date: 6/10/2025  Impression: HEAD CT: 1.No acute intracranial abnormality. 2.There is a 2.5 cm hypodensity in the posterior left temporal lobe which does not appear acute but is new since 2023. This is likely a subacute or chronic infarct. There is generalized parenchymal volume loss and chronic small vessel ischemic disease. CT C-SPINE: 1.No acute fracture or malalignment. 2.There is multilevel degenerative disc disease and facet arthropathy as described above. 3.There is evidence of previous left carotid surgery. The overall appearance of the cervical spine is unchanged since 2019. CT T-SPINE: 1.No acute fracture or malalignment. 2.There is multilevel degenerative disc disease with progression of degenerative changes at the T9-10 level. 3.There is marked cardiomegaly and a partially visualized pacemaker/AICD. 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 There is emphysema with numerous areas of scarring in the visualized lung fields. CT L-SPINE: 1.No acute fracture or malalignment. 2.There is extensive multilevel degenerative disc disease and facet arthropathy with postsurgical changes as described above. 3.There is a 5.1 cm fusiform infrarenal abdominal aortic aneurysm which has increased in size since 2021. 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 Electronically Signed: Sanjiv Roberts DO  6/10/2025 9:06 PM EDT  Workstation ID: [id number]    XR Elbow 3+ View Right  Result Date: 6/10/2025  Impression: Superficial soft tissue swelling posteriorly which can be seen with olecranon bursitis or cellulitis or contusion in the appropriate clinical setting. There are mild degenerative changes. No acute bony abnormality. No joint effusion is  demonstrated. Electronically Signed: Sanjiv Roberts, DO  6/10/2025 8:11 PM EDT  Workstation ID: [id number]    XR Chest 1 View  Result Date: 6/10/2025  Impression: Patchy and interstitial opacity in the periphery of the right mid lung and right basilar density which has evolved since 12/29/2020 and could be related to acute and/or chronic infection or inflammatory change.. This is superimposed on areas of chronic scarring and postsurgical change. 2. Cardiomegaly and AICD are unchanged. Electronically Signed: Sanjiv Roberts,   6/10/2025 8:09 PM EDT  Workstation ID: [id number]    Medications   sodium chloride 0.9 % flush 10 mL (has no administration in time range)   sodium chloride 0.9 % flush 10 mL (10 mL Intravenous Not Given 6/11/25 1211)   sodium chloride 0.9 % flush 10 mL (has no administration in time range)   sodium chloride 0.9 % infusion 40 mL (has no administration in time range)   nitroglycerin (NITROSTAT) SL tablet 0.4 mg (has no administration in time range)   Potassium Replacement - Follow Nurse / BPA Driven Protocol (has no administration in time range)   Magnesium Standard Dose Replacement - Follow Nurse / BPA Driven Protocol (has no administration in time range)   Phosphorus Replacement - Follow Nurse / BPA Driven Protocol (has no administration in time range)   Calcium Replacement - Follow Nurse / BPA Driven Protocol (has no administration in time range)   ipratropium (ATROVENT) nebulizer solution 0.5 mg (0.5 mg Nebulization Not Given 6/11/25 1120)   budesonide (PULMICORT) nebulizer solution 0.5 mg (0.5 mg Nebulization Not Given 6/11/25 0216)   pantoprazole (PROTONIX) EC tablet 40 mg (40 mg Oral Not Given 6/11/25 1211)   atorvastatin (LIPITOR) tablet 10 mg (10 mg Oral Not Given 6/11/25 1210)   cefTRIAXone (ROCEPHIN) 2,000 mg in sodium chloride 0.9 % 100 mL MBP (has no administration in time range)   lactated ringers infusion (100 mL/hr Intravenous New Bag 6/11/25 0238)   aspirin EC tablet 81 mg (81 mg  Oral Not Given 6/11/25 1210)   ferrous sulfate tablet 325 mg (325 mg Oral Not Given 6/11/25 1211)   latanoprost (XALATAN) 0.005 % ophthalmic solution 1 drop (has no administration in time range)   midodrine (PROAMATINE) tablet 5 mg ( Oral Dose Auto Held 6/19/25 1730)   vitamin B-12 (CYANOCOBALAMIN) tablet 1,000 mcg (1,000 mcg Oral Not Given 6/11/25 1211)   rivaroxaban (XARELTO) tablet 15 mg ( Oral Unheld by provider 6/11/25 0910)   sodium chloride 0.9 % flush 10 mL (10 mL Intravenous Not Given 6/11/25 1227)   sodium chloride 0.9 % flush 10 mL (has no administration in time range)   sodium chloride 0.9 % infusion 40 mL (has no administration in time range)   sodium chloride 0.9 % bolus 500 mL (0 mL Intravenous Stopped 6/10/25 1957)   cefTRIAXone (ROCEPHIN) 1,000 mg in sodium chloride 0.9 % 100 mL MBP (0 mg Intravenous Stopped 6/10/25 2156)   doxycycline (VIBRAMYCIN) 100 mg in sodium chloride 0.9 % 100 mL MBP (100 mg Intravenous New Bag 6/10/25 2146)   barium sulfate oral suspension 50 mL (50 mL Oral Given 6/11/25 1125)   barium sulfate (E-Z-PAQUE) 96 % oral suspension reconstituted suspension 183 mL (183 mL Oral Given 6/11/25 1125)                               Total (NIH Stroke Scale): 2                      Medical Decision Making  Patient presents today with the above complaint.    And above exam and evaluation.  He was placed on continuous cardiac monitor.  IV was established.  Labs EKG x-ray and CTs were obtained.  He was given a small fluid bolus.    Workup: EKG was independently interpreted by Dr. Willis and reviewed by myself shows A-fib flutter and ventricular paced rhythm with a rate of 70.  CBC is significant for WBC of 14.7, 78% neutrophils, no bands.  Metabolic panel significant for BUN of 33, AST 77, total bili 1.5.  Initial troponin 35.  Magnesium 2.5.  TSH within normal limits.  Total CK 2133.  Urinalysis shows 15 ketones and 30 protein, no evidence of infection.  Chest x-ray interpreted by the  radiologist shows patchy and interstitial opacity in the periphery of the right midlung and right basilar density which is equal since 2020 and could be related to acute and/or chronic infection or inflammation.  This is superimposed on areas of chronic scarring and postsurgical change.  Cardiomegaly and AICD are unchanged.  X-ray of the right elbow shows superficial soft tissue swelling posteriorly.  Mild degenerative changes.  No acute bony abnormality.  No joint effusion.  CT of the head shows no acute intracranial abnormality.  There is a 2.5 cm hypodense area in the posterior left temporal lobe which does not appear acute but is new since 2023 and is likely a subacute or chronic infarct.  CT of the C-spine thoracic spine and lumbar spine are all without acute abnormality.    Patient was given dose of IV antibiotics to cover for possible pneumonia found on chest x-ray today given his elevated white count, though procalcitonin is within normal limits.  Blood cultures were obtained as well as a POC lactate that was found to be within normal limits.  Viral respiratory panel is pending.    On reexamination patient is sleeping but arouses to loud verbal stimulation.  He remains hemodynamically stable.    He will be admitted to the hospital for further management.      Problems Addressed:  Altered mental status, unspecified altered mental status type: complicated acute illness or injury  Traumatic rhabdomyolysis, initial encounter: complicated acute illness or injury    Amount and/or Complexity of Data Reviewed  Labs: ordered.  Radiology: ordered.  ECG/medicine tests: ordered.    Risk  Prescription drug management.  Decision regarding hospitalization.        Final diagnoses:   Altered mental status, unspecified altered mental status type   Traumatic rhabdomyolysis, initial encounter   Pneumonia of right lung due to infectious organism, unspecified part of lung       ED Disposition  ED Disposition       ED Disposition    Decision to Admit    Condition   --    Comment   Level of Care: Telemetry [5]   Admitting Physician: JOSE F MARQUEZ [[id number]]   Attending Physician: JOSE F MARQUEZ [[id number]]                 No follow-up provider specified.       Medication List        ASK your doctor about these medications      midodrine 5 MG tablet  Commonly known as: PROAMATINE  Ask about: Which instructions should I use?                 Monalisa Lucas, TERRANCE  06/11/25 [id number]

## 2025-06-10 NOTE — Clinical Note
Level of Care: Med/Surg [1]   Diagnosis: Acute encephalopathy [330530]   Admitting Physician: JOSE F MARQUEZ [[id number]]   Certification: I Certify That Inpatient Hospital Services Are Medically Necessary For Greater Than 2 Midnights

## 2025-06-10 NOTE — LETTER
EMS Transport Request  For use at [facility], [place], [place], [place], and [place] only   Patient Name: Shukri Park : 1940   Weight:67.4 kg (148 lb 9.4 oz) Pick-up Location: [facility] BLS/ALS: BLS/ALS: ALS   Insurance: [employer] MED[employer]ARE REPLACEMENT Auth End Date: 25   Pre-Cert #: D/C Summary complete:    Destination: Other [address]   Contact Precautions: None   Equipment (O2, Fluids, etc.): IV Fluids, details Levophed/Heparin drips and O2, settings 15L NC   Arrive By Date/Time: 25 Stretcher/WC: Stretcher   SHARON Requesting: Beverly Salinas RN Ext: [phone]   Notes/Medical Necessity: DNR/DNI - AMBULANCE - STRETCHER - WILL CALL- LEVOPHED/HEPARIN GTT, 15L NC     ______________________________________________________________________    *Only 2 patient bags OR 1 carry-on size bag are permitted.  Wheelchairs and walkers CANNOT transported with the patient. Acknowledge: Yes

## 2025-06-10 NOTE — ED NOTES
Pt arrived via EMS after family called wellness check. Pt was last seen by family on Sunday after confusion, dizziness, weakness, left vision change after being at grocery. Family reports that pt refused any medical care Sunday night and would not answer their calls since. Pt currently with garbled speech/disoriented x4. Pt was soiled with stool on arrival. Bed bath performed and placed in clean bedding/gown. Pt high falls risk. Placed on bed alarm, non-skid socks, yellow bracelet, gait belt available, fall sign on door.

## 2025-06-11 ENCOUNTER — APPOINTMENT (OUTPATIENT)
Dept: MRI IMAGING | Facility: HOSPITAL | Age: 85
DRG: 065 | End: 2025-06-11
Payer: MEDICARE

## 2025-06-11 ENCOUNTER — APPOINTMENT (OUTPATIENT)
Dept: CARDIOLOGY | Facility: HOSPITAL | Age: 85
DRG: 065 | End: 2025-06-11
Payer: MEDICARE

## 2025-06-11 ENCOUNTER — APPOINTMENT (OUTPATIENT)
Dept: GENERAL RADIOLOGY | Facility: HOSPITAL | Age: 85
DRG: 065 | End: 2025-06-11
Payer: MEDICARE

## 2025-06-11 LAB
ALBUMIN SERPL-MCNC: 4.3 G/DL (ref 3.5–5.2)
ALBUMIN/GLOB SERPL: 1.7 G/DL
ALP SERPL-CCNC: 71 U/L (ref 39–117)
ALT SERPL W P-5'-P-CCNC: 26 U/L (ref 1–41)
AMMONIA BLD-SCNC: 16 UMOL/L (ref 16–60)
ANION GAP SERPL CALCULATED.3IONS-SCNC: 16.7 MMOL/L (ref 5–15)
AORTIC DIMENSIONLESS INDEX: 0.73 (DI)
ASCENDING AORTA: 4.1 CM
AST SERPL-CCNC: 62 U/L (ref 1–40)
AV MEAN PRESS GRAD SYS DOP V1V2: 1.78 MMHG
AV VMAX SYS DOP: 88.2 CM/SEC
BASOPHILS # BLD AUTO: 0.02 10*3/MM3 (ref 0–0.2)
BASOPHILS NFR BLD AUTO: 0.2 % (ref 0–1.5)
BH CV ECHO MEAS - ACS: 1.88 CM
BH CV ECHO MEAS - AO MAX PG: 3.1 MMHG
BH CV ECHO MEAS - AO ROOT DIAM: 4.1 CM
BH CV ECHO MEAS - AO V2 VTI: 13 CM
BH CV ECHO MEAS - AVA(I,D): 2.9 CM2
BH CV ECHO MEAS - EDV(CUBED): 96.9 ML
BH CV ECHO MEAS - EDV(MOD-SP4): 82.6 ML
BH CV ECHO MEAS - EF(MOD-SP4): 54.7 %
BH CV ECHO MEAS - ESV(CUBED): 32 ML
BH CV ECHO MEAS - ESV(MOD-SP4): 37.4 ML
BH CV ECHO MEAS - FS: 30.9 %
BH CV ECHO MEAS - IVS/LVPW: 0.99 CM
BH CV ECHO MEAS - IVSD: 1.12 CM
BH CV ECHO MEAS - LA DIMENSION: 4.8 CM
BH CV ECHO MEAS - LAT PEAK E' VEL: 20.9 CM/SEC
BH CV ECHO MEAS - LV DIASTOLIC VOL/BSA (35-75): 48.7 CM2
BH CV ECHO MEAS - LV MASS(C)D: 187.7 GRAMS
BH CV ECHO MEAS - LV MAX PG: 0.87 MMHG
BH CV ECHO MEAS - LV MEAN PG: 0.53 MMHG
BH CV ECHO MEAS - LV SYSTOLIC VOL/BSA (12-30): 22 CM2
BH CV ECHO MEAS - LV V1 MAX: 46.6 CM/SEC
BH CV ECHO MEAS - LV V1 VTI: 9.4 CM
BH CV ECHO MEAS - LVIDD: 4.6 CM
BH CV ECHO MEAS - LVIDS: 3.2 CM
BH CV ECHO MEAS - LVOT AREA: 4 CM2
BH CV ECHO MEAS - LVOT DIAM: 2.26 CM
BH CV ECHO MEAS - LVPWD: 1.14 CM
BH CV ECHO MEAS - MED PEAK E' VEL: 9.9 CM/SEC
BH CV ECHO MEAS - MR MAX PG: 114.9 MMHG
BH CV ECHO MEAS - MR MAX VEL: 535.9 CM/SEC
BH CV ECHO MEAS - MR MEAN PG: 76.2 MMHG
BH CV ECHO MEAS - MR MEAN VEL: 415.2 CM/SEC
BH CV ECHO MEAS - MR VTI: 162.3 CM
BH CV ECHO MEAS - MV A MAX VEL: 22.5 CM/SEC
BH CV ECHO MEAS - MV DEC SLOPE: 497.4 CM/SEC2
BH CV ECHO MEAS - MV DEC TIME: 0.13 SEC
BH CV ECHO MEAS - MV E MAX VEL: 82.5 CM/SEC
BH CV ECHO MEAS - MV E/A: 3.7
BH CV ECHO MEAS - MV MAX PG: 3.6 MMHG
BH CV ECHO MEAS - MV MEAN PG: 1.29 MMHG
BH CV ECHO MEAS - MV P1/2T: 63.5 MSEC
BH CV ECHO MEAS - MV V2 VTI: 22.2 CM
BH CV ECHO MEAS - MVA(P1/2T): 3.5 CM2
BH CV ECHO MEAS - MVA(VTI): 1.7 CM2
BH CV ECHO MEAS - PA ACC TIME: 0.07 SEC
BH CV ECHO MEAS - PA V2 MAX: 77.3 CM/SEC
BH CV ECHO MEAS - QP/QS: 0.96
BH CV ECHO MEAS - RAP SYSTOLE: 3 MMHG
BH CV ECHO MEAS - RV MAX PG: 1.27 MMHG
BH CV ECHO MEAS - RV V1 MAX: 56.3 CM/SEC
BH CV ECHO MEAS - RV V1 VTI: 8.7 CM
BH CV ECHO MEAS - RVOT DIAM: 2.3 CM
BH CV ECHO MEAS - RVSP: 43 MMHG
BH CV ECHO MEAS - SV(LVOT): 37.7 ML
BH CV ECHO MEAS - SV(MOD-SP4): 45.2 ML
BH CV ECHO MEAS - SV(RVOT): 36.2 ML
BH CV ECHO MEAS - SVI(LVOT): 22.2 ML/M2
BH CV ECHO MEAS - SVI(MOD-SP4): 26.6 ML/M2
BH CV ECHO MEAS - TR MAX PG: 39.7 MMHG
BH CV ECHO MEAS - TR MAX VEL: 315.1 CM/SEC
BH CV ECHO MEASUREMENTS AVERAGE E/E' RATIO: 5.36
BH CV XLRA - TDI S': 8.9 CM/SEC
BILIRUB SERPL-MCNC: 1 MG/DL (ref 0–1.2)
BUN SERPL-MCNC: 37 MG/DL (ref 8–23)
BUN/CREAT SERPL: 34.6 (ref 7–25)
CALCIUM SPEC-SCNC: 9.4 MG/DL (ref 8.6–10.5)
CHLORIDE SERPL-SCNC: 107 MMOL/L (ref 98–107)
CHOLEST SERPL-MCNC: 153 MG/DL (ref 0–200)
CO2 SERPL-SCNC: 20.3 MMOL/L (ref 22–29)
CREAT SERPL-MCNC: 1.07 MG/DL (ref 0.76–1.27)
DEPRECATED RDW RBC AUTO: 47.8 FL (ref 37–54)
EGFRCR SERPLBLD CKD-EPI 2021: 68 ML/MIN/1.73
EOSINOPHIL # BLD AUTO: 0 10*3/MM3 (ref 0–0.4)
EOSINOPHIL NFR BLD AUTO: 0 % (ref 0.3–6.2)
ERYTHROCYTE [DISTWIDTH] IN BLOOD BY AUTOMATED COUNT: 13.4 % (ref 12.3–15.4)
GLOBULIN UR ELPH-MCNC: 2.6 GM/DL
GLUCOSE BLDC GLUCOMTR-MCNC: 100 MG/DL (ref 70–105)
GLUCOSE BLDC GLUCOMTR-MCNC: 93 MG/DL (ref 70–105)
GLUCOSE BLDC GLUCOMTR-MCNC: 97 MG/DL (ref 70–105)
GLUCOSE SERPL-MCNC: 103 MG/DL (ref 65–99)
HBA1C MFR BLD: 5.76 % (ref 4.8–5.6)
HCT VFR BLD AUTO: 42.1 % (ref 37.5–51)
HDLC SERPL-MCNC: 40 MG/DL (ref 40–60)
HGB BLD-MCNC: 13.4 G/DL (ref 13–17.7)
IMM GRANULOCYTES # BLD AUTO: 0.05 10*3/MM3 (ref 0–0.05)
IMM GRANULOCYTES NFR BLD AUTO: 0.4 % (ref 0–0.5)
L PNEUMO1 AG UR QL IA: NEGATIVE
LDLC SERPL CALC-MCNC: 92 MG/DL (ref 0–100)
LDLC/HDLC SERPL: 2.25 {RATIO}
LEFT ATRIUM VOLUME INDEX: 28.1 ML/M2
LV EF BIPLANE MOD: 55 %
LYMPHOCYTES # BLD AUTO: 2.05 10*3/MM3 (ref 0.7–3.1)
LYMPHOCYTES NFR BLD AUTO: 16.9 % (ref 19.6–45.3)
MAGNESIUM SERPL-MCNC: 2.5 MG/DL (ref 1.6–2.4)
MCH RBC QN AUTO: 31.2 PG (ref 26.6–33)
MCHC RBC AUTO-ENTMCNC: 31.8 G/DL (ref 31.5–35.7)
MCV RBC AUTO: 98.1 FL (ref 79–97)
MONOCYTES # BLD AUTO: 1.18 10*3/MM3 (ref 0.1–0.9)
MONOCYTES NFR BLD AUTO: 9.7 % (ref 5–12)
NEUTROPHILS NFR BLD AUTO: 72.8 % (ref 42.7–76)
NEUTROPHILS NFR BLD AUTO: 8.86 10*3/MM3 (ref 1.7–7)
NRBC BLD AUTO-RTO: 0 /100 WBC (ref 0–0.2)
PHOSPHATE SERPL-MCNC: 4.2 MG/DL (ref 2.5–4.5)
PLATELET # BLD AUTO: 131 10*3/MM3 (ref 140–450)
PMV BLD AUTO: 10.4 FL (ref 6–12)
POTASSIUM SERPL-SCNC: 4.1 MMOL/L (ref 3.5–5.2)
PROT SERPL-MCNC: 6.9 G/DL (ref 6–8.5)
QT INTERVAL: 455 MS
QTC INTERVAL: 492 MS
RBC # BLD AUTO: 4.29 10*6/MM3 (ref 4.14–5.8)
S PNEUM AG SPEC QL LA: NEGATIVE
SINUS: 4.1 CM
SODIUM SERPL-SCNC: 144 MMOL/L (ref 136–145)
STJ: 3.6 CM
T4 FREE SERPL-MCNC: 1.07 NG/DL (ref 0.92–1.68)
TRIGL SERPL-MCNC: 116 MG/DL (ref 0–150)
TSH SERPL DL<=0.05 MIU/L-ACNC: 0.76 UIU/ML (ref 0.27–4.2)
VIT B12 BLD-MCNC: 805 PG/ML (ref 211–946)
VLDLC SERPL-MCNC: 21 MG/DL (ref 5–40)
WBC NRBC COR # BLD AUTO: 12.16 10*3/MM3 (ref 3.4–10.8)

## 2025-06-11 PROCEDURE — 25010000002 GADOTERIDOL PER 1 ML: Performed by: STUDENT IN AN ORGANIZED HEALTH CARE EDUCATION/TRAINING PROGRAM

## 2025-06-11 PROCEDURE — 84100 ASSAY OF PHOSPHORUS: CPT | Performed by: STUDENT IN AN ORGANIZED HEALTH CARE EDUCATION/TRAINING PROGRAM

## 2025-06-11 PROCEDURE — 83036 HEMOGLOBIN GLYCOSYLATED A1C: CPT | Performed by: STUDENT IN AN ORGANIZED HEALTH CARE EDUCATION/TRAINING PROGRAM

## 2025-06-11 PROCEDURE — 76018 MR SAFETY IMPLANT ELEC PREPJ: CPT

## 2025-06-11 PROCEDURE — 83735 ASSAY OF MAGNESIUM: CPT | Performed by: STUDENT IN AN ORGANIZED HEALTH CARE EDUCATION/TRAINING PROGRAM

## 2025-06-11 PROCEDURE — 93306 TTE W/DOPPLER COMPLETE: CPT

## 2025-06-11 PROCEDURE — 93306 TTE W/DOPPLER COMPLETE: CPT | Performed by: INTERNAL MEDICINE

## 2025-06-11 PROCEDURE — 74230 X-RAY XM SWLNG FUNCJ C+: CPT

## 2025-06-11 PROCEDURE — 25810000003 LACTATED RINGERS SOLUTION: Performed by: STUDENT IN AN ORGANIZED HEALTH CARE EDUCATION/TRAINING PROGRAM

## 2025-06-11 PROCEDURE — 97162 PT EVAL MOD COMPLEX 30 MIN: CPT

## 2025-06-11 PROCEDURE — 85025 COMPLETE CBC W/AUTO DIFF WBC: CPT | Performed by: STUDENT IN AN ORGANIZED HEALTH CARE EDUCATION/TRAINING PROGRAM

## 2025-06-11 PROCEDURE — 92611 MOTION FLUOROSCOPY/SWALLOW: CPT

## 2025-06-11 PROCEDURE — 84145 PROCALCITONIN (PCT): CPT | Performed by: NURSE PRACTITIONER

## 2025-06-11 PROCEDURE — 97166 OT EVAL MOD COMPLEX 45 MIN: CPT

## 2025-06-11 PROCEDURE — 70553 MRI BRAIN STEM W/O & W/DYE: CPT

## 2025-06-11 PROCEDURE — 25810000003 LACTATED RINGERS PER 1000 ML: Performed by: STUDENT IN AN ORGANIZED HEALTH CARE EDUCATION/TRAINING PROGRAM

## 2025-06-11 PROCEDURE — 25010000002 AMPICILLIN-SULBACTAM PER 1.5 G: Performed by: STUDENT IN AN ORGANIZED HEALTH CARE EDUCATION/TRAINING PROGRAM

## 2025-06-11 PROCEDURE — 84439 ASSAY OF FREE THYROXINE: CPT | Performed by: STUDENT IN AN ORGANIZED HEALTH CARE EDUCATION/TRAINING PROGRAM

## 2025-06-11 PROCEDURE — 80061 LIPID PANEL: CPT | Performed by: STUDENT IN AN ORGANIZED HEALTH CARE EDUCATION/TRAINING PROGRAM

## 2025-06-11 PROCEDURE — A9579 GAD-BASE MR CONTRAST NOS,1ML: HCPCS | Performed by: STUDENT IN AN ORGANIZED HEALTH CARE EDUCATION/TRAINING PROGRAM

## 2025-06-11 PROCEDURE — 99223 1ST HOSP IP/OBS HIGH 75: CPT | Performed by: STUDENT IN AN ORGANIZED HEALTH CARE EDUCATION/TRAINING PROGRAM

## 2025-06-11 PROCEDURE — 82140 ASSAY OF AMMONIA: CPT | Performed by: STUDENT IN AN ORGANIZED HEALTH CARE EDUCATION/TRAINING PROGRAM

## 2025-06-11 PROCEDURE — 82948 REAGENT STRIP/BLOOD GLUCOSE: CPT

## 2025-06-11 PROCEDURE — 80053 COMPREHEN METABOLIC PANEL: CPT | Performed by: STUDENT IN AN ORGANIZED HEALTH CARE EDUCATION/TRAINING PROGRAM

## 2025-06-11 PROCEDURE — 82550 ASSAY OF CK (CPK): CPT | Performed by: STUDENT IN AN ORGANIZED HEALTH CARE EDUCATION/TRAINING PROGRAM

## 2025-06-11 PROCEDURE — 76014 MR SFTY IMPLT&/FB ASMT STF 1: CPT

## 2025-06-11 PROCEDURE — 82607 VITAMIN B-12: CPT | Performed by: STUDENT IN AN ORGANIZED HEALTH CARE EDUCATION/TRAINING PROGRAM

## 2025-06-11 PROCEDURE — 92610 EVALUATE SWALLOWING FUNCTION: CPT

## 2025-06-11 RX ORDER — MIDODRINE HYDROCHLORIDE 5 MG/1
5 TABLET ORAL
Status: DISCONTINUED | OUTPATIENT
Start: 2025-06-11 | End: 2025-06-13

## 2025-06-11 RX ORDER — ATORVASTATIN CALCIUM 10 MG/1
10 TABLET, FILM COATED ORAL DAILY
Status: DISCONTINUED | OUTPATIENT
Start: 2025-06-11 | End: 2025-06-13 | Stop reason: HOSPADM

## 2025-06-11 RX ORDER — LATANOPROST 50 UG/ML
1 SOLUTION/ DROPS OPHTHALMIC NIGHTLY
Status: DISCONTINUED | OUTPATIENT
Start: 2025-06-11 | End: 2025-06-13 | Stop reason: HOSPADM

## 2025-06-11 RX ORDER — ASPIRIN 81 MG/1
81 TABLET ORAL DAILY
Status: DISCONTINUED | OUTPATIENT
Start: 2025-06-11 | End: 2025-06-13 | Stop reason: HOSPADM

## 2025-06-11 RX ORDER — FERROUS SULFATE 325(65) MG
325 TABLET ORAL
Status: DISCONTINUED | OUTPATIENT
Start: 2025-06-11 | End: 2025-06-13 | Stop reason: HOSPADM

## 2025-06-11 RX ORDER — MULTIVITAMIN WITH IRON
1000 TABLET ORAL DAILY
Status: DISCONTINUED | OUTPATIENT
Start: 2025-06-11 | End: 2025-06-13 | Stop reason: HOSPADM

## 2025-06-11 RX ORDER — SODIUM CHLORIDE 0.9 % (FLUSH) 0.9 %
10 SYRINGE (ML) INJECTION AS NEEDED
Status: DISCONTINUED | OUTPATIENT
Start: 2025-06-11 | End: 2025-06-13 | Stop reason: HOSPADM

## 2025-06-11 RX ORDER — PANTOPRAZOLE SODIUM 40 MG/1
40 TABLET, DELAYED RELEASE ORAL DAILY
Status: DISCONTINUED | OUTPATIENT
Start: 2025-06-11 | End: 2025-06-13 | Stop reason: HOSPADM

## 2025-06-11 RX ORDER — SODIUM CHLORIDE, SODIUM LACTATE, POTASSIUM CHLORIDE, CALCIUM CHLORIDE 600; 310; 30; 20 MG/100ML; MG/100ML; MG/100ML; MG/100ML
100 INJECTION, SOLUTION INTRAVENOUS CONTINUOUS
Status: DISPENSED | OUTPATIENT
Start: 2025-06-11 | End: 2025-06-12

## 2025-06-11 RX ORDER — SODIUM CHLORIDE 9 MG/ML
40 INJECTION, SOLUTION INTRAVENOUS AS NEEDED
Status: DISCONTINUED | OUTPATIENT
Start: 2025-06-11 | End: 2025-06-13 | Stop reason: HOSPADM

## 2025-06-11 RX ORDER — SODIUM CHLORIDE 0.9 % (FLUSH) 0.9 %
10 SYRINGE (ML) INJECTION EVERY 12 HOURS SCHEDULED
Status: DISCONTINUED | OUTPATIENT
Start: 2025-06-11 | End: 2025-06-13 | Stop reason: HOSPADM

## 2025-06-11 RX ORDER — BUDESONIDE 0.5 MG/2ML
0.5 INHALANT ORAL
Status: DISCONTINUED | OUTPATIENT
Start: 2025-06-11 | End: 2025-06-13 | Stop reason: HOSPADM

## 2025-06-11 RX ADMIN — RIVAROXABAN 15 MG: 15 TABLET, FILM COATED ORAL at 17:07

## 2025-06-11 RX ADMIN — Medication 10 ML: at 21:24

## 2025-06-11 RX ADMIN — SODIUM CHLORIDE, POTASSIUM CHLORIDE, SODIUM LACTATE AND CALCIUM CHLORIDE 500 ML: 600; 310; 30; 20 INJECTION, SOLUTION INTRAVENOUS at 19:18

## 2025-06-11 RX ADMIN — GADOTERIDOL 10 ML: 279.3 INJECTION, SOLUTION INTRAVENOUS at 18:38

## 2025-06-11 RX ADMIN — AMPICILLIN SODIUM AND SULBACTAM SODIUM 1.5 G: 1; .5 INJECTION, POWDER, FOR SOLUTION INTRAMUSCULAR; INTRAVENOUS at 21:25

## 2025-06-11 RX ADMIN — LATANOPROST 1 DROP: 50 SOLUTION/ DROPS OPHTHALMIC at 21:24

## 2025-06-11 RX ADMIN — BARIUM SULFATE 50 ML: 400 SUSPENSION ORAL at 11:25

## 2025-06-11 RX ADMIN — Medication 10 ML: at 21:28

## 2025-06-11 RX ADMIN — SODIUM CHLORIDE, POTASSIUM CHLORIDE, SODIUM LACTATE AND CALCIUM CHLORIDE 100 ML/HR: 600; 310; 30; 20 INJECTION, SOLUTION INTRAVENOUS at 02:38

## 2025-06-11 RX ADMIN — BARIUM SULFATE 183 ML: 960 POWDER, FOR SUSPENSION ORAL at 11:25

## 2025-06-11 NOTE — PLAN OF CARE
Goal Outcome Evaluation:  Plan of Care Reviewed With: patient, family           Outcome Evaluation: Shukri Park is a 84 y.o. male with a CMH of CAD, HF-imp-EF with LVEF of 55 % With mild concentric hypertrophy per echo in 2023, Afib on AC, status post AICD, HTN, HLD,  who presented to [facility] on 6/10/25 with acute encephalopathy. On 6/8/2025, he experienced sudden left eye vision loss and appeared confused while at the grocery store; he was driven home by a store employee. Later that day, family noted he was agitated and asked them to leave; vision reportedly returned. On 6/9/2025, he was unresponsive to calls, prompting a wellness check on 6/10/25. He was found on the floor by a neighbor, soiled, confused, and irritable, and was brought to the hospital by EMS. Loss of consciousness is unclear. Per neurology, Incidental chronic appearing hypodensity in the left temporal lobe, concerning for a chronic stroke. However, the rounded nature of it and its location do raise the suspicion for a neoplastic process, potentially metastatic in nature. MRI pending. Pt is disoriented x4 and difficult to rouse, minimally coversational. Family present to assist with baseline. Pt lives alone in a patio home in a senior community. He is typically IND with ADLs, mobility, and drives. Today pt is SBA with increased time for effort for supine > sit. He is Татьяна for EOB sitting balance progressing to CGA. He is Татьяна of 2 to stand and ambulate ~2 ft to chair with RW. Once pt in chair he is much more awake and responsive. Pt demos fair standing balance and generalized weakness. Pt presenting below baseline at this time and would benefit from SNF upon d/c. PT will follow and progress as tolerated.    Anticipated Discharge Disposition (PT): skilled nursing facility

## 2025-06-11 NOTE — THERAPY EVALUATION
Acute Care - Speech Language Pathology   Swallow [facility]     Patient Name: Shukri Park  : 1940  MRN: [id number]  Today's Date: 2025               Admit Date: 6/10/2025    Visit Dx:     ICD-10-CM ICD-9-CM   1. Altered mental status, unspecified altered mental status type  R41.82 780.97   2. Traumatic rhabdomyolysis, initial encounter  T79.6XXA 958.6     Patient Active Problem List   Diagnosis    Chronic anticoagulation    Mixed hyperlipidemia    Presence of stent in right coronary artery    Chronic pain    Ischemic cardiomyopathy    Persistent atrial fibrillation    Acute on chronic systolic congestive heart failure    Coronary artery disease involving native coronary artery of native heart without angina pectoris    Disorder of sacroiliac joint    Lumbosacral radiculopathy    Mitral regurgitation    Neck pain    Nevus of choroid    Presbyopia    Vitamin D deficiency    History of lung cancer    Chronic hypotension    Gastritis    Presence of biventricular implantable cardioverter-defibrillator (ICD)    Inguinodynia, right    Acute encephalopathy     Past Medical History:   Diagnosis Date    A-fib     Aortic aneurysm     Appetite loss     Cancer     right lobe cancer - surgically removed     CHF (congestive heart failure)     Coronary artery disease     Dark stools     Foot pain, bilateral     GERD (gastroesophageal reflux disease)     Hyperlipidemia     Low back pain     S/P epidural steroid injection     Israel Gomes's - no relief    Weight loss     acute loss of weight 30 lbs     Past Surgical History:   Procedure Laterality Date    CARDIAC CATHETERIZATION N/A 2020    Procedure: LEFT HEART CATH with possible PCI;  Surgeon: Wade Cronin MD;  Location: [facility] CATH INVASIVE LOCATION;  Service: Cardiovascular;  Laterality: N/A;  Local and IV sedation    CARDIAC CATHETERIZATION N/A 2020    Procedure: Percutaneous Coronary Intervention;  Surgeon: Wade Cronin MD;   Location: [facility] CATH INVASIVE LOCATION;  Service: Cardiovascular;  Laterality: N/A;    CARDIAC DEFIBRILLATOR PLACEMENT      CARDIAC ELECTROPHYSIOLOGY PROCEDURE N/A 7/10/2020    Procedure: Biventricular Device Insertion;  Surgeon: Jonathan Denney MD;  Location: [facility] CATH INVASIVE LOCATION;  Service: Cardiovascular;  Laterality: N/A;    CARDIAC ELECTROPHYSIOLOGY PROCEDURE N/A 7/10/2020    Procedure: ABLATION AV NODE;  Surgeon: Jonathan Denney MD;  Location: [facility] CATH INVASIVE LOCATION;  Service: Cardiovascular;  Laterality: N/A;    CARDIAC ELECTROPHYSIOLOGY PROCEDURE N/A 7/10/2020    Procedure: AV node ablation;  Surgeon: Jonathan Denney MD;  Location: [facility] CATH INVASIVE LOCATION;  Service: Cardiovascular;  Laterality: N/A;    CARDIOVASCULAR STRESS TEST  2020    CATARACT EXTRACTION, BILATERAL      CONVERTED (HISTORICAL) HOLTER  2020    CORONARY ANGIOPLASTY WITH STENT PLACEMENT      ENDOSCOPY N/A 7/5/2020    Procedure: ESOPHAGOGASTRODUODENOSCOPY;  Surgeon: Neal Correa MD;  Location: [facility] ENDOSCOPY;  Service: Gastroenterology;  Laterality: N/A;    INGUINAL HERNIA REPAIR Right 1/7/2021    Procedure: INGUINAL HERNIA REPAIR OPEN WITH MESH;  Surgeon: Cody Randall MD;  Location: [facility] MAIN OR;  Service: General;  Laterality: Right;    LUMBAR DECOMPRESSION  09/2015    L2-L3    LUMBAR DECOMPRESSION  2006    Dr. Logan    OTHER SURGICAL HISTORY  05/2015    left SI fusion with bone graft - Dr. Presley    OTHER SURGICAL HISTORY      carotid artery repair     OTHER SURGICAL HISTORY Left 02/19/2016    Left SI fusion 2/19/2016 Dr. Zendejas    POSTERIOR SPINAL FUSION  10/24/2016    PSF T12-3/TLIF L3-L4 poss L2-L3 --- Dr. 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 for Method of Medication Administration: meds via alternate route (06/11/25 0900)        Recommended Diagnostics: VFSS ([facility]) (06/11/25 0900)  Swallow Criteria for Skilled Therapeutic Interventions Met: demonstrates skilled criteria (06/11/25 0900)     Rehab Potential/Prognosis, Swallowing: good, to achieve stated therapy goals (06/11/25 0900)  Therapy Frequency (Swallow): 3 days per week, 4 days per week (06/11/25 0900)  Predicted Duration Therapy Intervention (Days): until discharge (06/11/25 0900)  Oral Care Recommendations: Oral Care BID/PRN (06/11/25 0900)    Pt seen on this date for clinical bedside swallow evaluation per failing swallow screening.     Head CT with following impression: \"1.No acute intracranial abnormality. There is a 2.5 cm hypodensity in the posterior left temporal lobe which does not appear acute but is new since 2023. This is likely a subacute or chronic infarct. There is generalized parenchymal volume loss and chronic small vessel ischemic disease.    CXR with following impression: \"Patchy and interstitial opacity in the periphery of the right mid lung and right basilar density which has evolved since 12/29/2020 and could be related to acute and/or chronic infection or inflammatory change.. This is superimposed on areas of chronic scarring and postsurgical change. Cardiomegaly and AICD are unchanged.\"     Upon entry, pt upright in bed with grandson at bedside. Pt confused but participatory. Grandson at bedside denies hx of swallowing difficulties, BL reg/thin diet. Oral mech revealed R labial drooping, generalized weakness, top dentures in place. Per grandson, R drooping is not baseline, pt has bottom dentures at home. Pt noted to be dysarthric secondary to drooping. Pt able to self feed. PO observed: water by cup x2, water by straw x4. Pt with impaired labial seal, some difficulty pulling " liquids. Oral prep and transit functional, complete clearance. Pt with cough x3, throat clearing, and reduction in SpO2. D/t overt s/s of aspiration at bedside, no further trials attempted. Discussed with pt and grandson recommendation of VFSS - verbalized agreement. It is recommended pt remain NPO and participate in VFSS to determine safest and least restrictive diet. ST will complete VFSS with additional recs to follow. Nursing updated.   SWALLOW EVALUATION (Last 72 Hours)       SLP Adult Swallow Evaluation       Row Name 06/11/25 0900       Rehab Evaluation    Document Type evaluation  -AA    Subjective Information no complaints  -AA    Patient Observations alert;cooperative;agree to therapy  confused  -AA    Patient/Family/Caregiver Comments/Observations Grandson in room  -AA    Patient Effort good  -AA    Symptoms Noted During/After Treatment none  -AA       General Information    Patient Profile Reviewed yes  -AA    Pertinent History Of Current Problem Shukri Park is a 84 y.o. male with a CMH of CAD, HF-imp-EF with LVEF of 55 % With mild concentric hypertrophy per echo in 2023, Afib on AC, status post AICD, HTN, HLD,  who [facility] with  Acute encephalopathy.     Patient is awake, responds to some commands, but appears confused and irritable. History was primarily provided by his grandson. On 6/8/2025, he experienced sudden left eye vision loss and appeared confused while at the grocery store; he was driven home by a store employee. Later that day, family noted he was agitated and asked them to leave; vision reportedly returned. On 6/9/2025, he was unresponsive to calls, prompting a wellness check on 6/10/25. He was found on the floor by a neighbor, soiled, confused, and irritable, and was brought to the hospital by EMS. Loss of consciousness is unclear.  -AA    Current Method of Nutrition NPO  -AA    Precautions/Limitations, Vision WFL;for purposes of eval  -AA     Precautions/Limitations, Hearing WFL;for purposes of eval  -AA    Prior Level of Function-Communication motor speech impairment;other (see comments)  dysarthric  -AA    Prior Level of Function-Swallowing no diet consistency restrictions;safe, efficient swallowing in all situations  -AA    Plans/Goals Discussed with patient;family;agreed upon  -AA    Barriers to Rehab cognitive status  -AA       Oral Motor Structure and Function    Dentition Assessment upper dentures/partial in place;other (see comments)  has lower dentures, not available.  -AA    Secretion Management WNL/WFL  -AA    Mucosal Quality dry  -AA    Volitional Swallow weak  -AA    Volitional Cough weak  -AA       Oral Musculature and Cranial Nerve Assessment    Oral Motor General Assessment generalized oral motor weakness  -AA    Oral Labial or Buccal Impairment, Detail, Cranial Nerve VII (Facial): right labial droop  -AA       General Eating/Swallowing Observations    Respiratory Support Currently in Use room air  -AA    Eating/Swallowing Skills self-fed  -AA    Positioning During Eating upright 90 degree;upright in bed  -AA    Utensils Used straw;cup  -AA    Consistencies Trialed thin liquids  -AA    Pre SpO2 (%) 96  -AA    Post SpO2 (%) 88  -AA       Respiratory    Respiratory Status reduction in SpO2  -AA       SLP Evaluation Clinical Impression    SLP Swallowing Diagnosis mild;oral dysphagia;suspected pharyngeal dysphagia  -AA    Functional Impact risk of aspiration/pneumonia;risk of malnutrition;risk of dehydration  -AA    Rehab Potential/Prognosis, Swallowing good, to achieve stated therapy goals  -AA    Swallow Criteria for Skilled Therapeutic Interventions Met demonstrates skilled criteria  -AA       Recommendations    Therapy Frequency (Swallow) 3 days per week;4 days per week  -AA    Predicted Duration Therapy Intervention (Days) until discharge  -AA    SLP Diet Recommendation NPO;water between meals after oral care, with supervision;ice chips  between meals after oral care, with supervision  -AA    Recommended Diagnostics VFSS ([facility])  -AA    Oral Care Recommendations Oral Care BID/PRN  -AA    SLP Rec. for Method of Medication Administration meds via alternate route  -AA       Swallow Goals (SLP)    Swallow LTGs Swallow Long Term Goal (free text)  -AA       (LTG) Swallow    (LTG) Swallow Patient will participate in ongoing assessment of swallow, including reevaluation of swallow clinically and/or including instrumental assessment of swallow if indicated, to further assess swallow function in anticipation of initiation of PO diet.  -AA    Alexander (Swallow Long Term Goal) with minimal cues (75-90% accuracy)  -AA    Time Frame (Swallow Long Term Goal) by discharge  -AA    Progress/Outcomes (Swallow Long Term Goal) new goal  -AA              User Key  (r) = Recorded By, (t) = Taken By, (c) = Cosigned By      Initials Name Effective Dates    Polly Gama SLP 06/18/24 -                     EDUCATION  The patient has been educated in the following areas:   Dysphagia (Swallowing Impairment).        SLP GOALS       Row Name 06/11/25 0900       (LTG) Swallow    (LTG) Swallow Patient will participate in ongoing assessment of swallow, including reevaluation of swallow clinically and/or including instrumental assessment of swallow if indicated, to further assess swallow function in anticipation of initiation of PO diet.  -AA    Alexander (Swallow Long Term Goal) with minimal cues (75-90% accuracy)  -AA    Time Frame (Swallow Long Term Goal) by discharge  -AA    Progress/Outcomes (Swallow Long Term Goal) new goal  -AA              User Key  (r) = Recorded By, (t) = Taken By, (c) = Cosigned By      Initials Name Provider Type    Polly Gama SLP Speech and Language Pathologist               LUZ ELENA Lara  6/11/2025

## 2025-06-11 NOTE — PLAN OF CARE
Goal Outcome Evaluation:    VFSS completed. Pt viewed seated at 90 degrees in the lateral position. Pt was fed all trials by clinician retrospectively. Pt observed upright in the lateral projection and was given trials of NTL by cup x2, puree x2, mech soft/mixed x2, thin by cup sequential sips x2 to complete the study.       Complete labial seal resulting in no anterior spillage on any trial or consistency.  Reduced bolus formation and cohesion on mech soft. Impaired and slow A-P transit. Premature spillage over the BOT at initiation of pharyngeal swallow and to the pyriform sinus on thin. Extended mastication and reduced mastication strength on mech soft.   Delayed swallow initiation. Reduced laryngeal elevation and forward hyoid movement.  Trace lingual and vallecular residue on all trials/consistencies.  Epiglottic inversion and laryngeal vestibular closure adequate on NT, puree, and mech soft trials;  Material does not enter airway (a 1 on the Rosenbeck Penetration-Aspiration Scale). Incomplete epiglottic inversion on thin resulting in reduced vestibular closure, resulting in mildly deep laryngeal penetration during the swallow that does not clear after the swallow; Material enters the airway, remains above the vocal folds, and is not ejected from the airway (a 3 on the Rosenbeck Penetration-Aspiration Scale). Esophageal scan was unremarkable.       SLP Plan & Recommendations:    - Puree and NTL, no straw, w/ [place] Water Protocol (FWP - see guidelines below)  - Meds: whole or crushed in puree or NTL  - Safe swallow strategies: upright during all PO/meds, slow rate of intake, small bites/sips, alternate liquids and solids  - ST will continue to follow to ensure tolerance and safety of rec'd diet, target dysphagia treatment, and provide further recs as indicated     Water Protocol  The rationale to recommend water when a PO diet cannot appropriately/functionally sustain nutrition (or if thickened liquids are  prescribed due to aspiration of thin liquids) is because water is low risk for aspiration pna when compared to aspiration of food or other liquids.    Benefits of a water protocol include but are not limited to:     Oral gratification  Engagement of oropharyngeal swallow musculature  Decrease likelihood of dehydration       Guidelines for proper implementation include:  Waiting a minimum of 30 minutes after consuming any other PO  Thorough oral care prior to consuming water  Upright at 90 degree hip flexion  Small sips at slow rate  Monitor for any changes in respiratory status and discontinue if distress noted     The [place] Free Water Protocol is a research based protocol established in 1984.                     SLP Swallowing Diagnosis: moderate, oral dysphagia, mild, pharyngeal dysphagia (06/11/25 1200)

## 2025-06-11 NOTE — PLAN OF CARE
Goal Outcome Evaluation:  Plan of Care Reviewed With: patient, family           Outcome Evaluation: Pt is a 85 y/o M admitted to [facility] on 6/10/25 presenting w/ acute encephalopathy/AMS. 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 MRI pending due to spinal hardware. NIH 2. Neurology consulted. PMHx significant for CAD, HF-imp-EF with LVEF of 55 % With mild concentric hypertrophy per echo in 2023, Afib on AC, status post AICD, HTN, and HLD. Nursing staff cleared for therapy. Pt poor historian this date, PLOF obtained by family members in room. At baseline[facility]. He was actively driving, IND with I/ADLs, with use of cane for ambulation. Pt A&O x0, however able to follow commands. Lethargic, OT provided enriched sensory environment turing on lights and opening blinds. Alertness increased with min exertion. Pt completed bed mobility with CGA and sat EOB with CGA fluctuating between min A and he demos decrease postural trunk strength. He requires min A x2 with FWW to t/f from bed<>chair. He demos PROM shoulders 90* with  strength 2+/5, unable to formally test BUE MMT at this time 2/2 cognition and strength, LUE > RUE. Pt is significantly below his baseline warranting the need for skilled OT services, recommending SNF when medically appropriate. OT will continue to follow while admitted.    Anticipated Discharge Disposition (OT): skilled nursing facility

## 2025-06-11 NOTE — PLAN OF CARE
Goal Outcome Evaluation:  Plan of Care Reviewed With: patient        Progress: no change  Outcome Evaluation: Patient sleeping most of the day, video swallow performed, plan for MRI, family at bedside

## 2025-06-11 NOTE — PROGRESS NOTES
[facility] MEDICINE SERVICE  DAILY PROGRESS NOTE    NAME: Shukri Park  : 1940  MRN: [id number]      LOS: 1 day     PROVIDER OF SERVICE: Khadra Alex MD    Chief Complaint: Acute encephalopathy    Subjective:     Interval History:  History taken from: Patient and patient's chart     Patient was incoherent and mumbling at the time of my encounter. Discussed with son at bed side, patient is alert and oriented x 3 at baseline.         Review of Systems:   Review of Systems  All negative except above   Objective:     Vital Signs  Temp:  [97.5 °F (36.4 °C)-98.7 °F (37.1 °C)] 97.9 °F (36.6 °C)  Heart Rate:  [69-95] 70  Resp:  [18-23] 23  BP: (124-146)/(65-86) 124/70  Flow (L/min) (Oxygen Therapy):  [0-4] 0   Body mass index is 15.48 kg/m².    Physical Exam  Physical Exam  General: Alert and oriented x 0, no acute distress.  Lungs: Clear to auscultation, nonlabored respiration.  Heart:regular rate and rhythm   Abdomen: Soft, nontender, nondistended, + bowel sounds.  Neuro: awake but confused, not following commands, moving all 4 extremities        Diagnostic Data    Results from last 7 days   Lab Units 25  0236   WBC 10*3/mm3 12.16*   HEMOGLOBIN g/dL 13.4   HEMATOCRIT % 42.1   PLATELETS 10*3/mm3 131*   GLUCOSE mg/dL 103*   CREATININE mg/dL 1.07   BUN mg/dL 37.0*   SODIUM mmol/L 144   POTASSIUM mmol/L 4.1   AST (SGOT) U/L 62*   ALT (SGPT) U/L 26   ALK PHOS U/L 71   BILIRUBIN mg/dL 1.0   ANION GAP mmol/L 16.7*       CT Head Without Contrast  Result Date: 6/10/2025  Impression: HEAD CT: 1.No acute intracranial abnormality. 2.There is a 2.5 cm hypodensity in the posterior left temporal lobe which does not appear acute but is new since . This is likely a subacute or chronic infarct. There is generalized parenchymal volume loss and chronic small vessel ischemic disease. CT C-SPINE: 1.No acute fracture or malalignment. 2.There is multilevel degenerative disc disease and facet arthropathy as  described above. 3.There is evidence of previous left carotid surgery. The overall appearance of the cervical spine is unchanged since 2019. CT T-SPINE: 1.No acute fracture or malalignment. 2.There is multilevel degenerative disc disease with progression of degenerative changes at the T9-10 level. 3.There is marked cardiomegaly and a partially visualized pacemaker/AICD. 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 There is emphysema with numerous areas of scarring in the visualized lung fields. CT L-SPINE: 1.No acute fracture or malalignment. 2.There is extensive multilevel degenerative disc disease and facet arthropathy with postsurgical changes as described above. 3.There is a 5.1 cm fusiform infrarenal abdominal aortic aneurysm which has increased in size since 2021. 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 Electronically Signed: Sanjiv Roberts DO  6/10/2025 9:06 PM EDT  Workstation ID: [id number]    CT Cervical Spine Without Contrast  Result Date: 6/10/2025  Impression: HEAD CT: 1.No acute intracranial abnormality. 2.There is a 2.5 cm hypodensity in the posterior left temporal lobe which does not appear acute but is new since 2023. This is likely a subacute or chronic infarct. There is generalized parenchymal volume loss and chronic small vessel ischemic disease. CT C-SPINE: 1.No acute fracture or malalignment. 2.There is multilevel degenerative disc disease and facet arthropathy as described above. 3.There is evidence of previous left carotid surgery. The overall appearance of the cervical spine is unchanged since 2019. CT T-SPINE: 1.No acute fracture or malalignment. 2.There is multilevel degenerative disc disease with progression of degenerative changes at the T9-10 level. 3.There is marked cardiomegaly and a partially visualized pacemaker/AICD. 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 There is emphysema with numerous areas of  scarring in the visualized lung fields. CT L-SPINE: 1.No acute fracture or malalignment. 2.There is extensive multilevel degenerative disc disease and facet arthropathy with postsurgical changes as described above. 3.There is a 5.1 cm fusiform infrarenal abdominal aortic aneurysm which has increased in size since 2021. 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 Electronically Signed: Sanjiv Roberts DO  6/10/2025 9:06 PM EDT  Workstation ID: [id number]    CT Thoracic Spine Without Contrast  Result Date: 6/10/2025  Impression: HEAD CT: 1.No acute intracranial abnormality. 2.There is a 2.5 cm hypodensity in the posterior left temporal lobe which does not appear acute but is new since 2023. This is likely a subacute or chronic infarct. There is generalized parenchymal volume loss and chronic small vessel ischemic disease. CT C-SPINE: 1.No acute fracture or malalignment. 2.There is multilevel degenerative disc disease and facet arthropathy as described above. 3.There is evidence of previous left carotid surgery. The overall appearance of the cervical spine is unchanged since 2019. CT T-SPINE: 1.No acute fracture or malalignment. 2.There is multilevel degenerative disc disease with progression of degenerative changes at the T9-10 level. 3.There is marked cardiomegaly and a partially visualized pacemaker/AICD. 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 There is emphysema with numerous areas of scarring in the visualized lung fields. CT L-SPINE: 1.No acute fracture or malalignment. 2.There is extensive multilevel degenerative disc disease and facet arthropathy with postsurgical changes as described above. 3.There is a 5.1 cm fusiform infrarenal abdominal aortic aneurysm which has increased in size since 2021. 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 Electronically Signed: Sanjiv Roberts DO  6/10/2025 9:06  PM EDT  Workstation ID: [id number]    CT Lumbar Spine Without Contrast  Result Date: 6/10/2025  Impression: HEAD CT: 1.No acute intracranial abnormality. 2.There is a 2.5 cm hypodensity in the posterior left temporal lobe which does not appear acute but is new since 2023. This is likely a subacute or chronic infarct. There is generalized parenchymal volume loss and chronic small vessel ischemic disease. CT C-SPINE: 1.No acute fracture or malalignment. 2.There is multilevel degenerative disc disease and facet arthropathy as described above. 3.There is evidence of previous left carotid surgery. The overall appearance of the cervical spine is unchanged since 2019. CT T-SPINE: 1.No acute fracture or malalignment. 2.There is multilevel degenerative disc disease with progression of degenerative changes at the T9-10 level. 3.There is marked cardiomegaly and a partially visualized pacemaker/AICD. 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 There is emphysema with numerous areas of scarring in the visualized lung fields. CT L-SPINE: 1.No acute fracture or malalignment. 2.There is extensive multilevel degenerative disc disease and facet arthropathy with postsurgical changes as described above. 3.There is a 5.1 cm fusiform infrarenal abdominal aortic aneurysm which has increased in size since 2021. 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 Electronically Signed: Sanjiv Roberts DO  6/10/2025 9:06 PM EDT  Workstation ID: [id number]    XR Elbow 3+ View Right  Result Date: 6/10/2025  Impression: Superficial soft tissue swelling posteriorly which can be seen with olecranon bursitis or cellulitis or contusion in the appropriate clinical setting. There are mild degenerative changes. No acute bony abnormality. No joint effusion is demonstrated. Electronically Signed: Sanjiv Roberts DO  6/10/2025 8:11 PM EDT  Workstation ID: [id number]    XR Chest 1 View  Result Date:  6/10/2025  Impression: Patchy and interstitial opacity in the periphery of the right mid lung and right basilar density which has evolved since 12/29/2020 and could be related to acute and/or chronic infection or inflammatory change.. This is superimposed on areas of chronic scarring and postsurgical change. 2. Cardiomegaly and AICD are unchanged. Electronically Signed: Sanjiv Roberts,   6/10/2025 8:09 PM EDT  Workstation ID: [id number]        I have reviewed patient labs and imaging     Assessment/Plan:     Active and Resolved Problems  Shukri Park is a 84 y.o. male with a CMH of CAD, HF-imp-EF with LVEF of 55 % With mild concentric hypertrophy per echo in 2023, Afib on AC, status post pacemaker, HTN, HLD,  who presented to [facility] on 6/10/2025 with  Acute encephalopathy.     Assessments:   Acute encephalopathy - possible subacute/chronic stroke, pneumonia  Right mid/lower lung opacities - concern for pneumonia  Resolved transient left eye vision loss - r/o stroke  Atrial fibrillation/flutter with paced rhythm on anticoagulation  Status post pacemaker  Rhabdomyolysis - likely from fall  Enlarging infrarenal AAA (5.1 cm, was 4.75 cm in 2023)  Chronic back pain, s/p spinal hardware placement     Plan:   continue ceftriaxone, Atrovent and Pulmicort. Follow blood cultures.  Send urine legionella and strep  CT head shows new 2.5 cm hypodensity in left posterior temporal lobe, likely subacute/chronic infarct. MRI not feasible due to spinal hardware.   Discussed with neurology, okay to resume home aspirin and Xarelto  TSH-0.75, FT4 1.07, A1C 5.76, B12- 805  Pacemaker was interrogated in ED; pending [employer] report. If arrhythmia events are noted, consider cardiology consult.  Continue LR at 100 cc/hr for rhabdomyolysis; monitor CK.  Recommend outpatient PCP and vascular surgery follow-up for AAA (5.1 cm, up from 4.75 cm in 2023); below intervention threshold.  SLP recommendations appreciated  Resume  home medications as appropriate   DVT/GI prophylaxis  Fall and bleeding precautions  Transfer patient to  neuro.      VTE Prophylaxis:  Pharmacologic & mechanical VTE prophylaxis orders are present.             Disposition Planning:     Barriers to Discharge: Neurology work up for stroke vs neoplastic process, septic work up, clinical improvement   Anticipated Date of Discharge: 06/14  Place of Discharge: rehab vs home      Time: 40 minutes     Code Status and Medical Interventions: No CPR (Do Not Attempt to Resuscitate); Full Support; DNR/DNI, Discussed in extensive detail with Grandsonw Who is POA, who stated that patient has expressed not to have CPR or put on machines such as ventila...   Ordered at: 06/11/25 0145     Code Status (Patient has no pulse and is not breathing):    No CPR (Do Not Attempt to Resuscitate)     Medical Interventions (Patient has pulse or is breathing):    Full Support     Comments:    DNR/DNI, Discussed in extensive detail with Grandsonw Who is POA, who stated that patient has expressed not to have CPR or put on machines such as ventilator     Level Of Support Discussed With:    Next of Kin (If No Surrogate)       Signature: Electronically signed by Khadra Alex MD, 06/11/25, 14:01 EDT.  [facility] Hospitalist Team

## 2025-06-11 NOTE — H&P
[facility] Medicine Services  History & Physical    Patient Name: Shukri Park  : 1940  MRN: [id number]  Primary Care Physician:  Homa Carrizales MD  Date of admission: 6/10/2025  Date and Time of Service: 6/10/2025 at 2146    Subjective      Chief Complaint: encephalopathy    History of Present Illness: Shukri Park is a 84 y.o. male with a CMH of CAD, HF-imp-EF with LVEF of 55 % With mild concentric hypertrophy per echo in , Afib on AC, status post AICD, HTN, HLD,  who presented to [facility] on 6/10/2025 with  Acute encephalopathy.    Patient is awake, responds to some commands, but appears confused and irritable. History was primarily provided by his grandson. On 2025, he experienced sudden left eye vision loss and appeared confused while at the grocery store; he was driven home by a store employee. Later that day, family noted he was agitated and asked them to leave; vision reportedly returned. On 2025, he was unresponsive to calls, prompting a wellness check on 6/10/25. He was found on the floor by a neighbor, soiled, confused, and irritable, and was brought to the hospital by EMS. Loss of consciousness is unclear.    In the ED, patient was afebrile with stable vitals. Labs notable for WBC 14.7, Cr 1.15, AST 77, normal ALT, T. bili 1.5, CK 2133, and troponins 35 ? 32. EKG showed Afib/flutter with a paced rhythm. CT head revealed a new 2.5 cm hypodensity in the left posterior temporal lobe, likely subacute or chronic infarct. CT C-, T-, and L-spine showed no acute fractures; spinal hardware intact with multilevel degenerative changes. 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  Patient was given dose of ceftriaxone and doxycycline, 500 cc of bolus fluids, admitted to medicine team for further inpatient management.      Review of Systems unable to  obtain from the patient    Personal History     Past Medical History:   Diagnosis Date    A-fib     Aortic aneurysm     Appetite loss     Cancer     right lobe cancer - surgically removed     CHF (congestive heart failure)     Coronary artery disease     Dark stools     Foot pain, bilateral     GERD (gastroesophageal reflux disease)     Hyperlipidemia     Low back pain     S/P epidural steroid injection     Israel Gomes's - no relief    Weight loss     acute loss of weight 30 lbs       Past Surgical History:   Procedure Laterality Date    CARDIAC CATHETERIZATION N/A 7/9/2020    Procedure: LEFT HEART CATH with possible PCI;  Surgeon: Wade Cronin MD;  Location: [facility] CATH INVASIVE LOCATION;  Service: Cardiovascular;  Laterality: N/A;  Local and IV sedation    CARDIAC CATHETERIZATION N/A 7/9/2020    Procedure: Percutaneous Coronary Intervention;  Surgeon: Wade Cronin MD;  Location: [facility] CATH INVASIVE LOCATION;  Service: Cardiovascular;  Laterality: N/A;    CARDIAC DEFIBRILLATOR PLACEMENT      CARDIAC ELECTROPHYSIOLOGY PROCEDURE N/A 7/10/2020    Procedure: Biventricular Device Insertion;  Surgeon: Jonathan Denney MD;  Location: [facility] CATH INVASIVE LOCATION;  Service: Cardiovascular;  Laterality: N/A;    CARDIAC ELECTROPHYSIOLOGY PROCEDURE N/A 7/10/2020    Procedure: ABLATION AV NODE;  Surgeon: Jonathan Denney MD;  Location: [facility] CATH INVASIVE LOCATION;  Service: Cardiovascular;  Laterality: N/A;    CARDIAC ELECTROPHYSIOLOGY PROCEDURE N/A 7/10/2020    Procedure: AV node ablation;  Surgeon: Jonathan Denney MD;  Location: [facility] CATH INVASIVE LOCATION;  Service: Cardiovascular;  Laterality: N/A;    CARDIOVASCULAR STRESS TEST  2020    CATARACT EXTRACTION, BILATERAL      CONVERTED (HISTORICAL) HOLTER  2020    CORONARY ANGIOPLASTY WITH STENT PLACEMENT      ENDOSCOPY N/A 7/5/2020    Procedure: ESOPHAGOGASTRODUODENOSCOPY;  Surgeon: Neal Correa MD;  Location: [facility] ENDOSCOPY;  Service:  Gastroenterology;  Laterality: N/A;    INGUINAL HERNIA REPAIR Right 1/7/2021    Procedure: INGUINAL HERNIA REPAIR OPEN WITH MESH;  Surgeon: Cody Randall MD;  Location: [facility] MAIN OR;  Service: General;  Laterality: Right;    LUMBAR DECOMPRESSION  09/2015    L2-L3    LUMBAR DECOMPRESSION  2006    Dr. Logan    OTHER SURGICAL HISTORY  05/2015    left SI fusion with bone graft - Dr. Presley    OTHER SURGICAL HISTORY      carotid artery repair     OTHER SURGICAL HISTORY Left 02/19/2016    Left SI fusion 2/19/2016 Dr. Zendejas    POSTERIOR SPINAL FUSION  10/24/2016    PSF T12-3/TLIF L3-L4 poss L2-L3 --- Dr. 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       Family History: family history includes Cancer in an other family member; Heart disease in an other family member; Hyperlipidemia in an other family member. Otherwise pertinent FHx was reviewed and not pertinent to current issue.    Social History:  reports that he quit smoking about 35 years ago. His smoking use included cigarettes. He has been exposed to tobacco smoke. He has never used smokeless tobacco. He reports that he does not drink alcohol and does not use drugs.    Home Medications:  Prior to Admission Medications       Prescriptions Last Dose Informant Patient Reported? Taking?    ipratropium (ATROVENT) 0.03 % nasal spray   Yes Yes    Administer 2 sprays into the nostril(s) as directed by provider 3 (Three) Times a Day.    latanoprost (XALATAN) 0.005 % ophthalmic solution   Yes Yes    Administer 1 drop to both eyes Every Night.    pantoprazole (PROTONIX) 40 MG EC tablet   No Yes    Take 1 tablet by mouth Daily.    simvastatin (ZOCOR) 40 MG tablet   Yes Yes    Take 1 tablet by mouth Every Night.    Xarelto 15 MG tablet   No Yes    TAKE 1 TABLET BY MOUTH EVERY DAY    aspirin (aspirin) 81 MG EC tablet   No No    Take 1 tablet by mouth Daily.    Patient taking differently:  Take 1 tablet by mouth Daily. LD 1/2/21    docusate  sodium (COLACE) 100 MG capsule   Yes No    Take 1 capsule by mouth 2 (Two) Times a Day.    ferrous sulfate 325 (65 FE) MG tablet   Yes No    Take 1 tablet by mouth Daily With Breakfast. Hold DOs    midodrine (PROAMATINE) 5 MG tablet   Yes No    Take 1 tablet by mouth 2 (Two) Times a Day.    Multiple Vitamin (MULTIVITAMIN) tablet   Yes No    Take 1 tablet by mouth Daily. LD 1/2    vitamin B-12 (CYANOCOBALAMIN) 1000 MCG tablet   Yes No    Take 1 tablet by mouth Daily.              Allergies:  Allergies   Allergen Reactions    Ciprofloxacin Anaphylaxis    Amlodipine Unknown (See Comments)    Rocephin [Ceftriaxone] Other (See Comments)     Mouth sores       Objective      Vitals:   Temp:  [98.4 °F (36.9 °C)] 98.4 °F (36.9 °C)  Heart Rate:  [69-79] 70  Resp:  [18] 18  BP: (128-146)/(70-81) 136/76  Body mass index is 15.17 kg/m².  Physical Exam  General: Awake, confused  HEENT: No scleral icterus  Respiratory: Decreased breath sound lung bases  Cardio: Heart rate normal, rhythm regular  Abdomen: Soft, nontender, no rebound or guarding  Extremities: No remarkable edema the bilateral extremities, moves all extremities  Neuro: Awake, confused, neurological exam very limited due to patient altered mental status, moving all extremities, appeared to be irritable    Diagnostic Data:  Lab Results (last 24 hours)       Procedure Component Value Units Date/Time    High Sensitivity Troponin T 1Hr [[id number]] Collected: 06/10/25 2033    Specimen: Blood Updated: 06/10/25 2144    Respiratory Panel PCR w/COVID-19(SARS-CoV-2) [place] In-House, NP Swab in [facility], 2 HR TAT - Swab, Nasopharynx [[id number]]  (Normal) Collected: 06/10/25 2036    Specimen: Swab from Nasopharynx Updated: 06/10/25 2130     ADENOVIRUS, PCR Not Detected     Coronavirus 229E Not Detected     Coronavirus HKU1 Not Detected     Coronavirus NL63 Not Detected     Coronavirus OC43 Not Detected     COVID19 Not Detected     Human Metapneumovirus Not  "Detected     Human Rhinovirus/Enterovirus Not Detected     Influenza A PCR Not Detected     Influenza B PCR Not Detected     Parainfluenza Virus 1 Not Detected     Parainfluenza Virus 2 Not Detected     Parainfluenza Virus 3 Not Detected     Parainfluenza Virus 4 Not Detected     RSV, PCR Not Detected     Bordetella pertussis pcr Not Detected     Bordetella parapertussis PCR Not Detected     Chlamydophila pneumoniae PCR Not Detected     Mycoplasma pneumo by PCR Not Detected    Narrative:      In the setting of a positive respiratory panel with a viral infection PLUS a negative procalcitonin without other underlying concern for bacterial infection, consider observing off antibiotics or discontinuation of antibiotics and continue supportive care. If the respiratory panel is positive for atypical bacterial infection (Bordetella pertussis, Chlamydophila pneumoniae, or Mycoplasma pneumoniae), consider antibiotic de-escalation to target atypical bacterial infection.    Procalcitonin [[id number]]  (Normal) Collected: 06/10/25 1928    Specimen: Blood Updated: 06/10/25 2049     Procalcitonin 0.07 ng/mL     Narrative:      As a Marker for Sepsis (Non-Neonates):    1. <0.5 ng/mL represents a low risk of severe sepsis and/or septic shock.  2. >2 ng/mL represents a high risk of severe sepsis and/or septic shock.    As a Marker for Lower Respiratory Tract Infections that require antibiotic therapy:    PCT on Admission    Antibiotic Therapy       6-12 Hrs later    >0.5                Strongly Recommended  >0.25 - <0.5        Recommended   0.1 - 0.25          Discouraged              Remeasure/reassess PCT  <0.1                Strongly Discouraged     Remeasure/reassess PCT    As 28 day mortality risk marker: \"Change in Procalcitonin Result\" (>80% or <=80%) if Day 0 (or Day 1) and Day 4 values are available. Refer to http://[email or web address]-pct-calculator.com    Change in PCT <=80%  A decrease of PCT levels below or equal to 80% defines a " positive change in PCT test result representing a higher risk for 28-day all-cause mortality of patients diagnosed with severe sepsis for septic shock.    Change in PCT >80%  A decrease of PCT levels of more than 80% defines a negative change in PCT result representing a lower risk for 28-day all-cause mortality of patients diagnosed with severe sepsis or septic shock.       POC Lactate [[id number]]  (Normal) Collected: 06/10/25 2041    Specimen: Blood Updated: 06/10/25 2043     Lactate 1.3 mmol/L      Comment: Serial Number: [id number]:  [id number]       Blood Culture - Blood, Hand, Left [[id number]] Collected: 06/10/25 2033    Specimen: Blood from Hand, Left Updated: 06/10/25 2042    Blood Culture - Blood, Arm, Left [[id number]] Collected: 06/10/25 2033    Specimen: Blood from Arm, Left Updated: 06/10/25 2041    CK [[id number]]  (Abnormal) Collected: 06/10/25 1928    Specimen: Blood Updated: 06/10/25 2013     Creatine Kinase 2,133 U/L     Comprehensive Metabolic Panel [[id number]]  (Abnormal) Collected: 06/10/25 1928    Specimen: Blood Updated: 06/10/25 2002     Glucose 93 mg/dL      BUN 33.0 mg/dL      Creatinine 1.15 mg/dL      Sodium 144 mmol/L      Potassium 4.4 mmol/L      Chloride 102 mmol/L      CO2 23.1 mmol/L      Calcium 10.1 mg/dL      Total Protein 8.2 g/dL      Albumin 5.0 g/dL      ALT (SGPT) 33 U/L      AST (SGOT) 77 U/L      Alkaline Phosphatase 83 U/L      Total Bilirubin 1.5 mg/dL      Globulin 3.2 gm/dL      A/G Ratio 1.6 g/dL      BUN/Creatinine Ratio 28.7     Anion Gap 18.9 mmol/L      eGFR 62.4 mL/min/1.73     Narrative:      GFR Categories in Chronic Kidney Disease (CKD)              GFR Category          GFR (mL/min/1.73)    Interpretation  G1                    90 or greater        Normal or high (1)  G2                    60-89                Mild decrease (1)  G3a                   45-59                Mild to moderate decrease  G3b                   30-44                Moderate to  severe decrease  G4                    15-29                Severe decrease  G5                    14 or less           Kidney failure    (1)In the absence of evidence of kidney disease, neither GFR category G1 or G2 fulfill the criteria for CKD.    eGFR calculation 2021 CKD-EPI creatinine equation, which does not include race as a factor    High Sensitivity Troponin T [[id number]]  (Abnormal) Collected: 06/10/25 1928    Specimen: Blood Updated: 06/10/25 2002     HS Troponin T 35 ng/L     Narrative:      High Sensitive Troponin T Reference Range:  <14.0 ng/L- Negative Female for AMI  <22.0 ng/L- Negative Male for AMI  >=14 - Abnormal Female indicating possible myocardial injury.  >=22 - Abnormal Male indicating possible myocardial injury.   Clinicians would have to utilize clinical acumen, EKG, Troponin, and serial changes to determine if it is an Acute Myocardial Infarction or myocardial injury due to an underlying chronic condition.         Magnesium [[id number]]  (Abnormal) Collected: 06/10/25 1928    Specimen: Blood Updated: 06/10/25 2002     Magnesium 2.5 mg/dL     TSH Rfx On Abnormal To Free T4 [[id number]]  (Normal) Collected: 06/10/25 1928    Specimen: Blood Updated: 06/10/25 2002     TSH 0.909 uIU/mL     CBC & Differential [[id number]]  (Abnormal) Collected: 06/10/25 1928    Specimen: Blood Updated: 06/10/25 1937    Narrative:      The following orders were created for panel order CBC & Differential.  Procedure                               Abnormality         Status                     ---------                               -----------         ------                     CBC Auto Differential[[id number]]        Abnormal            Final result                 Please view results for these tests on the individual orders.    CBC Auto Differential [[id number]]  (Abnormal) Collected: 06/10/25 1928    Specimen: Blood Updated: 06/10/25 1937     WBC 14.74 10*3/mm3      RBC 4.50 10*6/mm3      Hemoglobin 14.2 g/dL       Hematocrit 44.1 %      MCV 98.0 fL      MCH 31.6 pg      MCHC 32.2 g/dL      RDW 13.4 %      RDW-SD 48.2 fl      MPV 10.2 fL      Platelets 152 10*3/mm3      Neutrophil % 78.9 %      Lymphocyte % 11.5 %      Monocyte % 9.0 %      Eosinophil % 0.0 %      Basophil % 0.1 %      Immature Grans % 0.5 %      Neutrophils, Absolute 11.65 10*3/mm3      Lymphocytes, Absolute 1.69 10*3/mm3      Monocytes, Absolute 1.32 10*3/mm3      Eosinophils, Absolute 0.00 10*3/mm3      Basophils, Absolute 0.01 10*3/mm3      Immature Grans, Absolute 0.07 10*3/mm3      nRBC 0.0 /100 WBC     Extra Tubes [[id number]] Collected: 06/10/25 1928    Specimen: Blood, Venous Line Updated: 06/10/25 1931    Narrative:      The following orders were created for panel order Extra Tubes.  Procedure                               Abnormality         Status                     ---------                               -----------         ------                     Light Blue Top[[id number]]                                   Final result                 Please view results for these tests on the individual orders.    Light Blue Top [[id number]] Collected: 06/10/25 1928    Specimen: Blood Updated: 06/10/25 1931     Extra Tube Hold for add-ons.     Comment: Auto resulted       Urinalysis, Microscopic Only - Straight Cath [[id number]] Collected: 06/10/25 1904    Specimen: Urine from Straight Cath Updated: 06/10/25 1914     RBC, UA 0-2 /HPF      WBC, UA 0-2 /HPF      Comment: Urine culture not indicated.        Bacteria, UA None Seen /HPF      Squamous Epithelial Cells, UA 0-2 /HPF      Hyaline Casts, UA 0-2 /LPF      Methodology Automated Microscopy    Urinalysis With Culture If Indicated - Straight Cath [[id number]]  (Abnormal) Collected: 06/10/25 1904    Specimen: Urine from Straight Cath Updated: 06/10/25 1912     Color, UA Yellow     Appearance, UA Clear     pH, UA 5.5     Specific Gravity, UA 1.022     Glucose, UA Negative     Ketones, UA 15 mg/dL (1+)      Bilirubin, UA Negative     Blood, UA Negative     Protein, UA 30 mg/dL (1+)     Leuk Esterase, UA Negative     Nitrite, UA Negative     Urobilinogen, UA 1.0 E.U./dL    Narrative:      In absence of clinical symptoms, the presence of pyuria, bacteria, and/or nitrites on the urinalysis result does not correlate with infection.             Imaging Results (Last 24 Hours)       Procedure Component Value Units Date/Time    CT Cervical Spine Without Contrast [[id number]] Collected: 06/10/25 2036     Updated: 06/10/25 2108    Narrative:        CT CERVICAL SPINE WO CONTRAST, CT HEAD WO CONTRAST, CT LUMBAR SPINE WO CONTRAST, CT THORACIC SPINE WO CONTRAST    Date of Exam: 6/10/2025 7:35 PM EDT    Indication: ams/found down. 84-year-old    Comparison: Head CT without contrast dated 9/19/2023 CT C-spine dated 11/20/2019, chest CT without contrast dated 7/2/2020, L-spine CT myelogram from 8/4/2021    Technique: Axial CT images were obtained of the head, cervical, thoracic and lumbar spine without contrast administration.  Sagittal and coronal reconstructions were performed.  Automated exposure control and iterative reconstruction methods were used.      Findings:  Head CT: There is generalized parenchymal volume loss with ventricular prominence and sulcal widening, unchanged within the posterior left temporal lobe near the junction with the occipital lobe there is a 2.5 cm hypodensity which does not appear acute   but it is new since 2023.  There is no CT evidence of acute hemorrhage, infarct, mass, mass effect, or midline shift. The gray-white matter differentiation is preserved. There is no hydrocephalus. The sulci and ventricles are symmetric.  No extraaxial fluid collections. The globes   and orbital contents are normal and symmetric. The mastoid air cells and visualized paranasal sinuses are clear except for minimal mucosal thickening in the ethmoid air cells and left maxillary sinus.. No skull fractures or suspicious  calvarial lesions.    CT C-spine: There is motion artifact which degrades image quality particularly at the C2 and C3 levels.    The occipital condyles are intact. There is degenerative change at the atlantodental interval with osteophytes and joint space narrowing. The C1 to ring is intact. The vertebral body heights are maintained. There is minimal anterolisthesis of C3 6 on C7   due to facet arthropathy and degenerative disc disease. No acute fractures or aggressive appearing focal osseous lesions. The posterior elements are intact. There is facet arthropathy bilaterally at the C2-3 and C3-4, C4-5 and C5-6 levels as well as   degenerative disc space narrowing with endplate and uncovertebral joint spurring at the C3-4, C5-6 and C6-7 levels and to a lesser extent C4-5. The overall appearance has not significantly changed. No prevertebral soft tissue swelling.    There is evidence of previous left carotid surgery. There is partially visualized emphysema with chronic scarring in the lung apices, unchanged.    CT T-spine: The thoracic vertebral body heights are maintained. The posterior elements are intact. No acute fractures or malalignment. No aggressive focal osseous lesions. There are small nonbridging endplate osteophytes in the upper thoracic spine and   to lesser extent the mid and lower thoracic spine. There is disc space narrowing with vacuum discs at the T6/7, T7-8 and T8-9 910 levels with progression of degenerative changes particularly at the T9-10 level. The central spinal canal in the thoracic   spine is patent. No focal disc abnormality is identified on CT. No paraspinal fluid collections or hemorrhage. Partially visualized is marked cardiomegaly and a pacemaker/AICD coronary artery stents and coronary artery calcifications. The partially   visualized thoracic aorta is diffusely ectatic up to 4.2 cm. There is emphysema with numerous areas of scarring in the visualized lung fields.      CT L-spine: There  is hardware with posterior spinal rods and pedicle screws bilaterally at T12 with a left-sided pedicle screw at L1 and a right sided pedicle screw at L2 bilateral pedicle screws at L3 and L4. There are disc spacers with partial osseous   fusion at the L2-3 and L3-4 levels as well. The hardware appears intact without signs of loosening. The vertebral body heights are maintained. There is extensive multilevel degenerative disc disease with disc space narrowing and endplate osteophytes   throughout the lumbar spine the degenerative disc disease at the L4-5 level has progressed. No acute fractures are seen. The posterior elements are intact. There is hardware traversing the left SI joint as well.    Visualized soft tissues of the abdomen demonstrated 5.1 x 4.9 cm fusiform infrarenal abdominal aortic aneurysm measured at the L3 level, previously at the same level in 2021 this was 3.8 cm. The common iliac arteries are also aneurysmal measuring 2.9 cm   on the right and 2.2 cm on the left, previously 2.7 cm and 2.1 cm on the right and left respectively. There is a 6.4 cm cyst in the upper pole of the right kidney.          Impression:      Impression:  HEAD CT:  1.No acute intracranial abnormality.  2.There is a 2.5 cm hypodensity in the posterior left temporal lobe which does not appear acute but is new since 2023. This is likely a subacute or chronic infarct.  There is generalized parenchymal volume loss and chronic small vessel ischemic disease.  CT C-SPINE:  1.No acute fracture or malalignment.  2.There is multilevel degenerative disc disease and facet arthropathy as described above.  3.There is evidence of previous left carotid surgery. The overall appearance of the cervical spine is unchanged since 2019.    CT T-SPINE:  1.No acute fracture or malalignment.  2.There is multilevel degenerative disc disease with progression of degenerative changes at the T9-10 level.  3.There is marked cardiomegaly and a partially  visualized pacemaker/AICD.  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  There is emphysema with numerous areas of scarring in the visualized lung fields.  CT L-SPINE:  1.No acute fracture or malalignment.  2.There is extensive multilevel degenerative disc disease and facet arthropathy with postsurgical changes as described above.  3.There is a 5.1 cm fusiform infrarenal abdominal aortic aneurysm which has increased in size since 2021. 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       Electronically Signed: Sanjiv Roberts DO    6/10/2025 9:06 PM EDT    Workstation ID: [id number]    CT Head Without Contrast [[id number]] Collected: 06/10/25 2036     Updated: 06/10/25 2108    Narrative:        CT CERVICAL SPINE WO CONTRAST, CT HEAD WO CONTRAST, CT LUMBAR SPINE WO CONTRAST, CT THORACIC SPINE WO CONTRAST    Date of Exam: 6/10/2025 7:35 PM EDT    Indication: ams/found down. 84-year-old    Comparison: Head CT without contrast dated 9/19/2023 CT C-spine dated 11/20/2019, chest CT without contrast dated 7/2/2020, L-spine CT myelogram from 8/4/2021    Technique: Axial CT images were obtained of the head, cervical, thoracic and lumbar spine without contrast administration.  Sagittal and coronal reconstructions were performed.  Automated exposure control and iterative reconstruction methods were used.      Findings:  Head CT: There is generalized parenchymal volume loss with ventricular prominence and sulcal widening, unchanged within the posterior left temporal lobe near the junction with the occipital lobe there is a 2.5 cm hypodensity which does not appear acute   but it is new since 2023.  There is no CT evidence of acute hemorrhage, infarct, mass, mass effect, or midline shift. The gray-white matter differentiation is preserved. There is no hydrocephalus. The sulci and ventricles are symmetric.  No extraaxial fluid collections. The globes   and orbital contents  are normal and symmetric. The mastoid air cells and visualized paranasal sinuses are clear except for minimal mucosal thickening in the ethmoid air cells and left maxillary sinus.. No skull fractures or suspicious calvarial lesions.    CT C-spine: There is motion artifact which degrades image quality particularly at the C2 and C3 levels.    The occipital condyles are intact. There is degenerative change at the atlantodental interval with osteophytes and joint space narrowing. The C1 to ring is intact. The vertebral body heights are maintained. There is minimal anterolisthesis of C3 6 on C7   due to facet arthropathy and degenerative disc disease. No acute fractures or aggressive appearing focal osseous lesions. The posterior elements are intact. There is facet arthropathy bilaterally at the C2-3 and C3-4, C4-5 and C5-6 levels as well as   degenerative disc space narrowing with endplate and uncovertebral joint spurring at the C3-4, C5-6 and C6-7 levels and to a lesser extent C4-5. The overall appearance has not significantly changed. No prevertebral soft tissue swelling.    There is evidence of previous left carotid surgery. There is partially visualized emphysema with chronic scarring in the lung apices, unchanged.    CT T-spine: The thoracic vertebral body heights are maintained. The posterior elements are intact. No acute fractures or malalignment. No aggressive focal osseous lesions. There are small nonbridging endplate osteophytes in the upper thoracic spine and   to lesser extent the mid and lower thoracic spine. There is disc space narrowing with vacuum discs at the T6/7, T7-8 and T8-9 910 levels with progression of degenerative changes particularly at the T9-10 level. The central spinal canal in the thoracic   spine is patent. No focal disc abnormality is identified on CT. No paraspinal fluid collections or hemorrhage. Partially visualized is marked cardiomegaly and a pacemaker/AICD coronary artery stents and  coronary artery calcifications. The partially   visualized thoracic aorta is diffusely ectatic up to 4.2 cm. There is emphysema with numerous areas of scarring in the visualized lung fields.      CT L-spine: There is hardware with posterior spinal rods and pedicle screws bilaterally at T12 with a left-sided pedicle screw at L1 and a right sided pedicle screw at L2 bilateral pedicle screws at L3 and L4. There are disc spacers with partial osseous   fusion at the L2-3 and L3-4 levels as well. The hardware appears intact without signs of loosening. The vertebral body heights are maintained. There is extensive multilevel degenerative disc disease with disc space narrowing and endplate osteophytes   throughout the lumbar spine the degenerative disc disease at the L4-5 level has progressed. No acute fractures are seen. The posterior elements are intact. There is hardware traversing the left SI joint as well.    Visualized soft tissues of the abdomen demonstrated 5.1 x 4.9 cm fusiform infrarenal abdominal aortic aneurysm measured at the L3 level, previously at the same level in 2021 this was 3.8 cm. The common iliac arteries are also aneurysmal measuring 2.9 cm   on the right and 2.2 cm on the left, previously 2.7 cm and 2.1 cm on the right and left respectively. There is a 6.4 cm cyst in the upper pole of the right kidney.          Impression:      Impression:  HEAD CT:  1.No acute intracranial abnormality.  2.There is a 2.5 cm hypodensity in the posterior left temporal lobe which does not appear acute but is new since 2023. This is likely a subacute or chronic infarct.  There is generalized parenchymal volume loss and chronic small vessel ischemic disease.  CT C-SPINE:  1.No acute fracture or malalignment.  2.There is multilevel degenerative disc disease and facet arthropathy as described above.  3.There is evidence of previous left carotid surgery. The overall appearance of the cervical spine is unchanged since  2019.    CT T-SPINE:  1.No acute fracture or malalignment.  2.There is multilevel degenerative disc disease with progression of degenerative changes at the T9-10 level.  3.There is marked cardiomegaly and a partially visualized pacemaker/AICD.  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  There is emphysema with numerous areas of scarring in the visualized lung fields.  CT L-SPINE:  1.No acute fracture or malalignment.  2.There is extensive multilevel degenerative disc disease and facet arthropathy with postsurgical changes as described above.  3.There is a 5.1 cm fusiform infrarenal abdominal aortic aneurysm which has increased in size since 2021. 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       Electronically Signed: Sanjiv Roberts DO    6/10/2025 9:06 PM EDT    Workstation ID: [id number]    CT Thoracic Spine Without Contrast [[id number]] Collected: 06/10/25 2036     Updated: 06/10/25 2108    Narrative:        CT CERVICAL SPINE WO CONTRAST, CT HEAD WO CONTRAST, CT LUMBAR SPINE WO CONTRAST, CT THORACIC SPINE WO CONTRAST    Date of Exam: 6/10/2025 7:35 PM EDT    Indication: ams/found down. 84-year-old    Comparison: Head CT without contrast dated 9/19/2023 CT C-spine dated 11/20/2019, chest CT without contrast dated 7/2/2020, L-spine CT myelogram from 8/4/2021    Technique: Axial CT images were obtained of the head, cervical, thoracic and lumbar spine without contrast administration.  Sagittal and coronal reconstructions were performed.  Automated exposure control and iterative reconstruction methods were used.      Findings:  Head CT: There is generalized parenchymal volume loss with ventricular prominence and sulcal widening, unchanged within the posterior left temporal lobe near the junction with the occipital lobe there is a 2.5 cm hypodensity which does not appear acute   but it is new since 2023.  There is no CT evidence of acute hemorrhage, infarct,  mass, mass effect, or midline shift. The gray-white matter differentiation is preserved. There is no hydrocephalus. The sulci and ventricles are symmetric.  No extraaxial fluid collections. The globes   and orbital contents are normal and symmetric. The mastoid air cells and visualized paranasal sinuses are clear except for minimal mucosal thickening in the ethmoid air cells and left maxillary sinus.. No skull fractures or suspicious calvarial lesions.    CT C-spine: There is motion artifact which degrades image quality particularly at the C2 and C3 levels.    The occipital condyles are intact. There is degenerative change at the atlantodental interval with osteophytes and joint space narrowing. The C1 to ring is intact. The vertebral body heights are maintained. There is minimal anterolisthesis of C3 6 on C7   due to facet arthropathy and degenerative disc disease. No acute fractures or aggressive appearing focal osseous lesions. The posterior elements are intact. There is facet arthropathy bilaterally at the C2-3 and C3-4, C4-5 and C5-6 levels as well as   degenerative disc space narrowing with endplate and uncovertebral joint spurring at the C3-4, C5-6 and C6-7 levels and to a lesser extent C4-5. The overall appearance has not significantly changed. No prevertebral soft tissue swelling.    There is evidence of previous left carotid surgery. There is partially visualized emphysema with chronic scarring in the lung apices, unchanged.    CT T-spine: The thoracic vertebral body heights are maintained. The posterior elements are intact. No acute fractures or malalignment. No aggressive focal osseous lesions. There are small nonbridging endplate osteophytes in the upper thoracic spine and   to lesser extent the mid and lower thoracic spine. There is disc space narrowing with vacuum discs at the T6/7, T7-8 and T8-9 910 levels with progression of degenerative changes particularly at the T9-10 level. The central spinal  canal in the thoracic   spine is patent. No focal disc abnormality is identified on CT. No paraspinal fluid collections or hemorrhage. Partially visualized is marked cardiomegaly and a pacemaker/AICD coronary artery stents and coronary artery calcifications. The partially   visualized thoracic aorta is diffusely ectatic up to 4.2 cm. There is emphysema with numerous areas of scarring in the visualized lung fields.      CT L-spine: There is hardware with posterior spinal rods and pedicle screws bilaterally at T12 with a left-sided pedicle screw at L1 and a right sided pedicle screw at L2 bilateral pedicle screws at L3 and L4. There are disc spacers with partial osseous   fusion at the L2-3 and L3-4 levels as well. The hardware appears intact without signs of loosening. The vertebral body heights are maintained. There is extensive multilevel degenerative disc disease with disc space narrowing and endplate osteophytes   throughout the lumbar spine the degenerative disc disease at the L4-5 level has progressed. No acute fractures are seen. The posterior elements are intact. There is hardware traversing the left SI joint as well.    Visualized soft tissues of the abdomen demonstrated 5.1 x 4.9 cm fusiform infrarenal abdominal aortic aneurysm measured at the L3 level, previously at the same level in 2021 this was 3.8 cm. The common iliac arteries are also aneurysmal measuring 2.9 cm   on the right and 2.2 cm on the left, previously 2.7 cm and 2.1 cm on the right and left respectively. There is a 6.4 cm cyst in the upper pole of the right kidney.          Impression:      Impression:  HEAD CT:  1.No acute intracranial abnormality.  2.There is a 2.5 cm hypodensity in the posterior left temporal lobe which does not appear acute but is new since 2023. This is likely a subacute or chronic infarct.  There is generalized parenchymal volume loss and chronic small vessel ischemic disease.  CT C-SPINE:  1.No acute fracture or  malalignment.  2.There is multilevel degenerative disc disease and facet arthropathy as described above.  3.There is evidence of previous left carotid surgery. The overall appearance of the cervical spine is unchanged since 2019.    CT T-SPINE:  1.No acute fracture or malalignment.  2.There is multilevel degenerative disc disease with progression of degenerative changes at the T9-10 level.  3.There is marked cardiomegaly and a partially visualized pacemaker/AICD.  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  There is emphysema with numerous areas of scarring in the visualized lung fields.  CT L-SPINE:  1.No acute fracture or malalignment.  2.There is extensive multilevel degenerative disc disease and facet arthropathy with postsurgical changes as described above.  3.There is a 5.1 cm fusiform infrarenal abdominal aortic aneurysm which has increased in size since 2021. 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       Electronically Signed: Sanjiv Roberts DO    6/10/2025 9:06 PM EDT    Workstation ID: [id number]    CT Lumbar Spine Without Contrast [[id number]] Collected: 06/10/25 2036     Updated: 06/10/25 2108    Narrative:        CT CERVICAL SPINE WO CONTRAST, CT HEAD WO CONTRAST, CT LUMBAR SPINE WO CONTRAST, CT THORACIC SPINE WO CONTRAST    Date of Exam: 6/10/2025 7:35 PM EDT    Indication: ams/found down. 84-year-old    Comparison: Head CT without contrast dated 9/19/2023 CT C-spine dated 11/20/2019, chest CT without contrast dated 7/2/2020, L-spine CT myelogram from 8/4/2021    Technique: Axial CT images were obtained of the head, cervical, thoracic and lumbar spine without contrast administration.  Sagittal and coronal reconstructions were performed.  Automated exposure control and iterative reconstruction methods were used.      Findings:  Head CT: There is generalized parenchymal volume loss with ventricular prominence and sulcal widening, unchanged  within the posterior left temporal lobe near the junction with the occipital lobe there is a 2.5 cm hypodensity which does not appear acute   but it is new since 2023.  There is no CT evidence of acute hemorrhage, infarct, mass, mass effect, or midline shift. The gray-white matter differentiation is preserved. There is no hydrocephalus. The sulci and ventricles are symmetric.  No extraaxial fluid collections. The globes   and orbital contents are normal and symmetric. The mastoid air cells and visualized paranasal sinuses are clear except for minimal mucosal thickening in the ethmoid air cells and left maxillary sinus.. No skull fractures or suspicious calvarial lesions.    CT C-spine: There is motion artifact which degrades image quality particularly at the C2 and C3 levels.    The occipital condyles are intact. There is degenerative change at the atlantodental interval with osteophytes and joint space narrowing. The C1 to ring is intact. The vertebral body heights are maintained. There is minimal anterolisthesis of C3 6 on C7   due to facet arthropathy and degenerative disc disease. No acute fractures or aggressive appearing focal osseous lesions. The posterior elements are intact. There is facet arthropathy bilaterally at the C2-3 and C3-4, C4-5 and C5-6 levels as well as   degenerative disc space narrowing with endplate and uncovertebral joint spurring at the C3-4, C5-6 and C6-7 levels and to a lesser extent C4-5. The overall appearance has not significantly changed. No prevertebral soft tissue swelling.    There is evidence of previous left carotid surgery. There is partially visualized emphysema with chronic scarring in the lung apices, unchanged.    CT T-spine: The thoracic vertebral body heights are maintained. The posterior elements are intact. No acute fractures or malalignment. No aggressive focal osseous lesions. There are small nonbridging endplate osteophytes in the upper thoracic spine and   to lesser  extent the mid and lower thoracic spine. There is disc space narrowing with vacuum discs at the T6/7, T7-8 and T8-9 910 levels with progression of degenerative changes particularly at the T9-10 level. The central spinal canal in the thoracic   spine is patent. No focal disc abnormality is identified on CT. No paraspinal fluid collections or hemorrhage. Partially visualized is marked cardiomegaly and a pacemaker/AICD coronary artery stents and coronary artery calcifications. The partially   visualized thoracic aorta is diffusely ectatic up to 4.2 cm. There is emphysema with numerous areas of scarring in the visualized lung fields.      CT L-spine: There is hardware with posterior spinal rods and pedicle screws bilaterally at T12 with a left-sided pedicle screw at L1 and a right sided pedicle screw at L2 bilateral pedicle screws at L3 and L4. There are disc spacers with partial osseous   fusion at the L2-3 and L3-4 levels as well. The hardware appears intact without signs of loosening. The vertebral body heights are maintained. There is extensive multilevel degenerative disc disease with disc space narrowing and endplate osteophytes   throughout the lumbar spine the degenerative disc disease at the L4-5 level has progressed. No acute fractures are seen. The posterior elements are intact. There is hardware traversing the left SI joint as well.    Visualized soft tissues of the abdomen demonstrated 5.1 x 4.9 cm fusiform infrarenal abdominal aortic aneurysm measured at the L3 level, previously at the same level in 2021 this was 3.8 cm. The common iliac arteries are also aneurysmal measuring 2.9 cm   on the right and 2.2 cm on the left, previously 2.7 cm and 2.1 cm on the right and left respectively. There is a 6.4 cm cyst in the upper pole of the right kidney.          Impression:      Impression:  HEAD CT:  1.No acute intracranial abnormality.  2.There is a 2.5 cm hypodensity in the posterior left temporal lobe which  does not appear acute but is new since 2023. This is likely a subacute or chronic infarct.  There is generalized parenchymal volume loss and chronic small vessel ischemic disease.  CT C-SPINE:  1.No acute fracture or malalignment.  2.There is multilevel degenerative disc disease and facet arthropathy as described above.  3.There is evidence of previous left carotid surgery. The overall appearance of the cervical spine is unchanged since 2019.    CT T-SPINE:  1.No acute fracture or malalignment.  2.There is multilevel degenerative disc disease with progression of degenerative changes at the T9-10 level.  3.There is marked cardiomegaly and a partially visualized pacemaker/AICD.  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  There is emphysema with numerous areas of scarring in the visualized lung fields.  CT L-SPINE:  1.No acute fracture or malalignment.  2.There is extensive multilevel degenerative disc disease and facet arthropathy with postsurgical changes as described above.  3.There is a 5.1 cm fusiform infrarenal abdominal aortic aneurysm which has increased in size since 2021. 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       Electronically Signed: Sanjiv Roberts DO    6/10/2025 9:06 PM EDT    Workstation ID: [id number]    XR Elbow 3+ View Right [[id number]] Collected: 06/10/25 2009     Updated: 06/10/25 2014    Narrative:      XR ELBOW 3+ VW RIGHT    Date of Exam: 6/10/2025 7:10 PM EDT    Indication: trauma 84-year-old right elbow redness and bruising unable to straighten arm    Comparison: None available.    Findings:  An acute fracture is not demonstrated. No dislocation. Small osteophyte is present in the coronoid process. There is soft tissue swelling posteriorly. No definite joint effusion. No focal osseous lesions.      Impression:      Impression:  Superficial soft tissue swelling posteriorly which can be seen with olecranon bursitis or  cellulitis or contusion in the appropriate clinical setting. There are mild degenerative changes. No acute bony abnormality. No joint effusion is demonstrated.      Electronically Signed: Sanjiv Roberts DO    6/10/2025 8:11 PM EDT    Workstation ID: [id number]    XR Chest 1 View [[id number]] Collected: 06/10/25 2003     Updated: 06/10/25 2011    Narrative:      XR CHEST 1 VW    Date of Exam: 6/10/2025 7:01 PM EDT    Indication: ams 84-year-old    Comparison: Two-view chest radiograph from 12/29/2020, single view chest dated 7/10/2020    Findings:  There is a dual lead AICD in place from a left-sided approach, it appears unchanged radiographically. Cardiomegaly is unchanged. Cardiac hilar mediastinal silhouettes are stable. The right diaphragm is chronically elevated. There is patchy and   interstitial opacity in the periphery of the right mid lung and right basilar density. 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 This is superimposed on areas of chronic scarring. The left lung is grossly   clear. No pneumothorax or significant pleural effusions. There are senescent changes in the visualized skeletal structures.      Impression:      Impression:  Patchy and interstitial opacity in the periphery of the right mid lung and right basilar density which has evolved since 12/29/2020 and could be related to acute and/or chronic infection or inflammatory change.. This is superimposed on areas of chronic   scarring and postsurgical change.  2. Cardiomegaly and AICD are unchanged.        Electronically Signed: Sanjiv Roberts DO    6/10/2025 8:09 PM EDT    Workstation ID: [id number]              Assessment & Plan    Shukri Park is a 84 y.o. male with a CMH of CAD, HF-imp-EF with LVEF of 55 % With mild concentric hypertrophy per echo in 2023, Afib on AC, status post pacemaker, HTN, HLD,  who presented to [facility] on 6/10/2025 with  Acute encephalopathy.    Assessments:    Acute encephalopathy - possible subacute/chronic stroke, pneumonia  Right mid/lower lung opacities - concern for pneumonia  Resolved transient left eye vision loss - r/o stroke  Atrial fibrillation/flutter with paced rhythm on anticoagulation  Status post pacemaker  Rhabdomyolysis - likely from fall  Enlarging infrarenal AAA (5.1 cm, was 4.75 cm in 2023)  Chronic back pain, s/p spinal hardware placement    Plan:     Chest X-ray shows right mid and lower lung opacities; with leukocytosis and altered mental status, continue ceftriaxone. Add Atrovent and Pulmicort. Follow blood cultures.    CT head shows new 2.5 cm hypodensity in left posterior temporal lobe, likely subacute/chronic infarct. MRI not feasible due to spinal hardware. Consult neurology; outside permissive HTN window. Resume anticoagulation if cleared.    Check ammonia, B12, and TSH/FT4 for encephalopathy workup.    Pacemaker was interrogated in ED; pending [employer] report. If arrhythmia events are noted, consider cardiology consult.    Start LR at 100 cc/hr for rhabdomyolysis; monitor CK with morning labs.    Recommend outpatient PCP and vascular surgery follow-up for AAA (5.1 cm, up from 4.75 cm in 2023); below intervention threshold.    Bedside nursing swallow screen, if failed consult SLP for further evaluation    Resume home medications as appropriate once reconciled by pharmacy.    Follow a.m. labs.    VTE Prophylaxis:  No VTE prophylaxis order currently exists.    CODE STATUS:  DNR/DNI: Discussion held with the patient’s grandson, who is the POA. He stated the patient had clearly expressed wishes not to be resuscitated or placed on life-sustaining machines, including mechanical ventilation. Code status updated to DNR/DNI per patient’s previously expressed wishes.     I discussed the patient's findings and my recommendations with family.    This document has been electronically signed by Karlo Richardson MD on Hali 10, 2025 21:46 EDT    [facility]ist [facility]

## 2025-06-11 NOTE — CASE MANAGEMENT/SOCIAL WORK
Discharge Planning Assessment  [facility]     Patient Name: Shukri Park  MRN: [id number]  Today's Date: 6/11/2025    Admit Date: 6/10/2025    Plan: DC plan: From home alone. Pending neurology work up. Pending MRI BRAIN w/wo constrast , PT OT EVAL.   Discharge Needs Assessment       Row Name 06/11/25 1156       Living Environment    People in Home alone    Current Living Arrangements home    Potentially Unsafe Housing Conditions none    In the past 12 months has the electric, gas, oil, or water company threatened to shut off services in your home? No    Primary Care Provided by self    Provides Primary Care For no one    Family Caregiver if Needed child(hira), adult;grandchild(hira), adult    Family Caregiver Names Grandson Sebastian STAUFFER, daughters Emely, Michelle, son Azael , aaliyah Robledo    Quality of Family Relationships helpful;involved;supportive    Able to Return to Prior Arrangements yes       Resource/Environmental Concerns    Resource/Environmental Concerns none    Transportation Concerns none       Transportation Needs    In the past 12 months, has lack of transportation kept you from medical appointments or from getting medications? no    In the past 12 months, has lack of transportation kept you from meetings, work, or from getting things needed for daily living? No       Food Insecurity    Within the past 12 months, you worried that your food would run out before you got the money to buy more. Never true    Within the past 12 months, the food you bought just didn't last and you didn't have money to get more. Never true       Transition Planning    Patient/Family Anticipates Transition to other (see comments)  skilled nursing    Patient/Family Anticipated Services at Transition skilled nursing    Transportation Anticipated family or friend will provide       Discharge Needs Assessment    Readmission Within the Last 30 Days no previous admission in last 30 days    Equipment Currently Used at Home cane, straight;other  (see comments)  has rolling walker but does not use    Concerns to be Addressed care coordination/care conferences;discharge planning    Do you want help finding or keeping work or a job? I do not need or want help    Do you want help with school or training? For example, starting or completing job training or getting a high school diploma, GED or equivalent No    Anticipated Changes Related to Illness none    Equipment Needed After Discharge none    Outpatient/Agency/Support Group Needs skilled nursing facility    Discharge Facility/Level of Care Needs nursing facility, skilled    Provided Post Acute Provider List? Yes    Post Acute Provider List Nursing Home    Delivered To Support Person    Support Person Sebastian ese Park and family                   Discharge Plan       Row Name 06/11/25 7713       Plan    Plan DC plan: From home alone. Pending neurology work up. Pending MRI BRAIN w/wo constrast , PT OT EVAL.    Patient/Family in Agreement with Plan yes    Plan Comments Patient lives at home with alone.Family reports that patient drives some locally. Patient performs ADL. PCP and pharmacy confirmed. Denies financial assistance needs for medication and/or food. Family reports that patient is very resistant to moving out of his home or having others in to help.  Will likely need rehab/snf at discharge.  Spoke with family and ese Handy and list of skilled facilities given to them.  Pending stroke workup                       Demographic Summary       Row Name 06/11/25 9618       General Information    Admission Type inpatient    Arrived From emergency department    Required Notices Provided Important Message from Medicare    Referral Source admission list    Reason for Consult discharge planning    Preferred Language English       Contact Information    Permission Granted to Share Info With                    Functional Status       Row Name 06/11/25 2015       Functional Status     Usual Activity Tolerance moderate    Current Activity Tolerance fair       Functional Status, IADL    Medications assistive person    Meal Preparation independent    Housekeeping independent    Laundry independent    Shopping assistive person    If for any reason you need help with day-to-day activities such as bathing, preparing meals, shopping, managing finances, etc., do you get the help you need? I get all the help I need       Mental Status    General Appearance WDL WDL       Mental Status Summary    Recent Changes in Mental Status/Cognitive Functioning memory (recent);other (see comments)  oriented to person, place only                  Jenna Domingo RN    SIPS 1  [email or web address]  Office [phone]  Cell [phone]

## 2025-06-11 NOTE — PLAN OF CARE
"Goal Outcome Evaluation:  Pt seen on this date for clinical bedside swallow evaluation per failing swallow screening.      Head CT with following impression: \"1.No acute intracranial abnormality. There is a 2.5 cm hypodensity in the posterior left temporal lobe which does not appear acute but is new since 2023. This is likely a subacute or chronic infarct. There is generalized parenchymal volume loss and chronic small vessel ischemic disease.     CXR with following impression: \"Patchy and interstitial opacity in the periphery of the right mid lung and right basilar density which has evolved since 12/29/2020 and could be related to acute and/or chronic infection or inflammatory change.. This is superimposed on areas of chronic scarring and postsurgical change. Cardiomegaly and AICD are unchanged.\"      Upon entry, pt upright in bed with grandson at bedside. Pt confused but participatory. Grandson at bedside denies hx of swallowing difficulties, BL reg/thin diet. Oral mech revealed R labial drooping, generalized weakness, top dentures in place. Per grandson, R drooping is not baseline, pt has bottom dentures at home. Pt noted to be dysarthric secondary to drooping. Pt able to self feed. PO observed: water by cup x2, water by straw x4. Pt with impaired labial seal, some difficulty pulling liquids. Oral prep and transit functional, complete clearance. Pt with cough x3, throat clearing, and reduction in SpO2. D/t overt s/s of aspiration at bedside, no further trials attempted. Discussed with pt and grandson recommendation of VFSS - verbalized agreement. It is recommended pt remain NPO and participate in VFSS to determine safest and least restrictive diet. ST will complete VFSS with additional recs to follow. Nursing updated.     "

## 2025-06-11 NOTE — MBS/VFSS/FEES
Acute Care - Speech Language Pathology   Swallow Initial Evaluation [facility]     Patient Name: Shukri Park  : 1940  MRN: [id number]  Today's Date: 2025               Admit Date: 6/10/2025    Visit Dx:     ICD-10-CM ICD-9-CM   1. Altered mental status, unspecified altered mental status type  R41.82 780.97   2. Traumatic rhabdomyolysis, initial encounter  T79.6XXA 958.6     Patient Active Problem List   Diagnosis    Chronic anticoagulation    Mixed hyperlipidemia    Presence of stent in right coronary artery    Chronic pain    Ischemic cardiomyopathy    Persistent atrial fibrillation    Acute on chronic systolic congestive heart failure    Coronary artery disease involving native coronary artery of native heart without angina pectoris    Disorder of sacroiliac joint    Lumbosacral radiculopathy    Mitral regurgitation    Neck pain    Nevus of choroid    Presbyopia    Vitamin D deficiency    History of lung cancer    Chronic hypotension    Gastritis    Presence of biventricular implantable cardioverter-defibrillator (ICD)    Inguinodynia, right    Acute encephalopathy     Past Medical History:   Diagnosis Date    A-fib     Aortic aneurysm     Appetite loss     Cancer     right lobe cancer - surgically removed     CHF (congestive heart failure)     Coronary artery disease     Dark stools     Foot pain, bilateral     GERD (gastroesophageal reflux disease)     Hyperlipidemia     Low back pain     S/P epidural steroid injection     Israel Gomes's - no relief    Weight loss     acute loss of weight 30 lbs     Past Surgical History:   Procedure Laterality Date    CARDIAC CATHETERIZATION N/A 2020    Procedure: LEFT HEART CATH with possible PCI;  Surgeon: Wade Cronin MD;  Location: [facility] CATH INVASIVE LOCATION;  Service: Cardiovascular;  Laterality: N/A;  Local and IV sedation    CARDIAC CATHETERIZATION N/A 2020    Procedure: Percutaneous Coronary Intervention;  Surgeon: Wade Cronin MD;   Location: [facility] CATH INVASIVE LOCATION;  Service: Cardiovascular;  Laterality: N/A;    CARDIAC DEFIBRILLATOR PLACEMENT      CARDIAC ELECTROPHYSIOLOGY PROCEDURE N/A 7/10/2020    Procedure: Biventricular Device Insertion;  Surgeon: Jonathan Denney MD;  Location: [facility] CATH INVASIVE LOCATION;  Service: Cardiovascular;  Laterality: N/A;    CARDIAC ELECTROPHYSIOLOGY PROCEDURE N/A 7/10/2020    Procedure: ABLATION AV NODE;  Surgeon: Jonathan Denney MD;  Location: [facility] CATH INVASIVE LOCATION;  Service: Cardiovascular;  Laterality: N/A;    CARDIAC ELECTROPHYSIOLOGY PROCEDURE N/A 7/10/2020    Procedure: AV node ablation;  Surgeon: Jonathan Denney MD;  Location: [facility] CATH INVASIVE LOCATION;  Service: Cardiovascular;  Laterality: N/A;    CARDIOVASCULAR STRESS TEST  2020    CATARACT EXTRACTION, BILATERAL      CONVERTED (HISTORICAL) HOLTER  2020    CORONARY ANGIOPLASTY WITH STENT PLACEMENT      ENDOSCOPY N/A 7/5/2020    Procedure: ESOPHAGOGASTRODUODENOSCOPY;  Surgeon: Neal Correa MD;  Location: [facility] ENDOSCOPY;  Service: Gastroenterology;  Laterality: N/A;    INGUINAL HERNIA REPAIR Right 1/7/2021    Procedure: INGUINAL HERNIA REPAIR OPEN WITH MESH;  Surgeon: Cody Randall MD;  Location: [facility] MAIN OR;  Service: General;  Laterality: Right;    LUMBAR DECOMPRESSION  09/2015    L2-L3    LUMBAR DECOMPRESSION  2006    Dr. Logan    OTHER SURGICAL HISTORY  05/2015    left SI fusion with bone graft - Dr. Presley    OTHER SURGICAL HISTORY      carotid artery repair     OTHER SURGICAL HISTORY Left 02/19/2016    Left SI fusion 2/19/2016 Dr. Zendejas    POSTERIOR SPINAL FUSION  10/24/2016    PSF T12-3/TLIF L3-L4 poss L2-L3 --- Dr. 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 for Method of Medication Administration: meds via alternate route (06/11/25 1200)     Monitor for Signs of Aspiration: yes, notify SLP if any concerns (06/11/25 1200)  Recommended Diagnostics: reassess via clinical swallow evaluation, reassess via VFSS ([facility] [place]) (06/11/25 1200)  Swallow Criteria for Skilled Therapeutic Interventions Met: demonstrates skilled criteria (06/11/25 1200)     Rehab Potential/Prognosis, Swallowing: good, to achieve stated therapy goals (06/11/25 1200)  Therapy Frequency (Swallow): 3 days per week, 4 days per week (06/11/25 1200)  Predicted Duration Therapy Intervention (Days): until discharge (06/11/25 1200)  Oral Care Recommendations: Oral Care BID/PRN (06/11/25 1200)    SWALLOW EVALUATION (Last 72 Hours)       SLP Adult Swallow Evaluation       Row Name 06/11/25 1200       Rehab Evaluation    Document Type evaluation  -PF    Subjective Information no complaints  -PF    Patient Observations alert;cooperative  -PF    Patient Effort good  -PF    Symptoms Noted During/After Treatment none  -PF       General Information    Patient Profile Reviewed yes  -PF    Current Method of Nutrition NPO  -PF    Precautions/Limitations, Vision WFL;for purposes of eval  -PF    Precautions/Limitations, Hearing WFL;for purposes of eval  -PF    Prior Level of Function-Communication motor speech impairment;other (see comments)  dysarthric  -PF    Prior Level of Function-Swallowing no diet consistency restrictions;safe, efficient swallowing in all situations  -PF    Plans/Goals Discussed with patient;family;agreed upon  -PF    Barriers to Rehab cognitive status  -PF       Oral Motor Structure and Function    Dentition Assessment upper dentures/partial in place;other (see comments)  has lower dentures, not available.  -PF    Secretion Management WNL/WFL  -PF    Mucosal Quality dry  -PF       Oral Musculature and  Cranial Nerve Assessment    Oral Motor General Assessment generalized oral motor weakness  -PF    Oral Labial or Buccal Impairment, Detail, Cranial Nerve VII (Facial): right labial droop  -PF       General Eating/Swallowing Observations    Respiratory Support Currently in Use room air  -PF    Eating/Swallowing Skills self-fed  -PF    Positioning During Eating upright 90 degree;upright in bed  -PF    Utensils Used straw;cup  -PF    Consistencies Trialed thin liquids  -PF       MBS/VFSS    Utensils Used spoon;cup  -PF    Consistencies Trialed nectar/syrup-thick liquids;thin liquids;pureed;mixed consistency;mechanical ground textures  -PF       MBS/VFSS Interpretation    VFSS Summary VFSS completed. Pt viewed seated at 90 degrees in the lateral position. Pt was fed all trials by clinician retrospectively. Pt observed upright in the lateral projection and was given trials of NTL by cup x2, puree x2, mech soft/mixed x2, thin by cup sequential sips x2 to complete the study.       Complete labial seal resulting in no anterior spillage on any trial or consistency.  Reduced bolus formation and cohesion on mech soft. Impaired and slow A-P transit. Premature spillage over the BOT at initiation of pharyngeal swallow and to the pyriform sinus on thin. Extended mastication and reduced mastication strength on mech soft.   Delayed swallow initiation. Reduced laryngeal elevation and forward hyoid movement.  Trace lingual and vallecular residue on all trials/consistencies.  Epiglottic inversion and laryngeal vestibular closure adequate on NT, puree, and mech soft trials;  Material does not enter airway (a 1 on the Rosenbeck Penetration-Aspiration Scale). Incomplete epiglottic inversion on thin resulting in reduced vestibular closure, resulting in mildly deep laryngeal penetration during the swallow that does not clear after the swallow; Material enters the airway, remains above the vocal folds, and is not ejected from the airway (a 3  on the Rosenbeck Penetration-Aspiration Scale). Esophageal scan was unremarkable.       SLP Plan & Recommendations:    - Puree and NTL, no straw, w/ Ruggiero Water Protocol (FWP - see guidelines below)  - Meds: whole or crushed in puree or NTL  - Safe swallow strategies: upright during all PO/meds, slow rate of intake, small bites/sips, alternate liquids and solids  - ST will continue to follow to ensure tolerance and safety of rec'd diet, target dysphagia treatment, and provide further recs as indicated     Water Protocol  The rationale to recommend water when a PO diet cannot appropriately/functionally sustain nutrition (or if thickened liquids are prescribed due to aspiration of thin liquids) is because water is low risk for aspiration pna when compared to aspiration of food or other liquids.    Benefits of a water protocol include but are not limited to:     Oral gratification  Engagement of oropharyngeal swallow musculature  Decrease likelihood of dehydration       Guidelines for proper implementation include:  Waiting a minimum of 30 minutes after consuming any other PO  Thorough oral care prior to consuming water  Upright at 90 degree hip flexion  Small sips at slow rate  Monitor for any changes in respiratory status and discontinue if distress noted     The Ruggiero Free Water Protocol is a research based protocol established in 1984.    Penetration of thin during the swallow, which does not clear and remains in supraglottis w/ possible eventual aspiration          -PF       SLP Evaluation Clinical Impression    SLP Swallowing Diagnosis moderate;oral dysphagia;mild;pharyngeal dysphagia  -PF    Functional Impact risk of aspiration/pneumonia;risk of malnutrition;risk of dehydration  -PF    Rehab Potential/Prognosis, Swallowing good, to achieve stated therapy goals  -PF    Swallow Criteria for Skilled Therapeutic Interventions Met demonstrates skilled criteria  -PF       Recommendations    Therapy Frequency  (Swallow) 3 days per week;4 days per week  -PF    Predicted Duration Therapy Intervention (Days) until discharge  -PF    SLP Diet Recommendation NPO;water between meals after oral care, with supervision;ice chips between meals after oral care, with supervision  -PF    Recommended Diagnostics reassess via clinical swallow evaluation;[place]ess [place] [place]  -PF    Oral Care Recommendations Oral Care BID/PRN  -PF    SLP Rec. for Method of Medication Administration meds via alternate route  -PF    Monitor for Signs of Aspiration yes;notify SLP if any concerns  -PF       Swallow Goals (SLP)    Swallow LTGs Swallow Long Term Goal (free text)  -PF    Swallow STGs diet tolerance goal selection (SLP);pharyngeal strengthening exercise goal selection (SLP)  -PF    Diet Tolerance Goal Selection (SLP) Swallow Short Term Goal 1;Swallow Short Term Goal 2  -PF    Pharyngeal Strengthening Exercise Goal Selection (SLP) pharyngeal strengthening exercise, SLP goal 1  -PF       (LTG) Swallow    (LTG) Swallow Patient will participate in ongoing assessment of swallow, including reevaluation of swallow clinically and/or including instrumental assessment of swallow if indicated, to further assess swallow function in anticipation of initiation of PO diet.  -PF    [place] (Swallow Long Term Goal) with minimal cues (75-90% accuracy)  -PF    Time Frame (Swallow Long Term Goal) by discharge  -PF    Progress/Outcomes (Swallow Long Term Goal) new goal  -PF       (STG) Swallow 1    (STG) Swallow 1 Patient will participate in ongoing assessment of swallow, including reevaluation clinically and/or including instrumental assessment of swallow if indicated, to further assess swallow function  -PF    [place] (Swallow Short Term Goal 1) with minimal cues (75-90% accuracy)  -PF    Time Frame (Swallow Short Term Goal 1) 1 week  -PF    Progress/Outcomes (Swallow Short Term Goal 1) new goal  -PF       (STG) Swallow 2    (STG) Swallow 2 The patient  will participate in a full meal assessment to determine safety and adequacy of recommended diet, independent use of safe swallow compensations, and additional goals/recommendations to follow.  -PF    [place] (Swallow Short Term Goal 2) with minimal cues (75-90% accuracy)  -PF    Time Frame (Swallow Short Term Goal 2) 1 week  -PF    Progress/Outcomes (Swallow Short Term Goal 2) new goal  -PF       (STG) Pharyngeal Strengthening Exercise Goal 1 (SLP)    Activity (Pharyngeal Strengthening Goal 1, SLP) increase epiglottic inversion and retroflexion;increase closure at entrance to airway/closure of airway at glottis  -PF    Increase Epiglottic Inversion and Retroflexion supraglottic swallow;hard effortful swallow  -PF    Increase Closure at Entrance to Airway/Closure of Airway at Glottis effortful pitch glide (falsetto + pharyngeal squeeze);hard effortful swallow  -PF    [place]/Accuracy (Pharyngeal Strengthening Goal 1, SLP) with moderate cues (50-74% accuracy)  -PF    Time Frame (Pharyngeal Strengthening Goal 1, SLP) short term goal (STG)  -PF    Progress/Outcomes (Pharyngeal Strengthening Goal 1, SLP) new goal  -PF              User Key  (r) = Recorded By, (t) = Taken By, (c) = Cosigned By      Initials Name Effective Dates    PF Haider Ascencio, SLP 05/08/23 -     AA Polly Al, SLP 06/18/24 -              EDUCATION  The patient has been educated in the following areas:   Dysphagia (Swallowing Impairment) Oral Care/Hydration Modified Diet Instruction.        SLP GOALS       Row Name 06/11/25 1200       (LTG) Swallow    (LTG) Swallow Patient will participate in ongoing assessment of swallow, including reevaluation of swallow clinically and/or including instrumental assessment of swallow if indicated, to further assess swallow function in anticipation of initiation of PO diet.  -PF    [place] (Swallow Long Term Goal) with minimal cues (75-90% accuracy)  -PF    Time Frame (Swallow Long Term Goal) by  discharge  -PF    Progress/Outcomes (Swallow Long Term Goal) new goal  -PF       (STG) Swallow 1    (STG) Swallow 1 Patient will participate in ongoing assessment of swallow, including reevaluation clinically and/or including instrumental assessment of swallow if indicated, to further assess swallow function  -PF    [place] (Swallow Short Term Goal 1) with minimal cues (75-90% accuracy)  -PF    Time Frame (Swallow Short Term Goal 1) 1 week  -PF    Progress/Outcomes (Swallow Short Term Goal 1) new goal  -PF       (STG) Swallow 2    (STG) Swallow 2 The patient will participate in a full meal assessment to determine safety and adequacy of recommended diet, independent use of safe swallow compensations, and additional goals/recommendations to follow.  -PF    [place] (Swallow Short Term Goal 2) with minimal cues (75-90% accuracy)  -PF    Time Frame (Swallow Short Term Goal 2) 1 week  -PF    Progress/Outcomes (Swallow Short Term Goal 2) new goal  -PF       (STG) Pharyngeal Strengthening Exercise Goal 1 (SLP)    Activity (Pharyngeal Strengthening Goal 1, SLP) increase epiglottic inversion and retroflexion;increase closure at entrance to airway/closure of airway at glottis  -PF    Increase Epiglottic Inversion and Retroflexion supraglottic swallow;hard effortful swallow  -PF    Increase Closure at Entrance to Airway/Closure of Airway at Glottis effortful pitch glide (falsetto + pharyngeal squeeze);hard effortful swallow  -PF    [place]/Accuracy (Pharyngeal Strengthening Goal 1, SLP) with moderate cues (50-74% accuracy)  -PF    Time Frame (Pharyngeal Strengthening Goal 1, SLP) short term goal (STG)  -PF    Progress/Outcomes (Pharyngeal Strengthening Goal 1, SLP) new goal  -PF              User Key  (r) = Recorded By, (t) = Taken By, (c) = Cosigned By      Initials Name Provider Type    PF Haider Ascencio, SLP Speech and Language Pathologist    Polly Gama, SLP Speech and Language Pathologist                Haider Ascencio, LUZ ELENA  6/11/2025

## 2025-06-11 NOTE — CONSULTS
Primary Care Provider: Provider, No Known     Consult requested by:  Hospitalist Service     Reason for Consultation: Neurological evaluation - subacute stroke    History taken from: patient chart RN    Chief complaint: AMS       SUBJECTIVE:    History of present illness: Background per H&P: Shukri Park is a 84 y.o. male who has a PMHx of HF-imp-EF with LVEF of 55 % With mild concentric hypertrophy per echo in 2023, Afib on AC, status post AICD, HTN, HLD presneing to [facility] 6/10/25 ED with acute confusion. ON 6/8/25 he had acute painless vision loss with confusion while at a grocery store. He was driven home by a store employee and later that day found to be agitated and reportedly at that time his vision had returned to baseline. On 6/9 he was not responding to phone calls and a wellness check was made on 6/10 where he was found on the floor by a neighbor, incontinent of urine, confused and irritable. He was transported to this facility by EMS ground.     In the ED, patient was afebrile with stable vitals. Labs notable for WBC 14.7, Cr 1.15, AST 77, normal ALT, T. bili 1.5, CK 2133, and troponins 35 ? 32. EKG showed Afib/flutter with a paced rhythm. CT head revealed a new 2.5 cm hypodensity in the left posterior temporal lobe, likely subacute or chronic infarct. CT C-, T-, and L-spine showed no acute fractures; spinal hardware intact with multilevel degenerative changes. 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  Patient was given dose of ceftriaxone and doxycycline, 500 cc of bolus fluids, admitted to medicine team for further inpatient management.    On my examination, family is at bedside proving collateral information. He is Aox3, but has some residual dysarthria, otherwise almost fully neurointact.               - Portions of the above HPI were copied from previous encounters and edited as  appropriate. PMH as detailed below.     Review of Systems   Unable to perform ROS: Mental status change   Constitutional:  Negative for fever and unexpected weight change.   HENT:  Negative for ear pain, hearing loss, tinnitus, trouble swallowing and voice change.    Eyes:  Negative for photophobia and visual disturbance.   Respiratory:  Negative for chest tightness and shortness of breath.    Cardiovascular:  Negative for chest pain and palpitations.   Gastrointestinal:  Negative for nausea and vomiting.   Genitourinary:  Negative for difficulty urinating, dysuria, frequency and urgency.   Musculoskeletal:  Negative for back pain, gait problem, neck pain and neck stiffness.   Neurological:  Negative for dizziness, tremors, seizures, syncope, facial asymmetry, speech difficulty, weakness, light-headedness, numbness and headaches.   Psychiatric/Behavioral:  Negative for agitation, behavioral problems and confusion.    All other systems reviewed and are negative.         PATIENT HISTORY:  Past Medical History:   Diagnosis Date    A-fib     Aortic aneurysm     Appetite loss     Cancer     right lobe cancer - surgically removed     CHF (congestive heart failure)     Coronary artery disease     Dark stools     Foot pain, bilateral     GERD (gastroesophageal reflux disease)     Hyperlipidemia     Low back pain     S/P epidural steroid injection     Israel Gomes's - no relief    Weight loss     acute loss of weight 30 lbs   ,   Past Surgical History:   Procedure Laterality Date    CARDIAC CATHETERIZATION N/A 7/9/2020    Procedure: LEFT HEART CATH with possible PCI;  Surgeon: Wade Cronin MD;  Location: [facility] CATH INVASIVE LOCATION;  Service: Cardiovascular;  Laterality: N/A;  Local and IV sedation    CARDIAC CATHETERIZATION N/A 7/9/2020    Procedure: Percutaneous Coronary Intervention;  Surgeon: Wade Cronin MD;  Location: [facility] CATH INVASIVE LOCATION;  Service: Cardiovascular;  Laterality: N/A;    CARDIAC  DEFIBRILLATOR PLACEMENT      CARDIAC ELECTROPHYSIOLOGY PROCEDURE N/A 7/10/2020    Procedure: Biventricular Device Insertion;  Surgeon: Jonathan Denney MD;  Location: [facility] CATH INVASIVE LOCATION;  Service: Cardiovascular;  Laterality: N/A;    CARDIAC ELECTROPHYSIOLOGY PROCEDURE N/A 7/10/2020    Procedure: ABLATION AV NODE;  Surgeon: Jonathan Denney MD;  Location: [facility] CATH INVASIVE LOCATION;  Service: Cardiovascular;  Laterality: N/A;    CARDIAC ELECTROPHYSIOLOGY PROCEDURE N/A 7/10/2020    Procedure: AV node ablation;  Surgeon: Jonathan Denney MD;  Location: [facility] CATH INVASIVE LOCATION;  Service: Cardiovascular;  Laterality: N/A;    CARDIOVASCULAR STRESS TEST      CATARACT EXTRACTION, BILATERAL      CONVERTED (HISTORICAL) HOLTER      CORONARY ANGIOPLASTY WITH STENT PLACEMENT      ENDOSCOPY N/A 2020    Procedure: ESOPHAGOGASTRODUODENOSCOPY;  Surgeon: Neal Correa MD;  Location: [facility] ENDOSCOPY;  Service: Gastroenterology;  Laterality: N/A;    INGUINAL HERNIA REPAIR Right 2021    Procedure: INGUINAL HERNIA REPAIR OPEN WITH MESH;  Surgeon: Cody Randall MD;  Location: [facility] MAIN OR;  Service: General;  Laterality: Right;    LUMBAR DECOMPRESSION  2015    L2-L3    LUMBAR DECOMPRESSION  2006    Dr. Logan    OTHER SURGICAL HISTORY  2015    left SI fusion with bone graft - Dr. Presley    OTHER SURGICAL HISTORY      carotid artery repair     OTHER SURGICAL HISTORY Left 2016    Left SI fusion 2016 Dr. Zendejas    POSTERIOR SPINAL FUSION  10/24/2016    PSF T12-3/TLIF L3-L4 poss L2-L3 --- Dr. 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 Authorizing Provider   ipratropium (ATROVENT) 0.03 % nasal spray Administer 2 sprays into the nostril(s) as directed by provider 3 (Three) Times a Day.   Yes Marvin Lucas MD   latanoprost (XALATAN) 0.005 % ophthalmic solution Administer 1 drop to both eyes Every Night. 6/20/24  Yes Marvin Lucas MD   pantoprazole (PROTONIX) 40 MG EC tablet Take 1 tablet by mouth Daily. 7/15/20  Yes Elena Epps MD   simvastatin (ZOCOR) 40 MG tablet Take 1 tablet by mouth Every Night.   Yes Marvin uLcas MD   Xarelto 15 MG tablet TAKE 1 TABLET BY MOUTH EVERY DAY 7/5/22  Yes Wade Cronin MD   aspirin (aspirin) 81 MG EC tablet Take 1 tablet by mouth Daily.  Patient taking differently: Take 1 tablet by mouth Daily. LD 1/2/21 8/20/20   Jonathan Denney MD   docusate sodium (COLACE) 100 MG capsule Take 1 capsule by mouth 2 (Two) Times a Day.    Marvin Lucas MD   ferrous sulfate 325 (65 FE) MG tablet Take 1 tablet by mouth Daily With Breakfast. Hold DOs 9/2/20   Marvin Lucas MD   midodrine (PROAMATINE) 5 MG tablet Take 1 tablet by mouth 2 (Two) Times a Day.    Marvin Lucas MD   Multiple Vitamin (MULTIVITAMIN) tablet Take 1 tablet by mouth Daily. LD 1/2    Marvin Lucas MD   vitamin B-12 (CYANOCOBALAMIN) 1000 MCG tablet Take 1 tablet by mouth Daily. 11/15/18   Marvin Lucas MD    Allergies:  Ciprofloxacin, Amlodipine, and Rocephin [ceftriaxone]    Current Facility-Administered Medications   Medication Dose Route Frequency Provider Last Rate Last Admin    aspirin EC tablet 81 mg  81 mg Oral Daily Khadra Alex MD        atorvastatin (LIPITOR) tablet 10 mg  10 mg Oral Daily Karlo Richardson MD        budesonide  (PULMICORT) nebulizer solution 0.5 mg  0.5 mg Nebulization BID - RT Karlo Richardson MD        Calcium Replacement - Follow Nurse / BPA Driven Protocol   Not Applicable PRKhadra Kaminski MD        cefTRIAXone (ROCEPHIN) 2,000 mg in sodium chloride 0.9 % 100 mL MBP  2,000 mg Intravenous Daily Karlo Richardson MD        ferrous sulfate tablet 325 mg  325 mg Oral Daily With Breakfast Khadra Alex MD        ipratropium (ATROVENT) nebulizer solution 0.5 mg  0.5 mg Nebulization 4x Daily - RT Karlo Richardson MD        lactated ringers infusion  100 mL/hr Intravenous Continuous Karlo Richardson  mL/hr at 06/11/25 0238 100 mL/hr at 06/11/25 0238    latanoprost (XALATAN) 0.005 % ophthalmic solution 1 drop  1 drop Both Eyes Nightly Khadra Alex MD        Magnesium Standard Dose Replacement - Follow Nurse / BPA Driven Protocol   Not Applicable PRN Khadra Alex MD        [Held by provider] midodrine (PROAMATINE) tablet 5 mg  5 mg Oral BID AC Khadra Alex MD        nitroglycerin (NITROSTAT) SL tablet 0.4 mg  0.4 mg Sublingual Q5 Min PRN Khadra Alex MD        pantoprazole (PROTONIX) EC tablet 40 mg  40 mg Oral Daily Karlo Richardson MD        Phosphorus Replacement - Follow Nurse / BPA Driven Protocol   Not Applicable PRN Khadra Alex MD        Potassium Replacement - Follow Nurse / BPA Driven Protocol   Not Applicable PRN Khadra Alex MD        rivaroxaban (XARELTO) tablet 15 mg  15 mg Oral Q PM Khadra Alex MD        sodium chloride 0.9 % flush 10 mL  10 mL Intravenous PRN Khadra Alex MD        sodium chloride 0.9 % flush 10 mL  10 mL Intravenous Q12H Khadra Alex MD   10 mL at 06/10/25 2150    sodium chloride 0.9 % flush 10 mL  10 mL Intravenous PRN Khadra Alex MD        sodium chloride 0.9 % flush 10 mL  10 mL Intravenous Q12H Ramo Arroyo MD         sodium chloride 0.9 % flush 10 mL  10 mL Intravenous PRN Ramo Arroyo MD        sodium chloride 0.9 % infusion 40 mL  40 mL Intravenous PRN Khadra Alex MD        sodium chloride 0.9 % infusion 40 mL  40 mL Intravenous PRN Ramo Arroyo MD        vitamin B-12 (CYANOCOBALAMIN) tablet 1,000 mcg  1,000 mcg Oral Daily Khadra Alex MD            ________________________________________________________        OBJECTIVE:  Upon today's exam    GEN: NAD, pleasant, cooperative  CHEST: No signs of resp distress, on room air    NEURO  MENTAL STATUS: AAOx3, memory intact, fund of knowledge appropriate  LANG/SPEECH: Naming and repetition intact, mild dysarthria and expressive aphasia, follows 3-step commands    CRANIAL NERVES:  II-XII grossly intact    MOTOR:  Motor strength 4+/5 throughout, symmetric.     REFLEXES: 2/4 throughout    SENSORY:  Normal to touch, temp all limbs  No hemineglect, no extinction to double-sided stimulation (visual & tactile)  COORD: Normal finger to nose      NIH Stroke Scale  1a. Level of Consciousness: 0-->Alert, keenly responsive  1b. LOC Questions: 0-->Answers both questions correctly  1c. LOC Commands: 0-->Performs both tasks correctly  2. Best Gaze: 0-->Normal  3. Visual: 0-->No visual loss  4. Facial Palsy: 0-->Normal symmetrical movements  5a. Motor Arm, Left: 0-->No drift, limb holds 90 (or 45) degrees for full 10 secs  5b. Motor Arm, Right: 0-->No drift, limb holds 90 (or 45) degrees for full 10 secs  6a. Motor Leg, Left: 0-->No drift, leg holds 30 degree position for full 5 secs  6b. Motor Leg, Right: 0-->No drift, leg holds 30 degree position for full 5 secs  7. Limb Ataxia: 0-->Absent  8. Sensory: 0-->Normal, no sensory loss  9. Best Language: 1-->Mild-to-moderate aphasia, some obvious loss of fluency or facility of comprehension, without significant limitation on ideas expressed or form of expression. Reduction of speech and/or comprehension,  however, makes conversation. . . (see row details)  10. Dysarthria: 1-->Mild-to-moderate dysarthria, patient slurs at least some words and, at worst, can be understood with some difficulty  11. Extinction and Inattention (formerly Neglect): 0-->No abnormality  Total (NIH Stroke Scale): 2         ________________________________________________________   RESULTS REVIEW:    VITAL SIGNS:   Temp:  [97.5 °F (36.4 °C)-98.7 °F (37.1 °C)] 97.9 °F (36.6 °C)  Heart Rate:  [69-95] 70  Resp:  [18-23] 23  BP: (124-146)/(65-86) 124/70     LABS:      Lab 06/11/25  0236 06/10/25  2041 06/10/25  1928   WBC 12.16*  --  14.74*   HEMOGLOBIN 13.4  --  14.2   HEMATOCRIT 42.1  --  44.1   PLATELETS 131*  --  152   NEUTROS ABS 8.86*  --  11.65*   IMMATURE GRANS (ABS) 0.05  --  0.07*   LYMPHS ABS 2.05  --  1.69   MONOS ABS 1.18*  --  1.32*   EOS ABS 0.00  --  0.00   MCV 98.1*  --  98.0*   PROCALCITONIN  --   --  0.07   LACTATE  --  1.3  --          Lab 06/11/25  0236 06/10/25  2033 06/10/25  1928   SODIUM 144  --  144   POTASSIUM 4.1  --  4.4   CHLORIDE 107  --  102   CO2 20.3*  --  23.1   ANION GAP 16.7*  --  18.9*   BUN 37.0*  --  33.0*   CREATININE 1.07  --  1.15   EGFR 68.0  --  62.4   GLUCOSE 103*  --  93   CALCIUM 9.4  --  10.1   MAGNESIUM 2.5*  --  2.5*   PHOSPHORUS 4.2  --   --    TSH  --  0.758 0.909         Lab 06/11/25  0236 06/10/25  1928   TOTAL PROTEIN 6.9 8.2   ALBUMIN 4.3 5.0   GLOBULIN 2.6 3.2   ALT (SGPT) 26 33   AST (SGOT) 62* 77*   BILIRUBIN 1.0 1.5*   ALK PHOS 71 83         Lab 06/10/25  2033 06/10/25  1928   HSTROP T 32* 35*             Lab 06/11/25 0236   VITAMIN B 12 805         UA          6/10/2025    19:04   Urinalysis   Squamous Epithelial Cells, UA 0-2    Specific Gravity, UA 1.022    Ketones, UA 15 mg/dL (1+)    Blood, UA Negative    Leukocytes, UA Negative    Nitrite, UA Negative    RBC, UA 0-2    WBC, UA 0-2    Bacteria, UA None Seen        Lab Results   Component Value Date    TSH 0.758 06/10/2025     [id number] 805 06/11/2025       IMAGING STUDIES:  CT Head Without Contrast  Result Date: 6/10/2025  Impression: HEAD CT: 1.No acute intracranial abnormality. 2.There is a 2.5 cm hypodensity in the posterior left temporal lobe which does not appear acute but is new since 2023. This is likely a subacute or chronic infarct. There is generalized parenchymal volume loss and chronic small vessel ischemic disease. CT C-SPINE: 1.No acute fracture or malalignment. 2.There is multilevel degenerative disc disease and facet arthropathy as described above. 3.There is evidence of previous left carotid surgery. The overall appearance of the cervical spine is unchanged since 2019. CT T-SPINE: 1.No acute fracture or malalignment. 2.There is multilevel degenerative disc disease with progression of degenerative changes at the T9-10 level. 3.There is marked cardiomegaly and a partially visualized pacemaker/AICD. 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 There is emphysema with numerous areas of scarring in the visualized lung fields. CT L-SPINE: 1.No acute fracture or malalignment. 2.There is extensive multilevel degenerative disc disease and facet arthropathy with postsurgical changes as described above. 3.There is a 5.1 cm fusiform infrarenal abdominal aortic aneurysm which has increased in size since 2021. 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 Electronically Signed: Sanjiv Roberts DO  6/10/2025 9:06 PM EDT  Workstation ID: [id number]    CT Cervical Spine Without Contrast  Result Date: 6/10/2025  Impression: HEAD CT: 1.No acute intracranial abnormality. 2.There is a 2.5 cm hypodensity in the posterior left temporal lobe which does not appear acute but is new since 2023. This is likely a subacute or chronic infarct. There is generalized parenchymal volume loss and chronic small vessel ischemic disease. CT C-SPINE: 1.No acute fracture or malalignment. 2.There is  multilevel degenerative disc disease and facet arthropathy as described above. 3.There is evidence of previous left carotid surgery. The overall appearance of the cervical spine is unchanged since 2019. CT T-SPINE: 1.No acute fracture or malalignment. 2.There is multilevel degenerative disc disease with progression of degenerative changes at the T9-10 level. 3.There is marked cardiomegaly and a partially visualized pacemaker/AICD. 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 There is emphysema with numerous areas of scarring in the visualized lung fields. CT L-SPINE: 1.No acute fracture or malalignment. 2.There is extensive multilevel degenerative disc disease and facet arthropathy with postsurgical changes as described above. 3.There is a 5.1 cm fusiform infrarenal abdominal aortic aneurysm which has increased in size since 2021. 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 Electronically Signed: Sanjiv Roberts DO  6/10/2025 9:06 PM EDT  Workstation ID: [id number]    CT Thoracic Spine Without Contrast  Result Date: 6/10/2025  Impression: HEAD CT: 1.No acute intracranial abnormality. 2.There is a 2.5 cm hypodensity in the posterior left temporal lobe which does not appear acute but is new since 2023. This is likely a subacute or chronic infarct. There is generalized parenchymal volume loss and chronic small vessel ischemic disease. CT C-SPINE: 1.No acute fracture or malalignment. 2.There is multilevel degenerative disc disease and facet arthropathy as described above. 3.There is evidence of previous left carotid surgery. The overall appearance of the cervical spine is unchanged since 2019. CT T-SPINE: 1.No acute fracture or malalignment. 2.There is multilevel degenerative disc disease with progression of degenerative changes at the T9-10 level. 3.There is marked cardiomegaly and a partially visualized pacemaker/AICD. 4.There is a 4.2 cm ectatic ascending  thoracic aorta. There is emphysema with numerous areas of scarring in the visualized lung fields. CT L-SPINE: 1.No acute fracture or malalignment. 2.There is extensive multilevel degenerative disc disease and facet arthropathy with postsurgical changes as described above. 3.There is a 5.1 cm fusiform infrarenal abdominal aortic aneurysm which has increased in size since 2021. 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 Electronically Signed: Sanjiv Roberts DO  6/10/2025 9:06 PM EDT  Workstation ID: [id number]    CT Lumbar Spine Without Contrast  Result Date: 6/10/2025  Impression: HEAD CT: 1.No acute intracranial abnormality. 2.There is a 2.5 cm hypodensity in the posterior left temporal lobe which does not appear acute but is new since 2023. This is likely a subacute or chronic infarct. There is generalized parenchymal volume loss and chronic small vessel ischemic disease. CT C-SPINE: 1.No acute fracture or malalignment. 2.There is multilevel degenerative disc disease and facet arthropathy as described above. 3.There is evidence of previous left carotid surgery. The overall appearance of the cervical spine is unchanged since 2019. CT T-SPINE: 1.No acute fracture or malalignment. 2.There is multilevel degenerative disc disease with progression of degenerative changes at the T9-10 level. 3.There is marked cardiomegaly and a partially visualized pacemaker/AICD. 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 There is emphysema with numerous areas of scarring in the visualized lung fields. CT L-SPINE: 1.No acute fracture or malalignment. 2.There is extensive multilevel degenerative disc disease and facet arthropathy with postsurgical changes as described above. 3.There is a 5.1 cm fusiform infrarenal abdominal aortic aneurysm which has increased in size since 2021. 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  Electronically Signed: Sanjiv Roberts,   6/10/2025 9:06 PM EDT  Workstation ID: [id number]    XR Elbow 3+ View Right  Result Date: 6/10/2025  Impression: Superficial soft tissue swelling posteriorly which can be seen with olecranon bursitis or cellulitis or contusion in the appropriate clinical setting. There are mild degenerative changes. No acute bony abnormality. No joint effusion is demonstrated. Electronically Signed: Sanjiv Roberts,   6/10/2025 8:11 PM EDT  Workstation ID: [id number]    XR Chest 1 View  Result Date: 6/10/2025  Impression: Patchy and interstitial opacity in the periphery of the right mid lung and right basilar density which has evolved since 12/29/2020 and could be related to acute and/or chronic infection or inflammatory change.. This is superimposed on areas of chronic scarring and postsurgical change. 2. Cardiomegaly and AICD are unchanged. Electronically Signed: Sanjiv Roberts,   6/10/2025 8:09 PM EDT  Workstation ID: [id number]      I reviewed the patient's new clinical results.    ________________________________________________________     PROBLEM LIST:    Acute encephalopathy            ASSESSMENT/PLAN:    Acute confusion   Incidental chronic appearing hypodensity in the left temporal lobe, concerning for a chronic stroke. However, the rounded nature of it and its location do raise the suspicion for a neoplastic process, potentially metastatic in nature. I would recommend counting with a full stroke evaluation and adding on a contrasted MRI brain to assess for underlying neoplastic process.     He is AOx3, but has some residual dysarthria and mild expressive aphasia, otherwise neurointact. The are of concern identified in the left hemisphere could have been contributing to some of the confusion/aphasia, however it appears subacute at earliest to chronic and I suspect his confusion and AMS is 2/2 to a toxic/metabolic etiology. Nevertheless, I will continue to work him up from a neurostroke  standpoint, but a detailed TME workup should still be pursued. I will defer this to the primary service.     Leukocytosis (12.16), afebrile, neck supple, Aox3 and following commands. No concern for a CNS infection at this time.   NIHSS 2    TIA/ISCHEMIC STROKE ORDER SET  NCCT: There is a 2.5 cm hypodensity in the posterior left temporal lobe which does not appear acute but is new since 2023. This is likely a subacute or chronic infarct.   CT C/T/L spine negative for fracture or malalignment.   CTA H/N  MRI Brain W/WO  NH3: 16  TSH 0.758  A1C pending   LDL pending   B12 805  UA negative  Baseline EKG and chest x-ray  Echocardiogram with bubble study  Continuous cardiac telemetry to monitor for arrhythmia  PT/OT/Speech/swallow consults as indicated   Vital signs per hospital protocol  Neuroassessment per hospital protocol  Please document NIHSS on admission and with any neurochanges  Strict NPO until patient passes RN stroke swallow screen or speech therapy evaluation  Oxygen therapy (titrate to keep SpO2 greater than 94%  Activity as tolerated  Stroke education provided by nursing per institutional guidelines       Modification of stroke risk factors:   - Blood pressure should be less than 130/80 outpatient, HbA1c less than 6.5, LDL less than 70; b12>500 and smoking cessation if applicable. We would be grateful if the primary team / primary care physician would keep a close watch on the above targets.  - Stroke education  - Follow up with neurologist of choice      I discussed the patient's findings and my recommendations with patient, nursing staff, and primary care team    Ramo Arroyo MD  06/11/25  11:53 EDT

## 2025-06-11 NOTE — PLAN OF CARE
Goal Outcome Evaluation:      Patient new admission from the ED. Unable to make needs known. No signs of pain on this shift. LR going at 100mL/hr. Personal items and call light in reach. Plan of care is ongoing.

## 2025-06-11 NOTE — THERAPY EVALUATION
Patient Name: Shukri Park  : 1940    MRN: [id number]                              Today's Date: 2025       Admit Date: 6/10/2025    Visit Dx:     ICD-10-CM ICD-9-CM   1. Altered mental status, unspecified altered mental status type  R41.82 780.97   2. Traumatic rhabdomyolysis, initial encounter  T79.6XXA 958.6   3. Pneumonia of right lung due to infectious organism, unspecified part of lung  J18.9 483.8     Patient Active Problem List   Diagnosis    Chronic anticoagulation    Mixed hyperlipidemia    Presence of stent in right coronary artery    Chronic pain    Ischemic cardiomyopathy    Persistent atrial fibrillation    Acute on chronic systolic congestive heart failure    Coronary artery disease involving native coronary artery of native heart without angina pectoris    Disorder of sacroiliac joint    Lumbosacral radiculopathy    Mitral regurgitation    Neck pain    Nevus of choroid    Presbyopia    Vitamin D deficiency    History of lung cancer    Chronic hypotension    Gastritis    Presence of biventricular implantable cardioverter-defibrillator (ICD)    Inguinodynia, right    Acute encephalopathy     Past Medical History:   Diagnosis Date    A-fib     Aortic aneurysm     Appetite loss     Cancer     right lobe cancer - surgically removed     CHF (congestive heart failure)     Coronary artery disease     Dark stools     Foot pain, bilateral     GERD (gastroesophageal reflux disease)     Hyperlipidemia     Low back pain     S/P epidural steroid injection     Israel Gomes's - no relief    Weight loss     acute loss of weight 30 lbs     Past Surgical History:   Procedure Laterality Date    CARDIAC CATHETERIZATION N/A 2020    Procedure: LEFT HEART CATH with possible PCI;  Surgeon: Wade Cronin MD;  Location: [facility] CATH INVASIVE LOCATION;  Service: Cardiovascular;  Laterality: N/A;  Local and IV sedation    CARDIAC CATHETERIZATION N/A 2020    Procedure: Percutaneous Coronary Intervention;   Surgeon: Wade Cronin MD;  Location: [facility] CATH INVASIVE LOCATION;  Service: Cardiovascular;  Laterality: N/A;    CARDIAC DEFIBRILLATOR PLACEMENT      CARDIAC ELECTROPHYSIOLOGY PROCEDURE N/A 7/10/2020    Procedure: Biventricular Device Insertion;  Surgeon: Jonathan Denney MD;  Location: [facility] CATH INVASIVE LOCATION;  Service: Cardiovascular;  Laterality: N/A;    CARDIAC ELECTROPHYSIOLOGY PROCEDURE N/A 7/10/2020    Procedure: ABLATION AV NODE;  Surgeon: Jonathan Denney MD;  Location: [facility] CATH INVASIVE LOCATION;  Service: Cardiovascular;  Laterality: N/A;    CARDIAC ELECTROPHYSIOLOGY PROCEDURE N/A 7/10/2020    Procedure: AV node ablation;  Surgeon: Jonathan Denney MD;  Location: [facility] CATH INVASIVE LOCATION;  Service: Cardiovascular;  Laterality: N/A;    CARDIOVASCULAR STRESS TEST  2020    CATARACT EXTRACTION, BILATERAL      CONVERTED (HISTORICAL) HOLTER  2020    CORONARY ANGIOPLASTY WITH STENT PLACEMENT      ENDOSCOPY N/A 7/5/2020    Procedure: ESOPHAGOGASTRODUODENOSCOPY;  Surgeon: Neal Correa MD;  Location: [facility] ENDOSCOPY;  Service: Gastroenterology;  Laterality: N/A;    INGUINAL HERNIA REPAIR Right 1/7/2021    Procedure: INGUINAL HERNIA REPAIR OPEN WITH MESH;  Surgeon: Cody Randall MD;  Location: [facility] MAIN OR;  Service: General;  Laterality: Right;    LUMBAR DECOMPRESSION  09/2015    L2-L3    LUMBAR DECOMPRESSION  2006    Dr. Logan    OTHER SURGICAL HISTORY  05/2015    left SI fusion with bone graft - Dr. Presley    OTHER SURGICAL HISTORY      carotid artery repair     OTHER SURGICAL HISTORY Left 02/19/2016    Left SI fusion 2/19/2016 Dr. Zendejas    POSTERIOR SPINAL FUSION  10/24/2016    PSF T12-3/TLIF L3-L4 poss L2-L3 --- Dr. 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      General Information       Row Name 06/11/25 8407          Physical Therapy Time and Intention    Document Type evaluation  -     Mode of Treatment physical therapy  -        [facility] Name 06/11/25 1637          General Information    Patient Profile Reviewed yes  -     Prior Level of Function independent:;all household mobility;community mobility;home management;ADL's;driving  -     Existing Precautions/Restrictions fall  -     Barriers to Rehab none identified  -[facility] Name 06/11/25 1637          Living Environment    Current Living Arrangements home  -     People in Home alone  -[facility] Name 06/11/25 1637          Home Main Entrance    Number of Stairs, Main Entrance none  -[facility] Name 06/11/25 1637          Stairs Within Home, Primary    Number of Stairs, Within Home, Primary none  -[facility] Name 06/11/25 1637          Cognition    Orientation Status (Cognition) disoriented to;person;place;situation;time  -[facility] Name 06/11/25 1637          Safety Issues/Impairments Affecting Functional Mobility    Safety Issues Affecting Function (Mobility) awareness of need for assistance;insight into deficits/self-awareness  -     Impairments Affecting Function (Mobility) balance;cognition;endurance/activity tolerance;motor planning;pain;postural/trunk control;strength  -               User Key  (r) = Recorded By, (t) = Taken By, (c) = Cosigned By      Initials Name Provider Type     Raine Mcmillan, PT Physical Therapist                   Mobility       [facility] Name 06/11/25 1638          Bed Mobility    Bed Mobility bed mobility (all) activities  -     All Activities, Samson (Bed Mobility) standby assist  -[facility] Name 06/11/25 1638          Bed-Chair Transfer    Bed-Chair Samson (Transfers) minimum assist (75% patient effort);2 person assist  -     Assistive Device (Bed-Chair Transfers) walker, front-wheeled  -[facility] Name 06/11/25 1638          Sit-Stand Transfer    Sit-Stand Samson (Transfers) minimum assist (75% patient effort);2 person assist  -     Assistive Device (Sit-Stand Transfers) walker, front-wheeled  -        [facility] Name 06/11/25 1638          Gait/Stairs (Locomotion)    Wells Level (Gait) minimum assist (75% patient effort);2 person assist  -     Assistive Device (Gait) walker, front-wheeled  -     Patient was able to Ambulate yes  -     Distance in Feet (Gait) 2  -     Deviations/Abnormal Patterns (Gait) stride length decreased;gait speed decreased  -               User Key  (r) = Recorded By, (t) = Taken By, (c) = Cosigned By      Initials Name Provider Type     Raine Mcmillan, PT Physical Therapist                   Obj/Interventions       [facility] Name 06/11/25 1639          Range of Motion Comprehensive    General Range of Motion bilateral lower extremity ROM WFL  Simultaneous filing. User may be unaware of other data.  -AH       Row Name 06/11/25 1639          Strength Comprehensive (MMT)    General Manual Muscle Testing (MMT) Assessment lower extremity strength deficits identified  Simultaneous filing. User may be unaware of other data.  -     Comment, General Manual Muscle Testing (MMT) Assessment BLE grossly 3+/5  Simultaneous filing. User may be unaware of other data.  -AH       Row Name 06/11/25 1639          Balance    Balance Assessment sitting static balance;sitting dynamic balance;standing static balance;standing dynamic balance  -     Static Sitting Balance contact guard;minimal assist  -     Dynamic Sitting Balance contact guard;minimal assist  -     Position, Sitting Balance unsupported;sitting edge of bed  -     Static Standing Balance minimal assist;2-person assist  -     Dynamic Standing Balance minimal assist;2-person assist  -     Position/Device Used, Standing Balance supported;walker, front-wheeled  -AH       Row Name 06/11/25 1639          Sensory Assessment (Somatosensory)    Sensory Assessment (Somatosensory) sensation intact  Simultaneous filing. User may be unaware of other data.  -               User Key  (r) = Recorded By, (t) = Taken By, (c) =  Cosigned By      Initials Name Provider Type     Raine Mcmillan, PT Physical Therapist                   Goals/Plan       Row Name 06/11/25 1640          Bed Mobility Goal 1 (PT)    Activity/Assistive Device (Bed Mobility Goal 1, PT) bed mobility activities, all  -     [place] Level/Cues Needed (Bed Mobility Goal 1, PT) independent  -AH     Time Frame (Bed Mobility Goal 1, PT) long term goal (LTG);2 weeks  -AH       Row Name 06/11/25 1640          Transfer Goal 1 (PT)    Activity/Assistive Device (Transfer Goal 1, PT) transfers, all  -     [place] Level/Cues Needed (Transfer Goal 1, PT) independent  -     Time Frame (Transfer Goal 1, PT) long term goal (LTG);2 weeks  -AH       Row Name 06/11/25 1640          Gait Training Goal 1 (PT)    Activity/Assistive Device (Gait Training Goal 1, PT) gait (walking locomotion)  -     [place] Level (Gait Training Goal 1, PT) modified independence  -     Distance (Gait Training Goal 1, PT) 150 ft  -     Time Frame (Gait Training Goal 1, PT) long term goal (LTG);2 weeks  -AH       Row Name 06/11/25 1640          Therapy Assessment/Plan (PT)    Planned Therapy Interventions (PT) balance training;bed mobility training;gait training;home exercise program;patient/family education;transfer training;strengthening;stair training;ROM (range of motion);neuromuscular re-education  -               User Key  (r) = Recorded By, (t) = Taken By, (c) = Cosigned By      Initials Name Provider Type    Raine Yanes, PT Physical Therapist                   Clinical Impression       Row Name 06/11/25 1640          Pain    Pain Side/Orientation generalized  -     Additional Documentation Pain Scale: FACES Pre/Post-Treatment (Group)  -AH       Row Name 06/11/25 1640          Pain Scale: FACES Pre/Post-Treatment    Pain: FACES Scale, Pretreatment 0-->no hurt  -     Posttreatment Pain Rating 2-->hurts little bit  -AH       Row Name 06/11/25 1640           Plan of Care Review    Plan of Care Reviewed With patient;family  -     Outcome Evaluation Shukri Park is a 84 y.o. male with a CMH of CAD, HF-imp-EF with LVEF of 55 % With mild concentric hypertrophy per echo in 2023, Afib on AC, status post AICD, HTN, HLD,  who presented to [facility] on 6/10/25 with acute encephalopathy. On 6/8/2025, he experienced sudden left eye vision loss and appeared confused while at the grocery store; he was driven home by a store employee. Later that day, family noted he was agitated and asked them to leave; vision reportedly returned. On 6/9/2025, he was unresponsive to calls, prompting a wellness check on 6/10/25. He was found on the floor by a neighbor, soiled, confused, and irritable, and was brought to the hospital by EMS. Loss of consciousness is unclear. Per neurology, Incidental chronic appearing hypodensity in the left temporal lobe, concerning for a chronic stroke. However, the rounded nature of it and its location do raise the suspicion for a neoplastic process, potentially metastatic in nature. MRI pending. Pt is disoriented x4 and difficult to rouse, minimally coversational. Family present to assist with baseline. Pt lives alone in a patio home in a senior community. He is typically IND with ADLs, mobility, and drives. Today pt is SBA with increased time for effort for supine > sit. He is Татьяна for EOB sitting balance progressing to CGA. He is Татьяна of 2 to stand and ambulate ~2 ft to chair with RW. Once pt in chair he is much more awake and responsive. Pt demos fair standing balance and generalized weakness. Pt presenting below baseline at this time and would benefit from SNF upon d/c. PT will follow and progress as tolerated.  -       Row Name 06/11/25 1640          Therapy Assessment/Plan (PT)    Criteria for Skilled Interventions Met (PT) yes;skilled treatment is necessary  -     Therapy Frequency (PT) 5 times/wk  -       Row Name 06/11/25 1640          Vital  Signs    Pretreatment Heart Rate (beats/min) 78  -AH     Pre SpO2 (%) 94  -AH     O2 Delivery Pre Treatment room air  -AH     O2 Delivery Intra Treatment room air  -AH     O2 Delivery Post Treatment room air  -AH     Pre Patient Position Supine  -AH     Intra Patient Position Standing  -AH     Post Patient Position Sitting  -AH       Row Name 06/11/25 1640          Positioning and Restraints    Pre-Treatment Position in bed  -     Post Treatment Position chair  -AH     In Chair notified nsg;sitting;call light within reach;encouraged to call for assist;exit alarm on;with family/caregiver  -               User Key  (r) = Recorded By, (t) = Taken By, (c) = Cosigned By      Initials Name Provider Type     Raine Mcmillan, PT Physical Therapist                   Outcome Measures       Row Name 06/11/25 0800          How much help from another person do you currently need...    Turning from your back to your side while in flat bed without using bedrails? 4  -SM     Moving from lying on back to sitting on the side of a flat bed without bedrails? 2  -SM     Moving to and from a bed to a chair (including a wheelchair)? 2  -SM     Standing up from a chair using your arms (e.g., wheelchair, bedside chair)? 2  -SM     Climbing 3-5 steps with a railing? 2  -SM     To walk in hospital room? 2  -SM     AM-PAC 6 Clicks Score (PT) 14  -SM       Row Name 06/11/25 1644          Functional Assessment    Outcome Measure Options AM-PAC 6 Clicks Daily Activity (OT)  -SP               User Key  (r) = Recorded By, (t) = Taken By, (c) = Cosigned By      Initials Name Provider Type    Caroline Santa, RN Registered Nurse    Dl Bradshaw, OT Occupational Therapist                                 Physical Therapy Education       Title: PT OT SLP Therapies (In Progress)       Topic: Physical Therapy (In Progress)       Point: Mobility training (In Progress)       Learning Progress Summary            Patient Acceptance, E,  NL by  at 6/11/2025 1648                      Point: Body mechanics (In Progress)       Learning Progress Summary            Patient Acceptance, E, NL by  at 6/11/2025 1648                                      User Key       Initials Effective Dates Name Provider Type Discipline     08/12/24 -  Raine Mcmillan, PT Physical Therapist PT                  PT Recommendation and Plan  Planned Therapy Interventions (PT): balance training, bed mobility training, gait training, home exercise program, patient/family education, transfer training, strengthening, stair training, ROM (range of motion), neuromuscular re-education  Outcome Evaluation: Shukri Park is a 84 y.o. male with a CMH of CAD, HF-imp-EF with LVEF of 55 % With mild concentric hypertrophy per echo in 2023, Afib on AC, status post AICD, HTN, HLD,  who presented to [facility] on 6/10/25 with acute encephalopathy. On 6/8/2025, he experienced sudden left eye vision loss and appeared confused while at the grocery store; he was driven home by a store employee. Later that day, family noted he was agitated and asked them to leave; vision reportedly returned. On 6/9/2025, he was unresponsive to calls, prompting a wellness check on 6/10/25. He was found on the floor by a neighbor, soiled, confused, and irritable, and was brought to the hospital by EMS. Loss of consciousness is unclear. Per neurology, Incidental chronic appearing hypodensity in the left temporal lobe, concerning for a chronic stroke. However, the rounded nature of it and its location do raise the suspicion for a neoplastic process, potentially metastatic in nature. MRI pending. Pt is disoriented x4 and difficult to rouse, minimally coversational. Family present to assist with baseline. Pt lives alone in a patio home in a senior community. He is typically IND with ADLs, mobility, and drives. Today pt is SBA with increased time for effort for supine > sit. He is Elizabeth for EOB sitting balance  progressing to CGA. He is Татьяна of 2 to stand and ambulate ~2 ft to chair with RW. Once pt in chair he is much more awake and responsive. Pt demos fair standing balance and generalized weakness. Pt presenting below baseline at this time and would benefit from SNF upon d/c. PT will follow and progress as tolerated.     Time Calculation:         PT Charges       Row Name 06/11/25 1649             Time Calculation    Start Time 1458  -      Stop Time 1523  -      Time Calculation (min) 25 min  -      PT Received On 06/11/25  -      PT - Next Appointment 06/12/25  -      PT Goal Re-Cert Due Date 06/25/25  -                User Key  (r) = Recorded By, (t) = Taken By, (c) = Cosigned By      Initials Name Provider Type    Raine Yanes, PT Physical Therapist                  Therapy Charges for Today       Code Description Service Date Service Provider Modifiers Qty    32132598839 HC PT EVAL MOD COMPLEXITY 4 6/11/2025 Raine Mcmillan, PT GP 1            PT G-Codes  Outcome Measure Options: AM-PAC 6 Clicks Daily Activity (OT)  AM-PAC 6 Clicks Score (PT): 14  AM-PAC 6 Clicks Score (OT): 11  PT Discharge Summary  Anticipated Discharge Disposition (PT): skilled nursing facility    Raine Mcmillan, PT  6/11/2025

## 2025-06-11 NOTE — THERAPY EVALUATION
Patient Name: Shukri Park  : 1940    MRN: [id number]                              Today's Date: 2025       Admit Date: 6/10/2025    Visit Dx:     ICD-10-CM ICD-9-CM   1. Altered mental status, unspecified altered mental status type  R41.82 780.97   2. Traumatic rhabdomyolysis, initial encounter  T79.6XXA 958.6   3. Pneumonia of right lung due to infectious organism, unspecified part of lung  J18.9 483.8     Patient Active Problem List   Diagnosis    Chronic anticoagulation    Mixed hyperlipidemia    Presence of stent in right coronary artery    Chronic pain    Ischemic cardiomyopathy    Persistent atrial fibrillation    Acute on chronic systolic congestive heart failure    Coronary artery disease involving native coronary artery of native heart without angina pectoris    Disorder of sacroiliac joint    Lumbosacral radiculopathy    Mitral regurgitation    Neck pain    Nevus of choroid    Presbyopia    Vitamin D deficiency    History of lung cancer    Chronic hypotension    Gastritis    Presence of biventricular implantable cardioverter-defibrillator (ICD)    Inguinodynia, right    Acute encephalopathy     Past Medical History:   Diagnosis Date    A-fib     Aortic aneurysm     Appetite loss     Cancer     right lobe cancer - surgically removed     CHF (congestive heart failure)     Coronary artery disease     Dark stools     Foot pain, bilateral     GERD (gastroesophageal reflux disease)     Hyperlipidemia     Low back pain     S/P epidural steroid injection     Israel Gomes's - no relief    Weight loss     acute loss of weight 30 lbs     Past Surgical History:   Procedure Laterality Date    CARDIAC CATHETERIZATION N/A 2020    Procedure: LEFT HEART CATH with possible PCI;  Surgeon: Wade Cronin MD;  Location: [facility] CATH INVASIVE LOCATION;  Service: Cardiovascular;  Laterality: N/A;  Local and IV sedation    CARDIAC CATHETERIZATION N/A 2020    Procedure: Percutaneous Coronary Intervention;   Surgeon: Wade Cronin MD;  Location: [facility] CATH INVASIVE LOCATION;  Service: Cardiovascular;  Laterality: N/A;    CARDIAC DEFIBRILLATOR PLACEMENT      CARDIAC ELECTROPHYSIOLOGY PROCEDURE N/A 7/10/2020    Procedure: Biventricular Device Insertion;  Surgeon: Jonathan Denney MD;  Location: [facility] CATH INVASIVE LOCATION;  Service: Cardiovascular;  Laterality: N/A;    CARDIAC ELECTROPHYSIOLOGY PROCEDURE N/A 7/10/2020    Procedure: ABLATION AV NODE;  Surgeon: Jonathan Denney MD;  Location: [facility] CATH INVASIVE LOCATION;  Service: Cardiovascular;  Laterality: N/A;    CARDIAC ELECTROPHYSIOLOGY PROCEDURE N/A 7/10/2020    Procedure: AV node ablation;  Surgeon: Jonathan Denney MD;  Location: [facility] CATH INVASIVE LOCATION;  Service: Cardiovascular;  Laterality: N/A;    CARDIOVASCULAR STRESS TEST  2020    CATARACT EXTRACTION, BILATERAL      CONVERTED (HISTORICAL) HOLTER  2020    CORONARY ANGIOPLASTY WITH STENT PLACEMENT      ENDOSCOPY N/A 7/5/2020    Procedure: ESOPHAGOGASTRODUODENOSCOPY;  Surgeon: Neal Correa MD;  Location: [facility] ENDOSCOPY;  Service: Gastroenterology;  Laterality: N/A;    INGUINAL HERNIA REPAIR Right 1/7/2021    Procedure: INGUINAL HERNIA REPAIR OPEN WITH MESH;  Surgeon: Cody Randall MD;  Location: [facility] MAIN OR;  Service: General;  Laterality: Right;    LUMBAR DECOMPRESSION  09/2015    L2-L3    LUMBAR DECOMPRESSION  2006    Dr. Logan    OTHER SURGICAL HISTORY  05/2015    left SI fusion with bone graft - Dr. Presley    OTHER SURGICAL HISTORY      carotid artery repair     OTHER SURGICAL HISTORY Left 02/19/2016    Left SI fusion 2/19/2016 Dr. Zendejas    POSTERIOR SPINAL FUSION  10/24/2016    PSF T12-3/TLIF L3-L4 poss L2-L3 --- Dr. 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      General Information       Row Name 06/11/25 1641 06/11/25 1635       OT Time and Intention    Document Type evaluation  -SP evaluation  -SP    Mode of Treatment occupational  therapy  -SP occupational therapy  -SP      Row Name 06/11/25 1641 06/11/25 1635       General Information    Patient Profile Reviewed yes  -SP yes  -SP    Prior Level of Function independent:;ADL's;driving;all household mobility;home management  -SP independent:;ADL's;driving;all household mobility;home management  -SP    Existing Precautions/Restrictions fall  -SP fall  -SP    Barriers to Rehab none identified  -SP none identified  -SP      Row Name 06/11/25 1641 06/11/25 1635       Living Environment    Current Living Arrangements home  -SP home  -SP    People in Home alone  -SP alone  -SP      Row Name 06/11/25 1641 06/11/25 1635       Home Main Entrance    Number of Stairs, Main Entrance none  -SP none  -SP      Row Name 06/11/25 1641 06/11/25 1635       Stairs Within Home, Primary    Number of Stairs, Within Home, Primary none  -SP none  -SP      Row Name 06/11/25 1641 06/11/25 1635       Cognition    Orientation Status (Cognition) disoriented to;person;place;situation;time  -SP disoriented to;person;place;situation;time  -SP      Row Name 06/11/25 1641 06/11/25 1635       Safety Issues/Impairments Affecting Functional Mobility    Safety Issues Affecting Function (Mobility) -- awareness of need for assistance;insight into deficits/self-awareness  -SP    Impairments Affecting Function (Mobility) balance;cognition;endurance/activity tolerance;motor planning;pain;postural/trunk control;strength;range of motion (ROM)  -SP balance;cognition;endurance/activity tolerance;grasp;motor planning;muscle tone abnormal;pain;postural/trunk control;range of motion (ROM);strength  -SP    Cognitive Impairments, Mobility Safety/Performance attention;awareness, need for assistance;insight into deficits/self-awareness;judgment;problem-solving/reasoning;safety precaution awareness;safety precaution follow-through  -SP awareness, need for assistance;insight into deficits/self-awareness;judgment;problem-solving/reasoning;safety  precaution awareness;safety precaution follow-through  -SP              User Key  (r) = Recorded By, (t) = Taken By, (c) = Cosigned By      Initials Name Provider Type    SP Dl Adams OT Occupational Therapist                     Mobility/ADL's       Row Name 06/11/25 1642 06/11/25 1636       Bed Mobility    Bed Mobility bed mobility (all) activities  -SP bed mobility (all) activities  -SP    All Activities, [place] (Bed Mobility) standby assist  -SP contact guard  -SP      Row Name 06/11/25 1642 06/11/25 1636       Transfers    Transfers bed-chair transfer;sit-stand transfer  -SP bed-chair transfer;sit-stand transfer  -SP      Row Name 06/11/25 1642 06/11/25 1636       Bed-Chair Transfer    Bed-Chair [place] (Transfers) minimum assist (75% patient effort);2 person assist  -SP minimum assist (75% patient effort);2 person assist  -SP    Assistive Device (Bed-Chair Transfers) walker, front-wheeled  -SP walker, front-wheeled  -SP      Row Name 06/11/25 1642          Sit-Stand Transfer    Sit-Stand [place] (Transfers) minimum assist (75% patient effort);2 person assist  -SP     Assistive Device (Sit-Stand Transfers) walker, front-wheeled  -SP       Row Name 06/11/25 1642 06/11/25 1636       Functional Mobility    Functional Mobility- Ind. Level minimum assist (75% patient effort);2 person assist required  -SP minimum assist (75% patient effort);2 person assist required  -SP    Functional Mobility- Device walker, front-wheeled  -SP walker, front-wheeled  -SP    Patient was able to Ambulate yes  -SP yes  -SP              User Key  (r) = Recorded By, (t) = Taken By, (c) = Cosigned By      Initials Name Provider Type    SP Dl Adams OT Occupational Therapist                   Obj/Interventions       Row Name 06/11/25 1643          Sensory Assessment (Somatosensory)    Sensory Assessment (Somatosensory) unable/difficult to assess  -SP       Row Name 06/11/25 1643 06/11/25 1639       Vision  Assessment/Intervention    Visual Impairment/Limitations unable/difficult to assess  -SP unable/difficult to assess  -SP      Row Name 06/11/25 1639          Range of Motion Comprehensive    Comment, General Range of Motion PROM bilateral shoulders 90* LUE > RUE, RUE increased rigidity  -SP       Row Name 06/11/25 1639          Motor Skills    Motor Skills functional endurance  -SP     Functional Endurance poor  -SP       Row Name 06/11/25 1643 06/11/25 1639       Balance    Balance Assessment sitting dynamic balance;sit to stand dynamic balance;standing dynamic balance  -SP --    Dynamic Sitting Balance contact guard;minimal assist  -SP --    Position, Sitting Balance unsupported;sitting edge of bed  -SP --    Sit to Stand Dynamic Balance minimal assist;2-person assist  -SP minimal assist;2-person assist  -SP    Dynamic Standing Balance minimal assist;2-person assist  -SP --    Position/Device Used, Standing Balance walker, front-wheeled  -SP --              User Key  (r) = Recorded By, (t) = Taken By, (c) = Cosigned By      Initials Name Provider Type    SP Dl Adams, CASTILLO Occupational Therapist                   Goals/Plan       Row Name 06/11/25 1644          Bathing Goal 1 (OT)    Activity/Device (Bathing Goal 1, OT) bathing skills, all  -SP     [place] Level/Cues Needed (Bathing Goal 1, OT) maximum assist (25-49% patient effort)  -SP     Time Frame (Bathing Goal 1, OT) 2 weeks  -SP       Row Name 06/11/25 1644          Dressing Goal 1 (OT)    Activity/Device (Dressing Goal 1, OT) dressing skills, all  -SP     [place]/Cues Needed (Dressing Goal 1, OT) maximum assist (25-49% patient effort)  -SP     Time Frame (Dressing Goal 1, OT) 2 weeks  -SP     Strategies/Barriers (Dressing Goal 1, OT) until d/c  -SP       Row Name 06/11/25 1644          Toileting Goal 1 (OT)    Activity/Device (Toileting Goal 1, OT) toileting skills, all  -SP     [place] Level/Cues Needed (Toileting Goal 1, OT) moderate  assist (50-74% patient effort)  -SP     Time Frame (Toileting Goal 1, OT) 2 weeks  -SP     Strategies/Barriers (Toileting Goal 1, OT) until d/c  -SP       Row Name 06/11/25 0675          Therapy Assessment/Plan (OT)    Planned Therapy Interventions (OT) activity tolerance training;adaptive equipment training;BADL retraining;cognitive/visual perception retraining;edema control/reduction;IADL retraining;patient/caregiver education/training;passive ROM/stretching;occupation/activity based interventions;transfer/mobility retraining;neuromuscular control/coordination retraining;strengthening exercise;ROM/therapeutic exercise  -SP               User Key  (r) = Recorded By, (t) = Taken By, (c) = Cosigned By      Initials Name Provider Type    SP Dl Adams OT Occupational Therapist                   Clinical Impression       Row Name 06/11/25 1648          Pain Assessment    Additional Documentation Pain Scale: FACES Pre/Post-Treatment (Group)  -SP       Row Name 06/11/25 1645          Pain Scale: FACES Pre/Post-Treatment    Pain: FACES Scale, Pretreatment 4-->hurts little more  -SP     Posttreatment Pain Rating 6-->hurts even more  -SP       Row Name 06/11/25 164          Plan of Care Review    Plan of Care Reviewed With patient;family  -SP     Outcome Evaluation Pt is a 83 y/o M admitted to [facility] on 6/10/25 presenting w/ acute encephalopathy/AMS. 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 MRI pending due to spinal hardware. NIH 2. Neurology consulted. PMHx significant for CAD, HF-imp-EF with LVEF of 55 % With mild concentric hypertrophy per echo in 2023, Afib on AC, status post AICD, HTN, and HLD. Nursing staff cleared for therapy. Pt poor historian this date, PLOF obtained by family members in room. At baseline, pt resides alone in patio home in a senior living community. He was actively driving, IND with I/ADLs, with use of cane for ambulation. Pt A&O  x0, however able to follow commands. Lethargic, OT provided enriched sensory environment turing on lights and opening blinds. Alertness increased with min exertion. Pt completed bed mobility with CGA and sat EOB with CGA fluctuating between min A and he demos decrease postural trunk strength. He requires min A x2 with FWW to t/f from bed<>chair. He demos PROM shoulders 90* with  strength 2+/5, unable to formally test BUE MMT at this time 2/2 cognition and strength, LUE > RUE. Pt is significantly below his baseline warranting the need for skilled OT services, recommending SNF when medically appropriate. OT will continue to follow while admitted.  -SP       Row Name 06/11/25 1643          Therapy Assessment/Plan (OT)    Criteria for Skilled Therapeutic Interventions Met (OT) yes;meets criteria;skilled treatment is necessary  -SP     Therapy Frequency (OT) 5 times/wk  -SP     Predicted Duration of Therapy Intervention (OT) until d/c  -SP       Row Name 06/11/25 1643          Therapy Plan Review/Discharge Plan (OT)    Anticipated Discharge Disposition (OT) skilled nursing facility  -SP       Row Name 06/11/25 1643          Vital Signs    O2 Delivery Pre Treatment room air  -SP     O2 Delivery Intra Treatment room air  -SP     O2 Delivery Post Treatment room air  -SP     Pre Patient Position Supine  -SP     Intra Patient Position Standing  -SP     Post Patient Position Sitting  -SP       Row Name 06/11/25 1643          Positioning and Restraints    Pre-Treatment Position in bed  -SP     Post Treatment Position chair  -SP     In Chair notified nsg;reclined;call light within reach;encouraged to call for assist;exit alarm on;with family/caregiver  -SP               User Key  (r) = Recorded By, (t) = Taken By, (c) = Cosigned By      Initials Name Provider Type    SP Dl Adams, CASTILLO Occupational Therapist                   Outcome Measures       Row Name 06/11/25 1641          How much help from another is currently  needed...    Putting on and taking off regular lower body clothing? 1  -SP     Bathing (including washing, rinsing, and drying) 1  -SP     Toileting (which includes using toilet bed pan or urinal) 1  -SP     Putting on and taking off regular upper body clothing 2  -SP     Taking care of personal grooming (such as brushing teeth) 3  -SP     Eating meals 3  -SP     AM-PAC 6 Clicks Score (OT) 11  -SP       Row Name 06/11/25 0800          How much help from another person do you currently need...    Turning from your back to your side while in flat bed without using bedrails? 4  -SM     Moving from lying on back to sitting on the side of a flat bed without bedrails? 2  -SM     Moving to and from a bed to a chair (including a wheelchair)? 2  -SM     Standing up from a chair using your arms (e.g., wheelchair, bedside chair)? 2  -SM     Climbing 3-5 steps with a railing? 2  -SM     To walk in hospital room? 2  -SM     AM-PAC 6 Clicks Score (PT) 14  -SM       Row Name 06/11/25 1644          Functional Assessment    Outcome Measure Options AM-PAC 6 Clicks Daily Activity (OT)  -SP               User Key  (r) = Recorded By, (t) = Taken By, (c) = Cosigned By      Initials Name Provider Type    Caroline Santa, RN Registered Nurse    Dl Bradshaw OT Occupational Therapist                    Occupational Therapy Education       Title: PT OT SLP Therapies (Done)       Topic: Occupational Therapy (Done)       Point: ADL training (Done)       Learning Progress Summary            Patient Acceptance, E,TB, VU,NR by SP at 6/11/2025 1645                      Point: Home exercise program (Done)       Learning Progress Summary            Patient Acceptance, E,TB, VU,NR by SP at 6/11/2025 1645                      Point: Precautions (Done)       Learning Progress Summary            Patient Acceptance, E,TB, VU,NR by SP at 6/11/2025 1645                      Point: Body mechanics (Done)       Learning Progress Summary             Patient Acceptance, E,TB, VU,NR by SP at 6/11/2025 [id number]                                      User Key       Initials Effective Dates Name Provider Type Discipline    SP 11/15/23 -  Dl Adams, CASTILLO Occupational Therapist OT                  OT Recommendation and Plan  Planned Therapy Interventions (OT): activity tolerance training, adaptive equipment training, BADL retraining, cognitive/visual perception retraining, edema control/reduction, IADL retraining, patient/caregiver education/training, passive ROM/stretching, occupation/activity based interventions, transfer/mobility retraining, neuromuscular control/coordination retraining, strengthening exercise, ROM/therapeutic exercise  Therapy Frequency (OT): 5 times/wk  Plan of Care Review  Plan of Care Reviewed With: patient, family  Outcome Evaluation: Pt is a 85 y/o M admitted to [facility] on 6/10/25 presenting w/ acute encephalopathy/AMS. 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 MRI pending due to spinal hardware. NIH 2. Neurology consulted. PMHx significant for CAD, HF-imp-EF with LVEF of 55 % With mild concentric hypertrophy per echo in 2023, Afib on AC, status post AICD, HTN, and HLD. Nursing staff cleared for therapy. Pt poor historian this date, PLOF obtained by family members in room. [facility] He was actively driving, IND with I/ADLs, with use of cane for ambulation. Pt A&O x0, however able to follow commands. Lethargic, OT provided enriched sensory environment turing on lights and opening blinds. Alertness increased with min exertion. Pt completed bed mobility with CGA and sat EOB with CGA fluctuating between min A and he demos decrease postural trunk strength. He requires min A x2 with FWW to t/f from bed<>chair. He demos PROM shoulders 90* with  strength 2+/5, unable to formally test BUE MMT at this time 2/2 cognition and  strength, LUE > RUE. Pt is significantly below his baseline warranting the need for skilled OT services, recommending SNF when medically appropriate. OT will continue to follow while admitted.     Time Calculation:         Time Calculation- OT       Row Name 06/11/25 1645             Time Calculation- OT    OT Start Time 1459  -SP      OT Stop Time 1523  -SP      OT Time Calculation (min) 24 min  -SP      OT Received On 06/11/25  -SP      OT - Next Appointment 06/12/25  -SP      OT Goal Re-Cert Due Date 06/25/25  -SP                User Key  (r) = Recorded By, (t) = Taken By, (c) = Cosigned By      Initials Name Provider Type    SP Dl Adams, OT Occupational Therapist                  Therapy Charges for Today       Code Description Service Date Service Provider Modifiers Qty    [id number] HC OT EVAL MOD COMPLEXITY 4 6/11/2025 Dl Adams OT GO 1                 Dl Adams OT  6/11/2025

## 2025-06-12 ENCOUNTER — APPOINTMENT (OUTPATIENT)
Dept: CT IMAGING | Facility: HOSPITAL | Age: 85
DRG: 065 | End: 2025-06-12
Payer: MEDICARE

## 2025-06-12 ENCOUNTER — APPOINTMENT (OUTPATIENT)
Dept: GENERAL RADIOLOGY | Facility: HOSPITAL | Age: 85
DRG: 065 | End: 2025-06-12
Payer: MEDICARE

## 2025-06-12 LAB
ALBUMIN SERPL-MCNC: 4 G/DL (ref 3.5–5.2)
ALBUMIN/GLOB SERPL: 1.8 G/DL
ALP SERPL-CCNC: 72 U/L (ref 39–117)
ALT SERPL W P-5'-P-CCNC: 27 U/L (ref 1–41)
ANION GAP SERPL CALCULATED.3IONS-SCNC: 9.8 MMOL/L (ref 5–15)
APTT PPP: 37.1 SECONDS (ref 22.7–35.4)
APTT PPP: 37.2 SECONDS (ref 22.7–35.4)
AST SERPL-CCNC: 43 U/L (ref 1–40)
BASOPHILS # BLD AUTO: 0.02 10*3/MM3 (ref 0–0.2)
BASOPHILS NFR BLD AUTO: 0.2 % (ref 0–1.5)
BILIRUB SERPL-MCNC: 0.9 MG/DL (ref 0–1.2)
BUN SERPL-MCNC: 22 MG/DL (ref 8–23)
BUN/CREAT SERPL: 28.6 (ref 7–25)
CALCIUM SPEC-SCNC: 9.1 MG/DL (ref 8.6–10.5)
CHLORIDE SERPL-SCNC: 110 MMOL/L (ref 98–107)
CK SERPL-CCNC: 660 U/L (ref 20–200)
CO2 SERPL-SCNC: 24.2 MMOL/L (ref 22–29)
CREAT SERPL-MCNC: 0.77 MG/DL (ref 0.76–1.27)
D-LACTATE SERPL-SCNC: 2 MMOL/L (ref 0.5–2)
DEPRECATED RDW RBC AUTO: 50.2 FL (ref 37–54)
EGFRCR SERPLBLD CKD-EPI 2021: 87.7 ML/MIN/1.73
EOSINOPHIL # BLD AUTO: 0.06 10*3/MM3 (ref 0–0.4)
EOSINOPHIL NFR BLD AUTO: 0.5 % (ref 0.3–6.2)
ERYTHROCYTE [DISTWIDTH] IN BLOOD BY AUTOMATED COUNT: 13.7 % (ref 12.3–15.4)
GLOBULIN UR ELPH-MCNC: 2.2 GM/DL
GLUCOSE BLDC GLUCOMTR-MCNC: 105 MG/DL (ref 70–105)
GLUCOSE SERPL-MCNC: 125 MG/DL (ref 65–99)
HCT VFR BLD AUTO: 42 % (ref 37.5–51)
HGB BLD-MCNC: 13.1 G/DL (ref 13–17.7)
IMM GRANULOCYTES # BLD AUTO: 0.04 10*3/MM3 (ref 0–0.05)
IMM GRANULOCYTES NFR BLD AUTO: 0.3 % (ref 0–0.5)
INR PPP: 1.64 (ref 0.9–1.1)
LYMPHOCYTES # BLD AUTO: 2.94 10*3/MM3 (ref 0.7–3.1)
LYMPHOCYTES NFR BLD AUTO: 24.3 % (ref 19.6–45.3)
MCH RBC QN AUTO: 31.3 PG (ref 26.6–33)
MCHC RBC AUTO-ENTMCNC: 31.2 G/DL (ref 31.5–35.7)
MCV RBC AUTO: 100.2 FL (ref 79–97)
MONOCYTES # BLD AUTO: 1.37 10*3/MM3 (ref 0.1–0.9)
MONOCYTES NFR BLD AUTO: 11.3 % (ref 5–12)
NEUTROPHILS NFR BLD AUTO: 63.4 % (ref 42.7–76)
NEUTROPHILS NFR BLD AUTO: 7.67 10*3/MM3 (ref 1.7–7)
NRBC BLD AUTO-RTO: 0 /100 WBC (ref 0–0.2)
PLATELET # BLD AUTO: 133 10*3/MM3 (ref 140–450)
PMV BLD AUTO: 10.1 FL (ref 6–12)
POTASSIUM SERPL-SCNC: 3.4 MMOL/L (ref 3.5–5.2)
PROCALCITONIN SERPL-MCNC: 0.08 NG/ML (ref 0–0.25)
PROT SERPL-MCNC: 6.2 G/DL (ref 6–8.5)
PROTHROMBIN TIME: 19.5 SECONDS (ref 11.7–14.2)
RBC # BLD AUTO: 4.19 10*6/MM3 (ref 4.14–5.8)
SODIUM SERPL-SCNC: 144 MMOL/L (ref 136–145)
WBC NRBC COR # BLD AUTO: 12.1 10*3/MM3 (ref 3.4–10.8)

## 2025-06-12 PROCEDURE — 85730 THROMBOPLASTIN TIME PARTIAL: CPT | Performed by: STUDENT IN AN ORGANIZED HEALTH CARE EDUCATION/TRAINING PROGRAM

## 2025-06-12 PROCEDURE — 71045 X-RAY EXAM CHEST 1 VIEW: CPT

## 2025-06-12 PROCEDURE — 94664 DEMO&/EVAL PT USE INHALER: CPT

## 2025-06-12 PROCEDURE — 82948 REAGENT STRIP/BLOOD GLUCOSE: CPT

## 2025-06-12 PROCEDURE — 94640 AIRWAY INHALATION TREATMENT: CPT

## 2025-06-12 PROCEDURE — 83605 ASSAY OF LACTIC ACID: CPT

## 2025-06-12 PROCEDURE — 25010000002 AMPICILLIN-SULBACTAM PER 1.5 G: Performed by: STUDENT IN AN ORGANIZED HEALTH CARE EDUCATION/TRAINING PROGRAM

## 2025-06-12 PROCEDURE — 97530 THERAPEUTIC ACTIVITIES: CPT

## 2025-06-12 PROCEDURE — 85610 PROTHROMBIN TIME: CPT | Performed by: STUDENT IN AN ORGANIZED HEALTH CARE EDUCATION/TRAINING PROGRAM

## 2025-06-12 PROCEDURE — 93005 ELECTROCARDIOGRAM TRACING: CPT

## 2025-06-12 PROCEDURE — 97535 SELF CARE MNGMENT TRAINING: CPT

## 2025-06-12 PROCEDURE — 25010000002 HEPARIN (PORCINE) 25000-0.45 UT/250ML-% SOLUTION: Performed by: STUDENT IN AN ORGANIZED HEALTH CARE EDUCATION/TRAINING PROGRAM

## 2025-06-12 PROCEDURE — 94761 N-INVAS EAR/PLS OXIMETRY MLT: CPT

## 2025-06-12 PROCEDURE — 94799 UNLISTED PULMONARY SVC/PX: CPT

## 2025-06-12 PROCEDURE — 93010 ELECTROCARDIOGRAM REPORT: CPT | Performed by: INTERNAL MEDICINE

## 2025-06-12 PROCEDURE — 25510000001 IOPAMIDOL PER 1 ML

## 2025-06-12 PROCEDURE — 85025 COMPLETE CBC W/AUTO DIFF WBC: CPT | Performed by: STUDENT IN AN ORGANIZED HEALTH CARE EDUCATION/TRAINING PROGRAM

## 2025-06-12 PROCEDURE — 25810000003 LACTATED RINGERS PER 1000 ML: Performed by: NURSE PRACTITIONER

## 2025-06-12 PROCEDURE — 97112 NEUROMUSCULAR REEDUCATION: CPT

## 2025-06-12 PROCEDURE — 25810000003 SODIUM CHLORIDE 0.9 % SOLUTION: Performed by: STUDENT IN AN ORGANIZED HEALTH CARE EDUCATION/TRAINING PROGRAM

## 2025-06-12 PROCEDURE — 70498 CT ANGIOGRAPHY NECK: CPT

## 2025-06-12 PROCEDURE — 80053 COMPREHEN METABOLIC PANEL: CPT

## 2025-06-12 PROCEDURE — 70496 CT ANGIOGRAPHY HEAD: CPT

## 2025-06-12 PROCEDURE — 99233 SBSQ HOSP IP/OBS HIGH 50: CPT | Performed by: STUDENT IN AN ORGANIZED HEALTH CARE EDUCATION/TRAINING PROGRAM

## 2025-06-12 RX ORDER — NOREPINEPHRINE BITARTRATE 0.03 MG/ML
.02-.5 INJECTION, SOLUTION INTRAVENOUS
Status: DISCONTINUED | OUTPATIENT
Start: 2025-06-12 | End: 2025-06-13 | Stop reason: HOSPADM

## 2025-06-12 RX ORDER — POTASSIUM CHLORIDE 1.5 G/1.58G
40 POWDER, FOR SOLUTION ORAL EVERY 4 HOURS
Status: COMPLETED | OUTPATIENT
Start: 2025-06-12 | End: 2025-06-13

## 2025-06-12 RX ORDER — IOPAMIDOL 755 MG/ML
100 INJECTION, SOLUTION INTRAVASCULAR
Status: COMPLETED | OUTPATIENT
Start: 2025-06-12 | End: 2025-06-12

## 2025-06-12 RX ORDER — SODIUM CHLORIDE, SODIUM LACTATE, POTASSIUM CHLORIDE, CALCIUM CHLORIDE 600; 310; 30; 20 MG/100ML; MG/100ML; MG/100ML; MG/100ML
100 INJECTION, SOLUTION INTRAVENOUS CONTINUOUS
Status: DISCONTINUED | OUTPATIENT
Start: 2025-06-12 | End: 2025-06-13 | Stop reason: HOSPADM

## 2025-06-12 RX ORDER — HEPARIN SODIUM 10000 [USP'U]/100ML
18 INJECTION, SOLUTION INTRAVENOUS
Status: DISCONTINUED | OUTPATIENT
Start: 2025-06-12 | End: 2025-06-13 | Stop reason: HOSPADM

## 2025-06-12 RX ORDER — ASPIRIN 81 MG/1
325 TABLET ORAL DAILY
Qty: 120 TABLET | Refills: 0 | Status: SHIPPED | OUTPATIENT
Start: 2025-06-13 | End: 2025-06-22

## 2025-06-12 RX ADMIN — Medication 10 ML: at 21:37

## 2025-06-12 RX ADMIN — HEPARIN SODIUM 18 UNITS/KG/HR: 10000 INJECTION, SOLUTION INTRAVENOUS at 18:28

## 2025-06-12 RX ADMIN — Medication 1000 MCG: at 07:50

## 2025-06-12 RX ADMIN — BUDESONIDE 0.5 MG: 0.5 SUSPENSION RESPIRATORY (INHALATION) at 07:08

## 2025-06-12 RX ADMIN — IPRATROPIUM BROMIDE 0.5 MG: 0.5 SOLUTION RESPIRATORY (INHALATION) at 20:16

## 2025-06-12 RX ADMIN — ASPIRIN 81 MG: 81 TABLET, COATED ORAL at 07:50

## 2025-06-12 RX ADMIN — PANTOPRAZOLE SODIUM 40 MG: 40 TABLET, DELAYED RELEASE ORAL at 07:50

## 2025-06-12 RX ADMIN — FERROUS SULFATE TAB 325 MG (65 MG ELEMENTAL FE) 325 MG: 325 (65 FE) TAB at 07:50

## 2025-06-12 RX ADMIN — SODIUM CHLORIDE, POTASSIUM CHLORIDE, SODIUM LACTATE AND CALCIUM CHLORIDE 100 ML/HR: 600; 310; 30; 20 INJECTION, SOLUTION INTRAVENOUS at 19:36

## 2025-06-12 RX ADMIN — BUDESONIDE 0.5 MG: 0.5 SUSPENSION RESPIRATORY (INHALATION) at 20:19

## 2025-06-12 RX ADMIN — RIVAROXABAN 15 MG: 15 TABLET, FILM COATED ORAL at 17:10

## 2025-06-12 RX ADMIN — LATANOPROST 1 DROP: 50 SOLUTION/ DROPS OPHTHALMIC at 21:37

## 2025-06-12 RX ADMIN — Medication 10 ML: at 08:05

## 2025-06-12 RX ADMIN — IOPAMIDOL 100 ML: 755 INJECTION, SOLUTION INTRAVENOUS at 13:27

## 2025-06-12 RX ADMIN — AMPICILLIN SODIUM AND SULBACTAM SODIUM 1.5 G: 1; .5 INJECTION, POWDER, FOR SOLUTION INTRAMUSCULAR; INTRAVENOUS at 07:49

## 2025-06-12 RX ADMIN — IPRATROPIUM BROMIDE 0.5 MG: 0.5 SOLUTION RESPIRATORY (INHALATION) at 10:43

## 2025-06-12 RX ADMIN — SODIUM CHLORIDE 500 ML: 9 INJECTION, SOLUTION INTRAVENOUS at 08:37

## 2025-06-12 RX ADMIN — AMPICILLIN SODIUM AND SULBACTAM SODIUM 1.5 G: 1; .5 INJECTION, POWDER, FOR SOLUTION INTRAMUSCULAR; INTRAVENOUS at 21:36

## 2025-06-12 RX ADMIN — ATORVASTATIN CALCIUM 10 MG: 10 TABLET ORAL at 07:50

## 2025-06-12 RX ADMIN — AMPICILLIN SODIUM AND SULBACTAM SODIUM 1.5 G: 1; .5 INJECTION, POWDER, FOR SOLUTION INTRAMUSCULAR; INTRAVENOUS at 13:49

## 2025-06-12 RX ADMIN — NOREPINEPHRINE BITARTRATE 0.05 MCG/KG/MIN: 0.03 INJECTION, SOLUTION INTRAVENOUS at 18:28

## 2025-06-12 RX ADMIN — Medication 10 ML: at 07:50

## 2025-06-12 RX ADMIN — SODIUM CHLORIDE 1000 ML: 900 INJECTION, SOLUTION INTRAVENOUS at 17:08

## 2025-06-12 RX ADMIN — POTASSIUM CHLORIDE 40 MEQ: 1.5 POWDER, FOR SOLUTION ORAL at 21:37

## 2025-06-12 RX ADMIN — AMPICILLIN SODIUM AND SULBACTAM SODIUM 1.5 G: 1; .5 INJECTION, POWDER, FOR SOLUTION INTRAMUSCULAR; INTRAVENOUS at 01:06

## 2025-06-12 RX ADMIN — IPRATROPIUM BROMIDE 0.5 MG: 0.5 SOLUTION RESPIRATORY (INHALATION) at 07:00

## 2025-06-12 NOTE — PLAN OF CARE
Pt presents with functional mobility impairments which indicate the need for skilled intervention. Tolerating session today without incident. Pt on room air with IV, telemetry and male external catheter.  Pt with decreased safety awareness with all mobility tasks this date.  Min A for bed mobility, Min A for sitting balance secondary to posterior lean. Focused on anterior lean and finding/maintaining midline.  Sit to stand transfer with RW: Mod  A with posterior lean.  Mod/Max A for stand pivot transfer with RW to b/s chair.  Will continue to follow and progress as tolerated.

## 2025-06-12 NOTE — PROGRESS NOTES
[facility] MEDICINE SERVICE  DAILY PROGRESS NOTE    NAME: Shukri Park  : 1940  MRN: [id number]      LOS: 2 days     PROVIDER OF SERVICE: Marcela Olvera MD    Chief Complaint: Acute encephalopathy    Subjective:     Interval History:  History taken from: Patient     Patient seen and examined at bedside, resting comfortably and family members present at bedside.  He is ANO x 3 and about to go down for CT angio.        Review of Systems:   Review of Systems  All negative except above   Objective:     Vital Signs  Temp:  [97.5 °F (36.4 °C)-98.7 °F (37.1 °C)] 97.8 °F (36.6 °C)  Heart Rate:  [70-76] 70  Resp:  [10-23] 22  BP: (124-138)/(62-86) 128/82  Flow (L/min) (Oxygen Therapy):  [0-2] 2   Body mass index is 15.48 kg/m².    Physical Exam  Physical Exam  General: Alert and oriented x 0, no acute distress.  Lungs: Clear to auscultation, nonlabored respiration.  Heart:regular rate and rhythm   Abdomen: Soft, nontender, nondistended, + bowel sounds.  Neuro: awake but confused, not following commands, moving all 4 extremities        Diagnostic Data    Results from last 7 days   Lab Units 25  0236   WBC 10*3/mm3 12.16*   HEMOGLOBIN g/dL 13.4   HEMATOCRIT % 42.1   PLATELETS 10*3/mm3 131*   GLUCOSE mg/dL 103*   CREATININE mg/dL 1.07   BUN mg/dL 37.0*   SODIUM mmol/L 144   POTASSIUM mmol/L 4.1   AST (SGOT) U/L 62*   ALT (SGPT) U/L 26   ALK PHOS U/L 71   BILIRUBIN mg/dL 1.0   ANION GAP mmol/L 16.7*       MRI Brain With & Without Contrast  Result Date: 2025  Impression: 1.Multiple scattered acute infarcts noted within the left cerebral hemisphere. There is associated mild edema with these infarcts, without significant mass effect. 2.There is evidence of severe stenoses versus near occlusion of the left supraclinoid ICA/carotid terminus. Follow-up with dedicated CTA/MRI of the head and neck is recommended 3.No suspicious intracranial lesion or mass is identified on this study Electronically Signed:  Quincy Ale, DO  6/11/2025 11:01 PM EDT  Workstation ID: [id number]    CT Head Without Contrast  Result Date: 6/10/2025  Impression: HEAD CT: 1.No acute intracranial abnormality. 2.There is a 2.5 cm hypodensity in the posterior left temporal lobe which does not appear acute but is new since 2023. This is likely a subacute or chronic infarct. There is generalized parenchymal volume loss and chronic small vessel ischemic disease. CT C-SPINE: 1.No acute fracture or malalignment. 2.There is multilevel degenerative disc disease and facet arthropathy as described above. 3.There is evidence of previous left carotid surgery. The overall appearance of the cervical spine is unchanged since 2019. CT T-SPINE: 1.No acute fracture or malalignment. 2.There is multilevel degenerative disc disease with progression of degenerative changes at the T9-10 level. 3.There is marked cardiomegaly and a partially visualized pacemaker/AICD. 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 There is emphysema with numerous areas of scarring in the visualized lung fields. CT L-SPINE: 1.No acute fracture or malalignment. 2.There is extensive multilevel degenerative disc disease and facet arthropathy with postsurgical changes as described above. 3.There is a 5.1 cm fusiform infrarenal abdominal aortic aneurysm which has increased in size since 2021. 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 Electronically Signed: Sanjiv Roberts, DO  6/10/2025 9:06 PM EDT  Workstation ID: [id number]    CT Cervical Spine Without Contrast  Result Date: 6/10/2025  Impression: HEAD CT: 1.No acute intracranial abnormality. 2.There is a 2.5 cm hypodensity in the posterior left temporal lobe which does not appear acute but is new since 2023. This is likely a subacute or chronic infarct. There is generalized parenchymal volume loss and chronic small vessel ischemic disease. CT C-SPINE: 1.No acute fracture or  malalignment. 2.There is multilevel degenerative disc disease and facet arthropathy as described above. 3.There is evidence of previous left carotid surgery. The overall appearance of the cervical spine is unchanged since 2019. CT T-SPINE: 1.No acute fracture or malalignment. 2.There is multilevel degenerative disc disease with progression of degenerative changes at the T9-10 level. 3.There is marked cardiomegaly and a partially visualized pacemaker/AICD. 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 There is emphysema with numerous areas of scarring in the visualized lung fields. CT L-SPINE: 1.No acute fracture or malalignment. 2.There is extensive multilevel degenerative disc disease and facet arthropathy with postsurgical changes as described above. 3.There is a 5.1 cm fusiform infrarenal abdominal aortic aneurysm which has increased in size since 2021. 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 Electronically Signed: Sanjiv Roberts DO  6/10/2025 9:06 PM EDT  Workstation ID: [id number]    CT Thoracic Spine Without Contrast  Result Date: 6/10/2025  Impression: HEAD CT: 1.No acute intracranial abnormality. 2.There is a 2.5 cm hypodensity in the posterior left temporal lobe which does not appear acute but is new since 2023. This is likely a subacute or chronic infarct. There is generalized parenchymal volume loss and chronic small vessel ischemic disease. CT C-SPINE: 1.No acute fracture or malalignment. 2.There is multilevel degenerative disc disease and facet arthropathy as described above. 3.There is evidence of previous left carotid surgery. The overall appearance of the cervical spine is unchanged since 2019. CT T-SPINE: 1.No acute fracture or malalignment. 2.There is multilevel degenerative disc disease with progression of degenerative changes at the T9-10 level. 3.There is marked cardiomegaly and a partially visualized pacemaker/AICD. 4.There is a 4.2  cm ectatic ascending thoracic aorta. There is emphysema with numerous areas of scarring in the visualized lung fields. CT L-SPINE: 1.No acute fracture or malalignment. 2.There is extensive multilevel degenerative disc disease and facet arthropathy with postsurgical changes as described above. 3.There is a 5.1 cm fusiform infrarenal abdominal aortic aneurysm which has increased in size since 2021. 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 Electronically Signed: Sanjiv Roberts DO  6/10/2025 9:06 PM EDT  Workstation ID: [id number]    CT Lumbar Spine Without Contrast  Result Date: 6/10/2025  Impression: HEAD CT: 1.No acute intracranial abnormality. 2.There is a 2.5 cm hypodensity in the posterior left temporal lobe which does not appear acute but is new since 2023. This is likely a subacute or chronic infarct. There is generalized parenchymal volume loss and chronic small vessel ischemic disease. CT C-SPINE: 1.No acute fracture or malalignment. 2.There is multilevel degenerative disc disease and facet arthropathy as described above. 3.There is evidence of previous left carotid surgery. The overall appearance of the cervical spine is unchanged since 2019. CT T-SPINE: 1.No acute fracture or malalignment. 2.There is multilevel degenerative disc disease with progression of degenerative changes at the T9-10 level. 3.There is marked cardiomegaly and a partially visualized pacemaker/AICD. 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 There is emphysema with numerous areas of scarring in the visualized lung fields. CT L-SPINE: 1.No acute fracture or malalignment. 2.There is extensive multilevel degenerative disc disease and facet arthropathy with postsurgical changes as described above. 3.There is a 5.1 cm fusiform infrarenal abdominal aortic aneurysm which has increased in size since 2021. 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 Electronically Signed: Sanjiv Aramndo, DO  6/10/2025 9:06 PM EDT  Workstation ID: [id number]    XR Elbow 3+ View Right  Result Date: 6/10/2025  Impression: Superficial soft tissue swelling posteriorly which can be seen with olecranon bursitis or cellulitis or contusion in the appropriate clinical setting. There are mild degenerative changes. No acute bony abnormality. No joint effusion is demonstrated. Electronically Signed: Sanjiv Armando, DO  6/10/2025 8:11 PM EDT  Workstation ID: [id number]    XR Chest 1 View  Result Date: 6/10/2025  Impression: Patchy and interstitial opacity in the periphery of the right mid lung and right basilar density which has evolved since 12/29/2020 and could be related to acute and/or chronic infection or inflammatory change.. This is superimposed on areas of chronic scarring and postsurgical change. 2. Cardiomegaly and AICD are unchanged. Electronically Signed: Sanjiv Armando, DO  6/10/2025 8:09 PM EDT  Workstation ID: [id number]        I have reviewed patient labs and imaging     Assessment/Plan:     Active and Resolved Problems  Shukri Park is a 84 y.o. male with a CMH of CAD, HF-imp-EF with LVEF of 55 % With mild concentric hypertrophy per echo in 2023, Afib on AC, status post pacemaker, HTN, HLD,  who presented to [facility] on 6/10/2025 with  Acute encephalopathy.     Assessments:   Acute encephalopathy -suspect in setting of stroke, pneumonia  Right mid/lower lung opacities - concern for pneumonia  Resolved transient left eye vision loss - r/o stroke  Atrial fibrillation/flutter with paced rhythm on anticoagulation  Status post pacemaker  Rhabdomyolysis - likely from fall  Enlarging infrarenal AAA (5.1 cm, was 4.75 cm in 2023)  Chronic back pain, s/p spinal hardware placement     Plan:   continue ceftriaxone, Atrovent and Pulmicort. Follow blood cultures.  Sent urine legionella and strep  CT head shows new 2.5 cm hypodensity in left posterior temporal  lobe, likely subacute/chronic infarct. MRI not feasible due to spinal hardware.   Discussed with neurology, okay to resume home aspirin and Xarelto  TSH-0.75, FT4 1.07, A1C 5.76, B12- 805  Pacemaker was interrogated in ED;  Cardiology consulted  Continue LR at 100 cc/hr for rhabdomyolysis; monitor CK.  Recommend outpatient PCP and vascular surgery follow-up for AAA (5.1 cm, up from 4.75 cm in 2023); below intervention threshold.  SLP recommendations appreciated  Resume home medications as appropriate   DVT/GI prophylaxis  Fall and bleeding precautions  MRI brain revealed: Multiple scattered acute infarcts noted within the left cerebral hemisphere. There is associated mild edema with these infarcts, without significant mass effect. There is evidence of severe stenoses versus near occlusion of the left supraclinoid ICA/carotid terminus. No suspicious intracranial lesion or mass is identified on this study   Neurology consulted for stroke      VTE Prophylaxis:  Pharmacologic & mechanical VTE prophylaxis orders are present.             Disposition Planning:     Barriers to Discharge: Neurology work up for stroke vs neoplastic process, septic work up, clinical improvement   Anticipated Date of Discharge: 06/14  Place of Discharge: rehab vs home      Time: 40 minutes     Code Status and Medical Interventions: No CPR (Do Not Attempt to Resuscitate); Full Support; DNR/DNI, Discussed in extensive detail with Grandsonw Who is POA, who stated that patient has expressed not to have CPR or put on machines such as ventila...   Ordered at: 06/11/25 0145     Code Status (Patient has no pulse and is not breathing):    No CPR (Do Not Attempt to Resuscitate)     Medical Interventions (Patient has pulse or is breathing):    Full Support     Comments:    DNR/DNI, Discussed in extensive detail with Grandsonw Who is POA, who stated that patient has expressed not to have CPR or put on machines such as ventilator     Level Of Support  Discussed With:    Next of Kin (If No Surrogate)       Signature: Electronically signed by Marcela Olvera MD, 06/12/25, 07:49 EDT.  [facility] Hospitalist Team

## 2025-06-12 NOTE — THERAPY TREATMENT NOTE
"Subjective: Pt agreeable to therapeutic plan of care.    Objective:     Precautions -   Falls, Aspiration Risk     Bed mobility -  Min A with increased time and effort     Sitting balance:  Min A secondary to posterior lean.  Focused on anterior weight shifting and finding/maintainig midline position  Transfers -      Sit to stand: Mod A with RW with posterior lean    Stand pivot bed to b/s chair: Mod/Max A with RW   Ambulation -  not attempted       Vitals: WNL    Pain: 0 VAS Location: n/a  Intervention for pain: N/A    Education: Provided education on the importance of mobility in the acute care setting, Verbal/Tactile Cues, Transfer Training, and Energy conservation strategies    Assessment: Shukri Park presents with functional mobility impairments which indicate the need for skilled intervention. Tolerating session today without incident. Pt on room air with IV, telemetry and male external catheter.  Pt with decreased safety awareness with all mobility tasks this date.  Min A for bed mobility, Min A for sitting balance secondary to posterior lean. Focused on anterior lean and finding/maintaining midline.  Sit to stand transfer with RW: Mod  A with posterior lean.  Mod/Max A for stand pivot transfer with RW to b/s chair.  Will continue to follow and progress as tolerated.     Plan/Recommendations:   If medically appropriate, Moderate Intensity Therapy recommended post-acute care. This is recommended as therapy feels the patient would require 3-4 days per week and wouldn't tolerate \"3 hour daily\" rehab intensity. SNF would be the preferred choice. If the patient does not agree to SNF, arrange HH or OP depending on home bound status. If patient is medically complex, consider LTACH. Pt requires no DME at discharge.     Pt desires Skilled Rehab placement at discharge. Pt cooperative; agreeable to therapeutic recommendations and plan of care.         Post-Tx Position: Up in Chair, Alarms activated, and Call light " and personal items within reach  PPE: gloves    Therapy Charges for Today       Code Description Service Date Service Provider Modifiers Qty    75686900210 HC PT THERAPEUTIC ACT EA 15 MIN 6/12/2025 Sanjiv Mcintosh, PT GP 1    12014701578 HC PT NEUROMUSC RE EDUCATION EA 15 MIN 6/12/2025 Sanjiv Mcintosh, PT GP 1           PT Charges       Row Name 06/12/25 1410             Time Calculation    Start Time 0903  -AM      Stop Time 0922  -AM      Time Calculation (min) 19 min  -AM      PT Received On 06/12/25  -AM      PT - Next Appointment 06/13/25  -AM         Time Calculation- PT    Total Timed Code Minutes- PT 19 minute(s)  -AM                User Key  (r) = Recorded By, (t) = Taken By, (c) = Cosigned By      Initials Name Provider Type    AM Sanjiv Mcintosh, PT Physical Therapist

## 2025-06-12 NOTE — THERAPY TREATMENT NOTE
"Subjective: Pt agreeable to therapeutic plan of care.  Cognition: oriented to Person and Place, disoriented to Time and Situation, memory: Impaired short term, arousal/alertness: Alert, Attentive, and Confused, safety/judgement: limited, and awareness of deficits: fair awareness of safety precautions and poor awareness of defits    Objective: neurology writes: \"MRI brain showed multiple scattered acute infarcts in the left cerebral hemisphere with mild edema without significant mass effect. 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 No enhancing masses identified. CTA head neck is pending.\"    Precautions - fall    Bed Mobility: Min-A and Assist x 2 sit>supine  Functional Transfers: Max-A and Assist x 2     Balance: sitting EOB and supported Min-A  Functional Ambulation: N/A or Not attempted.    Grooming and Feeding: Mod-A  ADL Position: supported sitting  ADL Comments: Puree and NTL, no straw, w/ Ruggiero Water Protocol     Lower Body Dressing: Dependent  ADL Position: supine  ADL Comments: pt demonstrates weakness, cognitive deficits, &  incoordination impacting ADL skill  LBD.    Pain: 0 VAS Location: no c/o  Interventions for pain: Repositioned, Increased Activity, and Therapeutic Presence  Education: Provided education on the importance of mobility in the acute care setting, Verbal/Tactile Cues, ADL training, and Transfer Training    Assessment: Shukri Park presents with ADL impairments affecting function including balance, cognition, coordination, endurance / activity tolerance, grasp, postural / trunk control, range of motion (ROM), and strength. Demonstrated functioning below baseline abilities indicate the need for continued skilled intervention while inpatient. Tolerating session today without incident. Will continue to follow and progress as tolerated.     Plan/Recommendations:   Moderate Intensity Therapy recommended post-acute care. " "This is recommended as therapy feels the patient would require 3-4 days per week and wouldn't tolerate \"3 hour daily\" rehab intensity. SNF would be the preferred choice. If the patient does not agree to SNF, arrange HH or OP depending on home bound status. If patient is medically complex, consider LTACH.. Pt requires no DME at discharge.     Pt desires Skilled Rehab placement at discharge. Pt cooperative; agreeable to therapeutic recommendations and plan of care.     Modified Broad Top: 4 = Moderately severe disability (Unable to attend to own bodily needs without assistance, and unable to walk unassisted)     Post-Tx Position: Supine with HOB Elevated, Alarms activated, and Call light and personal items within reach  PPE: gloves    Therapy Charges for Today       Code Description Service Date Service Provider Modifiers Qty    51788228728  OT THERAPEUTIC ACT EA 15 MIN 6/12/2025 Mana Lucio OT GO 1    [id number]  OT SELF CARE/MGMT/TRAIN EA 15 MIN 6/12/2025 Mana Lucio OT GO 1           Time Calculation- OT       Row Name 06/12/25 1320             Time Calculation-     OT Start Time 1124  -      OT Stop Time 1143  -      OT Time Calculation (min) 19 min  -      Total Timed Code Minutes- OT 19 minute(s)  -      OT Received On 06/12/25  -      OT - Next Appointment 06/13/25  -                User Key  (r) = Recorded By, (t) = Taken By, (c) = Cosigned By      Initials Name Provider Type     Mana Lucio, OT Occupational Therapist                    "

## 2025-06-12 NOTE — PLAN OF CARE
Problem: Adult Inpatient Plan of Care  Goal: Plan of Care Review  Outcome: Not Progressing  Goal: Patient-Specific Goal (Individualized)  Outcome: Not Progressing  Goal: Absence of Hospital-Acquired Illness or Injury  Outcome: Not Progressing  Intervention: Identify and Manage Fall Risk  Recent Flowsheet Documentation  Taken 6/12/2025 1605 by Sarah Pollock RN  Safety Promotion/Fall Prevention:   assistive device/personal items within reach   clutter free environment maintained   room organization consistent   safety round/check completed   activity supervised   nonskid shoes/slippers when out of bed  Taken 6/12/2025 1400 by Sarah Pollock RN  Safety Promotion/Fall Prevention:   assistive device/personal items within reach   clutter free environment maintained   room organization consistent   safety round/check completed   nonskid shoes/slippers when out of bed   activity supervised  Taken 6/12/2025 1200 by Sarah Pollock RN  Safety Promotion/Fall Prevention:   activity supervised   assistive device/personal items within reach   clutter free environment maintained   fall prevention program maintained   nonskid shoes/slippers when out of bed   room organization consistent   safety round/check completed  Taken 6/12/2025 1000 by Sarah Pollock RN  Safety Promotion/Fall Prevention:   assistive device/personal items within reach   clutter free environment maintained   room organization consistent   safety round/check completed   nonskid shoes/slippers when out of bed   activity supervised  Taken 6/12/2025 0750 by Sarah Pollock RN  Safety Promotion/Fall Prevention:   activity supervised   assistive device/personal items within reach   clutter free environment maintained   fall prevention program maintained   nonskid shoes/slippers when out of bed   room organization consistent   safety round/check completed  Intervention: Prevent Skin Injury  Recent Flowsheet Documentation  Taken 6/12/2025 1605 by Phill  MEEK Smith  Body Position: supine  Taken 6/12/2025 1400 by Sarah Pollock RN  Body Position: patient/family refused  Taken 6/12/2025 1200 by Sarah Pollock RN  Body Position: weight shifting  Skin Protection:   transparent dressing maintained   incontinence pads utilized  Taken 6/12/2025 1000 by Sarah Pollock RN  Body Position: legs elevated  Taken 6/12/2025 0750 by Sarah Pollock RN  Body Position: supine  Skin Protection:   transparent dressing maintained   incontinence pads utilized  Intervention: Prevent and Manage VTE (Venous Thromboembolism) Risk  Recent Flowsheet Documentation  Taken 6/12/2025 1605 by Sarah Pollock RN  VTE Prevention/Management:   bilateral   SCDs (sequential compression devices) on  Taken 6/12/2025 1200 by Sarah Pollock RN  VTE Prevention/Management:   bilateral   patient refused intervention   SCDs (sequential compression devices) off  Taken 6/12/2025 0750 by Sarah Pollock RN  VTE Prevention/Management:   bilateral   SCDs (sequential compression devices) on  Intervention: Prevent Infection  Recent Flowsheet Documentation  Taken 6/12/2025 1605 by Sarah Pollock RN  Infection Prevention:   environmental surveillance performed   equipment surfaces disinfected   hand hygiene promoted   personal protective equipment utilized   single patient room provided  Taken 6/12/2025 1400 by Sarah Pollock RN  Infection Prevention:   environmental surveillance performed   equipment surfaces disinfected   hand hygiene promoted   personal protective equipment utilized   single patient room provided  Taken 6/12/2025 1200 by Sarah Pollock RN  Infection Prevention:   environmental surveillance performed   equipment surfaces disinfected   hand hygiene promoted   personal protective equipment utilized   single patient room provided  Taken 6/12/2025 1000 by Sarah Pollock RN  Infection Prevention:   environmental surveillance performed   equipment surfaces disinfected   hand hygiene  promoted   personal protective equipment utilized   single patient room provided  Taken 6/12/2025 0750 by Sarah Pollock RN  Infection Prevention:   environmental surveillance performed   equipment surfaces disinfected   hand hygiene promoted   personal protective equipment utilized   single patient room provided  Goal: Optimal Comfort and Wellbeing  Outcome: Not Progressing  Intervention: Monitor Pain and Promote Comfort  Recent Flowsheet Documentation  Taken 6/12/2025 1605 by Sarah Pollock RN  Pain Management Interventions:   care clustered   position adjusted   pillow support provided  Taken 6/12/2025 1200 by Sarah Pollock RN  Pain Management Interventions: care clustered  Taken 6/12/2025 0750 by Sarah Pollock RN  Pain Management Interventions:   care clustered   position adjusted   pillow support provided  Intervention: Provide Person-Centered Care  Recent Flowsheet Documentation  Taken 6/12/2025 1605 by Sarah Pollock RN  Trust Relationship/Rapport:   care explained   questions answered  Taken 6/12/2025 1200 by Sarah Pollock RN  Trust Relationship/Rapport:   care explained   questions answered   thoughts/feelings acknowledged  Taken 6/12/2025 0750 by Sarah Pollock RN  Trust Relationship/Rapport:   care explained   thoughts/feelings acknowledged  Goal: Readiness for Transition of Care  Outcome: Not Progressing     Problem: Violence Risk or Actual  Goal: Anger and Impulse Control  Outcome: Not Progressing  Intervention: Minimize Safety Risk  Recent Flowsheet Documentation  Taken 6/12/2025 1605 by Sarah Pollock RN  De-Escalation Techniques: increased round frequency  Enhanced Safety Measures:   room near unit station   bed alarm set  Taken 6/12/2025 1400 by Sarah Pollock RN  De-Escalation Techniques: increased round frequency  Enhanced Safety Measures: bed alarm set  Taken 6/12/2025 1200 by Sarah Pollock RN  De-Escalation Techniques: increased round frequency  Enhanced Safety  Measures:   room near unit station   bed alarm set  Taken 6/12/2025 1000 by Sarah Pollock RN  De-Escalation Techniques: increased round frequency  Enhanced Safety Measures: chair alarm set  Taken 6/12/2025 0750 by Sarah Pollock RN  Sensory Stimulation Regulation: care clustered  De-Escalation Techniques: increased round frequency  Enhanced Safety Measures:   bed alarm set   room near unit station  Intervention: Promote Self-Control  Recent Flowsheet Documentation  Taken 6/12/2025 1605 by Sarah Pollock RN  Environmental Support: calm environment promoted  Taken 6/12/2025 1200 by Sarah Pollock RN  Supportive Measures: active listening utilized  Environmental Support: calm environment promoted  Taken 6/12/2025 0750 by Sarah Pollock RN  Supportive Measures: active listening utilized     Problem: Fall Injury Risk  Goal: Absence of Fall and Fall-Related Injury  Outcome: Not Progressing  Intervention: Identify and Manage Contributors  Recent Flowsheet Documentation  Taken 6/12/2025 0750 by Sarah Pollock RN  Medication Review/Management: medications reviewed  Intervention: Promote Injury-Free Environment  Recent Flowsheet Documentation  Taken 6/12/2025 1605 by Sarah Pollock RN  Safety Promotion/Fall Prevention:   assistive device/personal items within reach   clutter free environment maintained   room organization consistent   safety round/check completed   activity supervised   nonskid shoes/slippers when out of bed  Taken 6/12/2025 1400 by Sarah Pollock RN  Safety Promotion/Fall Prevention:   assistive device/personal items within reach   clutter free environment maintained   room organization consistent   safety round/check completed   nonskid shoes/slippers when out of bed   activity supervised  Taken 6/12/2025 1200 by Sarah Pollock RN  Safety Promotion/Fall Prevention:   activity supervised   assistive device/personal items within reach   clutter free environment maintained   fall prevention  program maintained   nonskid shoes/slippers when out of bed   room organization consistent   safety round/check completed  Taken 6/12/2025 1000 by Sarah Pollock RN  Safety Promotion/Fall Prevention:   assistive device/personal items within reach   clutter free environment maintained   room organization consistent   safety round/check completed   nonskid shoes/slippers when out of bed   activity supervised  Taken 6/12/2025 0750 by Sarah Pollock RN  Safety Promotion/Fall Prevention:   activity supervised   assistive device/personal items within reach   clutter free environment maintained   fall prevention program maintained   nonskid shoes/slippers when out of bed   room organization consistent   safety round/check completed  Goal: Absence of Fall and Fall-Related Injury  Outcome: Not Progressing  Intervention: Identify and Manage Contributors  Recent Flowsheet Documentation  Taken 6/12/2025 0750 by Sarah Pollock RN  Medication Review/Management: medications reviewed  Intervention: Promote Injury-Free Environment  Recent Flowsheet Documentation  Taken 6/12/2025 1605 by Sarah Pollock RN  Safety Promotion/Fall Prevention:   assistive device/personal items within reach   clutter free environment maintained   room organization consistent   safety round/check completed   activity supervised   nonskid shoes/slippers when out of bed  Taken 6/12/2025 1400 by Sarah Pollock RN  Safety Promotion/Fall Prevention:   assistive device/personal items within reach   clutter free environment maintained   room organization consistent   safety round/check completed   nonskid shoes/slippers when out of bed   activity supervised  Taken 6/12/2025 1200 by Sarah Pollock, RN  Safety Promotion/Fall Prevention:   activity supervised   assistive device/personal items within reach   clutter free environment maintained   fall prevention program maintained   nonskid shoes/slippers when out of bed   room organization consistent    safety round/check completed  Taken 6/12/2025 1000 by Sarah Pollock RN  Safety Promotion/Fall Prevention:   assistive device/personal items within reach   clutter free environment maintained   room organization consistent   safety round/check completed   nonskid shoes/slippers when out of bed   activity supervised  Taken 6/12/2025 0750 by Sarah Pollock RN  Safety Promotion/Fall Prevention:   activity supervised   assistive device/personal items within reach   clutter free environment maintained   fall prevention program maintained   nonskid shoes/slippers when out of bed   room organization consistent   safety round/check completed     Problem: Comorbidity Management  Goal: Blood Pressure in Desired Range  Outcome: Not Progressing  Intervention: Maintain Blood Pressure Management  Recent Flowsheet Documentation  Taken 6/12/2025 0750 by Sarah Pollock RN  Medication Review/Management: medications reviewed     Problem: Skin Injury Risk Increased  Goal: Skin Health and Integrity  Outcome: Not Progressing  Intervention: Optimize Skin Protection  Recent Flowsheet Documentation  Taken 6/12/2025 1605 by Sarah Pollock RN  Activity Management: activity encouraged  Pressure Reduction Techniques:   frequent weight shift encouraged   weight shift assistance provided  Head of Bed (HOB) Positioning: HOB at 30-45 degrees  Pressure Reduction Devices: pressure-redistributing mattress utilized  Taken 6/12/2025 1400 by Sarah Pollock RN  Activity Management: activity encouraged  Taken 6/12/2025 1200 by Sarah Pollock RN  Activity Management: activity encouraged  Pressure Reduction Techniques:   frequent weight shift encouraged   weight shift assistance provided  Pressure Reduction Devices: pressure-redistributing mattress utilized  Skin Protection:   transparent dressing maintained   incontinence pads utilized  Taken 6/12/2025 1000 by Sarah Pollock RN  Activity Management: up in chair  Taken 6/12/2025 0750 by Phill  MEEK Smith  Activity Management: activity encouraged  Pressure Reduction Techniques:   frequent weight shift encouraged   heels elevated off bed   weight shift assistance provided  Head of Bed (HOB) Positioning: HOB at 30-45 degrees  Pressure Reduction Devices: pressure-redistributing mattress utilized  Skin Protection:   transparent dressing maintained   incontinence pads utilized     Problem: Stroke, Ischemic (Includes Transient Ischemic Attack)  Goal: Optimal Coping  Outcome: Not Progressing  Intervention: Support Psychosocial Response to Stroke  Recent Flowsheet Documentation  Taken 6/12/2025 1605 by Sarah Pollock RN  Family/Support System Care: support provided  Taken 6/12/2025 1200 by Sarah Pollock RN  Supportive Measures: active listening utilized  Family/Support System Care: presence promoted  Taken 6/12/2025 0750 by Sarah Pollock RN  Supportive Measures: active listening utilized  Family/Support System Care:   support provided   self-care encouraged  Goal: Effective Bowel Elimination  Outcome: Not Progressing  Goal: Optimal Cerebral Tissue Perfusion  Outcome: Not Progressing  Intervention: Protect and Optimize Cerebral Perfusion  Recent Flowsheet Documentation  Taken 6/12/2025 1605 by Sarah Pollock RN  Cerebral Perfusion Promotion: blood pressure monitored  Taken 6/12/2025 1200 by Sarah Pollock RN  Fluid/Electrolyte Management: fluids provided  Cerebral Perfusion Promotion: blood pressure monitored  Taken 6/12/2025 0750 by Sarah Pollock RN  Fluid/Electrolyte Management: fluids provided  Sensory Stimulation Regulation: care clustered  Cerebral Perfusion Promotion: blood pressure monitored  Goal: Optimal Cognitive Function  Outcome: Not Progressing  Intervention: Optimize Cognitive Function  Recent Flowsheet Documentation  Taken 6/12/2025 0750 by Sarah Pollock RN  Sensory Stimulation Regulation: care clustered  Goal: Improved Communication Skills  Outcome: Not Progressing  Intervention:  Optimize Communication Skills  Recent Flowsheet Documentation  Taken 6/12/2025 1605 by Sarah Pollock RN  Communication Enhancement Strategies: call light answered in person  Taken 6/12/2025 1200 by Sarah Pollock RN  Communication Enhancement Strategies: call light answered in person  Taken 6/12/2025 0750 by Sarah Pollock RN  Communication Enhancement Strategies:   call light answered in person   repetition utilized  Goal: Optimal Nutrition Intake  Outcome: Not Progressing  Goal: Effective Oxygenation and Ventilation  Outcome: Not Progressing  Intervention: Optimize Oxygenation and Ventilation  Recent Flowsheet Documentation  Taken 6/12/2025 1605 by Sarah Pollock RN  Head of Bed (HOB) Positioning: HOB at 30-45 degrees  Airway/Ventilation Management: airway patency maintained  Taken 6/12/2025 0750 by Sarah Pollock RN  Head of Bed (HOB) Positioning: HOB at 30-45 degrees  Goal: Improved Sensorimotor Function  Outcome: Not Progressing  Intervention: Optimize Range of Motion, Motor Control and Function  Recent Flowsheet Documentation  Taken 6/12/2025 1605 by Sarah Pollock RN  Positioning/Transfer Devices:   pillows   in use  Range of Motion: active ROM (range of motion) encouraged  Taken 6/12/2025 1200 by Sarah Pollock RN  Range of Motion: active ROM (range of motion) encouraged  Taken 6/12/2025 0750 by Sarah Pollock RN  Positioning/Transfer Devices:   pillows   in use  Range of Motion: active ROM (range of motion) encouraged  Intervention: Optimize Sensory and Perceptual Ability  Recent Flowsheet Documentation  Taken 6/12/2025 1605 by Sarah Pollock RN  Pressure Reduction Techniques:   frequent weight shift encouraged   weight shift assistance provided  Pressure Reduction Devices: pressure-redistributing mattress utilized  Taken 6/12/2025 1200 by Sarah Pollock RN  Pressure Reduction Techniques:   frequent weight shift encouraged   weight shift assistance provided  Pressure Reduction Devices:  pressure-redistributing mattress utilized  Taken 6/12/2025 0750 by Sarah Pollock, RN  Pressure Reduction Techniques:   frequent weight shift encouraged   heels elevated off bed   weight shift assistance provided  Pressure Reduction Devices: pressure-redistributing mattress utilized  Goal: Safe and Effective Swallow  Outcome: Not Progressing  Goal: Effective Urinary Elimination  Outcome: Not Progressing     Problem: Confusion Acute  Goal: Optimal Cognitive Function  Outcome: Not Progressing  Intervention: Minimize Contributing Factors  Recent Flowsheet Documentation  Taken 6/12/2025 1200 by Sarah Pollock, RN  Communication Support Strategies: active listening utilized  Taken 6/12/2025 0750 by Sarah Pollock, RN  Sensory Stimulation Regulation: care clustered  Communication Support Strategies: active listening utilized   Goal Outcome Evaluation:

## 2025-06-12 NOTE — CONSULTS
Diabetes Education    Patient Name:  Shukri Park  YOB: 1940  MRN: [id number]  Admit Date:  6/10/2025        MD consult per stroke protocol. On 6/11/2025 A1c was 5.76%. A1c info sheet given with discussion on being in pre-diabetes range. Patient stated he drinks about 2 regular Cokes a day and coffee with cream and sugar. Discussed with patient about trying to decrease his sugared beverage intake. Family at bedside and involved with discussion. Patient very hard of hearing. Patient and family have no further questions or concerns related to diabetes at this time.      Electronically signed by:  Maddi Veras RN  06/11/25 21:05 EDT

## 2025-06-12 NOTE — PROGRESS NOTES
LOS: 2 days     History taken from: patient chart RN  Chief complaint: Altered mental status     SUBJECTIVE:    Interval History: Sh[place] was admitted on 6/10/2025 no acute events overnight.  No new complaints, reports he feels a little better than yesterday.  Family at bedside endorses this and thinks he is even improved since we assessed him yesterday.  Continues to have a jovial personality.    Strength appears to be intact however continues to have some mild to moderate dysarthria and right facial droop.  No aphasia noted.    MRI brain showed multiple scattered acute infarcts in the left cerebral hemisphere with mild edema without significant mass effect.  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  No enhancing masses identified.  CTA head neck is pending.  Imaging reviewed with family.                - Portions of the above HPI were copied from previous encounters and edited as appropriate.        Review of Systems   Constitutional:  Negative for fever and unexpected weight change.   HENT:  Negative for ear pain, hearing loss, tinnitus, trouble swallowing and voice change.    Eyes:  Negative for photophobia and visual disturbance.   Respiratory:  Negative for chest tightness and shortness of breath.    Cardiovascular:  Negative for chest pain and palpitations.   Gastrointestinal:  Negative for nausea and vomiting.   Genitourinary:  Negative for difficulty urinating, dysuria, frequency and urgency.   Musculoskeletal:  Negative for back pain, gait problem, neck pain and neck stiffness.   Neurological:  Positive for facial asymmetry and speech difficulty. Negative for dizziness, tremors, seizures, syncope, weakness, light-headedness, numbness and headaches.   Psychiatric/Behavioral:  Negative for agitation, behavioral problems and confusion.    All other systems reviewed and are negative.         Pertinent PMH:  has a past medical  history of A-fib, Aortic aneurysm, Appetite loss, Cancer, CHF (congestive heart failure), Coronary artery disease, Dark stools, Foot pain, bilateral, GERD (gastroesophageal reflux disease), Hyperlipidemia, Low back pain, S/P epidural steroid injection, and Weight loss.     ________________________________________________     OBJECTIVE:  GEN: NAD, pleasant, cooperative  CHEST: No signs of resp distress, on room air     NEURO  MENTAL STATUS: AAOx3, memory intact, fund of knowledge appropriate  LANG/SPEECH: Naming and repetition intact, mild dysarthria, follows 3-step commands     CRANIAL NERVES:  II-XII grossly intact exception of a right facial droop     MOTOR:  Motor strength 4+/5 throughout, symmetric.      REFLEXES: 2/4 throughout     SENSORY:  Normal to touch, temp all limbs  No hemineglect, no extinction to double-sided stimulation (visual & tactile)  COORD: Normal finger to nose        NIH Stroke Scale  1a. Level of Consciousness: 0-->Alert, keenly responsive  1b. LOC Questions: 0-->Answers both questions correctly  1c. LOC Commands: 0-->Performs both tasks correctly  2. Best Gaze: 0-->Normal  3. Visual: 0-->No visual loss  4. Facial Palsy: 1-->Minor paralysis (flattened nasolabial fold, asymmetry on smiling)  5a. Motor Arm, Left: 0-->No drift, limb holds 90 (or 45) degrees for full 10 secs  5b. Motor Arm, Right: 1-->Drift, limb holds 90 (or 45) degrees, but drifts down before full 10 secs, does not hit bed or other support  6a. Motor Leg, Left: 0-->No drift, leg holds 30 degree position for full 5 secs  6b. Motor Leg, Right: 0-->No drift, leg holds 30 degree position for full 5 secs  7. Limb Ataxia: 0-->Absent  8. Sensory: 0-->Normal, no sensory loss  9. Best Language: 0-->No aphasia, normal  10. Dysarthria: 1-->Mild-to-moderate dysarthria, patient slurs at least some words and, at worst, can be understood with some difficulty  11. Extinction and Inattention (formerly Neglect): 0-->No abnormality  Total (NIH  Stroke Scale): 3         ________________________________________________   RESULTS REVIEW    VITAL SIGNS:  Temp:  [97.5 °F (36.4 °C)-98.7 °F (37.1 °C)] 97.8 °F (36.6 °C)  Heart Rate:  [70-76] 70  Resp:  [10-23] 22  BP: (124-133)/(62-82) 128/82    LABS:       Lab 06/11/25  0236 06/10/25  2041 06/10/25  1928   WBC 12.16*  --  14.74*   HEMOGLOBIN 13.4  --  14.2   HEMATOCRIT 42.1  --  44.1   PLATELETS 131*  --  152   NEUTROS ABS 8.86*  --  11.65*   IMMATURE GRANS (ABS) 0.05  --  0.07*   LYMPHS ABS 2.05  --  1.69   MONOS ABS 1.18*  --  1.32*   EOS ABS 0.00  --  0.00   MCV 98.1*  --  98.0*   PROCALCITONIN  --   --  0.07   LACTATE  --  1.3  --          Lab 06/11/25  0236 06/10/25  2033 06/10/25  1928   SODIUM 144  --  144   POTASSIUM 4.1  --  4.4   CHLORIDE 107  --  102   CO2 20.3*  --  23.1   ANION GAP 16.7*  --  18.9*   BUN 37.0*  --  33.0*   CREATININE 1.07  --  1.15   EGFR 68.0  --  62.4   GLUCOSE 103*  --  93   CALCIUM 9.4  --  10.1   MAGNESIUM 2.5*  --  2.5*   PHOSPHORUS 4.2  --   --    HEMOGLOBIN A1C 5.76*  --   --    TSH  --  0.758 0.909         Lab 06/11/25  0236 06/10/25  1928   TOTAL PROTEIN 6.9 8.2   ALBUMIN 4.3 5.0   GLOBULIN 2.6 3.2   ALT (SGPT) 26 33   AST (SGOT) 62* 77*   BILIRUBIN 1.0 1.5*   ALK PHOS 71 83         Lab 06/10/25  2033 06/10/25  1928   HSTROP T 32* 35*         Lab 06/11/25  0236   CHOLESTEROL 153   LDL CHOL 92   HDL CHOL 40   TRIGLYCERIDES 116         Lab 06/11/25  0236   VITAMIN B 12 805         UA          6/10/2025    19:04   Urinalysis   Squamous Epithelial Cells, UA 0-2    Specific Gravity, UA 1.022    Ketones, UA 15 mg/dL (1+)    Blood, UA Negative    Leukocytes, UA Negative    Nitrite, UA Negative    RBC, UA 0-2    WBC, UA 0-2    Bacteria, UA None Seen        Lab Results   Component Value Date    TSH 0.758 06/10/2025    LDL 92 06/11/2025    HGBA1C 5.76 (H) 06/11/2025    [id number] 805 06/11/2025         IMAGING STUDIES:  MRI Brain With & Without Contrast  Result Date:  6/11/2025  Impression: 1.Multiple scattered acute infarcts noted within the left cerebral hemisphere. There is associated mild edema with these infarcts, without significant mass effect. 2.There is evidence of severe stenoses versus near occlusion of the left supraclinoid ICA/carotid terminus. Follow-up with dedicated CTA/MRI of the head and neck is recommended 3.No suspicious intracranial lesion or mass is identified on this study Electronically Signed: Quincy Aguilera DO  6/11/2025 11:01 PM EDT  Workstation ID: [id number]    CT Head Without Contrast  Result Date: 6/10/2025  Impression: HEAD CT: 1.No acute intracranial abnormality. 2.There is a 2.5 cm hypodensity in the posterior left temporal lobe which does not appear acute but is new since 2023. This is likely a subacute or chronic infarct. There is generalized parenchymal volume loss and chronic small vessel ischemic disease. CT C-SPINE: 1.No acute fracture or malalignment. 2.There is multilevel degenerative disc disease and facet arthropathy as described above. 3.There is evidence of previous left carotid surgery. The overall appearance of the cervical spine is unchanged since 2019. CT T-SPINE: 1.No acute fracture or malalignment. 2.There is multilevel degenerative disc disease with progression of degenerative changes at the T9-10 level. 3.There is marked cardiomegaly and a partially visualized pacemaker/AICD. 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 There is emphysema with numerous areas of scarring in the visualized lung fields. CT L-SPINE: 1.No acute fracture or malalignment. 2.There is extensive multilevel degenerative disc disease and facet arthropathy with postsurgical changes as described above. 3.There is a 5.1 cm fusiform infrarenal abdominal aortic aneurysm which has increased in size since 2021. 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 Electronically Signed: Sanjiv Roberts DO   6/10/2025 9:06 PM EDT  Workstation ID: [id number]    CT Cervical Spine Without Contrast  Result Date: 6/10/2025  Impression: HEAD CT: 1.No acute intracranial abnormality. 2.There is a 2.5 cm hypodensity in the posterior left temporal lobe which does not appear acute but is new since 2023. This is likely a subacute or chronic infarct. There is generalized parenchymal volume loss and chronic small vessel ischemic disease. CT C-SPINE: 1.No acute fracture or malalignment. 2.There is multilevel degenerative disc disease and facet arthropathy as described above. 3.There is evidence of previous left carotid surgery. The overall appearance of the cervical spine is unchanged since 2019. CT T-SPINE: 1.No acute fracture or malalignment. 2.There is multilevel degenerative disc disease with progression of degenerative changes at the T9-10 level. 3.There is marked cardiomegaly and a partially visualized pacemaker/AICD. 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 There is emphysema with numerous areas of scarring in the visualized lung fields. CT L-SPINE: 1.No acute fracture or malalignment. 2.There is extensive multilevel degenerative disc disease and facet arthropathy with postsurgical changes as described above. 3.There is a 5.1 cm fusiform infrarenal abdominal aortic aneurysm which has increased in size since 2021. 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 Electronically Signed: Sanjiv Roberts DO  6/10/2025 9:06 PM EDT  Workstation ID: [id number]    CT Thoracic Spine Without Contrast  Result Date: 6/10/2025  Impression: HEAD CT: 1.No acute intracranial abnormality. 2.There is a 2.5 cm hypodensity in the posterior left temporal lobe which does not appear acute but is new since 2023. This is likely a subacute or chronic infarct. There is generalized parenchymal volume loss and chronic small vessel ischemic disease. CT C-SPINE: 1.No acute fracture or malalignment.  2.There is multilevel degenerative disc disease and facet arthropathy as described above. 3.There is evidence of previous left carotid surgery. The overall appearance of the cervical spine is unchanged since 2019. CT T-SPINE: 1.No acute fracture or malalignment. 2.There is multilevel degenerative disc disease with progression of degenerative changes at the T9-10 level. 3.There is marked cardiomegaly and a partially visualized pacemaker/AICD. 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 There is emphysema with numerous areas of scarring in the visualized lung fields. CT L-SPINE: 1.No acute fracture or malalignment. 2.There is extensive multilevel degenerative disc disease and facet arthropathy with postsurgical changes as described above. 3.There is a 5.1 cm fusiform infrarenal abdominal aortic aneurysm which has increased in size since 2021. 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 Electronically Signed: Sanjiv Roberts DO  6/10/2025 9:06 PM EDT  Workstation ID: [id number]    CT Lumbar Spine Without Contrast  Result Date: 6/10/2025  Impression: HEAD CT: 1.No acute intracranial abnormality. 2.There is a 2.5 cm hypodensity in the posterior left temporal lobe which does not appear acute but is new since 2023. This is likely a subacute or chronic infarct. There is generalized parenchymal volume loss and chronic small vessel ischemic disease. CT C-SPINE: 1.No acute fracture or malalignment. 2.There is multilevel degenerative disc disease and facet arthropathy as described above. 3.There is evidence of previous left carotid surgery. The overall appearance of the cervical spine is unchanged since 2019. CT T-SPINE: 1.No acute fracture or malalignment. 2.There is multilevel degenerative disc disease with progression of degenerative changes at the T9-10 level. 3.There is marked cardiomegaly and a partially visualized pacemaker/AICD. 4.There is a 4.2 cm ectatic  ascending thoracic aorta. There is emphysema with numerous areas of scarring in the visualized lung fields. CT L-SPINE: 1.No acute fracture or malalignment. 2.There is extensive multilevel degenerative disc disease and facet arthropathy with postsurgical changes as described above. 3.There is a 5.1 cm fusiform infrarenal abdominal aortic aneurysm which has increased in size since 2021. 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 Electronically Signed: Sanjiv Roberts DO  6/10/2025 9:06 PM EDT  Workstation ID: [id number]    XR Elbow 3+ View Right  Result Date: 6/10/2025  Impression: Superficial soft tissue swelling posteriorly which can be seen with olecranon bursitis or cellulitis or contusion in the appropriate clinical setting. There are mild degenerative changes. No acute bony abnormality. No joint effusion is demonstrated. Electronically Signed: Sanjiv Roberts DO  6/10/2025 8:11 PM EDT  Workstation ID: [id number]    XR Chest 1 View  Result Date: 6/10/2025  Impression: Patchy and interstitial opacity in the periphery of the right mid lung and right basilar density which has evolved since 12/29/2020 and could be related to acute and/or chronic infection or inflammatory change.. This is superimposed on areas of chronic scarring and postsurgical change. 2. Cardiomegaly and AICD are unchanged. Electronically Signed: Sanjiv Roberts DO  6/10/2025 8:09 PM EDT  Workstation ID: [id number]      I reviewed the patient's new clinical results.    ________________________________________________      PROBLEM LIST:    Acute encephalopathy        ASSESSMENT/PLAN:  Acute confusion   Incidental chronic appearing hypodensity in the left temporal lobe, concerning for a chronic stroke.  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  There does appear to be some severe stenosis versus near  occlusion of the left supraclinoid ICA/carotid terminus.  Vessel imaging is still pending.  Assuming there is a critical stenosis versus an occlusion, a low flow state would likely be causing the watershed appearing strokes.      He is AOx3, but has some residual dysarthria and mild right facial droop. Aphasia has resolved since yesterday.     Leukocytosis (12.16), afebrile, neck supple, Aox3 and following commands. No concern for a CNS infection at this time.   NIHSS 2     NCCT: There is a 2.5 cm hypodensity in the posterior left temporal lobe which does not appear acute but is new since 2023. This is likely a subacute or chronic infarct.   CT C/T/L spine negative for fracture or malalignment.   CTA H/N pending.   MRI Brain W/WO: Multiple scattered acute infarcts noted within the left cerebral hemisphere. There is associated mild edema with these infarcts, without significant mass effect. There is evidence of severe stenoses versus near occlusion of the left supraclinoid ICA/carotid terminus. No suspicious intracranial lesion or mass is identified on this study   Plavix load 300 mg once PO. C/w aspirin 325 mg daily PO and Plavix 75 mg daily PO x90 days then transition to aspirin 81 mg daily PO monotherapy  Recommend P2Y12 assay to assess if she is a ABDULKADIR responder. If non-responder, switch to Brilinta 90 mg BID.  NH3: 16  TSH 0.758  A1C 5.76%   LDL 92   B12 805  UA negative  Baseline EKG and chest x-ray  Echocardiogram with bubble study: Bubble study not completed.  LVEF 56 to 60%, left atrial cavity moderately to severely dilated.  Right atrial cavity is mildly dilated.  Severe mitral valve regurgitation.  Cardiology following, appreciate recommendations.  May need a KHUSHBU, await CTA results.  Continuous cardiac telemetry to monitor for arrhythmia  PT/OT/Speech/swallow consults as indicated   Vital signs per hospital protocol  Neuroassessment per hospital protocol  Please document NIHSS on admission and with any  neurochanges  Activity as tolerated  Stroke education provided by nursing per institutional guidelines     **Please refer to previous notes for further details and recommendations.     I discussed the patient's findings and my recommendations with patient, nursing staff, and primary care team    Ramo Arroyo MD  06/12/25  11:33 EDT

## 2025-06-12 NOTE — PLAN OF CARE
Problem: Adult Inpatient Plan of Care  Goal: Plan of Care Review  Outcome: Progressing  Flowsheets (Taken 6/12/2025 0507)  Progress: improving  Plan of Care Reviewed With: patient  Goal: Patient-Specific Goal (Individualized)  Outcome: Progressing  Goal: Absence of Hospital-Acquired Illness or Injury  Outcome: Progressing  Intervention: Identify and Manage Fall Risk  Recent Flowsheet Documentation  Taken 6/12/2025 0400 by Doris Wood RN  Safety Promotion/Fall Prevention:   safety round/check completed   room organization consistent   fall prevention program maintained   clutter free environment maintained   assistive device/personal items within reach  Taken 6/12/2025 0200 by Doris Wood RN  Safety Promotion/Fall Prevention:   safety round/check completed   room organization consistent   fall prevention program maintained   clutter free environment maintained   assistive device/personal items within reach  Taken 6/12/2025 0000 by Doris Wood RN  Safety Promotion/Fall Prevention:   safety round/check completed   room organization consistent   fall prevention program maintained   clutter free environment maintained   assistive device/personal items within reach  Taken 6/11/2025 2200 by Doris Wood RN  Safety Promotion/Fall Prevention:   safety round/check completed   room organization consistent   fall prevention program maintained   clutter free environment maintained   assistive device/personal items within reach  Taken 6/11/2025 2000 by Doris Wood RN  Safety Promotion/Fall Prevention:   safety round/check completed   room organization consistent   fall prevention program maintained   clutter free environment maintained   assistive device/personal items within reach  Intervention: Prevent Skin Injury  Recent Flowsheet Documentation  Taken 6/12/2025 0400 by Doris Wood RN  Body Position:   turned   weight shifting  Skin Protection: transparent dressing  maintained  Taken 6/12/2025 0200 by Doris Wood RN  Body Position:   weight shifting   turned  Taken 6/12/2025 0000 by Doris Wood RN  Body Position:   turned   weight shifting  Taken 6/11/2025 2200 by Doris Wood RN  Body Position:   weight shifting   turned  Taken 6/11/2025 2000 by Doris Wood RN  Body Position:   turned   weight shifting  Skin Protection: incontinence pads utilized  Intervention: Prevent and Manage VTE (Venous Thromboembolism) Risk  Recent Flowsheet Documentation  Taken 6/12/2025 0400 by Doris Wood RN  VTE Prevention/Management:   bilateral   SCDs (sequential compression devices) on  Taken 6/12/2025 0000 by Doris Wood RN  VTE Prevention/Management:   bilateral   SCDs (sequential compression devices) on  Taken 6/11/2025 2000 by Doris Wood RN  VTE Prevention/Management:   bilateral   SCDs (sequential compression devices) on  Intervention: Prevent Infection  Recent Flowsheet Documentation  Taken 6/12/2025 0400 by Doris Wood RN  Infection Prevention:   single patient room provided   rest/sleep promoted   personal protective equipment utilized   hand hygiene promoted   equipment surfaces disinfected   environmental surveillance performed  Taken 6/12/2025 0200 by Doris Wood RN  Infection Prevention:   single patient room provided   rest/sleep promoted   personal protective equipment utilized   hand hygiene promoted   equipment surfaces disinfected   environmental surveillance performed  Taken 6/12/2025 0000 by Doris Wood RN  Infection Prevention:   single patient room provided   rest/sleep promoted   personal protective equipment utilized   hand hygiene promoted   equipment surfaces disinfected   environmental surveillance performed  Taken 6/11/2025 2200 by Doris Wood RN  Infection Prevention:   single patient room provided   rest/sleep promoted   personal protective equipment utilized   hand hygiene  promoted   equipment surfaces disinfected   environmental surveillance performed  Taken 6/11/2025 2000 by Doris Wood RN  Infection Prevention:   single patient room provided   personal protective equipment utilized   rest/sleep promoted   hand hygiene promoted   equipment surfaces disinfected   environmental surveillance performed  Goal: Optimal Comfort and Wellbeing  Outcome: Progressing  Intervention: Provide Person-Centered Care  Recent Flowsheet Documentation  Taken 6/12/2025 0000 by Doris Wood RN  Trust Relationship/Rapport: care explained  Taken 6/11/2025 2000 by Doris Wood RN  Trust Relationship/Rapport: care explained  Goal: Readiness for Transition of Care  Outcome: Progressing     Problem: Violence Risk or Actual  Goal: Anger and Impulse Control  Outcome: Progressing  Intervention: Minimize Safety Risk  Recent Flowsheet Documentation  Taken 6/12/2025 0400 by Doris Wood RN  Enhanced Safety Measures:   bed alarm set   room near unit station  Taken 6/12/2025 0200 by Doris Wood RN  Enhanced Safety Measures:   bed alarm set   room near unit station  Taken 6/12/2025 0000 by Doris Wood RN  Sensory Stimulation Regulation: care clustered  Enhanced Safety Measures:   bed alarm set   room near unit station  Taken 6/11/2025 2200 by Doris Wood RN  Enhanced Safety Measures:   bed alarm set   room near unit station  Taken 6/11/2025 2000 by Doris Wood RN  Sensory Stimulation Regulation: care clustered  Enhanced Safety Measures:   bed alarm set   room near unit station  Intervention: Promote Self-Control  Recent Flowsheet Documentation  Taken 6/12/2025 0400 by Doris Wood RN  Supportive Measures: active listening utilized  Taken 6/12/2025 0000 by Doris Wood RN  Supportive Measures: active listening utilized     Problem: Fall Injury Risk  Goal: Absence of Fall and Fall-Related Injury  Outcome: Progressing  Intervention: Identify and  Manage Contributors  Recent Flowsheet Documentation  Taken 6/12/2025 0400 by Doris Wood RN  Medication Review/Management:   medications reviewed   high-risk medications identified  Taken 6/12/2025 0200 by Doris Wood RN  Medication Review/Management:   medications reviewed   high-risk medications identified  Taken 6/12/2025 0000 by Doris Wood RN  Medication Review/Management:   medications reviewed   high-risk medications identified  Taken 6/11/2025 2200 by Doris Wood RN  Medication Review/Management:   medications reviewed   high-risk medications identified  Taken 6/11/2025 2000 by Doris Wood RN  Medication Review/Management:   medications reviewed   high-risk medications identified  Intervention: Promote Injury-Free Environment  Recent Flowsheet Documentation  Taken 6/12/2025 0400 by Doris Wood RN  Safety Promotion/Fall Prevention:   safety round/check completed   room organization consistent   fall prevention program maintained   clutter free environment maintained   assistive device/personal items within reach  Taken 6/12/2025 0200 by Doris Wood RN  Safety Promotion/Fall Prevention:   safety round/check completed   room organization consistent   fall prevention program maintained   clutter free environment maintained   assistive device/personal items within reach  Taken 6/12/2025 0000 by Doris Wood RN  Safety Promotion/Fall Prevention:   safety round/check completed   room organization consistent   fall prevention program maintained   clutter free environment maintained   assistive device/personal items within reach  Taken 6/11/2025 2200 by Doris Wood RN  Safety Promotion/Fall Prevention:   safety round/check completed   room organization consistent   fall prevention program maintained   clutter free environment maintained   assistive device/personal items within reach  Taken 6/11/2025 2000 by Doris Wood RN  Safety  Promotion/Fall Prevention:   safety round/check completed   room organization consistent   fall prevention program maintained   clutter free environment maintained   assistive device/personal items within reach  Goal: Absence of Fall and Fall-Related Injury  Outcome: Progressing  Intervention: Identify and Manage Contributors  Recent Flowsheet Documentation  Taken 6/12/2025 0400 by Doris Wood RN  Medication Review/Management:   medications reviewed   high-risk medications identified  Taken 6/12/2025 0200 by Doris Wood RN  Medication Review/Management:   medications reviewed   high-risk medications identified  Taken 6/12/2025 0000 by Doris Wood RN  Medication Review/Management:   medications reviewed   high-risk medications identified  Taken 6/11/2025 2200 by Doris Wood RN  Medication Review/Management:   medications reviewed   high-risk medications identified  Taken 6/11/2025 2000 by Doris Wood RN  Medication Review/Management:   medications reviewed   high-risk medications identified  Intervention: Promote Injury-Free Environment  Recent Flowsheet Documentation  Taken 6/12/2025 0400 by Doris Wood RN  Safety Promotion/Fall Prevention:   safety round/check completed   room organization consistent   fall prevention program maintained   clutter free environment maintained   assistive device/personal items within reach  Taken 6/12/2025 0200 by Doris Wood RN  Safety Promotion/Fall Prevention:   safety round/check completed   room organization consistent   fall prevention program maintained   clutter free environment maintained   assistive device/personal items within reach  Taken 6/12/2025 0000 by Doris Wood RN  Safety Promotion/Fall Prevention:   safety round/check completed   room organization consistent   fall prevention program maintained   clutter free environment maintained   assistive device/personal items within reach  Taken 6/11/2025 2200 by  Ilesanmi, Doris, RN  Safety Promotion/Fall Prevention:   safety round/check completed   room organization consistent   fall prevention program maintained   clutter free environment maintained   assistive device/personal items within reach  Taken 6/11/2025 2000 by Doris Wood RN  Safety Promotion/Fall Prevention:   safety round/check completed   room organization consistent   fall prevention program maintained   clutter free environment maintained   assistive device/personal items within reach     Problem: Comorbidity Management  Goal: Blood Pressure in Desired Range  Outcome: Progressing  Intervention: Maintain Blood Pressure Management  Recent Flowsheet Documentation  Taken 6/12/2025 0400 by Doris Wood RN  Medication Review/Management:   medications reviewed   high-risk medications identified  Taken 6/12/2025 0200 by Doris Wood RN  Medication Review/Management:   medications reviewed   high-risk medications identified  Taken 6/12/2025 0000 by Doris Wood RN  Medication Review/Management:   medications reviewed   high-risk medications identified  Taken 6/11/2025 2200 by Doris Wood RN  Medication Review/Management:   medications reviewed   high-risk medications identified  Taken 6/11/2025 2000 by Doris Wood RN  Medication Review/Management:   medications reviewed   high-risk medications identified     Problem: Skin Injury Risk Increased  Goal: Skin Health and Integrity  Outcome: Progressing  Intervention: Optimize Skin Protection  Recent Flowsheet Documentation  Taken 6/12/2025 0400 by Doris Wood RN  Pressure Reduction Techniques:   frequent weight shift encouraged   weight shift assistance provided  Head of Bed (HOB) Positioning: [facility] elevated  Pressure Reduction Devices: pressure-redistributing mattress utilized  Skin Protection: transparent dressing maintained  Taken 6/12/2025 0200 by Doris Wood RN  Head of Bed (HOB) Positioning: HOB  elevated  Taken 6/12/2025 0000 by Doris Wood RN  Pressure Reduction Techniques:   frequent weight shift encouraged   weight shift assistance provided  Head of Bed (HOB) Positioning: HOB elevated  Pressure Reduction Devices: pressure-redistributing mattress utilized  Taken 6/11/2025 2200 by Doris Wood RN  Head of Bed (HOB) Positioning: HOB elevated  Taken 6/11/2025 2100 by Doris Wood RN  Activity Management: patient refuses activity  Taken 6/11/2025 2000 by Doris Wood RN  Activity Management: activity encouraged  Pressure Reduction Techniques:   frequent weight shift encouraged   weight shift assistance provided  Head of Bed (HOB) Positioning: HOB elevated  Pressure Reduction Devices: pressure-redistributing mattress utilized  Skin Protection: incontinence pads utilized     Problem: Stroke, Ischemic (Includes Transient Ischemic Attack)  Goal: Optimal Coping  Outcome: Progressing  Intervention: Support Psychosocial Response to Stroke  Recent Flowsheet Documentation  Taken 6/12/2025 0400 by Doris Wood RN  Supportive Measures: active listening utilized  Taken 6/12/2025 0000 by Doris Wood RN  Supportive Measures: active listening utilized  Goal: Effective Bowel Elimination  Outcome: Progressing  Goal: Optimal Cerebral Tissue Perfusion  Outcome: Progressing  Intervention: Protect and Optimize Cerebral Perfusion  Recent Flowsheet Documentation  Taken 6/12/2025 0000 by Doris Wood RN  Sensory Stimulation Regulation: care clustered  Taken 6/11/2025 2000 by Doris Wood RN  Sensory Stimulation Regulation: care clustered  Goal: Optimal Cognitive Function  Outcome: Progressing  Intervention: Optimize Cognitive Function  Recent Flowsheet Documentation  Taken 6/12/2025 0000 by Doris Wood RN  Sensory Stimulation Regulation: care clustered  Taken 6/11/2025 2000 by Doris Wood RN  Sensory Stimulation Regulation: care clustered  Goal: Improved  Communication Skills  Outcome: Progressing  Intervention: Optimize Communication Skills  Recent Flowsheet Documentation  Taken 6/12/2025 0400 by Doris Wood RN  Communication Enhancement Strategies: call light answered in person  Taken 6/12/2025 0000 by Doris Wood RN  Communication Enhancement Strategies: call light answered in person  Taken 6/11/2025 2000 by Doris Wood RN  Communication Enhancement Strategies: call light answered in person  Goal: Optimal Nutrition Intake  Outcome: Progressing  Goal: Effective Oxygenation and Ventilation  Outcome: Progressing  Intervention: Optimize Oxygenation and Ventilation  Recent Flowsheet Documentation  Taken 6/12/2025 0400 by Doris Wood RN  Head of Bed (HOB) Positioning: HOB elevated  Taken 6/12/2025 0200 by Doris Wood RN  Head of Bed (HOB) Positioning: HOB elevated  Taken 6/12/2025 0000 by Doris Wood RN  Head of Bed (HOB) Positioning: HOB elevated  Taken 6/11/2025 2200 by Doris Wood RN  Head of Bed (HOB) Positioning: HOB elevated  Taken 6/11/2025 2000 by Doris Wood RN  Head of Bed (HOB) Positioning: HOB elevated  Goal: Improved Sensorimotor Function  Outcome: Progressing  Intervention: Optimize Range of Motion, Motor Control and Function  Recent Flowsheet Documentation  Taken 6/12/2025 0400 by Doris Wood RN  Positioning/Transfer Devices:   pillows   in use  Taken 6/12/2025 0200 by Doris Wood RN  Positioning/Transfer Devices:   pillows   in use  Taken 6/12/2025 0000 by Doris Wood RN  Positioning/Transfer Devices:   pillows   in use  Taken 6/11/2025 2200 by Doris Wood RN  Positioning/Transfer Devices:   pillows   in use  Taken 6/11/2025 2000 by Doris Wood RN  Positioning/Transfer Devices:   pillows   in use  Intervention: Optimize Sensory and Perceptual Ability  Recent Flowsheet Documentation  Taken 6/12/2025 0400 by Doris Wood RN  Pressure Reduction  Techniques:   frequent weight shift encouraged   weight shift assistance provided  Pressure Reduction Devices: pressure-redistributing mattress utilized  Taken 6/12/2025 0000 by Doris Wood RN  Pressure Reduction Techniques:   frequent weight shift encouraged   weight shift assistance provided  Pressure Reduction Devices: pressure-redistributing mattress utilized  Taken 6/11/2025 2000 by Doris Wood RN  Pressure Reduction Techniques:   frequent weight shift encouraged   weight shift assistance provided  Pressure Reduction Devices: pressure-redistributing mattress utilized  Goal: Safe and Effective Swallow  Outcome: Progressing  Goal: Effective Urinary Elimination  Outcome: Progressing     Problem: Confusion Acute  Goal: Optimal Cognitive Function  Outcome: Progressing  Intervention: Minimize Contributing Factors  Recent Flowsheet Documentation  Taken 6/12/2025 0000 by Doris Wood RN  Sensory Stimulation Regulation: care clustered  Taken 6/11/2025 2000 by Doris Wood RN  Sensory Stimulation Regulation: care clustered   Goal Outcome Evaluation:  Plan of Care Reviewed With: patient        Progress: improving

## 2025-06-12 NOTE — PLAN OF CARE
"Goal Outcome Evaluation:      Assessment: Shukri [place] presents with ADL impairments affecting function including balance, cognition, coordination, endurance / activity tolerance, grasp, postural / trunk control, range of motion (ROM), and strength. Demonstrated functioning below baseline abilities indicate the need for continued skilled intervention while inpatient. Tolerating session today without incident. Will continue to follow and progress as tolerated.      Plan/Recommendations:   Moderate Intensity Therapy recommended post-acute care. This is recommended as therapy feels the patient would require 3-4 days per week and wouldn't tolerate \"3 hour daily\" rehab intensity. SNF would be the preferred choice. If the patient does not agree to SNF, arrange HH or OP depending on home bound status. If patient is medically complex, consider LTACH.. Pt requires no DME at discharge.      Pt desires Skilled Rehab placement at discharge. Pt cooperative; agreeable to therapeutic recommendations and plan of care.      Modified [place]: 4 = Moderately severe disability (Unable to attend to own bodily needs without assistance, and unable to walk unassisted)   "

## 2025-06-12 NOTE — CONSULTS
Critical Care Consult Note   Shukri Park : 1940 MRN:[id number] LOS:2 ROOM: 240/1     Reason for admission: Acute encephalopathy     Consulting provider: Dr Olvera    Reason for consultation: Medical management of problems noted below.    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  Radiology studies:   CT Head (6/10): No acute intracranial abnormality.  CT Spine (C/T/L) (6/10): No acute fracture or malalignment.  MRI Brain (): multiple scattered acute infarcts in left cerebral hemisphere, evidence of severe stenoses versus near occlusion of the left supraclinoid ICA/carotid terminus.   CTA Head and Neck (): Bulky plaque along left carotid bifurcation resulting in focal 80% stenosis along left internal carotid, and slightly more distally is an anterior nonocclusive thrombus.  Echocardiogram: Bubble study not completed.  LVEF 56 to 60%, left atrial cavity moderately to severely dilated.  Right atrial cavity is mildly dilated.  Severe mitral valve regurgitation. Cardiology consulted to consider possible KHUSHBU.  Neurology following, they have discussed with Dr. Nice at [facility], who was accepted for patient to transfer non-emergently for interventional neurology evaluation. Patient was being arranged for transfer when he subsequently dropped his pressure, prompting upgrade to ICU.  Discussion with Dr. Terrazas, Intensivist MD at [facility], and decision has been made to stabilize patient first, and then will re-contact the transfer line, but will investigate the hypotension first; transfer request to [facility] changed to pending at this time.  Loaded with Plavix, continue daily Plavix and  mg for a total of 90 days, then will transition to ASA 81 mg daily. Check P2Y12 platelet inhibition test to assess if he is a ABDULKADIR responder, if non-responder, planned transition  to Brilinta.   Holding Xarelto and transitioning to heparin gtt.  PT/OT/SLP    Acute on chronic hypotension  Continue vasopressors to maintain SBP > 120  Patient and family state patient's SBP stays 80-90s. (So do his children's)  Titrate Levophed to maintain SBP >120  Continue midodrine  Continue Unasyn  Blood cultures show no growth to date  Legionella and S. Pneumo antigens negative  RVP negative  UA showed ketone and proteinuria only    Non-traumatic rhabdomyolysis  CK 2133 on admission, repeat 660.  Received several boluses of IVF.  Continue IVF and repeat CK in AM.    Chronic / Persistent atrial fibrillation /  CAD/ CHF  V Paced since upgrade to ICU  [id number] Score of at least 4. On Xarelto, holding and transitioning to heparin gtt.        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  Recommend outpatient follow-up.    Code Status (Patient has no pulse and is not breathing): No CPR (Do Not Attempt to Resuscitate)  Medical Interventions (Patient has pulse or is breathing): Full Support  Comments: DNR/DNI, Discussed in extensive detail with Grandsonw Who is POA, who stated that patient has expressed not to have CPR or put on machines such as ventilator  Level Of Support Discussed With: Next of Kin (If No Surrogate)       Nutrition: Diet: Regular/House; No Straw; Texture: Pureed (NDD 1); Fluid Consistency: [place] Thick Patient isn't on Tube Feeding     VTE Prophylaxis:  Pharmacologic & mechanical VTE prophylaxis orders are present.         History of Present illness     Shukri Park is a 84 y.o. old male patient with PMH of Hypertension but in recent years hypotension, persistent atrial fibrillation on Xarelto, CAD, chronic systolic congestive heart failure, ischemic cardiomyopathy, AICD in situ, history of lung cancer initially presented to the hospital with complaints of acute encephalopathy.  HPI information is limited due to patient is poor historian; information obtained from  chart review.  On 6/8/2025 patient experienced a sudden left eye vision loss and appeared confused while at the grocery store.  He was driven home by a store employee.  Later that day the patient's family noted he was agitated and asked them to leave also his vision reportedly returned.  On 6/9/2025 patient was not answering phone calls prompting a wellness check on 6/10/2025.  He was found on the floor by his neighbor soiled, confused, and irritable he was brought to the hospital by EMS.  Patient's pacemaker was interrogated in the emergency department with no noted arrhythmia events.  Patient was being treated for rhabdomyolysis.  A 5.1 cm AAA was noted on CT scan this is up from 4.75 in 2023 outpatient follow-up was recommended.  Altered mental status was being evaluated with CT scans and labs. MRI brain showed multiple scattered acute infarcts in the left cerebral hemisphere with mild edema without significant mass effect. 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  Neurology was following patient. Today neurology spoke with neurologist at [facility] in the hopes to have patient transferred not emergently to be seen by neurology team at [facility].  Patient's blood pressure was maintaining in the 70s systolic at which point patient started to have some slight aphasia.  Neurology made the determination to keep patient's systolic blood pressure greater than 120.  Patient was placed on Levophed and transferred to the intensive care unit.  He was started on a heparin drip and his Xarelto was placed on hold.  [facility] neurology requested patient be stabilized prior to making the transfer as they were not planning an acute intervention.    ACP: DNR/DNI; hospitalist provider spoke extensively with patient's grandson who is the POA who stated the patient has expressed not to have CPR or be put on machines  such as a ventilator.    Patient was seen and examined on 06/12/25 at 17:58 EDT .    Past Medical/Surgical/Social/Family History & Allergies     Past Medical History:   Diagnosis Date    A-fib     Aortic aneurysm     Appetite loss     Cancer     right lobe cancer - surgically removed     CHF (congestive heart failure)     Coronary artery disease     Dark stools     Foot pain, bilateral     GERD (gastroesophageal reflux disease)     Hyperlipidemia     Low back pain     S/P epidural steroid injection     Israel Gomes's - no relief    Weight loss     acute loss of weight 30 lbs      Past Surgical History:   Procedure Laterality Date    CARDIAC CATHETERIZATION N/A 7/9/2020    Procedure: LEFT HEART CATH with possible PCI;  Surgeon: Wade Cronin MD;  Location: [facility] CATH INVASIVE LOCATION;  Service: Cardiovascular;  Laterality: N/A;  Local and IV sedation    CARDIAC CATHETERIZATION N/A 7/9/2020    Procedure: Percutaneous Coronary Intervention;  Surgeon: Wade Cronin MD;  Location: [facility] CATH INVASIVE LOCATION;  Service: Cardiovascular;  Laterality: N/A;    CARDIAC DEFIBRILLATOR PLACEMENT      CARDIAC ELECTROPHYSIOLOGY PROCEDURE N/A 7/10/2020    Procedure: Biventricular Device Insertion;  Surgeon: Jonathan Denney MD;  Location: [facility] CATH INVASIVE LOCATION;  Service: Cardiovascular;  Laterality: N/A;    CARDIAC ELECTROPHYSIOLOGY PROCEDURE N/A 7/10/2020    Procedure: ABLATION AV NODE;  Surgeon: Jonathan Denney MD;  Location: [facility] CATH INVASIVE LOCATION;  Service: Cardiovascular;  Laterality: N/A;    CARDIAC ELECTROPHYSIOLOGY PROCEDURE N/A 7/10/2020    Procedure: AV node ablation;  Surgeon: Jonathan Denney MD;  Location: [facility] CATH INVASIVE LOCATION;  Service: Cardiovascular;  Laterality: N/A;    CARDIOVASCULAR STRESS TEST  2020    CATARACT EXTRACTION, BILATERAL      CONVERTED (HISTORICAL) HOLTER  2020    CORONARY ANGIOPLASTY WITH STENT PLACEMENT      ENDOSCOPY N/A 7/5/2020    Procedure:  ESOPHAGOGASTRODUODENOSCOPY;  Surgeon: Neal Correa MD;  Location: [facility] ENDOSCOPY;  Service: Gastroenterology;  Laterality: N/A;    INGUINAL HERNIA REPAIR Right 2021    Procedure: INGUINAL HERNIA REPAIR OPEN WITH MESH;  Surgeon: Cody Randall MD;  Location: [facility] MAIN OR;  Service: General;  Laterality: Right;    LUMBAR DECOMPRESSION  2015    L2-L3    LUMBAR DECOMPRESSION  2006    Dr. Logan    OTHER SURGICAL HISTORY  2015    left SI fusion with bone graft - Dr. Presley    OTHER SURGICAL HISTORY      carotid artery repair     OTHER SURGICAL HISTORY Left 2016    Left SI fusion 2016 Dr. Zendejas    POSTERIOR SPINAL FUSION  10/24/2016    PSF T12-3/TLIF L3-L4 poss L2-L3 --- Dr. 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      Social History     Socioeconomic History    Marital status:    Tobacco Use    Smoking status: Former     Current packs/day: 0.00     Types: Cigarettes     Quit date: 1989     Years since quittin.4     Passive exposure: Past    Smokeless tobacco: Never   Vaping Use    Vaping status: Never Used   Substance and Sexual Activity    Alcohol use: No    Drug use: Never    Sexual activity: Defer      Family History   Problem Relation Age of Onset    Cancer Other     Hyperlipidemia Other     Heart disease Other       Allergies   Allergen Reactions    Ciprofloxacin Anaphylaxis    Amlodipine Unknown (See Comments)    Rocephin [Ceftriaxone] Other (See Comments)     Mouth sores        Home Medications     Prior to Admission medications    Medication Sig Start Date End Date Taking? Authorizing Provider   ampicillin-sulbactam (UNASYN) 1.5 gm IVPB in 100 mL NS (MBP) Infuse 1.5 g into a venous catheter Every 6 (Six) Hours for 8 doses. Indications: Pneumonia 25 Yes Marcela Olvera MD   aspirin 81 MG EC tablet Take 4 tablets by mouth Daily for 30 days. 25 Yes Marcela Olvera MD   ipratropium (ATROVENT) 0.03 %  nasal spray Administer 2 sprays into the nostril(s) as directed by provider 3 (Three) Times a Day.   Yes Marvin Lucas MD   latanoprost (XALATAN) 0.005 % ophthalmic solution Administer 1 drop to both eyes Every Night. 6/20/24  Yes Marvin Lucas MD   pantoprazole (PROTONIX) 40 MG EC tablet Take 1 tablet by mouth Daily. 7/15/20  Yes Elena Epps MD   simvastatin (ZOCOR) 40 MG tablet Take 1 tablet by mouth Every Night.   Yes Marvin Lucas MD   Xarelto 15 MG tablet TAKE 1 TABLET BY MOUTH EVERY DAY 7/5/22  Yes Wade Cronin MD   aspirin (aspirin) 81 MG EC tablet Take 1 tablet by mouth Daily.  Patient taking differently: Take 1 tablet by mouth Daily. LD 1/2/21 8/20/20   Jonathan Denney MD   docusate sodium (COLACE) 100 MG capsule Take 1 capsule by mouth 2 (Two) Times a Day.    Marvin Lucas MD   ferrous sulfate 325 (65 FE) MG tablet Take 1 tablet by mouth Daily With Breakfast. Hold DOs 9/2/20   Marvin Lucas MD   midodrine (PROAMATINE) 5 MG tablet Take 1 tablet by mouth 2 (Two) Times a Day.    Marvin Lucas MD   Multiple Vitamin (MULTIVITAMIN) tablet Take 1 tablet by mouth Daily. LD 1/2    Marvin Lucas MD   vitamin B-12 (CYANOCOBALAMIN) 1000 MCG tablet Take 1 tablet by mouth Daily. 11/15/18   Marvin Lucas MD        Objective / Physical Exam     Vital signs:  Temp: 97.4 °F (36.3 °C)  BP: (!) 78/53  Heart Rate: 70  Resp: 23  SpO2: 98 %  Weight: 53.2 kg (117 lb 4.6 oz)    Admission Weight: Weight: 52.2 kg (115 lb)    Physical Exam  Vitals and nursing note reviewed.   Constitutional:       Appearance: Normal appearance.   HENT:      Head: Normocephalic.      Nose: Nose normal.      Mouth/Throat:      Mouth: Mucous membranes are moist.      Pharynx: Oropharynx is clear.   Eyes:      Pupils: Pupils are equal, round, and reactive to light.   Cardiovascular:      Rate and Rhythm: Normal rate and regular rhythm.      Pulses: Normal pulses.      Heart  sounds: Normal heart sounds.   Pulmonary:      Effort: Pulmonary effort is normal.      Breath sounds: Normal breath sounds.   Abdominal:      General: Bowel sounds are normal.      Palpations: Abdomen is soft.   Musculoskeletal:         General: Normal range of motion.      Cervical back: Normal range of motion.   Skin:     General: Skin is warm and dry.      Capillary Refill: Capillary refill takes 2 to 3 seconds.   Neurological:      General: No focal deficit present.      Mental Status: He is alert. Mental status is at baseline. He is disoriented.   Psychiatric:         Mood and Affect: Mood normal.         Behavior: Behavior normal.        Labs     Results from last 7 days   Lab Units 06/11/25  0236 06/10/25  1928   WBC 10*3/mm3 12.16* 14.74*   HEMATOCRIT % 42.1 44.1   PLATELETS 10*3/mm3 131* 152      Results from last 7 days   Lab Units 06/11/25  0236 06/10/25  1928   SODIUM mmol/L 144 144   POTASSIUM mmol/L 4.1 4.4   CHLORIDE mmol/L 107 102   CO2 mmol/L 20.3* 23.1   ANION GAP mmol/L 16.7* 18.9*   BUN mg/dL 37.0* 33.0*   CREATININE mg/dL 1.07 1.15   GLUCOSE mg/dL 103* 93   PHOSPHORUS mg/dL 4.2  --    MAGNESIUM mg/dL 2.5* 2.5*   ALT (SGPT) U/L 26 33   AST (SGOT) U/L 62* 77*   ALK PHOS U/L 71 83        Imaging     CT Angiogram Carotids  Result Date: 6/12/2025  Impression: 1.Bulky plaque along left carotid bifurcation resulting in focal 80% stenosis along left internal carotid, and slightly more distally is an anterior nonocclusive thrombus. 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 3.Major intracranial arterial vasculature appears widely patent, with no evidence of thrombus. Electronically Signed: Clint Dotson MD  6/12/2025 3:40 PM EDT  Workstation ID: [id number]    CT Angiogram Head  Result Date: 6/12/2025  Impression: 1.Bulky plaque along left carotid bifurcation resulting in focal 80% stenosis along left internal carotid, and slightly more distally is an  anterior nonocclusive thrombus. 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 3.Major intracranial arterial vasculature appears widely patent, with no evidence of thrombus. Electronically Signed: Clint Dotson MD  6/12/2025 3:40 PM EDT  Workstation ID: [id number]    MRI Brain With & Without Contrast  Result Date: 6/11/2025  Impression: 1.Multiple scattered acute infarcts noted within the left cerebral hemisphere. There is associated mild edema with these infarcts, without significant mass effect. 2.There is evidence of severe stenoses versus near occlusion of the left supraclinoid ICA/carotid terminus. Follow-up with dedicated CTA/MRI of the head and neck is recommended 3.No suspicious intracranial lesion or mass is identified on this study Electronically Signed: Quincy Aguilera DO  6/11/2025 11:01 PM EDT  Workstation ID: [id number]    CT Head Without Contrast  Result Date: 6/10/2025  Impression: HEAD CT: 1.No acute intracranial abnormality. 2.There is a 2.5 cm hypodensity in the posterior left temporal lobe which does not appear acute but is new since 2023. This is likely a subacute or chronic infarct. There is generalized parenchymal volume loss and chronic small vessel ischemic disease. CT C-SPINE: 1.No acute fracture or malalignment. 2.There is multilevel degenerative disc disease and facet arthropathy as described above. 3.There is evidence of previous left carotid surgery. The overall appearance of the cervical spine is unchanged since 2019. CT T-SPINE: 1.No acute fracture or malalignment. 2.There is multilevel degenerative disc disease with progression of degenerative changes at the T9-10 level. 3.There is marked cardiomegaly and a partially visualized pacemaker/AICD. 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 There is emphysema with numerous areas of scarring in the visualized lung fields. CT L-SPINE: 1.No acute fracture or malalignment. 2.There is  extensive multilevel degenerative disc disease and facet arthropathy with postsurgical changes as described above. 3.There is a 5.1 cm fusiform infrarenal abdominal aortic aneurysm which has increased in size since 2021. 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 Electronically Signed: Sanjiv Roberts DO  6/10/2025 9:06 PM EDT  Workstation ID: [id number]    CT Cervical Spine Without Contrast  Result Date: 6/10/2025  Impression: HEAD CT: 1.No acute intracranial abnormality. 2.There is a 2.5 cm hypodensity in the posterior left temporal lobe which does not appear acute but is new since 2023. This is likely a subacute or chronic infarct. There is generalized parenchymal volume loss and chronic small vessel ischemic disease. CT C-SPINE: 1.No acute fracture or malalignment. 2.There is multilevel degenerative disc disease and facet arthropathy as described above. 3.There is evidence of previous left carotid surgery. The overall appearance of the cervical spine is unchanged since 2019. CT T-SPINE: 1.No acute fracture or malalignment. 2.There is multilevel degenerative disc disease with progression of degenerative changes at the T9-10 level. 3.There is marked cardiomegaly and a partially visualized pacemaker/AICD. 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 There is emphysema with numerous areas of scarring in the visualized lung fields. CT L-SPINE: 1.No acute fracture or malalignment. 2.There is extensive multilevel degenerative disc disease and facet arthropathy with postsurgical changes as described above. 3.There is a 5.1 cm fusiform infrarenal abdominal aortic aneurysm which has increased in size since 2021. 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 Electronically Signed: Sanjiv Roberts DO  6/10/2025 9:06 PM EDT  Workstation ID: [id number]    CT Thoracic Spine Without Contrast  Result Date:  6/10/2025  Impression: HEAD CT: 1.No acute intracranial abnormality. 2.There is a 2.5 cm hypodensity in the posterior left temporal lobe which does not appear acute but is new since 2023. This is likely a subacute or chronic infarct. There is generalized parenchymal volume loss and chronic small vessel ischemic disease. CT C-SPINE: 1.No acute fracture or malalignment. 2.There is multilevel degenerative disc disease and facet arthropathy as described above. 3.There is evidence of previous left carotid surgery. The overall appearance of the cervical spine is unchanged since 2019. CT T-SPINE: 1.No acute fracture or malalignment. 2.There is multilevel degenerative disc disease with progression of degenerative changes at the T9-10 level. 3.There is marked cardiomegaly and a partially visualized pacemaker/AICD. 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 There is emphysema with numerous areas of scarring in the visualized lung fields. CT L-SPINE: 1.No acute fracture or malalignment. 2.There is extensive multilevel degenerative disc disease and facet arthropathy with postsurgical changes as described above. 3.There is a 5.1 cm fusiform infrarenal abdominal aortic aneurysm which has increased in size since 2021. 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 Electronically Signed: Sanjiv Roberts DO  6/10/2025 9:06 PM EDT  Workstation ID: [id number]    CT Lumbar Spine Without Contrast  Result Date: 6/10/2025  Impression: HEAD CT: 1.No acute intracranial abnormality. 2.There is a 2.5 cm hypodensity in the posterior left temporal lobe which does not appear acute but is new since 2023. This is likely a subacute or chronic infarct. There is generalized parenchymal volume loss and chronic small vessel ischemic disease. CT C-SPINE: 1.No acute fracture or malalignment. 2.There is multilevel degenerative disc disease and facet arthropathy as described above. 3.There is  evidence of previous left carotid surgery. The overall appearance of the cervical spine is unchanged since 2019. CT T-SPINE: 1.No acute fracture or malalignment. 2.There is multilevel degenerative disc disease with progression of degenerative changes at the T9-10 level. 3.There is marked cardiomegaly and a partially visualized pacemaker/AICD. 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 There is emphysema with numerous areas of scarring in the visualized lung fields. CT L-SPINE: 1.No acute fracture or malalignment. 2.There is extensive multilevel degenerative disc disease and facet arthropathy with postsurgical changes as described above. 3.There is a 5.1 cm fusiform infrarenal abdominal aortic aneurysm which has increased in size since 2021. 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 Electronically Signed: Sanijv Roberts DO  6/10/2025 9:06 PM EDT  Workstation ID: [id number]    XR Elbow 3+ View Right  Result Date: 6/10/2025  Impression: Superficial soft tissue swelling posteriorly which can be seen with olecranon bursitis or cellulitis or contusion in the appropriate clinical setting. There are mild degenerative changes. No acute bony abnormality. No joint effusion is demonstrated. Electronically Signed: Sanjiv Roberts DO  6/10/2025 8:11 PM EDT  Workstation ID: [id number]    XR Chest 1 View  Result Date: 6/10/2025  Impression: Patchy and interstitial opacity in the periphery of the right mid lung and right basilar density which has evolved since 12/29/2020 and could be related to acute and/or chronic infection or inflammatory change.. This is superimposed on areas of chronic scarring and postsurgical change. 2. Cardiomegaly and AICD are unchanged. Electronically Signed: Sanjiv Roberts DO  6/10/2025 8:09 PM EDT  Workstation ID: [id number]       Chest X ray: My independent assessment showed right sided infiltrates or effusions    EKG: My independent evaluation  showed V paced rhythm    Current Medications     Scheduled Meds:  ampicillin-sulbactam, 1.5 g, Intravenous, Q6H  aspirin, 81 mg, Oral, Daily  atorvastatin, 10 mg, Oral, Daily  budesonide, 0.5 mg, Nebulization, BID - RT  ferrous sulfate, 325 mg, Oral, Daily With Breakfast  ipratropium, 0.5 mg, Nebulization, 4x Daily - RT  latanoprost, 1 drop, Both Eyes, Nightly  [Held by provider] midodrine, 5 mg, Oral, BID AC  pantoprazole, 40 mg, Oral, Daily  [Held by provider] rivaroxaban, 15 mg, Oral, Q PM  sodium chloride, 1,000 mL, Intravenous, Once  sodium chloride, 10 mL, Intravenous, Q12H  sodium chloride, 10 mL, Intravenous, Q12H  vitamin B-12, 1,000 mcg, Oral, Daily         Continuous Infusions:  heparin, 18 Units/kg/hr  norepinephrine, 0.02-0.5 mcg/kg/min       Total critical care time: Approximately 50 minutes    Due to a high probability of clinically significant, life threatening deterioration, the patient required my highest level of preparedness to intervene emergently and I personally spent this critical care time directly and personally managing the patient. This critical care time included obtaining a history; examining the patient; pulse oximetry; ordering and review of studies; arranging urgent treatment with development of a management plan; evaluation of patient's response to treatment; frequent reassessment; and, discussions with other providers.    This critical care time was performed to assess and manage the high probability of imminent, life-threatening deterioration that could result in multi-organ failure. It was exclusive of separately billable procedures and treating other patients and teaching time.      TERRANCE Bazan   Critical Care  06/12/25   17:58 EDT

## 2025-06-12 NOTE — CONSULTS
Nutrition Services  Patient Name: Shukri Park  YOB: 1940  MRN: [id number]  Admission date: 6/10/2025    *See MSA at bottom of this note.    Severe chronic disease related malnutrition R/t suspected chronic suboptimal intake in the setting of chronic illness including HF; as evidenced by severe muscle and fat loss per NFPE.     Continue pureed foods and NTL per ST recommendations. If poor intake continues, may need to consider EN support. RD continues to follow.    Will add Magic Cups at lunch/dinner (Provides 580 kcals, 18 g protein if consumed) for additional nutrition    CLINICAL NUTRITION ASSESSMENT      Reason for Assessment 6/12: MST 2+; BMI     H&P      Past Medical History:   Diagnosis Date    A-fib     Aortic aneurysm     Appetite loss     Cancer     right lobe cancer - surgically removed     CHF (congestive heart failure)     Coronary artery disease     Dark stools     Foot pain, bilateral     GERD (gastroesophageal reflux disease)     Hyperlipidemia     Low back pain     S/P epidural steroid injection     Israel Gomes's - no relief    Weight loss     acute loss of weight 30 lbs       Past Surgical History:   Procedure Laterality Date    CARDIAC CATHETERIZATION N/A 7/9/2020    Procedure: LEFT HEART CATH with possible PCI;  Surgeon: Wade Cronin MD;  Location: [facility] CATH INVASIVE LOCATION;  Service: Cardiovascular;  Laterality: N/A;  Local and IV sedation    CARDIAC CATHETERIZATION N/A 7/9/2020    Procedure: Percutaneous Coronary Intervention;  Surgeon: Wade Cronin MD;  Location: [facility] CATH INVASIVE LOCATION;  Service: Cardiovascular;  Laterality: N/A;    CARDIAC DEFIBRILLATOR PLACEMENT      CARDIAC ELECTROPHYSIOLOGY PROCEDURE N/A 7/10/2020    Procedure: Biventricular Device Insertion;  Surgeon: Jonathan Denney MD;  Location: [facility] CATH INVASIVE LOCATION;  Service: Cardiovascular;  Laterality: N/A;    CARDIAC ELECTROPHYSIOLOGY PROCEDURE N/A 7/10/2020    Procedure: ABLATION AV  NODE;  Surgeon: Jonathan Denney MD;  Location: [facility] CATH INVASIVE LOCATION;  Service: Cardiovascular;  Laterality: N/A;    CARDIAC ELECTROPHYSIOLOGY PROCEDURE N/A 7/10/2020    Procedure: AV node ablation;  Surgeon: Jonathan Denney MD;  Location: [facility] CATH INVASIVE LOCATION;  Service: Cardiovascular;  Laterality: N/A;    CARDIOVASCULAR STRESS TEST  2020    CATARACT EXTRACTION, BILATERAL      CONVERTED (HISTORICAL) HOLTER  2020    CORONARY ANGIOPLASTY WITH STENT PLACEMENT      ENDOSCOPY N/A 7/5/2020    Procedure: ESOPHAGOGASTRODUODENOSCOPY;  Surgeon: Neal Correa MD;  Location: [facility] ENDOSCOPY;  Service: Gastroenterology;  Laterality: N/A;    INGUINAL HERNIA REPAIR Right 1/7/2021    Procedure: INGUINAL HERNIA REPAIR OPEN WITH MESH;  Surgeon: Cody Randall MD;  Location: [facility] MAIN OR;  Service: General;  Laterality: Right;    LUMBAR DECOMPRESSION  09/2015    L2-L3    LUMBAR DECOMPRESSION  2006    Dr. Logan    OTHER SURGICAL HISTORY  05/2015    left SI fusion with bone graft - Dr. Presley    OTHER SURGICAL HISTORY      carotid artery repair     OTHER SURGICAL HISTORY Left 02/19/2016    Left SI fusion 2/19/2016 Dr. Zendejas    POSTERIOR SPINAL FUSION  10/24/2016    PSF T12-3/TLIF L3-L4 poss L2-L3 --- Dr. 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 NFPE suggestive of weight more C/W prior recorded weights around 66-67 kg. Pt with ongoing AMS/confusion - not able to give diet recall. ST has seen pt and has recommend pureed foods with NTL, which are in place presently. Pt's intake has  "remained poor - generally less than 50%. NFPE does show patterns of muscle/fat wasting C/W malnutrition, likely R/t chronic poor intake. If intake does not improve and aggressive care is desired, pt may become a candidate for more aggressive nutrition support.      Anthropometrics        Current Height, Weight Height: 185.4 cm (72.99\")  Weight: 53.2 kg (117 lb 4.6 oz) (06/10/25 2241)       Usual Body Weight (UBW) UTD       Trending Weight Hx     This admission: 6/10: Scale weight 53.2 kg; then updated on 6/12 to 67.4 kg -- based on NFPE, suspect weight of ~67 kg is more accurate             PTA: Pt has been at low weight for an extended time -- used to weight significantly more than he does now. Over the last year, weight has remained stable, but low.    Wt Readings from Last 30 Encounters:   06/12/25 1831 67.4 kg (148 lb 9.4 oz)   06/10/25 2241 53.2 kg (117 lb 4.6 oz)   06/10/25 1817 52.2 kg (115 lb)   04/28/25 1355 66.7 kg (147 lb)   08/30/24 1357 64 kg (141 lb)   02/07/24 1306 65.8 kg (145 lb)   08/02/23 1342 64.4 kg (142 lb)   02/01/23 1311 66.2 kg (146 lb)   07/27/22 1405 66.2 kg (146 lb)   01/26/22 1443 69.9 kg (154 lb)   10/28/21 1409 69.9 kg (154 lb)   08/04/21 0918 68 kg (150 lb)   05/13/21 0723 72.1 kg (159 lb)   03/18/21 1508 71.7 kg (158 lb)   01/25/21 1058 73.5 kg (162 lb)   01/07/21 0745 71.7 kg (158 lb)   12/23/20 1455 70.3 kg (155 lb)   12/21/20 1054 70.3 kg (155 lb)   11/12/20 1336 71.2 kg (157 lb)   11/09/20 0855 70.2 kg (154 lb 12.8 oz)   09/21/20 1440 63.5 kg (140 lb)   08/20/20 1416 63.5 kg (140 lb)   08/12/20 1433 64 kg (141 lb)   07/15/20 0500 68.2 kg (150 lb 5.7 oz)   07/14/20 1300 67.2 kg (148 lb 2.4 oz)   07/14/20 0537 68.4 kg (150 lb 12.7 oz)   07/13/20 0400 67.3 kg (148 lb 5.9 oz)   07/10/20 0400 67.8 kg (149 lb 7.6 oz)   07/09/20 0400 64 kg (141 lb 1.5 oz)   07/08/20 1842 64.7 kg (142 lb 10.2 oz)   07/08/20 0615 67.3 kg (148 lb 5.9 oz)   07/07/20 0555 68 kg (149 lb 14.6 oz)   07/06/20 " 0600 67.2 kg (148 lb 2.4 oz)   07/04/20 0600 67.7 kg (149 lb 4 oz)   07/03/20 0617 67 kg (147 lb 11.3 oz)   07/02/20 0653 68 kg (150 lb)   07/01/20 2336 68 kg (150 lb)   04/15/20 1343 74.8 kg (165 lb)   01/16/20 1333 71.7 kg (158 lb)   11/20/19 1811 71.8 kg (158 lb 4.6 oz)   08/19/19 1403 75.3 kg (166 lb)   12/17/18 1244 77.6 kg (171 lb)   11/14/18 1435 77.6 kg (171 lb)   11/08/18 1307 77.6 kg (171 lb)   05/17/18 0940 77.6 kg (171 lb)   05/09/18 1235 77.6 kg (171 lb)      BMI kg/m2 Body mass index is 15.48 kg/m².       Labs        Pertinent Labs    Results from last 7 days   Lab Units 06/11/25  0236 06/10/25  1928   SODIUM mmol/L 144 144   POTASSIUM mmol/L 4.1 4.4   CHLORIDE mmol/L 107 102   CO2 mmol/L 20.3* 23.1   BUN mg/dL 37.0* 33.0*   CREATININE mg/dL 1.07 1.15   CALCIUM mg/dL 9.4 10.1   BILIRUBIN mg/dL 1.0 1.5*   ALK PHOS U/L 71 83   ALT (SGPT) U/L 26 33   AST (SGOT) U/L 62* 77*   GLUCOSE mg/dL 103* 93     Results from last 7 days   Lab Units 06/11/25  0236 06/10/25  1928   MAGNESIUM mg/dL 2.5* 2.5*   PHOSPHORUS mg/dL 4.2  --    HEMOGLOBIN g/dL 13.4 14.2   HEMATOCRIT % 42.1 44.1   TRIGLYCERIDES mg/dL 116  --      Lab Results   Component Value Date    HGBA1C 5.76 (H) 06/11/2025        Medications    Scheduled Medications ampicillin-sulbactam, 1.5 g, Intravenous, Q6H  aspirin, 81 mg, Oral, Daily  atorvastatin, 10 mg, Oral, Daily  budesonide, 0.5 mg, Nebulization, BID - RT  ferrous sulfate, 325 mg, Oral, Daily With Breakfast  ipratropium, 0.5 mg, Nebulization, 4x Daily - RT  latanoprost, 1 drop, Both Eyes, Nightly  [Held by provider] midodrine, 5 mg, Oral, BID AC  pantoprazole, 40 mg, Oral, Daily  rivaroxaban, 15 mg, Oral, Q PM  sodium chloride, 10 mL, Intravenous, Q12H  sodium chloride, 10 mL, Intravenous, Q12H  vitamin B-12, 1,000 mcg, Oral, Daily        Infusions      PRN Medications   Calcium Replacement - Follow Nurse / BPA Driven Protocol    Magnesium Standard Dose Replacement - Follow Nurse / BPA Driven  Protocol    nitroglycerin    Phosphorus Replacement - Follow Nurse / BPA Driven Protocol    Potassium Replacement - Follow Nurse / BPA Driven Protocol    [COMPLETED] Insert Peripheral IV **AND** sodium chloride    sodium chloride    sodium chloride    sodium chloride    sodium chloride     Physical Findings        Trending Physical   Appearance, NFPE 6/12: NFPE completed, consistent with nutrition diagnosis of malnutrition using AND/ASPEN criteria. See MSA below.      --  Edema  None documented     Bowel Function BM 6/10     Tubes No feeding tube in place presently     Chewing/Swallowing ST following -- recommends pureed food and NTL     Skin No breakdown documented      --  Current Nutrition Orders & Evaluation of Intake       Oral Nutrition     Food Allergies NKFA   Current PO Diet Diet: Regular/House; No Straw; Texture: Pureed (NDD 1); Fluid Consistency: Nectar Thick   Supplement None ordered    PO Evaluation     Trending % PO Intake 6/12: less than 50% at recent meals -- ongoing poor intake    --  Nutritional Risk Screening        NRS-2002 Score          Nutrition Diagnosis         Nutrition Dx Problem 1 Severe chronic disease related malnutrition R/t suspected chronic suboptimal intake in the setting of chronic illness including HF; as evidenced by severe muscle and fat loss per NFPE.       Nutrition Dx Problem 2        Intervention Goal         Intervention Goal(s) Intake improving to greater than 50% at meals or nutrition support initiated      Nutrition Intervention        RD Action Will monitor plan of care for nutrition   NFPE completed        Nutrition Prescription          Diet Prescription Diet per ST: Puree, NTL   Supplement Prescription Will add Magic Cups at lunch/dinner (Provides 580 kcals, 18 g protein if consumed) for additional nutrition   --  Monitor/Evaluation        Monitor PO intake, Pertinent labs, Weight, Skin status, GI status, POC/GOC     Malnutrition Severity Assessment      Patient  meets criteria for : Severe Malnutrition  Malnutrition Type (Last 8 Hours)       Malnutrition Severity Assessment       Row Name 06/13/25 1157       Malnutrition Severity Assessment    Malnutrition Type Chronic Disease - Related Malnutrition      Row Name 06/13/25 1157       Muscle Loss    Loss of Muscle Mass Findings Severe    Yarsanism Region Severe - deep hollowing/scooping, lack of muscle to touch, facial bones well defined    Clavicle Bone Region Severe - protruding prominent bone    Acromion Bone Region Severe - squared shoulders, bones, and acromion process protrusion prominent    Dorsal Hand Region Severe - prominent depression    Patellar Region Severe - prominent bone, square looking, very little muscle definition    Anterior Thigh Region Severe - line/depression along thigh, obviously thin    Posterior Calf Region Severe - thin with very little definition/firmness      Row Name 06/13/25 1157       Fat Loss    Subcutaneous Fat Loss Findings Severe    Orbital Region  Severe - pronounced hollowness/depression, dark circles, loose saggy skin    Upper Arm Region Severe - mostly skin, very little space between folds, fingers touch      Row Name 06/13/25 1157       Criteria Met (Must meet criteria for severity in at least 2 of these categories: M Wasting, Fat Loss, Fluid, Secondary Signs, Wt. Status, Intake)    Patient meets criteria for  Severe Malnutrition                       Electronically signed by:  Raine Marrero RD  06/12/25 16:17 EDT

## 2025-06-13 VITALS
OXYGEN SATURATION: 90 % | HEART RATE: 70 BPM | TEMPERATURE: 97.6 F | BODY MASS INDEX: 19.69 KG/M2 | RESPIRATION RATE: 26 BRPM | HEIGHT: 73 IN | WEIGHT: 148.59 LBS | DIASTOLIC BLOOD PRESSURE: 93 MMHG | SYSTOLIC BLOOD PRESSURE: 140 MMHG

## 2025-06-13 PROBLEM — K29.70 GASTRITIS: Status: RESOLVED | Noted: 2020-07-14 | Resolved: 2025-01-01

## 2025-06-13 PROBLEM — I34.0 MITRAL REGURGITATION: Status: RESOLVED | Noted: 2020-07-12 | Resolved: 2025-01-01

## 2025-06-13 PROBLEM — I65.22 LEFT CAROTID STENOSIS: Status: ACTIVE | Noted: 2025-06-13

## 2025-06-13 PROBLEM — M62.82 RHABDOMYOLYSIS: Status: ACTIVE | Noted: 2025-01-01

## 2025-06-13 PROBLEM — I25.5 ISCHEMIC CARDIOMYOPATHY: Chronic | Status: ACTIVE | Noted: 2020-07-01

## 2025-06-13 PROBLEM — Z95.810 PRESENCE OF BIVENTRICULAR IMPLANTABLE CARDIOVERTER-DEFIBRILLATOR (ICD): Chronic | Status: ACTIVE | Noted: 2020-08-14

## 2025-06-13 PROBLEM — I50.22 CHRONIC SYSTOLIC CONGESTIVE HEART FAILURE: Chronic | Status: ACTIVE | Noted: 2020-07-01

## 2025-06-13 PROBLEM — I65.22 SYMPTOMATIC STENOSIS OF LEFT CAROTID ARTERY: Status: ACTIVE | Noted: 2025-01-01

## 2025-06-13 PROBLEM — I82.90 NON-OCCLUSIVE THROMBUS: Status: ACTIVE | Noted: 2025-01-01

## 2025-06-13 PROBLEM — I50.22 CHRONIC SYSTOLIC CONGESTIVE HEART FAILURE: Chronic | Status: RESOLVED | Noted: 2020-07-01 | Resolved: 2025-06-13

## 2025-06-13 PROBLEM — I50.32 CHRONIC HEART FAILURE WITH PRESERVED EJECTION FRACTION (HFPEF): Status: ACTIVE | Noted: 2025-01-01

## 2025-06-13 PROBLEM — E43 SEVERE PROTEIN-CALORIE MALNUTRITION: Status: ACTIVE | Noted: 2025-06-13

## 2025-06-13 PROBLEM — I65.22 MORE THAN 50 PERCENT STENOSIS OF LEFT INTERNAL CAROTID ARTERY: Status: ACTIVE | Noted: 2025-01-01

## 2025-06-13 PROBLEM — R10.31 INGUINODYNIA, RIGHT: Status: RESOLVED | Noted: 2020-12-23 | Resolved: 2025-01-01

## 2025-06-13 LAB
APTT PPP: 100.5 SECONDS (ref 22.7–35.4)
APTT PPP: 107.7 SECONDS (ref 22.7–35.4)
APTT PPP: 67.8 SECONDS (ref 22.7–35.4)
CK SERPL-CCNC: 308 U/L (ref 20–200)
PA ADP PRP-ACNC: 168 PRU
POTASSIUM SERPL-SCNC: 4 MMOL/L (ref 3.5–5.2)
QTC INTERVAL: NORMAL MS

## 2025-06-13 PROCEDURE — 25810000003 LACTATED RINGERS PER 1000 ML: Performed by: NURSE PRACTITIONER

## 2025-06-13 PROCEDURE — 94799 UNLISTED PULMONARY SVC/PX: CPT

## 2025-06-13 PROCEDURE — 99233 SBSQ HOSP IP/OBS HIGH 50: CPT | Performed by: STUDENT IN AN ORGANIZED HEALTH CARE EDUCATION/TRAINING PROGRAM

## 2025-06-13 PROCEDURE — 84132 ASSAY OF SERUM POTASSIUM: CPT | Performed by: INTERNAL MEDICINE

## 2025-06-13 PROCEDURE — 82550 ASSAY OF CK (CPK): CPT | Performed by: NURSE PRACTITIONER

## 2025-06-13 PROCEDURE — 99222 1ST HOSP IP/OBS MODERATE 55: CPT | Performed by: STUDENT IN AN ORGANIZED HEALTH CARE EDUCATION/TRAINING PROGRAM

## 2025-06-13 PROCEDURE — 85576 BLOOD PLATELET AGGREGATION: CPT | Performed by: NURSE PRACTITIONER

## 2025-06-13 PROCEDURE — 94761 N-INVAS EAR/PLS OXIMETRY MLT: CPT

## 2025-06-13 PROCEDURE — 85730 THROMBOPLASTIN TIME PARTIAL: CPT | Performed by: STUDENT IN AN ORGANIZED HEALTH CARE EDUCATION/TRAINING PROGRAM

## 2025-06-13 PROCEDURE — 25010000002 BUMETANIDE PER 0.5 MG: Performed by: NURSE PRACTITIONER

## 2025-06-13 PROCEDURE — 85730 THROMBOPLASTIN TIME PARTIAL: CPT | Performed by: INTERNAL MEDICINE

## 2025-06-13 PROCEDURE — 25010000002 AMPICILLIN-SULBACTAM PER 1.5 G: Performed by: STUDENT IN AN ORGANIZED HEALTH CARE EDUCATION/TRAINING PROGRAM

## 2025-06-13 RX ORDER — LATANOPROST 50 UG/ML
1 SOLUTION/ DROPS OPHTHALMIC NIGHTLY
Status: CANCELLED | OUTPATIENT
Start: 2025-06-13

## 2025-06-13 RX ORDER — FERROUS SULFATE 325(65) MG
325 TABLET ORAL
Status: CANCELLED | OUTPATIENT
Start: 2025-06-14

## 2025-06-13 RX ORDER — PANTOPRAZOLE SODIUM 40 MG/1
40 TABLET, DELAYED RELEASE ORAL DAILY
Status: CANCELLED | OUTPATIENT
Start: 2025-06-14

## 2025-06-13 RX ORDER — MULTIVITAMIN WITH IRON
1000 TABLET ORAL DAILY
Status: CANCELLED | OUTPATIENT
Start: 2025-06-14

## 2025-06-13 RX ORDER — SODIUM CHLORIDE 0.9 % (FLUSH) 0.9 %
10 SYRINGE (ML) INJECTION AS NEEDED
Status: CANCELLED | OUTPATIENT
Start: 2025-06-13

## 2025-06-13 RX ORDER — ATORVASTATIN CALCIUM 10 MG/1
10 TABLET, FILM COATED ORAL DAILY
Status: CANCELLED | OUTPATIENT
Start: 2025-06-14

## 2025-06-13 RX ORDER — MIDODRINE HYDROCHLORIDE 5 MG/1
10 TABLET ORAL EVERY 8 HOURS SCHEDULED
Status: CANCELLED | OUTPATIENT
Start: 2025-06-13

## 2025-06-13 RX ORDER — SODIUM CHLORIDE 9 MG/ML
40 INJECTION, SOLUTION INTRAVENOUS AS NEEDED
Status: CANCELLED | OUTPATIENT
Start: 2025-06-13

## 2025-06-13 RX ORDER — NEOMYCIN SULFATE, POLYMYXIN B SULFATE AND BACITRACIN ZINC 3.5; 10000; 4 MG/G; [USP'U]/G; [USP'U]/G
OINTMENT OPHTHALMIC
Status: CANCELLED | OUTPATIENT
Start: 2025-06-13

## 2025-06-13 RX ORDER — ASPIRIN 81 MG/1
81 TABLET ORAL DAILY
Status: CANCELLED | OUTPATIENT
Start: 2025-06-14

## 2025-06-13 RX ORDER — NITROGLYCERIN 0.4 MG/1
0.4 TABLET SUBLINGUAL
Status: CANCELLED | OUTPATIENT
Start: 2025-06-13

## 2025-06-13 RX ORDER — MIDODRINE HYDROCHLORIDE 5 MG/1
5 TABLET ORAL EVERY 8 HOURS
Start: 2025-06-13 | End: 2025-06-22

## 2025-06-13 RX ORDER — BUMETANIDE 0.25 MG/ML
2 INJECTION, SOLUTION INTRAMUSCULAR; INTRAVENOUS ONCE
Status: COMPLETED | OUTPATIENT
Start: 2025-06-13 | End: 2025-06-13

## 2025-06-13 RX ORDER — BUMETANIDE 0.25 MG/ML
2 INJECTION, SOLUTION INTRAMUSCULAR; INTRAVENOUS ONCE
Status: CANCELLED | OUTPATIENT
Start: 2025-06-13

## 2025-06-13 RX ORDER — BUDESONIDE 0.5 MG/2ML
0.5 INHALANT ORAL
Status: CANCELLED | OUTPATIENT
Start: 2025-06-13

## 2025-06-13 RX ORDER — SODIUM CHLORIDE 0.9 % (FLUSH) 0.9 %
10 SYRINGE (ML) INJECTION EVERY 12 HOURS SCHEDULED
Status: CANCELLED | OUTPATIENT
Start: 2025-06-13

## 2025-06-13 RX ORDER — MIDODRINE HYDROCHLORIDE 5 MG/1
10 TABLET ORAL EVERY 8 HOURS SCHEDULED
Status: DISCONTINUED | OUTPATIENT
Start: 2025-06-13 | End: 2025-06-13 | Stop reason: HOSPADM

## 2025-06-13 RX ORDER — HEPARIN SODIUM 10000 [USP'U]/100ML
18 INJECTION, SOLUTION INTRAVENOUS
Status: CANCELLED | OUTPATIENT
Start: 2025-06-13

## 2025-06-13 RX ORDER — NEOMYCIN SULFATE, POLYMYXIN B SULFATE AND BACITRACIN ZINC 3.5; 10000; 4 MG/G; [USP'U]/G; [USP'U]/G
OINTMENT OPHTHALMIC
Status: DISCONTINUED | OUTPATIENT
Start: 2025-06-13 | End: 2025-06-13 | Stop reason: HOSPADM

## 2025-06-13 RX ORDER — MIDODRINE HYDROCHLORIDE 5 MG/1
5 TABLET ORAL EVERY 8 HOURS
Status: DISCONTINUED | OUTPATIENT
Start: 2025-06-13 | End: 2025-06-13

## 2025-06-13 RX ORDER — NOREPINEPHRINE BITARTRATE 0.03 MG/ML
.02-.5 INJECTION, SOLUTION INTRAVENOUS
Status: CANCELLED | OUTPATIENT
Start: 2025-06-13

## 2025-06-13 RX ORDER — MIDODRINE HYDROCHLORIDE 5 MG/1
10 TABLET ORAL ONCE
Status: COMPLETED | OUTPATIENT
Start: 2025-06-13 | End: 2025-06-13

## 2025-06-13 RX ORDER — SODIUM CHLORIDE, SODIUM LACTATE, POTASSIUM CHLORIDE, CALCIUM CHLORIDE 600; 310; 30; 20 MG/100ML; MG/100ML; MG/100ML; MG/100ML
100 INJECTION, SOLUTION INTRAVENOUS CONTINUOUS
Status: CANCELLED | OUTPATIENT
Start: 2025-06-13 | End: 2025-06-13

## 2025-06-13 RX ADMIN — Medication 10 ML: at 10:17

## 2025-06-13 RX ADMIN — POTASSIUM CHLORIDE 40 MEQ: 1.5 POWDER, FOR SOLUTION ORAL at 01:43

## 2025-06-13 RX ADMIN — ATORVASTATIN CALCIUM 10 MG: 10 TABLET ORAL at 10:15

## 2025-06-13 RX ADMIN — ASPIRIN 81 MG: 81 TABLET, COATED ORAL at 10:32

## 2025-06-13 RX ADMIN — MIDODRINE HYDROCHLORIDE 10 MG: 5 TABLET ORAL at 13:46

## 2025-06-13 RX ADMIN — MIDODRINE HYDROCHLORIDE 10 MG: 5 TABLET ORAL at 10:15

## 2025-06-13 RX ADMIN — NOREPINEPHRINE BITARTRATE 0.14 MCG/KG/MIN: 0.03 INJECTION, SOLUTION INTRAVENOUS at 09:59

## 2025-06-13 RX ADMIN — Medication 1000 MCG: at 10:14

## 2025-06-13 RX ADMIN — AMPICILLIN SODIUM AND SULBACTAM SODIUM 1.5 G: 1; .5 INJECTION, POWDER, FOR SOLUTION INTRAMUSCULAR; INTRAVENOUS at 03:41

## 2025-06-13 RX ADMIN — MIDODRINE HYDROCHLORIDE 5 MG: 5 TABLET ORAL at 01:43

## 2025-06-13 RX ADMIN — FERROUS SULFATE TAB 325 MG (65 MG ELEMENTAL FE) 325 MG: 325 (65 FE) TAB at 10:09

## 2025-06-13 RX ADMIN — BUMETANIDE 2 MG: 0.25 INJECTION INTRAMUSCULAR; INTRAVENOUS at 11:30

## 2025-06-13 RX ADMIN — IPRATROPIUM BROMIDE 0.5 MG: 0.5 SOLUTION RESPIRATORY (INHALATION) at 07:55

## 2025-06-13 RX ADMIN — NEOMYCIN SULFATE, POLYMYXIN B SULFATE AND BACITRACIN ZINC: 3.5; 10000; 4 OINTMENT OPHTHALMIC at 13:45

## 2025-06-13 RX ADMIN — AMPICILLIN SODIUM AND SULBACTAM SODIUM 1.5 G: 1; .5 INJECTION, POWDER, FOR SOLUTION INTRAMUSCULAR; INTRAVENOUS at 10:09

## 2025-06-13 RX ADMIN — SODIUM CHLORIDE, POTASSIUM CHLORIDE, SODIUM LACTATE AND CALCIUM CHLORIDE 100 ML/HR: 600; 310; 30; 20 INJECTION, SOLUTION INTRAVENOUS at 06:24

## 2025-06-13 RX ADMIN — PANTOPRAZOLE SODIUM 40 MG: 40 TABLET, DELAYED RELEASE ORAL at 10:15

## 2025-06-13 RX ADMIN — IPRATROPIUM BROMIDE 0.5 MG: 0.5 SOLUTION RESPIRATORY (INHALATION) at 11:40

## 2025-06-13 RX ADMIN — BUDESONIDE 0.5 MG: 0.5 SUSPENSION RESPIRATORY (INHALATION) at 08:01

## 2025-06-13 RX ADMIN — MUPIROCIN 1 APPLICATION: 20 OINTMENT TOPICAL at 10:16

## 2025-06-13 NOTE — THERAPY EVALUATION
Acute Care - Speech Language Pathology   Swallow Initial Evaluation [facility]     Patient Name: Shukri Park  : 1940  MRN: [id number]  Today's Date: 2025               Admit Date: 2025    Visit Dx:   No diagnosis found.  Patient Active Problem List   Diagnosis    Chronic anticoagulation    Mixed hyperlipidemia    Chronic pain    Ischemic cardiomyopathy    Persistent atrial fibrillation    Coronary artery disease involving native coronary artery of native heart without angina pectoris    Vitamin D deficiency    History of lung cancer    Chronic hypotension    Presence of biventricular implantable cardioverter-defibrillator (ICD)    Acute encephalopathy    GERD (gastroesophageal reflux disease)    Aortic aneurysm    Rhabdomyolysis    More than 50 percent stenosis of left internal carotid artery    Non-occlusive thrombus    Severe protein-calorie malnutrition    Chronic heart failure with preserved ejection fraction (HFpEF)     Past Medical History:   Diagnosis Date    Aortic aneurysm     Chronic anticoagulation 2013    Chronic HFrEF (heart failure with reduced ejection fraction)     Chronic hypotension 2020    Chronic pain 2020    Coronary artery disease     Foot pain, bilateral     GERD (gastroesophageal reflux disease)     History of lung cancer 2020    Hyperlipidemia     Inguinodynia, right 2020    Added automatically from request for surgery [id number]      Ischemic cardiomyopathy 2020    Added automatically from request for surgery [id number]      Low back pain     Lung cancer     Mixed hyperlipidemia 2013    Persistent atrial fibrillation 2020    Added automatically from request for surgery [id number]      Presence of biventricular implantable cardioverter-defibrillator (ICD) 2020    S/P epidural steroid injection     Israel Gomes's - no relief    Vitamin D deficiency 2016    Weight loss     acute loss of weight 30 lbs     Past  Surgical History:   Procedure Laterality Date    CARDIAC CATHETERIZATION N/A 7/9/2020    Procedure: LEFT HEART CATH with possible PCI;  Surgeon: Wade Cronin MD;  Location: [facility] CATH INVASIVE LOCATION;  Service: Cardiovascular;  Laterality: N/A;  Local and IV sedation    CARDIAC CATHETERIZATION N/A 7/9/2020    Procedure: Percutaneous Coronary Intervention;  Surgeon: Wade Cronin MD;  Location: [facility] CATH INVASIVE LOCATION;  Service: Cardiovascular;  Laterality: N/A;    CARDIAC DEFIBRILLATOR PLACEMENT      CARDIAC ELECTROPHYSIOLOGY PROCEDURE N/A 7/10/2020    Procedure: Biventricular Device Insertion;  Surgeon: Jonathan Denney MD;  Location: [facility] CATH INVASIVE LOCATION;  Service: Cardiovascular;  Laterality: N/A;    CARDIAC ELECTROPHYSIOLOGY PROCEDURE N/A 7/10/2020    Procedure: ABLATION AV NODE;  Surgeon: Jonathan Denney MD;  Location: [facility] CATH INVASIVE LOCATION;  Service: Cardiovascular;  Laterality: N/A;    CARDIAC ELECTROPHYSIOLOGY PROCEDURE N/A 7/10/2020    Procedure: AV node ablation;  Surgeon: Jonathan Denney MD;  Location: [facility] CATH INVASIVE LOCATION;  Service: Cardiovascular;  Laterality: N/A;    CARDIOVASCULAR STRESS TEST  2020    CATARACT EXTRACTION, BILATERAL      CONVERTED (HISTORICAL) HOLTER  2020    CORONARY ANGIOPLASTY WITH STENT PLACEMENT      ENDOSCOPY N/A 7/5/2020    Procedure: ESOPHAGOGASTRODUODENOSCOPY;  Surgeon: Neal Correa MD;  Location: [facility] ENDOSCOPY;  Service: Gastroenterology;  Laterality: N/A;    INGUINAL HERNIA REPAIR Right 1/7/2021    Procedure: INGUINAL HERNIA REPAIR OPEN WITH MESH;  Surgeon: Cody Randall MD;  Location: [facility] MAIN OR;  Service: General;  Laterality: Right;    LUMBAR DECOMPRESSION  09/2015    L2-L3    LUMBAR DECOMPRESSION  2006    Dr. Logan    OTHER SURGICAL HISTORY  05/2015    left SI fusion with bone graft - Dr. Presley    OTHER SURGICAL HISTORY      carotid artery repair     OTHER SURGICAL HISTORY Left 02/19/2016    Left SI  fusion 2/19/2016 Dr. Zendejas    POSTERIOR SPINAL FUSION  10/24/2016    PSF T12-3/TLIF L3-L4 poss L2-L3 --- Dr. 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       SLP Recommendation and Plan  SLP Swallowing Diagnosis: moderate, oral dysphagia, mild, pharyngeal dysphagia (06/13/25 1611)  SLP Diet Recommendation: puree, nectar thick liquids, water between meals after oral care, with supervision, ice chips between meals after oral care, with supervision (06/13/25 1611)  Recommended Precautions and Strategies: upright posture during/after eating, small bites of food and sips of liquid, general aspiration precautions, 1:1 supervision, assist with feeding, fatigue precautions, reflux precautions (06/13/25 1611)  SLP Rec. for Method of Medication Administration: with puree, meds whole, meds crushed, as tolerated (06/13/25 1611)     Monitor for Signs of Aspiration: yes, notify SLP if any concerns (06/13/25 1611)  Recommended Diagnostics: reassess via clinical swallow evaluation, reassess via VFSS ([facility]) (06/13/25 1611)  Swallow Criteria for Skilled Therapeutic Interventions Met: demonstrates skilled criteria (06/13/25 1611)  Anticipated Discharge Disposition (SLP): anticipate therapy at next level of care (06/13/25 1611)  Rehab Potential/Prognosis, Swallowing: good, to achieve stated therapy goals (06/13/25 1611)  Therapy Frequency (Swallow): PRN (06/13/25 1611)  Predicted Duration Therapy Intervention (Days): until discharge (06/13/25 1611)  Oral Care Recommendations: Oral Care BID/PRN (06/13/25 1611)                                        Outcome Evaluation: Clinical swallow eval completed with patient admitted with acute CVAs. VFSS completed 6/11 at [facility] which showed moderate oral dysphagia and mild pharyngeal dysphagia. Puree and nectar thick liquids were recommended. Clinical swallow observations: No overt clinical signs of aspiration with nectar thick liquids and pureed textures. Plan:  "Suggest continue current diet textures and monitor clinically. If pt demonstrates signs of aspiration, make NPO and request SLP re-eval. Diet texture recommendation: Puree, nectar thick liquids. Meds: Whole or crushed in puree, unless contraindicated. Aspiration precautions: FULLY UPRIGHT FOR ALL ORAL INTAKE. 1:1 feeding assistance      SWALLOW EVALUATION (Last 72 Hours)       SLP Adult Swallow Evaluation       Row Name 06/13/25 5188       Rehab Evaluation    Document Type evaluation  -BB    Subjective Information no complaints  -BB    Patient Observations alert;cooperative;agree to therapy  -BB    Patient Effort good  -BB    Symptoms Noted During/After Treatment none  -BB       General Information    Patient Profile Reviewed yes  -BB    Pertinent History Of Current Problem 85 yo w acute CVA and transferred from [facility] for higher level of care. \"Scattered acute infarcts of left cerebral hemisphere  Severe stenosis of left supraclinoid ICA  Acute anterior non-occlusive thrombus of distal left ICA \" Pt transfered to ICU at [facility] 2/2 drop in pressure prior to transfer to [facility]. Pt currently on 6L o2. Pt completed VFSS on 6/11, which showed moderate oral dysphagia and mild pharyngeal dysphagia; laryngeal penetration with thin liquids; pureed textures and nectar thick liquids were recommended.  -BB    Current Method of Nutrition NPO  -BB    Precautions/Limitations, Vision WFL;for purposes of eval  -BB    Precautions/Limitations, Hearing difficult to assess;other (see comments)  pt appears to be [place]  -BB    Prior Level of Function-Communication motor speech impairment;other (see comments)  -BB    Prior Level of Function-Swallowing no diet consistency restrictions;safe, efficient swallowing in all situations  -BB    Plans/Goals Discussed with patient;family;agreed upon  -BB    Barriers to Rehab cognitive status  -BB    Patient's Goals for Discharge return to PO diet  -BB    Family Goals for Discharge patient " able to return to PO diet  -BB       Pain    Additional Documentation Pain Scale: FACES Pre/Post-Treatment (Group)  -BB       Pain Scale: FACES Pre/Post-Treatment    Pain: FACES Scale, Pretreatment 2-->hurts little bit  -BB    Posttreatment Pain Rating 2-->hurts little bit  -BB    Pre/Posttreatment Pain Comment Pt complains of back pain. Improved with repositioning  -BB       Oral Motor Structure and Function    Dentition Assessment upper dentures/partial in place;missing teeth  pt missing lower denture (at home, per family)  -BB    Secretion Management WNL/WFL  -BB    Mucosal Quality dry  -BB       Oral Musculature and Cranial Nerve Assessment    Oral Motor General Assessment generalized oral motor weakness  -BB    Oral Labial or Buccal Impairment, Detail, Cranial Nerve VII (Facial): right labial droop  very slight  -BB       General Eating/Swallowing Observations    Respiratory Support Currently in Use nasal cannula  -BB    O2 Liters 6L  -BB    Eating/Swallowing Skills needed assist  -BB    Positioning During Eating upright 90 degree;upright in bed  -BB    Utensils Used straw;cup  -BB    Consistencies Trialed thin liquids;pureed;nectar/syrup-thick liquids  -BB       Clinical Swallow Eval    Clinical Swallow Evaluation Summary Clinical swallow eval completed. Dtr at bedside. Pt with cough.     Cognition: A/[employer]. Pt aware that he is in the hospital. Pt able to follow a few basic commands. Appears [place]. ?Cognitive-linguistic considerations.  Expressive communication: appropriate.   Voice: Vocal quality and loudness appear good.   Cough: Not elicited.   Affect: Pleasant.   Speech: Intelligible.   Bulbar mech exam: Pt did not appear to comprehend task. Slight R labial asymmetry.   Dysphagia symptoms: pt denies difficulties on current diet (puree, nectar).        Patient agreeable to PO trials.   Dysphagia signs: No overt clinical signs of aspiration across PO trials of thin tsp, thin straw, nectar straw, puree.        Assessment / Plan: Clinical swallow eval completed with patient admitted with acute CVAs. VFSS completed 6/11 at [facility] which showed moderate oral dysphagia and mild pharyngeal dysphagia. Puree and nectar thick liquids were recommended. Clinical swallow observations: No overt clinical signs of aspiration with nectar thick liquids and pureed textures. Plan: Suggest continue current diet textures and monitor clinically. If pt demonstrates signs of aspiration, make NPO and request SLP re-eval. Diet texture recommendation: Puree, nectar thick liquids. Meds: Whole or crushed in puree, unless contraindicated. Aspiration precautions: FULLY UPRIGHT FOR ALL ORAL INTAKE. 1:1 feeding assistance  -BB       SLP Evaluation Clinical Impression    SLP Swallowing Diagnosis moderate;oral dysphagia;mild;pharyngeal dysphagia  -BB    Functional Impact risk of aspiration/pneumonia;risk of malnutrition;risk of dehydration  -BB    Rehab Potential/Prognosis, Swallowing good, to achieve stated therapy goals  -BB    Swallow Criteria for Skilled Therapeutic Interventions Met demonstrates skilled criteria  -BB       Recommendations    Therapy Frequency (Swallow) PRN  -BB    Predicted Duration Therapy Intervention (Days) until discharge  -BB    SLP Diet Recommendation puree;nectar thick liquids;water between meals after oral care, with supervision;ice chips between meals after oral care, with supervision  -BB    Recommended Diagnostics reassess via clinical swallow evaluation;reassess via VFSS ([facility])  -BB    Recommended Precautions and Strategies upright posture during/after eating;small bites of food and sips of liquid;general aspiration precautions;1:1 supervision;assist with feeding;fatigue precautions;reflux precautions  -BB    Oral Care Recommendations Oral Care BID/PRN  -BB    SLP Rec. for Method of Medication Administration with puree;meds whole;meds crushed;as tolerated  -BB    Monitor for Signs of Aspiration yes;notify SLP if any  concerns  -BB    Anticipated Discharge Disposition (SLP) anticipate therapy at next level of care  -BB       Swallow Goals (SLP)    Swallow LTGs Swallow Long Term Goal (free text);Patient will demonstrate functional swallow for  -BB    Swallow STGs diet tolerance goal selection (SLP);pharyngeal strengthening exercise goal selection (SLP)  -BB    Diet Tolerance Goal Selection (SLP) Patient will tolerate trials of  -BB    Pharyngeal Strengthening Exercise Goal Selection (SLP) pharyngeal strengthening exercise, SLP goal 1  -BB       (LTG) Patient will demonstrate functional swallow for    Diet Texture (Demonstrate functional swallow) mechanical ground textures  -BB    Liquid viscosity (Demonstrate functional swallow) thin liquids  -BB    [place] (Demonstrate functional swallow) independently (over 90% accuracy)  -BB    Time Frame (Demonstrate functional swallow) by discharge  -BB    Progress/Outcomes (Demonstrate functional swallow) new goal  -BB       (STG) Patient will tolerate trials of    Consistencies Trialed (Tolerate trials) mechanical ground textures;thin liquids  -BB    Desired Outcome (Tolerate trials) without signs/symptoms of aspiration  -BB    [place] (Tolerate trials) independently (over 90% accuracy)  -BB    Time Frame (Tolerate trials) by discharge  -BB    Progress/Outcomes (Tolerate trials) new goal  -BB       (STG) Pharyngeal Strengthening Exercise Goal 1 (SLP)    Activity (Pharyngeal Strengthening Goal 1, SLP) increase epiglottic inversion and retroflexion;increase closure at entrance to airway/closure of airway at glottis  -BB    Increase Epiglottic Inversion and Retroflexion supraglottic swallow;hard effortful swallow  -BB    Increase Closure at Entrance to Airway/Closure of Airway at Glottis effortful pitch glide (falsetto + pharyngeal squeeze);hard effortful swallow  -BB    [place]/Accuracy (Pharyngeal Strengthening Goal 1, SLP) with minimal cues (75-90% accuracy)  -BB    Time  Frame (Pharyngeal Strengthening Goal 1, SLP) short term goal (STG)  -BB    Progress/Outcomes (Pharyngeal Strengthening Goal 1, SLP) new goal  -BB              User Key  (r) = Recorded By, (t) = Taken By, (c) = Cosigned By      Initials Name Effective Dates    Dawit Olvera, SLP 02/19/23 -                     EDUCATION  The patient has been educated in the following areas:   Dysphagia (Swallowing Impairment) and results/recs of this clinical swallow eval and recommendations of recent VFSS. Pt and family in agreement with plan of care.       SLP GOALS       Row Name 06/13/25 1611             (LTG) Patient will demonstrate functional swallow for    Diet Texture (Demonstrate functional swallow) mechanical ground textures  -BB      Liquid viscosity (Demonstrate functional swallow) thin liquids  -BB      [place] (Demonstrate functional swallow) independently (over 90% accuracy)  -BB      Time Frame (Demonstrate functional swallow) by discharge  -BB      Progress/Outcomes (Demonstrate functional swallow) new goal  -BB         (STG) Patient will tolerate trials of    Consistencies Trialed (Tolerate trials) mechanical ground textures;thin liquids  -BB      Desired Outcome (Tolerate trials) without signs/symptoms of aspiration  -BB      [place] (Tolerate trials) independently (over 90% accuracy)  -BB      Time Frame (Tolerate trials) by discharge  -BB      Progress/Outcomes (Tolerate trials) new goal  -BB         (STG) Pharyngeal Strengthening Exercise Goal 1 (SLP)    Activity (Pharyngeal Strengthening Goal 1, SLP) increase epiglottic inversion and retroflexion;increase closure at entrance to airway/closure of airway at glottis  -BB      Increase Epiglottic Inversion and Retroflexion supraglottic swallow;hard effortful swallow  -BB      Increase Closure at Entrance to Airway/Closure of Airway at Glottis effortful pitch glide (falsetto + pharyngeal squeeze);hard effortful swallow  -BB      [place]/Accuracy  (Pharyngeal Strengthening Goal 1, SLP) with minimal cues (75-90% accuracy)  -BB      Time Frame (Pharyngeal Strengthening Goal 1, SLP) short term goal (STG)  -BB      Progress/Outcomes (Pharyngeal Strengthening Goal 1, SLP) new goal  -BB                User Key  (r) = Recorded By, (t) = Taken By, (c) = Cosigned By      Initials Name Provider Type    Dawit Olvera SLP Speech and Language Pathologist                         Time Calculation:    Time Calculation- SLP       Row Name 06/13/25 1627             Time Calculation- SLP    SLP Start Time 1530  -BB      SLP Stop Time 1630  -BB      SLP Time Calculation (min) 60 min  -BB      SLP Received On 06/13/25  -BB         Untimed Charges    SLP Eval/Re-eval  ST Eval Oral Pharyng Swallow - [id number]  -BB      [id number] Eval Oral Pharyng Swallow Minutes 60  -BB         Total Minutes    Untimed Charges Total Minutes 60  -BB       Total Minutes 60  -BB                User Key  (r) = Recorded By, (t) = Taken By, (c) = Cosigned By      Initials Name Provider Type    Dawit Olvera SLP Speech and Language Pathologist                    Therapy Charges for Today       Code Description Service Date Service Provider Modifiers Qty    [id number] HC ST EVAL ORAL PHARYNG SWALLOW 4 6/13/2025 Dawit Nieto SLP GN 1                 LUZ ELENA Mazariegos  6/13/2025

## 2025-06-13 NOTE — PLAN OF CARE
Goal Outcome Evaluation:      Pt disoriented to time and sometimes situation. NIHSS = 4 at beginning of shift. Levophed titrated for systolic goal >120, see MAR. No neuro changes this shift. Levo, heparin, and LR gtt infusing. Adequate urine output, no BM this shift. Vital signs stable; pt has no complaints at this time.

## 2025-06-13 NOTE — SIGNIFICANT NOTE
06/13/25 1250   OTHER   Discipline physical therapist   Rehab Time/Intention   Session Not Performed patient unavailable for treatment  (pending transfer; will check back if does not come to fruition)   Therapy Assessment/Plan (PT)   Criteria for Skilled Interventions Met (PT) yes;skilled treatment is necessary   Recommendation   PT - Next Appointment 06/14/25

## 2025-06-13 NOTE — DISCHARGE SUMMARY
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  Radiology studies:   CT Head (6/10): No acute intracranial abnormality.  CT Spine (C/T/L) (6/10): No acute fracture or malalignment.  MRI Brain (): multiple scattered acute infarcts in left cerebral hemisphere, evidence of severe stenoses versus near occlusion of the left supraclinoid ICA/carotid terminus.   CTA Head and Neck (): Bulky plaque along left carotid bifurcation resulting in focal 80% stenosis along left internal carotid, and slightly more distally is an anterior nonocclusive thrombus.  Echocardiogram: Bubble study not completed.  LVEF 56 to 60%, left atrial cavity moderately to severely dilated.  Right atrial cavity is mildly dilated.  Severe mitral valve regurgitation. Cardiology consulted to consider possible KHUSHBU.  Neurology following, they have discussed with Dr. Nice at [facility], who was accepted for patient to transfer non-emergently for interventional neurology evaluation. Patient was being arranged for transfer when he subsequently dropped his pressure, prompting upgrade to ICU.  Discussion with Dr. Terrazas, Intensivist MD at [facility], and decision has been made to stabilize patient first, and then will re-contact the transfer line, but will investigate the hypotension first; transfer request to [facility] changed to pending at this time.  Loaded with Plavix, continue daily Plavix and  mg for a total of 90 days, then will transition to ASA 81 mg daily. Check P2Y12 platelet inhibition test to assess if he is a ABDULKADIR responder, if non-responder, planned transition to Brilinta.   Holding Xarelto and continues on  heparin gtt.  PT/OT/SLP     Acute on chronic hypotension  Continue vasopressors to maintain SBP > 120  Patient and family state patient's SBP stays 80-90s. (So do his children's)  Titrate Levophed to maintain SBP >120  Continue midodrine, dose increased to 10 mg Q8H scheduled.  No evidence of pneumonia, Unasyn discontinued.  Blood cultures show no growth to date  Legionella and S. Pneumo antigens negative  RVP negative  UA showed ketone and proteinuria only     Non-traumatic rhabdomyolysis  CK 2133 on admission, repeat 660.  Received several boluses of IVF.  Continue IVF and repeat CK this AM is 308.     Hypoxia  Chest x ray with some pulmonary vascular congestion.  Bumex x 1 dose ordered.  Titrate oxygen supplementation to maintain oxygen saturation > 90%.    Chronic / Persistent atrial fibrillation /  CAD/ CHF  V Paced since upgrade to ICU  [id number] Score of at least 4. On Xarelto as outpatient, continue on heparin gtt.        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  Recommend outpatient follow-up.      HOSPITAL COURSE  Shukri Park is a 84 y.o. old male patient with PMH of Hypertension but in recent years hypotension, persistent atrial fibrillation on Xarelto, CAD, chronic systolic congestive heart failure, ischemic cardiomyopathy, AICD in situ, history of lung cancer initially presented to the hospital with complaints of acute encephalopathy.  HPI information is limited due to patient is poor historian; information obtained from chart review.  On 6/8/2025 patient experienced a sudden left eye vision loss and appeared confused while at the grocery store.  He was driven home by a store employee.  Later that day the patient's family noted he was agitated and asked them to leave also his vision reportedly returned.  On 6/9/2025 patient was not answering phone calls prompting a wellness check on 6/10/2025.  He was found on the floor by his neighbor soiled, confused, and irritable  he was brought to the hospital by EMS.  Patient's pacemaker was interrogated in the emergency department with no noted arrhythmia events.  Patient was being treated for rhabdomyolysis.  A 5.1 cm AAA was noted on CT scan this is up from 4.75 in 2023 outpatient follow-up was recommended.  Altered mental status was being evaluated with CT scans and labs. MRI brain showed multiple scattered acute infarcts in the left cerebral hemisphere with mild edema without significant mass effect. 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  Neurology was following patient. Today neurology spoke with neurologist at [facility] in the hopes to have patient transferred not emergently to be seen by neurology team at [facility].  Patient's blood pressure was maintaining in the 70s systolic at which point patient started to have some slight aphasia.  Neurology made the determination to keep patient's systolic blood pressure greater than 120.  Patient was placed on Levophed and transferred to the intensive care unit.  He was started on a heparin drip and his Xarelto was placed on hold.  [facility] neurology requested patient be stabilized prior to making the transfer as they were not planning an acute intervention.    Hypotensive workup yielded etiology most likely related to acute on chronic hypotension with patient's midodrine not being resumed earlier in admission. Midodrine restarted and increased, continues on Levophed gtt.  We are also pushing patient's systolic BP higher than his normal BP. Patient has been deemed medically stable for transfer to [facility] for interventional neurology workup.     ACP: DNR/DNI; hospitalist provider spoke extensively with patient's grandson who is the POA who stated the patient has expressed not to have CPR or be put on machines such as a ventilator.    PROCEDURES PERFORMED  None        LABS/RADIOLOGICAL STUDIES  Lab Results (last 72 hours)       Procedure Component Value Units Date/Time    P2Y12 Platelet Inhibition [[id number]] Collected: 06/13/25 0540    Specimen: Blood Updated: 06/13/25 0851     P2Y12 Platelet Inhibition 168 PRU     Narrative:      Test results are in P2Y12 reaction units (PRU). This measures the extent of platelet aggregation in the presence of P2Y12 inhibitor drugs such as clopidrogrel (Plavix), prasugrel (Effient), ticagrelor (Brilinta), and ticlopidine (Ticlid).   Pre-Drug normal reference range: 194 - 418 PRU   P2Y12 values <194 (low end of reference range) are specific evidence of a P2Y12 inhibitor effect   Patients who have been treated with Glycoprotein IIb/IIIa inhibitors should not be tested until platelet function has recovered. This time period is approximately 14 days after discontinuation of abciximab (ReoPro) and up to 48 hours after discontinuation of eptifibatide (Integrillin) and tirofiban (Aggratstat).   Results should be interpreted in conjunction with other laboratory and clinical data available to the clinician.    aPTT [[id number]]  (Abnormal) Collected: 06/13/25 0751    Specimen: Blood from Arm, Left Updated: 06/13/25 0818     .7 seconds     Potassium [[id number]]  (Normal) Collected: 06/13/25 0048    Specimen: Blood Updated: 06/13/25 0113     Potassium 4.0 mmol/L     CK [[id number]]  (Abnormal) Collected: 06/13/25 0048    Specimen: Blood Updated: 06/13/25 0113     Creatine Kinase 308 U/L     aPTT [[id number]]  (Abnormal) Collected: 06/13/25 0048    Specimen: Blood Updated: 06/13/25 0104     PTT 67.8 seconds     Blood Culture - Blood, Arm, Left [[id number]]  (Normal) Collected: 06/10/25 2033    Specimen: Blood from Arm, Left Updated: 06/12/25 2045     Blood Culture No growth at 2 days    Blood Culture - Blood, Hand, Left [[id number]]  (Normal) Collected: 06/10/25 2033    Specimen: Blood from Hand, Left Updated: 06/12/25 2045     Blood Culture No  "growth at 2 days    Narrative:      Less than seven (7) mL's of blood was collected.  Insufficient quantity may yield false negative results.    Procalcitonin [[id number]]  (Normal) Collected: 06/11/25 2357    Specimen: Blood Updated: 06/12/25 2027     Procalcitonin 0.08 ng/mL     Narrative:      As a Marker for Sepsis (Non-Neonates):    1. <0.5 ng/mL represents a low risk of severe sepsis and/or septic shock.  2. >2 ng/mL represents a high risk of severe sepsis and/or septic shock.    As a Marker for Lower Respiratory Tract Infections that require antibiotic therapy:    PCT on Admission    Antibiotic Therapy       6-12 Hrs later    >0.5                Strongly Recommended  >0.25 - <0.5        Recommended   0.1 - 0.25          Discouraged              Remeasure/reassess PCT  <0.1                Strongly Discouraged     Remeasure/reassess PCT    As 28 day mortality risk marker: \"Change in Procalcitonin Result\" (>80% or <=80%) if Day 0 (or Day 1) and Day 4 values are available. Refer to [email or web address]pct-[email or web address]    Change in PCT <=80%  A decrease of PCT levels below or equal to 80% defines a positive change in PCT test result representing a higher risk for 28-day all-cause mortality of patients diagnosed with severe sepsis for septic shock.    Change in PCT >80%  A decrease of PCT levels of more than 80% defines a negative change in PCT result representing a lower risk for 28-day all-cause mortality of patients diagnosed with severe sepsis or septic shock.       Comprehensive Metabolic Panel [[id number]]  (Abnormal) Collected: 06/12/25 1952    Specimen: Blood Updated: 06/12/25 2024     Glucose 125 mg/dL      BUN 22.0 mg/dL      Creatinine 0.77 mg/dL      Sodium 144 mmol/L      Potassium 3.4 mmol/L      Chloride 110 mmol/L      CO2 24.2 mmol/L      Calcium 9.1 mg/dL      Total Protein 6.2 g/dL      Albumin 4.0 g/dL      ALT (SGPT) 27 U/L      AST (SGOT) 43 U/L      Alkaline Phosphatase 72 U/L      Total " Bilirubin 0.9 mg/dL      Globulin 2.2 gm/dL      A/G Ratio 1.8 g/dL      BUN/Creatinine Ratio 28.6     Anion Gap 9.8 mmol/L      eGFR 87.7 mL/min/1.73     Narrative:      GFR Categories in Chronic Kidney Disease (CKD)              GFR Category          GFR (mL/min/1.73)    Interpretation  G1                    90 or greater        Normal or high (1)  G2                    60-89                Mild decrease (1)  G3a                   45-59                Mild to moderate decrease  G3b                   30-44                Moderate to severe decrease  G4                    15-29                Severe decrease  G5                    14 or less           Kidney failure    (1)In the absence of evidence of kidney disease, neither GFR category G1 or G2 fulfill the criteria for CKD.    eGFR calculation 2021 CKD-EPI creatinine equation, which does not include race as a factor    CBC & Differential [[id number]]  (Abnormal) Collected: 06/12/25 1952    Specimen: Blood Updated: 06/12/25 2004    Narrative:      The following orders were created for panel order CBC & Differential.  Procedure                               Abnormality         Status                     ---------                               -----------         ------                     CBC Auto Differential[[id number]]        Abnormal            Final result                 Please view results for these tests on the individual orders.    CBC Auto Differential [[id number]]  (Abnormal) Collected: 06/12/25 1952    Specimen: Blood Updated: 06/12/25 2004     WBC 12.10 10*3/mm3      RBC 4.19 10*6/mm3      Hemoglobin 13.1 g/dL      Hematocrit 42.0 %      .2 fL      MCH 31.3 pg      MCHC 31.2 g/dL      RDW 13.7 %      RDW-SD 50.2 fl      MPV 10.1 fL      Platelets 133 10*3/mm3      Neutrophil % 63.4 %      Lymphocyte % 24.3 %      Monocyte % 11.3 %      Eosinophil % 0.5 %      Basophil % 0.2 %      Immature Grans % 0.3 %      Neutrophils, Absolute 7.67 10*3/mm3       Lymphocytes, Absolute 2.94 10*3/mm3      Monocytes, Absolute 1.37 10*3/mm3      Eosinophils, Absolute 0.06 10*3/mm3      Basophils, Absolute 0.02 10*3/mm3      Immature Grans, Absolute 0.04 10*3/mm3      nRBC 0.0 /100 WBC     Lactic Acid, Plasma [[id number]]  (Normal) Collected: 06/12/25 1748    Specimen: Blood Updated: 06/12/25 1836     Lactate 2.0 mmol/L     Protime-INR [[id number]]  (Abnormal) Collected: 06/12/25 1748    Specimen: Blood Updated: 06/12/25 1832     Protime 19.5 Seconds      INR 1.64    aPTT [[id number]]  (Abnormal) Collected: 06/12/25 1748    Specimen: Blood Updated: 06/12/25 1832     PTT 37.1 seconds     aPTT [[id number]]  (Abnormal) Collected: 06/12/25 1748    Specimen: Blood Updated: 06/12/25 1829     PTT 37.2 seconds     POC Glucose Once [[id number]]  (Normal) Collected: 06/12/25 1750    Specimen: Blood Updated: 06/12/25 1751     Glucose 105 mg/dL      Comment: Serial Number: [id number]:  [id number]       CK [[id number]]  (Abnormal) Collected: 06/11/25 2357    Specimen: Blood Updated: 06/12/25 0048     Creatine Kinase 660 U/L     POC Glucose Once [[id number]]  (Normal) Collected: 06/11/25 2345    Specimen: Blood Updated: 06/11/25 2347     Glucose 100 mg/dL      Comment: Serial Number: [id number]:  [id number]       POC Glucose Once [[id number]]  (Normal) Collected: 06/11/25 1944    Specimen: Blood Updated: 06/11/25 1946     Glucose 97 mg/dL      Comment: Serial Number: [id number]:  [id number]       POC Glucose Once [[id number]]  (Normal) Collected: 06/11/25 1922    Specimen: Blood Updated: 06/11/25 1924     Glucose 93 mg/dL      Comment: Serial Number: [id number]:  [id number]       Legionella Antigen, Urine - Urine, Straight Cath [[id number]]  (Normal) Collected: 06/10/25 1904    Specimen: Urine from Straight Cath Updated: 06/11/25 1433     LEGIONELLA ANTIGEN, URINE Negative    S. Pneumo Ag Urine or CSF - Urine, Straight Cath [[id number]]  (Normal) Collected: 06/10/25 1904     Specimen: Urine from Straight Cath Updated: 06/11/25 1433     Strep Pneumo Ag Negative    Hemoglobin A1c [[id number]]  (Abnormal) Collected: 06/11/25 0236    Specimen: Blood from Arm, Right Updated: 06/11/25 1216     Hemoglobin A1C 5.76 %     Narrative:      Hemoglobin A1C Ranges:    Increased Risk for Diabetes  5.7% to 6.4%  Diabetes                     >= 6.5%  Diabetic Goal                < 7.0%    Lipid Panel [[id number]] Collected: 06/11/25 0236    Specimen: Blood from Arm, Right Updated: 06/11/25 1204     Total Cholesterol 153 mg/dL      Triglycerides 116 mg/dL      HDL Cholesterol 40 mg/dL      LDL Cholesterol  92 mg/dL      VLDL Cholesterol 21 mg/dL      LDL/HDL Ratio 2.25    Narrative:      Cholesterol Reference Ranges  (U.S. Department of Health and Human Services ATP III Classifications)    Desirable          <200 mg/dL  Borderline High    200-239 mg/dL  High Risk          >240 mg/dL      Triglyceride Reference Ranges  (U.S. Department of Health and Human Services ATP III Classifications)    Normal           <150 mg/dL  Borderline High  150-199 mg/dL  High             200-499 mg/dL  Very High        >500 mg/dL    HDL Reference Ranges  (U.S. Department of Health and Human Services ATP III Classifications)    Low     <40 mg/dl (major risk factor for CHD)  High    >60 mg/dl ('negative' risk factor for CHD)        LDL Reference Ranges  (U.S. Department of Health and Human Services ATP III Classifications)    Optimal          <100 mg/dL  Near Optimal     100-129 mg/dL  Borderline High  130-159 mg/dL  High             160-189 mg/dL  Very High        >189 mg/dL    LDL is calculated using the NIH LDL-C calculation.      Vitamin B12 [[id number]]  (Normal) Collected: 06/11/25 0236    Specimen: Blood from Arm, Right Updated: 06/11/25 1134     Vitamin B-12 805 pg/mL     Narrative:      Results may be falsely increased if patient taking Biotin.      T4, Free [[id number]]  (Normal) Collected: 06/11/25 0236    Specimen:  Blood from Arm, Right Updated: 06/11/25 0333     Free T4 1.07 ng/dL     Comprehensive Metabolic Panel [[id number]]  (Abnormal) Collected: 06/11/25 0236    Specimen: Blood from Arm, Right Updated: 06/11/25 0333     Glucose 103 mg/dL      BUN 37.0 mg/dL      Creatinine 1.07 mg/dL      Sodium 144 mmol/L      Potassium 4.1 mmol/L      Chloride 107 mmol/L      CO2 20.3 mmol/L      Calcium 9.4 mg/dL      Total Protein 6.9 g/dL      Albumin 4.3 g/dL      ALT (SGPT) 26 U/L      AST (SGOT) 62 U/L      Alkaline Phosphatase 71 U/L      Total Bilirubin 1.0 mg/dL      Globulin 2.6 gm/dL      A/G Ratio 1.7 g/dL      BUN/Creatinine Ratio 34.6     Anion Gap 16.7 mmol/L      eGFR 68.0 mL/min/1.73     Narrative:      GFR Categories in Chronic Kidney Disease (CKD)              GFR Category          GFR (mL/min/1.73)    Interpretation  G1                    90 or greater        Normal or high (1)  G2                    60-89                Mild decrease (1)  G3a                   45-59                Mild to moderate decrease  G3b                   30-44                Moderate to severe decrease  G4                    15-29                Severe decrease  G5                    14 or less           Kidney failure    (1)In the absence of evidence of kidney disease, neither GFR category G1 or G2 fulfill the criteria for CKD.    eGFR calculation 2021 CKD-EPI creatinine equation, which does not include race as a factor    Magnesium [[id number]]  (Abnormal) Collected: 06/11/25 0236    Specimen: Blood from Arm, Right Updated: 06/11/25 0333     Magnesium 2.5 mg/dL     Phosphorus [[id number]]  (Normal) Collected: 06/11/25 0236    Specimen: Blood from Arm, Right Updated: 06/11/25 0333     Phosphorus 4.2 mg/dL     Ammonia [[id number]]  (Normal) Collected: 06/11/25 0236    Specimen: Blood from Arm, Right Updated: 06/11/25 0300     Ammonia 16 umol/L     CBC & Differential [[id number]]  (Abnormal) Collected: 06/11/25 0236    Specimen: Blood from Arm,  Right Updated: 06/11/25 0243    Narrative:      The following orders were created for panel order CBC & Differential.  Procedure                               Abnormality         Status                     ---------                               -----------         ------                     CBC Auto Differential[[id number]]        Abnormal            Final result                 Please view results for these tests on the individual orders.    CBC Auto Differential [[id number]]  (Abnormal) Collected: 06/11/25 0236    Specimen: Blood from Arm, Right Updated: 06/11/25 0243     WBC 12.16 10*3/mm3      RBC 4.29 10*6/mm3      Hemoglobin 13.4 g/dL      Hematocrit 42.1 %      MCV 98.1 fL      MCH 31.2 pg      MCHC 31.8 g/dL      RDW 13.4 %      RDW-SD 47.8 fl      MPV 10.4 fL      Platelets 131 10*3/mm3      Neutrophil % 72.8 %      Lymphocyte % 16.9 %      Monocyte % 9.7 %      Eosinophil % 0.0 %      Basophil % 0.2 %      Immature Grans % 0.4 %      Neutrophils, Absolute 8.86 10*3/mm3      Lymphocytes, Absolute 2.05 10*3/mm3      Monocytes, Absolute 1.18 10*3/mm3      Eosinophils, Absolute 0.00 10*3/mm3      Basophils, Absolute 0.02 10*3/mm3      Immature Grans, Absolute 0.05 10*3/mm3      nRBC 0.0 /100 WBC     TSH [[id number]]  (Normal) Collected: 06/10/25 2033    Specimen: Blood Updated: 06/11/25 0242     TSH 0.758 uIU/mL     High Sensitivity Troponin T 1Hr [[id number]]  (Abnormal) Collected: 06/10/25 2033    Specimen: Blood Updated: 06/10/25 2157     HS Troponin T 32 ng/L      Troponin T Numeric Delta -3 ng/L      Troponin T % Delta -9    Narrative:      High Sensitive Troponin T Reference Range:  <14.0 ng/L- Negative Female for AMI  <22.0 ng/L- Negative Male for AMI  >=14 - Abnormal Female indicating possible myocardial injury.  >=22 - Abnormal Male indicating possible myocardial injury.   Clinicians would have to utilize clinical acumen, EKG, Troponin, and serial changes to determine if it is an Acute Myocardial  Infarction or myocardial injury due to an underlying chronic condition.         Respiratory Panel PCR w/COVID-19(SARS-CoV-2) [place] In-House, NP Swab in UTM/VTM, 2 HR TAT - Swab, Nasopharynx [[id number]]  (Normal) Collected: 06/10/25 2036    Specimen: Swab from Nasopharynx Updated: 06/10/25 2130     ADENOVIRUS, PCR Not Detected     Coronavirus 229E Not Detected     Coronavirus HKU1 Not Detected     Coronavirus NL63 Not Detected     Coronavirus OC43 Not Detected     COVID19 Not Detected     Human Metapneumovirus Not Detected     Human Rhinovirus/Enterovirus Not Detected     Influenza A PCR Not Detected     Influenza B PCR Not Detected     Parainfluenza Virus 1 Not Detected     Parainfluenza Virus 2 Not Detected     Parainfluenza Virus 3 Not Detected     Parainfluenza Virus 4 Not Detected     RSV, PCR Not Detected     Bordetella pertussis pcr Not Detected     Bordetella parapertussis PCR Not Detected     Chlamydophila pneumoniae PCR Not Detected     Mycoplasma pneumo by PCR Not Detected    Narrative:      In the setting of a positive respiratory panel with a viral infection PLUS a negative procalcitonin without other underlying concern for bacterial infection, consider observing off antibiotics or discontinuation of antibiotics and continue supportive care. If the respiratory panel is positive for atypical bacterial infection (Bordetella pertussis, Chlamydophila pneumoniae, or Mycoplasma pneumoniae), consider antibiotic de-escalation to target atypical bacterial infection.    Procalcitonin [[id number]]  (Normal) Collected: 06/10/25 1928    Specimen: Blood Updated: 06/10/25 2049     Procalcitonin 0.07 ng/mL     Narrative:      As a Marker for Sepsis (Non-Neonates):    1. <0.5 ng/mL represents a low risk of severe sepsis and/or septic shock.  2. >2 ng/mL represents a high risk of severe sepsis and/or septic shock.    As a Marker for Lower Respiratory Tract Infections that require antibiotic  "therapy:    PCT on Admission    Antibiotic Therapy       6-12 Hrs later    >0.5                Strongly Recommended  >0.25 - <0.5        Recommended   0.1 - 0.25          Discouraged              Remeasure/reassess PCT  <0.1                Strongly Discouraged     Remeasure/reassess PCT    As 28 day mortality risk marker: \"Change in Procalcitonin Result\" (>80% or <=80%) if Day 0 (or Day 1) and Day 4 values are available. Refer to [email or web address] [email or web address]-pct-[email or web address]    Change in PCT <=80%  A decrease of PCT levels below or equal to 80% defines a positive change in PCT test result representing a higher risk for 28-day all-cause mortality of patients diagnosed with severe sepsis for septic shock.    Change in PCT >80%  A decrease of PCT levels of more than 80% defines a negative change in PCT result representing a lower risk for 28-day all-cause mortality of patients diagnosed with severe sepsis or septic shock.       POC Lactate [[id number]]  (Normal) Collected: 06/10/25 2041    Specimen: Blood Updated: 06/10/25 2043     Lactate 1.3 mmol/L      Comment: Serial Number: [id number]:  [id number]       CK [[id number]]  (Abnormal) Collected: 06/10/25 1928    Specimen: Blood Updated: 06/10/25 2013     Creatine Kinase 2,133 U/L     Comprehensive Metabolic Panel [[id number]]  (Abnormal) Collected: 06/10/25 1928    Specimen: Blood Updated: 06/10/25 2002     Glucose 93 mg/dL      BUN 33.0 mg/dL      Creatinine 1.15 mg/dL      Sodium 144 mmol/L      Potassium 4.4 mmol/L      Chloride 102 mmol/L      CO2 23.1 mmol/L      Calcium 10.1 mg/dL      Total Protein 8.2 g/dL      Albumin 5.0 g/dL      ALT (SGPT) 33 U/L      AST (SGOT) 77 U/L      Alkaline Phosphatase 83 U/L      Total Bilirubin 1.5 mg/dL      Globulin 3.2 gm/dL      A/G Ratio 1.6 g/dL      BUN/Creatinine Ratio 28.7     Anion Gap 18.9 mmol/L      eGFR 62.4 mL/min/1.73     Narrative:      GFR Categories in Chronic Kidney Disease (CKD)              GFR Category          " GFR (mL/min/1.73)    Interpretation  G1                    90 or greater        Normal or high (1)  G2                    60-89                Mild decrease (1)  G3a                   45-59                Mild to moderate decrease  G3b                   30-44                Moderate to severe decrease  G4                    15-29                Severe decrease  G5                    14 or less           Kidney failure    (1)In the absence of evidence of kidney disease, neither GFR category G1 or G2 fulfill the criteria for CKD.    eGFR calculation 2021 CKD-EPI creatinine equation, which does not include race as a factor    High Sensitivity Troponin T [[id number]]  (Abnormal) Collected: 06/10/25 1928    Specimen: Blood Updated: 06/10/25 2002     HS Troponin T 35 ng/L     Narrative:      High Sensitive Troponin T Reference Range:  <14.0 ng/L- Negative Female for AMI  <22.0 ng/L- Negative Male for AMI  >=14 - Abnormal Female indicating possible myocardial injury.  >=22 - Abnormal Male indicating possible myocardial injury.   Clinicians would have to utilize clinical acumen, EKG, Troponin, and serial changes to determine if it is an Acute Myocardial Infarction or myocardial injury due to an underlying chronic condition.         Magnesium [[id number]]  (Abnormal) Collected: 06/10/25 1928    Specimen: Blood Updated: 06/10/25 2002     Magnesium 2.5 mg/dL     TSH Rfx On Abnormal To Free T4 [[id number]]  (Normal) Collected: 06/10/25 1928    Specimen: Blood Updated: 06/10/25 2002     TSH 0.909 uIU/mL     CBC & Differential [[id number]]  (Abnormal) Collected: 06/10/25 1928    Specimen: Blood Updated: 06/10/25 1937    Narrative:      The following orders were created for panel order CBC & Differential.  Procedure                               Abnormality         Status                     ---------                               -----------         ------                     CBC Auto Differential[[id number]]        Abnormal             Final result                 Please view results for these tests on the individual orders.    CBC Auto Differential [[id number]]  (Abnormal) Collected: 06/10/25 1928    Specimen: Blood Updated: 06/10/25 1937     WBC 14.74 10*3/mm3      RBC 4.50 10*6/mm3      Hemoglobin 14.2 g/dL      Hematocrit 44.1 %      MCV 98.0 fL      MCH 31.6 pg      MCHC 32.2 g/dL      RDW 13.4 %      RDW-SD 48.2 fl      MPV 10.2 fL      Platelets 152 10*3/mm3      Neutrophil % 78.9 %      Lymphocyte % 11.5 %      Monocyte % 9.0 %      Eosinophil % 0.0 %      Basophil % 0.1 %      Immature Grans % 0.5 %      Neutrophils, Absolute 11.65 10*3/mm3      Lymphocytes, Absolute 1.69 10*3/mm3      Monocytes, Absolute 1.32 10*3/mm3      Eosinophils, Absolute 0.00 10*3/mm3      Basophils, Absolute 0.01 10*3/mm3      Immature Grans, Absolute 0.07 10*3/mm3      nRBC 0.0 /100 WBC     Extra Tubes [[id number]] Collected: 06/10/25 1928    Specimen: Blood, Venous Line Updated: 06/10/25 1931    Narrative:      The following orders were created for panel order Extra Tubes.  Procedure                               Abnormality         Status                     ---------                               -----------         ------                     Light Blue Top[[id number]]                                   Final result                 Please view results for these tests on the individual orders.    Light Blue Top [[id number]] Collected: 06/10/25 1928    Specimen: Blood Updated: 06/10/25 1931     Extra Tube Hold for add-ons.     Comment: Auto resulted       Urinalysis, Microscopic Only - Straight Cath [[id number]] Collected: 06/10/25 1904    Specimen: Urine from Straight Cath Updated: 06/10/25 1914     RBC, UA 0-2 /HPF      WBC, UA 0-2 /HPF      Comment: Urine culture not indicated.        Bacteria, UA None Seen /HPF      Squamous Epithelial Cells, UA 0-2 /HPF      Hyaline Casts, UA 0-2 /LPF      Methodology Automated Microscopy    Urinalysis With Culture If Indicated  - Straight Cath [[id number]]  (Abnormal) Collected: 06/10/25 1904    Specimen: Urine from Straight Cath Updated: 06/10/25 1912     Color, UA Yellow     Appearance, UA Clear     pH, UA 5.5     Specific Gravity, UA 1.022     Glucose, UA Negative     Ketones, UA 15 mg/dL (1+)     Bilirubin, UA Negative     Blood, UA Negative     Protein, UA 30 mg/dL (1+)     Leuk Esterase, UA Negative     Nitrite, UA Negative     Urobilinogen, UA 1.0 E.U./dL    Narrative:      In absence of clinical symptoms, the presence of pyuria, bacteria, and/or nitrites on the urinalysis result does not correlate with infection.            XR Chest 1 View  Result Date: 6/12/2025  Impression: Unchanged appearing patchy interstitial opacity in the periphery of the right midlung, which may represent chronic changes, no superimposed acute infection/inflammatory change is possible in the appropriate clinical setting. Recommend radiographic follow-up to resolution, and if persists, chest CT. Emphysema. Electronically Signed: Ashok Hills MD  6/12/2025 7:54 PM EDT  Workstation ID: [id number]    CT Angiogram Carotids  Result Date: 6/12/2025  Impression: 1.Bulky plaque along left carotid bifurcation resulting in focal 80% stenosis along left internal carotid, and slightly more distally is an anterior nonocclusive thrombus. 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 3.Major intracranial arterial vasculature appears widely patent, with no evidence of thrombus. Electronically Signed: Clint Dotson MD  6/12/2025 3:40 PM EDT  Workstation ID: [id number]    CT Angiogram Head  Result Date: 6/12/2025  Impression: 1.Bulky plaque along left carotid bifurcation resulting in focal 80% stenosis along left internal carotid, and slightly more distally is an anterior nonocclusive thrombus. 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 3.Major intracranial arterial vasculature  appears widely patent, with no evidence of thrombus. 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 There is associated mild edema with these infarcts, without significant mass effect. 2.There is evidence of severe stenoses versus near occlusion of the left supraclinoid ICA/carotid terminus. Follow-up with dedicated CTA/MRI of the head and neck is recommended 3.No suspicious intracranial lesion or mass is identified on this study Electronically Signed: Quincy Aguilera DO  6/11/2025 11:01 PM EDT  Workstation ID: [id number]    CT Head Without Contrast  Result Date: 6/10/2025  Impression: HEAD CT: 1.No acute intracranial abnormality. 2.There is a 2.5 cm hypodensity in the posterior left temporal lobe which does not appear acute but is new since 2023. This is likely a subacute or chronic infarct. There is generalized parenchymal volume loss and chronic small vessel ischemic disease. CT C-SPINE: 1.No acute fracture or malalignment. 2.There is multilevel degenerative disc disease and facet arthropathy as described above. 3.There is evidence of previous left carotid surgery. The overall appearance of the cervical spine is unchanged since 2019. CT T-SPINE: 1.No acute fracture or malalignment. 2.There is multilevel degenerative disc disease with progression of degenerative changes at the T9-10 level. 3.There is marked cardiomegaly and a partially visualized pacemaker/AICD. 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 There is emphysema with numerous areas of scarring in the visualized lung fields. CT L-SPINE: 1.No acute fracture or malalignment. 2.There is extensive multilevel degenerative disc disease and facet arthropathy with postsurgical changes as described above. 3.There is a 5.1 cm fusiform infrarenal abdominal  aortic aneurysm which has increased in size since 2021. 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 Electronically Signed: Sanjiv Roberts DO  6/10/2025 9:06 PM EDT  Workstation ID: [id number]    CT Cervical Spine Without Contrast  Result Date: 6/10/2025  Impression: HEAD CT: 1.No acute intracranial abnormality. 2.There is a 2.5 cm hypodensity in the posterior left temporal lobe which does not appear acute but is new since 2023. This is likely a subacute or chronic infarct. There is generalized parenchymal volume loss and chronic small vessel ischemic disease. CT C-SPINE: 1.No acute fracture or malalignment. 2.There is multilevel degenerative disc disease and facet arthropathy as described above. 3.There is evidence of previous left carotid surgery. The overall appearance of the cervical spine is unchanged since 2019. CT T-SPINE: 1.No acute fracture or malalignment. 2.There is multilevel degenerative disc disease with progression of degenerative changes at the T9-10 level. 3.There is marked cardiomegaly and a partially visualized pacemaker/AICD. 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 There is emphysema with numerous areas of scarring in the visualized lung fields. CT L-SPINE: 1.No acute fracture or malalignment. 2.There is extensive multilevel degenerative disc disease and facet arthropathy with postsurgical changes as described above. 3.There is a 5.1 cm fusiform infrarenal abdominal aortic aneurysm which has increased in size since 2021. 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 Electronically Signed: Sanjiv Roberts DO  6/10/2025 9:06 PM EDT  Workstation ID: [id number]    CT Thoracic Spine Without Contrast  Result Date: 6/10/2025  Impression: HEAD CT: 1.No acute intracranial abnormality. 2.There is a 2.5 cm hypodensity in the posterior left temporal lobe which does not appear acute but is new since  2023. This is likely a subacute or chronic infarct. There is generalized parenchymal volume loss and chronic small vessel ischemic disease. CT C-SPINE: 1.No acute fracture or malalignment. 2.There is multilevel degenerative disc disease and facet arthropathy as described above. 3.There is evidence of previous left carotid surgery. The overall appearance of the cervical spine is unchanged since 2019. CT T-SPINE: 1.No acute fracture or malalignment. 2.There is multilevel degenerative disc disease with progression of degenerative changes at the T9-10 level. 3.There is marked cardiomegaly and a partially visualized pacemaker/AICD. 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 There is emphysema with numerous areas of scarring in the visualized lung fields. CT L-SPINE: 1.No acute fracture or malalignment. 2.There is extensive multilevel degenerative disc disease and facet arthropathy with postsurgical changes as described above. 3.There is a 5.1 cm fusiform infrarenal abdominal aortic aneurysm which has increased in size since 2021. 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 Electronically Signed: Sanjiv Roberts DO  6/10/2025 9:06 PM EDT  Workstation ID: [id number]    CT Lumbar Spine Without Contrast  Result Date: 6/10/2025  Impression: HEAD CT: 1.No acute intracranial abnormality. 2.There is a 2.5 cm hypodensity in the posterior left temporal lobe which does not appear acute but is new since 2023. This is likely a subacute or chronic infarct. There is generalized parenchymal volume loss and chronic small vessel ischemic disease. CT C-SPINE: 1.No acute fracture or malalignment. 2.There is multilevel degenerative disc disease and facet arthropathy as described above. 3.There is evidence of previous left carotid surgery. The overall appearance of the cervical spine is unchanged since 2019. CT T-SPINE: 1.No acute fracture or malalignment. 2.There is multilevel  degenerative disc disease with progression of degenerative changes at the T9-10 level. 3.There is marked cardiomegaly and a partially visualized pacemaker/AICD. 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 There is emphysema with numerous areas of scarring in the visualized lung fields. CT L-SPINE: 1.No acute fracture or malalignment. 2.There is extensive multilevel degenerative disc disease and facet arthropathy with postsurgical changes as described above. 3.There is a 5.1 cm fusiform infrarenal abdominal aortic aneurysm which has increased in size since 2021. 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 Electronically Signed: Sanjiv Roberts DO  6/10/2025 9:06 PM EDT  Workstation ID: [id number]    XR Elbow 3+ View Right  Result Date: 6/10/2025  Impression: Superficial soft tissue swelling posteriorly which can be seen with olecranon bursitis or cellulitis or contusion in the appropriate clinical setting. There are mild degenerative changes. No acute bony abnormality. No joint effusion is demonstrated. Electronically Signed: Sanjiv Roberts DO  6/10/2025 8:11 PM EDT  Workstation ID: [id number]    XR Chest 1 View  Result Date: 6/10/2025  Impression: Patchy and interstitial opacity in the periphery of the right mid lung and right basilar density which has evolved since 12/29/2020 and could be related to acute and/or chronic infection or inflammatory change.. This is superimposed on areas of chronic scarring and postsurgical change. 2. Cardiomegaly and AICD are unchanged. Electronically Signed: Sanjiv Roberts DO  6/10/2025 8:09 PM EDT  Workstation ID: [id number]      CONSULTS   Consults       Date and Time Order Name Status Description    6/12/2025  3:44 PM Inpatient Vascular Surgery Consult      6/11/2025  5:16 PM Inpatient Cardiology Consult      6/11/2025  2:15 AM Inpatient Neurology Consult Stroke Completed     6/10/2025  9:15 PM Hospitalist (on-call MD unless  "specified)              CONDITION ON DISCHARGE    Stable    VITAL SIGNS  /75   Pulse 70   Temp 97.5 °F (36.4 °C) (Oral)   Resp 26   Ht 185.4 cm (73\")   Wt 67.4 kg (148 lb 9.4 oz)   SpO2 91%   BMI 19.60 kg/m²     PHYSICAL EXAM  Constitutional:  Chronically ill appearing, NAD.  Eyes:  Eyelids normal.  HENT:  Atraumatic, external ears normal, nose normal, oropharynx moist, no pharyngeal exudates. Neck-normal range of motion, no tenderness, supple, trachea midline  Respiratory:  Coarse breath sounds, diminished bibasilar breath sounds, non-labored respirations without accessory muscle use  Cardiovascular:  Normal rate, normal rhythm, no murmurs, no gallops, no rubs   GI:  Soft, nondistended, normal bowel sounds, non-tender.   :  No costovertebral angle tenderness   Musculoskeletal:  No edema, no tenderness, no deformities  Integument:  Well hydrated, no rash   Lymphatic:  No lymphadenopathy noted   Neurologic:  Alert & oriented x 3, disoriented to situation, slight facial droop but edentulous, some dysarthria but improved from yesterday.   Psychiatric:  Dysarthria and behavior appropriate       DIS[facility]GE DISPOSITION  [facility])    DISCHARGE MEDICATIONS  Inter-facility transfer medications (From admission, onward)               sodium chloride 0.9 % flush 10 mL  (Insert Peripheral IV)  As Needed,   Status:  Canceled             sodium chloride 0.9 % flush 10 mL  (Insert & Maintain IV)  Every 12 Hours Scheduled,   Status:  Canceled             sodium chloride 0.9 % flush 10 mL  (Insert & Maintain IV)  As Needed,   Status:  Canceled             sodium chloride 0.9 % infusion 40 mL  (Insert & Maintain IV)  As Needed,   Status:  Canceled             nitroglycerin (NITROSTAT) SL tablet 0.4 mg  (Telemetry Standing Orders)  Every 5 Minutes PRN,   Status:  Canceled             Potassium Replacement - Follow Nurse / BPA Driven Protocol  (Electrolyte Replacement Protocol Initiation " Orders Pre-Selected (Potassium, Magnesium - Standard, Phosphorus & Calcium))  As Needed,   Status:  Canceled             Magnesium Standard Dose Replacement - Follow Nurse / BPA Driven Protocol  (Electrolyte Replacement Protocol Initiation Orders Pre-Selected (Potassium, Magnesium - Standard, Phosphorus & Calcium))  As Needed,   Status:  Canceled             Phosphorus Replacement - Follow Nurse / BPA Driven Protocol  (Electrolyte Replacement Protocol Initiation Orders Pre-Selected (Potassium, Magnesium - Standard, Phosphorus & Calcium))  As Needed,   Status:  Canceled             Calcium Replacement - Follow Nurse / BPA Driven Protocol  (Electrolyte Replacement Protocol Initiation Orders Pre-Selected (Potassium, Magnesium - Standard, Phosphorus & Calcium))  As Needed,   Status:  Canceled             ipratropium (ATROVENT) nebulizer solution 0.5 mg  4 Times Daily - RT,   Status:  Canceled             budesonide (PULMICORT) nebulizer solution 0.5 mg  2 Times Daily - RT,   Status:  Canceled             pantoprazole (PROTONIX) EC tablet 40 mg  Daily,   Status:  Canceled             atorvastatin (LIPITOR) tablet 10 mg  Daily,   Status:  Canceled             aspirin EC tablet 81 mg  Daily,   Status:  Canceled             ferrous sulfate tablet 325 mg  Daily With Breakfast,   Status:  Canceled             latanoprost (XALATAN) 0.005 % ophthalmic solution 1 drop  Nightly,   Status:  Canceled             vitamin B-12 (CYANOCOBALAMIN) tablet 1,000 mcg  Daily,   Status:  Canceled             rivaroxaban (XARELTO) tablet 15 mg  Every Evening,   Status:  Canceled             sodium chloride 0.9 % flush 10 mL  (Insert & Maintain IV)  Every 12 Hours Scheduled,   Status:  Canceled             sodium chloride 0.9 % flush 10 mL  (Insert & Maintain IV)  As Needed,   Status:  Canceled             sodium chloride 0.9 % infusion 40 mL  (Insert & Maintain IV)  As Needed,   Status:  Canceled             ampicillin-sulbactam (UNASYN) 1.5 g  in sodium chloride 0.9 % 100 mL MBP  Every 6 Hours,   Status:  Canceled             heparin 99010 units/250 mL (100 units/mL) in 0.45 % NaCl infusion  (High Dose Protocol for VTE (DVT/PE))  Titrated,   Status:  Canceled             norepinephrine (LEVOPHED) 8 mg in 250 mL NS infusion (premix)  Titrated,   Status:  Canceled             lactated ringers infusion  Continuous,   Status:  Canceled             mupirocin (BACTROBAN) 2 % nasal ointment 1 Application  (MRSA Decolonization)  2 Times Daily,   Status:  Canceled             midodrine (PROAMATINE) tablet 10 mg  Every 8 Hours Scheduled,   Status:  Canceled             bumetanide (BUMEX) injection 2 mg  Once,   Status:  Canceled             neomycin-bacitracin-polymyxin (NEOSPORIN) ophthalmic ointment  Every 4 Hours Scheduled,   Status:  Canceled             aspirin EC tablet 81 mg  Daily             atorvastatin (LIPITOR) tablet 10 mg  Daily             budesonide (PULMICORT) nebulizer solution 0.5 mg  2 Times Daily - RT             ferrous sulfate tablet 325 mg  Daily With Breakfast             ipratropium (ATROVENT) nebulizer solution 0.5 mg  4 Times Daily - RT             latanoprost (XALATAN) 0.005 % ophthalmic solution 1 drop  Nightly             midodrine (PROAMATINE) tablet 10 mg  Every 8 Hours Scheduled             mupirocin (BACTROBAN) 2 % nasal ointment 1 Application  (MRSA Decolonization)  2 Times Daily             neomycin-bacitracin-polymyxin (NEOSPORIN) ophthalmic ointment  Every 4 Hours Scheduled             pantoprazole (PROTONIX) EC tablet 40 mg  Daily             sodium chloride 0.9 % flush 10 mL  (Insert & Maintain IV)  Every 12 Hours Scheduled             vitamin B-12 (CYANOCOBALAMIN) tablet 1,000 mcg  Daily             heparin 87233 units/250 mL (100 units/mL) in 0.45 % NaCl infusion  (High Dose Protocol for VTE (DVT/PE))  Titrated             lactated ringers infusion  Continuous             norepinephrine (LEVOPHED) 8 mg in 250 mL NS  infusion (premix)  Titrated             sodium chloride 0.9 % flush 10 mL  (Insert Peripheral IV)  As Needed             sodium chloride 0.9 % flush 10 mL  (Insert & Maintain IV)  As Needed             sodium chloride 0.9 % infusion 40 mL  (Insert & Maintain IV)  As Needed             nitroglycerin (NITROSTAT) SL tablet 0.4 mg  (Telemetry Standing Orders)  Every 5 Minutes PRN             Potassium Replacement - Follow Nurse / BPA Driven Protocol  (Electrolyte Replacement Protocol Initiation Orders Pre-Selected (Potassium, Magnesium - Standard, Phosphorus & Calcium))  As Needed             Magnesium Standard Dose Replacement - Follow Nurse / BPA Driven Protocol  (Electrolyte Replacement Protocol Initiation Orders Pre-Selected (Potassium, Magnesium - Standard, Phosphorus & Calcium))  As Needed             Phosphorus Replacement - Follow Nurse / BPA Driven Protocol  (Electrolyte Replacement Protocol Initiation Orders Pre-Selected (Potassium, Magnesium - Standard, Phosphorus & Calcium))  As Needed             Calcium Replacement - Follow Nurse / BPA Driven Protocol  (Electrolyte Replacement Protocol Initiation Orders Pre-Selected (Potassium, Magnesium - Standard, Phosphorus & Calcium))  As Needed             sodium chloride 0.9 % flush 10 mL  (Insert & Maintain IV)  As Needed             sodium chloride 0.9 % infusion 40 mL  (Insert & Maintain IV)  As Needed                            DISCHARGE DIET     Diet Order   Procedures    Diet: Regular/House; No Straw; Texture: Pureed (NDD 1); Fluid Consistency: Nectar Thick         ACTIVITY AT DISCHARGE   Per PT recommendations.      THERAPY RECOMMENDATIONS AT DISCHARGE  PT:   Pt presents with functional mobility impairments which indicate the need for skilled intervention. Tolerating session today without incident. Pt on room air with IV, telemetry and male external catheter.  Pt with decreased safety awareness with all mobility tasks this date.  Min A for bed mobility, Min A  "for sitting balance secondary to posterior lean. Focused on anterior lean and finding/maintaining midline.  Sit to stand transfer with RW: Mod  A with posterior lean.  Mod/Max A for stand pivot transfer with RW to b/s chair.  Will continue to follow and progress as tolerated.    OT:   Goal Outcome Evaluation:   Assessment: [place] presents with ADL impairments affecting function including balance, cognition, coordination, endurance / activity tolerance, grasp, postural / trunk control, range of motion (ROM), and strength. Demonstrated functioning below baseline abilities indicate the need for continued skilled intervention while inpatient. Tolerating session today without incident. Will continue to follow and progress as tolerated.      Plan/Recommendations:   Moderate Intensity Therapy recommended post-acute care. This is recommended as therapy feels the patient would require 3-4 days per week and wouldn't tolerate \"3 hour daily\" rehab intensity. SNF would be the preferred choice. If the patient does not agree to SNF, arrange HH or OP depending on home bound status. If patient is medically complex, consider LTACH.. Pt requires no DME at discharge.      Pt desires Skilled Rehab placement at discharge. Pt cooperative; agreeable to therapeutic recommendations and plan of care.      Modified Bronson: 4 = Moderately severe disability (Unable to attend to own bodily needs without assistance, and unable to walk unassisted)    SLP:     Goal Outcome Evaluation:     VFSS completed. Pt viewed seated at 90 degrees in the lateral position. Pt was fed all trials by clinician retrospectively. Pt observed upright in the lateral projection and was given trials of NTL by cup x2, puree x2, mech soft/mixed x2, thin by cup sequential sips x2 to complete the study.        Complete labial seal resulting in no anterior spillage on any trial or consistency.  Reduced bolus formation and cohesion on mech soft. Impaired and slow A-P " transit. Premature spillage over the BOT at initiation of pharyngeal swallow and to the pyriform sinus on thin. Extended mastication and reduced mastication strength on mech soft.   Delayed swallow initiation. Reduced laryngeal elevation and forward hyoid movement.  Trace lingual and vallecular residue on all trials/consistencies.  Epiglottic inversion and laryngeal vestibular closure adequate on NT, puree, and mech soft trials;  Material does not enter airway (a 1 on the Rosenbeck Penetration-Aspiration Scale). Incomplete epiglottic inversion on thin resulting in reduced vestibular closure, resulting in mildly deep laryngeal penetration during the swallow that does not clear after the swallow; Material enters the airway, remains above the vocal folds, and is not ejected from the airway (a 3 on the Rosenbeck Penetration-Aspiration Scale). Esophageal scan was unremarkable.         SLP Plan & Recommendations:     - Puree and NTL, no straw, w/ Ruggiero Water Protocol (FWP - see guidelines below)  - Meds: whole or crushed in puree or NTL  - Safe swallow strategies: upright during all PO/meds, slow rate of intake, small bites/sips, alternate liquids and solids  - ST will continue to follow to ensure tolerance and safety of rec'd diet, target dysphagia treatment, and provide further recs as indicated     Water Protocol  The rationale to recommend water when a PO diet cannot appropriately/functionally sustain nutrition (or if thickened liquids are prescribed due to aspiration of thin liquids) is because water is low risk for aspiration pna when compared to aspiration of food or other liquids.    Benefits of a water protocol include but are not limited to:     Oral gratification  Engagement of oropharyngeal swallow musculature  Decrease likelihood of dehydration       Guidelines for proper implementation include:  Waiting a minimum of 30 minutes after consuming any other PO  Thorough oral care prior to consuming  water  Upright at 90 degree hip flexion  Small sips at slow rate  Monitor for any changes in respiratory status and discontinue if distress noted     The Ruggiero Free Water Protocol is a research based protocol established in 1984.        SLP Swallowing Diagnosis: moderate, oral dysphagia, mild, pharyngeal dysphagia (06/11/25 1200)             FOLLOW-UP APPOINTMENTS  Future Appointments   Date Time Provider Department Center   11/26/2025  3:45 PM Wade Cronin MD [facility]           TEST RESULTS PENDING AT DISCHARGE  Pending Results       Procedure [Order ID] Specimen - Date/Time    aPTT [[id number]]     Specimen: Blood     aPTT [[id number]]     Specimen: Blood     aPTT [[id number]]     Specimen: Blood     aPTT [[id number]]     Specimen: Blood               This note completed by this APRN on behalf of the critical care team, at the request of Dr. Clark, attending physician who agrees with transfer at this time.    Time: Discharge 37 min    TERRANCE Mendez  6/13/2025

## 2025-06-13 NOTE — CASE MANAGEMENT/SOCIAL WORK
"Physicians Statement of Medical Necessity for  Ambulance Transportation    GENERAL INFORMATION     Name: Shukri Park  YOB: 1940  Medicare #: [id number]   Transport Date: 6/13/25 (Valid for round trips this date, or for scheduled repetitive trips for 60 days from the date signed below.)  Origin: [facility]  Destination: [address]  Is the Patient's stay covered under Medicare Part A (PPS/DRG?)No   Closest appropriate facility? Yes  If this a hosp-hosp transfer? Yes, describe services needed at 2nd facility not available at 1st facility [facility]  Is this a hospice patient? No    MEDICAL NECESSITY QUESTIONAIRE    Ambulance Transportation is medically necessary only if other means of transportation are contraindicated or would be potentially harmful to the patient.  To meet this requirement, the patient must be either \"bed confined\" or suffer from a condition such that transport by means other than an ambulance is contraindicated by the patient's condition.  The following questions must be answered by the healthcare professional signing below for this form to be valid:     1) Describe the MEDICAL CONDITION (physical and/or mental) of this patient AT THE TIME OF AMBULANCE TRANSPORT that requires the patient to be transported in an ambulance, and why transport by other means is contraindicated by the patient's condition: Bedbound - brain bleed  Past Medical History:   Diagnosis Date    A-fib     Aortic aneurysm     Appetite loss     Cancer     right lobe cancer - surgically removed     CHF (congestive heart failure)     Chronic anticoagulation 04/17/2013    Chronic hypotension 07/14/2020    Chronic pain 07/02/2020    Chronic systolic congestive heart failure 07/01/2020    Added automatically from request for surgery [id number]      Coronary artery disease     Coronary artery disease involving native coronary artery of native heart without angina pectoris 07/09/2020    Added " automatically from request for surgery [id number]      Dark stools     Foot pain, bilateral     GERD (gastroesophageal reflux disease)     History of lung cancer 07/14/2020    Hyperlipidemia     Inguinodynia, right 12/23/2020    Added automatically from request for surgery [id number]      Ischemic cardiomyopathy 07/01/2020    Added automatically from request for surgery [id number]      Low back pain     Mitral regurgitation 07/12/2020    Mixed hyperlipidemia 04/17/2013    Persistent atrial fibrillation 07/01/2020    Added automatically from request for surgery [id number]      Presence of biventricular implantable cardioverter-defibrillator (ICD) 08/14/2020    Presence of stent in right coronary artery 04/17/2013    S/P epidural steroid injection     Israel Gomes's - no relief    Vitamin D deficiency 04/05/2016    Weight loss     acute loss of weight 30 lbs      Past Surgical History:   Procedure Laterality Date    CARDIAC CATHETERIZATION N/A 7/9/2020    Procedure: LEFT HEART CATH with possible PCI;  Surgeon: Wade Cronin MD;  Location: [facility] CATH INVASIVE LOCATION;  Service: Cardiovascular;  Laterality: N/A;  Local and IV sedation    CARDIAC CATHETERIZATION N/A 7/9/2020    Procedure: Percutaneous Coronary Intervention;  Surgeon: Wade Cronin MD;  Location: [facility] CATH INVASIVE LOCATION;  Service: Cardiovascular;  Laterality: N/A;    CARDIAC DEFIBRILLATOR PLACEMENT      CARDIAC ELECTROPHYSIOLOGY PROCEDURE N/A 7/10/2020    Procedure: Biventricular Device Insertion;  Surgeon: Jonathan Denney MD;  Location: [facility] CATH INVASIVE LOCATION;  Service: Cardiovascular;  Laterality: N/A;    CARDIAC ELECTROPHYSIOLOGY PROCEDURE N/A 7/10/2020    Procedure: ABLATION AV NODE;  Surgeon: Jonathan Denney MD;  Location: [facility] CATH INVASIVE LOCATION;  Service: Cardiovascular;  Laterality: N/A;    CARDIAC ELECTROPHYSIOLOGY PROCEDURE N/A 7/10/2020    Procedure: AV node ablation;  Surgeon: Jonathan Denney MD;  Location: [facility] CATH  "INVASIVE LOCATION;  Service: Cardiovascular;  Laterality: N/A;    CARDIOVASCULAR STRESS TEST  2020    CATARACT EXTRACTION, BILATERAL      CONVERTED (HISTORICAL) HOLTER  2020    CORONARY ANGIOPLASTY WITH STENT PLACEMENT      ENDOSCOPY N/A 7/5/2020    Procedure: ESOPHAGOGASTRODUODENOSCOPY;  Surgeon: Neal Correa MD;  Location: [facility] ENDOSCOPY;  Service: Gastroenterology;  Laterality: N/A;    INGUINAL HERNIA REPAIR Right 1/7/2021    Procedure: INGUINAL HERNIA REPAIR OPEN WITH MESH;  Surgeon: Cody Randall MD;  Location: [facility] MAIN OR;  Service: General;  Laterality: Right;    LUMBAR DECOMPRESSION  09/2015    L2-L3    LUMBAR DECOMPRESSION  2006    Dr. Logan    OTHER SURGICAL HISTORY  05/2015    left SI fusion with bone graft - Dr. Presley    OTHER SURGICAL HISTORY      carotid artery repair     OTHER SURGICAL HISTORY Left 02/19/2016    Left SI fusion 2/19/2016 Dr. Zendejas    POSTERIOR SPINAL FUSION  10/24/2016    PSF T12-3/TLIF L3-L4 poss L2-L3 --- Dr. 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  3) Can this patient safely be transported by car or wheelchair van (I.e., may safely sit during transport, without an attendant or monitoring?)No   4. In addition to completing questions 1-3 above, please check any of the following conditions that apply*:          *Note: supporting documentation for any boxes checked must be maintained in the patient's medical records Patient is confused, IV meds/fluids required, Medical attendant required, Requires oxygen - unable to self administer, Hemodynamic monitoring required en route, and Cardiac monitoring required en route Levophed drip, 15L NC, Heparin gtt      SIGNATURE OF " PHYSICIAN OR OTHER AUTHORIZED HEALTHCARE PROFESSIONAL    I certify that the above information is true and correct based on my evaluation of this patient, and represent that the patient requires transport by ambulance and that other forms of transport are contraindicated.  I understand that this information will be used by the Centers for Medicare and Medicaid Services (CMS) to support the determiniation of medical necessity for ambulance services, and I represent that I have personal knowledge of the patient's condition at the time of transport.    x   If this box is checked, I also certify that the patient is physically or mentally incapable of signing the ambulance service's claim form and that the institution with which I am affiliated has furnished care, services or assistance to the patient.  My signature below is made on behalf of the patient pursuant to 42 .36(b)(4). In accordance with 42 .37, the specific reason(s) that the patient is physically or mentally incapable of signing the claim for is as follows: confused    Signature of Physician or Healthcare Professional     CYDNEY Salinas RN Date/Time:   6/13/25     (For Scheduled repetitive transport, this form is not valid for transports performed more than 60 days after this date).                                                                                                                                            --------------------------------------------------------------------------------------------  Printed Name and Credentials of Physician or Authorized Healthcare Professional     *Form must be signed by patient's attending physician for scheduled, repetitive transports,.  For non-repetitive ambulance transports, if unable to obtain the signature of the attending physician, any of the following may sign (please select below):     Physician  Clinical Nurse Specialist x Registered Nurse     Physician Assistant x Discharge Planner   Licensed Practical Nurse     Nurse Practitioner x

## 2025-06-13 NOTE — PLAN OF CARE
Goal Outcome Evaluation:         Transferring to [facility].

## 2025-06-13 NOTE — CONSULTS
Name: Shukri Park ADMIT: 6/10/2025   : 1940  PCP: Homa Carrizales MD    MRN: [id number] LOS: 3 days   AGE/SEX: 84 y.o. male  ROOM: [address] [place] [address]      Patient Care Team:  Homa Carrizales MD as PCP - General (Family Medicine)  Chief Complaint   Patient presents with    Altered Mental Status       CC: Carotid stenosis    Subjective     Inpatient Vascular Surgery Consult  Consult performed by: Sobeida Esposito APRN  Consult ordered by: Ramo Arroyo MD          History of Present Illness  Shukri Park is a 84 y.o. male with a past medical history of hyperlipidemia, atrial fibrillation, chronic anticoagulation, CAD, CHF, lung cancer, chronic hypotension, AICD placement, carotid stenosis, AAA, and tobacco abuse who presented to [facility] on 6/10/2025 for acute encephalopathy.  Patient was reported to have left eye vision loss few days prior to admission along with increased agitation and confusion.  He was found on the floor at home by his neighbor prompting an EMS call.  On arrival to the emergency room a CT of the head revealed a subacute to chronic infarct in the left posterior temporal lobe.  CT scan of his lumbar spine showed no acute fractures.  He was noted to have elevated CK levels.  He was also noted to be hypotensive and was started on vasopressor support.  He was admitted for further workup and care.  Neurology was consulted.  MRI of the brain was obtained which showed multiple scattered acute infarcts within the left cerebral hemisphere with associated mild edema.  CTA of the head and neck showed bulky plaque along the left carotid bifurcation resulting in focal 80% stenosis along the left ICA, slightly more distally there is an anterior nonocclusive thrombus.  Vascular surgery was consulted to evaluate.  He was followed by Dr. Doan and underwent a left carotid endarterectomy in .  He is also followed for AAA and right common iliac artery aneurysm.  He has been  lost to follow-up for about 2 years.  The patient is alert, he does have some dysarthria and word finding issues.  He tells me his vision is at baseline right now, he does report having a history of glaucoma.  He is on Xarelto for his history of atrial fibrillation.  He has been transition to a heparin drip.    Review of Systems   Neurological:  Positive for speech difficulty. Negative for facial asymmetry and weakness.       Past Medical History:   Diagnosis Date    A-fib     Aortic aneurysm     Appetite loss     Cancer     right lobe cancer - surgically removed     CHF (congestive heart failure)     Chronic anticoagulation 04/17/2013    Chronic hypotension 07/14/2020    Chronic pain 07/02/2020    Chronic systolic congestive heart failure 07/01/2020    Added automatically from request for surgery [id number]      Coronary artery disease     Coronary artery disease involving native coronary artery of native heart without angina pectoris 07/09/2020    Added automatically from request for surgery [id number]      Dark stools     Foot pain, bilateral     GERD (gastroesophageal reflux disease)     History of lung cancer 07/14/2020    Hyperlipidemia     Inguinodynia, right 12/23/2020    Added automatically from request for surgery [id number]      Ischemic cardiomyopathy 07/01/2020    Added automatically from request for surgery [id number]      Low back pain     Mitral regurgitation 07/12/2020    Mixed hyperlipidemia 04/17/2013    Persistent atrial fibrillation 07/01/2020    Added automatically from request for surgery [id number]      Presence of biventricular implantable cardioverter-defibrillator (ICD) 08/14/2020    Presence of stent in right coronary artery 04/17/2013    S/P epidural steroid injection     Israel Gomes's - no relief    Vitamin D deficiency 04/05/2016    Weight loss     acute loss of weight 30 lbs     Past Surgical History:   Procedure Laterality Date    CARDIAC CATHETERIZATION N/A 7/9/2020    Procedure: LEFT HEART  CATH with possible PCI;  Surgeon: Wade Cronin MD;  Location: [facility] CATH INVASIVE LOCATION;  Service: Cardiovascular;  Laterality: N/A;  Local and IV sedation    CARDIAC CATHETERIZATION N/A 7/9/2020    Procedure: Percutaneous Coronary Intervention;  Surgeon: Wade Cronin MD;  Location: [facility] CATH INVASIVE LOCATION;  Service: Cardiovascular;  Laterality: N/A;    CARDIAC DEFIBRILLATOR PLACEMENT      CARDIAC ELECTROPHYSIOLOGY PROCEDURE N/A 7/10/2020    Procedure: Biventricular Device Insertion;  Surgeon: Jonathan Denney MD;  Location: [facility] CATH INVASIVE LOCATION;  Service: Cardiovascular;  Laterality: N/A;    CARDIAC ELECTROPHYSIOLOGY PROCEDURE N/A 7/10/2020    Procedure: ABLATION AV NODE;  Surgeon: Jonathan Denney MD;  Location: [facility] CATH INVASIVE LOCATION;  Service: Cardiovascular;  Laterality: N/A;    CARDIAC ELECTROPHYSIOLOGY PROCEDURE N/A 7/10/2020    Procedure: AV node ablation;  Surgeon: Jonathan Denney MD;  Location: [facility] CATH INVASIVE LOCATION;  Service: Cardiovascular;  Laterality: N/A;    CARDIOVASCULAR STRESS TEST  2020    CATARACT EXTRACTION, BILATERAL      CONVERTED (HISTORICAL) HOLTER  2020    CORONARY ANGIOPLASTY WITH STENT PLACEMENT      ENDOSCOPY N/A 7/5/2020    Procedure: ESOPHAGOGASTRODUODENOSCOPY;  Surgeon: Neal Correa MD;  Location: [facility] ENDOSCOPY;  Service: Gastroenterology;  Laterality: N/A;    INGUINAL HERNIA REPAIR Right 1/7/2021    Procedure: INGUINAL HERNIA REPAIR OPEN WITH MESH;  Surgeon: Cody Randall MD;  Location: [facility] MAIN OR;  Service: General;  Laterality: Right;    LUMBAR DECOMPRESSION  09/2015    L2-L3    LUMBAR DECOMPRESSION  2006    Dr. Logan    OTHER SURGICAL HISTORY  05/2015    left SI fusion with bone graft - Dr. Persley    OTHER SURGICAL HISTORY      carotid artery repair     OTHER SURGICAL HISTORY Left 02/19/2016    Left SI fusion 2/19/2016 Dr. Zendejas    POSTERIOR SPINAL FUSION  10/24/2016    PSF T12-3/TLIF L3-L4 poss L2-L3 --- Dr. Majd     TUMOR REMOVAL      carcinoid tumor removed off right lobe tumor     Family History   Problem Relation Age of Onset    Cancer Other     Hyperlipidemia Other     Heart disease Other        Social History     Tobacco Use    Smoking status: Former     Current packs/day: 0.00     Types: Cigarettes     Quit date: 1989     Years since quittin.5     Passive exposure: Past    Smokeless tobacco: Never   Vaping Use    Vaping status: Never Used   Substance Use Topics    Alcohol use: No    Drug use: Never     Medications Prior to Admission   Medication Sig Dispense Refill Last Dose/Taking    ipratropium (ATROVENT) 0.03 % nasal spray Administer 2 sprays into the nostril(s) as directed by provider 3 (Three) Times a Day.   Taking    latanoprost (XALATAN) 0.005 % ophthalmic solution Administer 1 drop to both eyes Every Night.   Taking    pantoprazole (PROTONIX) 40 MG EC tablet Take 1 tablet by mouth Daily. 30 tablet 0 Taking    simvastatin (ZOCOR) 40 MG tablet Take 1 tablet by mouth Every Night.   Taking    Xarelto 15 MG tablet TAKE 1 TABLET BY MOUTH EVERY DAY 60 tablet 3 Taking    aspirin (aspirin) 81 MG EC tablet Take 1 tablet by mouth Daily. (Patient taking differently: Take 1 tablet by mouth Daily. LD 21) 90 tablet 3     docusate sodium (COLACE) 100 MG capsule Take 1 capsule by mouth 2 (Two) Times a Day.       ferrous sulfate 325 (65 FE) MG tablet Take 1 tablet by mouth Daily With Breakfast. Hold DOs       midodrine (PROAMATINE) 5 MG tablet Take 1 tablet by mouth 2 (Two) Times a Day.       Multiple Vitamin (MULTIVITAMIN) tablet Take 1 tablet by mouth Daily. LD        vitamin B-12 (CYANOCOBALAMIN) 1000 MCG tablet Take 1 tablet by mouth Daily.        ampicillin-sulbactam, 1.5 g, Intravenous, Q6H  aspirin, 81 mg, Oral, Daily  atorvastatin, 10 mg, Oral, Daily  budesonide, 0.5 mg, Nebulization, BID - RT  ferrous sulfate, 325 mg, Oral, Daily With Breakfast  ipratropium, 0.5 mg, Nebulization, 4x Daily -  RT  latanoprost, 1 drop, Both Eyes, Nightly  midodrine, 10 mg, Oral, Q8H  midodrine, 10 mg, Oral, Once  mupirocin, 1 Application, Each Nare, BID  pantoprazole, 40 mg, Oral, Daily  [Held by provider] rivaroxaban, 15 mg, Oral, Q PM  sodium chloride, 10 mL, Intravenous, Q12H  sodium chloride, 10 mL, Intravenous, Q12H  vitamin B-12, 1,000 mcg, Oral, Daily      heparin, 18 Units/kg/hr, Last Rate: 20 Units/kg/hr (06/13/25 0128)  lactated ringers, 100 mL/hr, Last Rate: 100 mL/hr (06/13/25 0624)  norepinephrine, 0.02-0.5 mcg/kg/min, Last Rate: 0.14 mcg/kg/min (06/13/25 0813)        Calcium Replacement - Follow Nurse / BPA Driven Protocol    Magnesium Standard Dose Replacement - Follow Nurse / BPA Driven Protocol    nitroglycerin    Phosphorus Replacement - Follow Nurse / BPA Driven Protocol    Potassium Replacement - Follow Nurse / BPA Driven Protocol    [COMPLETED] Insert Peripheral IV **AND** sodium chloride    sodium chloride    sodium chloride    sodium chloride    sodium chloride  Ciprofloxacin, Amlodipine, and Rocephin [ceftriaxone]    Objective     Physical Exam:   NAD, alert and oriented, dysarthria, expressive aphasia  RRR  Lungs clear  Abd soft, benign  Vascular: Palpable bilateral radial and pedal pulses  Skin: Warm to touch and dry    Vital Signs and Labs:  Vital Signs Patient Vitals for the past 24 hrs:   BP Temp Temp src Pulse Resp SpO2 Height Weight   06/13/25 0813 131/98 -- -- -- -- -- -- --   06/13/25 0744 -- 97.5 °F (36.4 °C) Oral -- -- -- -- --   06/13/25 0700 124/87 -- -- 70 (!) 29 97 % -- --   06/13/25 0600 135/90 -- -- 70 -- 98 % -- --   06/13/25 0500 138/89 -- -- 70 -- (!) 88 % -- --   06/13/25 0400 138/89 97.3 °F (36.3 °C) Oral 70 28 94 % -- --   06/13/25 0300 140/92 -- -- 70 -- 94 % -- --   06/13/25 0200 135/91 -- -- 70 -- 96 % -- --   06/13/25 0100 137/90 -- -- 70 -- 96 % -- --   06/13/25 0000 140/86 97 °F (36.1 °C) Oral 70 20 98 % -- --   06/12/25 2300 141/91 -- -- 72 -- 94 % -- --   06/12/25 2200  "-- -- -- 70 -- 96 % -- --   06/12/25 2100 140/71 -- -- 76 -- 97 % -- --   06/12/25 2030 136/85 -- -- 74 -- 96 % -- --   06/12/25 2015 129/88 -- -- 70 -- 97 % -- --   06/12/25 2000 138/92 98.5 °F (36.9 °C) Oral 70 24 100 % -- --   06/12/25 1945 126/84 -- -- 70 -- 100 % -- --   06/12/25 1930 122/84 -- -- 70 -- 100 % -- --   06/12/25 1915 106/68 -- -- 88 -- 98 % -- --   06/12/25 1900 98/68 -- -- 70 -- 100 % -- --   06/12/25 1848 94/65 -- -- 70 -- 100 % -- --   06/12/25 1845 94/65 -- -- 70 -- 99 % -- --   06/12/25 1842 -- -- -- 70 -- 99 % -- --   06/12/25 1839 -- -- -- 70 -- 98 % -- --   06/12/25 1836 -- -- -- 70 -- 93 % -- --   06/12/25 1833 -- -- -- 70 -- 99 % -- --   06/12/25 1831 (!) 86/56 99 °F (37.2 °C) Rectal 70 22 96 % 185.4 cm (73\") 67.4 kg (148 lb 9.4 oz)   06/12/25 1830 -- -- -- 70 -- 98 % -- --   06/12/25 1828 -- -- -- 70 -- 96 % -- --   06/12/25 1811 (!) 86/56 -- -- 70 -- 100 % -- --   06/12/25 1809 -- -- -- 70 -- 99 % -- --   06/12/25 1806 (!) 85/58 -- -- 71 -- 100 % -- --   06/12/25 1803 -- -- -- 70 -- 100 % -- --   06/12/25 1800 (!) 83/59 -- -- 70 -- 100 % -- --   06/12/25 1757 -- -- -- 70 -- 99 % -- --   06/12/25 1756 (!) 86/54 -- -- -- -- -- -- --   06/12/25 1755 (!) 83/57 -- -- -- -- -- -- --   06/12/25 1754 -- -- -- 70 -- 98 % -- --   06/12/25 1751 (!) 82/55 -- -- 70 -- 98 % -- --   06/12/25 1748 -- -- -- 70 -- 98 % -- --   06/12/25 1745 (!) 78/53 -- -- 70 -- 98 % -- --   06/12/25 1742 (!) 85/53 -- -- 70 -- 97 % -- --   06/12/25 1739 -- -- -- 70 -- 98 % -- --   06/12/25 1736 (!) 76/49 -- -- 70 -- 99 % -- --   06/12/25 1730 (!) 78/48 -- -- 70 -- 96 % -- --   06/12/25 1715 (!) 75/52 -- -- 70 -- 99 % -- --   06/12/25 1653 (!) 85/49 -- -- 70 -- 99 % -- --   06/12/25 1652 (!) 84/51 97.4 °F (36.3 °C) Oral 70 23 100 % -- --   06/12/25 1336 104/69 -- -- 70 -- 100 % -- --   06/12/25 1202 94/60 98 °F (36.7 °C) Axillary 70 -- 98 % -- --   06/12/25 1201 94/60 97.4 °F (36.3 °C) Oral 70 23 99 % -- --   06/12/25 " 1048 -- -- -- 70 22 100 % -- --   06/12/25 1043 -- -- -- 70 22 97 % -- --     BMI:  Body mass index is 19.6 kg/m².    CBC    Results from last 7 days   Lab Units 06/12/25  1952 06/11/25  0236 06/10/25  1928   WBC 10*3/mm3 12.10* 12.16* 14.74*   HEMOGLOBIN g/dL 13.1 13.4 14.2   PLATELETS 10*3/mm3 133* 131* 152     BMP   Results from last 7 days   Lab Units 06/13/25  0048 06/12/25  1952 06/11/25  0236 06/10/25  1928   SODIUM mmol/L  --  144 144 144   POTASSIUM mmol/L 4.0 3.4* 4.1 4.4   CHLORIDE mmol/L  --  110* 107 102   CO2 mmol/L  --  24.2 20.3* 23.1   BUN mg/dL  --  22.0 37.0* 33.0*   CREATININE mg/dL  --  0.77 1.07 1.15   GLUCOSE mg/dL  --  125* 103* 93   MAGNESIUM mg/dL  --   --  2.5* 2.5*   PHOSPHORUS mg/dL  --   --  4.2  --      Coag   Results from last 7 days   Lab Units 06/13/25 0048 06/12/25  1748   INR   --  1.64*   APTT seconds 67.8* 37.1*  37.2*     HbA1C   Lab Results   Component Value Date    HGBA1C 5.76 (H) 06/11/2025     Infection   Results from last 7 days   Lab Units 06/11/25  2357 06/10/25  2033 06/10/25  1928   BLOODCX   --  No growth at 2 days  No growth at 2 days  --    PROCALCITONIN ng/mL 0.08  --  0.07     Radiology(recent) XR Chest 1 View  Result Date: 6/12/2025  Impression: Unchanged appearing patchy interstitial opacity in the periphery of the right midlung, which may represent chronic changes, no superimposed acute infection/inflammatory change is possible in the appropriate clinical setting. Recommend radiographic follow-up to resolution, and if persists, chest CT. Emphysema. Electronically Signed: Ashok Hills MD  6/12/2025 7:54 PM EDT  Workstation ID: [id number]    CT Angiogram Carotids  Result Date: 6/12/2025  Impression: 1.Bulky plaque along left carotid bifurcation resulting in focal 80% stenosis along left internal carotid, and slightly more distally is an anterior nonocclusive thrombus. 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  3.Major intracranial arterial vasculature appears widely patent, with no evidence of thrombus. Electronically Signed: Clint Dotson MD  6/12/2025 3:40 PM EDT  Workstation ID: [id number]    CT Angiogram Head  Result Date: 6/12/2025  Impression: 1.Bulky plaque along left carotid bifurcation resulting in focal 80% stenosis along left internal carotid, and slightly more distally is an anterior nonocclusive thrombus. 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 3.Major intracranial arterial vasculature appears widely patent, with no evidence of thrombus. Electronically Signed: Clint Dotson MD  6/12/2025 3:40 PM EDT  Workstation ID: [id number]    MRI Brain With & Without Contrast  Result Date: 6/11/2025  Impression: 1.Multiple scattered acute infarcts noted within the left cerebral hemisphere. There is associated mild edema with these infarcts, without significant mass effect. 2.There is evidence of severe stenoses versus near occlusion of the left supraclinoid ICA/carotid terminus. Follow-up with dedicated CTA/MRI of the head and neck is recommended 3.No suspicious intracranial lesion or mass is identified on this study Electronically Signed: Quincy Aguilera DO  6/11/2025 11:01 PM EDT  Workstation ID: [id number]     VTE Prophylaxis:  Pharmacologic & mechanical VTE prophylaxis orders are present.        Active Hospital Problems    Diagnosis  POA    **Non-occlusive thrombus [I82.90]  Yes    Rhabdomyolysis [M62.82]  Yes    More than 50 percent stenosis of left internal carotid artery [I65.22]  Yes    GERD (gastroesophageal reflux disease) [K21.9]  Yes    Aortic aneurysm [I71.9]  Yes    Acute encephalopathy [G93.40]  Yes    Presence of biventricular implantable cardioverter-defibrillator (ICD) [Z95.810]  Yes    History of lung cancer [Z85.118]  Not Applicable    Chronic hypotension [I95.89]  Yes    Coronary artery disease involving native coronary artery of native heart  without angina pectoris [I25.10]  Yes    Chronic pain [G89.29]  Yes    Ischemic cardiomyopathy [I25.5]  Yes    Persistent atrial fibrillation [I48.19]  Yes    Chronic systolic congestive heart failure [I50.22]  Yes    Vitamin D deficiency [E55.9]  Yes    Chronic anticoagulation [Z79.01]  Not Applicable    Mixed hyperlipidemia [E78.2]  Yes      Resolved Hospital Problems    Diagnosis Date Resolved POA    Presence of stent in right coronary artery [Z95.5] 06/13/2025 Not Applicable       Assessment & Plan   Assessment / Plan     Non-occlusive thrombus    Chronic anticoagulation    Mixed hyperlipidemia    Chronic pain    Ischemic cardiomyopathy    Persistent atrial fibrillation    Chronic systolic congestive heart failure    Coronary artery disease involving native coronary artery of native heart without angina pectoris    Vitamin D deficiency    History of lung cancer    Chronic hypotension    Presence of biventricular implantable cardioverter-defibrillator (ICD)    Acute encephalopathy    GERD (gastroesophageal reflux disease)    Aortic aneurysm    Rhabdomyolysis    More than 50 percent stenosis of left internal carotid artery      84 y.o. male with history of CAD, AAA, atrial fibrillation, carotid stenosis who presents with acute encephalopathy  MRI brain shows scattered infarcts in the left cerebral hemisphere  CTA head neck shows 80% stenosis of the left proximal ICA along with a nonocclusive thrombus more distally  Dr. Alvarez discussed the case with neurointerventional radiology at [facility], no intervention planned after their review of the case  He is okay to remain at [facility] from our standpoint and we will plan workup for revascularization of his left carotid artery, he does have history of previous left CEA over 20 years ago  Recommend continuing aspirin and statin  Continue heparin drip for now    Thank you for this consultation.        TERRANCE Trujillo  [facility]  06/13/25   O: [phone]  F: [phone]

## 2025-06-13 NOTE — ACP (ADVANCE CARE PLANNING)
Social Work Assessment  [facility]     Patient Name: Shukri Park  MRN: [id number]  Today's Date: 6/13/2025    Admit Date: 6/10/2025     Demographic Summary       Row Name 06/13/25 1422       General Information    Reason for Consult health care directive    General Information Comments SW reviewed chart and noted pt's grandson is listed as POA, but document on file states otherwise. SW obtained updated POA/HCR document from hard chart and scanned to HIM, verified grandson is now POA/HCR.             DAVID Tucker, [facility]  Medical Social Worker  Ph [phone]  Fax [phone]  [email or web address]

## 2025-06-13 NOTE — CASE MANAGEMENT/SOCIAL WORK
Continued Stay Note  [facility]     Patient Name: Shukri Park  MRN: [id number]  Today's Date: 6/13/2025    Admit Date: 6/10/2025    Plan: Plan to dc to [facility] via EMS transport.   Discharge Plan       Row Name 06/13/25 1118       Plan    Plan Plan to dc to [facility] via EMS transport.    Plan Comments OOH DNR form filled out and given to ICU Chrg Walter FAUSTIN EMS necessity form completed and ready to be printed out. Ambulance request submitted as WILL CALL - call #[phone] when ready, floor Rn and MD updated.                 Expected Discharge Date and Time       Expected Discharge Date Expected Discharge Time    Jun 13, 2025               CYDNEY Salinas RN  ICU/CVU   O: [phone]  C: [phone]  [email or web address]

## 2025-06-13 NOTE — PLAN OF CARE
Goal Outcome Evaluation:              Outcome Evaluation: Clinical swallow eval completed with patient admitted with acute CVAs. VFSS completed 6/11 at [facility] which showed moderate oral dysphagia and mild pharyngeal dysphagia. Puree and nectar thick liquids were recommended.     Clinical swallow observations: No overt clinical signs of aspiration with nectar thick liquids and pureed textures.     Plan: Suggest continue current diet textures and monitor clinically. If pt demonstrates signs of aspiration, make NPO and request SLP re-eval.     Diet texture recommendation: Puree, nectar thick liquids.     Meds: Whole or crushed in puree, unless contraindicated.     Aspiration precautions: FULLY UPRIGHT FOR ALL ORAL INTAKE. 1:1 feeding assistance        Anticipated Discharge Disposition (SLP): anticipate therapy at next level of care          SLP Swallowing Diagnosis: moderate, oral dysphagia, mild, pharyngeal dysphagia (06/13/25 5846)

## 2025-06-13 NOTE — H&P
Group: [facility]         Critical care admission note    Patient Identification:  Shukri Park  84 y.o.  male  1940  [id number]                 CC: Speech difficulty and right-sided weakness    History of Present Illness:  84-year-old male patient who is elderly and frail.  He was admitted to [facility] a few days ago on the 10th.  He was admitted with speech difficulty, hypotension and right sided weakness.  Per neurology the patient has had fluctuating systolic blood pressures and fluctuating mentation with speech difficulty.  His speech difficulty temporarily improved, they augmented his blood pressure with IV fluids.  He received large amount of IV fluids but the patient is also chronically hypotensive at home on midodrine.  Subsequently they realized that he needed blood pressure augmentation with Lewis-Synephrine.  The patient was transferred to the ICU and a call was made to this facility for further neurointerventional care.  CT angiogram of the head and neck at [facility] showed bulky plaque with left carotid stenosis and thrombus.  The patient was started on heparin drip.  Patient also started developing acute hypoxic respiratory failure with sudden increase requirement and FiO2.  He is not actually short of breath.  He is in no respiratory distress.  Dr. Nice from the neurology team here accepted the patient.  I discussed the patient with his power of , grandson at the bedside as well as the rest of family and nurse in the ICU.  Chart was reviewed.  NIH score was 3 prior to departure from [place]  Currently during my exam the patient is quite sleepy.  He is not really answering my questions, has some stuttering and his speech is difficult to understand due to his lack of dentures.  He does follow a few simple commands but he has noticeable right-sided weakness during exam    Review of Systems   Constitutional:  Negative for diaphoresis and fever.   HENT:  Negative for  ear pain and sore throat.    Eyes:  Negative for pain and visual disturbance.   Respiratory:  Negative for cough and shortness of breath.    Gastrointestinal:  Negative for abdominal pain and diarrhea.   Endocrine: Negative for cold intolerance and polyuria.   Genitourinary:  Negative for dysuria and hematuria.   Musculoskeletal:  Negative for joint swelling and myalgias.   Skin:  Negative for rash and wound.   Neurological:  Positive for speech difficulty and weakness. Negative for numbness.   Hematological:  Negative for adenopathy. Does not bruise/bleed easily.   Psychiatric/Behavioral:  Negative for agitation and confusion.        Past Medical History:  Past Medical History:   Diagnosis Date    Aortic aneurysm     Chronic anticoagulation 04/17/2013    Chronic HFrEF (heart failure with reduced ejection fraction)     Chronic hypotension 07/14/2020    Chronic pain 07/02/2020    Coronary artery disease     Foot pain, bilateral     GERD (gastroesophageal reflux disease)     History of lung cancer 07/14/2020    Hyperlipidemia     Inguinodynia, right 12/23/2020    Added automatically from request for surgery [id number]      Ischemic cardiomyopathy 07/01/2020    Added automatically from request for surgery [id number]      Low back pain     Lung cancer     Mixed hyperlipidemia 04/17/2013    Persistent atrial fibrillation 07/01/2020    Added automatically from request for surgery [id number]      Presence of biventricular implantable cardioverter-defibrillator (ICD) 08/14/2020    S/P epidural steroid injection     Israel Gomes's - no relief    Vitamin D deficiency 04/05/2016    Weight loss     acute loss of weight 30 lbs       Past Surgical History:  Past Surgical History:   Procedure Laterality Date    CARDIAC CATHETERIZATION N/A 7/9/2020    Procedure: LEFT HEART CATH with possible PCI;  Surgeon: Wade Cronin MD;  Location: [facility] CATH INVASIVE LOCATION;  Service: Cardiovascular;  Laterality: N/A;  Local and IV sedation     CARDIAC CATHETERIZATION N/A 7/9/2020    Procedure: Percutaneous Coronary Intervention;  Surgeon: Wade Cronin MD;  Location: [facility] CATH INVASIVE LOCATION;  Service: Cardiovascular;  Laterality: N/A;    CARDIAC DEFIBRILLATOR PLACEMENT      CARDIAC ELECTROPHYSIOLOGY PROCEDURE N/A 7/10/2020    Procedure: Biventricular Device Insertion;  Surgeon: Jonathan Denney MD;  Location: [facility] CATH INVASIVE LOCATION;  Service: Cardiovascular;  Laterality: N/A;    CARDIAC ELECTROPHYSIOLOGY PROCEDURE N/A 7/10/2020    Procedure: ABLATION AV NODE;  Surgeon: Jonathan Denney MD;  Location: [facility] CATH INVASIVE LOCATION;  Service: Cardiovascular;  Laterality: N/A;    CARDIAC ELECTROPHYSIOLOGY PROCEDURE N/A 7/10/2020    Procedure: AV node ablation;  Surgeon: Jonathan Denney MD;  Location: [facility] CATH INVASIVE LOCATION;  Service: Cardiovascular;  Laterality: N/A;    CARDIOVASCULAR STRESS TEST  2020    CATARACT EXTRACTION, BILATERAL      CONVERTED (HISTORICAL) HOLTER  2020    CORONARY ANGIOPLASTY WITH STENT PLACEMENT      ENDOSCOPY N/A 7/5/2020    Procedure: ESOPHAGOGASTRODUODENOSCOPY;  Surgeon: Neal Correa MD;  Location: [facility] ENDOSCOPY;  Service: Gastroenterology;  Laterality: N/A;    INGUINAL HERNIA REPAIR Right 1/7/2021    Procedure: INGUINAL HERNIA REPAIR OPEN WITH MESH;  Surgeon: Cody Randall MD;  Location: [facility] MAIN OR;  Service: General;  Laterality: Right;    LUMBAR DECOMPRESSION  09/2015    L2-L3    LUMBAR DECOMPRESSION  2006    Dr. Logan    OTHER SURGICAL HISTORY  05/2015    left SI fusion with bone graft - Dr. Presley    OTHER SURGICAL HISTORY      carotid artery repair     OTHER SURGICAL HISTORY Left 02/19/2016    Left SI fusion 2/19/2016 Dr. Zendejas    POSTERIOR SPINAL FUSION  10/24/2016    PSF T12-3/TLIF L3-L4 poss L2-L3 --- Dr. 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        Home Meds:  Medications Prior to Admission   Medication Sig Dispense Refill Last  Dose/Taking    ipratropium (ATROVENT) 0.03 % nasal spray Administer 2 sprays into the nostril(s) as directed by provider 3 (Three) Times a Day.   Past Week    latanoprost (XALATAN) 0.005 % ophthalmic solution Administer 1 drop to both eyes Every Night.   Past Week    lisinopril (PRINIVIL,ZESTRIL) 2.5 MG tablet Take 1 tablet by mouth Daily.   Past Week    pantoprazole (PROTONIX) 40 MG EC tablet Take 1 tablet by mouth Daily. 30 tablet 0 Past Week    simvastatin (ZOCOR) 40 MG tablet Take 1 tablet by mouth Daily.   Past Week    Xarelto 15 MG tablet TAKE 1 TABLET BY MOUTH EVERY DAY 60 tablet 3 Past Week    ampicillin-sulbactam (UNASYN) 1.5 gm IVPB in 100 mL NS (MBP) Infuse 1.5 g into a venous catheter Every 6 (Six) Hours for 8 doses. Indications: Pneumonia       aspirin 81 MG EC tablet Take 4 tablets by mouth Daily for 30 days. 120 tablet 0     docusate sodium (COLACE) 100 MG capsule Take 1 capsule by mouth 2 (Two) Times a Day.       ferrous sulfate 325 (65 FE) MG tablet Take 1 tablet by mouth Daily With Breakfast. Hold DOs       midodrine (PROAMATINE) 5 MG tablet Take 1 tablet by mouth Every 8 (Eight) Hours. (Patient taking differently: Take 1 tablet by mouth 2 (Two) Times a Day.)       Multiple Vitamin (MULTIVITAMIN) tablet Take 1 tablet by mouth Daily. LD 1/2       vitamin B-12 (CYANOCOBALAMIN) 1000 MCG tablet Take 1 tablet by mouth Daily.          Allergies:  Allergies   Allergen Reactions    Ciprofloxacin Anaphylaxis    Amlodipine Unknown (See Comments)    Rocephin [Ceftriaxone] Other (See Comments)     Mouth sores       Social History:   Social History     Socioeconomic History    Marital status:    Tobacco Use    Smoking status: Former     Current packs/day: 0.00     Types: Cigarettes     Quit date: 1989     Years since quittin.5     Passive exposure: Past    Smokeless tobacco: Never   Vaping Use    Vaping status: Never Used   Substance and Sexual Activity    Alcohol use: No    Drug use: Never     Sexual activity: Defer       Family History:  Family History   Problem Relation Age of Onset    Cancer Other     Hyperlipidemia Other     Heart disease Other        Physical Exam:  /98   Pulse 70   Temp 98.1 °F (36.7 °C) (Oral)   Resp (!) 38   SpO2 95%  There is no height or weight on file to calculate BMI. 95%    Physical Exam  Constitutional:       General: He is not in acute distress.     Appearance: He is well-developed.      Comments: Elderly who appears frail, debilitated, poorly nourished and has poor insight.  Has some speech difficulty   HENT:      Right Ear: External ear normal.      Left Ear: External ear normal.      Nose: Nose normal.      Mouth/Throat:      Mouth: Mucous membranes are dry.   Eyes:      General: No scleral icterus.     Conjunctiva/sclera: Conjunctivae normal.      Pupils: Pupils are equal, round, and reactive to light.   Neck:      Thyroid: No thyromegaly.      Vascular: No JVD.   Cardiovascular:      Rate and Rhythm: Normal rate and regular rhythm.      Heart sounds: No murmur heard.     Comments: No edema  Pulmonary:      Effort: Pulmonary effort is normal.      Breath sounds: Rales present. No wheezing.   Abdominal:      General: There is no distension.      Palpations: Abdomen is soft.      Comments: No liver or spleen enlargment palpable   Musculoskeletal:         General: No deformity.      Cervical back: Neck supple. No rigidity.      Comments: No deformity in all 4 extrem   Skin:     Findings: No erythema or rash.      Comments: No palpable nodules   Neurological:      Mental Status: He is alert.      Comments: Difficult to understand.  Patient mumbling due to lack of dentures.  Very soft voice.  Flat affect.  Follows commands but has clear right-sided upper extremity weakness.  Moves lower legs on command, moves left upper extremity on command   Psychiatric:      Comments: Flat affect, poor judgment and insight         LABS:        Results from last 7 days   Lab  Units 06/13/25  0048 06/12/25  1952 06/11/25  2357 06/11/25  0236 06/10/25  1928   MAGNESIUM mg/dL  --   --   --  2.5* 2.5*   SODIUM mmol/L  --  144  --  144 144   POTASSIUM mmol/L 4.0 3.4*  --  4.1 4.4   CHLORIDE mmol/L  --  110*  --  107 102   CO2 mmol/L  --  24.2  --  20.3* 23.1   BUN mg/dL  --  22.0  --  37.0* 33.0*   CREATININE mg/dL  --  0.77  --  1.07 1.15   GLUCOSE mg/dL  --  125*  --  103* 93   CALCIUM mg/dL  --  9.1  --  9.4 10.1   WBC 10*3/mm3  --  12.10*  --  12.16* 14.74*   HEMOGLOBIN g/dL  --  13.1  --  13.4 14.2   PLATELETS 10*3/mm3  --  133*  --  131* 152   ALT (SGPT) U/L  --  27  --  26 33   AST (SGOT) U/L  --  43*  --  62* 77*   PROCALCITONIN ng/mL  --   --  0.08  --  0.07     Lab Results   Component Value Date    CKTOTAL 308 (H) 06/13/2025    TROPONINT 32 (H) 06/10/2025     Results from last 7 days   Lab Units 06/13/25  0048 06/11/25  2357 06/10/25  2033 06/10/25  1928   CK TOTAL U/L 308* 660*  --  2,133*   HSTROP T ng/L  --   --  32* 35*     Results from last 7 days   Lab Units 06/10/25  2033   BLOODCX  No growth at 2 days  No growth at 2 days     Results from last 7 days   Lab Units 06/12/25  1748 06/11/25  2357 06/10/25  2041 06/10/25  1928   PROCALCITONIN ng/mL  --  0.08  --  0.07   LACTATE mmol/L 2.0  --  1.3  --          Results from last 7 days   Lab Units 06/10/25  2036   ADENOVIRUS DETECTION BY PCR  Not Detected   CORONAVIRUS 229E  Not Detected   CORONAVIRUS HKU1  Not Detected   CORONAVIRUS NL63  Not Detected   CORONAVIRUS OC43  Not Detected   HUMAN METAPNEUMOVIRUS  Not Detected   HUMAN RHINOVIRUS/ENTEROVIRUS  Not Detected   INFLUENZA B PCR  Not Detected   PARAINFLUENZA 1  Not Detected   PARAINFLUENZA VIRUS 2  Not Detected   PARAINFLUENZA VIRUS 3  Not Detected   PARAINFLUENZA VIRUS 4  Not Detected   BORDETELLA PERTUSSIS PCR  Not Detected   BORDETELLA PARAPERTUSSIS PCR  Not Detected   CHLAMYDOPHILA PNEUMONIAE PCR  Not Detected   MYCOPLAMA PNEUMO PCR  Not Detected   RSV, PCR  Not Detected      Results from last 7 days   Lab Units 06/12/25  1748   INR  1.64*     Results from last 7 days   Lab Units 06/10/25  2033   BLOODCX  No growth at 2 days  No growth at 2 days     Lab Results   Component Value Date    TSH 0.758 06/10/2025     Estimated Creatinine Clearance: 66.9 mL/min (by C-G formula based on SCr of 0.77 mg/dL).  Results from last 7 days   Lab Units 06/10/25  1904   NITRITE UA  Negative   WBC UA /HPF 0-2   BACTERIA UA /HPF None Seen   SQUAM EPITHEL UA /HPF 0-2     Microbiology Results (last 10 days)       Procedure Component Value - Date/Time    Respiratory Panel PCR w/COVID-19(SARS-CoV-2) [facility] In-House, NP Swab in UTM/VTM, 2 HR TAT - Swab, Nasopharynx [[id number]]  (Normal) Collected: 06/10/25 2036    Lab Status: Final result Specimen: Swab from Nasopharynx Updated: 06/10/25 2130     ADENOVIRUS, PCR Not Detected     Coronavirus 229E Not Detected     Coronavirus HKU1 Not Detected     Coronavirus NL63 Not Detected     Coronavirus OC43 Not Detected     COVID19 Not Detected     Human Metapneumovirus Not Detected     Human Rhinovirus/Enterovirus Not Detected     Influenza A PCR Not Detected     Influenza B PCR Not Detected     Parainfluenza Virus 1 Not Detected     Parainfluenza Virus 2 Not Detected     Parainfluenza Virus 3 Not Detected     Parainfluenza Virus 4 Not Detected     RSV, PCR Not Detected     Bordetella pertussis pcr Not Detected     Bordetella parapertussis PCR Not Detected     Chlamydophila pneumoniae PCR Not Detected     Mycoplasma pneumo by PCR Not Detected    Narrative:      In the setting of a positive respiratory panel with a viral infection PLUS a negative procalcitonin without other underlying concern for bacterial infection, consider observing off antibiotics or discontinuation of antibiotics and continue supportive care. If the respiratory panel is positive for atypical bacterial infection (Bordetella pertussis, Chlamydophila pneumoniae, or Mycoplasma  pneumoniae), consider antibiotic de-escalation to target atypical bacterial infection.    Blood Culture - Blood, Arm, Left [[id number]]  (Normal) Collected: 06/10/25 2033    Lab Status: Preliminary result Specimen: Blood from Arm, Left Updated: 06/12/25 2045     Blood Culture No growth at 2 days    Blood Culture - Blood, Hand, Left [[id number]]  (Normal) Collected: 06/10/25 2033    Lab Status: Preliminary result Specimen: Blood from Hand, Left Updated: 06/12/25 2045     Blood Culture No growth at 2 days    Narrative:      Less than seven (7) mL's of blood was collected.  Insufficient quantity may yield false negative results.    Legionella Antigen, Urine - Urine, Straight Cath [[id number]]  (Normal) Collected: 06/10/25 1904    Lab Status: Final result Specimen: Urine from Straight Cath Updated: 06/11/25 1433     LEGIONELLA ANTIGEN, URINE Negative    S. Pneumo Ag Urine or CSF - Urine, Straight Cath [[id number]]  (Normal) Collected: 06/10/25 1904    Lab Status: Final result Specimen: Urine from Straight Cath Updated: 06/11/25 1433     Strep Pneumo Ag Negative               Imaging: I personally visualized the images of images of chest x-ray and CT angiogram of carotids and head.    Radiology reports are as follows:  .XR Chest 1 View  Result Date: 6/12/2025  Impression: Unchanged appearing patchy interstitial opacity in the periphery of the right midlung, which may represent chronic changes, no superimposed acute infection/inflammatory change is possible in the appropriate clinical setting. Recommend radiographic follow-up to resolution, and if persists, chest CT. Emphysema. Electronically Signed: Ashok Hills MD  6/12/2025 7:54 PM EDT  Workstation ID: [id number]     CT Angiogram Carotids  Result Date: 6/12/2025  Impression: 1.Bulky plaque along left carotid bifurcation resulting in focal 80% stenosis along left internal carotid, and slightly more distally is an anterior nonocclusive thrombus. 2.Soft plaque causing  intraluminal web within distal V2 segment of left vertebral artery. 3.Major intracranial arterial vasculature appears widely patent, with no evidence of thrombus. Electronically Signed: Clint Dotson MD  6/12/2025 3:40 PM EDT  Workstation ID: [id number]     CT Angiogram Head  Result Date: 6/12/2025  Impression: 1.Bulky plaque along left carotid bifurcation resulting in focal 80% stenosis along left internal carotid, and slightly more distally is an anterior nonocclusive thrombus. 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 3.Major intracranial arterial vasculature appears widely patent, with no evidence of thrombus. Electronically Signed: Clint Dotson MD  6/12/2025 3:40 PM EDT  Workstation ID: [id number]      Assessment:  Symptomatic stenosis of left carotid artery  Probable aspiration pneumonia versus pulmonary edema  Acute hypoxic respiratory failure  Speech difficulty due to impending stroke  Hypotension  Right-sided hemiparesis  COPD      Recommendations:  Admit to ICU.  Consult neurology and neurosurgery for probable intervention to left carotid stenosis.  Augment blood pressure with Lewis-Synephrine.  Patient on midodrine at home.  May have developed pulmonary edema from IV fluids given at [facility].  Will try low-dose Lasix if blood pressure tolerates.  Chest x-ray shows bilateral infiltrates but asymmetric, could be aspiration pneumonia.  Start empiric coverage with Rocephin IV for probable aspiration pneumonia.  Continue supplemental oxygen for now.  Nothing by mouth tonight.  Continue heparin for now.  Allow neurosurgery to decide antiplatelets and heparin management.  Start statin tomorrow once patient undergo swallow evaluation by speech therapy.  Hold all home medications especially blood pressure medications due to hypotension.  Check temperature curve, white count and procalcitonin level.    Total critical care time 35-minute        Nilo Cleary MD  6/13/2025  17:45 EDT      Much of this encounter note is an electronic transcription/translation of spoken language to printed text using Dragon Software.

## 2025-06-13 NOTE — PROGRESS NOTES
LOS: 3 days     History taken from: patient chart RN  Chief complaint: Altered mental status     SUBJECTIVE:    Interval History: Shukri Park was admitted on 6/10/2025 no acute events overnight.        6/13/25: Yesterday early evening the patient's systolic blood pressure dropped to the 70s with corresponding worsening aphasia and despite augmentation with IV fluids patient's pressures persisted in the 70s and 80s necessitating IV pressor support and transferred to the ICU.  Suspect pressure difficulty secondary to home medication midodrine not being resumed. CTA head neck showed a bulky plaque in the left carotid bifurcation with a focal stenosis of 80% and a anterior nonocclusive thrombus that was more distal.  Patient was started on heparin and Xarelto was held.  Case was discussed with [facility] stroke team, Dr. Nice and recommend patient be transferred nonurgently.  Unfortunately the patient's labile blood pressures, the transfer was placed on hold overnight until pressure stabilized and examined improved.  Morning the patient is alert and oriented x 4, sitting upright in bed and joking around.  Speech according to family at bedside is at 90% without his dentures and the best it has been since he has been in the hospital.  There is no aphasia noted on exam mild dysarthria, mild right nasolabial fold flattening and very subtle right upper extremity pronator drift.        NIHSS 3.     aPTT 107.7              6/12/25: No new complaints, reports he feels a little better than yesterday.  Family at bedside endorses this and thinks he is even improved since we assessed him yesterday.  Continues to have a jovial personality.    Strength appears to be intact however continues to have some mild to moderate dysarthria and right facial droop.  No aphasia noted.    MRI brain showed multiple scattered acute infarcts in the left cerebral hemisphere with mild edema without significant mass effect.  Appears to be  some kind of severe stenosis versus near occlusion of the left supraclinoid ICA/carotid terminus.  No enhancing masses identified.  CTA head neck is pending.  Imaging reviewed with family.                - Portions of the above HPI were copied from previous encounters and edited as appropriate.        Review of Systems   Constitutional:  Negative for fever and unexpected weight change.   HENT:  Negative for ear pain, hearing loss, tinnitus, trouble swallowing and voice change.    Eyes:  Negative for photophobia and visual disturbance.   Respiratory:  Negative for chest tightness and shortness of breath.    Cardiovascular:  Negative for chest pain and palpitations.   Gastrointestinal:  Negative for nausea and vomiting.   Genitourinary:  Negative for difficulty urinating, dysuria, frequency and urgency.   Musculoskeletal:  Negative for back pain, gait problem, neck pain and neck stiffness.   Neurological:  Positive for facial asymmetry and speech difficulty. Negative for dizziness, tremors, seizures, syncope, weakness, light-headedness, numbness and headaches.   Psychiatric/Behavioral:  Negative for agitation, behavioral problems and confusion.    All other systems reviewed and are negative.         Pertinent PMH:  has a past medical history of A-fib, Aortic aneurysm, Appetite loss, Cancer, CHF (congestive heart failure), Chronic anticoagulation (04/17/2013), Chronic hypotension (07/14/2020), Chronic pain (07/02/2020), Chronic systolic congestive heart failure (07/01/2020), Coronary artery disease, Coronary artery disease involving native coronary artery of native heart without angina pectoris (07/09/2020), Dark stools, Foot pain, bilateral, GERD (gastroesophageal reflux disease), History of lung cancer (07/14/2020), Hyperlipidemia, Inguinodynia, right (12/23/2020), Ischemic cardiomyopathy (07/01/2020), Low back pain, Mitral regurgitation (07/12/2020), Mixed hyperlipidemia (04/17/2013), Persistent atrial fibrillation  (07/01/2020), Presence of biventricular implantable cardioverter-defibrillator (ICD) (08/14/2020), Presence of stent in right coronary artery (04/17/2013), S/P epidural steroid injection, Vitamin D deficiency (04/05/2016), and Weight loss.     ________________________________________________     OBJECTIVE:  GEN: NAD, pleasant, cooperative  CHEST: No signs of resp distress, on room air     NEURO  MENTAL STATUS: AAOx3, memory intact, fund of knowledge appropriate  LANG/SPEECH: Naming and repetition intact, mild dysarthria, follows 3-step commands     CRANIAL NERVES:  II-XII grossly intact exception of a right facial droop     MOTOR:  Motor strength 4+/5 throughout, symmetric.      REFLEXES: 2/4 throughout     SENSORY:  Normal to touch, temp all limbs  No hemineglect, no extinction to double-sided stimulation (visual & tactile)  COORD: Normal finger to nose        NIH Stroke Scale  Interval: -- (2 RN handoff)  1a. Level of Consciousness: 0-->Alert, keenly responsive  1b. LOC Questions: 0-->Answers both questions correctly  1c. LOC Commands: 0-->Performs both tasks correctly  2. Best Gaze: 0-->Normal  3. Visual: 0-->No visual loss  4. Facial Palsy: 1-->Minor paralysis (flattened nasolabial fold, asymmetry on smiling) (Right nasolabial fold flattening)  5a. Motor Arm, Left: 0-->No drift, limb holds 90 (or 45) degrees for full 10 secs  5b. Motor Arm, Right: 1-->Drift, limb holds 90 (or 45) degrees, but drifts down before full 10 secs, does not hit bed or other support  6a. Motor Leg, Left: 0-->No drift, leg holds 30 degree position for full 5 secs  6b. Motor Leg, Right: 0-->No drift, leg holds 30 degree position for full 5 secs  7. Limb Ataxia: 0-->Absent  8. Sensory: 0-->Normal, no sensory loss  9. Best Language: 0-->No aphasia, normal  10. Dysarthria: 1-->Mild-to-moderate dysarthria, patient slurs at least some words and, at worst, can be understood with some difficulty  11. Extinction and Inattention (formerly  Neglect): 0-->No abnormality  Total (NIH Stroke Scale): 3      ABCD2 Score for TIA  Diabetes History: N    CHADS2 Score  CHF History: Y  Hypertension History: N  Diabetes History: N  Stroke/TIA/Thromboembolism History: N    [id number] Score for Atrial Fibrillation Stroke Risk  Age in Years: 75+  Sex: Male  CHF History: Y  Hypertension History: N  Stroke/TIA/Thromboembolism History: N  Vascular Disease History: Y  Diabetes History: N  [id number] Score: 4         ________________________________________________   RESULTS REVIEW    VITAL SIGNS:  Temp:  [97 °F (36.1 °C)-99 °F (37.2 °C)] 97.5 °F (36.4 °C)  Heart Rate:  [70-88] 70  Resp:  [20-33] 26  BP: ()/(48-98) 126/81    LABS:       Lab 06/13/25  0751 06/13/25  0048 06/12/25  1952 06/12/25  1748 06/11/25  2357 06/11/25  0236 06/10/25  2041 06/10/25  1928   WBC  --   --  12.10*  --   --  12.16*  --  14.74*   HEMOGLOBIN  --   --  13.1  --   --  13.4  --  14.2   HEMATOCRIT  --   --  42.0  --   --  42.1  --  44.1   PLATELETS  --   --  133*  --   --  131*  --  152   NEUTROS ABS  --   --  7.67*  --   --  8.86*  --  11.65*   IMMATURE GRANS (ABS)  --   --  0.04  --   --  0.05  --  0.07*   LYMPHS ABS  --   --  2.94  --   --  2.05  --  1.69   MONOS ABS  --   --  1.37*  --   --  1.18*  --  1.32*   EOS ABS  --   --  0.06  --   --  0.00  --  0.00   MCV  --   --  100.2*  --   --  98.1*  --  98.0*   PROCALCITONIN  --   --   --   --  0.08  --   --  0.07   LACTATE  --   --   --  2.0  --   --  1.3  --    PROTIME  --   --   --  19.5*  --   --   --   --    APTT 107.7* 67.8*  --  37.1*  37.2*  --   --   --   --          Lab 06/13/25  0048 06/12/25  1952 06/11/25  0236 06/10/25  2033 06/10/25  1928   SODIUM  --  144 144  --  144   POTASSIUM 4.0 3.4* 4.1  --  4.4   CHLORIDE  --  110* 107  --  102   CO2  --  24.2 20.3*  --  23.1   ANION GAP  --  9.8 16.7*  --  18.9*   BUN  --  22.0 37.0*  --  33.0*   CREATININE  --  0.77 1.07  --  1.15   EGFR  --  87.7 68.0  --  62.4   GLUCOSE  --   125* 103*  --  93   CALCIUM  --  9.1 9.4  --  10.1   MAGNESIUM  --   --  2.5*  --  2.5*   PHOSPHORUS  --   --  4.2  --   --    HEMOGLOBIN A1C  --   --  5.76*  --   --    TSH  --   --   --  0.758 0.909         Lab 06/12/25  1952 06/11/25  0236 06/10/25  1928   TOTAL PROTEIN 6.2 6.9 8.2   ALBUMIN 4.0 4.3 5.0   GLOBULIN 2.2 2.6 3.2   ALT (SGPT) 27 26 33   AST (SGOT) 43* 62* 77*   BILIRUBIN 0.9 1.0 1.5*   ALK PHOS 72 71 83         Lab 06/12/25  1748 06/10/25  2033 06/10/25  1928   HSTROP T  --  32* 35*   PROTIME 19.5*  --   --    INR 1.64*  --   --          Lab 06/11/25  0236   CHOLESTEROL 153   LDL CHOL 92   HDL CHOL 40   TRIGLYCERIDES 116         Lab 06/11/25  0236   VITAMIN B 12 805         UA          6/10/2025    19:04   Urinalysis   Squamous Epithelial Cells, UA 0-2    Specific Gravity, UA 1.022    Ketones, UA 15 mg/dL (1+)    Blood, UA Negative    Leukocytes, UA Negative    Nitrite, UA Negative    RBC, UA 0-2    WBC, UA 0-2    Bacteria, UA None Seen        Lab Results   Component Value Date    TSH 0.758 06/10/2025    LDL 92 06/11/2025    HGBA1C 5.76 (H) 06/11/2025    [id number] 805 06/11/2025         IMAGING STUDIES:  XR Chest 1 View  Result Date: 6/12/2025  Impression: Unchanged appearing patchy interstitial opacity in the periphery of the right midlung, which may represent chronic changes, no superimposed acute infection/inflammatory change is possible in the appropriate clinical setting. Recommend radiographic follow-up to resolution, and if persists, chest CT. Emphysema. Electronically Signed: Ashok Hills MD  6/12/2025 7:54 PM EDT  Workstation ID: [id number]    CT Angiogram Carotids  Result Date: 6/12/2025  Impression: 1.Bulky plaque along left carotid bifurcation resulting in focal 80% stenosis along left internal carotid, and slightly more distally is an anterior nonocclusive thrombus. 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 3.Major intracranial arterial  vasculature appears widely patent, with no evidence of thrombus. Electronically Signed: Clint Dotson MD  6/12/2025 3:40 PM EDT  Workstation ID: [id number]    CT Angiogram Head  Result Date: 6/12/2025  Impression: 1.Bulky plaque along left carotid bifurcation resulting in focal 80% stenosis along left internal carotid, and slightly more distally is an anterior nonocclusive thrombus. 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 3.Major intracranial arterial vasculature appears widely patent, with no evidence of thrombus. Electronically Signed: Clint Dotson MD  6/12/2025 3:40 PM EDT  Workstation ID: [id number]    MRI Brain With & Without Contrast  Result Date: 6/11/2025  Impression: 1.Multiple scattered acute infarcts noted within the left cerebral hemisphere. There is associated mild edema with these infarcts, without significant mass effect. 2.There is evidence of severe stenoses versus near occlusion of the left supraclinoid ICA/carotid terminus. Follow-up with dedicated CTA/MRI of the head and neck is recommended 3.No suspicious intracranial lesion or mass is identified on this study Electronically Signed: Quincy Aguilera DO  6/11/2025 11:01 PM EDT  Workstation ID: [id number]      I reviewed the patient's new clinical results.    ________________________________________________      PROBLEM LIST:    Non-occlusive thrombus    Chronic anticoagulation    Mixed hyperlipidemia    Chronic pain    Ischemic cardiomyopathy    Persistent atrial fibrillation    Chronic systolic congestive heart failure    Coronary artery disease involving native coronary artery of native heart without angina pectoris    Vitamin D deficiency    History of lung cancer    Chronic hypotension    Presence of biventricular implantable cardioverter-defibrillator (ICD)    Acute encephalopathy    GERD (gastroesophageal reflux disease)    Aortic aneurysm    Rhabdomyolysis    More than 50 percent  stenosis of left internal carotid artery        ASSESSMENT/PLAN:  Acute confusion, resolved  Incidental chronic appearing hypodensity in the left temporal lobe, concerning for a chronic stroke.  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  Etiology likely large artery-to-artery embolism in the setting of left carotid stenosis     He is AOx3, but has some residual dysarthria and mild right facial droop.  Low flow state.  Aphasia has resolved.      Leukocytosis (12.16), afebrile, neck supple, Aox3 and following commands. No concern for a CNS infection at this time.   NIHSS 3     NCCT: There is a 2.5 cm hypodensity in the posterior left temporal lobe which does not appear acute but is new since 2023. This is likely a subacute or chronic infarct.   CT C/T/L spine negative for fracture or malalignment.   CTA H/N bulky plaque along the left carotid bifurcation resulting in 80% stenosis in the left ICA and slightly more distally there is an anterior nonocclusive thrombus.  Soft plaque causing intraluminal web within the distal V2 segment of the left vertebral artery.  Case discussed with [facility] stroke team, recommending transfer to their facility nonurgently  Clinically, the patient is stable and ready for transfer.  Heparin drip started, Xarelto held  Maintain SBP>120, use pressors as needed.  MRI Brain W/WO: Multiple scattered acute infarcts noted within the left cerebral hemisphere. There is associated mild edema with these infarcts, without significant mass effect. There is evidence of severe stenoses versus near occlusion of the left supraclinoid ICA/carotid terminus. No suspicious intracranial lesion or mass is identified on this study   Patient was on Xarelto, held 6/12/2025 and started on a heparin drip without boluses.  NH3: 16  TSH 0.758  A1C 5.76%   LDL 92   B12 805  UA negative  Echocardiogram with  bubble study: Bubble study not completed.  LVEF 56 to 60%, left atrial cavity moderately to severely dilated.  Right atrial cavity is mildly dilated.  Severe mitral valve regurgitation.  Cardiology following, appreciate recommendations.   Continuous cardiac telemetry to monitor for arrhythmia  PT/OT/Speech/swallow consults as indicated   Please document NIHSS on admission and with any neurochanges  Activity as tolerated  Stroke education provided by nursing per institutional guidelines     **Please refer to previous notes for further details and recommendations.     I discussed the patient's findings and my recommendations with patient, nursing staff, and primary care team    Ramo Arroyo MD  06/13/25  10:10 EDT

## 2025-06-14 NOTE — PROGRESS NOTES
[facility]   Surgical Progress Note    Patient Name: Shukri Park  : 1940  MRN: [id number]  Date of admission: 2025  Surgical Procedures Since Admission:  Procedure(s):  CAROTID ENDARTERECTOMY with common carotid to internal carotid interposition and repair of carotid aneurysm  Surgeon:  Maxine Alvarez MD  Status:  * No surgery found *  -------------------    Subjective   Subjective     Chief Complaint: Symptomatic carotid artery stenosis follow-up    History of Present Illness   Patient denies chest pain or shortness of breath.    Review of Systems    Objective   Objective     Vitals:   Temp:  [97.2 °F (36.2 °C)-98.3 °F (36.8 °C)] 97.9 °F (36.6 °C)  Heart Rate:  [69-74] 72  Resp:  [26-38] 28  BP: ()/() 130/83  Flow (L/min) (Oxygen Therapy):  [8-15] 12  Output by Drain (mL) 25 0701 - 25 1900 25 1901 - 25 0700 25 0701 - 25 1348 Range Total   External Urinary Catheter 790 1800 50 2640       Physical Exam  Constitutional:       Appearance: He is well-developed.   Pulmonary:      Effort: Pulmonary effort is normal. No respiratory distress.   Abdominal:      General: There is no distension.      Palpations: Abdomen is soft.   Neurological:      Mental Status: He is alert and oriented to person, place, and time.     Still with mild right arm and right leg weakness.     Result Review    Result Review:  I have personally reviewed the results from the time of this admission to 2025 13:48 EDT and agree with these findings:  [x]  Laboratory list / accordion  []  Microbiology  []  Radiology  []  EKG/Telemetry   []  Cardiology/Vascular   []  Pathology  []  Old records  []  Other:  Most notable findings include:       Results from last 7 days   Lab Units 25  0538 252 25  0236 06/10/25  1928   WBC 10*3/mm3 15.27* 12.10* 12.16* 14.74*   HEMOGLOBIN g/dL 11.9* 13.1 13.4 14.2   PLATELETS 10*3/mm3 113* 133* 131* 152     Results from  last 7 days   Lab Units 06/14/25  0538 06/13/25  0048 06/12/25  1952 06/11/25  0236 06/10/25  1928   SODIUM mmol/L 147*  --  144 144 144   POTASSIUM mmol/L 3.3* 4.0 3.4* 4.1 4.4   CHLORIDE mmol/L 115*  --  110* 107 102   CO2 mmol/L 19.0*  --  24.2 20.3* 23.1   BUN mg/dL 15.0  --  22.0 37.0* 33.0*   CREATININE mg/dL 0.70*  --  0.77 1.07 1.15   GLUCOSE mg/dL 94  --  125* 103* 93   MAGNESIUM mg/dL 1.7  --   --  2.5* 2.5*   PHOSPHORUS mg/dL 2.2*  --   --  4.2  --    Estimated Creatinine Clearance: 74.8 mL/min (A) (by C-G formula based on SCr of 0.7 mg/dL (L)).  Results from last 7 days   Lab Units 06/12/25  1748   PROTIME Seconds 19.5*   INR  1.64*     Lab Results   Component Value Date    HGBA1C 5.76 (H) 06/11/2025     Glucose   Date/Time Value Ref Range Status   06/14/2025 1138 103 70 - 130 mg/dL Final   06/14/2025 0531 98 70 - 130 mg/dL Final   06/14/2025 0014 125 70 - 130 mg/dL Final   06/13/2025 1520 121 70 - 130 mg/dL Final   06/12/2025 1750 105 70 - 105 mg/dL Final     Comment:     Serial Number: [id number]:  [id number]   06/11/2025 2345 100 70 - 105 mg/dL Final     Comment:     Serial Number: [id number]:  [id number]   06/11/2025 1944 97 70 - 105 mg/dL Final     Comment:     Serial Number: [id number]:  [id number]   06/11/2025 1922 93 70 - 105 mg/dL Final     Comment:     Serial Number: [id number]:  [id number]       Assessment & Plan   Assessment / Plan     Brief Patient Summary:  Shukri Park is a 84 y.o. male who symptomatic restenosis and aneurysmal degeneration following remote endarterectomy.    Active Hospital Problems:   Active Hospital Problems    Diagnosis     **Symptomatic stenosis of left carotid artery     Chronic heart failure with preserved ejection fraction (HFpEF)     Severe protein-calorie malnutrition     Rhabdomyolysis     Left carotid stenosis     Aortic aneurysm     Acute encephalopathy     Chronic hypotension     Coronary artery disease involving native coronary  artery of native heart without angina pectoris     Ischemic cardiomyopathy      Plan:   6/14/2025: Patient somewhat variable in his answers.  I have called and discussed the case with the patient's grandson and power of  Trish.  And answered all the questions about the recommendations for surgery on Monday best I was able.  Sebastian will discuss with other family members and visit his grandfather tonight and formulate a goals of care plan.  Will remain on the surgical schedule for Monday with Dr. Maxine Fairbanks.    VTE Prophylaxis:  Pharmacologic & mechanical VTE prophylaxis orders are present.        Orlin Perez MD

## 2025-06-14 NOTE — THERAPY EVALUATION
Patient Name: Shukri Park  : 1940    MRN: [id number]                              Today's Date: 2025       Admit Date: 2025    Visit Dx:     ICD-10-CM ICD-9-CM   1. Symptomatic stenosis of left carotid artery  I65.22 433.10     Patient Active Problem List   Diagnosis    Chronic anticoagulation    Mixed hyperlipidemia    Chronic pain    Ischemic cardiomyopathy    Persistent atrial fibrillation    Coronary artery disease involving native coronary artery of native heart without angina pectoris    Vitamin D deficiency    History of lung cancer    Chronic hypotension    Presence of biventricular implantable cardioverter-defibrillator (ICD)    Acute encephalopathy    GERD (gastroesophageal reflux disease)    Aortic aneurysm    Rhabdomyolysis    More than 50 percent stenosis of left internal carotid artery    Non-occlusive thrombus    Severe protein-calorie malnutrition    Chronic heart failure with preserved ejection fraction (HFpEF)    Symptomatic stenosis of left carotid artery    Left carotid stenosis     Past Medical History:   Diagnosis Date    Aortic aneurysm     Chronic anticoagulation 2013    Chronic HFrEF (heart failure with reduced ejection fraction)     Chronic hypotension 2020    Chronic pain 2020    Coronary artery disease     Foot pain, bilateral     GERD (gastroesophageal reflux disease)     History of lung cancer 2020    Hyperlipidemia     Inguinodynia, right 2020    Added automatically from request for surgery [id number]      Ischemic cardiomyopathy 2020    Added automatically from request for surgery [id number]      Low back pain     Lung cancer     Mixed hyperlipidemia 2013    Persistent atrial fibrillation 2020    Added automatically from request for surgery [id number]      Presence of biventricular implantable cardioverter-defibrillator (ICD) 2020    S/P epidural steroid injection     Israel Gomes's - no relief    Vitamin D  deficiency 04/05/2016    Weight loss     acute loss of weight 30 lbs     Past Surgical History:   Procedure Laterality Date    CARDIAC CATHETERIZATION N/A 7/9/2020    Procedure: LEFT HEART CATH with possible PCI;  Surgeon: Wade Cronin MD;  Location: [facility] CATH INVASIVE LOCATION;  Service: Cardiovascular;  Laterality: N/A;  Local and IV sedation    CARDIAC CATHETERIZATION N/A 7/9/2020    Procedure: Percutaneous Coronary Intervention;  Surgeon: Wade Cronin MD;  Location: [facility] CATH INVASIVE LOCATION;  Service: Cardiovascular;  Laterality: N/A;    CARDIAC DEFIBRILLATOR PLACEMENT      CARDIAC ELECTROPHYSIOLOGY PROCEDURE N/A 7/10/2020    Procedure: Biventricular Device Insertion;  Surgeon: Jonathan Denney MD;  Location: [facility] CATH INVASIVE LOCATION;  Service: Cardiovascular;  Laterality: N/A;    CARDIAC ELECTROPHYSIOLOGY PROCEDURE N/A 7/10/2020    Procedure: ABLATION AV NODE;  Surgeon: Jonathan Denney MD;  Location: [facility] CATH INVASIVE LOCATION;  Service: Cardiovascular;  Laterality: N/A;    CARDIAC ELECTROPHYSIOLOGY PROCEDURE N/A 7/10/2020    Procedure: AV node ablation;  Surgeon: Jonathan Denney MD;  Location: [facility] CATH INVASIVE LOCATION;  Service: Cardiovascular;  Laterality: N/A;    CARDIOVASCULAR STRESS TEST  2020    CATARACT EXTRACTION, BILATERAL      CONVERTED (HISTORICAL) HOLTER  2020    CORONARY ANGIOPLASTY WITH STENT PLACEMENT      ENDOSCOPY N/A 7/5/2020    Procedure: ESOPHAGOGASTRODUODENOSCOPY;  Surgeon: Neal Correa MD;  Location: [facility] ENDOSCOPY;  Service: Gastroenterology;  Laterality: N/A;    INGUINAL HERNIA REPAIR Right 1/7/2021    Procedure: INGUINAL HERNIA REPAIR OPEN WITH MESH;  Surgeon: Cody Randall MD;  Location: [facility] MAIN OR;  Service: General;  Laterality: Right;    LUMBAR DECOMPRESSION  09/2015    L2-L3    LUMBAR DECOMPRESSION  2006    Dr. Logan    OTHER SURGICAL HISTORY  05/2015    left SI fusion with bone graft - Dr. Presley    OTHER SURGICAL HISTORY       carotid artery repair     OTHER SURGICAL HISTORY Left 02/19/2016    Left SI fusion 2/19/2016 Dr. Zendejas    POSTERIOR SPINAL FUSION  10/24/2016    PSF T12-3/TLIF L3-L4 poss L2-L3 --- Dr. 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      General Information       Row Name 06/14/25 1426          OT Time and Intention    Subjective Information complains of;weakness;fatigue  -KG     Document Type evaluation  -KG     Mode of Treatment individual therapy;occupational therapy  -KG     Patient Effort good  -KG     Symptoms Noted During/After Treatment fatigue  -KG       Row Name 06/14/25 1426          General Information    Patient Profile Reviewed yes  -KG     Prior Level of Function independent:;ADL's;all household mobility;community mobility;home management  -KG     Existing Precautions/Restrictions fall  -KG     Barriers to Rehab none identified  -KG       Row Name 06/14/25 1426          Living Environment    Current Living Arrangements home  -KG     People in Home alone  -KG       Row Name 06/14/25 1426          Home Main Entrance    Number of Stairs, Main Entrance none  -KG       Row Name 06/14/25 1426          Stairs Within Home, Primary    Number of Stairs, Within Home, Primary none  -KG       Row Name 06/14/25 1426          Cognition    Orientation Status (Cognition) oriented to;person  -KG       Row Name 06/14/25 1426          Safety Issues/Impairments Affecting Functional Mobility    Safety Issues Affecting Function (Mobility) ability to follow commands;impulsivity;insight into deficits/self-awareness;judgment;problem-solving;sequencing abilities  -KG     Impairments Affecting Function (Mobility) balance;cognition;coordination;endurance/activity tolerance;motor planning;strength;range of motion (ROM)  -KG     Cognitive Impairments, Mobility Safety/Performance attention;awareness, need for assistance;impulsivity;insight into  deficits/self-awareness;judgment;problem-solving/reasoning;sequencing abilities  -KG               User Key  (r) = Recorded By, (t) = Taken By, (c) = Cosigned By      Initials Name Provider Type    Cecilio Gomez OT Occupational Therapist                     Mobility/ADL's       Row Name 06/14/25 1428          Bed Mobility    Bed Mobility supine-sit;sit-supine  -KG     Supine-Sit Long (Bed Mobility) moderate assist (50% patient effort)  -KG     Sit-Supine Long (Bed Mobility) moderate assist (50% patient effort)  -KG       Row Name 06/14/25 1428          Transfers    Transfers sit-stand transfer  -KG       Row Name 06/14/25 1428          Sit-Stand Transfer    Sit-Stand Long (Transfers) moderate assist (50% patient effort)  -KG     Comment, (Sit-Stand Transfer) HHA  -KG       Row Name 06/14/25 1428          Functional Mobility    Functional Mobility- Ind. Level minimum assist (75% patient effort);moderate assist (50% patient effort)  -KG     Functional Mobility- Comment side stepping to HOB w/ HHA, ataxic and difficulty sequencing  -KG               User Key  (r) = Recorded By, (t) = Taken By, (c) = Cosigned By      Initials Name Provider Type    Cecilio Gomez OT Occupational Therapist                   Obj/Interventions       Row Name 06/14/25 1429          Sensory Assessment (Somatosensory)    Sensory Assessment (Somatosensory) sensation intact  -KG       Row Name 06/14/25 1429          Vision Assessment/Intervention    Visual Impairment/Limitations WFL  -KG       Row Name 06/14/25 1429          Range of Motion Comprehensive    Comment, General Range of Motion ~50% shoulder ROM, WFL distally  -KG       Row Name 06/14/25 1429          Strength Comprehensive (MMT)    Comment, General Manual Muscle Testing (MMT) Assessment LUE 4/5, RUE 3-/5 grossly  -KG       Row Name 06/14/25 1429          Motor Skills    Motor Skills coordination  -KG     Coordination --  Appears to have R side ataxia  w/ mobility, however difficult to formally assess due to poor command following when attempting coordination testing.  -KG       Row Name 06/14/25 1429          Balance    Balance Assessment sitting static balance;sitting dynamic balance;sit to stand dynamic balance;standing static balance;standing dynamic balance  -KG     Static Sitting Balance standby assist;contact guard  -KG     Dynamic Sitting Balance standby assist;contact guard  -KG     Position, Sitting Balance sitting edge of bed  -KG     Sit to Stand Dynamic Balance moderate assist  -KG     Static Standing Balance minimal assist;moderate assist  -KG     Dynamic Standing Balance minimal assist;moderate assist  -KG     Position/Device Used, Standing Balance supported  -KG     Balance Interventions sitting;standing;sit to stand;supported;static;dynamic  -KG               User Key  (r) = Recorded By, (t) = Taken By, (c) = Cosigned By      Initials Name Provider Type    KG Cecilio Joy, OT Occupational Therapist                   Goals/Plan       Row Name 06/14/25 1444          Bed Mobility Goal 1 (OT)    Activity/Assistive Device (Bed Mobility Goal 1, OT) sit to supine;supine to sit  -KG     [place] Level/Cues Needed (Bed Mobility Goal 1, OT) contact guard required;minimum assist (75% or more patient effort)  -KG     Time Frame (Bed Mobility Goal 1, OT) short term goal (STG);2 weeks  -KG     Progress/Outcomes (Bed Mobility Goal 1, OT) new goal  -KG       Row Name 06/14/25 1444          Transfer Goal 1 (OT)    Activity/Assistive Device (Transfer Goal 1, OT) sit-to-stand/stand-to-sit;bed-to-chair/chair-to-bed;toilet  -KG     [place] Level/Cues Needed (Transfer Goal 1, OT) minimum assist (75% or more patient effort)  -KG     Time Frame (Transfer Goal 1, OT) short term goal (STG);2 weeks  -KG     Progress/Outcome (Transfer Goal 1, OT) new goal  -KG       Row Name 06/14/25 1444          Bathing Goal 1 (OT)    Activity/Device (Bathing Goal 1, OT) upper  body bathing;lower body bathing  -KG     [place] Level/Cues Needed (Bathing Goal 1, OT) minimum assist (75% or more patient effort)  -KG     Time Frame (Bathing Goal 1, OT) short term goal (STG);2 weeks  -KG     Progress/Outcomes (Bathing Goal 1, OT) new goal  -KG       Row Name 06/14/25 1444          Dressing Goal 1 (OT)    Activity/Device (Dressing Goal 1, OT) upper body dressing;lower body dressing  -KG     [place]/Cues Needed (Dressing Goal 1, OT) minimum assist (75% or more patient effort)  -KG     Time Frame (Dressing Goal 1, OT) short term goal (STG);2 weeks  -KG     Progress/Outcome (Dressing Goal 1, OT) new goal  -KG       Row Name 06/14/25 1444          Toileting Goal 1 (OT)    Activity/Device (Toileting Goal 1, OT) adjust/manage clothing;perform perineal hygiene  -KG     [place] Level/Cues Needed (Toileting Goal 1, OT) minimum assist (75% or more patient effort)  -KG     Time Frame (Toileting Goal 1, OT) short term goal (STG);2 weeks  -KG     Progress/Outcome (Toileting Goal 1, OT) new goal  -KG       Row Name 06/14/25 1444          Grooming Goal 1 (OT)    Activity/Device (Grooming Goal 1, OT) oral care;wash face, hands  -KG     [place] (Grooming Goal 1, OT) minimum assist (75% or more patient effort)  -KG     Time Frame (Grooming Goal 1, OT) short term goal (STG);2 weeks  -KG     Progress/Outcome (Grooming Goal 1, OT) new goal  -KG       Row Name 06/14/25 1444          Therapy Assessment/Plan (OT)    Planned Therapy Interventions (OT) activity tolerance training;adaptive equipment training;BADL retraining;functional balance retraining;occupation/activity based interventions;ROM/therapeutic exercise;patient/caregiver education/training;passive ROM/stretching;strengthening exercise;transfer/mobility retraining  -KG               User Key  (r) = Recorded By, (t) = Taken By, (c) = Cosigned By      Initials Name Provider Type    KG Cecilio Joy, OT Occupational Therapist                    Clinical Impression       Row Name 06/14/25 1436          Pain Assessment    Pretreatment Pain Rating 0/10 - no pain  -KG     Posttreatment Pain Rating 0/10 - no pain  -KG       Row Name 06/14/25 1436          Plan of Care Review    Plan of Care Reviewed With patient;family  -KG     Outcome Evaluation Pt admitted to [facility] for L MCA watershed stroke ans symptomatic L stenosis. Transferred to [facility] w/ plan for elective left supraclinoid stenting. Pt lives alone and is (I) w/ BADLs, IADLs and mobility at baseline. Today, he presents w/ decreased balance, activity tolerance, coordiation/motor planning, and overall impaired cognition and R side weakness. Required Mod A for bed mobility. Performed STS x3 w/ Mod A + HHA. Side stepped to HOB w/ Min<>Mod A + HHA. Notified RN of BP drop to 101/78 (goal -140). OT will continue to follow to address functional deficits and maximize performance prior to d/c. Would benefit from IRF to return to (I) baseline.  -KG       Row Name 06/14/25 1436          Therapy Assessment/Plan (OT)    Rehab Potential (OT) good  -KG     Criteria for Skilled Therapeutic Interventions Met (OT) skilled treatment is necessary  -KG     Therapy Frequency (OT) 5 times/wk  -KG       Row Name 06/14/25 1436          Therapy Plan Review/Discharge Plan (OT)    Anticipated Discharge Disposition (OT) inpatient rehabilitation facility  -KG       Row Name 06/14/25 1436          Vital Signs    Pre Systolic BP Rehab 119  -KG     Pre Treatment Diastolic BP 80  -KG     Post Systolic BP Rehab 101  -KG     Post Treatment Diastolic BP 78  -KG     Pre Patient Position Supine  -KG     Intra Patient Position Standing  -KG     Post Patient Position Supine  -KG       Row Name 06/14/25 1436          Positioning and Restraints    In Bed notified nsg;supine;call light within reach;encouraged to call for assist;exit alarm on;with family/caregiver  -KG               User Key  (r) = Recorded By, (t) = Taken By, (c) =  Cosigned By      Initials Name Provider Type    Cecilio Gomez, CASTILLO Occupational Therapist                   Outcome Measures       Row Name 06/14/25 1445          How much help from another is currently needed...    Putting on and taking off regular lower body clothing? 2  -KG     Bathing (including washing, rinsing, and drying) 2  -KG     Toileting (which includes using toilet bed pan or urinal) 2  -KG     Putting on and taking off regular upper body clothing 3  -KG     Taking care of personal grooming (such as brushing teeth) 3  -KG     Eating meals 3  -KG     AM-PAC 6 Clicks Score (OT) 15  -KG       Row Name 06/14/25 1241 06/14/25 0730       How much help from another person do you currently need...    Turning from your back to your side while in flat bed without using bedrails? 3  -AR 3  -DS    Moving from lying on back to sitting on the side of a flat bed without bedrails? 2  -AR 2  -DS    Moving to and from a bed to a chair (including a wheelchair)? 2  -AR 2  -DS    Standing up from a chair using your arms (e.g., wheelchair, bedside chair)? 2  -AR 2  -DS    Climbing 3-5 steps with a railing? 1  -AR 2  -DS    To walk in hospital room? 1  -AR 2  -DS    AM-PAC 6 Clicks Score (PT) 11  -AR 13  -DS    Highest Level of Mobility Goal Move to Chair/Commode-4  -AR Move to Chair/Commode-4  -DS      Row Name 06/14/25 1445          Modified Bronson Scale    Modified [place] Scale 4 - Moderately severe disability.  Unable to walk without assistance, and unable to attend to own bodily needs without assistance.  -KG       Row Name 06/14/25 1445 06/14/25 1241       Functional Assessment    Outcome Measure Options AM-PAC 6 Clicks Daily Activity (OT);Modified Bronson  -KG AM-PAC 6 Clicks Basic Mobility (PT)  -AR              User Key  (r) = Recorded By, (t) = Taken By, (c) = Cosigned By      Initials Name Provider Type    Preeti Tony, PT Physical Therapist    Lexie Amador, RN Registered Nurse    Cecilio Gomez, OT  Occupational Therapist                      OT Recommendation and Plan  Planned Therapy Interventions (OT): activity tolerance training, adaptive equipment training, BADL retraining, functional balance retraining, occupation/activity based interventions, ROM/therapeutic exercise, patient/caregiver education/training, passive ROM/stretching, strengthening exercise, transfer/mobility retraining  Therapy Frequency (OT): 5 times/wk  Plan of Care Review  Plan of Care Reviewed With: patient, family  Outcome Evaluation: Pt admitted to [facility] for L MCA watershed stroke ans symptomatic L stenosis. Transferred to [facility] w/ plan for elective left supraclinoid stenting. Pt lives alone and is (I) w/ BADLs, IADLs and mobility at baseline. Today, he presents w/ decreased balance, activity tolerance, coordiation/motor planning, and overall impaired cognition and R side weakness. Required Mod A for bed mobility. Performed STS x3 w/ Mod A + HHA. Side stepped to HOB w/ Min<>Mod A + HHA. Notified RN of BP drop to 101/78 (goal -140). OT will continue to follow to address functional deficits and maximize performance prior to d/c. Would benefit from IRF to return to (I) baseline.     Time Calculation:   Evaluation Complexity (OT)  Review Occupational Profile/Medical/Therapy History Complexity: expanded/moderate complexity  Assessment, Occupational Performance/Identification of Deficit Complexity: 3-5 performance deficits  Clinical Decision Making Complexity (OT): detailed assessment/moderate complexity  Overall Complexity of Evaluation (OT): moderate complexity     Time Calculation- OT       Row Name 06/14/25 1446             Time Calculation- OT    OT Start Time 1400  -KG      OT Stop Time 1420  -KG      OT Time Calculation (min) 20 min  -KG      Total Timed Code Minutes- OT 12 minute(s)  -KG      OT Non-Billable Time (min) 8 min  -KG      OT Received On 06/14/25  -KG      OT - Next Appointment 06/16/25  -KG      OT Goal  Re-Cert Due Date 06/28/25  -KG         Timed Charges    59773 - OT Therapeutic Activity Minutes 12  -KG         Untimed Charges    OT Eval/Re-eval Minutes 8  -KG         Total Minutes    Timed Charges Total Minutes 12  -KG      Untimed Charges Total Minutes 8  -KG       Total Minutes 20  -KG                User Key  (r) = Recorded By, (t) = Taken By, (c) = Cosigned By      Initials Name Provider Type    KG Cecilio Joy OT Occupational Therapist                  Therapy Charges for Today       Code Description Service Date Service Provider Modifiers Qty    74758773427 HC OT THERAPEUTIC ACT EA 15 MIN 6/14/2025 Cecilio Joy OT GO 1    16978443263 HC OT EVAL MOD COMPLEXITY 2 6/14/2025 Cecilio Joy OT GO 1                 Cecilio Joy OT  6/14/2025

## 2025-06-14 NOTE — PLAN OF CARE
"Goal Outcome Evaluation:  Plan of Care Reviewed With: patient, family        Progress: no change     Pt transferred from [facility] at 1545. Pt alert and disoriented to time and situation. NIH 4 for confusion, right facial droop, RUE drift, and dysarthria. Heparin gtt infusing and pressors titrated to keep -140 per Neurology. Pt denying pain until close to shift change when noted to be more restless in bed and telling RN he's had a headache \"all day\" since at [facility]. CT head obtained and Dr. Nice from Neurology updated and planning to take a look. Pt maxed on high flow NC and speech therapist evaluated. Per Dr. Cleary, keep NPO due to concern of pulmonary edema vs. Aspiration pneumonia. IV Lasix given and good urine output. Pt's POA updated at bedside. Home medication reconciliation completed to the best of knowledge per POA. Ongoing care provided.                              "

## 2025-06-14 NOTE — PLAN OF CARE
Goal Outcome Evaluation:              Outcome Evaluation: Pt admitted from [facility] 6/13.  [facility], +L MCA watershed CVA and symptomatic L stenosis.  Keep SBP between 120-140.  Plan for endarterectomy  6/16.  Pt is normally independent, no use of AD,  no assist w/ groceries/errands.  Today pt demonstrates weakness, impaired sitting and standing balance, activiyt tolerance and ind. w/ mobiilty but able to sit up w/ min A.  Stood once w/ min A x2 and HHAx2.  Limited by dizziness.  Held further activity due to BP to 112/74, RN aware.  Currently recommend DC to acute rehab.    Anticipated Discharge Disposition (PT): inpatient rehabilitation facility

## 2025-06-14 NOTE — PLAN OF CARE
Goal Outcome Evaluation:  Plan of Care Reviewed With: patient, family        Progress: no change        Pt alert and disoriented to time and situation. NIH and neuro checks stable. Pt more pleasant today as family has visited. Pt denying pain, but endorsing discomfort of chronic back pain/sitting more in bed- PT/OT worked with pt and repositioning provided. Pt's O2 needs remain elevated 8-12L high flow NC. Concern for aspiration pneumonia and pt made NPO per Pulmonary except essential meds with applesauce. Blood cultures & MRSA swab sent & anti-biotics adjusted. Voiding in external catheter. Heparin gtt and Lewis infusing per order. Electrolyte replacement initiated as pt tolerates. Cardiology to assess for clearance for potential surgery Monday per Vascular Surgery. Ongoing care provided.

## 2025-06-14 NOTE — CONSULTS
Neurology Consult Note    Consult Date: 6/14/2025    Referring MD: Provider, No Known    Reason for Consult I have been asked to see the patient in neurological consultation to render advice and opinion regarding right-sided weakness and aphasia      Shukri Park is a 84 y.o. male with past medical history of congestive heart failure with reduced ejection fracture, atrial fibrillation on anticoagulation, hypertension, hyperlipidemia presented to [facility] ED on 6/10/2025 with acute confusion.  He was found confused on 6/8/2025 at a grocery store later on 610 he was found on the floor by neighbor incontinent of urine confused and irritable so he was brought into the ED. After admission to [facility] he had an MRI brain which showed left MCA watershed ischemic strokes latest CTA head and neck were done which showed intracranial atherosclerotic disease along with left supraclinoid severe stenosis and was transferred to [facility] ICU for elective left supraclinoid stenting.        Past Medical History:   Diagnosis Date    Aortic aneurysm     Chronic anticoagulation 04/17/2013    Chronic HFrEF (heart failure with reduced ejection fraction)     Chronic hypotension 07/14/2020    Chronic pain 07/02/2020    Coronary artery disease     Foot pain, bilateral     GERD (gastroesophageal reflux disease)     History of lung cancer 07/14/2020    Hyperlipidemia     Inguinodynia, right 12/23/2020    Added automatically from request for surgery [id number]      Ischemic cardiomyopathy 07/01/2020    Added automatically from request for surgery [id number]      Low back pain     Lung cancer     Mixed hyperlipidemia 04/17/2013    Persistent atrial fibrillation 07/01/2020    Added automatically from request for surgery [id number]      Presence of biventricular implantable cardioverter-defibrillator (ICD) 08/14/2020    S/P epidural steroid injection     Israel Gomes's - no relief    Vitamin D deficiency 04/05/2016    Weight  "loss     acute loss of weight 30 lbs       Exam  /85   Pulse 72   Temp 98.2 °F (36.8 °C) (Oral)   Resp 28   Ht 188 cm (74\")   Wt 68.5 kg (151 lb 0.2 oz)   SpO2 93%   BMI 19.39 kg/m²   Gen: NAD, vitals reviewed  MS: Awake oriented x 3 normal comprehension repetition fluency impaired  CN: visual acuity grossly normal, PERRL, EOMI, mild facial droop and mild dysarthria  Motor: Right upper extremity strength 4 -/5 right lower extremity 4+/  Left upper left lower 5/5    DATA:    Lab Results   Component Value Date    GLUCOSE 94 06/14/2025    CALCIUM 7.3 (L) 06/14/2025     (H) 06/14/2025    K 3.3 (L) 06/14/2025    CO2 19.0 (L) 06/14/2025     (H) 06/14/2025    BUN 15.0 06/14/2025    CREATININE 0.70 (L) 06/14/2025    EGFRIFNONA 87 12/29/2020    BCR 21.4 06/14/2025    ANIONGAP 13.0 06/14/2025     Lab Results   Component Value Date    WBC 15.27 (H) 06/14/2025    HGB 11.9 (L) 06/14/2025    HCT 34.9 (L) 06/14/2025    MCV 96.1 06/14/2025     (L) 06/14/2025   Vitals  Afebrile  Pulse rate 70s  Respiratory rate 26-28  Systolic blood pressure 110-130s    Lab review:   Sodium 147  Creatinine 0.7  AST 43 ALT 27  Blood glucose 94    proBNP 7900    A1c 5.7  B12 805  LDL 92    WBC 15  Hemoglobin 11.9 and platelets 113    Imaging review:   CT head without left temporal lobe hypodensity no other acute hypodensity or hypodensity appreciated    CTA head and neck showed left ICA carotid bulb possible mural thrombus with ulcerated plaque.    MRI brain acute watershed diffusion restriction along the left MCA region the largest stroke being the left parietal lobe no SWI changes, T2 flair showed mild small vessel disease    Repeat CT head done at [facility] 6/13/2025 showed small hypodensity along the left parietal lobe but no other acute hypodensity or hyperdensity    Diagnoses:  Acute left MCA watershed and stroke    Symptomatic left ICA atherosclerosis with stenosis and ulcerative " plaque    Coronary artery disease  Hypertension  Atrial fibrillation  Congestive heart failure  Hyperlipidemia    Pre-stroke MRS: 1 (at baseline is able to drive sometimes lives by himself cooks and takes care of his ADLs)  NIHSS: 4    Etiology of the stroke most likely due to symptomatic left ICA atherosclerosis with ulcerated plaque/possible thrombus    Systolic blood pressure goal greater than 120  Continue aspirin 81 mg daily and heparin drip  q2 neurochecks  I have read vascular neurology note plan to do left CEA on Monday I agree with the plan    Neurology will follow peripherally kindly call us back with any questions.      MDM   Reviewed: Previous charts, nursing notes and vitals   Reviewed: Previous labs and CT/CTA/MRI scan    Interpretation: Labs and CT/CTA/MRI scan   Total time providing critical care is :40 minutes. This excluded time spent performing separately reportable procedures and services  Consults :Neurology/Stroke    Please note that portions of this note were completed with a voice recognition program.     Abel Nice MD  Neuro Hospitalist /Vascular Neurology.

## 2025-06-14 NOTE — CONSULTS
Date of Consultation: 25    Referral Provider: Jose Armando Hare MD.    Reason for Consultation: Preop clearance, persistent atrial fibrillation    Encounter Provider: Jd Caballero MD    Group of Service: [facility]     Patient Name: Shukri Park    :1940    Chief complaint: Right sided weakness.     History of Present Illness:      This is a pleasant 84 year-old male who follows with Dr. Cronin in [place]. He has a past medical history significant for, atrial fibrillation, ischemic cardiomyopathy, s/p ICD, coronary artery disease.  He also takes midodrine for orthostatic hypotension.    He was admitted in 2020 with shortness of breath and palpitations.  He was noted to be in atrial fibrillation with rapid ventricular response.  He was initially treated with medication, but became hypotensive.  He subsequently underwent an AV node ablation and had a biventricular AICD placed.  Unfortunately, his left-sided lead was not functioning appropriately because of stimulation of the diaphragm.  It was turned off.  He also underwent a cardiac catheterization that showed high-grade stenosis of RCA, but PCI to RCA was unsuccessful.  He had nonobstructive disease of left coronary artery was noted.  LVEF was 35% on echocardiogram at that time, although he recovered function with an EF of 55% in 2023.    The patient presented to [facility] on 6/10/2025 with acute right-sided weakness and speech difficulty.  He was placed on vasopressors for hypotension.  An angiogram of the head and neck showed left carotid stenosis and thrombus.  He was started on a heparin drip, and ultimately was transferred to [facility] for further care.  On arrival, he was found to be in respiratory distress, and received IV Lasix.  His chest x-ray showed bilateral infiltrates, possibly consistent with aspiration pneumonia.  He has been started on IV antibiotics.  He is being considered for carotid  artery surgery, which is scheduled on 6/16/2025 with Dr. Alvarez.  Patient denied any chest pain on questioning.  His EKG shows ventricular pacing.      ECHO 6/11/2025    Left ventricular systolic function is normal. Left ventricular ejection fraction appears to be 56 - 60%.    The left atrial cavity is moderate to severely dilated.    The right atrial cavity is mildly dilated.    Severe mitral valve regurgitation is present.    Estimated right ventricular systolic pressure from tricuspid regurgitation is mildly elevated (35-45 mmHg). Calculated right ventricular systolic pressure from tricuspid regurgitation is 43 mmHg.    Mild dilation of the ascending aorta is present.    Stress Test 7/8/2020  Diaphragmatic attenuation artifact is present.  Left ventricular ejection fraction is mildly reduced (Calculated EF = 43%).  Myocardial perfusion imaging indicates a normal myocardial perfusion study with no evidence of ischemia.  Impressions are consistent with a low risk study.  This is normal Cardiolite imaging stress test with no evidence of ischemia or myocardial infarction. Small fixed inferior defect was noted which showed normal thickening and contractility on gated SPECT consistent with attenuation artifact, however small myocardial infarction cannot be excluded. Clinical correlation recommended. Left ventricle size and function is normal on gated SPECT imaging. No wall motion abnormality was noted. Clinical correlation recommended. Further recommendation a  Findings consistent with an equivocal ECG stress test.    Cardiac Catheterization 7/9/2020  SUMMARY:   1.  Three-vessel coronary artery disease with patent stent in LCx and 50% stenosis of LAD  2.  High-grade stenosis of RCA within the previous stent and it was heavily calcified  3.  Failed PCI to RCA     RECOMMENDATIONS: Recommend aspirin and Plavix    Past Medical History:   Diagnosis Date    Aortic aneurysm     Chronic anticoagulation 04/17/2013    Chronic  HFrEF (heart failure with reduced ejection fraction)     Chronic hypotension 07/14/2020    Chronic pain 07/02/2020    Coronary artery disease     Foot pain, bilateral     GERD (gastroesophageal reflux disease)     History of lung cancer 07/14/2020    Hyperlipidemia     Inguinodynia, right 12/23/2020    Added automatically from request for surgery [id number]      Ischemic cardiomyopathy 07/01/2020    Added automatically from request for surgery [id number]      Low back pain     Lung cancer     Mixed hyperlipidemia 04/17/2013    Persistent atrial fibrillation 07/01/2020    Added automatically from request for surgery [id number]      Presence of biventricular implantable cardioverter-defibrillator (ICD) 08/14/2020    S/P epidural steroid injection     Israel Gomes's - no relief    Vitamin D deficiency 04/05/2016    Weight loss     acute loss of weight 30 lbs         Past Surgical History:   Procedure Laterality Date    CARDIAC CATHETERIZATION N/A 7/9/2020    Procedure: LEFT HEART CATH with possible PCI;  Surgeon: Wade Cronin MD;  Location: [facility] CATH INVASIVE LOCATION;  Service: Cardiovascular;  Laterality: N/A;  Local and IV sedation    CARDIAC CATHETERIZATION N/A 7/9/2020    Procedure: Percutaneous Coronary Intervention;  Surgeon: Wade Cronin MD;  Location: [facility] CATH INVASIVE LOCATION;  Service: Cardiovascular;  Laterality: N/A;    CARDIAC DEFIBRILLATOR PLACEMENT      CARDIAC ELECTROPHYSIOLOGY PROCEDURE N/A 7/10/2020    Procedure: Biventricular Device Insertion;  Surgeon: Jonathan Denney MD;  Location: [facility] CATH INVASIVE LOCATION;  Service: Cardiovascular;  Laterality: N/A;    CARDIAC ELECTROPHYSIOLOGY PROCEDURE N/A 7/10/2020    Procedure: ABLATION AV NODE;  Surgeon: Jonathan Denney MD;  Location: [facility] CATH INVASIVE LOCATION;  Service: Cardiovascular;  Laterality: N/A;    CARDIAC ELECTROPHYSIOLOGY PROCEDURE N/A 7/10/2020    Procedure: AV node ablation;  Surgeon: Jonathan Denney MD;  Location: [facility]  CATH INVASIVE LOCATION;  Service: Cardiovascular;  Laterality: N/A;    CARDIOVASCULAR STRESS TEST  2020    CATARACT EXTRACTION, BILATERAL      CONVERTED (HISTORICAL) HOLTER  2020    CORONARY ANGIOPLASTY WITH STENT PLACEMENT      ENDOSCOPY N/A 7/5/2020    Procedure: ESOPHAGOGASTRODUODENOSCOPY;  Surgeon: Neal Correa MD;  Location: [facility] ENDOSCOPY;  Service: Gastroenterology;  Laterality: N/A;    INGUINAL HERNIA REPAIR Right 1/7/2021    Procedure: INGUINAL HERNIA REPAIR OPEN WITH MESH;  Surgeon: Cody Randall MD;  Location: [facility] MAIN OR;  Service: General;  Laterality: Right;    LUMBAR DECOMPRESSION  09/2015    L2-L3    LUMBAR DECOMPRESSION  2006    Dr. Logan    OTHER SURGICAL HISTORY  05/2015    left SI fusion with bone graft - Dr. Presley    OTHER SURGICAL HISTORY      carotid artery repair     OTHER SURGICAL HISTORY Left 02/19/2016    Left SI fusion 2/19/2016 Dr. Zendejas    POSTERIOR SPINAL FUSION  10/24/2016    PSF T12-3/TLIF L3-L4 poss L2-L3 --- Dr. 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     Current Outpatient Medications on File Prior to Encounter   Medication Sig Dispense Refill    ipratropium (ATROVENT) 0.03 % nasal spray Administer 2 sprays into the nostril(s) as directed by provider 3 (Three) Times a Day.      latanoprost (XALATAN) 0.005 % ophthalmic solution Administer 1 drop to both eyes Every Night.      lisinopril (PRINIVIL,ZESTRIL) 2.5 MG tablet Take 1 tablet by mouth Daily.      pantoprazole (PROTONIX) 40 MG EC tablet Take 1 tablet by mouth Daily. 30 tablet 0    simvastatin (ZOCOR) 40 MG tablet Take 1 tablet by mouth Daily.      Xarelto 15 MG tablet TAKE 1 TABLET BY MOUTH EVERY DAY 60  "tablet 3    ampicillin-sulbactam (UNASYN) 1.5 gm IVPB in 100 mL NS (MBP) Infuse 1.5 g into a venous catheter Every 6 (Six) Hours for 8 doses. Indications: Pneumonia      aspirin 81 MG EC tablet Take 4 tablets by mouth Daily for 30 days. 120 tablet 0    docusate sodium (COLACE) 100 MG capsule Take 1 capsule by mouth 2 (Two) Times a Day.      ferrous sulfate 325 (65 FE) MG tablet Take 1 tablet by mouth Daily With Breakfast. Hold DOs      midodrine (PROAMATINE) 5 MG tablet Take 1 tablet by mouth Every 8 (Eight) Hours. (Patient taking differently: Take 1 tablet by mouth 2 (Two) Times a Day.)      Multiple Vitamin (MULTIVITAMIN) tablet Take 1 tablet by mouth Daily. LD 1/2      vitamin B-12 (CYANOCOBALAMIN) 1000 MCG tablet Take 1 tablet by mouth Daily.           Social History     Socioeconomic History    Marital status:    Tobacco Use    Smoking status: Former     Current packs/day: 0.00     Types: Cigarettes     Quit date: 1989     Years since quittin.5     Passive exposure: Past    Smokeless tobacco: Never   Vaping Use    Vaping status: Never Used   Substance and Sexual Activity    Alcohol use: No    Drug use: Never    Sexual activity: Defer         Family History   Problem Relation Age of Onset    Cancer Other     Hyperlipidemia Other     Heart disease Other        REVIEW OF SYSTEMS:   Pertinent positives are noted in HPI above.  Otherwise, all other systems were reviewed, and are negative.     Objective:     Vitals:    25 1500 25 1507 25 1521 25 1600   BP: 125/82 125/82  120/85   BP Location:    Left arm   Patient Position:    Lying   Pulse: 70 72 70 70   Resp:   22 28   Temp:       TempSrc:       SpO2: 96%  97% 98%   Weight:       Height:         Body mass index is 19.39 kg/m².  Flowsheet Rows      Flowsheet Row First Filed Value   Admission Height 188 cm (74\") Documented at 2025   Admission Weight 68.5 kg (151 lb 0.2 oz) Documented at 2025 033             " General:    No acute distress, alert, ill-appearing.                   Head:    Normocephalic, atraumatic.   Eyes:          Conjunctivae and sclerae normal, no icterus.   Throat:   No oral lesions, no thrush, oral mucosa dry.    Neck:   Supple, trachea midline.   Lungs:     Rales and rhonchi noted bilaterally.     Heart:    Regular rhythm and normal rate. II/VI SM LLSB and apex.   Abdomen:     Soft, non-tender, non-distended, positive bowel sounds.    Extremities:   No clubbing, cyanosis, or edema.     Pulses:   Pulses palpable and equal bilaterally.    Skin:   No bleeding or rash.   Neuro:   Mild right upper extremity weakness.   Psychiatric:   Normal mood and affect.           Lab Review:                Results from last 7 days   Lab Units 06/14/25  0538   SODIUM mmol/L 147*   POTASSIUM mmol/L 3.3*   CHLORIDE mmol/L 115*   CO2 mmol/L 19.0*   BUN mg/dL 15.0   CREATININE mg/dL 0.70*   GLUCOSE mg/dL 94   CALCIUM mg/dL 7.3*     Results from last 7 days   Lab Units 06/13/25  0048 06/11/25  2357 06/10/25  2033 06/10/25  1928   CK TOTAL U/L 308* 660*  --  2,133*   HSTROP T ng/L  --   --  32* 35*     Results from last 7 days   Lab Units 06/14/25  0538   WBC 10*3/mm3 15.27*   HEMOGLOBIN g/dL 11.9*   HEMATOCRIT % 34.9*   PLATELETS 10*3/mm3 113*     Results from last 7 days   Lab Units 06/14/25  1300 06/14/25  0538 06/13/25  2358 06/13/25  0048 06/12/25  1748   INR   --   --   --   --  1.64*   APTT seconds 92.4* 103.3* 93.0*   < > 37.1*  37.2*    < > = values in this interval not displayed.     Results from last 7 days   Lab Units 06/11/25  0236   CHOLESTEROL mg/dL 153     Results from last 7 days   Lab Units 06/14/25  0538   MAGNESIUM mg/dL 1.7     Results from last 7 days   Lab Units 06/11/25  0236   CHOLESTEROL mg/dL 153   TRIGLYCERIDES mg/dL 116   HDL CHOL mg/dL 40   LDL CHOL mg/dL 92       EKG (reviewed by me personally):                Assessment:   1.  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  Bilateral pulmonary infiltrates (likely aspiration pneumonia)  4.  Acute on chronic hypotension (on baseline midodrine)  5.  Persistent atrial fibrillation  6.  Status post AV node ablation and biventricular ICD placement in 2020 (left ventricular lead subsequently turned off secondary to diaphragmatic stimulation)  7.  Coronary artery disease with high-grade stenosis of the RCA (in-stent restenosis of a previously placed stent) and 2020 (PCI unsuccessful at that time).  Nonobstructive disease of the 50% in the LAD at that time.  Patent stent in the left circumflex.  8.  History of ischemic cardiomyopathy with recovered ejection fraction  9.  COPD without acute exacerbation  10.  Severe mitral regurgitation  11.  Acute rhabdomyolysis  12.  Multifactorial chronic anemia  13.  Thrombocytopenia    Plan:       Again, he is scheduled for left carotid surgery on 6/16/2025 with Dr. Alvarez.  With regards to his cardiac status, he had pulmonary infiltrates on admission which may be aspiration.  It was unclear whether this also represented some degree of pulmonary edema.  He got a dose of Lasix yesterday and is being treated with antibiotics.  I would favor that this is very likely aspiration based on his clinical presentation and labs.  I will give him 1 more dose of IV Lasix 20 mg today.    I reviewed the echocardiogram images from [facility] on 6/11/2025.  I could not see his apex well, although I agree that his ejection fraction is at least 50% or more.  He does have fairly significant mitral regurgitation.  He has underlying persistent atrial fibrillation, although he is pacemaker dependent from a previous AV kelly ablation (paced from his ICD).  He does have coronary artery disease as noted above, and I suspect his RCA may be chronically occluded at this point.  However, he is not having anginal  symptoms.  His troponin was not consistent with ACS.  He is not appropriate for ischemic testing given the stroke and acute issues which are ongoing.    Overall, I would consider him to be at least moderate cardiac risk for carotid artery surgery.  However, other than diuresing him in case there is any pulmonary edema (again not confirmed), his other risk factors should be considered nonmodifiable.  This is a necessary and needed surgery.  I would recommend proceeding as planned on Monday.  He is currently on aspirin, Lipitor, and a heparin drip.  He is hypotensive on Lewis-Synephrine and midodrine, and beta-blockers are contraindicated currently.    Cardiology will continue to follow.    Thank you very much for this consult.    Nakul Caballero MD

## 2025-06-14 NOTE — PLAN OF CARE
Goal Outcome Evaluation:  Plan of Care Reviewed With: patient, family           Outcome Evaluation: Pt admitted to [facility] for L MCA watershed stroke ans symptomatic L stenosis. Transferred to [facility] w/ plan for elective left supraclinoid stenting. Pt lives alone and is (I) w/ BADLs, IADLs and mobility at baseline. Today, he presents w/ decreased balance, activity tolerance, coordiation/motor planning, and overall impaired cognition and R side weakness. Required Mod A for bed mobility. Performed STS x3 w/ Mod A + HHA. Side stepped to HOB w/ Min<>Mod A + HHA. Notified RN of BP drop to 101/78 (goal -140). OT will continue to follow to address functional deficits and maximize performance prior to d/c. Would benefit from IRF to return to (I) baseline.    Anticipated Discharge Disposition (OT): inpatient rehabilitation facility

## 2025-06-14 NOTE — PROGRESS NOTES
[facility]  [phone]  Dr. Jose Armando Hare    Subjective:  LOS: 1  [unfilled]      Chief Complaint: Left carotid artery stenosis    Requiring high flow cannula this morning.  Given Lasix yesterday with good output.    Objective   Vital Signs past 24hrs  Temp range: Temp (24hrs), Av °F (36.7 °C), Min:97.2 °F (36.2 °C), Max:98.3 °F (36.8 °C)    BP range: BP: ()/() 130/83  Pulse range: Heart Rate:  [69-74] 72  Resp rate range: Resp:  [26-38] 28  Device (Oxygen Therapy): humidified;high-flow nasal cannulaFlow (L/min) (Oxygen Therapy):  [8-15] 12  Oxygen range:SpO2:  [83 %-100 %] 99 %   Mechanical Ventilator:     Physical Exam  Eyes:      Pupils: Pupils are equal, round, and reactive to light.   Cardiovascular:      Rate and Rhythm: Normal rate and regular rhythm.   Pulmonary:      Breath sounds: Rales present.   Abdominal:      General: Bowel sounds are normal.      Palpations: Abdomen is soft. There is no mass.      Tenderness: There is no abdominal tenderness.   Musculoskeletal:         General: No swelling.   Neurological:      Mental Status: He is alert.      Comments: Right upper extremity weakness 4/5       Results Review:    I have reviewed the laboratory and imaging data since the last note by [facility] physician.  My annotations are noted in assessment and plan.      Result Review:  I have personally reviewed the results from last note by [facility] physician to 2025 13:39 EDT and agree with these findings:  [x]  Laboratory list / accordion  [x]  Microbiology  [x]  Radiology  []  EKG/Telemetry   []  Cardiology/Vascular   []  Pathology  []  Old records  []  Other:      Medication Review:  I have reviewed the current MAR.  My annotations are noted in assessment and plan.    aspirin, 81 mg, Oral, Daily  [START ON 6/15/2025] atorvastatin, 40 mg, Oral, Daily  budesonide, 0.5 mg, Nebulization, BID - RT  latanoprost, 1 drop, Both Eyes, Nightly  midodrine, 10 mg, Oral, Q8H  mupirocin, 1  Application, Each Nare, BID  neomycin-bacitracin-polymyxin, , Both Eyes, Q4H  pantoprazole, 40 mg, Oral, Daily  piperacillin-tazobactam, 3.375 g, Intravenous, Q8H  potassium chloride, 10 mEq, Intravenous, Q1H  potassium phosphate, 15 mmol, Intravenous, Once  senna-docusate sodium, 2 tablet, Oral, BID  sodium chloride, 10 mL, Intravenous, Q12H  sodium chloride, 10 mL, Intravenous, Q12H        heparin, 18 Units/kg/hr, Last Rate: 12.24 Units/kg/hr (06/14/25 0618)  phenylephrine, 0.5-3 mcg/kg/min, Last Rate: 0.5 mcg/kg/min (06/13/25 2030)      Lines, Drains & Airways       Active LDAs       Name Placement date Placement time Site Days    Peripheral IV 06/10/25 1927 20 G Anterior;Right Forearm 06/10/25  1927  Forearm  3    Peripheral IV 06/13/25 0814 20 G Anterior;Right;Upper Arm 06/13/25  0814  Arm  1    Peripheral IV 06/13/25 0814 Anterior;Distal;Right;Upper Arm 06/13/25  0814  Arm  1    External Urinary Catheter 06/12/25  1835  --  1                    PCCM Problems  Acute ischemic stroke, left MCA watershed  Left carotid artery stenosis with soft plaque  Bilateral pulmonary infiltrates  Fluid overload  Aspiration pneumonia  ? Acute hypotension also, on Lewis  Chronic hypotension on Midodrine  Severe MR  Afib on anticoagulation  COPD        THESE ARE NEW MEDICAL PROBLEMS TO ME.    Plan of Treatment    Discussed with neurology.  Plan is for carotid endarterectomy by vascular surgery on Monday.  Note vascular surgery input as well.  Continue aspirin and heparin drip.  Keep systolic blood pressure greater than 120.    Bilateral pulmonary infiltrates with some improvement after Lasix.  However persistent asymmetrical infiltrates on chest x-ray.  I suspect aspiration.  Elevated procalcitonin and white count is elevated.  Antibiotic was switched to Zosyn.  Cultures are sent.    Will ask speech to formally rule out dysphagia with video-swallow evaluation. NPO except meds with sips.    Hypotension requiring Lewis-Synephrine.   Also on midodrine at home.  Unclear how much of this is acute versus chronic.  Has severe MR underlying and history of ischemic cardiomyopathy.  Cardiology to see also for clearance for surgery.    COPD and not a presently in exacerbation.    His past medical history records indicate lung cancer.  I cannot find any evidence of this in epic.  Will try to investigate further.    I spent 35 mins critical care time in care of this patient outside of any procedures and not overlapping with any other provider.     Jose Armando Hare MD  06/14/25  13:39 EDT    Isolation status: No active isolations    Dietary Orders (From admission, onward)       Start     Ordered    06/13/25 1755  NPO Diet NPO Type: Strict NPO  Diet Effective Now        Question:  NPO Type  Answer:  Strict NPO    06/13/25 1756                    Part of this note may be an electronic transcription/translation of spoken language to printed text using the Dragon Dictation System.

## 2025-06-14 NOTE — PAYOR COMM NOTE
"Shukri Park (84 y.o. Male)     ATTN: NURSE REVIEWER  RE: INITIAL INPT AUTH CLINICALS   REF: [id number]  PLS REPLY TO SHUBHAM ERICKSON [phone] OR FAX# [phone]      Date of Birth   1940    Social Security Number       Address   [address] [place] [address]    Home Phone   [phone]    MRN   [id number]       Baptism   None    Marital Status                               Admission Date   6/13/2025    Admission Type   Urgent    Admitting Provider   Jose Armando Hare MD    Attending Provider   Jose Armando Hare MD    Department, Room/Bed   [facility] INTENSIVE CARE, I387/1       Discharge Date       Discharge Disposition       Discharge Destination                                 Attending Provider: Jose Armando Hare MD    Allergies: Ciprofloxacin, Amlodipine, Rocephin [Ceftriaxone]    Isolation: None   Infection: None   Code Status: No CPR    Ht: 188 cm (74\")   Wt: 68.5 kg (151 lb 0.2 oz)    Admission Cmt: None   Principal Problem: Symptomatic stenosis of left carotid artery [I65.22]                   Active Insurance as of 6/13/2025       Primary Coverage       Payor Plan Insurance Group Employer/Plan Group    ANTHEM MEDICARE REPLACEMENT ANTHEM MEDICARE ADVANTAGE PPO [id number]       Payor Plan Address Payor Plan Phone Number Payor Plan Fax Number Effective Dates    [address] [phone]  1/1/2024 - None Entered    [facility] [id number]         Subscriber Name Subscriber Birth Date Member ID       SHUKRI PARK 1940 [id number]                     Emergency Contacts        (Rel.) Home Phone Work Phone Mobile Phone    Sebastian Park LEGAL (Grandchild) -- -- [phone]    GAURAV PARK (Grandchild) -- -- [phone]    PARKJAMIEE (Sister) [phone] -- [phone]    JYOTSNA PARK (Son) -- -- [phone]                 History & Physical        Nilo Cleary MD at 06/13/25 5744          Group: [facility]         Critical care " admission note    Patient Identification:  Shukri Park  84 y.o.  male  1940  [id number]                 CC: Speech difficulty and right-sided weakness    History of Present Illness:  84-year-old male patient who is elderly and frail.  He was admitted to [facility] a few days ago on the 10th.  He was admitted with speech difficulty, hypotension and right sided weakness.  Per neurology the patient has had fluctuating systolic blood pressures and fluctuating mentation with speech difficulty.  His speech difficulty temporarily improved, they augmented his blood pressure with IV fluids.  He received large amount of IV fluids but the patient is also chronically hypotensive at home on midodrine.  Subsequently they realized that he needed blood pressure augmentation with Lewis-Synephrine.  The patient was transferred to the ICU and a call was made to this facility for further neurointerventional care.  CT angiogram of the head and neck at [facility] showed bulky plaque with left carotid stenosis and thrombus.  The patient was started on heparin drip.  Patient also started developing acute hypoxic respiratory failure with sudden increase requirement and FiO2.  He is not actually short of breath.  He is in no respiratory distress.  Dr. Nice from the neurology team here accepted the patient.  I discussed the patient with his power of , grandson at the bedside as well as the rest of family and nurse in the ICU.  Chart was reviewed.  NIH score was 3 prior to departure from [place]  Currently during my exam the patient is quite sleepy.  He is not really answering my questions, has some stuttering and his speech is difficult to understand due to his lack of dentures.  He does follow a few simple commands but he has noticeable right-sided weakness during exam    Review of Systems   Constitutional:  Negative for diaphoresis and fever.   HENT:  Negative for ear pain and sore throat.    Eyes:  Negative for pain  and visual disturbance.   Respiratory:  Negative for cough and shortness of breath.    Gastrointestinal:  Negative for abdominal pain and diarrhea.   Endocrine: Negative for cold intolerance and polyuria.   Genitourinary:  Negative for dysuria and hematuria.   Musculoskeletal:  Negative for joint swelling and myalgias.   Skin:  Negative for rash and wound.   Neurological:  Positive for speech difficulty and weakness. Negative for numbness.   Hematological:  Negative for adenopathy. Does not bruise/bleed easily.   Psychiatric/Behavioral:  Negative for agitation and confusion.        Past Medical History:  Past Medical History:   Diagnosis Date    Aortic aneurysm     Chronic anticoagulation 04/17/2013    Chronic HFrEF (heart failure with reduced ejection fraction)     Chronic hypotension 07/14/2020    Chronic pain 07/02/2020    Coronary artery disease     Foot pain, bilateral     GERD (gastroesophageal reflux disease)     History of lung cancer 07/14/2020    Hyperlipidemia     Inguinodynia, right 12/23/2020    Added automatically from request for surgery [id number]      Ischemic cardiomyopathy 07/01/2020    Added automatically from request for surgery [id number]      Low back pain     Lung cancer     Mixed hyperlipidemia 04/17/2013    Persistent atrial fibrillation 07/01/2020    Added automatically from request for surgery [id number]      Presence of biventricular implantable cardioverter-defibrillator (ICD) 08/14/2020    S/P epidural steroid injection     Israel Gomes's - no relief    Vitamin D deficiency 04/05/2016    Weight loss     acute loss of weight 30 lbs       Past Surgical History:  Past Surgical History:   Procedure Laterality Date    CARDIAC CATHETERIZATION N/A 7/9/2020    Procedure: LEFT HEART CATH with possible PCI;  Surgeon: Wade Cronin MD;  Location: [facility] CATH INVASIVE LOCATION;  Service: Cardiovascular;  Laterality: N/A;  Local and IV sedation    CARDIAC CATHETERIZATION N/A 7/9/2020    Procedure:  Percutaneous Coronary Intervention;  Surgeon: Wade Cronin MD;  Location: [facility] CATH INVASIVE LOCATION;  Service: Cardiovascular;  Laterality: N/A;    CARDIAC DEFIBRILLATOR PLACEMENT      CARDIAC ELECTROPHYSIOLOGY PROCEDURE N/A 7/10/2020    Procedure: Biventricular Device Insertion;  Surgeon: Jonathan Denney MD;  Location: [facility] CATH INVASIVE LOCATION;  Service: Cardiovascular;  Laterality: N/A;    CARDIAC ELECTROPHYSIOLOGY PROCEDURE N/A 7/10/2020    Procedure: ABLATION AV NODE;  Surgeon: Jonathan Denney MD;  Location: [facility] CATH INVASIVE LOCATION;  Service: Cardiovascular;  Laterality: N/A;    CARDIAC ELECTROPHYSIOLOGY PROCEDURE N/A 7/10/2020    Procedure: AV node ablation;  Surgeon: Jonathan Denney MD;  Location: [facility] CATH INVASIVE LOCATION;  Service: Cardiovascular;  Laterality: N/A;    CARDIOVASCULAR STRESS TEST  2020    CATARACT EXTRACTION, BILATERAL      CONVERTED (HISTORICAL) HOLTER  2020    CORONARY ANGIOPLASTY WITH STENT PLACEMENT      ENDOSCOPY N/A 7/5/2020    Procedure: ESOPHAGOGASTRODUODENOSCOPY;  Surgeon: Neal Correa MD;  Location: [facility] ENDOSCOPY;  Service: Gastroenterology;  Laterality: N/A;    INGUINAL HERNIA REPAIR Right 1/7/2021    Procedure: INGUINAL HERNIA REPAIR OPEN WITH MESH;  Surgeon: Cody Randall MD;  Location: [facility] MAIN OR;  Service: General;  Laterality: Right;    LUMBAR DECOMPRESSION  09/2015    L2-L3    LUMBAR DECOMPRESSION  2006    Dr. Logan    OTHER SURGICAL HISTORY  05/2015    left SI fusion with bone graft - Dr. Presley    OTHER SURGICAL HISTORY      carotid artery repair     OTHER SURGICAL HISTORY Left 02/19/2016    Left SI fusion 2/19/2016 Dr. Zendejas    POSTERIOR SPINAL FUSION  10/24/2016    PSF T12-3/TLIF L3-L4 poss L2-L3 --- Dr. 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        Home Meds:  Medications Prior to Admission   Medication Sig Dispense Refill Last Dose/Taking    ipratropium (ATROVENT) 0.03 % nasal spray  Administer 2 sprays into the nostril(s) as directed by provider 3 (Three) Times a Day.   Past Week    latanoprost (XALATAN) 0.005 % ophthalmic solution Administer 1 drop to both eyes Every Night.   Past Week    lisinopril (PRINIVIL,ZESTRIL) 2.5 MG tablet Take 1 tablet by mouth Daily.   Past Week    pantoprazole (PROTONIX) 40 MG EC tablet Take 1 tablet by mouth Daily. 30 tablet 0 Past Week    simvastatin (ZOCOR) 40 MG tablet Take 1 tablet by mouth Daily.   Past Week    Xarelto 15 MG tablet TAKE 1 TABLET BY MOUTH EVERY DAY 60 tablet 3 Past Week    ampicillin-sulbactam (UNASYN) 1.5 gm IVPB in 100 mL NS (MBP) Infuse 1.5 g into a venous catheter Every 6 (Six) Hours for 8 doses. Indications: Pneumonia       aspirin 81 MG EC tablet Take 4 tablets by mouth Daily for 30 days. 120 tablet 0     docusate sodium (COLACE) 100 MG capsule Take 1 capsule by mouth 2 (Two) Times a Day.       ferrous sulfate 325 (65 FE) MG tablet Take 1 tablet by mouth Daily With Breakfast. Hold DOs       midodrine (PROAMATINE) 5 MG tablet Take 1 tablet by mouth Every 8 (Eight) Hours. (Patient taking differently: Take 1 tablet by mouth 2 (Two) Times a Day.)       Multiple Vitamin (MULTIVITAMIN) tablet Take 1 tablet by mouth Daily. LD 1/2       vitamin B-12 (CYANOCOBALAMIN) 1000 MCG tablet Take 1 tablet by mouth Daily.          Allergies:  Allergies   Allergen Reactions    Ciprofloxacin Anaphylaxis    Amlodipine Unknown (See Comments)    Rocephin [Ceftriaxone] Other (See Comments)     Mouth sores       Social History:   Social History     Socioeconomic History    Marital status:    Tobacco Use    Smoking status: Former     Current packs/day: 0.00     Types: Cigarettes     Quit date: 1989     Years since quittin.5     Passive exposure: Past    Smokeless tobacco: Never   Vaping Use    Vaping status: Never Used   Substance and Sexual Activity    Alcohol use: No    Drug use: Never    Sexual activity: Defer       Family History:  Family  History   Problem Relation Age of Onset    Cancer Other     Hyperlipidemia Other     Heart disease Other        Physical Exam:  /98   Pulse 70   Temp 98.1 °F (36.7 °C) (Oral)   Resp (!) 38   SpO2 95%  There is no height or weight on file to calculate BMI. 95%    Physical Exam  Constitutional:       General: He is not in acute distress.     Appearance: He is well-developed.      Comments: Elderly who appears frail, debilitated, poorly nourished and has poor insight.  Has some speech difficulty   HENT:      Right Ear: External ear normal.      Left Ear: External ear normal.      Nose: Nose normal.      Mouth/Throat:      Mouth: Mucous membranes are dry.   Eyes:      General: No scleral icterus.     Conjunctiva/sclera: Conjunctivae normal.      Pupils: Pupils are equal, round, and reactive to light.   Neck:      Thyroid: No thyromegaly.      Vascular: No JVD.   Cardiovascular:      Rate and Rhythm: Normal rate and regular rhythm.      Heart sounds: No murmur heard.     Comments: No edema  Pulmonary:      Effort: Pulmonary effort is normal.      Breath sounds: Rales present. No wheezing.   Abdominal:      General: There is no distension.      Palpations: Abdomen is soft.      Comments: No liver or spleen enlargment palpable   Musculoskeletal:         General: No deformity.      Cervical back: Neck supple. No rigidity.      Comments: No deformity in all 4 extrem   Skin:     Findings: No erythema or rash.      Comments: No palpable nodules   Neurological:      Mental Status: He is alert.      Comments: Difficult to understand.  Patient mumbling due to lack of dentures.  Very soft voice.  Flat affect.  Follows commands but has clear right-sided upper extremity weakness.  Moves lower legs on command, moves left upper extremity on command   Psychiatric:      Comments: Flat affect, poor judgment and insight         LABS:        Results from last 7 days   Lab Units 06/13/25  0048 06/12/25 1952 06/11/25  1475  06/11/25  0236 06/10/25  1928   MAGNESIUM mg/dL  --   --   --  2.5* 2.5*   SODIUM mmol/L  --  144  --  144 144   POTASSIUM mmol/L 4.0 3.4*  --  4.1 4.4   CHLORIDE mmol/L  --  110*  --  107 102   CO2 mmol/L  --  24.2  --  20.3* 23.1   BUN mg/dL  --  22.0  --  37.0* 33.0*   CREATININE mg/dL  --  0.77  --  1.07 1.15   GLUCOSE mg/dL  --  125*  --  103* 93   CALCIUM mg/dL  --  9.1  --  9.4 10.1   WBC 10*3/mm3  --  12.10*  --  12.16* 14.74*   HEMOGLOBIN g/dL  --  13.1  --  13.4 14.2   PLATELETS 10*3/mm3  --  133*  --  131* 152   ALT (SGPT) U/L  --  27  --  26 33   AST (SGOT) U/L  --  43*  --  62* 77*   PROCALCITONIN ng/mL  --   --  0.08  --  0.07     Lab Results   Component Value Date    CKTOTAL 308 (H) 06/13/2025    TROPONINT 32 (H) 06/10/2025     Results from last 7 days   Lab Units 06/13/25  0048 06/11/25  2357 06/10/25  2033 06/10/25  1928   CK TOTAL U/L 308* 660*  --  2,133*   HSTROP T ng/L  --   --  32* 35*     Results from last 7 days   Lab Units 06/10/25  2033   BLOODCX  No growth at 2 days  No growth at 2 days     Results from last 7 days   Lab Units 06/12/25  1748 06/11/25  2357 06/10/25  2041 06/10/25  1928   PROCALCITONIN ng/mL  --  0.08  --  0.07   LACTATE mmol/L 2.0  --  1.3  --          Results from last 7 days   Lab Units 06/10/25  2036   ADENOVIRUS DETECTION BY PCR  Not Detected   CORONAVIRUS 229E  Not Detected   CORONAVIRUS HKU1  Not Detected   CORONAVIRUS NL63  Not Detected   CORONAVIRUS OC43  Not Detected   HUMAN METAPNEUMOVIRUS  Not Detected   HUMAN RHINOVIRUS/ENTEROVIRUS  Not Detected   INFLUENZA B PCR  Not Detected   PARAINFLUENZA 1  Not Detected   PARAINFLUENZA VIRUS 2  Not Detected   PARAINFLUENZA VIRUS 3  Not Detected   PARAINFLUENZA VIRUS 4  Not Detected   BORDETELLA PERTUSSIS PCR  Not Detected   BORDETELLA PARAPERTUSSIS PCR  Not Detected   CHLAMYDOPHILA PNEUMONIAE PCR  Not Detected   MYCOPLAMA PNEUMO PCR  Not Detected   RSV, PCR  Not Detected     Results from last 7 days   Lab Units  06/12/25  1748   INR  1.64*     Results from last 7 days   Lab Units 06/10/25  2033   BLOODCX  No growth at 2 days  No growth at 2 days     Lab Results   Component Value Date    TSH 0.758 06/10/2025     Estimated Creatinine Clearance: 66.9 mL/min (by C-G formula based on SCr of 0.77 mg/dL).  Results from last 7 days   Lab Units 06/10/25  1904   NITRITE UA  Negative   WBC UA /HPF 0-2   BACTERIA UA /HPF None Seen   SQUAM EPITHEL UA /HPF 0-2     Microbiology Results (last 10 days)       Procedure Component Value - Date/Time    Respiratory Panel PCR w/COVID-19(SARS-CoV-2) [facility] In-House, NP Swab in UTM/VTM, 2 HR TAT - Swab, Nasopharynx [[id number]]  (Normal) Collected: 06/10/25 2036    Lab Status: Final result Specimen: Swab from Nasopharynx Updated: 06/10/25 2130     ADENOVIRUS, PCR Not Detected     Coronavirus 229E Not Detected     Coronavirus HKU1 Not Detected     Coronavirus NL63 Not Detected     Coronavirus OC43 Not Detected     COVID19 Not Detected     Human Metapneumovirus Not Detected     Human Rhinovirus/Enterovirus Not Detected     Influenza A PCR Not Detected     Influenza B PCR Not Detected     Parainfluenza Virus 1 Not Detected     Parainfluenza Virus 2 Not Detected     Parainfluenza Virus 3 Not Detected     Parainfluenza Virus 4 Not Detected     RSV, PCR Not Detected     Bordetella pertussis pcr Not Detected     Bordetella parapertussis PCR Not Detected     Chlamydophila pneumoniae PCR Not Detected     Mycoplasma pneumo by PCR Not Detected    Narrative:      In the setting of a positive respiratory panel with a viral infection PLUS a negative procalcitonin without other underlying concern for bacterial infection, consider observing off antibiotics or discontinuation of antibiotics and continue supportive care. If the respiratory panel is positive for atypical bacterial infection (Bordetella pertussis, Chlamydophila pneumoniae, or Mycoplasma pneumoniae), consider antibiotic de-escalation  to target atypical bacterial infection.    Blood Culture - Blood, Arm, Left [[id number]]  (Normal) Collected: 06/10/25 2033    Lab Status: Preliminary result Specimen: Blood from Arm, Left Updated: 06/12/25 2045     Blood Culture No growth at 2 days    Blood Culture - Blood, Hand, Left [[id number]]  (Normal) Collected: 06/10/25 2033    Lab Status: Preliminary result Specimen: Blood from Hand, Left Updated: 06/12/25 2045     Blood Culture No growth at 2 days    Narrative:      Less than seven (7) mL's of blood was collected.  Insufficient quantity may yield false negative results.    Legionella Antigen, Urine - Urine, Straight Cath [[id number]]  (Normal) Collected: 06/10/25 1904    Lab Status: Final result Specimen: Urine from Straight Cath Updated: 06/11/25 1433     LEGIONELLA ANTIGEN, URINE Negative    S. Pneumo Ag Urine or CSF - Urine, Straight Cath [[id number]]  (Normal) Collected: 06/10/25 1904    Lab Status: Final result Specimen: Urine from Straight Cath Updated: 06/11/25 1433     Strep Pneumo Ag Negative               Imaging: I personally visualized the images of images of chest x-ray and CT angiogram of carotids and head.    Radiology reports are as follows:  .XR Chest 1 View  Result Date: 6/12/2025  Impression: Unchanged appearing patchy interstitial opacity in the periphery of the right midlung, which may represent chronic changes, no superimposed acute infection/inflammatory change is possible in the appropriate clinical setting. Recommend radiographic follow-up to resolution, and if persists, chest CT. Emphysema. Electronically Signed: Ashok Hills MD  6/12/2025 7:54 PM EDT  Workstation ID: [id number]     CT Angiogram Carotids  Result Date: 6/12/2025  Impression: 1.Bulky plaque along left carotid bifurcation resulting in focal 80% stenosis along left internal carotid, and slightly more distally is an anterior nonocclusive thrombus. 2.Soft plaque causing intraluminal web within distal V2 segment of left  vertebral artery. 3.Major intracranial arterial vasculature appears widely patent, with no evidence of thrombus. Electronically Signed: Clint Dotson MD  6/12/2025 3:40 PM EDT  Workstation ID: [id number]     CT Angiogram Head  Result Date: 6/12/2025  Impression: 1.Bulky plaque along left carotid bifurcation resulting in focal 80% stenosis along left internal carotid, and slightly more distally is an anterior nonocclusive thrombus. 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 3.Major intracranial arterial vasculature appears widely patent, with no evidence of thrombus. Electronically Signed: Clint Dotson MD  6/12/2025 3:40 PM EDT  Workstation ID: [id number]      Assessment:  Symptomatic stenosis of left carotid artery  Probable aspiration pneumonia versus pulmonary edema  Acute hypoxic respiratory failure  Speech difficulty due to impending stroke  Hypotension  Right-sided hemiparesis  COPD      Recommendations:  Admit to ICU.  Consult neurology and neurosurgery for probable intervention to left carotid stenosis.  Augment blood pressure with Lewis-Synephrine.  Patient on midodrine at home.  May have developed pulmonary edema from IV fluids given at [facility].  Will try low-dose Lasix if blood pressure tolerates.  Chest x-ray shows bilateral infiltrates but asymmetric, could be aspiration pneumonia.  Start empiric coverage with Rocephin IV for probable aspiration pneumonia.  Continue supplemental oxygen for now.  Nothing by mouth tonight.  Continue heparin for now.  Allow neurosurgery to decide antiplatelets and heparin management.  Start statin tomorrow once patient undergo swallow evaluation by speech therapy.  Hold all home medications especially blood pressure medications due to hypotension.  Check temperature curve, white count and procalcitonin level.    Total critical care time 35-minute       Nilo Cleary MD  6/13/2025  17:45 EDT      Much  of this encounter note is an electronic transcription/translation of spoken language to printed text using Dragon Software.    Electronically signed by Nilo Cleary MD at 06/13/25 1803       Facility-Administered Medications as of 6/14/2025   Medication Dose Route Frequency Provider Last Rate Last Admin    acetaminophen (TYLENOL) tablet 650 mg  650 mg Oral Q4H PRN Nilo Cleary MD        Or    acetaminophen (TYLENOL) suppository 650 mg  650 mg Rectal Q4H PRN Nilo Cleary MD        aspirin chewable tablet 81 mg  81 mg Oral Daily Abel Nice MD        [START ON 6/15/2025] atorvastatin (LIPITOR) tablet 40 mg  40 mg Oral Daily Abel Nice MD        sennosides-docusate (PERICOLACE) 8.6-50 MG per tablet 2 tablet  2 tablet Oral BID Nilo Cleary MD        And    polyethylene glycol (MIRALAX) packet 17 g  17 g Oral Daily PRN Nilo Cleary MD        And    bisacodyl (DULCOLAX) EC tablet 5 mg  5 mg Oral Daily PRN Nilo Cleary MD        And    bisacodyl (DULCOLAX) suppository 10 mg  10 mg Rectal Daily PRN Nilo Cleary MD        budesonide (PULMICORT) nebulizer solution 0.5 mg  0.5 mg Nebulization BID - RT Filippo Link APRN   0.5 mg at 06/14/25 0759    [COMPLETED] bumetanide (BUMEX) injection 2 mg  2 mg Intravenous Once Filippo Link APRN   2 mg at 06/13/25 1130    Calcium Replacement - Follow Nurse / BPA Driven Protocol   Not Applicable PRN Filippo Link APRN        [COMPLETED] furosemide (LASIX) injection 20 mg  20 mg Intravenous Once Nilo Cleary MD   20 mg at 06/13/25 1814    heparin 80860 units/250 mL (100 units/mL) in 0.45 % NaCl infusion  18 Units/kg/hr Intravenous Titrated Filippo Link APRN 8.24 mL/hr at 06/14/25 0618 12.24 Units/kg/hr at 06/14/25 0618    latanoprost (XALATAN) 0.005 % ophthalmic solution 1 drop  1 drop Both Eyes Nightly Filippo Link, APRN   1 drop at 06/13/25 2054    Magnesium Standard Dose Replacement - Follow Nurse / BPA  Driven Protocol   Not Applicable PRN Filippo Link APRN        [COMPLETED] midodrine (PROAMATINE) tablet 10 mg  10 mg Oral Once Filippo Link APRN   10 mg at 06/13/25 1015    midodrine (PROAMATINE) tablet 10 mg  10 mg Oral Q8H Filippo Link APRN        mupirocin (BACTROBAN) 2 % nasal ointment 1 Application  1 Application Each Nare BID Filippo Link APRN   1 Application at 06/13/25 2055    neomycin-bacitracin-polymyxin (NEOSPORIN) ophthalmic ointment   Both Eyes Q4H Filippo Link APRN   Given at 06/14/25 0902    nitroglycerin (NITROSTAT) SL tablet 0.4 mg  0.4 mg Sublingual Q5 Min PRN Filippo Link APRN        ondansetron ODT (ZOFRAN-ODT) disintegrating tablet 4 mg  4 mg Oral Q6H PRN Nilo Cleary MD        Or    ondansetron (ZOFRAN) injection 4 mg  4 mg Intravenous Q6H PRN Nilo Cleary MD        pantoprazole (PROTONIX) EC tablet 40 mg  40 mg Oral Daily Filippo Link APRN        phenylephrine (BRIANA-SYNEPHRINE) 50 mg in 250 mL NS infusion  0.5-3 mcg/kg/min Intravenous Titrated Abel Nice MD 10.11 mL/hr at 06/13/25 2030 0.5 mcg/kg/min at 06/13/25 2030    Phosphorus Replacement - Follow Nurse / BPA Driven Protocol   Not Applicable PRN Filippo Link APRN        [COMPLETED] piperacillin-tazobactam (ZOSYN) 3.375 g IVPB in 100 mL NS MBP (CD)  3.375 g Intravenous Once Jose Armando Hare MD   3.375 g at 06/14/25 1107    piperacillin-tazobactam (ZOSYN) 3.375 g IVPB in 100 mL NS MBP (CD)  3.375 g Intravenous Q8H Jose Armando Hare MD        [COMPLETED] potassium chloride (KLOR-CON) packet 40 mEq  40 mEq Oral Q4H Pedro Clark DO   40 mEq at 06/13/25 0143    potassium chloride 10 mEq in 100 mL IVPB  10 mEq Intravenous Q1H Jose Armando Hare MD        potassium phosphate 15 mmol in 0.9% normal saline 250 mL IVPB  15 mmol Intravenous Once Jose Armando Hare MD        Potassium Replacement - Follow Nurse / BPA Driven Protocol   Not Applicable PRN Filippo Link, APRN        sodium chloride 0.9 %  flush 10 mL  10 mL Intravenous Q12H Love, Filippo E, APRN   10 mL at 06/14/25 0851    sodium chloride 0.9 % flush 10 mL  10 mL Intravenous PRN Love, Filippo E, APRN        sodium chloride 0.9 % flush 10 mL  10 mL Intravenous PRN Love, Filippo E, APRN        sodium chloride 0.9 % flush 10 mL  10 mL Intravenous PRN Love, Filippo E, APRN        sodium chloride 0.9 % flush 10 mL  10 mL Intravenous Q12H Nilo Cleary MD   10 mL at 06/14/25 0851    sodium chloride 0.9 % flush 10 mL  10 mL Intravenous PRN Nilo Cleary MD        sodium chloride 0.9 % infusion 40 mL  40 mL Intravenous PRN Love, Filippo E, APRN        sodium chloride 0.9 % infusion 40 mL  40 mL Intravenous PRN Love, Filippo E, APRN        sodium chloride 0.9 % infusion 40 mL  40 mL Intravenous PRN Nilo Cleary MD         Lab Results (last 24 hours)       Procedure Component Value Units Date/Time    POC Glucose Once [[id number]]  (Normal) Collected: 06/14/25 1138    Specimen: Blood Updated: 06/14/25 1140     Glucose 103 mg/dL     Blood Culture - Blood, Arm, Right [[id number]] Collected: 06/14/25 1048    Specimen: Blood from Arm, Right Updated: 06/14/25 1115    Blood Culture - Blood, Arm, Left [[id number]] Collected: 06/14/25 1048    Specimen: Blood from Arm, Left Updated: 06/14/25 1115    MRSA Screen, PCR (Inpatient) - Swab, Nares [[id number]] Collected: 06/14/25 1054    Specimen: Swab from Nares Updated: 06/14/25 1114    Magnesium [[id number]]  (Normal) Collected: 06/14/25 0538    Specimen: Blood Updated: 06/14/25 0745     Magnesium 1.7 mg/dL     Basic Metabolic Panel [[id number]]  (Abnormal) Collected: 06/14/25 0538    Specimen: Blood Updated: 06/14/25 0744     Glucose 94 mg/dL      BUN 15.0 mg/dL      Creatinine 0.70 mg/dL      Sodium 147 mmol/L      Potassium 3.3 mmol/L      Comment: Slight hemolysis detected by analyzer. Result may be falsely elevated.        Chloride 115 mmol/L      CO2 19.0 mmol/L      Calcium 7.3 mg/dL      BUN/Creatinine Ratio  "21.4     Anion Gap 13.0 mmol/L      eGFR 90.3 mL/min/1.73     Narrative:      GFR Categories in Chronic Kidney Disease (CKD)              GFR Category          GFR (mL/min/1.73)    Interpretation  G1                    90 or greater        Normal or high (1)  G2                    60-89                Mild decrease (1)  G3a                   45-59                Mild to moderate decrease  G3b                   30-44                Moderate to severe decrease  G4                    15-29                Severe decrease  G5                    14 or less           Kidney failure    (1)In the absence of evidence of kidney disease, neither GFR category G1 or G2 fulfill the criteria for CKD.    eGFR calculation 2021 CKD-EPI creatinine equation, which does not include race as a factor    Phosphorus [[id number]]  (Abnormal) Collected: 06/14/25 0538    Specimen: Blood Updated: 06/14/25 0739     Phosphorus 2.2 mg/dL     Procalcitonin [[id number]]  (Abnormal) Collected: 06/14/25 0538    Specimen: Blood Updated: 06/14/25 0633     Procalcitonin 0.34 ng/mL     Narrative:      As a Marker for Sepsis (Non-Neonates):    1. <0.5 ng/mL represents a low risk of severe sepsis and/or septic shock.  2. >2 ng/mL represents a high risk of severe sepsis and/or septic shock.    As a Marker for Lower Respiratory Tract Infections that require antibiotic therapy:    PCT on Admission    Antibiotic Therapy       6-12 Hrs later    >0.5                Strongly Recommended  >0.25 - <0.5        Recommended   0.1 - 0.25          Discouraged              Remeasure/reassess PCT  <0.1                Strongly Discouraged     Remeasure/reassess PCT    As 28 day mortality risk marker: \"Change in Procalcitonin Result\" (>80% or <=80%) if Day 0 (or Day 1) and Day 4 values are available. Refer to http://www.[employer]-pct-calculator.com    Change in PCT <=80%  A decrease of PCT levels below or equal to 80% defines a positive change in PCT test result representing a " higher risk for 28-day all-cause mortality of patients diagnosed with severe sepsis for septic shock.    Change in PCT >80%  A decrease of PCT levels of more than 80% defines a negative change in PCT result representing a lower risk for 28-day all-cause mortality of patients diagnosed with severe sepsis or septic shock.       aPTT [[id number]]  (Abnormal) Collected: 06/14/25 0538    Specimen: Blood Updated: 06/14/25 0616     .3 seconds     CBC & Differential [[id number]]  (Abnormal) Collected: 06/14/25 0538    Specimen: Blood Updated: 06/14/25 0605    Narrative:      The following orders were created for panel order CBC & Differential.  Procedure                               Abnormality         Status                     ---------                               -----------         ------                     CBC Auto Differential[[id number]]        Abnormal            Final result                 Please view results for these tests on the individual orders.    CBC Auto Differential [[id number]]  (Abnormal) Collected: 06/14/25 0538    Specimen: Blood Updated: 06/14/25 0605     WBC 15.27 10*3/mm3      RBC 3.63 10*6/mm3      Hemoglobin 11.9 g/dL      Hematocrit 34.9 %      MCV 96.1 fL      MCH 32.8 pg      MCHC 34.1 g/dL      RDW 12.5 %      RDW-SD 43.9 fl      MPV 10.9 fL      Platelets 113 10*3/mm3      Neutrophil % 78.4 %      Lymphocyte % 13.7 %      Monocyte % 7.2 %      Eosinophil % 0.0 %      Basophil % 0.1 %      Immature Grans % 0.6 %      Neutrophils, Absolute 11.98 10*3/mm3      Lymphocytes, Absolute 2.09 10*3/mm3      Monocytes, Absolute 1.10 10*3/mm3      Eosinophils, Absolute 0.00 10*3/mm3      Basophils, Absolute 0.01 10*3/mm3      Immature Grans, Absolute 0.09 10*3/mm3      nRBC 0.0 /100 WBC     POC Glucose Once [[id number]]  (Normal) Collected: 06/14/25 0531    Specimen: Blood Updated: 06/14/25 0536     Glucose 98 mg/dL     aPTT [[id number]]  (Abnormal) Collected: 06/13/25 1974    Specimen: Blood  Updated: 06/14/25 0030     PTT 93.0 seconds     POC Glucose Once [[id number]]  (Normal) Collected: 06/14/25 0014    Specimen: Blood Updated: 06/14/25 0016     Glucose 125 mg/dL     BNP [[id number]]  (Abnormal) Collected: 06/13/25 1828    Specimen: Blood from Arm, Left Updated: 06/13/25 1911     proBNP 7,934.0 pg/mL     Narrative:      This assay is used as an aid in the diagnosis of individuals suspected of having heart failure. It can be used as an aid in the diagnosis of acute decompensated heart failure (ADHF) in patients presenting with signs and symptoms of ADHF to the emergency department (ED). In addition, NT-proBNP of <300 pg/mL indicates ADHF is not likely.    Age Range Result Interpretation  NT-proBNP Concentration (pg/mL:      <50             Positive            >450                   Gray                 300-450                    Negative             <300    50-75           Positive            >900                  Gray                300-900                  Negative            <300      >75             Positive            >1800                  Gray                300-1800                  Negative            <300    aPTT [[id number]]  (Abnormal) Collected: 06/13/25 1828    Specimen: Blood from Arm, Left Updated: 06/13/25 1902     PTT 95.8 seconds     POC Glucose Once [[id number]]  (Normal) Collected: 06/13/25 1520    Specimen: Blood Updated: 06/13/25 1521     Glucose 121 mg/dL           Imaging Results (Last 24 Hours)       Procedure Component Value Units Date/Time    XR Chest 1 View [[id number]] Collected: 06/14/25 0940     Updated: 06/14/25 0944    Narrative:      XR CHEST 1 VW-     HISTORY: Male who is 84 years-old, increased oxygen requirement     TECHNIQUE: Frontal views of the chest     COMPARISON: 6/13/2025     FINDINGS: The heart size is borderline. Left-sided pacemaker and cardiac  leads are seen. Aorta is tortuous. Extensive bilateral alveolar  interstitial opacities appear similar to prior  exam. Minimal pleural  effusions are suggested. No pneumothorax. No acute osseous process.       Impression:      As described.     This report was finalized on 6/14/2025 9:41 AM by Dr. Temo Sanchez M.D on Workstation: [id number]       CT Head Without Contrast [[id number]] Collected: 06/14/25 0728     Updated: 06/14/25 0728    Narrative:        Patient: SHWETA HUGGINS  Time Out: 07:28  Exam(s): CT HEAD Without Contrast     EXAM:    CT Head Without Intravenous Contrast    CLINICAL HISTORY:     Reason for exam: Stroke follow up-transferred from [facility].    TECHNIQUE:    Axial computed tomography images of the head brain without intravenous   contrast.  CTDI is 47.6 mGy and DLP is 890.2 mGy-cm.  This CT exam was   performed according to the principle of ALARA (As Low As Reasonably   Achievable) by using one or more of the following dose reduction   techniques: automated exposure control, adjustment of the mA and or kV   according to patient size, and or use of iterative reconstruction   technique.    COMPARISON:    Noncontrast head CT of 6 13 2025 (images, no report), MRI brain with   and without IV contrast of 6 11 2025.    FINDINGS:    Brain: There is subtle loss of gray-white differentiation in the left   posterior temporal lobe, suggestive of an evolving acute infarct.  No   findings of hemorrhagic transformation.  No new hypodensity suggestive of   an additional acute infarct.  Generalized parenchymal volume loss and   white matter low attenuation compatible with chronic ischemic   microvascular changes redemonstrated.  There is beam hardening artifact   in the posterior fossa that obscures evaluation of the brainstem.    Ventricles:  Unremarkable.  No hydrocephalus.    Bones joints:  Unremarkable.  No acute fracture.    Soft tissues:  Unremarkable.    Sinuses:  Unremarkable as visualized.  No acute sinusitis.    Mastoid air cells:  Unremarkable as visualized.  No mastoid effusion.    IMPRESSION:          Senescent changes with similar appearing hypoattenuation to the   posterior left temporal lobe, compatible with an evolving acute infarct.    No evidence of hemorrhagic transformation or developing mass-effect.      Impression:          Electronically signed by Eula Mullen MD on 06-14-25 at 0728    CT Head Without Contrast [[id number]] Collected: 06/13/25 2023     Updated: 06/13/25 2030    Narrative:      HEAD CT WITHOUT CONTRAST     REASON:  C/o headache on hep gtt; I65.22-Occlusion and stenosis of left  carotid artery      COMPARISON STUDIES: Head CT 6/10/2025.     TECHNIQUE:  Axial images were acquired from the skull base to vertex  without contrast, including multiplanar reformats, per standard  departmental protocol.    Radiation dose reduction techniques were  utilized, including automated exposure control, and exposure modulation  based on body size.     FINDINGS:     There is no CT evidence of acute intracranial hemorrhage, mass, or  infarct. There is volume loss, but there is no evidence of hydrocephalus  or extra-axial fluid collection.     There are redemonstrated ischemic and nonspecific white matter changes,  but there is no new intracranial abnormality.     Skull base, calvarium, and extracranial soft tissues show no acute  abnormality.       Impression:         Redemonstrated ischemic and chronic changes, no new intracranial  abnormality.                       This report was finalized on 6/13/2025 8:27 PM by Dr. Bandar Westbrook M.D on Workstation: [id number]       XR Chest 1 View [[id number]] Collected: 06/13/25 1657     Updated: 06/13/25 1701    Narrative:      XR CHEST 1 VW-     HISTORY: Male who is 84 years-old, increased oxygen requirement     TECHNIQUE: Frontal view of the chest     COMPARISON: 6/12/2025     FINDINGS: The heart size is borderline. Left-sided pacemaker and cardiac  leads are seen. Aorta is tortuous. Extensive bilateral alveolar  interstitial opacities represent interval  worsening and may reflect  edema an/or pneumonia, clinical correlation and follow-up recommended.  Minimal pleural effusions are suggested. No pneumothorax. No acute  osseous process.       Impression:      As described.     This report was finalized on 6/13/2025 4:58 PM by Dr. Temo Sanchez M.D on Workstation: [id number]             ECG/EMG Results (last 24 hours)       Procedure Component Value Units Date/Time    Telemetry Scan [[id number]] Resulted: 06/13/25 1517     Updated: 06/13/25 1537    Telemetry Scan [[id number]] Resulted: 06/13/25 1615     Updated: 06/13/25 1735    Telemetry Scan [[id number]] Resulted: 06/13/25 2009     Updated: 06/13/25 2106    Telemetry Scan [[id number]] Resulted: 06/14/25 0014     Updated: 06/14/25 0037    Telemetry Scan [[id number]] Resulted: 06/14/25 0412     Updated: 06/14/25 0538    Telemetry Scan [[id number]] Resulted: 06/14/25 0700     Updated: 06/14/25 0806          Orders (last 24 hrs)        Start     Ordered    06/15/25 0900  atorvastatin (LIPITOR) tablet 40 mg  Daily         06/14/25 1054    06/14/25 1906  Potassium  Timed         06/14/25 0905    06/14/25 1906  Phosphorus  Timed         06/14/25 0905    06/14/25 1730  piperacillin-tazobactam (ZOSYN) 3.375 g IVPB in 100 mL NS MBP (CD)  Every 8 Hours         06/14/25 0946    06/14/25 1145  aspirin chewable tablet 81 mg  Daily         06/14/25 1056    06/14/25 1141  POC Glucose Once  PROCEDURE ONCE        Comments: Complete no more than 45 minutes prior to patient eating      06/14/25 1138    06/14/25 1045  piperacillin-tazobactam (ZOSYN) 3.375 g IVPB in 100 mL NS MBP (CD)  Once         06/14/25 0946    06/14/25 1000  potassium chloride 10 mEq in 100 mL IVPB  Every 1 Hour         06/14/25 0905    06/14/25 1000  potassium phosphate 15 mmol in 0.9% normal saline 250 mL IVPB  Once         06/14/25 0905    06/14/25 0928  MRSA Screen, PCR (Inpatient) - Swab, Nares  Once         06/14/25 0927 06/14/25 0927  Blood Culture - Blood,  Arm, Right  STAT         06/14/25 0926    06/14/25 0927  Blood Culture - Blood, Arm, Left  STAT         06/14/25 0926    06/14/25 0927  Respiratory Culture - Sputum, Cough  Once         06/14/25 0926    06/14/25 0926  Pharmacy To Dose: Piperacillin-tazobactam (Zosyn)  Continuous PRN,   Status:  Discontinued         06/14/25 0926    06/14/25 0902  XR Chest 1 View  1 Time Imaging         06/14/25 0901    06/14/25 0900  aspirin EC tablet 81 mg  Daily,   Status:  Discontinued         06/13/25 1527    06/14/25 0900  atorvastatin (LIPITOR) tablet 10 mg  Daily,   Status:  Discontinued         06/13/25 1527    06/14/25 0900  pantoprazole (PROTONIX) EC tablet 40 mg  Daily         06/13/25 1527    06/14/25 0900  vitamin B-12 (CYANOCOBALAMIN) tablet 1,000 mcg  Daily,   Status:  Discontinued         06/13/25 1527    06/14/25 0800  ferrous sulfate tablet 325 mg  Daily With Breakfast,   Status:  Discontinued         06/13/25 1527    06/14/25 0700  Telemetry Scan  Once         06/14/25 0700    06/14/25 0600  Daily CHG Bath While in ICU  Daily      Comments: Use for admission bath & daily bath for duration of critical care stay    06/13/25 1527    06/14/25 0600  Basic Metabolic Panel  Morning Draw         06/13/25 1756    06/14/25 0600  CBC Auto Differential  Morning Draw,   Status:  Canceled         06/13/25 1756    06/14/25 0600  Magnesium  Morning Draw         06/13/25 1756    06/14/25 0600  Phosphorus  Morning Draw         06/13/25 1756    06/14/25 0600  Procalcitonin  Morning Draw         06/13/25 1803    06/14/25 0537  POC Glucose Once  PROCEDURE ONCE        Comments: Complete no more than 45 minutes prior to patient eating      06/14/25 0531    06/14/25 0500  CT Head Without Contrast  1 Time Imaging         06/13/25 1834    06/14/25 0430  CBC & Differential  Every Other Day      Comments: Discontinue After Heparin Stopped      06/13/25 1527    06/14/25 0430  CBC Auto Differential  PROCEDURE ONCE        Comments: Discontinue  "After Heparin Stopped      06/13/25 2030 06/14/25 0412  Telemetry Scan  Once         06/14/25 0412    06/14/25 0017  POC Glucose Once  PROCEDURE ONCE        Comments: Complete no more than 45 minutes prior to patient eating      06/14/25 0014    06/14/25 0014  Telemetry Scan  Once         06/14/25 0014    06/13/25 2200  midodrine (PROAMATINE) tablet 10 mg  Every 8 Hours Scheduled         06/13/25 1527    06/13/25 2148  Intake & Output  Every Shift       06/13/25 1527    06/13/25 2100  latanoprost (XALATAN) 0.005 % ophthalmic solution 1 drop  Nightly         06/13/25 1527    06/13/25 2100  mupirocin (BACTROBAN) 2 % nasal ointment 1 Application  2 Times Daily         06/13/25 1527    06/13/25 2100  sodium chloride 0.9 % flush 10 mL  Every 12 Hours Scheduled         06/13/25 1527    06/13/25 2100  sodium chloride 0.9 % flush 10 mL  Every 12 Hours Scheduled         06/13/25 1756 06/13/25 2100  sennosides-docusate (PERICOLACE) 8.6-50 MG per tablet 2 tablet  2 Times Daily        Placed in \"And\" Linked Group    06/13/25 1756 06/13/25 2009  Telemetry Scan  Once         06/13/25 2009    06/13/25 2000  Neuro Checks  Every 2 Hours       06/13/25 1834    06/1939  CT Head Without Contrast  1 Time Imaging         06/1939    06/13/25 1930  budesonide (PULMICORT) nebulizer solution 0.5 mg  2 Times Daily - RT         06/13/25 1527    06/13/25 1930  ipratropium (ATROVENT) nebulizer solution 0.5 mg  4 Times Daily - RT,   Status:  Discontinued         06/13/25 1527    06/13/25 1900  NIHSS Assessment  Every 12 Hours        Comments: Turn off all sedation medications prior to performing assessment. Assessment to be performed upon admission, transfer to another unit, discharge, and with neurological decline. If NIHSS change is greater than or equal to 4 and/or neurological decline is noted notify physician.    06/13/25 1527    06/13/25 1900  cefTRIAXone (ROCEPHIN) 2,000 mg in sodium chloride 0.9 % 100 mL MBP  Every 24 " "Hours,   Status:  Discontinued         06/13/25 1800    06/13/25 1845  furosemide (LASIX) injection 20 mg  Once         06/13/25 1753    06/13/25 1845  phenylephrine (BRIANA-SYNEPHRINE) 50 mg in 250 mL NS infusion  Titrated         06/13/25 1756    06/13/25 1836  Per Neurology-keep SBP between 120-140.  Nursing Communication  Once        Comments: Per Neurology-keep SBP between 120-140.    06/13/25 1836    06/13/25 1834  Per Neurology-continue heparin gtt order with no bolus.  Nursing Communication  Once        Comments: Per Neurology-continue heparin gtt order with no bolus.    06/13/25 1834    06/13/25 1804  BNP  Once         06/13/25 1803    06/13/25 1800  Oral Care  2 Times Daily       06/13/25 1527    06/13/25 1800  aPTT  Every 6 Hours      Comments: During Infusion, Once 2 Consecutive Therapeutic Levels Are Drawn, Change to Every Morning      06/13/25 1527    06/13/25 1800  POC Glucose Q6H  Every 6 Hours       06/13/25 1756    06/13/25 1800  Reason for Not Completing Sepsis Bundle (Currently Treating Infection - Sepsis Work Up Not Needed At This Time)  Once         06/13/25 1800    06/13/25 1757  Daily Weights  Daily       06/13/25 1756    06/13/25 1757  Daily CHG Bath While in Critical Care  Daily      Comments: Use for admission bath and length of critical care stay.    06/13/25 1756 06/13/25 1755  Code Status and Medical Interventions: No CPR (Do Not Attempt to Resuscitate); Limited Support; No cardioversion, No intubation (DNI)  Continuous         06/13/25 1756 06/13/25 1755  NPO Diet NPO Type: Strict NPO  Diet Effective Now         06/13/25 1756    06/13/25 1754  acetaminophen (TYLENOL) tablet 650 mg  Every 4 Hours PRN        Placed in \"Or\" Linked Group    06/13/25 1756    06/13/25 1754  acetaminophen (TYLENOL) suppository 650 mg  Every 4 Hours PRN        Placed in \"Or\" Linked Group    06/13/25 1756    06/13/25 1754  Inpatient Admission  Once         06/13/25 1756    06/13/25 1754  Vital Signs Per " hospital policy  Per Hospital Policy/Protocol         06/13/25 1756 06/13/25 1754  Continuous Cardiac Monitoring  Continuous        Comments: Follow Standing Orders As Outlined in Process Instructions (Open Order Report to View Full Instructions)    06/13/25 1756 06/13/25 1754  Maintain IV Access  Continuous,   Status:  Canceled         06/13/25 1756    06/13/25 1754  Telemetry - Place Orders & Notify Provider of Results When Patient Experiences Acute Chest Pain, Dysrhythmia or Respiratory Distress  Continuous        Comments: Open Order Report to View Parameters Requiring Provider Notification    06/13/25 1756    06/13/25 1754  Continuous Pulse Oximetry  Continuous         06/13/25 1756    06/13/25 1754  Height and weight  Once         06/13/25 1756    06/13/25 1754  Intake and Output  Every Shift       06/13/25 1756 06/13/25 1754  Oral Care - Patient Not on NPPV & Not Intubated  Every Shift       06/13/25 1756 06/13/25 1754  Target Arousal Level RASS 0 to -2  Continuous         06/13/25 1756 06/13/25 1754  If Patient has BG of < 80 and is symptomatic but not on an IV insulin protocol then use the Adult Hypoglycemia Treatment Orders.  Once         06/13/25 1756    06/13/25 1754  POC Glucose Once  Once        Comments: On arrival to unit. If >180, call admitting MD for orders.      06/13/25 1756 06/13/25 1754  Saline Lock  Continuous,   Status:  Canceled        Comments: For standing ICU/CCU Protocol    06/13/25 1756    06/13/25 1754  Place Order to Consult Intensivist For Critical Care Management (If Patient Not Admitted to Cardiology for Primary Cardiology Condition)  Continuous        Comments: For standing ICU/CCU Protocol    06/13/25 1756    06/13/25 1754  Use Mobility Guidelines for Advancement of Activity  Until Discontinued         06/13/25 1756 06/13/25 1754  Appropriate VTE Prophylaxis Orders in Place  Once         06/13/25 1756 06/13/25 1754  Insert Peripheral IV  Once          "06/13/25 1756    06/13/25 1754  Saline Lock & Maintain IV Access  Continuous         06/13/25 1756    06/13/25 1753  sodium chloride 0.9 % flush 10 mL  As Needed         06/13/25 1756    06/13/25 1753  sodium chloride 0.9 % infusion 40 mL  As Needed         06/13/25 1756    06/13/25 1753  polyethylene glycol (MIRALAX) packet 17 g  Daily PRN        Placed in \"And\" Linked Group    06/13/25 1756    06/13/25 1753  bisacodyl (DULCOLAX) EC tablet 5 mg  Daily PRN        Placed in \"And\" Linked Group    06/13/25 1756    06/13/25 1753  bisacodyl (DULCOLAX) suppository 10 mg  Daily PRN        Placed in \"And\" Linked Group    06/13/25 1756    06/13/25 1753  ondansetron ODT (ZOFRAN-ODT) disintegrating tablet 4 mg  Every 6 Hours PRN        Placed in \"Or\" Linked Group    06/13/25 1756    06/13/25 1753  ondansetron (ZOFRAN) injection 4 mg  Every 6 Hours PRN        Placed in \"Or\" Linked Group    06/13/25 1756    06/13/25 1708  Inpatient Neurology Consult Stroke  Once        Specialty:  Neurology  Provider:  Abel Nice MD    06/13/25 1710    06/13/25 1650  CPR - Full Support in OR  Once         06/13/25 1649    06/13/25 1648  Case request  Once         06/13/25 1649    06/13/25 1615  neomycin-bacitracin-polymyxin (NEOSPORIN) ophthalmic ointment  Every 4 Hours Scheduled         06/13/25 1527    06/13/25 1615  heparin 09880 units/250 mL (100 units/mL) in 0.45 % NaCl infusion  Titrated         06/13/25 1527    06/13/25 1615  lactated ringers infusion  Continuous,   Status:  Discontinued         06/13/25 1527    06/13/25 1615  norepinephrine (LEVOPHED) 8 mg in 250 mL NS infusion (premix)  Titrated,   Status:  Discontinued         06/13/25 1527    06/13/25 1615  Telemetry Scan  Once         06/13/25 1615    06/13/25 1608  XR Chest 1 View  1 Time Imaging         06/13/25 1608    06/13/25 1600  Vital Signs  Every 4 Hours       06/13/25 1527    06/13/25 1600  Neuro Checks  Every 4 Hours       06/13/25 1527    06/13/25 1600  " Intake and Output  Every 4 Hours       06/13/25 1527    06/13/25 1600  Neuro Checks  Every 4 Hours       06/13/25 1527    06/13/25 1546  NPO Diet NPO Type: Strict NPO  Diet Effective Now,   Status:  Canceled        Comments: Should Remain in Effect Until a Complete SLP Evaluation Occurs & a Superceding Order is Received    06/13/25 1546    06/13/25 1546  SLP Consult: Eval & Treat RN Dysphagia Screen Failed  Once         06/13/25 1546    06/13/25 1528  Hospitalist (on-call MD unless specified)  Once        Specialty:  Hospitalist  Provider:  Nicholas Milton MD    06/13/25 1527    06/13/25 1528  Inpatient Cardiology Consult  Once        Specialty:  Cardiology  Provider:  Ashlie Carpio MD    06/13/25 1527    06/13/25 1528  Inpatient Vascular Surgery Consult  Once        Specialty:  Vascular Surgery  Provider:  Maxine Alvarez MD    06/13/25 1527    06/13/25 1528  Discharge readmit patient  Once,   Status:  Canceled         06/13/25 1527    06/13/25 1528  May Be Off Telemetry for Tests  Continuous         06/13/25 1527    06/13/25 1528  SLP Consult: Eval & Treat RN Dysphagia Screen Failed  Once         06/13/25 1527    06/13/25 1528  SLP Video Swallow  Once         06/13/25 1527    06/13/25 1528  Vital Signs  Per Order Details        Comments: For ICU Admission: Vital Signs Every 2 Hours  For Telemetry Unit Admission: Vital Signs Every 4 Hours    06/13/25 1527    06/13/25 1528  Continuous Pulse Oximetry  Continuous,   Status:  Canceled         06/13/25 1527    06/13/25 1528  Continuous Cardiac Monitoring  Continuous,   Status:  Canceled        Comments: Follow Standing Orders As Outlined in Process Instructions (Open Order Report to View Full Instructions)    06/13/25 1527    06/13/25 1528  Telemetry - Place Orders & Notify Provider of Results When Patient Experiences Acute Chest Pain, Dysrhythmia or Respiratory Distress  Continuous        Comments: Open Order Report to View Parameters Requiring Provider  Notification    06/13/25 1527    06/13/25 1528  Notify Provider  Continuous        Comments: Open Order Report to View Parameters Requiring Provider Notification    06/13/25 1527    06/13/25 1528  RN to Place Order SLP Consult - Eval & Treat Choosing Reason of RN Dysphagia Screen Failed  Per Order Details        Comments: RN to Place Order SLP Consult - Eval & Treat Choosing Reason of RN Dysphagia Screen Failed    06/13/25 1527    06/13/25 1528  Provide Stroke Education Material  Prior to Discharge        Comments: Educate patient PRN and daily during hospitalization.    06/13/25 1527    06/13/25 1528  Notify Stroke Coordinator  Once        Provider:  (Not yet assigned)    06/13/25 1527    06/13/25 1528  OT Consult: Eval & Treat  Once         06/13/25 1527    06/13/25 1528  PT Consult: Eval & Treat As Tolerated  Once         06/13/25 1527    06/13/25 1528  SLP Consult: Eval & Treat Communication Disorder  Once        Comments: Per Stroke Protocol    06/13/25 1527    06/13/25 1528  Insert Peripheral IV  Once         06/13/25 1527    06/13/25 1528  Saline Lock & Maintain IV Access  Continuous,   Status:  Canceled         06/13/25 1527    06/13/25 1528  Place Sequential Compression Device  Once         06/13/25 1527    06/13/25 1528  Maintain Sequential Compression Device  Continuous         06/13/25 1527    06/13/25 1528  Activity As Tolerated  Until Discontinued         06/13/25 1527    06/13/25 1528  Diet: Regular/House; No Straw; Texture: Pureed (NDD 1); Fluid Consistency: Nectar Thick  Diet Effective Now,   Status:  Canceled         06/13/25 1527    06/13/25 1528  Device Interrogation. Device type? CRT-D Moment[employer]; Company to contact? [employer] [phone]; Device company contacted already? No  Once         06/13/25 1527    06/13/25 1528  Initiate & Follow Heparin Protocol  Continuous         06/13/25 1527    06/13/25 1528  Weigh Patient  Once         06/13/25 1527    06/13/25 1528  Verify All  Anticoagulant Orders Are Discontinued Upon Initiation of Heparin Protocol (eg Enoxaparin, Fondaparinux, Apixaban, Dabigatran, Edoxaban, or Rivaroxaban)  Once         06/13/25 1527    06/13/25 1528  Notify Provider Platelet Count Less Than 20345  Continuous        Comments: Open Order Report to View Parameters Requiring Provider Notification    06/13/25 1527    06/13/25 1528  Stop Infusion & Notify Provider if Bleeding Occurs  Continuous        Comments: Open Order Report to View Parameters Requiring Provider Notification    06/13/25 1527    06/13/25 1528  Adjust Heparin Rate Based on aPTT Using Nomogram  Continuous         06/13/25 1527    06/13/25 1528  Titrate vasopressors for systolic goal >120  Nursing Communication  Continuous        Comments: Titrate vasopressors for systolic goal >120    06/13/25 1527    06/13/25 1528  Code Status and Medical Interventions: No CPR (Do Not Attempt to Resuscitate); Limited Support; No intubation (DNI)  Continuous,   Status:  Canceled         06/13/25 1527    06/13/25 1528  aPTT  Timed,   Status:  Canceled         06/13/25 1527    06/13/25 1528  Reason For No ARB ACEI or ARNI at Discharge  Hypotension (SBP Less Than 100)  Once         06/13/25 1527    06/13/25 1528  Reason For No Beta-Blocker At Discharge  Hypotension (SBP Less Than 100)  Once         06/13/25 1527    06/13/25 1528  Heart Failure Cause: Ischemic Heart Disease  Once         06/13/25 1527    06/13/25 1528  Reason For No Beta-Blocker At Discharge  Hypotension (SBP Less Than 100)  Once         06/13/25 1527    06/13/25 1528  Reason For No ARB ACEI or ARNI at Discharge  Hypotension (SBP Less Than 100)  Once         06/13/25 1527    06/13/25 1527  sodium chloride 0.9 % flush 10 mL  As Needed         06/13/25 1527    06/13/25 1527  sodium chloride 0.9 % flush 10 mL  As Needed         06/13/25 1527    06/13/25 1527  sodium chloride 0.9 % infusion 40 mL  As Needed         06/13/25 1527    06/13/25 1527  nitroglycerin  (NITROSTAT) SL tablet 0.4 mg  Every 5 Minutes PRN         06/13/25 1527    06/13/25 1527  Potassium Replacement - Follow Nurse / BPA Driven Protocol  As Needed         06/13/25 1527    06/13/25 1527  Magnesium Standard Dose Replacement - Follow Nurse / BPA Driven Protocol  As Needed         06/13/25 1527    06/13/25 1527  Phosphorus Replacement - Follow Nurse / BPA Driven Protocol  As Needed         06/13/25 1527    06/13/25 1527  Calcium Replacement - Follow Nurse / BPA Driven Protocol  As Needed         06/13/25 1527    06/13/25 1527  sodium chloride 0.9 % flush 10 mL  As Needed         06/13/25 1527    06/13/25 1527  sodium chloride 0.9 % infusion 40 mL  As Needed         06/13/25 1527    06/13/25 1522  POC Glucose Once  PROCEDURE ONCE        Comments: Complete no more than 45 minutes prior to patient eating      06/13/25 1520    06/13/25 1517  Telemetry Scan  Once         06/13/25 1517    Unscheduled  Oxygen Therapy- Nasal Cannula; Titrate 1-6 LPM Per SpO2; 90 - 95%  Continuous PRN       06/13/25 1527    Unscheduled  Order CT Head Without Contrast for Neurological Decline  As Needed       06/13/25 1527    Unscheduled  aPTT  As Needed       06/13/25 1527    Unscheduled  RN To Release aPTT Order 6 Hours After Heparin Bolus & 6 Hours After Any Heparin Rate Change  As Needed       06/13/25 1527    Unscheduled  After 2 Consecutive Therapeutic aPTTs, Obtain aPTT Daily.  If a Rate Adjustment is Necessary, Resume Every 6 Hour aPTT Draws  As Needed       06/13/25 1527    --  lisinopril (PRINIVIL,ZESTRIL) 2.5 MG tablet  Daily         06/13/25 1556                  Operative/Procedure Notes (last 24 hours)  Notes from 06/13/25 1153 through 06/14/25 1153   No notes of this type exist for this encounter.       Physician Progress Notes (last 24 hours)  Notes from 06/13/25 1153 through 06/14/25 1153   No notes of this type exist for this encounter.          Consult Notes (last 24 hours)        Abel Nice MD  at 06/14/25 0939        Consult Orders    1. Inpatient Neurology Consult Stroke [[id number]] ordered by Nilo Cleary MD at 06/13/25 1710                 Neurology Consult Note    Consult Date: 6/14/2025    Referring MD: Provider, No Known    Reason for Consult I have been asked to see the patient in neurological consultation to render advice and opinion regarding right-sided weakness and aphasia      Shukri Park is a 84 y.o. male with past medical history of congestive heart failure with reduced ejection fracture, atrial fibrillation on anticoagulation, hypertension, hyperlipidemia presented to [facility], [place], operated by [facility] ED on 6/10/2025 with acute confusion.  He was found confused on 6/8/2025 at a grocery store later on 610 he was found on the floor by neighbor incontinent of urine confused and irritable so he was brought into the ED. After admission to [facility], [place], operated by [facility] he had an MRI brain which showed left MCA watershed ischemic strokes latest CTA head and neck were done which showed intracranial atherosclerotic disease along with left supraclinoid severe stenosis and was transferred to [facility] ICU for elective left supraclinoid stenting.        Past Medical History:   Diagnosis Date    Aortic aneurysm     Chronic anticoagulation 04/17/2013    Chronic HFrEF (heart failure with reduced ejection fraction)     Chronic hypotension 07/14/2020    Chronic pain 07/02/2020    Coronary artery disease     Foot pain, bilateral     GERD (gastroesophageal reflux disease)     History of lung cancer 07/14/2020    Hyperlipidemia     Inguinodynia, right 12/23/2020    Added automatically from request for surgery [id number]      Ischemic cardiomyopathy 07/01/2020    Added automatically from request for surgery [id number]      Low back pain     Lung cancer     Mixed hyperlipidemia 04/17/2013    Persistent atrial fibrillation 07/01/2020    Added automatically from request for surgery [id number]      Presence of biventricular implantable  "cardioverter-defibrillator (ICD) 08/14/2020    S/P epidural steroid injection     Israel Gomes's - no relief    Vitamin D deficiency 04/05/2016    Weight loss     acute loss of weight 30 lbs       Exam  /85   Pulse 72   Temp 98.2 °F (36.8 °C) (Oral)   Resp 28   Ht 188 cm (74\")   Wt 68.5 kg (151 lb 0.2 oz)   SpO2 93%   BMI 19.39 kg/m²   Gen: NAD, vitals reviewed  MS: Awake oriented x 3 normal comprehension repetition fluency impaired  CN: visual acuity grossly normal, PERRL, EOMI, mild facial droop and mild dysarthria  Motor: Right upper extremity strength 4 -/5 right lower extremity 4+/  Left upper left lower 5/5    DATA:    Lab Results   Component Value Date    GLUCOSE 94 06/14/2025    CALCIUM 7.3 (L) 06/14/2025     (H) 06/14/2025    K 3.3 (L) 06/14/2025    CO2 19.0 (L) 06/14/2025     (H) 06/14/2025    BUN 15.0 06/14/2025    CREATININE 0.70 (L) 06/14/2025    EGFRIFNONA 87 12/29/2020    BCR 21.4 06/14/2025    ANIONGAP 13.0 06/14/2025     Lab Results   Component Value Date    WBC 15.27 (H) 06/14/2025    HGB 11.9 (L) 06/14/2025    HCT 34.9 (L) 06/14/2025    MCV 96.1 06/14/2025     (L) 06/14/2025   Vitals  Afebrile  Pulse rate 70s  Respiratory rate 26-28  Systolic blood pressure 110-130s    Lab review:   Sodium 147  Creatinine 0.7  AST 43 ALT 27  Blood glucose 94    proBNP 7900    A1c 5.7  B12 805  LDL 92    WBC 15  Hemoglobin 11.9 and platelets 113    Imaging review:   CT head without left temporal lobe hypodensity no other acute hypodensity or hypodensity appreciated    CTA head and neck showed left ICA carotid bulb possible mural thrombus with ulcerated plaque.    MRI brain acute watershed diffusion restriction along the left MCA region the largest stroke being the left parietal lobe no SWI changes, T2 flair showed mild small vessel disease    Repeat CT head done at [facility] 6/13/2025 showed small hypodensity along the left parietal lobe but no other acute " hypodensity or hyperdensity    Diagnoses:  Acute left MCA watershed and stroke    Symptomatic left ICA atherosclerosis with stenosis and ulcerative plaque    Coronary artery disease  Hypertension  Atrial fibrillation  Congestive heart failure  Hyperlipidemia    Pre-stroke MRS: 1 (at baseline is able to drive sometimes lives by himself cooks and takes care of his ADLs)  NIHSS: 4    Etiology of the stroke most likely due to symptomatic left ICA atherosclerosis with ulcerated plaque/possible thrombus    Systolic blood pressure goal greater than 120  Continue aspirin 81 mg daily and heparin drip  q2 neurochecks  I have read vascular neurology note plan to do left CEA on Monday I agree with the plan    Neurology will follow peripherally kindly call us back with any questions.      MDM   Reviewed: Previous charts, nursing notes and vitals   Reviewed: Previous labs and CT/CTA/MRI scan    Interpretation: Labs and CT/CTA/MRI scan   Total time providing critical care is :40 minutes. This excluded time spent performing separately reportable procedures and services  Consults :Neurology/Stroke    Please note that portions of this note were completed with a voice recognition program.     Abel Nice MD  Neuro Hospitalist /Vascular Neurology.             Electronically signed by Abel Nice MD at 06/14/25 0098

## 2025-06-14 NOTE — NURSING NOTE
This RN called and spoke with Rabia from Cardiology answering service to place routine consult in as ordered per Dr. Hare.

## 2025-06-14 NOTE — THERAPY EVALUATION
Patient Name: Shukri Park  : 1940    MRN: [id number]                              Today's Date: 2025       Admit Date: 2025    Visit Dx:     ICD-10-CM ICD-9-CM   1. Symptomatic stenosis of left carotid artery  I65.22 433.10     Patient Active Problem List   Diagnosis    Chronic anticoagulation    Mixed hyperlipidemia    Chronic pain    Ischemic cardiomyopathy    Persistent atrial fibrillation    Coronary artery disease involving native coronary artery of native heart without angina pectoris    Vitamin D deficiency    History of lung cancer    Chronic hypotension    Presence of biventricular implantable cardioverter-defibrillator (ICD)    Acute encephalopathy    GERD (gastroesophageal reflux disease)    Aortic aneurysm    Rhabdomyolysis    More than 50 percent stenosis of left internal carotid artery    Non-occlusive thrombus    Severe protein-calorie malnutrition    Chronic heart failure with preserved ejection fraction (HFpEF)    Symptomatic stenosis of left carotid artery    Left carotid stenosis     Past Medical History:   Diagnosis Date    Aortic aneurysm     Chronic anticoagulation 2013    Chronic HFrEF (heart failure with reduced ejection fraction)     Chronic hypotension 2020    Chronic pain 2020    Coronary artery disease     Foot pain, bilateral     GERD (gastroesophageal reflux disease)     History of lung cancer 2020    Hyperlipidemia     Inguinodynia, right 2020    Added automatically from request for surgery [id number]      Ischemic cardiomyopathy 2020    Added automatically from request for surgery [id number]      Low back pain     Lung cancer     Mixed hyperlipidemia 2013    Persistent atrial fibrillation 2020    Added automatically from request for surgery [id number]      Presence of biventricular implantable cardioverter-defibrillator (ICD) 2020    S/P epidural steroid injection     Israel Gomes's - no relief    Vitamin D  deficiency 04/05/2016    Weight loss     acute loss of weight 30 lbs     Past Surgical History:   Procedure Laterality Date    CARDIAC CATHETERIZATION N/A 7/9/2020    Procedure: LEFT HEART CATH with possible PCI;  Surgeon: Wade Cronin MD;  Location: [facility] CATH INVASIVE LOCATION;  Service: Cardiovascular;  Laterality: N/A;  Local and IV sedation    CARDIAC CATHETERIZATION N/A 7/9/2020    Procedure: Percutaneous Coronary Intervention;  Surgeon: Wade Cronin MD;  Location: [facility] CATH INVASIVE LOCATION;  Service: Cardiovascular;  Laterality: N/A;    CARDIAC DEFIBRILLATOR PLACEMENT      CARDIAC ELECTROPHYSIOLOGY PROCEDURE N/A 7/10/2020    Procedure: Biventricular Device Insertion;  Surgeon: Jonathan Denney MD;  Location: [facility] CATH INVASIVE LOCATION;  Service: Cardiovascular;  Laterality: N/A;    CARDIAC ELECTROPHYSIOLOGY PROCEDURE N/A 7/10/2020    Procedure: ABLATION AV NODE;  Surgeon: Jonathan Denney MD;  Location: [facility] CATH INVASIVE LOCATION;  Service: Cardiovascular;  Laterality: N/A;    CARDIAC ELECTROPHYSIOLOGY PROCEDURE N/A 7/10/2020    Procedure: AV node ablation;  Surgeon: Jonathan Denney MD;  Location: [facility] CATH INVASIVE LOCATION;  Service: Cardiovascular;  Laterality: N/A;    CARDIOVASCULAR STRESS TEST  2020    CATARACT EXTRACTION, BILATERAL      CONVERTED (HISTORICAL) HOLTER  2020    CORONARY ANGIOPLASTY WITH STENT PLACEMENT      ENDOSCOPY N/A 7/5/2020    Procedure: ESOPHAGOGASTRODUODENOSCOPY;  Surgeon: Neal Correa MD;  Location: [facility] ENDOSCOPY;  Service: Gastroenterology;  Laterality: N/A;    INGUINAL HERNIA REPAIR Right 1/7/2021    Procedure: INGUINAL HERNIA REPAIR OPEN WITH MESH;  Surgeon: Cody Randall MD;  Location: [facility] MAIN OR;  Service: General;  Laterality: Right;    LUMBAR DECOMPRESSION  09/2015    L2-L3    LUMBAR DECOMPRESSION  2006    Dr. Logan    OTHER SURGICAL HISTORY  05/2015    left SI fusion with bone graft - Dr. Presley    OTHER SURGICAL HISTORY       carotid artery repair     OTHER SURGICAL HISTORY Left 02/19/2016    Left SI fusion 2/19/2016 Dr. Zendejas    POSTERIOR SPINAL FUSION  10/24/2016    PSF T12-3/TLIF L3-L4 poss L2-L3 --- Dr. 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      General Information       Row Name 06/14/25 1226          Physical Therapy Time and Intention    Document Type evaluation  -AR     Mode of Treatment physical therapy  -AR       Row Name 06/14/25 1226          General Information    Patient Profile Reviewed yes  -AR     Prior Level of Function independent:  no AD, no falls, ind. w/ groceries/ errands, ADLs  -AR     Existing Precautions/Restrictions fall  -AR     Barriers to Rehab none identified  -AR       Row Name 06/14/25 1226          Living Environment    Current Living Arrangements home  -AR     People in Home alone  -AR       Row Name 06/14/25 1226          Home Main Entrance    Number of Stairs, Main Entrance none  -AR       Row Name 06/14/25 1226          Cognition    Orientation Status (Cognition) oriented to;person  -AR       Row Name 06/14/25 1226          Safety Issues/Impairments Affecting Functional Mobility    Impairments Affecting Function (Mobility) balance;cognition;endurance/activity tolerance;motor planning;pain;postural/trunk control;strength;range of motion (ROM)  -AR               User Key  (r) = Recorded By, (t) = Taken By, (c) = Cosigned By      Initials Name Provider Type    AR Preeti Hanna PT Physical Therapist                   Mobility       Row Name 06/14/25 1227          Bed Mobility    Bed Mobility supine-sit;sit-supine  -AR     Supine-Sit [place] (Bed Mobility) moderate assist (50% patient effort)  -AR     Sit-Supine [place] (Bed Mobility) moderate assist (50% patient effort)  -AR     Assistive Device (Bed Mobility) bed rails;head of bed elevated  -AR       Row Name 06/14/25 1227          Sit-Stand Transfer    Sit-Stand [place] (Transfers) minimum assist (75%  patient effort);2 person assist  -AR     Comment, (Sit-Stand Transfer) HHAx2  -AR       Row Name 06/14/25 1227          Gait/Stairs (Locomotion)    Comment, (Gait/Stairs) unable to take any steps tday  -AR               User Key  (r) = Recorded By, (t) = Taken By, (c) = Cosigned By      Initials Name Provider Type    Preeti Tony, PT Physical Therapist                   Obj/Interventions       Row Name 06/14/25 1228          Range of Motion Comprehensive    Comment, General Range of Motion B LE WFL  -AR       Row Name 06/14/25 1228          Strength Comprehensive (MMT)    Comment, General Manual Muscle Testing (MMT) Assessment B LE at least 3+/5 , some difficulty following instructions for MMT  -AR       Row Name 06/14/25 1228          Motor Skills    Therapeutic Exercise --  B AP, LAQ  -AR       Row Name 06/14/25 1228          Balance    Dynamic Sitting Balance minimal assist  -AR     Static Standing Balance minimal assist;2-person assist  -AR               User Key  (r) = Recorded By, (t) = Taken By, (c) = Cosigned By      Initials Name Provider Type    AR Preeti Hanna, PT Physical Therapist                   Goals/Plan       Row Name 06/14/25 1239          Bed Mobility Goal 1 (PT)    Activity/Assistive Device (Bed Mobility Goal 1, PT) bed mobility activities, all  -AR     [place] Level/Cues Needed (Bed Mobility Goal 1, PT) standby assist  -AR     Time Frame (Bed Mobility Goal 1, PT) 1 week  -AR       Row Name 06/14/25 1239          Transfer Goal 1 (PT)    Activity/Assistive Device (Transfer Goal 1, PT) sit-to-stand/stand-to-sit;bed-to-chair/chair-to-bed;walker, rolling  -AR     [place] Level/Cues Needed (Transfer Goal 1, PT) contact guard required  -AR     Time Frame (Transfer Goal 1, PT) 1 week  -AR       Row Name 06/14/25 1239          Gait Training Goal 1 (PT)    Activity/Assistive Device (Gait Training Goal 1, PT) gait (walking locomotion);walker, rolling  -AR     [place] Level (Gait  Training Goal 1, PT) contact guard required  -AR     Distance (Gait Training Goal 1, PT) 150 ft  -AR     Time Frame (Gait Training Goal 1, PT) long term goal (LTG);2 weeks  -AR       Row Name 06/14/25 1239          Therapy Assessment/Plan (PT)    Planned Therapy Interventions (PT) balance training;bed mobility training;gait training;home exercise program;patient/family education;ROM (range of motion);strengthening;transfer training  -AR               User Key  (r) = Recorded By, (t) = Taken By, (c) = Cosigned By      Initials Name Provider Type    AR Preeti Hanna, PT Physical Therapist                   Clinical Impression       Row Name 06/14/25 1229          Pain    Pretreatment Pain Rating 0/10 - no pain  -AR     Posttreatment Pain Rating 0/10 - no pain  -AR     Response to Pain Interventions activity participation with tolerable pain  -AR       Row Name 06/14/25 1229          Plan of Care Review    Outcome Evaluation Pt admitted from [facility] 6/13.  Arrived to [facility] after being found down, +L MCA watershed CVA and symptomatic L stenosis.  Keep SBP between 120-140.  Plan for endarterectomy  6/16.  Pt is normally independent, no use of AD,  no assist w/ groceries/errands.  Today pt demonstrates weakness, impaired sitting and standing balance, activiyt tolerance and ind. w/ mobiilty but able to sit up w/ min A.  Stood once w/ min A x2 and HHAx2.  Limited by dizziness.  Held further activity due to BP to 112/74, RN aware.  Currently recommend DC to acute rehab.  -AR       Row Name 06/14/25 1222          Therapy Assessment/Plan (PT)    Rehab Potential (PT) good  -AR     Criteria for Skilled Interventions Met (PT) yes  -AR     Therapy Frequency (PT) 5 times/wk  -AR       Row Name 06/14/25 1229          Vital Signs    O2 Delivery Pre Treatment hi-flow  -AR     O2 Delivery Intra Treatment hi-flow  -AR     O2 Delivery Post Treatment hi-flow  -AR     Recovery Time Spo2 maintained >94% on 10L high flow  -AR        Row Name 06/14/25 1229          Positioning and Restraints    Pre-Treatment Position in bed  -AR     Post Treatment Position bed  -AR     In Bed notified nsg;supine;call light within reach;exit alarm on;encouraged to call for assist  -AR               User Key  (r) = Recorded By, (t) = Taken By, (c) = Cosigned By      Initials Name Provider Type    AR Preeti Hanna, PT Physical Therapist                   Outcome Measures       Row Name 06/14/25 1241 06/14/25 0730       How much help from another person do you currently need...    Turning from your back to your side while in flat bed without using bedrails? 3  -AR 3  -DS    Moving from lying on back to sitting on the side of a flat bed without bedrails? 2  -AR 2  -DS    Moving to and from a bed to a chair (including a wheelchair)? 2  -AR 2  -DS    Standing up from a chair using your arms (e.g., wheelchair, bedside chair)? 2  -AR 2  -DS    Climbing 3-5 steps with a railing? 1  -AR 2  -DS    To walk in hospital room? 1  -AR 2  -DS    AM-PAC 6 Clicks Score (PT) 11  -AR 13  -DS    Highest Level of Mobility Goal Move to Chair/Commode-4  -AR Move to Chair/Commode-4  -DS      Row Name 06/14/25 1241          Functional Assessment    Outcome Measure Options AM-PAC 6 Clicks Basic Mobility (PT)  -AR               User Key  (r) = Recorded By, (t) = Taken By, (c) = Cosigned By      Initials Name Provider Type    Preeti Tony, PT Physical Therapist    Lexie Amador, RN Registered Nurse                                 Physical Therapy Education       Title: PT OT SLP Therapies (In Progress)       Topic: Physical Therapy (In Progress)       Point: Mobility training (In Progress)       Learning Progress Summary            Patient Acceptance, E, NR by AR at 6/14/2025 1242                      Point: Home exercise program (In Progress)       Learning Progress Summary            Patient Acceptance, E, NR by AR at 6/14/2025 1242                      Point: Body mechanics  (In Progress)       Learning Progress Summary            Patient Acceptance, E, NR by AR at 6/14/2025 1242                      Point: Precautions (In Progress)       Learning Progress Summary            Patient Acceptance, E, NR by AR at 6/14/2025 1242                                      User Key       Initials Effective Dates Name Provider Type Discipline    AR 06/16/21 -  Preeti Hanna PT Physical Therapist PT                  PT Recommendation and Plan  Planned Therapy Interventions (PT): balance training, bed mobility training, gait training, home exercise program, patient/family education, ROM (range of motion), strengthening, transfer training  Outcome Evaluation: Pt admitted from [facility] 6/13.  Arrived to [facility] after being found down, +L MCA watershed CVA and symptomatic L stenosis.  Keep SBP between 120-140.  Plan for endarterectomy  6/16.  Pt is normally independent, no use of AD,  no assist w/ groceries/errands.  Today pt demonstrates weakness, impaired sitting and standing balance, activiyt tolerance and ind. w/ mobiilty but able to sit up w/ min A.  Stood once w/ min A x2 and HHAx2.  Limited by dizziness.  Held further activity due to BP to 112/74, RN aware.  Currently recommend DC to acute rehab.     Time Calculation:         PT Charges       Row Name 06/14/25 1226             Time Calculation    Start Time 1133  -AR      Stop Time 1159  -AR      Time Calculation (min) 26 min  -AR      PT Received On 06/14/25  -AR      PT - Next Appointment 06/16/25  -AR      PT Goal Re-Cert Due Date 06/21/25  -AR                User Key  (r) = Recorded By, (t) = Taken By, (c) = Cosigned By      Initials Name Provider Type    AR Preeti Hanna PT Physical Therapist                  Therapy Charges for Today       Code Description Service Date Service Provider Modifiers Qty    22309272551 HC PT EVAL MOD COMPLEXITY 3 6/14/2025 Preeti Hanna, PT GP 1    02865991796 HC PT THER PROC EA 15 MIN 6/14/2025  Preeti Hanna, PT GP 1    [id number]  PT THER SUPP EA 15 MIN 6/14/2025 Preeti Hanna, PT GP 1            PT G-Codes  Outcome Measure Options: AM-PAC 6 Clicks Basic Mobility (PT)  AM-PAC 6 Clicks Score (PT): 11  PT Discharge Summary  Anticipated Discharge Disposition (PT): inpatient rehabilitation facility    Preeti Hanna PT  6/14/2025

## 2025-06-15 LAB
BACTERIA SPEC AEROBE CULT: NORMAL
BACTERIA SPEC AEROBE CULT: NORMAL

## 2025-06-15 NOTE — SIGNIFICANT NOTE
Discussed with RN. Per report, pt choking on water previous date. Order for video swallow study received. Pt had a video swallow study on 6/11 at [facility]; recommendation was for pureed textures and nectar thick liquids. Per pulm note, c/f aspiration pna. Will plan to complete repeat VFSS, next date.

## 2025-06-15 NOTE — PROGRESS NOTES
LOS: 2 days   Patient Care Team:  Homa Carrizales MD as PCP - General (Family Medicine)    Chief Complaint: Follow-up carotid artery stenosis, left MCA portion CVA, persistent atrial fibrillation, AV node ablation with ICD placement, coronary artery disease, mitral regurgitation.    Interval History: No acute events.  He was more alert today.  He is still on Lewis-Synephrine and heparin.  No chest pain or significant shortness of breath.  Plans for carotid artery surgery tomorrow.    Vital Signs:  Temp:  [97.8 °F (36.6 °C)-98.4 °F (36.9 °C)] 98.3 °F (36.8 °C)  Heart Rate:  [70-94] 72  Resp:  [16-28] 16  BP: (105-146)/() 135/81    Intake/Output Summary (Last 24 hours) at 6/15/2025 1557  Last data filed at 6/15/2025 1125  Gross per 24 hour   Intake 879.32 ml   Output 1930 ml   Net -1050.68 ml       Physical Exam:   General Appearance:    No acute distress, alert and oriented x4   Lungs:     Clear to auscultation bilaterally     Heart:    Regular rhythm and normal rate. II/VI SM LLSB and apex.    Abdomen:     Soft, nontender, nondistended.    Extremities:   No clubbing, cyanosis, or edema.     Results Review:    Results from last 7 days   Lab Units 06/15/25  0608   SODIUM mmol/L 152*   POTASSIUM mmol/L 3.2*   CHLORIDE mmol/L 110*   CO2 mmol/L 28.0   BUN mg/dL 23.0   CREATININE mg/dL 0.87   GLUCOSE mg/dL 110*   CALCIUM mg/dL 8.6     Results from last 7 days   Lab Units 06/13/25  0048 06/11/25  2357 06/10/25  2033 06/10/25  1928   CK TOTAL U/L 308* 660*  --  2,133*   HSTROP T ng/L  --   --  32* 35*     Results from last 7 days   Lab Units 06/15/25  0608   WBC 10*3/mm3 11.87*   HEMOGLOBIN g/dL 11.7*   HEMATOCRIT % 35.0*   PLATELETS 10*3/mm3 120*     Results from last 7 days   Lab Units 06/15/25  0608 06/14/25 2359 06/14/25 1959 06/13/25  0048 06/12/25  1748   INR   --   --   --   --  1.64*   APTT seconds 95.1* 94.0* 57.3*   < > 37.1*  37.2*    < > = values in this interval not displayed.     Results from last 7  days   Lab Units 06/11/25  0236   CHOLESTEROL mg/dL 153     Results from last 7 days   Lab Units 06/14/25  0538   MAGNESIUM mg/dL 1.7     Results from last 7 days   Lab Units 06/11/25  0236   CHOLESTEROL mg/dL 153   TRIGLYCERIDES mg/dL 116   HDL CHOL mg/dL 40   LDL CHOL mg/dL 92       I reviewed the patient's new clinical results.        Assessment:  1.  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  Bilateral pulmonary infiltrates (likely aspiration pneumonia)  4.  Acute on chronic hypotension (on baseline midodrine)  5.  Persistent atrial fibrillation  6.  Status post AV node ablation and biventricular ICD placement in 2020 (left ventricular lead subsequently turned off secondary to diaphragmatic stimulation)  7.  Coronary artery disease with high-grade stenosis of the RCA (in-stent restenosis of a previously placed stent) and 2020 (PCI unsuccessful at that time).  Nonobstructive disease of the 50% in the LAD at that time.  Patent stent in the left circumflex.  8.  History of ischemic cardiomyopathy with recovered ejection fraction  9.  COPD without acute exacerbation  10.  Severe mitral regurgitation  11.  Acute rhabdomyolysis  12.  Multifactorial chronic anemia  13.  Thrombocytopenia  14.  Hypernatremia    Plan:  -At this point, I really suspect that the pulmonary infiltrates are aspiration and not pulmonary edema.  I would hold on further Lasix for now.  He does not appear to be in decompensated heart failure otherwise.    -Hypernatremia noted.  D5W being administered.  Continue to hold diuretics as noted above.    -He is at least moderate cardiac risk for carotid artery surgery.  However, he is having no chest pain, and he is not appropriate for ischemic testing currently given the stroke and other acute issues.  My review of his echocardiogram from 6/11/2025 shows an EF of at  least 50% or more.  His risk factors should be considered nonmodifiable at this point, and I would proceed as planned.    -Continues on Lewis-Synephrine and midodrine (hypotension at baseline).    -He is on aspirin, Lipitor, and a heparin drip.  Beta-blockers are contraindicated because of hypotension.    -He has underlying persistent atrial fibrillation, although he is pacemaker dependent from a previous AV kelly ablation (paced from his ICD).     -Cardiology will continue to follow.    Jd Cablalero MD  06/15/25  15:57 EDT

## 2025-06-15 NOTE — PROGRESS NOTES
[facility]   Surgical Progress Note    Patient Name: Shukri Park  : 1940  MRN: [id number]  Date of admission: 2025  Surgical Procedures Since Admission:  Procedure(s):  CAROTID ENDARTERECTOMY with common carotid to internal carotid interposition and repair of carotid aneurysm  Surgeon:  Maxine Alvarez MD  Status:  * No surgery found *  -------------------    Subjective   Subjective     Chief Complaint: Symptomatic carotid artery stenosis follow-up    History of Present Illness   Patient denies chest pain or shortness of breath.  Patient agreeable to left carotid artery aneurysm repair tomorrow.    Review of Systems    Objective   Objective     Vitals:   Temp:  [97.8 °F (36.6 °C)-98.4 °F (36.9 °C)] 98.3 °F (36.8 °C)  Heart Rate:  [70-94] 70  Resp:  [18-28] 18  BP: (106-146)/() 114/84  Flow (L/min) (Oxygen Therapy):  [7-12] 7  Output by Drain (mL) 25 0701 - 25 1900 25 1901 - 06/15/25 0700 06/15/25 0701 - 06/15/25 1027 Range Total   External Urinary Catheter 430 1300  1730       Physical Exam  Constitutional:       Appearance: He is well-developed.   Pulmonary:      Effort: Pulmonary effort is normal. No respiratory distress.   Abdominal:      General: There is no distension.      Palpations: Abdomen is soft.   Neurological:      Mental Status: He is alert and oriented to person, place, and time.     Still with mild right arm and right leg weakness.     Result Review    Result Review:  I have personally reviewed the results from the time of this admission to 6/15/2025 10:27 EDT and agree with these findings:  [x]  Laboratory list / accordion  []  Microbiology  []  Radiology  []  EKG/Telemetry   []  Cardiology/Vascular   []  Pathology  []  Old records  []  Other:  Most notable findings include:       Results from last 7 days   Lab Units 06/15/25  0608 25  0538 25  1952 25  0236 06/10/25  1928   WBC 10*3/mm3 11.87* 15.27* 12.10* 12.16* 14.74*    HEMOGLOBIN g/dL 11.7* 11.9* 13.1 13.4 14.2   PLATELETS 10*3/mm3 120* 113* 133* 131* 152     Results from last 7 days   Lab Units 06/15/25  0608 06/14/25  1933 06/14/25  1730 06/14/25  1620 06/14/25  0538 06/13/25  0048 06/12/25  1952 06/11/25  0236 06/10/25  1928   SODIUM mmol/L 152*  --   --   --  147*  --  144 144 144   POTASSIUM mmol/L 3.2*  --  3.8 >10.0* 3.3* 4.0 3.4* 4.1 4.4   CHLORIDE mmol/L 110*  --   --   --  115*  --  110* 107 102   CO2 mmol/L 28.0  --   --   --  19.0*  --  24.2 20.3* 23.1   BUN mg/dL 23.0  --   --   --  15.0  --  22.0 37.0* 33.0*   CREATININE mg/dL 0.87  --   --   --  0.70*  --  0.77 1.07 1.15   GLUCOSE mg/dL 110*  --   --   --  94  --  125* 103* 93   MAGNESIUM mg/dL  --   --   --   --  1.7  --   --  2.5* 2.5*   PHOSPHORUS mg/dL  --  2.5  --   --  2.2*  --   --  4.2  --    Estimated Creatinine Clearance: 59.9 mL/min (by C-G formula based on SCr of 0.87 mg/dL).  Results from last 7 days   Lab Units 06/12/25  1748   PROTIME Seconds 19.5*   INR  1.64*     Lab Results   Component Value Date    HGBA1C 5.76 (H) 06/11/2025     Glucose   Date/Time Value Ref Range Status   06/15/2025 0615 93 70 - 130 mg/dL Final   06/14/2025 2355 98 70 - 130 mg/dL Final   06/14/2025 1930 105 70 - 130 mg/dL Final   06/14/2025 1138 103 70 - 130 mg/dL Final   06/14/2025 0531 98 70 - 130 mg/dL Final   06/14/2025 0014 125 70 - 130 mg/dL Final   06/13/2025 1520 121 70 - 130 mg/dL Final   06/12/2025 1750 105 70 - 105 mg/dL Final     Comment:     Serial Number: [id number]:  [id number]       Assessment & Plan   Assessment / Plan     Brief Patient Summary:  Shukri Park is a 84 y.o. male who symptomatic restenosis and aneurysmal degeneration following remote endarterectomy.    Active Hospital Problems:   Active Hospital Problems    Diagnosis     **Symptomatic stenosis of left carotid artery     Chronic heart failure with preserved ejection fraction (HFpEF)     Severe protein-calorie malnutrition     Rhabdomyolysis      Left carotid stenosis     Aortic aneurysm     Acute encephalopathy     Chronic hypotension     Coronary artery disease involving native coronary artery of native heart without angina pectoris     Ischemic cardiomyopathy      Plan:   6/14/2025: Patient somewhat variable in his answers.  I have called and discussed the case with the patient's grandson and power of  Trish.  And answered all the questions about the recommendations for surgery on Monday best I was able.  Sebastian will discuss with other family members and visit his grandfather tonight and formulate a goals of care plan.  Will remain on the surgical schedule for Monday with Dr. Maxine Fairbanks.    6/15/2025: Patient more lucid today.  We discussed the situation at hand with his severe carotid stenosis in the setting of a carotid artery aneurysm following patch angioplasty in the remote past with significant intra aneurysmal mural thrombus.  Without surgical intervention tPA high risk for recurrent stroke despite maximal medical management.  As such we will proceed with urgent left carotid artery aneurysm repair tomorrow.  I have reviewed cardiology's note and appreciate their assistance and agree with the recommendations.  Continue aspirin and statin therapy and will hold heparin drip at 6 AM 6/16/2025.    Patient has been recommended for carotid surgery.  This is a major surgery.  Patient understands the inherent risks associated with carotid surgery.  These include but not are not limited to stroke, heart attack, bleeding, infection, need for secondary surgery/intervention, cranial nerve injury, and even death.  Patient also understands the nature of carotid disease and if a left on surgically treated would put them at increased risk for strokes in the future.    VTE Prophylaxis:  Pharmacologic & mechanical VTE prophylaxis orders are present.        Orlin Perez MD

## 2025-06-15 NOTE — PLAN OF CARE
Problem: Adult Inpatient Plan of Care  Goal: Plan of Care Review  Outcome: Progressing  Goal: Patient-Specific Goal (Individualized)  Outcome: Progressing  Goal: Absence of Hospital-Acquired Illness or Injury  Outcome: Progressing  Intervention: Identify and Manage Fall Risk  Recent Flowsheet Documentation  Taken 6/15/2025 0500 by Jatinder Olivier RN  Safety Promotion/Fall Prevention:   safety round/check completed   room organization consistent   clutter free environment maintained  Taken 6/15/2025 0400 by Jatinder Olivier RN  Safety Promotion/Fall Prevention:   safety round/check completed   room organization consistent   clutter free environment maintained  Taken 6/15/2025 0300 by Jatinder Olivier RN  Safety Promotion/Fall Prevention:   safety round/check completed   room organization consistent   clutter free environment maintained  Taken 6/15/2025 0200 by Jatinder Olivier RN  Safety Promotion/Fall Prevention:   safety round/check completed   room organization consistent   clutter free environment maintained  Taken 6/15/2025 0100 by Jatinder Olivier RN  Safety Promotion/Fall Prevention:   safety round/check completed   room organization consistent   clutter free environment maintained  Taken 6/15/2025 0000 by Jatinder Olivier RN  Safety Promotion/Fall Prevention:   safety round/check completed   room organization consistent   clutter free environment maintained  Taken 6/14/2025 2300 by Jatinder Olivier RN  Safety Promotion/Fall Prevention:   safety round/check completed   room organization consistent   clutter free environment maintained  Taken 6/14/2025 2200 by Jatinder Olivier RN  Safety Promotion/Fall Prevention:   safety round/check completed   room organization consistent   clutter free environment maintained  Taken 6/14/2025 2100 by Jatinder Olivier RN  Safety Promotion/Fall Prevention:   safety round/check completed   room organization consistent   clutter free environment maintained  Taken 6/14/2025 2000 by Charline  MEEK Tobias  Safety Promotion/Fall Prevention:   safety round/check completed   room organization consistent   clutter free environment maintained  Intervention: Prevent Skin Injury  Recent Flowsheet Documentation  Taken 6/15/2025 0400 by Jatinder Olivier RN  Body Position:   turned   left  Taken 6/15/2025 0200 by Jatinder Olivier RN  Body Position:   turned   right  Taken 6/15/2025 0000 by Jatinder Olivier RN  Body Position:   turned   left  Taken 6/14/2025 2200 by Jatinder Olivier RN  Body Position:   turned   right  Taken 6/14/2025 2000 by Jatinder Olivier RN  Body Position:   turned   left  Intervention: Prevent and Manage VTE (Venous Thromboembolism) Risk  Recent Flowsheet Documentation  Taken 6/15/2025 0400 by Jatinder Olivier RN  VTE Prevention/Management: (HEPARIN GTT) other (see comments)  Taken 6/15/2025 0000 by Jatinder Olivier RN  VTE Prevention/Management: (heparin gtt) other (see comments)  Taken 6/14/2025 2000 by Jatinder Olivier RN  VTE Prevention/Management: (heparin gtt) other (see comments)  Intervention: Prevent Infection  Recent Flowsheet Documentation  Taken 6/15/2025 0500 by Jatinder Olivier RN  Infection Prevention:   rest/sleep promoted   personal protective equipment utilized   equipment surfaces disinfected   hand hygiene promoted  Taken 6/15/2025 0400 by Jatinder Olivier RN  Infection Prevention:   rest/sleep promoted   personal protective equipment utilized   equipment surfaces disinfected   hand hygiene promoted  Taken 6/15/2025 0300 by Jatinder Olivier RN  Infection Prevention:   rest/sleep promoted   personal protective equipment utilized   equipment surfaces disinfected   hand hygiene promoted  Taken 6/15/2025 0200 by Jatinder Olivier RN  Infection Prevention:   rest/sleep promoted   personal protective equipment utilized   equipment surfaces disinfected   hand hygiene promoted  Taken 6/15/2025 0100 by Jatinder Olivier RN  Infection Prevention:   rest/sleep promoted   personal protective equipment utilized    equipment surfaces disinfected   hand hygiene promoted  Taken 6/15/2025 0000 by Jatinder Olivier RN  Infection Prevention:   rest/sleep promoted   personal protective equipment utilized   equipment surfaces disinfected   hand hygiene promoted  Taken 6/14/2025 2300 by Jatinder Olivier RN  Infection Prevention:   rest/sleep promoted   personal protective equipment utilized   equipment surfaces disinfected   hand hygiene promoted  Taken 6/14/2025 2200 by Jatinder Olivier RN  Infection Prevention:   rest/sleep promoted   personal protective equipment utilized   equipment surfaces disinfected   hand hygiene promoted  Taken 6/14/2025 2100 by Jatinder Olivier RN  Infection Prevention:   rest/sleep promoted   personal protective equipment utilized   equipment surfaces disinfected   hand hygiene promoted  Taken 6/14/2025 2000 by Jatinder Olivier RN  Infection Prevention:   rest/sleep promoted   personal protective equipment utilized   equipment surfaces disinfected   hand hygiene promoted  Goal: Optimal Comfort and Wellbeing  Outcome: Progressing  Intervention: Provide Person-Centered Care  Recent Flowsheet Documentation  Taken 6/15/2025 0400 by Jatinder Olivier RN  Trust Relationship/Rapport: care explained  Taken 6/15/2025 0000 by Jatinder Olivier RN  Trust Relationship/Rapport:   care explained   choices provided  Taken 6/14/2025 2000 by Jatinder Olivier RN  Trust Relationship/Rapport:   care explained   choices provided  Goal: Readiness for Transition of Care  Outcome: Progressing  Intervention: Mutually Develop Transition Plan  Recent Flowsheet Documentation  Taken 6/14/2025 2110 by Jatinder Olivier RN  Transportation Anticipated: family or friend will provide  Transportation Concerns: none  Patient/Family Anticipated Services at Transition: none  Patient/Family Anticipates Transition to:   home with family   home with help/services     Problem: Sepsis/Septic Shock  Goal: Optimal Coping  Outcome: Progressing  Intervention: Support  Patient and Family Response  Recent Flowsheet Documentation  Taken 6/15/2025 0400 by Jatinder Olivier RN  Family/Support System Care: caregiver stress acknowledged  Taken 6/15/2025 0000 by Jatinder Olivier RN  Family/Support System Care: caregiver stress acknowledged  Taken 6/14/2025 2000 by Jatinder Olivier RN  Family/Support System Care: caregiver stress acknowledged  Goal: Absence of Bleeding  Outcome: Progressing  Goal: Blood Glucose Level Within Target Range  Outcome: Progressing  Goal: Absence of Infection Signs and Symptoms  Outcome: Progressing  Intervention: Initiate Sepsis Management  Recent Flowsheet Documentation  Taken 6/15/2025 0500 by Jatinder Olivier RN  Infection Prevention:   rest/sleep promoted   personal protective equipment utilized   equipment surfaces disinfected   hand hygiene promoted  Taken 6/15/2025 0400 by Jatinder Olivier RN  Infection Prevention:   rest/sleep promoted   personal protective equipment utilized   equipment surfaces disinfected   hand hygiene promoted  Taken 6/15/2025 0300 by Jatinder Olivier RN  Infection Prevention:   rest/sleep promoted   personal protective equipment utilized   equipment surfaces disinfected   hand hygiene promoted  Taken 6/15/2025 0200 by Jatinder Olivier RN  Infection Prevention:   rest/sleep promoted   personal protective equipment utilized   equipment surfaces disinfected   hand hygiene promoted  Taken 6/15/2025 0100 by Jatinder Olivier RN  Infection Prevention:   rest/sleep promoted   personal protective equipment utilized   equipment surfaces disinfected   hand hygiene promoted  Taken 6/15/2025 0000 by Jatinder Olivier RN  Infection Prevention:   rest/sleep promoted   personal protective equipment utilized   equipment surfaces disinfected   hand hygiene promoted  Taken 6/14/2025 2300 by Jatinder Olivier RN  Infection Prevention:   rest/sleep promoted   personal protective equipment utilized   equipment surfaces disinfected   hand hygiene promoted  Taken 6/14/2025  2200 by Jatinder Olivier RN  Infection Prevention:   rest/sleep promoted   personal protective equipment utilized   equipment surfaces disinfected   hand hygiene promoted  Taken 6/14/2025 2100 by Jatinder Olivier RN  Infection Prevention:   rest/sleep promoted   personal protective equipment utilized   equipment surfaces disinfected   hand hygiene promoted  Taken 6/14/2025 2000 by Jatinder Olivier RN  Infection Prevention:   rest/sleep promoted   personal protective equipment utilized   equipment surfaces disinfected   hand hygiene promoted  Intervention: Promote Recovery  Recent Flowsheet Documentation  Taken 6/15/2025 0400 by Jatinder Olivier RN  Activity Management: bedrest  Taken 6/15/2025 0000 by Jatinder Olivier RN  Activity Management: bedrest  Taken 6/14/2025 2000 by Jatinder Olivier RN  Activity Management: bedrest  Airway/Ventilation Management: oxygen therapy provided  Goal: Optimal Nutrition Delivery  Outcome: Progressing     Problem: Violence Risk or Actual  Goal: Anger and Impulse Control  Outcome: Progressing  Intervention: Minimize Safety Risk  Recent Flowsheet Documentation  Taken 6/15/2025 0500 by Jatinder Olivier RN  Enhanced Safety Measures: bed alarm set  Taken 6/15/2025 0400 by Jatinder Olivier RN  Enhanced Safety Measures: bed alarm set  Taken 6/15/2025 0300 by Jatinder Olivier RN  Enhanced Safety Measures: bed alarm set  Taken 6/15/2025 0200 by Jatinder Olivier RN  Enhanced Safety Measures: bed alarm set  Taken 6/15/2025 0100 by Jatinder Olivier RN  Enhanced Safety Measures: bed alarm set  Taken 6/15/2025 0000 by Jatinder Olivier RN  Enhanced Safety Measures: bed alarm set  Taken 6/14/2025 2300 by Jatinder Olivier RN  Enhanced Safety Measures: bed alarm set  Taken 6/14/2025 2200 by Jatinder Olivier RN  Enhanced Safety Measures: bed alarm set  Taken 6/14/2025 2100 by Jatinder Olivier RN  Enhanced Safety Measures: bed alarm set  Taken 6/14/2025 2000 by Jatinder Olivier RN  Enhanced Safety Measures: bed alarm set     Problem:  Skin Injury Risk Increased  Goal: Skin Health and Integrity  Outcome: Progressing  Intervention: Optimize Skin Protection  Recent Flowsheet Documentation  Taken 6/15/2025 0400 by Jatinder Olivier RN  Activity Management: bedrest  Head of Bed (HOB) Positioning: HOB at 30-45 degrees  Taken 6/15/2025 0200 by Jatinder Olivier RN  Head of Bed (HOB) Positioning: HOB at 30 degrees  Taken 6/15/2025 0000 by Jatinder Olivier RN  Activity Management: bedrest  Head of Bed (HOB) Positioning: HOB at 30 degrees  Taken 6/14/2025 2200 by Jatinder Olivier RN  Head of Bed (HOB) Positioning: HOB at 30 degrees  Taken 6/14/2025 2000 by Jatinder Olivier RN  Activity Management: bedrest  Head of Bed (HOB) Positioning: HOB at 30 degrees     Problem: Fall Injury Risk  Goal: Absence of Fall and Fall-Related Injury  Outcome: Progressing  Intervention: Identify and Manage Contributors  Recent Flowsheet Documentation  Taken 6/15/2025 0500 by Jatinder Olivier RN  Medication Review/Management:   medications reviewed   high-risk medications identified  Taken 6/15/2025 0400 by Jatinder Olivier RN  Medication Review/Management:   medications reviewed   high-risk medications identified  Taken 6/15/2025 0300 by Jatinder Olivier RN  Medication Review/Management:   medications reviewed   high-risk medications identified  Taken 6/15/2025 0200 by Jatinder Olivier RN  Medication Review/Management:   medications reviewed   high-risk medications identified  Taken 6/15/2025 0100 by Jatinder Olivier RN  Medication Review/Management:   medications reviewed   high-risk medications identified  Taken 6/15/2025 0000 by Jatinder Olivier RN  Medication Review/Management:   medications reviewed   high-risk medications identified  Taken 6/14/2025 2300 by Jatinder Olivier RN  Medication Review/Management:   medications reviewed   high-risk medications identified  Taken 6/14/2025 2200 by Jatinder Olivier RN  Medication Review/Management:   medications reviewed   high-risk medications  identified  Taken 6/14/2025 2100 by Jatinder Olivier RN  Medication Review/Management:   medications reviewed   high-risk medications identified  Taken 6/14/2025 2000 by Jatinder Olivier RN  Medication Review/Management:   medications reviewed   high-risk medications identified  Intervention: Promote Injury-Free Environment  Recent Flowsheet Documentation  Taken 6/15/2025 0500 by Jatinder Olivier RN  Safety Promotion/Fall Prevention:   safety round/check completed   room organization consistent   clutter free environment maintained  Taken 6/15/2025 0400 by Jatinder Olivier RN  Safety Promotion/Fall Prevention:   safety round/check completed   room organization consistent   clutter free environment maintained  Taken 6/15/2025 0300 by Jatinder Olivier RN  Safety Promotion/Fall Prevention:   safety round/check completed   room organization consistent   clutter free environment maintained  Taken 6/15/2025 0200 by Jatinder Olivier RN  Safety Promotion/Fall Prevention:   safety round/check completed   room organization consistent   clutter free environment maintained  Taken 6/15/2025 0100 by Jatinder Olivier RN  Safety Promotion/Fall Prevention:   safety round/check completed   room organization consistent   clutter free environment maintained  Taken 6/15/2025 0000 by Jatinder Olivier RN  Safety Promotion/Fall Prevention:   safety round/check completed   room organization consistent   clutter free environment maintained  Taken 6/14/2025 2300 by Jatinder Olivier RN  Safety Promotion/Fall Prevention:   safety round/check completed   room organization consistent   clutter free environment maintained  Taken 6/14/2025 2200 by Jatinder Olivier RN  Safety Promotion/Fall Prevention:   safety round/check completed   room organization consistent   clutter free environment maintained  Taken 6/14/2025 2100 by Jatinder Olivier RN  Safety Promotion/Fall Prevention:   safety round/check completed   room organization consistent   clutter free environment  maintained  Taken 6/14/2025 2000 by Jatinder Olivier, RN  Safety Promotion/Fall Prevention:   safety round/check completed   room organization consistent   clutter free environment maintained   Goal Outcome Evaluation:

## 2025-06-15 NOTE — PROGRESS NOTES
[facility]  [phone]  Dr. Jose Armando Hare    Subjective:  LOS: 2  [unfilled]      Chief Complaint: Left carotid artery stenosis    Requiring O2. Sig smoking hx. Denies complaints.    Objective   Vital Signs past 24hrs  Temp range: Temp (24hrs), Av.1 °F (36.7 °C), Min:97.8 °F (36.6 °C), Max:98.4 °F (36.9 °C)    BP range: BP: (106-146)/() 116/73  Pulse range: Heart Rate:  [70-94] 72  Resp rate range: Resp:  [18-28] 18  Device (Oxygen Therapy): high-flow nasal cannula;humidifiedFlow (L/min) (Oxygen Therapy):  [7-12] 7  Oxygen range:SpO2:  [91 %-100 %] 91 %   Mechanical Ventilator:     Physical Exam  Eyes:      Pupils: Pupils are equal, round, and reactive to light.   Cardiovascular:      Rate and Rhythm: Normal rate and regular rhythm.   Pulmonary:      Breath sounds: Wheezing (mild coarse) and rales present.   Abdominal:      General: Bowel sounds are normal.      Palpations: Abdomen is soft. There is no mass.      Tenderness: There is no abdominal tenderness.   Musculoskeletal:         General: No swelling.   Neurological:      Mental Status: He is alert.      Comments: Right upper extremity weakness 4/5       Results Review:    I have reviewed the laboratory and imaging data since the last note by [facility] physician.  My annotations are noted in assessment and plan.      Result Review:  I have personally reviewed the results from last note by [facility] physician to 6/15/2025 09:54 EDT and agree with these findings:  [x]  Laboratory list / accordion  [x]  Microbiology  [x]  Radiology  []  EKG/Telemetry   []  Cardiology/Vascular   []  Pathology  []  Old records  []  Other:      Medication Review:  I have reviewed the current MAR.  My annotations are noted in assessment and plan.    albuterol, 2.5 mg, Nebulization, Q4H - RT  aspirin, 81 mg, Oral, Daily  atorvastatin, 40 mg, Oral, Daily  latanoprost, 1 drop, Both Eyes, Nightly  methylPREDNISolone sodium succinate, 40 mg, Intravenous, Q12H  midodrine, 10 mg,  Oral, Q8H  mupirocin, 1 Application, Each Nare, BID  neomycin-bacitracin-polymyxin, , Both Eyes, Q4H  pantoprazole, 40 mg, Oral, Daily  piperacillin-tazobactam, 3.375 g, Intravenous, Q8H  potassium chloride, 10 mEq, Intravenous, Q1H  senna-docusate sodium, 2 tablet, Oral, BID  sodium chloride, 10 mL, Intravenous, Q12H  sodium chloride, 10 mL, Intravenous, Q12H        heparin, 18 Units/kg/hr, Last Rate: 16.24 Units/kg/hr (06/15/25 0640)  phenylephrine, 0.5-3 mcg/kg/min, Last Rate: 0.5 mcg/kg/min (06/15/25 0943)      Lines, Drains & Airways       Active LDAs       Name Placement date Placement time Site Days    Peripheral IV 06/10/25 1927 20 G Anterior;Right Forearm 06/10/25  1927  Forearm  3    Peripheral IV 06/13/25 0814 20 G Anterior;Right;Upper Arm 06/13/25  0814  Arm  1    Peripheral IV 06/13/25 0814 Anterior;Distal;Right;Upper Arm 06/13/25  0814  Arm  1    External Urinary Catheter 06/12/25  1835  --  1                    PCCM Problems  Acute ischemic stroke, left MCA watershed  Left carotid artery stenosis with soft plaque  Bilateral pulmonary infiltrates  Fluid overload  Aspiration pneumonia  ? Acute hypotension also, on Lewis  Chronic hypotension on Midodrine  Severe MR  Afib on anticoagulation  COPD      Plan of Treatment    Plan is for carotid endarterectomy by vascular surgery on Monday.    Continue aspirin and heparin drip.    Keep systolic blood pressure greater than 120. On Lewis.  Hypotension requiring Lewis-Synephrine.  Also on midodrine at home.    Appreciate cardiology input. Moderate cardiac risk - non modifiable.    Bilateral pulmonary infiltrates with some improvement after Lasix.  However persistent asymmetrical infiltrates on chest x-ray.  I suspect aspiration.  Elevated procalcitonin and white count is elevated.  Antibiotic was switched to Zosyn.  Cultures are sent. Keep same.    Will ask speech to formally rule out dysphagia with video-swallow evaluation. NPO except meds with sips.    Hypernatremia  today. Give D5W. Note he received Lasix yesterday.    COPD and wheezing. Give steroids and nebs.    His past medical history records indicate lung cancer.  I cannot find any evidence of this in epic.  Will try to investigate further.    Jose Armando Hare MD  06/15/25  09:54 EDT    Isolation status: No active isolations    Dietary Orders (From admission, onward)       Start     Ordered    06/13/25 1755  NPO Diet NPO Type: Strict NPO  Diet Effective Now        Question:  NPO Type  Answer:  Strict NPO    06/13/25 1756                    Part of this note may be an electronic transcription/translation of spoken language to printed text using the Dragon Dictation System.

## 2025-06-16 PROBLEM — I72.0 CAROTID ARTERY ANEURYSM: Status: ACTIVE | Noted: 2025-06-16

## 2025-06-16 NOTE — ANESTHESIA PROCEDURE NOTES
Arterial Line      Patient reassessed immediately prior to procedure    Patient location during procedure: pre-op   Line placed for hemodynamic monitoring.  Performed By   Anesthesiologist: Marvel Wright MD   Preanesthetic Checklist  Completed: patient identified, IV checked, site marked, risks and benefits discussed, surgical consent, monitors and equipment checked, pre-op evaluation and timeout performed  Arterial Line Prep    Sterile Tech: cap, gloves, mask and sterile barriers  Prep: ChloraPrep  Patient monitoring: blood pressure monitoring, continuous pulse oximetry and EKG  Arterial Line Procedure   Laterality:left  Location:  radial artery  Catheter size: 20 G   Guidance: ultrasound guided  PROCEDURE NOTE/ULTRASOUND INTERPRETATION.  Using ultrasound guidance the potential vascular sites for insertion of the catheter were visualized to determine the patency of the vessel to be used for vascular access.  After selecting the appropriate site for insertion, the needle was visualized under ultrasound being inserted into the radial artery, followed by ultrasound confirmation of wire and catheter placement. There were no abnormalities seen on ultrasound; an image was taken; and the patient tolerated the procedure with no complications.   Number of attempts: 1  Successful placement: yes Images: still images not obtained  Post Assessment   Dressing Type: occlusive dressing applied and secured with tape.   Complications no  Circ/Move/Sens Assessment: normal and unchanged.   Patient Tolerance: patient tolerated the procedure well with no apparent complications  Additional Notes  Ultrasound guidance used for visualization of pertinent anatomy and confirmation of needle positioning.

## 2025-06-16 NOTE — PROGRESS NOTES
"Nutrition Services    Patient Name: Shukri Park  YOB: 1940  MRN: [id number]  Admission date: 6/13/2025    Assessment Date:  06/16/25    NUTRITION EVALUATION      Reason for Encounter BMI   Diagnosis/Problem Admission Diagnosis:  Chronic heart failure with preserved ejection fraction (HFpEF) [I50.32]  Left carotid stenosis [I65.22]    Problem List:    Carotid artery aneurysm    Ischemic cardiomyopathy    Coronary artery disease involving native coronary artery of native heart without angina pectoris    Chronic hypotension    Acute encephalopathy    Aortic aneurysm    Rhabdomyolysis    Severe protein-calorie malnutrition    Chronic heart failure with preserved ejection fraction (HFpEF)    Symptomatic stenosis of left carotid artery    Left carotid stenosis     Narrative This is a 83 yo male with symptomatic left internal carotid artery aneurysm with symptomatic left internal carotid artery stenosis. S/p surgery today carotid endarterectomy. It is reported pt with poor po intake and pt on a dysphagia diet.  Pt meets criteria for severe malnutrition based on NFPE, inadequate po intake, and hx of wt loss.        PO Diet NPO Diet NPO Type: Sips with Meds   Allergies NKFA   Supplements n/a   PO Intake % npo       Chewing/Swallowing Difficulty dysphagia pt has a hx of dysphagia and was on Pureed NTL diet       Medications reviewed D5 75ml/hr, heparin, sunday   Labs  reviewed Na 149, glu 202, BUN 24 phos 1.7       Physical Findings on oxygen therapy     Edema no edema    GI Function last bowel movement: 6/10   Skin Status Left neck surgical    Lines/Drains none   I/O reviewed        Height  Weight  BMI  Weight Trend     Height: 188 cm (74\")  Weight: 67 kg (147 lb 11.3 oz) (06/16/25 0549)  Body mass index is 18.96 kg/m².  Loss, Gain    Weight change: Pt use to weigh 170's--over 50lbs-severe       NFPE See Malnutrition Severity Assessment, Date Completed: 6/16       Nutrition Problem (PES) Problem: Underweight, " Unintentional Weight Loss, Malnutrition (severe), and Inadequate Oral Intake  Etiology: Factors Affecting Nutrition - decrease appetite   Signs/Symptoms: Report/Observation       Intervention/Plan Will follow for start of nutrition as medical conditions allow.    RD to follow up per protocol.     Estimated/Assessed Needs        Current Weight  Weight: 67 kg (147 lb 11.3 oz) (06/16/25 0549)       Energy Requirements    Weight for Calculation 67 kg   Method for Estimation  30 kcal/kg   EST Needs (kcal/day) 2010       Protein Requirements    Weight for Calculation 67 kg   EST Protein Needs (g/kg) 1.5 gm/kg   EST Daily Needs (g/day) 100       Fluid Requirements     Method for Estimation 1 mL/kcal    EST Needs (mL/day)      Malnutrition Severity Assessment      Patient meets criteria for : Severe Malnutrition  Malnutrition Type (Last 8 Hours)       Malnutrition Severity Assessment       Row Name 06/16/25 1601       Insufficient Energy Intake     Insufficient Energy Intake Findings Severe    Insufficient Energy Intake  <75% of est. energy requirement for > or equal to 1 month      Row Name 06/16/25 1601       Unintentional Weight Loss     Unintentional Weight Loss Findings Severe    Unintentional Weight Loss  --  pt has lost over 50lbs(time fram unknown)      Row Name 06/16/25 1601       Muscle Loss    [place] Region Severe - deep hollowing/scooping, lack of muscle to touch, facial bones well defined    Clavicle Bone Region Severe - protruding prominent bone    Acromion Bone Region Severe - squared shoulders, bones, and acromion process protrusion prominent    Dorsal Hand Region Severe - prominent depression    Patellar Region Severe - prominent bone, square looking, very little muscle definition    Anterior Thigh Region Severe - line/depression along thigh, obviously thin    Posterior Calf Region Severe - thin with very little definition/firmness      Row Name 06/16/25 1601       Fat Loss    Orbital Region  Severe -  pronounced hollowness/depression, dark circles, loose saggy skin    Upper Arm Region Moderate - some fat tissue, not ample      Row Name 06/16/25 1601       Criteria Met (Must meet criteria for severity in at least 2 of these categories: M Wasting, Fat Loss, Fluid, Secondary Signs, Wt. Status, Intake)    Patient meets criteria for  Severe Malnutrition                       Results from last 7 days   Lab Units 06/16/25  1443 06/16/25  0559 06/15/25  1702 06/15/25  0608 06/13/25  0048 06/12/25  1952 06/11/25  0236 06/10/25  1928   SODIUM mmol/L 149* 148*  --  152*   < > 144 144 144   POTASSIUM mmol/L 4.2 3.3* 3.4* 3.2*   < > 3.4* 4.1 4.4   CHLORIDE mmol/L 111* 112*  --  110*   < > 110* 107 102   CO2 mmol/L 28.5 25.6  --  28.0   < > 24.2 20.3* 23.1   BUN mg/dL 24.0* 23.0  --  23.0   < > 22.0 37.0* 33.0*   CREATININE mg/dL 0.78 0.74*  --  0.87   < > 0.77 1.07 1.15   CALCIUM mg/dL 8.2* 8.2*  --  8.6   < > 9.1 9.4 10.1   BILIRUBIN mg/dL  --   --   --   --   --  0.9 1.0 1.5*   ALK PHOS U/L  --   --   --   --   --  72 71 83   ALT (SGPT) U/L  --   --   --   --   --  27 26 33   AST (SGOT) U/L  --   --   --   --   --  43* 62* 77*   GLUCOSE mg/dL 202* 175*  --  110*   < > 125* 103* 93    < > = values in this interval not displayed.     Results from last 7 days   Lab Units 06/16/25  1443 06/16/25  0559 06/14/25  1933 06/14/25  0538 06/12/25  1952 06/11/25  0236 06/10/25  1928 06/10/25  1928   MAGNESIUM mg/dL  --   --   --  1.7  --  2.5*  --  2.5*   PHOSPHORUS mg/dL  --  1.7*   < > 2.2*  --  4.2   < >  --    HEMOGLOBIN g/dL 12.1* 11.3*   < > 11.9*   < > 13.4  --  14.2   HEMATOCRIT % 37.9 35.2*   < > 34.9*   < > 42.1  --  44.1   TRIGLYCERIDES mg/dL  --   --   --   --   --  116  --   --     < > = values in this interval not displayed.     Lab Results   Component Value Date    HGBA1C 5.76 (H) 06/11/2025     Wt Readings from Last 10 Encounters:   06/16/25 67 kg (147 lb 11.3 oz)   06/12/25 67.4 kg (148 lb 9.4 oz)   04/28/25 66.7 kg  (147 lb)   08/30/24 64 kg (141 lb)   02/07/24 65.8 kg (145 lb)   08/02/23 64.4 kg (142 lb)   02/01/23 66.2 kg (146 lb)   07/27/22 66.2 kg (146 lb)   01/26/22 69.9 kg (154 lb)   10/28/21 69.9 kg (154 lb)       Electronically signed by:  Luda Mcpherson RD  06/16/25 15:52 EDT

## 2025-06-16 NOTE — OP NOTE
Operative Note  Location: [facility]  Date of Admission:  6/13/2025  OR Date: 6/16/2025      Pre-op Diagnosis:  symptomatic left internal carotid artery aneurysm with symptomatic left internal carotid artery stenosis    Post-op Diagnosis: Same    Procedure:   1) left internal carotid and common carotid artery aneurysm repair with CCA to ICA bypass with 4-7 mm PTFE, ligation of left ECA    Surgeon: Maxine Alvarez MD    Assistant: Asya Terrazas RN, Radha Plascencia MD , Provided critical assistance in exposure, retraction, and suction that overall decrease blood loss and operative time.    Anesthesia: General    Staff:   Cell Saver : Toño Last  Circulator: Marisabel Bajwa RN; Michelle Naik RN  Scrub Person: Betty Bowden; Kavon Knowles  Assistant: Asya Terrazas CSFA    Estimated Blood Loss: 200ml    Specimen: * No orders in the log *    Complications: none    Findings: aneurysmal degeneration of the left common carotid and proximal internal carotid arteries with a large amount of chronic mural thrombus and acute thrombus present.  The aneurysmal portion was opened longitudinally and the thrombus was evacuated, then the ECA was oversewn and a CCA to ICA bypass was performed with a 4-7 mm tapered PTFE graft with a shunt in place to maintain cerebral blood flow.      Implants:   Implant Name Type Inv. Item Serial No.  Lot No. LRB No. Used Action   CLIPAPPLR M/ ENDO [id number] 3/8IN MD - [id number] Implant CLIPAPPLR M/ ENDO [id number] 3/8IN MD  [employer]  DIV OF J AND J [id number] Left 1 Implanted   HEMOST ABS SURGICEL FIBRILLAR 1X2 - [id number] Implant HEMOST ABS SURGICEL FIBRILLAR 1X2  [employer]  DIV OF J AND J [id number] Left 1 Implanted   GRFT STRCH INTERING 4 TO 7MM 45CM - [id number] - [id number] Implant GRFT STRCH INTERING 4 TO 7MM 45CM [id number] MESHA TALLEY AND  . Left 1 Implanted       Indications:    The patient is an 84 year old male seen for evaluation of symptomatic  left ICA stenosis.  He had a remote left CEA and he was found to have aneurysmal degeneration of the CCA and ICA from the vein patch used at his initial surgery.  There was significant accumulation of mural thrombus in the aneurysmal vessel, causing stenosis and possible embolization distally.  After careful review of the images, I recommended open repair with an interposition of the CCA to ICA.  I discussed this with the patient and his grandchildren and they agreed that they wanted to proceed.       Procedure:    Patient brought to the operating room and positioned supine on the operating table.  Appropriate perioperative antibiotics administered prior to skin incision.  Timeout performed with all OR staff present and in agreement.  General anesthesia introduced without event.  Left neck and chest were prepped and draped in standard sterile fashion. The prior cervical incision was marked with a marking pen and reopened with a 15 blade.  Dissection was carried down with a bovie through the platysma and the SCM was retracted laterally.  The IJV was absent since it had been used for a vein patch.  The patient was systemically heparinized with 15,000 units of IV heparin.  The CCA was identified.  The ansa cervicalis was present anterior and medial to the CCA and ECA.  There was dense scar tissue from the mid CCA to the distal ECA and mid ICA.  This was carefully dissected away and the proximal CCA was encircled with an umbilical tape and Rimmel tourniquet.  Then, the distal ICA was exposed in a similar fashion. We did not encounter the hypoglossal nerve or the vagus nerve during the dissection, and I did not specifically try to expose them due to increased risk of injuring the nerves trying to expose them.  The ECA was exposed in a similar fashion and encircled with a vessel loop.  The ICA was clamped, then the CCA and ECA in standard fashion.  The CCA and ICA were opened with a longitudinal arteriotomy from the  proximal CCA to the distal ICA.  There was a large amount of mural thrombus in the CCA and proximal ICA, with a small amount of fresh thrombus in the CCA.  This was all removed.  Initially, I cut a 4-7 mm PTFE graft to length and placed a sunt shunt through this and into the ICA and CCA.  However, the sunt shunt was too redundant so I swapped this for an argyle shunt, which layed much better.  Flow through the shunt was confirmed with doppler.  The superior thyroid artery was oversewn with a 5-0 prolene suture from the inside of the CCA.  The ECA origin was oversewn with a running 5-0 suture in standard fashion.  Then, the graft was cut to size and the distal ICA was transected and the graft was sewn in an end to end fashion to the 4 mm end of the graft.  Then, the graft was cut to length and the shunt was removed intact.  The ICA and CCA were clamped and the shunt and CCA were sewn in an end to end fashion.  Prior to completion, this was de-aired and flushed vigorously.  Then, I used a running vicryl to close the aneurysm sac over the bypass graft in a running fashion.  The wound was irrigated and protamine was given for heparin reversal.  Hemostasis was achieved and the wound was closed in layers, with interrupted 2-0 vicryl, then running 3-0 and 4-0 vicryl.  Skin was closed with skin glue.      All counts were reported as correct at the conclusion of the procedure.  Patient tolerated procedure well without any apparent complications.  Patient was awakened from anesthesia and found to be neurologically intact prior to leaving the operating room.  Anesthesia was reversed and the patient was transferred to the PACU in stable condition.        Active Hospital Problems    Diagnosis  POA    **Carotid artery aneurysm [I72.0]  Yes    Chronic heart failure with preserved ejection fraction (HFpEF) [I50.32]  Yes    Severe protein-calorie malnutrition [E43]  Yes    Rhabdomyolysis [M62.82]  Yes    Symptomatic stenosis of left  carotid artery [I65.22]  Unknown    Left carotid stenosis [I65.22]  Yes    Aortic aneurysm [I71.9]  Yes    Acute encephalopathy [G93.40]  Yes    Chronic hypotension [I95.89]  Yes    Coronary artery disease involving native coronary artery of native heart without angina pectoris [I25.10]  Yes     Added automatically from request for surgery [id number]      Ischemic cardiomyopathy [I25.5]  Yes     Added automatically from request for surgery [id number]           Maxine Alvarez MD  Vascular Surgery  O: [phone]  F: [phone]

## 2025-06-16 NOTE — ANESTHESIA POSTPROCEDURE EVALUATION
Patient: Shukri Park    Procedure Summary       Date: 06/16/25 Room / Location: [facility] OR [address] [place] [address] MAIN OR    Anesthesia Start: 1030 Anesthesia Stop: 1339    Procedure: CAROTID ENDARTERECTOMY with common carotid to internal carotid interposition and repair of carotid aneurysm (Left: Neck) Diagnosis:       Symptomatic stenosis of left carotid artery      (Symptomatic stenosis of left carotid artery [I65.22])    Surgeons: Maxine Alvarez MD Provider: Marvel Wright MD    Anesthesia Type: generalJyoti ASA Status: 4            Anesthesia Type: generalJyoti    Vitals  Vitals Value Taken Time   /83 06/16/25 16:15   Temp 36.4 °C (97.5 °F) 06/16/25 16:20   Pulse 73 06/16/25 16:26   Resp 24 06/16/25 16:15   SpO2 96 % 06/16/25 16:26   Vitals shown include unfiled device data.        Anesthesia Post Evaluation

## 2025-06-16 NOTE — CASE MANAGEMENT/SOCIAL WORK
Case Management Discharge Note      Final Note: [facility]    Provided Post Acute Provider List?: Yes  Post Acute Provider List: Nursing Home  Delivered To: Support Person  Support Person: Sebastian Park, ese ROSIESAHNE and family    Selected Continued Care - Discharged on 6/13/2025 Admission date: 6/10/2025 - Discharge disposition: [facility] ([facility])   Transportation Services  Ambulance: [facility]    Final Discharge Disposition Code: 02 - [facility] for  care      OAlexandra Salinas RN  ICU/CVU   O: [phone]  C: [phone]  [email or web address]

## 2025-06-16 NOTE — SIGNIFICANT NOTE
Patient seen in the PACU.  He initially woke up from the surgery neurologically intact but he became somnolent and lethargic in the PACU.  He was profoundly acidotic with hypercapnia.  He is DNR/DNI.  He was started on BIPAP.  Bilateral lung infiltrates, L>R, worse on the left from prior chest xray two days ago.  Discussed with Dr. Hare.  He improved on BIPAP, but still significantly acidotic and hypercapnic.  Discussed with his family.  Will continue BIPAP as he requested no intubation, and he is showing improvement.  He is showing right sided neglect, which is not unexpected given his recent stroke.  I do not think there is any indication for carotid imaging at this time.      Maxine Alvarez MD  Vascular Surgery  O: [phone]  F: [phone]

## 2025-06-16 NOTE — PROGRESS NOTES
I got a call from Dr. Fairbanks.  Patient was extubated and doing well for approximately 1 hour.  Then became obtunded and ABG shows hypercapnia.  He will be placed on BiPAP but may require intubation.  Discussed with RT and given orders.  Repeat ABG in half an hour.    Electronically signed by Jose Armando Hare MD, 06/16/25, 2:51 PM EDT.

## 2025-06-16 NOTE — PROGRESS NOTES
[facility]  [phone]  Dr. Jose Armando Hare    Subjective:  LOS: 3  [unfilled]      Chief Complaint: Left carotid artery stenosis    On low flow O2. No complaints.    Objective   Vital Signs past 24hrs  Temp range: Temp (24hrs), Av.5 °F (36.4 °C), Min:97.2 °F (36.2 °C), Max:97.9 °F (36.6 °C)    BP range: BP: (111-148)/() 130/81  Pulse range: Heart Rate:  [69-77] 70  Resp rate range: Resp:  [18-24] 18  Device (Oxygen Therapy): nasal cannulaFlow (L/min) (Oxygen Therapy):  [3-9] 5  Oxygen range:SpO2:  [87 %-100 %] 94 %   Mechanical Ventilator:     Physical Exam  Eyes:      Pupils: Pupils are equal, round, and reactive to light.   Cardiovascular:      Rate and Rhythm: Normal rate and regular rhythm.   Pulmonary:      Breath sounds: No wheezing or rales.   Abdominal:      General: Bowel sounds are normal.      Palpations: Abdomen is soft. There is no mass.      Tenderness: There is no abdominal tenderness.   Musculoskeletal:         General: No swelling.   Neurological:      Mental Status: He is alert.      Comments: Right upper extremity weakness 4/5       Results Review:    I have reviewed the laboratory and imaging data since the last note by [facility] physician.  My annotations are noted in assessment and plan.      Result Review:  I have personally reviewed the results from last note by [facility] physician to 2025 14:12 EDT and agree with these findings:  [x]  Laboratory list / accordion  [x]  Microbiology  [x]  Radiology  []  EKG/Telemetry   []  Cardiology/Vascular   []  Pathology  []  Old records  []  Other:      Medication Review:  I have reviewed the current MAR.  My annotations are noted in assessment and plan.    acetaminophen, 1,000 mg, Oral, Q8H  [Transfer Hold] albuterol, 2.5 mg, Nebulization, Q4H - RT  [Transfer Hold] aspirin, 81 mg, Oral, Daily  [Transfer Hold] atorvastatin, 40 mg, Oral, Daily  clopidogrel, 75 mg, Oral, Daily  [Transfer Hold] latanoprost, 1 drop, Both Eyes,  Nightly  [Transfer Hold] methylPREDNISolone sodium succinate, 40 mg, Intravenous, Q12H  [Transfer Hold] midodrine, 10 mg, Oral, Q8H  [Transfer Hold] mupirocin, 1 Application, Each Nare, BID  [Transfer Hold] neomycin-bacitracin-polymyxin, , Both Eyes, Q4H  [Transfer Hold] pantoprazole, 40 mg, Oral, Daily  piperacillin-tazobactam, 3.375 g, Intravenous, Q8H  [Transfer Hold] senna-docusate sodium, 2 tablet, Oral, BID  [Transfer Hold] sodium chloride, 10 mL, Intravenous, Q12H  [Transfer Hold] sodium chloride, 10 mL, Intravenous, Q12H        dextrose 5 % and sodium chloride 0.45 %, 75 mL/hr  heparin, 18 Units/kg/hr, Last Rate: Stopped (06/16/25 0602)  phenylephrine, 0.5-3 mcg/kg/min, Last Rate: 0.5 mcg/kg/min (06/16/25 0618)      Lines, Drains & Airways       Active LDAs       Name Placement date Placement time Site Days    Peripheral IV 06/10/25 1927 20 G Anterior;Right Forearm 06/10/25  1927  Forearm  3    Peripheral IV 06/13/25 0814 20 G Anterior;Right;Upper Arm 06/13/25  0814  Arm  1    Peripheral IV 06/13/25 0814 Anterior;Distal;Right;Upper Arm 06/13/25  0814  Arm  1    External Urinary Catheter 06/12/25  1835  --  1                    PCCM Problems  Acute ischemic stroke, left MCA watershed  Left carotid artery stenosis with soft plaque  Bilateral pulmonary infiltrates  Fluid overload  Aspiration pneumonia  ? Acute hypotension also, on Elwis  Chronic hypotension on Midodrine  Severe MR  Afib on anticoagulation  COPD      Plan of Treatment    Plan for carotid endarterectomy by vascular surgery today.    Keep systolic blood pressure greater than 120. On Lewis.  Hypotension requiring Lewis-Synephrine.  Also on midodrine at home.    Appreciate cardiology input. Moderate cardiac risk - non modifiable.    Bilateral pulmonary infiltrates with some improvement after Lasix but more likely aspiration pneumonia. - on Zosyn.    Pending formal video swallow.    Hypernatremia on D5W.    COPD and wheezing better. Taper steroids.    His  past medical history records indicate lung cancer.  I cannot find any evidence of this in epic.     Jose Armando Hare MD  06/16/25  14:12 EDT    Isolation status: No active isolations    Dietary Orders (From admission, onward)       Start     Ordered    06/16/25 0001  NPO Diet NPO Type: Sips with Meds  Diet Effective Midnight        Question:  NPO Type  Answer:  Sips with Meds    06/15/25 1030                    Part of this note may be an electronic transcription/translation of spoken language to printed text using the Dragon Dictation System.

## 2025-06-16 NOTE — PLAN OF CARE
Problem: Malnutrition  Goal: Improved Nutritional Intake  Outcome: Progressing   Goal Outcome Evaluation:      NPO  RD to follow

## 2025-06-16 NOTE — SIGNIFICANT NOTE
06/16/25 0965   Communication Assessment/Intervention   Session Not Performed patient unavailable for evaluation;other (see comments)  (Pt scheduled for left ICA aneurysm repair with CCA to ICA bypass this am.  will f/u for plan)

## 2025-06-16 NOTE — PROGRESS NOTES
Patient seen and examined at bedside.  His grandson and granddaughter were at bedside.  His speech was clear and his strength was normal.  We discussed the planned surgery, including the risks (cranial nerve injury, stroke, heart attack, death, bleeding infection) as well as his DNR status.  After discussion, he agreed to full code in the OR and will resume DNR once he leaves the OR.  I answered all of his and his family's questions and we will plan to proceed with left ICA aneurysm repair with CCA to ICA bypass.        Maxine Alvarez MD  Vascular Surgery  O: [phone]  F: [phone]

## 2025-06-16 NOTE — ANESTHESIA PREPROCEDURE EVALUATION
Anesthesia Evaluation                  Airway   Mallampati: II  TM distance: >3 FB  Neck ROM: full  Dental    (+) edentulous and upper dentures    Pulmonary    (+) a smoker Former, lung cancer,  Cardiovascular     (+) pacemaker pacemaker, ICD, valvular problems/murmurs MR, CAD, dysrhythmias Paroxysmal Atrial Fib, Bradycardia, hyperlipidemia,  carotid artery disease left carotid      Neuro/Psych  (+) CVA  GI/Hepatic/Renal/Endo    (+) GERD    Musculoskeletal     Abdominal    Substance History      OB/GYN          Other                    Anesthesia Plan    ASA 4     general and Jyoti     (Art line, cerebral ox)    Anesthetic plan, risks, benefits, and alternatives have been provided, discussed and informed consent has been obtained with: patient.    CODE STATUS:    While under anesthesia and immediate perioperative period:  Code Status: CPR - Full Support

## 2025-06-17 NOTE — CASE MANAGEMENT/SOCIAL WORK
Discharge Planning Assessment  [facility]     Patient Name: Shukri Park  MRN: [id number]  Today's Date: 6/17/2025    Admit Date: 6/13/2025    Plan: [facility] vs Subacute.Ese works for [employer] and is trying to decide where he wants referrals.   Discharge Needs Assessment       Row Name 06/17/25 1430       Living Environment    People in Home alone    Current Living Arrangements home    Potentially Unsafe Housing Conditions none    Primary Care Provided by self    Provides Primary Care For no one    Family Caregiver if Needed child(hira), adult;grandchild(hira), adult    Quality of Family Relationships helpful;involved;supportive    Able to Return to Prior Arrangements yes       Resource/Environmental Concerns    Resource/Environmental Concerns none    Transportation Concerns none       Transition Planning    Patient/Family Anticipates Transition to inpatient rehabilitation facility    Patient/Family Anticipated Services at Transition none    Transportation Anticipated family or friend will provide;health plan transportation       Discharge Needs Assessment    Readmission Within the Last 30 Days planned readmission    Equipment Currently Used at Home cane, straight;walker, rolling    Anticipated Changes Related to Illness none    Equipment Needed After Discharge none                   Discharge Plan       Row Name 06/17/25 1428       Plan    Plan [facility] vs Subacute.Ese works for [employer] and is trying to decide where he wants referrals.    Patient/Family in Agreement with Plan yes    Provided Post Acute Provider List? 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 Introduced self and explained role of CCP. The patient confirmed the information on his face sheet as accurate. Patients PCP is Homa Carrizales. Patient uses [facility]. Patient lives at  home alone. He is independent at baseline. Patient has used HH ins the past and has been to a SNF in the past but could not remember the name. CCP called Sebastian the patients EH and other grandson to discuss rehab options. PT recommended acute rehab. Sebastian stated that he works for [employer] and is hoping patient could go to a [employer] facility. Discussed the difference between acute and subacute. Sebastian wants to run this information by a coworker who works in admissions for [employer] before deciding on acute vs subacute. He stated he would call CCP back once he has spoken to her. He was ok with a referral for [facility] so [employer] could look at the patient’s chart. CCP following.                  Continued Care and Services - Admitted Since 6/13/2025       Destination       Service Provider Request Status Services Address Phone Fax Patient Preferred    [facility] Pending - Request Sent -- [address], [place] [address] [phone] --                  Expected Discharge Date and Time       Expected Discharge Date Expected Discharge Time    Jun 13, 2025            Demographic Summary       Row Name 06/17/25 1429       General Information    Admission Type inpatient    Arrived From hospital    Referral Source admission list    Preferred Language English                   Functional Status       Row Name 06/17/25 1430       Functional Status    Usual Activity Tolerance moderate    Current Activity Tolerance moderate       Functional Status, IADL    Medications independent    Meal Preparation independent    Housekeeping independent    Laundry independent    Shopping assistive person       Mental Status    General Appearance WDL WDL       Mental Status Summary    Recent Changes in Mental Status/Cognitive Functioning no changes       Employment/    Employment Status retired                   Psychosocial    No documentation.                  Abuse/Neglect    No  documentation.                  Legal    No documentation.                  Substance Abuse    No documentation.                  Patient Forms    No documentation.

## 2025-06-17 NOTE — PROGRESS NOTES
Vascular Surgery Progress Note    No acute events overnight.  Mentation much better, off BiPAP on O2 NC.  Alert, oriented, and neurologic exam back to his baseline this morning.  RUE 4/5 strength, LUE 5/5 strength.  BLE equivalent strength.  Awaiting results of swallow study.  Discussed with ICU and nursing staff, SBP > 100 mmHg, MAP > 65 mmHg, goal SBP < 160 mmHg.      Maxine Alvarez MD  Vascular Surgery  O: [phone]  F: [phone]

## 2025-06-17 NOTE — NURSING NOTE
Pt recovering in ICU. He is NPO d/t failed swallow study, possible aspiration, awaiting VFSS. Spoke with Dr. Flaherty regarding aspirin and plavix - OK with holding for now until able to tolerate PO.

## 2025-06-17 NOTE — MBS/VFSS/FEES
Acute Care - Speech Language Pathology   Swallow Initial Evaluation [facility]     Patient Name: Shukri Park  : 1940  MRN: [id number]  Today's Date: 2025               Admit Date: 2025    Visit Dx:     ICD-10-CM ICD-9-CM   1. Symptomatic stenosis of left carotid artery  I65.22 433.10     Patient Active Problem List   Diagnosis    Chronic anticoagulation    Mixed hyperlipidemia    Chronic pain    Ischemic cardiomyopathy    Persistent atrial fibrillation    Coronary artery disease involving native coronary artery of native heart without angina pectoris    Vitamin D deficiency    History of lung cancer    Chronic hypotension    Presence of biventricular implantable cardioverter-defibrillator (ICD)    Acute encephalopathy    GERD (gastroesophageal reflux disease)    Aortic aneurysm    Rhabdomyolysis    More than 50 percent stenosis of left internal carotid artery    Non-occlusive thrombus    Severe protein-calorie malnutrition    Chronic heart failure with preserved ejection fraction (HFpEF)    Symptomatic stenosis of left carotid artery    Left carotid stenosis    Carotid artery aneurysm     Past Medical History:   Diagnosis Date    Aortic aneurysm     Chronic anticoagulation 2013    Chronic HFrEF (heart failure with reduced ejection fraction)     Chronic hypotension 2020    Chronic pain 2020    Coronary artery disease     Foot pain, bilateral     GERD (gastroesophageal reflux disease)     History of lung cancer 2020    Hyperlipidemia     Inguinodynia, right 2020    Added automatically from request for surgery [id number]      Ischemic cardiomyopathy 2020    Added automatically from request for surgery [id number]      Low back pain     Lung cancer     Mixed hyperlipidemia 2013    Persistent atrial fibrillation 2020    Added automatically from request for surgery [id number]      Presence of biventricular implantable cardioverter-defibrillator (ICD) 2020     S/P epidural steroid injection     Israel Gomes's - no relief    Vitamin D deficiency 04/05/2016    Weight loss     acute loss of weight 30 lbs     Past Surgical History:   Procedure Laterality Date    CARDIAC CATHETERIZATION N/A 7/9/2020    Procedure: LEFT HEART CATH with possible PCI;  Surgeon: Wade Cronin MD;  Location: [facility] CATH INVASIVE LOCATION;  Service: Cardiovascular;  Laterality: N/A;  Local and IV sedation    CARDIAC CATHETERIZATION N/A 7/9/2020    Procedure: Percutaneous Coronary Intervention;  Surgeon: Wade Cronin MD;  Location: [facility] CATH INVASIVE LOCATION;  Service: Cardiovascular;  Laterality: N/A;    CARDIAC DEFIBRILLATOR PLACEMENT      CARDIAC ELECTROPHYSIOLOGY PROCEDURE N/A 7/10/2020    Procedure: Biventricular Device Insertion;  Surgeon: Jonathan Denney MD;  Location: [facility] CATH INVASIVE LOCATION;  Service: Cardiovascular;  Laterality: N/A;    CARDIAC ELECTROPHYSIOLOGY PROCEDURE N/A 7/10/2020    Procedure: ABLATION AV NODE;  Surgeon: Jonathan Denney MD;  Location: [facility] CATH INVASIVE LOCATION;  Service: Cardiovascular;  Laterality: N/A;    CARDIAC ELECTROPHYSIOLOGY PROCEDURE N/A 7/10/2020    Procedure: AV node ablation;  Surgeon: Jonathan Denney MD;  Location: [facility] CATH INVASIVE LOCATION;  Service: Cardiovascular;  Laterality: N/A;    CARDIOVASCULAR STRESS TEST  2020    CAROTID ENDARTERECTOMY Left 6/16/2025    Procedure: CAROTID ENDARTERECTOMY with common carotid to internal carotid interposition and repair of carotid aneurysm;  Surgeon: Maxine Alvarez MD;  Location: [facility];  Service: Vascular;  Laterality: Left;    CATARACT EXTRACTION, BILATERAL      CONVERTED (HISTORICAL) HOLTER  2020    CORONARY ANGIOPLASTY WITH STENT PLACEMENT      ENDOSCOPY N/A 7/5/2020    Procedure: ESOPHAGOGASTRODUODENOSCOPY;  Surgeon: Neal Correa MD;  Location: [facility] ENDOSCOPY;  Service: Gastroenterology;  Laterality: N/A;    INGUINAL HERNIA REPAIR Right 1/7/2021     Procedure: INGUINAL HERNIA REPAIR OPEN WITH MESH;  Surgeon: Cody Randall MD;  Location: [facility] MAIN OR;  Service: General;  Laterality: Right;    LUMBAR DECOMPRESSION  09/2015    L2-L3    LUMBAR DECOMPRESSION  2006    Dr. Logan    OTHER SURGICAL HISTORY  05/2015    left SI fusion with bone graft - Dr. Presley    OTHER SURGICAL HISTORY      carotid artery repair     OTHER SURGICAL HISTORY Left 02/19/2016    Left SI fusion 2/19/2016 Dr. Zendejas    POSTERIOR SPINAL FUSION  10/24/2016    PSF T12-3/TLIF L3-L4 poss L2-L3 --- Dr. 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 for Method of Medication Administration: meds via alternate route (crushed with puree per MD request if alternate route not available.) (06/17/25 1145)     Monitor for Signs of Aspiration: yes, notify SLP if any concerns (06/17/25 1145)  Recommended Diagnostics: reassess via VFSS ([facility]) (06/17/25 1145)  Swallow Criteria for Skilled Therapeutic Interventions Met: demonstrates skilled criteria (06/17/25 1145)  Anticipated Discharge Disposition (SLP): anticipate therapy at next level of care (06/17/25 1145)  Rehab Potential/Prognosis, Swallowing: good, to achieve stated therapy goals (06/17/25 1145)  Therapy Frequency (Swallow): PRN (06/17/25 1145)  Predicted Duration Therapy Intervention (Days): until discharge (06/17/25 1145)  Oral Care Recommendations: Oral Care BID/PRN (06/17/25 1145)                                        Outcome Evaluation: VFSS completed. Recommend NPO, temporary means of alternate nutrition and meds alternate route. If alternate route unavailable, meds crushed with puree as tolerated. Allow ice chips s/p  oral care. Swallow function appears to have declined since previous study 6/11/25. If NPO refused, could trial puree/nectar thick. Concern for inability to tolerate as patient did not tolerate s/p previous VFSS 6/11/25 and dysphagia appears to have worsened since previous study. SLP to follow for re-eval as over all strength/status improves.      SWALLOW EVALUATION (Last 72 Hours)       SLP Adult Swallow Evaluation       Row Name 06/17/25 5018                   Rehab Evaluation    Document Type evaluation  -OC        Subjective Information no complaints  -OC        Patient Observations alert;cooperative;agree to therapy  -OC        Patient Effort good  -OC        Symptoms Noted During/After Treatment none  -OC           General Information    Patient Profile Reviewed yes  -OC        Current Method of Nutrition NPO  -OC        Precautions/Limitations, Vision WFL;for purposes of eval  -OC        Precautions/Limitations, Hearing difficult to assess;other (see comments)  -OC        Prior Level of Function-Swallowing no diet consistency restrictions;safe, efficient swallowing in all situations  -OC        Plans/Goals Discussed with patient;family;agreed upon  -OC        Barriers to Rehab cognitive status  -OC        Patient's Goals for Discharge patient did not state  -OC           Pain    Pretreatment Pain Rating 0/10 - no pain  -OC        Posttreatment Pain Rating 0/10 - no pain  -OC           MBS/VFSS Interpretation    VFSS Summary VFSS completed inconjunction with Ena CARLOS. Patient presents with mild-moderate oropharyngeal dysphagia characterized by generalized pharyngeal weakness, prolonged anterior-posterior transit, sluggish eiglotic deflection. Patient demonstrated tongue pumping and premature spillage. Patient demonstrated partial bolus clearance honey thick via spoon X2. Increased weight of bolus via cup aids in bolus clearance. Mild-moderate honey thick liquids pooling to the vallecula/pyriforms  after the swallow. Partially clears with cued double swallow. mistiming nectar thick liquids, mild-moderate residue pooling after the swallow. Prespill thin to the pyriforms. Residue pooling to the pyriforms. Moderate residue puree after the swallow, partially clears with liquids wash. Noted web-like structure upper esophagus.  -OC           SLP Communication to Radiology    Summary Statement VFSS completed inconjunction with Ena CARLOS. Patient presents with mild-moderate oropharyngeal dysphagia characterized by generalized pharyngeal weakness, prolonged anterior-posterior transit, sluggish eiglotic deflection. Patient demonstrated tongue pumping and premature spillage. Patient demonstrated partial bolus clearance honey thick via spoon X2. Increased weight of bolus via cup aids in bolus clearance. Mild-moderate honey thick liquids pooling to the vallecula/pyriforms after the swallow. Partially clears with cued double swallow. mistiming nectar thick liquids, mild-moderate residue pooling after the swallow. Prespill thin to the pyriforms. Residue pooling to the pyriforms. Moderate residue puree after the swallow, partially clears with liquids wash. Noted web-like structure upper esophagus.  -OC           SLP Evaluation Clinical Impression    SLP Swallowing Diagnosis mild-moderate;oral dysphagia;pharyngeal dysphagia  appears to have declined since previous study  -OC        Functional Impact risk of aspiration/pneumonia;risk of malnutrition;risk of dehydration  -OC        Rehab Potential/Prognosis, Swallowing good, to achieve stated therapy goals  -OC        Swallow Criteria for Skilled Therapeutic Interventions Met demonstrates skilled criteria  -OC           Recommendations    Therapy Frequency (Swallow) PRN  -OC        Predicted Duration Therapy Intervention (Days) until discharge  -OC        SLP Diet Recommendation NPO;ice chips between meals after oral care, with supervision  -OC        Recommended  Diagnostics reassess via [place]  -OC        Oral Care Recommendations Oral Care BID/PRN  -OC        SLP Rec. for Method of Medication Administration meds via alternate route  crushed with puree per MD request if alternate route not available.  -OC        Monitor for Signs of Aspiration yes;notify SLP if any concerns  -OC        Anticipated Discharge Disposition (SLP) anticipate therapy at next level of care  -OC                  User Key  (r) = Recorded By, (t) = Taken By, (c) = Cosigned By      Initials Name Effective Dates    Brittnee Issa SLP 08/28/23 -                     EDUCATION  The patient has been educated in the following areas:   Dysphagia (Swallowing Impairment).                Time Calculation:    Time Calculation- SLP       Row Name 06/17/25 1600             Time Calculation- SLP    SLP Start Time 1145  -OC      SLP Received On 06/17/25  -OC         Untimed Charges    SLP Eval/Re-eval  ST Motion Fluoro Eval Swallow - [id number]  -OC      [id number] Motion Fluoro Eval Swallow Minutes 105  -OC         Total Minutes    Untimed Charges Total Minutes 105  -OC       Total Minutes 105  -OC                User Key  (r) = Recorded By, (t) = Taken By, (c) = Cosigned By      Initials Name Provider Type    Brittnee Issa SLP Speech and Language Pathologist                    Therapy Charges for Today       Code Description Service Date Service Provider Modifiers Qty    [id number] HC [facility] MOTION FLUORO EVAL SWALLOW 7 6/17/2025 Brittnee Mix SLP GN 1                 LUZ ELENA Hermosillo  6/17/2025

## 2025-06-17 NOTE — PROGRESS NOTES
LOS: 4 days   Patient Care Team:  Homa Carrizales MD as PCP - General (Family Medicine)    Chief Complaint: Follow-up carotid artery stenosis, left MCA portion CVA, persistent atrial fibrillation, AV node ablation with ICD placement, coronary artery disease, mitral regurgitation.    Interval History: No acute events.  He mainly complains of being thirsty.  No chest pain or shortness of breath.  He had some mild postoperative pain at his left carotid endarterectomy site.    Vital Signs:  Temp:  [97.3 °F (36.3 °C)-97.5 °F (36.4 °C)] 97.5 °F (36.4 °C)  Heart Rate:  [65-78] 70  Resp:  [13-30] 18  BP: ()/() 148/89  Arterial Line BP: (146-197)/() 151/75    Intake/Output Summary (Last 24 hours) at 6/17/2025 1216  Last data filed at 6/17/2025 1030  Gross per 24 hour   Intake 3553 ml   Output 1251 ml   Net 2302 ml       Physical Exam:   General Appearance:    No acute distress, alert and oriented x4   Lungs:     Clear to auscultation bilaterally     Heart:    Regular rhythm and normal rate. II/VI SM LLSB and apex.    Abdomen:     Soft, nontender, nondistended.    Extremities:   No clubbing, cyanosis, or edema.     Results Review:    Results from last 7 days   Lab Units 06/17/25  0329   SODIUM mmol/L 148*   POTASSIUM mmol/L 3.6   CHLORIDE mmol/L 113*   CO2 mmol/L 23.0   BUN mg/dL 23.0   CREATININE mg/dL 0.84   GLUCOSE mg/dL 156*   CALCIUM mg/dL 8.0*     Results from last 7 days   Lab Units 06/16/25  1838 06/16/25  1443 06/13/25  0048 06/11/25  2357 06/10/25  2033 06/10/25  1928   CK TOTAL U/L  --   --  308* 660*  --  2,133*   HSTROP T ng/L 26* 24*  --   --  32* 35*     Results from last 7 days   Lab Units 06/17/25  0329   WBC 10*3/mm3 16.14*   HEMOGLOBIN g/dL 12.0*   HEMATOCRIT % 37.0*   PLATELETS 10*3/mm3 141     Results from last 7 days   Lab Units 06/16/25  0559 06/15/25  0608 06/14/25  2359 06/13/25  0048 06/12/25  1748   INR   --   --   --   --  1.64*   APTT seconds 101.3* 95.1* 94.0*   < > 37.1*   37.2*    < > = values in this interval not displayed.     Results from last 7 days   Lab Units 06/11/25  0236   CHOLESTEROL mg/dL 153     Results from last 7 days   Lab Units 06/14/25  0538   MAGNESIUM mg/dL 1.7     Results from last 7 days   Lab Units 06/11/25  0236   CHOLESTEROL mg/dL 153   TRIGLYCERIDES mg/dL 116   HDL CHOL mg/dL 40   LDL CHOL mg/dL 92       I reviewed the patient's new clinical results.        Assessment:  1.  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  Status post left ICA and common carotid aneurysm repair with CCA to ICA bypass and ligation of the left external carotid artery by Dr. Alvarez on 6/16/2025.  3.  Bilateral pulmonary infiltrates (likely aspiration pneumonia)  4.  Acute on chronic hypotension (on baseline midodrine)  5.  Persistent atrial fibrillation  6.  Status post AV node ablation and biventricular ICD placement in 2020 (left ventricular lead subsequently turned off secondary to diaphragmatic stimulation)  7.  Coronary artery disease with high-grade stenosis of the RCA (in-stent restenosis of a previously placed stent) and 2020 (PCI unsuccessful at that time).  Nonobstructive disease of the 50% in the LAD at that time.  Patent stent in the left circumflex.  8.  History of ischemic cardiomyopathy with recovered ejection fraction  9.  COPD without acute exacerbation  10.  Severe mitral regurgitation  11.  Acute rhabdomyolysis  12.  Multifactorial chronic anemia  13.  Thrombocytopenia  14.  Hypernatremia    Plan:  -Still suspect pulmonary infiltrates or aspiration pneumonia rather than pulmonary edema.  He got another dose of IV Lasix yesterday.  He is on low oxygen today.  Stable.  I would hold on further diuretics for now, especially given the hypernatremia.    -No obvious complications from surgery thus far.    -Continue on midodrine 10 mg tid (on  at baseline).    -He is on aspirin, Plavix, and Lipitor.  Beta-blockers are contraindicated because of hypotension.    -He has underlying persistent atrial fibrillation, although he is pacemaker dependent from a previous AV kelly ablation (paced from his ICD).     -He will need to potentially resume systemic anticoagulation when okay from a vascular surgery standpoint.  His Xarelto is currently being held.    Jd Caballero MD  06/17/25  12:16 EDT

## 2025-06-17 NOTE — SIGNIFICANT NOTE
06/17/25 1509   OTHER   Discipline physical therapist   Rehab Time/Intention   Session Not Performed other (see comments)  (pt off the floor to swallow study this morning, on bipap this afternoon and resting. discussed with MEEK Mahan, will hold PT until tomorrow.)   Recommendation   PT - Next Appointment 06/18/25

## 2025-06-17 NOTE — PROGRESS NOTES
[facility]  [phone]  Dr. Jose Armando Hare    Subjective:  LOS: 4  [unfilled]      Chief Complaint: Left carotid artery stenosis    On low flow O2. No respiratory distress. Following commands.    Objective   Vital Signs past 24hrs  Temp range: Temp (24hrs), Av.5 °F (36.4 °C), Min:97.3 °F (36.3 °C), Max:97.5 °F (36.4 °C)    BP range: BP: ()/() 148/89  Pulse range: Heart Rate:  [65-78] 70  Resp rate range: Resp:  [13-30] 18  Device (Oxygen Therapy): nasal cannula;humidifiedFlow (L/min) (Oxygen Therapy):  [3-5] 5  Oxygen range:SpO2:  [87 %-100 %] 95 %   Mechanical Ventilator:     Physical Exam  Eyes:      Pupils: Pupils are equal, round, and reactive to light.   Cardiovascular:      Rate and Rhythm: Normal rate and regular rhythm.   Pulmonary:      Breath sounds: No wheezing or rales.   Abdominal:      General: Bowel sounds are normal.      Palpations: Abdomen is soft. There is no mass.      Tenderness: There is no abdominal tenderness.   Musculoskeletal:         General: No swelling.   Neurological:      Mental Status: He is alert.      Comments: Power appears intact today       Results Review:    I have reviewed the laboratory and imaging data since the last note by [facility] physician.  My annotations are noted in assessment and plan.      Result Review:  I have personally reviewed the results from last note by [facility] physician to 2025 10:15 EDT and agree with these findings:  [x]  Laboratory list / accordion  [x]  Microbiology  [x]  Radiology  []  EKG/Telemetry   []  Cardiology/Vascular   []  Pathology  []  Old records  []  Other:      Medication Review:  I have reviewed the current MAR.  My annotations are noted in assessment and plan.    acetaminophen, 1,000 mg, Oral, Q8H  albuterol, 2.5 mg, Nebulization, Q4H - RT  aspirin, 81 mg, Oral, Daily  atorvastatin, 40 mg, Oral, Daily  clopidogrel, 75 mg, Oral, Daily  latanoprost, 1 drop, Both Eyes, Nightly  methylPREDNISolone sodium succinate, 40  mg, Intravenous, Q12H  midodrine, 10 mg, Oral, Q8H  mupirocin, 1 Application, Each Nare, BID  neomycin-bacitracin-polymyxin, , Both Eyes, Q4H  pantoprazole, 40 mg, Oral, Daily  piperacillin-tazobactam, 3.375 g, Intravenous, Q8H  potassium chloride, 10 mEq, Intravenous, Q1H  senna-docusate sodium, 2 tablet, Oral, BID  sodium chloride, 10 mL, Intravenous, Q12H  sodium chloride, 10 mL, Intravenous, Q12H        dextrose 5 % and sodium chloride 0.45 %, 75 mL/hr, Last Rate: 75 mL/hr (06/16/25 1822)  niCARdipine, 5-15 mg/hr  phenylephrine, 0.5-3 mcg/kg/min, Last Rate: 2 mcg/kg/min (06/17/25 0649)      Lines, Drains & Airways       Active LDAs       Name Placement date Placement time Site Days    Peripheral IV 06/10/25 1927 20 G Anterior;Right Forearm 06/10/25  1927  Forearm  3    Peripheral IV 06/13/25 0814 20 G Anterior;Right;Upper Arm 06/13/25  0814  Arm  1    Peripheral IV 06/13/25 0814 Anterior;Distal;Right;Upper Arm 06/13/25  0814  Arm  1    External Urinary Catheter 06/12/25  1835  --  1                    PCCM Problems  Acute ischemic stroke, left MCA watershed  Left carotid artery stenosis with soft plaque  Mohit carotid endarteriectomy on 6/16/25  Bilateral pulmonary infiltrates  Fluid overload  Aspiration pneumonia  ? Acute hypotension also, on Lewis  Chronic hypotension on Midodrine  Severe MR  Afib on anticoagulation  COPD  Acute postop respiratory failure      Plan of Treatment    Carotid endarterectomy by vascular surgery 6/16/25.    Clarify BP goals with vascular surgery.    On midodrine. Also at home.    Appreciate cardiology input. Moderate cardiac risk - non modifiable.    Bilateral pulmonary infiltrates with some improvement after Lasix but more likely aspiration pneumonia. - on Zosyn.    Respiratory failure yesterday after endarterectomy. Requiring NIV. Now better and tolerating NC. Patient remains DNR and DNI.    Pending formal video swallow.    Hypernatremia. Now on D51/2NS.    COPD and wheezing better.  Taper steroids.    His past medical history records indicate lung cancer.  I cannot find any evidence of this in epic.    Transfer out of ICU soon.    Jose Armando Hare MD  06/17/25  10:15 EDT    Isolation status: No active isolations          Part of this note may be an electronic transcription/translation of spoken language to printed text using the Dragon Dictation System.

## 2025-06-17 NOTE — DISCHARGE PLACEMENT REQUEST
"Shukri Park (84 y.o. Male)       Date of Birth   1940    Social Security Number       Address   [address] [place] [address]    Home Phone   [phone]    MRN   [id number]       Presybeterian   None    Marital Status                               Admission Date   6/13/2025    Admission Type   Urgent    Admitting Provider   Jose Armando Hare MD    Attending Provider   Jose Armando Hare MD    Department, Room/Bed   [facility] INTENSIVE CARE, I387/1       Discharge Date       Discharge Disposition       Discharge Destination                                 Attending Provider: Jose Armando Hare MD    Allergies: Ciprofloxacin, Amlodipine, Rocephin [Ceftriaxone]    Isolation: None   Infection: None   Code Status: No CPR    Ht: 188 cm (74\")   Wt: 68.7 kg (151 lb 7.3 oz)    Admission Cmt: None   Principal Problem: Carotid artery aneurysm [I72.0]                   Active Insurance as of 6/13/2025       Primary Coverage       Payor Plan Insurance Group Employer/Plan Group    ANTHEM MEDICARE REPLACEMENT ANTHEM MEDICARE ADVANTAGE PPO [id number]       Payor Plan Address Payor Plan Phone Number Payor Plan Fax Number Effective Dates    [address] [phone]  1/1/2024 - None Entered    [facility] [id number]         Subscriber Name Subscriber Birth Date Member ID       SHUKRI PARK 1940 [id number]                     Emergency Contacts        (Rel.) Home Phone Work Phone Mobile Phone    Sebastian Park LEGAL (Grandchild) -- -- [phone]    GAURAV PARK (Grandchild) -- -- [phone]    ALEYDA PARK (Sister) [phone] -- [phone]    PARKJYOTSNA (Son) -- -- [phone]          "

## 2025-06-17 NOTE — SIGNIFICANT NOTE
06/17/25 1526   OTHER   Discipline occupational therapist   Rehab Time/Intention   Session Not Performed other (see comments)  (pt off the floor to swallow study this morning, on bipap this afternoon and resting. discussed with RN, will hold OT until tomorrow.)   Recommendation   OT - Next Appointment 06/18/25

## 2025-06-17 NOTE — PLAN OF CARE
Goal Outcome Evaluation:              Outcome Evaluation: VFSS completed. Recommend NPO, temporary means of alternate nutrition and meds alternate route. If alternate route unavailable, meds crushed with puree as tolerated. Allow ice chips s/p oral care. Swallow function appears to have declined since previous study 6/11/25. If NPO refused, could trial puree/nectar thick. Concern for inability to tolerate as patient did not tolerate s/p previous VFSS 6/11/25 and dysphagia appears to have worsened since previous study. SLP to follow for re-eval as over all strength/status improves.    Anticipated Discharge Disposition (SLP): anticipate therapy at next level of care          SLP Swallowing Diagnosis: mild-moderate, oral dysphagia, pharyngeal dysphagia (appears to have declined since previous study) (06/17/25 2069)

## 2025-06-18 NOTE — PROGRESS NOTES
[facility]  [phone]  Dr. Jose Armando Hare    Subjective:  LOS: 5  [unfilled]      Chief Complaint: Left carotid artery stenosis    When seen this morning he was on noninvasive ventilation.  He was taken off and placed on supplemental oxygen via nasal cannula.  Tolerating same with adequate saturation.    Objective   Vital Signs past 24hrs  Temp range: Temp (24hrs), Av.7 °F (36.5 °C), Min:97.2 °F (36.2 °C), Max:98 °F (36.7 °C)    BP range: BP: ()/() 117/76  Pulse range: Heart Rate:  [70-77] 70  Resp rate range: Resp:  [16-29] 20  Device (Oxygen Therapy): high-flow nasal cannula;humidifiedFlow (L/min) (Oxygen Therapy):  [6-11] 6  Oxygen range:SpO2:  [88 %-100 %] 97 %   Mechanical Ventilator:     Physical Exam  Eyes:      Pupils: Pupils are equal, round, and reactive to light.   Cardiovascular:      Rate and Rhythm: Normal rate and regular rhythm.   Pulmonary:      Breath sounds: Decreased breath sounds present. No wheezing or rales.   Abdominal:      General: Bowel sounds are normal.      Palpations: Abdomen is soft. There is no mass.      Tenderness: There is no abdominal tenderness.   Musculoskeletal:         General: No swelling.   Neurological:      Mental Status: He is alert.       Results Review:    I have reviewed the laboratory and imaging data since the last note by [facility] physician.  My annotations are noted in assessment and plan.      Result Review:  I have personally reviewed the results from last note by [facility] physician to 2025 16:25 EDT and agree with these findings:  [x]  Laboratory list / accordion  [x]  Microbiology  [x]  Radiology  []  EKG/Telemetry   []  Cardiology/Vascular   []  Pathology  []  Old records  []  Other:      Medication Review:  I have reviewed the current MAR.  My annotations are noted in assessment and plan.    acetaminophen, 1,000 mg, Nasogastric, Q8H  albuterol, 2.5 mg, Nebulization, Q4H - RT  [START ON 2025] aspirin, 81 mg, Nasogastric,  Daily  [START ON 6/19/2025] atorvastatin, 40 mg, Nasogastric, Daily  [START ON 6/19/2025] clopidogrel, 75 mg, Nasogastric, Daily  [START ON 6/19/2025] lansoprazole, 30 mg, Nasogastric, Q AM  latanoprost, 1 drop, Both Eyes, Nightly  midodrine, 10 mg, Nasogastric, Q8H  mupirocin, 1 Application, Each Nare, BID  neomycin-bacitracin-polymyxin, , Both Eyes, Q4H  piperacillin-tazobactam, 3.375 g, Intravenous, Q8H  [START ON 6/19/2025] predniSONE, 20 mg, Nasogastric, Daily With Breakfast  senna-docusate sodium, 2 tablet, Nasogastric, BID  sodium chloride, 10 mL, Intravenous, Q12H  sodium chloride, 10 mL, Intravenous, Q12H  sodium phosphate, 15 mmol, Intravenous, Q3H        niCARdipine, 5-15 mg/hr  phenylephrine, 0.5-3 mcg/kg/min, Last Rate: 2 mcg/kg/min (06/17/25 0649)      Lines, Drains & Airways       Active LDAs       Name Placement date Placement time Site Days    Peripheral IV 06/10/25 1927 20 G Anterior;Right Forearm 06/10/25  1927  Forearm  3    Peripheral IV 06/13/25 0814 20 G Anterior;Right;Upper Arm 06/13/25  0814  Arm  1    Peripheral IV 06/13/25 0814 Anterior;Distal;Right;Upper Arm 06/13/25  0814  Arm  1    External Urinary Catheter 06/12/25  1835  --  1                    PCCM Problems  Acute ischemic stroke, left MCA watershed  Left carotid artery stenosis with soft plaque  Mohit carotid endarteriectomy on 6/16/25  Bilateral pulmonary infiltrates  Fluid overload  Aspiration pneumonia  ? Acute hypotension also, on Lewis  Chronic hypotension on Midodrine  Severe MR  Afib on anticoagulation  COPD  Acute postop respiratory failure      Plan of Treatment    Carotid endarterectomy by vascular surgery 6/16/25.    Appears to be doing well from this standpoint    On midodrine. Also at home.    Appreciate cardiology input.  Given Lasix by cardiology this morning    Bilateral pulmonary infiltrates with some improvement after Lasix but more likely aspiration pneumonia. - on Zosyn.    Respiratory failure yesterday after  endarterectomy.  Was on NIV but now tolerating nasal cannula.  ABG this morning was acceptable.    Failed swallow eval.  Cortrack placed for feeding after discussing with grandson.    Hypernatremia.  Give free water in the tube feeds now.    COPD and wheezing better. Taper steroids.    His platelets went down to 80.  Will monitor.  If further drop will need hematology.    I spoke to his grandson who is second POA at the bedside    Jose Armando Hare MD  06/18/25  16:25 EDT    Isolation status: No active isolations    Dietary Orders (From admission, onward)       Start     Ordered    06/18/25 1158  Tube Feeding: Formula: Isosource 1.5 (Jevity 1.5); Feeding Type: Continuous; Start at: 10 mL/hr; Then Advance By: 10 mL/hr; Every: 6 hours; To Goal Rate of: 60 mL/hr; Water Flush: 20 mL; Every: 1 hour; Water Bolus: None  (Tube Feeding (RD to Initiate & Manage) + NPO)  Diet Effective Now        Question Answer Comment   Formula Isosource 1.5 (Jevity 1.5)    Feeding Type Continuous    Start at 10 mL/hr    Then Advance By 10 mL/hr    Every 6 hours    To Goal Rate of 60 mL/hr    Water Flush 20 mL    Every 1 hour    Water Bolus None        06/18/25 1158    06/18/25 1158  NPO Diet NPO Type: Tube Feeding  (Tube Feeding (RD to Initiate & Manage) + NPO)  Diet Effective Now        Question:  NPO Type  Answer:  Tube Feeding    06/18/25 1158                      Part of this note may be an electronic transcription/translation of spoken language to printed text using the Dragon Dictation System.

## 2025-06-18 NOTE — PLAN OF CARE
Goal Outcome Evaluation:  Plan of Care Reviewed With: patient           Outcome Evaluation: Pt seen this afternoon for OT/PT treatment. Now s/p left internal carotid and common carotid artery aneurysm repair with CCA to ICA bypass. Needing Max A x2 for bed mobility. Flucuating Min<>Max A for EOB sitting, however mostly Max A. Pt very lethargic and minimally conversive/participatory compared to prior session. Additionally limited by hypotension and OOB activity was deferred. OT will continue to follow to address strength, balance, and activity tolerance prior to d/c.    Anticipated Discharge Disposition (OT): inpatient rehabilitation facility

## 2025-06-18 NOTE — THERAPY RE-EVALUATION
Patient Name: Shukri Park  : 1940    MRN: [id number]                              Today's Date: 2025       Admit Date: 2025    Visit Dx:     ICD-10-CM ICD-9-CM   1. Symptomatic stenosis of left carotid artery  I65.22 433.10     Patient Active Problem List   Diagnosis    Chronic anticoagulation    Mixed hyperlipidemia    Chronic pain    Ischemic cardiomyopathy    Persistent atrial fibrillation    Coronary artery disease involving native coronary artery of native heart without angina pectoris    Vitamin D deficiency    History of lung cancer    Chronic hypotension    Presence of biventricular implantable cardioverter-defibrillator (ICD)    Acute encephalopathy    GERD (gastroesophageal reflux disease)    Aortic aneurysm    Rhabdomyolysis    More than 50 percent stenosis of left internal carotid artery    Non-occlusive thrombus    Severe protein-calorie malnutrition    Chronic heart failure with preserved ejection fraction (HFpEF)    Symptomatic stenosis of left carotid artery    Left carotid stenosis    Carotid artery aneurysm     Past Medical History:   Diagnosis Date    Aortic aneurysm     Chronic anticoagulation 2013    Chronic HFrEF (heart failure with reduced ejection fraction)     Chronic hypotension 2020    Chronic pain 2020    Coronary artery disease     Foot pain, bilateral     GERD (gastroesophageal reflux disease)     History of lung cancer 2020    Hyperlipidemia     Inguinodynia, right 2020    Added automatically from request for surgery [id number]      Ischemic cardiomyopathy 2020    Added automatically from request for surgery [id number]      Low back pain     Lung cancer     Mixed hyperlipidemia 2013    Persistent atrial fibrillation 2020    Added automatically from request for surgery [id number]      Presence of biventricular implantable cardioverter-defibrillator (ICD) 2020    S/P epidural steroid injection     Israel Gomes's - no  relief    Vitamin D deficiency 04/05/2016    Weight loss     acute loss of weight 30 lbs     Past Surgical History:   Procedure Laterality Date    CARDIAC CATHETERIZATION N/A 7/9/2020    Procedure: LEFT HEART CATH with possible PCI;  Surgeon: Wade Cronin MD;  Location: [facility] CATH INVASIVE LOCATION;  Service: Cardiovascular;  Laterality: N/A;  Local and IV sedation    CARDIAC CATHETERIZATION N/A 7/9/2020    Procedure: Percutaneous Coronary Intervention;  Surgeon: Wade Cronin MD;  Location: [facility] CATH INVASIVE LOCATION;  Service: Cardiovascular;  Laterality: N/A;    CARDIAC DEFIBRILLATOR PLACEMENT      CARDIAC ELECTROPHYSIOLOGY PROCEDURE N/A 7/10/2020    Procedure: Biventricular Device Insertion;  Surgeon: Jonathan Denney MD;  Location: [facility] CATH INVASIVE LOCATION;  Service: Cardiovascular;  Laterality: N/A;    CARDIAC ELECTROPHYSIOLOGY PROCEDURE N/A 7/10/2020    Procedure: ABLATION AV NODE;  Surgeon: Jonathan Denney MD;  Location: [facility] CATH INVASIVE LOCATION;  Service: Cardiovascular;  Laterality: N/A;    CARDIAC ELECTROPHYSIOLOGY PROCEDURE N/A 7/10/2020    Procedure: AV node ablation;  Surgeon: Jonathan Denney MD;  Location: [facility] CATH INVASIVE LOCATION;  Service: Cardiovascular;  Laterality: N/A;    CARDIOVASCULAR STRESS TEST  2020    CAROTID ENDARTERECTOMY Left 6/16/2025    Procedure: CAROTID ENDARTERECTOMY with common carotid to internal carotid interposition and repair of carotid aneurysm;  Surgeon: Maxine Alvarez MD;  Location: [facility] OR;  Service: Vascular;  Laterality: Left;    CATARACT EXTRACTION, BILATERAL      CONVERTED (HISTORICAL) HOLTER  2020    CORONARY ANGIOPLASTY WITH STENT PLACEMENT      ENDOSCOPY N/A 7/5/2020    Procedure: ESOPHAGOGASTRODUODENOSCOPY;  Surgeon: Neal Correa MD;  Location: [facility] ENDOSCOPY;  Service: Gastroenterology;  Laterality: N/A;    INGUINAL HERNIA REPAIR Right 1/7/2021    Procedure: INGUINAL HERNIA REPAIR OPEN WITH MESH;  Surgeon:  Cody Randall MD;  Location: [facility] MAIN OR;  Service: General;  Laterality: Right;    LUMBAR DECOMPRESSION  09/2015    L2-L3    LUMBAR DECOMPRESSION  2006    Dr. Logan    OTHER SURGICAL HISTORY  05/2015    left SI fusion with bone graft - Dr. Presley    OTHER SURGICAL HISTORY      carotid artery repair     OTHER SURGICAL HISTORY Left 02/19/2016    Left SI fusion 2/19/2016 Dr. Zendejas    POSTERIOR SPINAL FUSION  10/24/2016    PSF T12-3/TLIF L3-L4 poss L2-L3 --- Dr. 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      General Information       Row Name 06/18/25 1617          Physical Therapy Time and Intention    Document Type re-evaluation  -EM     Mode of Treatment co-treatment;physical therapy;occupational therapy  -EM       Row Name 06/18/25 1617          General Information    Patient Profile Reviewed yes  -EM     Existing Precautions/Restrictions fall;oxygen therapy device and L/min  10L high flow  -EM       Row Name 06/18/25 1617          Cognition    Orientation Status (Cognition) oriented to;person  -EM       Row Name 06/18/25 1617          Safety Issues/Impairments Affecting Functional Mobility    Impairments Affecting Function (Mobility) balance;cognition;endurance/activity tolerance;strength;pain  -EM               User Key  (r) = Recorded By, (t) = Taken By, (c) = Cosigned By      Initials Name Provider Type    EM Shelby Schmid PT Physical Therapist                   Mobility       Row Name 06/18/25 1620          Bed Mobility    Supine-Sit [place] (Bed Mobility) maximum assist (25% patient effort);2 person assist  -EM     Sit-Supine [place] (Bed Mobility) maximum assist (25% patient effort);2 person assist  -EM     Assistive Device (Bed Mobility) head of bed elevated  -EM       Row Name 06/18/25 1620          Transfers    Comment, (Transfers) deferred any further mobility efforts due to low BP, high O2 demands  -EM               User Key  (r) = Recorded  By, (t) = Taken By, (c) = Cosigned By      Initials Name Provider Type    Shelby Luna PT Physical Therapist                   Obj/Interventions       Row Name 06/18/25 1621          Range of Motion Comprehensive    General Range of Motion no range of motion deficits identified  -EM       Row Name 06/18/25 1621          Strength Comprehensive (MMT)    Comment, General Manual Muscle Testing (MMT) Assessment pt unable to lift LEs against gravity today, could not perform LAQ while sitting up on EOB  -EM       Row Name 06/18/25 1621          Motor Skills    Therapeutic Exercise other (see comments)  AP in supine with assist  -EM       Row Name 06/18/25 1621          Balance    Static Sitting Balance moderate assist;maximum assist  -EM     Dynamic Sitting Balance maximum assist  -EM     Position, Sitting Balance sitting edge of bed  -EM               User Key  (r) = Recorded By, (t) = Taken By, (c) = Cosigned By      Initials Name Provider Type    EM Shelby Schmid PT Physical Therapist                   Goals/Plan       Row Name 06/18/25 1628          Bed Mobility Goal 1 (PT)    Activity/Assistive Device (Bed Mobility Goal 1, PT) bed mobility activities, all  -EM     Hill Level/Cues Needed (Bed Mobility Goal 1, PT) minimum assist (75% or more patient effort)  -EM     Time Frame (Bed Mobility Goal 1, PT) 2 weeks  -EM       Row Name 06/18/25 1628          Transfer Goal 1 (PT)    Activity/Assistive Device (Transfer Goal 1, PT) transfers, all;walker, rolling  -EM     Hill Level/Cues Needed (Transfer Goal 1, PT) moderate assist (50-74% patient effort)  -EM     Time Frame (Transfer Goal 1, PT) 2 weeks  -EM       Row Name 06/18/25 1628          Gait Training Goal 1 (PT)    Activity/Assistive Device (Gait Training Goal 1, PT) gait (walking locomotion);walker, rolling  -EM     Hill Level (Gait Training Goal 1, PT) moderate assist (50-74% patient effort)  -EM     Distance (Gait Training  Goal 1, PT) 25  -EM     Time Frame (Gait Training Goal 1, PT) 2 weeks  -EM       Row Name 06/18/25 1628          Therapy Assessment/Plan (PT)    Planned Therapy Interventions (PT) bed mobility training;gait training;home exercise program;patient/family education;transfer training;strengthening  -EM               User Key  (r) = Recorded By, (t) = Taken By, (c) = Cosigned By      Initials Name Provider Type    EM Shelby Schmid, PT Physical Therapist                   Clinical Impression       Row Name 06/18/25 1622          Pain    Pre/Posttreatment Pain Comment pt c/o pain but did not rate on numeric scale  -EM       Row Name 06/18/25 1622          Plan of Care Review    Plan of Care Reviewed With patient  -EM     Outcome Evaluation Pt is s/p L internal carotid artery aneurysm repair on 6/16/25. Pt lethargic but arousable, c/o pain in neck but did not rate on numeric scale. Pt required maxAx2 for bed mobility, fluctuating level of assistance needed while sitting up on EOB but mainly required mod/maxA to maintain static sitting balance. Pt hypotensive, all further activity deferred for today, required maxAx2 to return to supine. Pt has impairments consisting of generalized weakness and pain, decreased activity tolerance, decreased balance and would benefit from continued skilled PT. Recommend rehab at d/c at current level of function, PT will continue to follow.  -EM       Row Name 06/18/25 1622          Therapy Assessment/Plan (PT)    Rehab Potential (PT) fair  -EM     Criteria for Skilled Interventions Met (PT) yes  -EM     Therapy Frequency (PT) 5 times/wk  -EM       Row Name 06/18/25 1622          Vital Signs    Pre Systolic BP Rehab 71  -EM     Pre Treatment Diastolic BP 47  -EM     Post Systolic BP Rehab 77  -EM     Post Treatment Diastolic BP 52  -EM     Pretreatment Heart Rate (beats/min) 72  -EM     Pre SpO2 (%) 98  -EM     O2 Delivery Pre Treatment hi-flow  10L  -EM     Post SpO2 (%) 90  -EM     O2  Delivery Post Treatment hi-flow  -EM       Row Name 06/18/25 1622          Positioning and Restraints    Pre-Treatment Position in bed  -EM     Post Treatment Position bed  -EM     In Bed supine;call light within reach;exit alarm on  -EM               User Key  (r) = Recorded By, (t) = Taken By, (c) = Cosigned By      Initials Name Provider Type    Shelby Luna, PT Physical Therapist                   Outcome Measures       Row Name 06/18/25 1629 06/18/25 0800       How much help from another person do you currently need...    Turning from your back to your side while in flat bed without using bedrails? 2  -EM 1  -EMA    Moving from lying on back to sitting on the side of a flat bed without bedrails? 2  -EM 1  -EMA    Moving to and from a bed to a chair (including a wheelchair)? 1  -EM 1  -EMA    Standing up from a chair using your arms (e.g., wheelchair, bedside chair)? 1  -EM 1  -EMA    Climbing 3-5 steps with a railing? 1  -EM 1  -EMA    To walk in hospital room? 1  -EM 1  -EMA    AM-PAC 6 Clicks Score (PT) 8  -EM 6  -EMA    Highest Level of Mobility Goal Sit at Edge of Bed-3  -EM Bed Activities/Dependent Transfer-2  -EMA              User Key  (r) = Recorded By, (t) = Taken By, (c) = Cosigned By      Initials Name Provider Type    Shelby Luna, PT Physical Therapist    Nichol Giron RN Registered Nurse                                 Physical Therapy Education       Title: PT OT SLP Therapies (In Progress)       Topic: Physical Therapy (In Progress)       Point: Mobility training (In Progress)       Learning Progress Summary            Patient Acceptance, E, NR by EM at 6/18/2025 1630    Acceptance, E, NR by AR at 6/14/2025 1242                      Point: Home exercise program (In Progress)       Learning Progress Summary            Patient Acceptance, E, NR by AR at 6/14/2025 1242                      Point: Body mechanics (In Progress)       Learning Progress Summary             Patient Acceptance, E, NR by AR at 6/14/2025 1242                      Point: Precautions (In Progress)       Learning Progress Summary            Patient Acceptance, E, NR by AR at 6/14/2025 1242                                      User Key       Initials Effective Dates Name Provider Type Discipline    EM 06/16/21 -  Shelby Schmid PT Physical Therapist PT    AR 06/16/21 -  Preeti Hanna PT Physical Therapist PT                  PT Recommendation and Plan  Planned Therapy Interventions (PT): bed mobility training, gait training, home exercise program, patient/family education, transfer training, strengthening  Outcome Evaluation: Pt is s/p L internal carotid artery aneurysm repair on 6/16/25. Pt lethargic but arousable, c/o pain in neck but did not rate on numeric scale. Pt required maxAx2 for bed mobility, fluctuating level of assistance needed while sitting up on EOB but mainly required mod/maxA to maintain static sitting balance. Pt hypotensive, all further activity deferred for today, required maxAx2 to return to supine. Pt has impairments consisting of generalized weakness and pain, decreased activity tolerance, decreased balance and would benefit from continued skilled PT. Recommend rehab at d/c at current level of function, PT will continue to follow.     Time Calculation:         PT Charges       Row Name 06/18/25 1632             Time Calculation    Start Time 1400  -EM      Stop Time 1415  -EM      Time Calculation (min) 15 min  -EM      PT Received On 06/18/25  -EM      PT - Next Appointment 06/19/25  -EM      PT Goal Re-Cert Due Date 07/02/25  -EM         Time Calculation- PT    Total Timed Code Minutes- PT 10 minute(s)  -EM         Timed Charges    31479 - PT Therapeutic Activity Minutes 10  -EM         Total Minutes    Timed Charges Total Minutes 10  -EM       Total Minutes 10  -EM                User Key  (r) = Recorded By, (t) = Taken By, (c) = Cosigned By      Initials Name Provider  Type    EM Shelby Schmid, PT Physical Therapist                  Therapy Charges for Today       Code Description Service Date Service Provider Modifiers Qty    30990812124 HC PT THERAPEUTIC ACT EA 15 MIN 6/18/2025 Shelby Schmid, PT GP 1    [id number] HC PT RE-EVAL ESTABLISHED PLAN 2 6/18/2025 Shelby Schmid PT GP 1            PT G-Codes  Outcome Measure Options: AM-PAC 6 Clicks Daily Activity (OT), Modified Bronson  AM-PAC 6 Clicks Score (PT): 8  AM-PAC 6 Clicks Score (OT): 15  Modified [place]onson Scale: 4 - Moderately severe disability.  Unable to walk without assistance, and unable to attend to own bodily needs without assistance.  PT Discharge Summary  Anticipated Discharge Disposition (PT): skilled nursing facility    Shelby Schmid, PT  6/18/2025

## 2025-06-18 NOTE — THERAPY TREATMENT NOTE
Patient Name: Shukri Park  : 1940    MRN: [id number]                              Today's Date: 2025       Admit Date: 2025    Visit Dx:     ICD-10-CM ICD-9-CM   1. Symptomatic stenosis of left carotid artery  I65.22 433.10     Patient Active Problem List   Diagnosis    Chronic anticoagulation    Mixed hyperlipidemia    Chronic pain    Ischemic cardiomyopathy    Persistent atrial fibrillation    Coronary artery disease involving native coronary artery of native heart without angina pectoris    Vitamin D deficiency    History of lung cancer    Chronic hypotension    Presence of biventricular implantable cardioverter-defibrillator (ICD)    Acute encephalopathy    GERD (gastroesophageal reflux disease)    Aortic aneurysm    Rhabdomyolysis    More than 50 percent stenosis of left internal carotid artery    Non-occlusive thrombus    Severe protein-calorie malnutrition    Chronic heart failure with preserved ejection fraction (HFpEF)    Symptomatic stenosis of left carotid artery    Left carotid stenosis    Carotid artery aneurysm     Past Medical History:   Diagnosis Date    Aortic aneurysm     Chronic anticoagulation 2013    Chronic HFrEF (heart failure with reduced ejection fraction)     Chronic hypotension 2020    Chronic pain 2020    Coronary artery disease     Foot pain, bilateral     GERD (gastroesophageal reflux disease)     History of lung cancer 2020    Hyperlipidemia     Inguinodynia, right 2020    Added automatically from request for surgery [id number]      Ischemic cardiomyopathy 2020    Added automatically from request for surgery [id number]      Low back pain     Lung cancer     Mixed hyperlipidemia 2013    Persistent atrial fibrillation 2020    Added automatically from request for surgery [id number]      Presence of biventricular implantable cardioverter-defibrillator (ICD) 2020    S/P epidural steroid injection     Israel Gomes's - no  relief    Vitamin D deficiency 04/05/2016    Weight loss     acute loss of weight 30 lbs     Past Surgical History:   Procedure Laterality Date    CARDIAC CATHETERIZATION N/A 7/9/2020    Procedure: LEFT HEART CATH with possible PCI;  Surgeon: Wade Cronin MD;  Location: [facility] CATH INVASIVE LOCATION;  Service: Cardiovascular;  Laterality: N/A;  Local and IV sedation    CARDIAC CATHETERIZATION N/A 7/9/2020    Procedure: Percutaneous Coronary Intervention;  Surgeon: Wade Cronin MD;  Location: [facility] CATH INVASIVE LOCATION;  Service: Cardiovascular;  Laterality: N/A;    CARDIAC DEFIBRILLATOR PLACEMENT      CARDIAC ELECTROPHYSIOLOGY PROCEDURE N/A 7/10/2020    Procedure: Biventricular Device Insertion;  Surgeon: Jonathan Denney MD;  Location: [facility] CATH INVASIVE LOCATION;  Service: Cardiovascular;  Laterality: N/A;    CARDIAC ELECTROPHYSIOLOGY PROCEDURE N/A 7/10/2020    Procedure: ABLATION AV NODE;  Surgeon: Jonathan Denney MD;  Location: [facility] CATH INVASIVE LOCATION;  Service: Cardiovascular;  Laterality: N/A;    CARDIAC ELECTROPHYSIOLOGY PROCEDURE N/A 7/10/2020    Procedure: AV node ablation;  Surgeon: Jonathan Denney MD;  Location: [facility] CATH INVASIVE LOCATION;  Service: Cardiovascular;  Laterality: N/A;    CARDIOVASCULAR STRESS TEST  2020    CAROTID ENDARTERECTOMY Left 6/16/2025    Procedure: CAROTID ENDARTERECTOMY with common carotid to internal carotid interposition and repair of carotid aneurysm;  Surgeon: Maxine lAvarez MD;  Location: [facility] OR;  Service: Vascular;  Laterality: Left;    CATARACT EXTRACTION, BILATERAL      CONVERTED (HISTORICAL) HOLTER  2020    CORONARY ANGIOPLASTY WITH STENT PLACEMENT      ENDOSCOPY N/A 7/5/2020    Procedure: ESOPHAGOGASTRODUODENOSCOPY;  Surgeon: Neal Correa MD;  Location: [facility] ENDOSCOPY;  Service: Gastroenterology;  Laterality: N/A;    INGUINAL HERNIA REPAIR Right 1/7/2021    Procedure: INGUINAL HERNIA REPAIR OPEN WITH MESH;  Surgeon:  Cody Randall MD;  Location: [facility] MAIN OR;  Service: General;  Laterality: Right;    LUMBAR DECOMPRESSION  09/2015    L2-L3    LUMBAR DECOMPRESSION  2006    Dr. Logan    OTHER SURGICAL HISTORY  05/2015    left SI fusion with bone graft - Dr. Presley    OTHER SURGICAL HISTORY      carotid artery repair     OTHER SURGICAL HISTORY Left 02/19/2016    Left SI fusion 2/19/2016 Dr. Zendejas    POSTERIOR SPINAL FUSION  10/24/2016    PSF T12-3/TLIF L3-L4 poss L2-L3 --- Dr. 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      General Information       Row Name 06/18/25 1455          OT Time and Intention    Subjective Information complains of;weakness;fatigue  -KG     Document Type therapy note (daily note)  -KG     Mode of Treatment co-treatment;occupational therapy  cotreat appropriate due to pt acuity level, needing 2 person assist for mobility, and to maximize safety of pt and staff  -KG     Patient Effort fair  -KG     Symptoms Noted During/After Treatment fatigue  -KG       Row Name 06/18/25 1455          General Information    Patient Profile Reviewed yes  -KG     Existing Precautions/Restrictions fall  -KG       Row Name 06/18/25 1455          Safety Issues/Impairments Affecting Functional Mobility    Safety Issues Affecting Function (Mobility) ability to follow commands;impulsivity;insight into deficits/self-awareness;judgment;problem-solving;sequencing abilities  -KG     Impairments Affecting Function (Mobility) balance;cognition;endurance/activity tolerance;pain;strength;range of motion (ROM);postural/trunk control  -KG     Cognitive Impairments, Mobility Safety/Performance attention;awareness, need for assistance;impulsivity;insight into deficits/self-awareness;judgment;problem-solving/reasoning;sequencing abilities  -KG               User Key  (r) = Recorded By, (t) = Taken By, (c) = Cosigned By      Initials Name Provider Type    KG Cecilio Joy, OT Occupational Therapist                      Mobility/ADL's       Row Name 06/18/25 1456          Bed Mobility    Bed Mobility supine-sit;sit-supine  -KG     Supine-Sit [place] (Bed Mobility) maximum assist (25% patient effort);2 person assist  -KG     Sit-Supine [place] (Bed Mobility) maximum assist (25% patient effort);2 person assist  -KG     Bed Mobility, Safety Issues decreased use of arms for pushing/pulling;decreased use of legs for bridging/pushing  -KG       Row Name 06/18/25 1456          Transfers    Comment, (Transfers) deferred due to safety concerns  -KG       Row Name 06/18/25 1456          Functional Mobility    Functional Mobility- Comment deferred due to safety concerns  -KG               User Key  (r) = Recorded By, (t) = Taken By, (c) = Cosigned By      Initials Name Provider Type    Cecilio Gomez OT Occupational Therapist                   Obj/Interventions       Row Name 06/18/25 1459          Motor Skills    Motor Skills functional endurance  -KG     Functional Endurance fair-/poor  -KG       Row Name 06/18/25 1459          Balance    Balance Assessment sitting static balance  -KG     Static Sitting Balance minimal assist;maximum assist;2-person assist  -KG     Position, Sitting Balance sitting edge of bed  -KG     Balance Interventions sitting;static;supported  -KG               User Key  (r) = Recorded By, (t) = Taken By, (c) = Cosigned By      Initials Name Provider Type    Cecilio Gomez OT Occupational Therapist                   Goals/Plan    No documentation.                  Clinical Impression       [facility] Name 06/18/25 1500          Pain Assessment    Pretreatment Pain Rating 0/10 - no pain  -KG     Posttreatment Pain Rating 0/10 - no pain  -KG       [facility] Name 06/18/25 1500          Plan of Care Review    Plan of Care Reviewed With patient  -KG     Outcome Evaluation Pt seen this afternoon for OT/PT treatment. Now s/p left internal carotid and common carotid artery aneurysm repair with CCA to ICA  bypass. Needing Max A x2 for bed mobility. Flucuating Min<>Max A for EOB sitting, however mostly Max A. Pt very lethargic and minimally conversive/participatory compared to prior session. Additionally limited by hypotension and OOB activity was deferred. OT will continue to follow to address strength, balance, and activity tolerance prior to d/c.  -KG       Row Name 06/18/25 1500          Therapy Plan Review/Discharge Plan (OT)    Anticipated Discharge Disposition (OT) inpatient rehabilitation facility  -KG       Row Name 06/18/25 1500          Vital Signs    Pre Patient Position Supine  -KG     Intra Patient Position Sitting  -KG     Post Patient Position Supine  -KG       Row Name 06/18/25 1500          Positioning and Restraints    Pre-Treatment Position in bed  -KG     Post Treatment Position bed  -KG     In Bed notified nsg;supine;call light within reach;encouraged to call for assist;exit alarm on  -KG               User Key  (r) = Recorded By, (t) = Taken By, (c) = Cosigned By      Initials Name Provider Type    Cecilio Gomez OT Occupational Therapist                   Outcome Measures       Row Name 06/18/25 0800          How much help from another person do you currently need...    Turning from your back to your side while in flat bed without using bedrails? 1  -EM     Moving from lying on back to sitting on the side of a flat bed without bedrails? 1  -EM     Moving to and from a bed to a chair (including a wheelchair)? 1  -EM     Standing up from a chair using your arms (e.g., wheelchair, bedside chair)? 1  -EM     Climbing 3-5 steps with a railing? 1  -EM     To walk in hospital room? 1  -EM     AM-PAC 6 Clicks Score (PT) 6  -EM     Highest Level of Mobility Goal Bed Activities/Dependent Transfer-2  -EM               User Key  (r) = Recorded By, (t) = Taken By, (c) = Cosigned By      Initials Name Provider Type    Nichol Moore, RN Registered Nurse                      OT Recommendation and  Plan  Planned Therapy Interventions (OT): activity tolerance training, adaptive equipment training, BADL retraining, functional balance retraining, occupation/activity based interventions, ROM/therapeutic exercise, patient/caregiver education/training, passive ROM/stretching, strengthening exercise, transfer/mobility retraining  Therapy Frequency (OT): 5 times/wk  Plan of Care Review  Plan of Care Reviewed With: patient  Outcome Evaluation: Pt seen this afternoon for OT/PT treatment. Now s/p left internal carotid and common carotid artery aneurysm repair with CCA to ICA bypass. Needing Max A x2 for bed mobility. Flucuating Min<>Max A for EOB sitting, however mostly Max A. Pt very lethargic and minimally conversive/participatory compared to prior session. Additionally limited by hypotension and OOB activity was deferred. OT will continue to follow to address strength, balance, and activity tolerance prior to d/c.     Time Calculation:   Evaluation Complexity (OT)  Review Occupational Profile/Medical/Therapy History Complexity: expanded/moderate complexity  Assessment, Occupational Performance/Identification of Deficit Complexity: 3-5 performance deficits  Clinical Decision Making Complexity (OT): detailed assessment/moderate complexity  Overall Complexity of Evaluation (OT): moderate complexity     Time Calculation- OT       Row Name 06/18/25 1506             Time Calculation- OT    OT Start Time 1350  -KG      OT Stop Time 1415  -KG      OT Time Calculation (min) 25 min  -KG      Total Timed Code Minutes- OT 25 minute(s)  -KG      OT Received On 06/18/25  -KG      OT - Next Appointment 06/19/25  -KG         Timed Charges    51524 - OT Therapeutic Activity Minutes 25  -KG         Total Minutes    Timed Charges Total Minutes 25  -KG       Total Minutes 25  -KG                User Key  (r) = Recorded By, (t) = Taken By, (c) = Cosigned By      Initials Name Provider Type    Cecilio Gomez, OT Occupational Therapist                   Therapy Charges for Today       Code Description Service Date Service Provider Modifiers Qty    [id number]  OT THERAPEUTIC ACT EA 15 MIN 6/18/2025 Cecilio Joy OT GO 2                 Cecilio Joy OT  6/18/2025

## 2025-06-18 NOTE — PLAN OF CARE
Goal Outcome Evaluation:  Plan of Care Reviewed With: patient           Outcome Evaluation: Pt is s/p L internal carotid artery aneurysm repair on 6/16/25. Pt lethargic but arousable, c/o pain in neck but did not rate on numeric scale. Pt required maxAx2 for bed mobility, fluctuating level of assistance needed while sitting up on EOB but mainly required mod/maxA to maintain static sitting balance. Pt hypotensive, all further activity deferred for today, required maxAx2 to return to supine. Pt has impairments consisting of generalized weakness and pain, decreased activity tolerance, decreased balance and would benefit from continued skilled PT. Recommend rehab at d/c at current level of function, PT will continue to follow.    Anticipated Discharge Disposition (PT): skilled nursing facility

## 2025-06-18 NOTE — PROGRESS NOTES
"Nutrition Services    Patient Name: Shukri Park  YOB: 1940  MRN: [id number]  Admission date: 6/13/2025    Assessment Date:  06/18/25    Recommend TF's IsoSource 1.5 at 10ml/hr and advance to goal as tolerated to 60ml/hr.  Water flushes 20ml/hr adjust as needed  Will monitor labs, electrolytes to be replaced as needed  RD to follow.       NUTRITION EVALUATION      Reason for Encounter BMI   Diagnosis/Problem Admission Diagnosis:  Chronic heart failure with preserved ejection fraction (HFpEF) [I50.32]  Left carotid stenosis [I65.22]    Problem List:    Carotid artery aneurysm    Ischemic cardiomyopathy    Coronary artery disease involving native coronary artery of native heart without angina pectoris    Chronic hypotension    Acute encephalopathy    Aortic aneurysm    Rhabdomyolysis    Severe protein-calorie malnutrition    Chronic heart failure with preserved ejection fraction (HFpEF)    Symptomatic stenosis of left carotid artery    Left carotid stenosis     Narrative 6/18: Consult for cortrak placement and feeding assessment.    This is a 83 yo male with symptomatic left internal carotid artery aneurysm with symptomatic left internal carotid artery stenosis. S/p surgery today carotid endarterectomy. It is reported pt with poor po intake and pt on a dysphagia diet.  Pt meets criteria for severe malnutrition based on NFPE, inadequate po intake, and hx of wt loss.        PO Diet No diet orders on file   Allergies NKFA   Supplements n/a   PO Intake % npo       Chewing/Swallowing Difficulty dysphagia pt has a hx of dysphagia and was on Pureed NTL diet       Medications reviewed sunday   Labs  reviewed Na 148, phos 2.2       Physical Findings on oxygen therapy     Edema no edema    GI Function last bowel movement: 6/17   Skin Status Left neck surgical    Lines/Drains none   I/O reviewed        Height  Weight  BMI  Weight Trend     Height: 188 cm (74\")  Weight: 68.7 kg (151 lb 7.3 oz) (06/17/25 0638)  Body " mass index is 19.45 kg/m².  Loss, Gain    Weight change: Pt use to weigh 170's--over 50lbs-severe       NFPE See Malnutrition Severity Assessment, Date Completed: 6/17       Nutrition Problem (PES) Problem: Underweight, Unintentional Weight Loss, Malnutrition (severe), and Inadequate Oral Intake  Etiology: Factors Affecting Nutrition - decrease appetite   Signs/Symptoms: Report/Observation       Intervention/Plan TF's to begin with IsoSource 1.5 at 10ml/hr and advance to goal at 60ml/hr  Water flushes 20ml/hr.  Monitor labs, electrolytes to be replaced as needed    RD to follow up per protocol.     Estimated/Assessed Needs        Current Weight  Weight: 68.7 kg (151 lb 7.3 oz) (06/17/25 0638)       Energy Requirements    Weight for Calculation 67 kg   Method for Estimation  30 kcal/kg 28kcals/kg   EST Needs (kcal/day) 4464-9958       Protein Requirements    Weight for Calculation 67 kg   EST Protein Needs (g/kg) 1.2 - 1.5 gm/kg   EST Daily Needs (g/day)        Fluid Requirements     Method for Estimation 1 mL/kcal    EST Needs (mL/day)      Malnutrition Severity Assessment      Patient meets criteria for : Severe Malnutrition         Initial Goal:  *initial goal conservative d/t risk of RFS     Isosource 1.5 at 10mL/hr + 20mL/hr water flush      End Goal:    Isosource 1.5 at 60 mL/hr + 20mL/hr water flush      Calories  1980 kcals (100%)    Protein  90 g (100%)    Free water  1003 mL   Flushes  440     The above end goal rate is for 22 hrs/day to assume interruptions for ADLs. Water flushes adjusted based on clinical picture + Rx flushes/IV fluids   Results from last 7 days   Lab Units 06/18/25  0303 06/17/25  1636 06/17/25  0329 06/16/25  1443 06/13/25  0048 06/12/25  1952   SODIUM mmol/L 148*  --  148* 149*   < > 144   POTASSIUM mmol/L 4.6 3.2* 3.6 4.2   < > 3.4*   CHLORIDE mmol/L 115*  --  113* 111*   < > 110*   CO2 mmol/L 24.0  --  23.0 28.5   < > 24.2   BUN mg/dL 22.0  --  23.0 24.0*   < > 22.0    CREATININE mg/dL 0.64*  --  0.84 0.78   < > 0.77   CALCIUM mg/dL 7.6*  --  8.0* 8.2*   < > 9.1   BILIRUBIN mg/dL  --   --   --   --   --  0.9   ALK PHOS U/L  --   --   --   --   --  72   ALT (SGPT) U/L  --   --   --   --   --  27   AST (SGOT) U/L  --   --   --   --   --  43*   GLUCOSE mg/dL 142*  --  156* 202*   < > 125*    < > = values in this interval not displayed.     Results from last 7 days   Lab Units 06/18/25  0303 06/14/25  1933 06/14/25  0538   MAGNESIUM mg/dL  --   --  1.7   PHOSPHORUS mg/dL 2.2*   < > 2.2*   HEMOGLOBIN g/dL 10.1*   < > 11.9*   HEMATOCRIT % 31.5*   < > 34.9*    < > = values in this interval not displayed.     Lab Results   Component Value Date    HGBA1C 5.76 (H) 06/11/2025     Wt Readings from Last 10 Encounters:   06/17/25 68.7 kg (151 lb 7.3 oz)   06/12/25 67.4 kg (148 lb 9.4 oz)   04/28/25 66.7 kg (147 lb)   08/30/24 64 kg (141 lb)   02/07/24 65.8 kg (145 lb)   08/02/23 64.4 kg (142 lb)   02/01/23 66.2 kg (146 lb)   07/27/22 66.2 kg (146 lb)   01/26/22 69.9 kg (154 lb)   10/28/21 69.9 kg (154 lb)       Electronically signed by:  Luda Mcpherson RD  06/18/25 11:49 EDT

## 2025-06-18 NOTE — PROGRESS NOTES
LOS: 5 days   Patient Care Team:  Homa Carrizales MD as PCP - General (Family Medicine)    Chief Complaint: Follow-up carotid artery stenosis, left MCA portion CVA, persistent atrial fibrillation, AV node ablation with ICD placement, coronary artery disease, mitral regurgitation.    Interval History: He was confused and agitated this morning.  He was on BiPAP.  No acute cardiology rounds.     Vital Signs:  Temp:  [97.2 °F (36.2 °C)-98.1 °F (36.7 °C)] 98 °F (36.7 °C)  Heart Rate:  [70-77] 70  Resp:  [15-29] 16  BP: (103-151)/() 133/93    Intake/Output Summary (Last 24 hours) at 6/18/2025 1006  Last data filed at 6/18/2025 0600  Gross per 24 hour   Intake 2583 ml   Output 150 ml   Net 2433 ml       Physical Exam:   General Appearance:    Confused and agitated, on BiPAP.   Lungs:     Coarse breath sounds bilaterally anteriorly.    Heart:    Regular rhythm and normal rate. II/VI SM LLSB and apex.    Abdomen:     Soft, nontender, nondistended.    Extremities:   No clubbing, cyanosis, or edema.     Results Review:    Results from last 7 days   Lab Units 06/18/25  0303   SODIUM mmol/L 148*   POTASSIUM mmol/L 4.6   CHLORIDE mmol/L 115*   CO2 mmol/L 24.0   BUN mg/dL 22.0   CREATININE mg/dL 0.64*   GLUCOSE mg/dL 142*   CALCIUM mg/dL 7.6*     Results from last 7 days   Lab Units 06/16/25  1838 06/16/25  1443 06/13/25  0048 06/11/25  2357   CK TOTAL U/L  --   --  308* 660*   HSTROP T ng/L 26* 24*  --   --      Results from last 7 days   Lab Units 06/18/25  0303   WBC 10*3/mm3 10.55   HEMOGLOBIN g/dL 10.1*   HEMATOCRIT % 31.5*   PLATELETS 10*3/mm3 80*     Results from last 7 days   Lab Units 06/16/25  0559 06/15/25  0608 06/14/25  2359 06/13/25  0048 06/12/25  1748   INR   --   --   --   --  1.64*   APTT seconds 101.3* 95.1* 94.0*   < > 37.1*  37.2*    < > = values in this interval not displayed.           Results from last 7 days   Lab Units 06/14/25  0538   MAGNESIUM mg/dL 1.7             I reviewed the patient's new  clinical results.        Assessment:  1.  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  Status post left ICA and common carotid aneurysm repair with CCA to ICA bypass and ligation of the left external carotid artery by Dr. Alvarez on 6/16/2025.  3.  Bilateral pulmonary infiltrates (likely aspiration pneumonia)  4.  Acute on chronic hypotension (on baseline midodrine)  5.  Persistent atrial fibrillation  6.  Status post AV node ablation and biventricular ICD placement in 2020 (left ventricular lead subsequently turned off secondary to diaphragmatic stimulation)  7.  Coronary artery disease with high-grade stenosis of the RCA (in-stent restenosis of a previously placed stent) and 2020 (PCI unsuccessful at that time).  Nonobstructive disease of the 50% in the LAD at that time.  Patent stent in the left circumflex.  8.  History of ischemic cardiomyopathy with recovered ejection fraction  9.  COPD without acute exacerbation  10.  Severe mitral regurgitation  11.  Acute rhabdomyolysis  12.  Multifactorial chronic anemia  13.  Thrombocytopenia  14.  Hypernatremia    Plan:  -Still suspect pulmonary infiltrates or aspiration pneumonia rather than pulmonary edema.  However, his respiratory status has worsened.  I am going to give him a dose of Lasix 40 mg IV in case there is any pulmonary edema component of this.  He was on BiPAP this morning.    - He has been treated with Zosyn.  Prednisone was added today.    -Continue on midodrine 10 mg tid (on at baseline).    -He is on aspirin, Plavix, and Lipitor.     -He has underlying persistent atrial fibrillation, although he is pacemaker dependent from a previous AV kelly ablation (paced from his ICD).     -Spoke with Dr. Alvarez about systemic anticoagulation.  She is okay with resuming systemic anticoagulation.  Continue aspirin and Plavix today and  plan on stopping Plavix and starting home Xarelto dose of 15 mg/day tomorrow with aspirin (assuming he can take oral medications tomorrow).      Jd Caballero MD  06/18/25  10:06 EDT

## 2025-06-18 NOTE — PROGRESS NOTES
Pulm/ CC cross-coverage note    Patient extremely anxious and agitated-when he was removed off of the BiPAP, oxygen saturation drops into the low 80s-patient becomes tachypneic and has increased work of breathing.  He is alert and oriented and reports that he is aware that he needs to keep the BiPAP on but he is too anxious.  As needed benzodiazepine given without improvement in symptoms.    Precedex ordered for BiPAP compliance, plan to wean off in the morning.

## 2025-06-19 NOTE — PROGRESS NOTES
[facility]  [phone]  Dr. Jose Armando Hare    Subjective:  LOS: 6  [unfilled]      Chief Complaint: Left carotid artery stenosis    On HF cannula. Weak but recouping from recant surgery.    Objective   Vital Signs past 24hrs  Temp range: Temp (24hrs), Av.8 °F (36.6 °C), Min:97.7 °F (36.5 °C), Max:97.9 °F (36.6 °C)    BP range: BP: ()/() 138/78  Pulse range: Heart Rate:  [70-81] 70  Resp rate range: Resp:  [20-28] 24  Device (Oxygen Therapy): humidified;high-flow nasal cannulaFlow (L/min) (Oxygen Therapy):  [6-10] 7  Oxygen range:SpO2:  [85 %-100 %] 95 %   Mechanical Ventilator:     Physical Exam  Eyes:      Pupils: Pupils are equal, round, and reactive to light.   Cardiovascular:      Rate and Rhythm: Normal rate and regular rhythm.   Pulmonary:      Breath sounds: Decreased breath sounds present. No wheezing or rales.   Abdominal:      General: Bowel sounds are normal.      Palpations: Abdomen is soft. There is no mass.      Tenderness: There is no abdominal tenderness.   Musculoskeletal:         General: No swelling.   Neurological:      Mental Status: He is alert.       Results Review:    I have reviewed the laboratory and imaging data since the last note by [facility] physician.  My annotations are noted in assessment and plan.      Result Review:  I have personally reviewed the results from last note by [facility] physician to 2025 10:09 EDT and agree with these findings:  [x]  Laboratory list / accordion  [x]  Microbiology  [x]  Radiology  []  EKG/Telemetry   []  Cardiology/Vascular   []  Pathology  []  Old records  []  Other:      Medication Review:  I have reviewed the current MAR.  My annotations are noted in assessment and plan.    acetaminophen, 1,000 mg, Nasogastric, Q8H  albuterol, 2.5 mg, Nebulization, Q4H - RT  aspirin, 81 mg, Nasogastric, Daily  atorvastatin, 40 mg, Nasogastric, Daily  clopidogrel, 75 mg, Nasogastric, Daily  lansoprazole, 30 mg, Nasogastric, Q AM  latanoprost, 1  drop, Both Eyes, Nightly  midodrine, 10 mg, Nasogastric, Q8H  mupirocin, 1 Application, Each Nare, BID  neomycin-bacitracin-polymyxin, , Both Eyes, Q4H  piperacillin-tazobactam, 3.375 g, Intravenous, Once  predniSONE, 20 mg, Nasogastric, Daily With Breakfast  senna-docusate sodium, 2 tablet, Nasogastric, BID  sodium chloride, 10 mL, Intravenous, Q12H  sodium chloride, 10 mL, Intravenous, Q12H        niCARdipine, 5-15 mg/hr  phenylephrine, 0.5-3 mcg/kg/min, Last Rate: 2 mcg/kg/min (06/17/25 0649)      Lines, Drains & Airways       Active LDAs       Name Placement date Placement time Site Days    Peripheral IV 06/10/25 1927 20 G Anterior;Right Forearm 06/10/25  1927  Forearm  3    Peripheral IV 06/13/25 0814 20 G Anterior;Right;Upper Arm 06/13/25  0814  Arm  1    Peripheral IV 06/13/25 0814 Anterior;Distal;Right;Upper Arm 06/13/25  0814  Arm  1    External Urinary Catheter 06/12/25  1835  --  1                    PCCM Problems  Acute ischemic stroke, left MCA watershed  Left carotid artery stenosis with soft plaque  Mohit carotid endarteriectomy on 6/16/25  Bilateral pulmonary infiltrates  Fluid overload  Aspiration pneumonia  ? Acute hypotension also, on Lewis  Chronic hypotension on Midodrine  Severe MR  Afib on anticoagulation  COPD  Acute postop respiratory failure      Plan of Treatment    Carotid endarterectomy by vascular surgery 6/16/25.  Doing well. On Plavix.    On midodrine, home medication. BP elevated, decrease dose to 5 mg q8 hrs.    Seen by cards and diuretics yesterday.     Hypernatremia. Free water not helping. Ask nephrology to see.    Bilateral pulmonary infiltrates with some improvement after Lasix but more likely aspiration pneumonia. - completed Zosyn.    Was on NIV but now tolerating nasal cannula.    Failed swallow eval.  Cortrack placed for feeding after discussing with grandson.    COPD and wheezing better. Taper off steroids.    Platelets better.    Transfer out probably tomorrow.    Jose Armando  MD [place]  06/19/25  10:09 EDT    Isolation status: No active isolations    Dietary Orders (From admission, onward)       Start     Ordered    06/18/25 1633  Tube Feeding: Formula: Isosource 1.5 (Jevity 1.5); Feeding Type: Continuous; Start at: 10 mL/hr; Then Advance By: 10 mL/hr; Every: 6 hours; To Goal Rate of: 60 mL/hr; Water Flush: 50 mL; Every: 1 hour; Water Bolus: None  (Tube Feeding (RD to Initiate & Manage) + NPO)  Diet Effective Now        Question Answer Comment   Formula Isosource 1.5 (Jevity 1.5)    Feeding Type Continuous    Start at 10 mL/hr    Then Advance By 10 mL/hr    Every 6 hours    To Goal Rate of 60 mL/hr    Water Flush 50 mL    Every 1 hour    Water Bolus None        06/18/25 1632    06/18/25 1158  NPO Diet NPO Type: Tube Feeding  (Tube Feeding (RD to Initiate & Manage) + NPO)  Diet Effective Now        Question:  NPO Type  Answer:  Tube Feeding    06/18/25 1158                      Part of this note may be an electronic transcription/translation of spoken language to printed text using the Dragon Dictation System.

## 2025-06-19 NOTE — THERAPY TREATMENT NOTE
Patient Name: Shukri Park  : 1940    MRN: [id number]                              Today's Date: 2025       Admit Date: 2025    Visit Dx:     ICD-10-CM ICD-9-CM   1. Symptomatic stenosis of left carotid artery  I65.22 433.10     Patient Active Problem List   Diagnosis    Chronic anticoagulation    Mixed hyperlipidemia    Chronic pain    Ischemic cardiomyopathy    Persistent atrial fibrillation    Coronary artery disease involving native coronary artery of native heart without angina pectoris    Vitamin D deficiency    History of lung cancer    Chronic hypotension    Presence of biventricular implantable cardioverter-defibrillator (ICD)    Acute encephalopathy    GERD (gastroesophageal reflux disease)    Aortic aneurysm    Rhabdomyolysis    More than 50 percent stenosis of left internal carotid artery    Non-occlusive thrombus    Severe protein-calorie malnutrition    Chronic heart failure with preserved ejection fraction (HFpEF)    Symptomatic stenosis of left carotid artery    Left carotid stenosis    Carotid artery aneurysm     Past Medical History:   Diagnosis Date    Aortic aneurysm     Chronic anticoagulation 2013    Chronic HFrEF (heart failure with reduced ejection fraction)     Chronic hypotension 2020    Chronic pain 2020    Coronary artery disease     Foot pain, bilateral     GERD (gastroesophageal reflux disease)     History of lung cancer 2020    Hyperlipidemia     Inguinodynia, right 2020    Added automatically from request for surgery [id number]      Ischemic cardiomyopathy 2020    Added automatically from request for surgery [id number]      Low back pain     Lung cancer     Mixed hyperlipidemia 2013    Persistent atrial fibrillation 2020    Added automatically from request for surgery [id number]      Presence of biventricular implantable cardioverter-defibrillator (ICD) 2020    S/P epidural steroid injection     Israel Gomes's - no  relief    Vitamin D deficiency 04/05/2016    Weight loss     acute loss of weight 30 lbs     Past Surgical History:   Procedure Laterality Date    CARDIAC CATHETERIZATION N/A 7/9/2020    Procedure: LEFT HEART CATH with possible PCI;  Surgeon: Wade Cronin MD;  Location: [facility] CATH INVASIVE LOCATION;  Service: Cardiovascular;  Laterality: N/A;  Local and IV sedation    CARDIAC CATHETERIZATION N/A 7/9/2020    Procedure: Percutaneous Coronary Intervention;  Surgeon: Wade Cronin MD;  Location: [facility] CATH INVASIVE LOCATION;  Service: Cardiovascular;  Laterality: N/A;    CARDIAC DEFIBRILLATOR PLACEMENT      CARDIAC ELECTROPHYSIOLOGY PROCEDURE N/A 7/10/2020    Procedure: Biventricular Device Insertion;  Surgeon: Jonathan Denney MD;  Location: [facility] CATH INVASIVE LOCATION;  Service: Cardiovascular;  Laterality: N/A;    CARDIAC ELECTROPHYSIOLOGY PROCEDURE N/A 7/10/2020    Procedure: ABLATION AV NODE;  Surgeon: Jonathan Denney MD;  Location: [facility] CATH INVASIVE LOCATION;  Service: Cardiovascular;  Laterality: N/A;    CARDIAC ELECTROPHYSIOLOGY PROCEDURE N/A 7/10/2020    Procedure: AV node ablation;  Surgeon: Jonathan Denney MD;  Location: [facility] CATH INVASIVE LOCATION;  Service: Cardiovascular;  Laterality: N/A;    CARDIOVASCULAR STRESS TEST  2020    CAROTID ENDARTERECTOMY Left 6/16/2025    Procedure: CAROTID ENDARTERECTOMY with common carotid to internal carotid interposition and repair of carotid aneurysm;  Surgeon: Maxine Alvarez MD;  Location: [facility] OR;  Service: Vascular;  Laterality: Left;    CATARACT EXTRACTION, BILATERAL      CONVERTED (HISTORICAL) HOLTER  2020    CORONARY ANGIOPLASTY WITH STENT PLACEMENT      ENDOSCOPY N/A 7/5/2020    Procedure: ESOPHAGOGASTRODUODENOSCOPY;  Surgeon: Neal Correa MD;  Location: [facility] ENDOSCOPY;  Service: Gastroenterology;  Laterality: N/A;    INGUINAL HERNIA REPAIR Right 1/7/2021    Procedure: INGUINAL HERNIA REPAIR OPEN WITH MESH;  Surgeon:  Cody Randall MD;  Location: [facility] MAIN OR;  Service: General;  Laterality: Right;    LUMBAR DECOMPRESSION  09/2015    L2-L3    LUMBAR DECOMPRESSION  2006    Dr. Logan    OTHER SURGICAL HISTORY  05/2015    left SI fusion with bone graft - Dr. Presley    OTHER SURGICAL HISTORY      carotid artery repair     OTHER SURGICAL HISTORY Left 02/19/2016    Left SI fusion 2/19/2016 Dr. Zendejas    POSTERIOR SPINAL FUSION  10/24/2016    PSF T12-3/TLIF L3-L4 poss L2-L3 --- Dr. 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      General Information       Row Name 06/19/25 1636          Physical Therapy Time and Intention    Document Type therapy note (daily note)  -EM     Mode of Treatment co-treatment;occupational therapy;physical therapy;other (see comments)  -EM       Row Name 06/19/25 1636          General Information    Existing Precautions/Restrictions fall;oxygen therapy device and L/min  5L high flow  -EM       Row Name 06/19/25 1636          Safety Issues/Impairments Affecting Functional Mobility    Comment, Safety Issues/Impairments (Mobility) Co treatment medically appropriate and necessary due to patient acuity level, activity tolerance and safety of patient and staff. Treatment is focusing on progression of care and goals established in the POC.  -EM               User Key  (r) = Recorded By, (t) = Taken By, (c) = Cosigned By      Initials Name Provider Type    EM Shelby Schmid, PT Physical Therapist                   Mobility       Row Name 06/19/25 1636          Bed Mobility    Supine-Sit [place] (Bed Mobility) maximum assist (25% patient effort);2 person assist  -EM     Sit-Supine [place] (Bed Mobility) maximum assist (25% patient effort);2 person assist  -EM     Assistive Device (Bed Mobility) head of bed elevated;repositioning sheet  -EM       Row Name 06/19/25 1638          Transfers    Comment, (Transfers) did not attempt sit to stand due to requiring high  level of assistance to maintain sitting on EOB  -EM               User Key  (r) = Recorded By, (t) = Taken By, (c) = Cosigned By      Initials Name Provider Type    Shelby Luna PT Physical Therapist                   Obj/Interventions       Row Name 06/19/25 1637          Motor Skills    Therapeutic Exercise other (see comments)  AP with assist in supine, LAQ with assist x 5 reps  -EM       Row Name 06/19/25 1637          Balance    Static Sitting Balance moderate assist;maximum assist  -EM     Dynamic Sitting Balance maximum assist  -EM     Position, Sitting Balance sitting edge of bed  -EM     Comment, Balance pt sat up on EOB with mod/maxA, working on core engagement for increased postural support, reaching activities, x 5 reps. Pt fatigues quickly  -EM               User Key  (r) = Recorded By, (t) = Taken By, (c) = Cosigned By      Initials Name Provider Type    Shelby Luna PT Physical Therapist                   Goals/Plan    No documentation.                  Clinical Impression       Row Name 06/19/25 1638          Pain    Pretreatment Pain Rating 0/10 - no pain  -EM       Row Name 06/19/25 1638          Plan of Care Review    Plan of Care Reviewed With patient  -EM     Outcome Evaluation Pt sleeping but arousable, agreeable to therapy, c/o feeling very weak, no c/o pain. patient performed bed mobility with maxAx2, required mod/maxA to maintain sitting on EOB while working on functional reaching tasks for core engagement. Pt fatigues quickly, recommend rehab at d/c.  -EM       Row Name 06/19/25 1638          Vital Signs    Pre Systolic BP Rehab 138  -EM     Pre Treatment Diastolic BP 78  -EM     Pre SpO2 (%) 94  -EM     O2 Delivery Pre Treatment hi-flow  5L  -EM       Row Name 06/19/25 1638          Positioning and Restraints    Pre-Treatment Position in bed  -EM     Post Treatment Position bed  -EM     In Bed supine;call light within reach;exit alarm on  -EM               User Key   (r) = Recorded By, (t) = Taken By, (c) = Cosigned By      Initials Name Provider Type    Shelby Luna, PT Physical Therapist                   Outcome Measures       Row Name 06/19/25 1639 06/19/25 0800       How much help from another person do you currently need...    Turning from your back to your side while in flat bed without using bedrails? 2  -EM 2  -AB    Moving from lying on back to sitting on the side of a flat bed without bedrails? 2  -EM 2  -AB    Moving to and from a bed to a chair (including a wheelchair)? 1  -EM 1  -AB    Standing up from a chair using your arms (e.g., wheelchair, bedside chair)? 1  -EM 1  -AB    Climbing 3-5 steps with a railing? 1  -EM 1  -AB    To walk in hospital room? 1  -EM 1  -AB    AM-PAC 6 Clicks Score (PT) 8  -EM 8  -AB    Highest Level of Mobility Goal Sit at Edge of Bed-3  -EM Sit at Edge of Bed-3  -AB      Row Name 06/19/25 1307          Functional Assessment    Outcome Measure Options AM-PAC 6 Clicks Daily Activity (OT)  -ES               User Key  (r) = Recorded By, (t) = Taken By, (c) = Cosigned By      Initials Name Provider Type    Skyla Baptiste, RN Registered Nurse    Shelby Luna, PT Physical Therapist    June Soni, OTR/L, CSRS Occupational Therapist                                 Physical Therapy Education       Title: PT OT SLP Therapies (In Progress)       Topic: Physical Therapy (In Progress)       Point: Mobility training (In Progress)       Learning Progress Summary            Patient Acceptance, E, NR by EM at 6/19/2025 1640    Acceptance, E, NR by EM at 6/18/2025 1630    Acceptance, E, NR by AR at 6/14/2025 1242                      Point: Home exercise program (In Progress)       Learning Progress Summary            Patient Acceptance, E, NR by AR at 6/14/2025 1242                      Point: Body mechanics (In Progress)       Learning Progress Summary            Patient Acceptance, E, NR by AR at 6/14/2025 1242                       Point: Precautions (In Progress)       Learning Progress Summary            Patient Acceptance, E, NR by AR at 6/14/2025 1242                                      User Key       Initials Effective Dates Name Provider Type Discipline    EM 06/16/21 -  Shelby Schmid PT Physical Therapist PT    AR 06/16/21 -  Preeti Hanna PT Physical Therapist PT                  PT Recommendation and Plan  Planned Therapy Interventions (PT): bed mobility training, gait training, home exercise program, patient/family education, transfer training, strengthening  Outcome Evaluation: Pt sleeping but arousable, agreeable to therapy, c/o feeling very weak, no c/o pain. patient performed bed mobility with maxAx2, required mod/maxA to maintain sitting on EOB while working on functional reaching tasks for core engagement. Pt fatigues quickly, recommend rehab at d/c.     Time Calculation:         PT Charges       Row Name 06/19/25 1640             Time Calculation    Start Time 0958  -EM      Stop Time 1021  -EM      Time Calculation (min) 23 min  -EM      PT Received On 06/19/25  -EM      PT - Next Appointment 06/20/25  -EM         Time Calculation- PT    Total Timed Code Minutes- PT 23 minute(s)  -EM         Timed Charges    11265 - PT Therapeutic Exercise Minutes 8  -EM      38895 - PT Therapeutic Activity Minutes 15  -EM         Total Minutes    Timed Charges Total Minutes 23  -EM       Total Minutes 23  -EM                User Key  (r) = Recorded By, (t) = Taken By, (c) = Cosigned By      Initials Name Provider Type     Shelby Schmid PT Physical Therapist                  Therapy Charges for Today       Code Description Service Date Service Provider Modifiers Qty    29086025051 HC PT THERAPEUTIC ACT EA 15 MIN 6/18/2025 Shelby Schmid, PT GP 1    20207604448 HC PT RE-EVAL ESTABLISHED PLAN 2 6/18/2025 Shelby Schmid, PT GP 1    41671830160 HC PT THER PROC EA 15 MIN 6/19/2025 Shelby Schmid  SHANE, PT GP 1    [id number]  PT THERAPEUTIC ACT EA 15 MIN 6/19/2025 Shelby Schmid, PT GP 1            PT G-Codes  Outcome Measure Options: AM-PAC 6 Clicks Daily Activity (OT)  AM-PAC 6 Clicks Score (PT): 8  AM-PAC 6 Clicks Score (OT): 12  Modified Bronson Scale: 4 - Moderately severe disability.  Unable to walk without assistance, and unable to attend to own bodily needs without assistance.  PT Discharge Summary  Anticipated Discharge Disposition (PT): skilled nursing facility    Shelby Schmid, PT  6/19/2025     Show Applicator Variable?: Yes Duration Of Freeze Thaw-Cycle (Seconds): 10 Number Of Freeze-Thaw Cycles: 1 freeze-thaw cycle Spray Paint Text: The liquid nitrogen was applied to the skin utilizing a spray paint frosting technique. Render Note In Bullet Format When Appropriate: No Medical Necessity Clause: This procedure was medically necessary because the lesions that were treated were: precancerous Consent: The patient's consent was obtained including but not limited to risks of crusting, scabbing, blistering, scarring, darker or lighter pigmentary change, recurrence, incomplete removal and infection. Medical Necessity Information: It is in your best interest to select a reason for this procedure from the list below. All of these items fulfill various CMS LCD requirements except the new and changing color options. Post-Care Instructions: I reviewed with the patient in detail post-care instructions. Patient is to wear sunprotection, and avoid picking at any of the treated lesions. Pt may apply Vaseline to crusted or scabbing areas. Detail Level: Detailed

## 2025-06-19 NOTE — CASE MANAGEMENT/SOCIAL WORK
Continued Stay Note  [facility]     Patient Name: Shukri Park  MRN: [id number]  Today's Date: 6/19/2025    Admit Date: 6/13/2025    Plan: SNF referrals pending. [facility] is pending.   Discharge Plan       Row Name 06/19/25 1456       Plan    Plan SNF referrals pending. [facility] is pending.    Patient/Family in Agreement with Plan yes    Plan Comments [facility] is pending for patient. Patient can not go to a SNF facility with a [facility]. CCP following clincial course.                   Discharge Codes    No documentation.                 Expected Discharge Date and Time       Expected Discharge Date Expected Discharge Time    Jun 20, 2025

## 2025-06-19 NOTE — CONSULTS
[facility] Consult Note      Patient Name: Shukri Park  : 1940  MRN: [id number]  Primary Care Physician:  Homa Carrizales MD  Referring Physician: No Known Provider  Date of admission: 2025    Subjective     Reason for Consult: Hypernatremia    HPI:   Shukri Park is a 84 y.o. male with normal renal function at baseline, chronic HFpEF, chronic hypotension, PAF, and hx of lung cancer, who was transferred to [facility] on  from [facility] for aphasia, hypotension, and right-sided weakness.  Found to have an acute ischemic stroke in the left carotid stenosis, s/p L carotid artery aneurysm repair with CCA to ICA bypass by vascular surgery on .      Patient was started on tube feed since  with water flush at 50 cc/h.  Received 1 dose IV Lasix 40 mg yesterday for possible pulm edema.  Respiratory status improved today with requiring less oxygenation.  Received another dose of IV Lasix 40 mg this morning again.  Although, Na is to 155 today.  Cr stable at baseline, with BUN slightly higher at 25.    Patient denies any chest pain, shortness of breath, or N/V/D.  Tolerating tube feeds without issues.  UOP yesterday 2.46 L, has had 650 mL so far today.    Review of Systems:   14 point review of systems is otherwise negative except for mentioned above on HPI    Personal History     Past Medical History:   Diagnosis Date    Aortic aneurysm     Chronic anticoagulation 2013    Chronic HFrEF (heart failure with reduced ejection fraction)     Chronic hypotension 2020    Chronic pain 2020    Coronary artery disease     Foot pain, bilateral     GERD (gastroesophageal reflux disease)     History of lung cancer 2020    Hyperlipidemia     Inguinodynia, right 2020    Added automatically from request for surgery [id number]      Ischemic cardiomyopathy 2020    Added automatically from request for surgery [id number]      Low back pain     Lung cancer     Mixed  hyperlipidemia 04/17/2013    Persistent atrial fibrillation 07/01/2020    Added automatically from request for surgery [id number]      Presence of biventricular implantable cardioverter-defibrillator (ICD) 08/14/2020    S/P epidural steroid injection     Israel Gomes's - no relief    Vitamin D deficiency 04/05/2016    Weight loss     acute loss of weight 30 lbs       Past Surgical History:   Procedure Laterality Date    CARDIAC CATHETERIZATION N/A 7/9/2020    Procedure: LEFT HEART CATH with possible PCI;  Surgeon: Wade Cronin MD;  Location: [facility] CATH INVASIVE LOCATION;  Service: Cardiovascular;  Laterality: N/A;  Local and IV sedation    CARDIAC CATHETERIZATION N/A 7/9/2020    Procedure: Percutaneous Coronary Intervention;  Surgeon: Wade Cronin MD;  Location: [facility] CATH INVASIVE LOCATION;  Service: Cardiovascular;  Laterality: N/A;    CARDIAC DEFIBRILLATOR PLACEMENT      CARDIAC ELECTROPHYSIOLOGY PROCEDURE N/A 7/10/2020    Procedure: Biventricular Device Insertion;  Surgeon: Jonathan Denney MD;  Location: [facility] CATH INVASIVE LOCATION;  Service: Cardiovascular;  Laterality: N/A;    CARDIAC ELECTROPHYSIOLOGY PROCEDURE N/A 7/10/2020    Procedure: ABLATION AV NODE;  Surgeon: Jonathan Denney MD;  Location: [facility] CATH INVASIVE LOCATION;  Service: Cardiovascular;  Laterality: N/A;    CARDIAC ELECTROPHYSIOLOGY PROCEDURE N/A 7/10/2020    Procedure: AV node ablation;  Surgeon: Jonathan Denney MD;  Location: [facility] CATH INVASIVE LOCATION;  Service: Cardiovascular;  Laterality: N/A;    CARDIOVASCULAR STRESS TEST  2020    CAROTID ENDARTERECTOMY Left 6/16/2025    Procedure: CAROTID ENDARTERECTOMY with common carotid to internal carotid interposition and repair of carotid aneurysm;  Surgeon: Maxine Alvarez MD;  Location: [facility] OR;  Service: Vascular;  Laterality: Left;    CATARACT EXTRACTION, BILATERAL      CONVERTED (HISTORICAL) HOLTER  2020    CORONARY ANGIOPLASTY WITH STENT PLACEMENT       ENDOSCOPY N/A 7/5/2020    Procedure: ESOPHAGOGASTRODUODENOSCOPY;  Surgeon: Neal Correa MD;  Location: [facility] ENDOSCOPY;  Service: Gastroenterology;  Laterality: N/A;    INGUINAL HERNIA REPAIR Right 1/7/2021    Procedure: INGUINAL HERNIA REPAIR OPEN WITH MESH;  Surgeon: Cody Randall MD;  Location: [facility] MAIN OR;  Service: General;  Laterality: Right;    LUMBAR DECOMPRESSION  09/2015    L2-L3    LUMBAR DECOMPRESSION  2006    Dr. Logan    OTHER SURGICAL HISTORY  05/2015    left SI fusion with bone graft - Dr. Presley    OTHER SURGICAL HISTORY      carotid artery repair     OTHER SURGICAL HISTORY Left 02/19/2016    Left SI fusion 2/19/2016 Dr. Zendejas    POSTERIOR SPINAL FUSION  10/24/2016    PSF T12-3/TLIF L3-L4 poss L2-L3 --- Dr. 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       Family History: family history includes Cancer in an other family member; Heart disease in an other family member; Hyperlipidemia in an other family member.    Social History:  reports that he quit smoking about 35 years ago. His smoking use included cigarettes. He has been exposed to tobacco smoke. He has never used smokeless tobacco. He reports that he does not drink alcohol and does not use drugs.    Home Medications:  Prior to Admission medications    Medication Sig Start Date End Date Taking? Authorizing Provider   ipratropium (ATROVENT) 0.03 % nasal spray Administer 2 sprays into the nostril(s) as directed by provider 3 (Three) Times a Day.   Yes Marvin Lucas MD   latanoprost (XALATAN) 0.005 % ophthalmic solution Administer 1 drop to both eyes Every Night. 6/20/24  Yes Marvin Lucas MD   lisinopril (PRINIVIL,ZESTRIL) 2.5 MG tablet Take 1 tablet by mouth Daily.   Yes Marvin Lucas MD   pantoprazole (PROTONIX) 40 MG EC tablet Take 1 tablet by mouth Daily. 7/15/20  Yes Elena Epps MD   simvastatin (ZOCOR) 40 MG tablet Take 1 tablet by mouth Daily.   Yes Lance  MD Marvin   Xarelto 15 MG tablet TAKE 1 TABLET BY MOUTH EVERY DAY 7/5/22  Yes Wade Cronin MD   aspirin 81 MG EC tablet Take 4 tablets by mouth Daily for 30 days. 6/13/25 7/13/25  Marcela Olvera MD   docusate sodium (COLACE) 100 MG capsule Take 1 capsule by mouth 2 (Two) Times a Day.    Marvin Lucas MD   ferrous sulfate 325 (65 FE) MG tablet Take 1 tablet by mouth Daily With Breakfast. Hold DOs 9/2/20   Marvin Lucas MD   midodrine (PROAMATINE) 5 MG tablet Take 1 tablet by mouth Every 8 (Eight) Hours.  Patient taking differently: Take 1 tablet by mouth 2 (Two) Times a Day. 6/13/25   Skyla Xiao APRN   Multiple Vitamin (MULTIVITAMIN) tablet Take 1 tablet by mouth Daily. LD 1/2    Marvin Lucas MD   vitamin B-12 (CYANOCOBALAMIN) 1000 MCG tablet Take 1 tablet by mouth Daily. 11/15/18   Marvin Lucas MD       Allergies:  Allergies   Allergen Reactions    Ciprofloxacin Anaphylaxis    Amlodipine Unknown (See Comments)    Rocephin [Ceftriaxone] Other (See Comments)     Mouth sores  Tolerated on 6/10/25       Objective     Vitals:   Temp:  [97.4 °F (36.3 °C)-97.9 °F (36.6 °C)] 97.4 °F (36.3 °C)  Heart Rate:  [70-81] 70  Resp:  [20-28] 24  BP: ()/() 151/86  Flow (L/min) (Oxygen Therapy):  [4-8] 4    Intake/Output Summary (Last 24 hours) at 6/19/2025 1629  Last data filed at 6/19/2025 1200  Gross per 24 hour   Intake 2317 ml   Output 3410 ml   Net -1093 ml       Physical Exam:   Constitutional: Awake, chronically ill, thin, no acute distress.  HEENT: Sclera anicteric, no conjunctival injection, core track in nose  Neck: L carotid surgical site clean and dry.  No JVD  Respiratory: L basilar crackles, clear in the rest of the fields, nonlabored respiration on 5 L/min  Cardiovascular: Paced rhythm, no rub  Gastrointestinal: BS +, abdomen is soft, nontender and nondistended  : No palpable bladder  Musculoskeletal: No edema, no clubbing or cyanosis  Psychiatric:  Appropriate affect, cooperative  Neurologic: Moving all extremities, speech smooth and clear  Skin: Warm and dry       Scheduled Meds:     acetaminophen, 1,000 mg, Nasogastric, Q8H  albuterol, 2.5 mg, Nebulization, Q4H - RT  aspirin, 81 mg, Nasogastric, Daily  atorvastatin, 40 mg, Nasogastric, Daily  lansoprazole, 30 mg, Nasogastric, Q AM  latanoprost, 1 drop, Both Eyes, Nightly  midodrine, 5 mg, Nasogastric, Q8H  mupirocin, 1 Application, Each Nare, BID  neomycin-bacitracin-polymyxin, , Both Eyes, Q4H  predniSONE, 20 mg, Nasogastric, Daily With Breakfast  rivaroxaban, 15 mg, Oral, Daily With Dinner  senna-docusate sodium, 2 tablet, Nasogastric, BID  sodium chloride, 10 mL, Intravenous, Q12H  sodium chloride, 10 mL, Intravenous, Q12H      IV Meds:        Results Reviewed:   I have personally reviewed the results from the time of this admission to 6/19/2025 16:29 EDT     Lab Results   Component Value Date    GLUCOSE 111 (H) 06/19/2025    CALCIUM 8.1 (L) 06/19/2025     (H) 06/19/2025    K 3.9 06/19/2025    CO2 27.8 06/19/2025     (H) 06/19/2025    BUN 25.0 (H) 06/19/2025    CREATININE 0.80 06/19/2025    EGFRIFNONA 87 12/29/2020    BCR 31.3 (H) 06/19/2025    ANIONGAP 12.2 06/19/2025      Lab Results   Component Value Date    MG 1.7 06/14/2025    PHOS 2.9 06/19/2025    PHOS 2.9 06/19/2025    ALBUMIN 3.3 (L) 06/19/2025           Assessment / Plan       Carotid artery aneurysm    Ischemic cardiomyopathy    Coronary artery disease involving native coronary artery of native heart without angina pectoris    Chronic hypotension    Acute encephalopathy    Aortic aneurysm    Rhabdomyolysis    Severe protein-calorie malnutrition    Chronic heart failure with preserved ejection fraction (HFpEF)    Symptomatic stenosis of left carotid artery    Left carotid stenosis      ASSESSMENT:  Hypernatremia, secondary to free water deficit, has been receiving water flush at 50 cc/h since 6/18.  No peripheral edema; has mild  left-sided crackles on exam; although respiratory status improved after IV Lasix 40 x 1 yesterday, received another dose again today  Acute ischemic stroke, secondary to #2  Left carotid artery aneurysm/stenosis, s/p repair and bypass on 6/16 vascular surgery following  Bilateral pulmonary infiltrates/aspiration pneumonia, on IV antibiotics, managed by primary team  Acute on chronic hypotension, off pressors.  BP adequate on midodrine 5 TID  Chronic HFpEF, echo on 6/11/2025 showed EF 56 to 60% with severe mitral valve regurg.  RVSP 43, followed by cardiology  PAF, paced rhythm, on AC  CAD, no chest pain, followed by cardiology  Hx lung cancer  Rhabdo, CK trending down from 2133 to 308 (6/10)    PLAN:  Increase water flush to 100 cc/h  Potassium was repleted earlier  Hold further diuresis until sodium is corrected  Closely monitor UOP and respiratory status  Surveillance labs    Discussed the case with patient and his nurse Casandra.    Thank you for involving us in the care of Shukri Park.  Please feel free to call with any questions.    Bruce Arce MD  06/19/25  16:29 EDT    [facility]  [phone]      Please note that portions of this note were completed with a voice recognition program.

## 2025-06-19 NOTE — PLAN OF CARE
Goal Outcome Evaluation:  Plan of Care Reviewed With: patient        Progress: no change  Outcome Evaluation: Patient with fair participation in OT/PT session this AM. Therapeutic intervention focused on improving core control in preperation for ADLs EOB and transfers through completion of core exercises. Patient completed trunk flexion with reaching for target seated EOB focused on core control, patient continues to require max A with trunk control demonstrating continued overall strength deficits. Patient is unable to tolerate therex seated EOB, completed in supine, AAROM as patient is unable to actively lift UEs without assist. Patient will benefit from continued skilled OT intervention to address above mentioned deficits. Continue to recommend IPR at time of discharge.    Anticipated Discharge Disposition (OT): inpatient rehabilitation facility

## 2025-06-19 NOTE — PLAN OF CARE
Goal Outcome Evaluation:     Pt remains in ICU. On bipap overnight, now HF NC at 6L. Alert and oriented x 3. No acute events overnight. Electrolytes replaced and off pressors.

## 2025-06-19 NOTE — PLAN OF CARE
Goal Outcome Evaluation:  Plan of Care Reviewed With: patient           Outcome Evaluation: Pt sleeping but arousable, agreeable to therapy, c/o feeling very weak, no c/o pain. patient performed bed mobility with maxAx2, required mod/maxA to maintain sitting on EOB while working on functional reaching tasks for core engagement. Pt fatigues quickly, recommend rehab at d/c.    Anticipated Discharge Disposition (PT): skilled nursing facility

## 2025-06-19 NOTE — PROGRESS NOTES
LOS: 6 days   Patient Care Team:  Homa Carrizales MD as PCP - General (Family Medicine)    Chief Complaint: Follow-up carotid artery stenosis, left MCA portion CVA, persistent atrial fibrillation, AV node ablation with ICD placement, coronary artery disease, mitral regurgitation.    Interval History: He looked much better this morning.  He was more alert.  He is just fatigued.  His respiratory status is better.    Vital Signs:  Temp:  [97.7 °F (36.5 °C)-97.9 °F (36.6 °C)] 97.7 °F (36.5 °C)  Heart Rate:  [70-81] 72  Resp:  [20-28] 24  BP: ()/() 135/87    Intake/Output Summary (Last 24 hours) at 6/19/2025 1203  Last data filed at 6/19/2025 1000  Gross per 24 hour   Intake 2317 ml   Output 2110 ml   Net 207 ml       Physical Exam:   General Appearance:    Confused and agitated, on BiPAP.   Lungs:     Coarse breath sounds bilaterally anteriorly.    Heart:    Regular rhythm and normal rate. II/VI SM LLSB and apex.    Abdomen:     Soft, nontender, nondistended.    Extremities:   No clubbing, cyanosis, or edema.     Results Review:    Results from last 7 days   Lab Units 06/19/25  0431   SODIUM mmol/L 155*   POTASSIUM mmol/L 3.6   CHLORIDE mmol/L 115*   CO2 mmol/L 27.8   BUN mg/dL 25.0*   CREATININE mg/dL 0.80   GLUCOSE mg/dL 111*   CALCIUM mg/dL 8.1*     Results from last 7 days   Lab Units 06/16/25  1838 06/16/25  1443 06/13/25  0048   CK TOTAL U/L  --   --  308*   HSTROP T ng/L 26* 24*  --      Results from last 7 days   Lab Units 06/19/25  0432   WBC 10*3/mm3 14.91*   HEMOGLOBIN g/dL 11.3*   HEMATOCRIT % 33.6*   PLATELETS 10*3/mm3 100*     Results from last 7 days   Lab Units 06/16/25  0559 06/15/25  0608 06/14/25  2359 06/13/25  0048 06/12/25  1748   INR   --   --   --   --  1.64*   APTT seconds 101.3* 95.1* 94.0*   < > 37.1*  37.2*    < > = values in this interval not displayed.           Results from last 7 days   Lab Units 06/14/25  0538   MAGNESIUM mg/dL 1.7             I reviewed the patient's new  clinical results.        Assessment:  1.  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  Status post left ICA and common carotid aneurysm repair with CCA to ICA bypass and ligation of the left external carotid artery by Dr. Alvarez on 6/16/2025.  3.  Bilateral pulmonary infiltrates (likely aspiration pneumonia)  4.  Acute on chronic hypotension (on baseline midodrine)  5.  Persistent atrial fibrillation  6.  Status post AV node ablation and biventricular ICD placement in 2020 (left ventricular lead subsequently turned off secondary to diaphragmatic stimulation)  7.  Coronary artery disease with high-grade stenosis of the RCA (in-stent restenosis of a previously placed stent) and 2020 (PCI unsuccessful at that time).  Nonobstructive disease of the 50% in the LAD at that time.  Patent stent in the left circumflex.  8.  History of ischemic cardiomyopathy with recovered ejection fraction  9.  COPD without acute exacerbation  10.  Severe mitral regurgitation  11.  Acute rhabdomyolysis  12.  Multifactorial chronic anemia  13.  Thrombocytopenia  14.  Hypernatremia    Plan:  -Still suspect pulmonary infiltrates are aspiration pneumonia rather than pulmonary edema.  Looks better and responded to IV Lasix yesterday.  I am going to give him another 40 mg of IV Lasix today.    - He has been treated with Zosyn.  Prednisone was added today.    -Continue on midodrine 10 mg tid (on at baseline).    -Stop Plavix and restart Xarelto 15 mg/day.  I spoke with Dr. Alvarez yesterday about resuming systemic anticoagulation for the atrial fibrillation.    -He has underlying persistent atrial fibrillation, although he is pacemaker dependent from a previous AV kelly ablation (paced from his ICD).     -Continue aspirin and Lipitor.    -Hypernatremia worsen.  Nephrology has been consulted.    -Failed swallow study.   Cortrak placement is pending.    Jd Caballero MD  06/19/25  12:03 EDT

## 2025-06-19 NOTE — THERAPY TREATMENT NOTE
Patient Name: Shukri Park  : 1940    MRN: [id number]                              Today's Date: 2025       Admit Date: 2025    Visit Dx:     ICD-10-CM ICD-9-CM   1. Symptomatic stenosis of left carotid artery  I65.22 433.10     Patient Active Problem List   Diagnosis    Chronic anticoagulation    Mixed hyperlipidemia    Chronic pain    Ischemic cardiomyopathy    Persistent atrial fibrillation    Coronary artery disease involving native coronary artery of native heart without angina pectoris    Vitamin D deficiency    History of lung cancer    Chronic hypotension    Presence of biventricular implantable cardioverter-defibrillator (ICD)    Acute encephalopathy    GERD (gastroesophageal reflux disease)    Aortic aneurysm    Rhabdomyolysis    More than 50 percent stenosis of left internal carotid artery    Non-occlusive thrombus    Severe protein-calorie malnutrition    Chronic heart failure with preserved ejection fraction (HFpEF)    Symptomatic stenosis of left carotid artery    Left carotid stenosis    Carotid artery aneurysm     Past Medical History:   Diagnosis Date    Aortic aneurysm     Chronic anticoagulation 2013    Chronic HFrEF (heart failure with reduced ejection fraction)     Chronic hypotension 2020    Chronic pain 2020    Coronary artery disease     Foot pain, bilateral     GERD (gastroesophageal reflux disease)     History of lung cancer 2020    Hyperlipidemia     Inguinodynia, right 2020    Added automatically from request for surgery [id number]      Ischemic cardiomyopathy 2020    Added automatically from request for surgery [id number]      Low back pain     Lung cancer     Mixed hyperlipidemia 2013    Persistent atrial fibrillation 2020    Added automatically from request for surgery [id number]      Presence of biventricular implantable cardioverter-defibrillator (ICD) 2020    S/P epidural steroid injection     Israel Gomes's - no  relief    Vitamin D deficiency 04/05/2016    Weight loss     acute loss of weight 30 lbs     Past Surgical History:   Procedure Laterality Date    CARDIAC CATHETERIZATION N/A 7/9/2020    Procedure: LEFT HEART CATH with possible PCI;  Surgeon: Wade Cronin MD;  Location: [facility] CATH INVASIVE LOCATION;  Service: Cardiovascular;  Laterality: N/A;  Local and IV sedation    CARDIAC CATHETERIZATION N/A 7/9/2020    Procedure: Percutaneous Coronary Intervention;  Surgeon: Wade Cronin MD;  Location: [facility] CATH INVASIVE LOCATION;  Service: Cardiovascular;  Laterality: N/A;    CARDIAC DEFIBRILLATOR PLACEMENT      CARDIAC ELECTROPHYSIOLOGY PROCEDURE N/A 7/10/2020    Procedure: Biventricular Device Insertion;  Surgeon: Jonathan Denney MD;  Location: [facility] CATH INVASIVE LOCATION;  Service: Cardiovascular;  Laterality: N/A;    CARDIAC ELECTROPHYSIOLOGY PROCEDURE N/A 7/10/2020    Procedure: ABLATION AV NODE;  Surgeon: Jonathan Denney MD;  Location: [facility] CATH INVASIVE LOCATION;  Service: Cardiovascular;  Laterality: N/A;    CARDIAC ELECTROPHYSIOLOGY PROCEDURE N/A 7/10/2020    Procedure: AV node ablation;  Surgeon: Jonathan Denney MD;  Location: [facility] CATH INVASIVE LOCATION;  Service: Cardiovascular;  Laterality: N/A;    CARDIOVASCULAR STRESS TEST  2020    CAROTID ENDARTERECTOMY Left 6/16/2025    Procedure: CAROTID ENDARTERECTOMY with common carotid to internal carotid interposition and repair of carotid aneurysm;  Surgeon: Maxine Alvarez MD;  Location: [facility] OR;  Service: Vascular;  Laterality: Left;    CATARACT EXTRACTION, BILATERAL      CONVERTED (HISTORICAL) HOLTER  2020    CORONARY ANGIOPLASTY WITH STENT PLACEMENT      ENDOSCOPY N/A 7/5/2020    Procedure: ESOPHAGOGASTRODUODENOSCOPY;  Surgeon: Neal Correa MD;  Location: [facility] ENDOSCOPY;  Service: Gastroenterology;  Laterality: N/A;    INGUINAL HERNIA REPAIR Right 1/7/2021    Procedure: INGUINAL HERNIA REPAIR OPEN WITH MESH;  Surgeon:  Cody Randall MD;  Location: [facility] MAIN OR;  Service: General;  Laterality: Right;    LUMBAR DECOMPRESSION  09/2015    L2-L3    LUMBAR DECOMPRESSION  2006    Dr. Logan    OTHER SURGICAL HISTORY  05/2015    left SI fusion with bone graft - Dr. Presley    OTHER SURGICAL HISTORY      carotid artery repair     OTHER SURGICAL HISTORY Left 02/19/2016    Left SI fusion 2/19/2016 Dr. Zendejas    POSTERIOR SPINAL FUSION  10/24/2016    PSF T12-3/TLIF L3-L4 poss L2-L3 --- Dr. 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      General Information       Row Name 06/19/25 1226          OT Time and Intention    Document Type therapy note (daily note)  -ES     Mode of Treatment co-treatment;physical therapy;occupational therapy  -ES     Patient Effort fair  -ES       Row Name 06/19/25 1226          General Information    Existing Precautions/Restrictions fall;oxygen therapy device and L/min  -ES       Row Name 06/19/25 1226          Cognition    Orientation Status (Cognition) oriented to;person;situation  patient cooperative, agreeable to therapy participation. Patient is lethargic throughout session, requires verbal and tactile cueing for participation.  -ES       Row Name 06/19/25 1226          Safety Issues/Impairments Affecting Functional Mobility    Impairments Affecting Function (Mobility) balance;cognition;endurance/activity tolerance;strength;pain  -ES               User Key  (r) = Recorded By, (t) = Taken By, (c) = Cosigned By      Initials Name Provider Type    ES June Plunkett, OTR/L, CSRS Occupational Therapist                     Mobility/ADL's       Row Name 06/19/25 1257          Bed Mobility    Bed Mobility supine-sit;sit-supine  -ES     Supine-Sit [place] (Bed Mobility) maximum assist (25% patient effort);2 person assist  -ES     Sit-Supine [place] (Bed Mobility) maximum assist (25% patient effort);2 person assist  -ES     Bed Mobility, Safety Issues decreased use of arms  for pushing/pulling;decreased use of legs for bridging/pushing  -ES     Comment, (Bed Mobility) increased cueing and levels of assist required with bed mobility  -ES               User Key  (r) = Recorded By, (t) = Taken By, (c) = Cosigned By      Initials Name Provider Type    June Soni OTR/L, VASILE Occupational Therapist                   Obj/Interventions       Row Name 06/19/25 1259          Shoulder (Therapeutic Exercise)    Shoulder (Therapeutic Exercise) AAROM (active assistive range of motion)  -ES     Shoulder AAROM (Therapeutic Exercise) bilateral;flexion;aBduction;aDduction;10 repetitions;supine  -ES       Row Name 06/19/25 1259          Elbow/Forearm (Therapeutic Exercise)    Elbow/Forearm (Therapeutic Exercise) AAROM (active assistive range of motion)  -ES     Elbow/Forearm AAROM (Therapeutic Exercise) bilateral;flexion;extension;supine;10 repetitions  -ES       Row Name 06/19/25 1259          Motor Skills    Therapeutic Exercise shoulder;elbow/forearm  unable to tolerate therex EOB, completed in supine with assist for AROM  -ES       Row Name 06/19/25 1259          Balance    Balance Assessment sitting static balance;sitting dynamic balance  -ES     Static Sitting Balance moderate assist;maximum assist  -ES     Dynamic Sitting Balance maximum assist  -ES     Position, Sitting Balance supported;sitting edge of bed  -ES     Comment, Balance Attempted to complete sitting balance task seated EOB focused on core strength to decrease level of assist required seated EOB. Completed trunk flexion with UE reaching towards target to promote core control. Patient completes task x5 trials, requiring up to max A with dynamic sitting balance. Patient demonstrates limited trunk activation with task.  -ES               User Key  (r) = Recorded By, (t) = Taken By, (c) = Cosigned By      Initials Name Provider Type    June Soni OTR/L, VASILE Occupational Therapist                   Goals/Plan    No  documentation.                  Clinical Impression       Row Name 06/19/25 1304          Pain Assessment    Pretreatment Pain Rating 0/10 - no pain  -ES     Posttreatment Pain Rating 0/10 - no pain  -ES       Row Name 06/19/25 1304          Plan of Care Review    Plan of Care Reviewed With patient  -ES     Progress no change  -ES     Outcome Evaluation Patient with fair participation in OT/PT session this AM. Therapeutic intervention focused on improving core control in preperation for ADLs EOB and transfers through completion of core exercises. Patient completed trunk flexion with reaching for target seated EOB focused on core control, patient continues to require max A with trunk control demonstrating continued overall strength deficits. Patient is unable to tolerate therex seated EOB, completed in supine, AAROM as patient is unable to actively lift UEs without assist. Patient will benefit from continued skilled OT intervention to address above mentioned deficits. Continue to recommend IPR at time of discharge.  -ES       Row Name 06/19/25 130          Therapy Plan Review/Discharge Plan (OT)    Anticipated Discharge Disposition (OT) inpatient rehabilitation facility  -ES       Row Name 06/19/25 1306          Positioning and Restraints    Pre-Treatment Position in bed  -ES     Post Treatment Position bed  -ES     In Bed fowlers;call light within reach;encouraged to call for assist;exit alarm on  -ES               User Key  (r) = Recorded By, (t) = Taken By, (c) = Cosigned By      Initials Name Provider Type    June Soni, CASTILLOR/L, CSRS Occupational Therapist                   Outcome Measures       Row Name 06/19/25 1305          How much help from another is currently needed...    Putting on and taking off regular lower body clothing? 2  -ES     Bathing (including washing, rinsing, and drying) 2  -ES     Toileting (which includes using toilet bed pan or urinal) 1  -ES     Putting on and taking off regular  upper body clothing 3  -ES     Taking care of personal grooming (such as brushing teeth) 3  -ES     Eating meals 1  -ES     AM-PAC 6 Clicks Score (OT) 12  -ES       Row Name 06/19/25 0800          How much help from another person do you currently need...    Turning from your back to your side while in flat bed without using bedrails? 2  -AB     Moving from lying on back to sitting on the side of a flat bed without bedrails? 2  -AB     Moving to and from a bed to a chair (including a wheelchair)? 1  -AB     Standing up from a chair using your arms (e.g., wheelchair, bedside chair)? 1  -AB     Climbing 3-5 steps with a railing? 1  -AB     To walk in hospital room? 1  -AB     AM-PAC 6 Clicks Score (PT) 8  -AB     Highest Level of Mobility Goal Sit at Edge of Bed-3  -AB       Row Name 06/19/25 1307          Functional Assessment    Outcome Measure Options AM-PAC 6 Clicks Daily Activity (OT)  -ES               User Key  (r) = Recorded By, (t) = Taken By, (c) = Cosigned By      Initials Name Provider Type    AB Skyla Small, RN Registered Nurse    June Soni, OTR/L, CSRS Occupational Therapist                      OT Recommendation and Plan     Plan of Care Review  Plan of Care Reviewed With: patient  Progress: no change  Outcome Evaluation: Patient with fair participation in OT/PT session this AM. Therapeutic intervention focused on improving core control in preperation for ADLs EOB and transfers through completion of core exercises. Patient completed trunk flexion with reaching for target seated EOB focused on core control, patient continues to require max A with trunk control demonstrating continued overall strength deficits. Patient is unable to tolerate therex seated EOB, completed in supine, AAROM as patient is unable to actively lift UEs without assist. Patient will benefit from continued skilled OT intervention to address above mentioned deficits. Continue to recommend IPR at time of discharge.     Time  Calculation:         Time Calculation- OT       Row Name 06/19/25 1308             Time Calculation- OT    OT Start Time 0958  -ES      OT Stop Time 1021  -ES      OT Time Calculation (min) 23 min  -ES      Total Timed Code Minutes- OT 23 minute(s)  -ES      OT Received On 06/19/25  -ES      OT - Next Appointment 06/20/25  -ES         Timed Charges    09641 - OT Therapeutic Exercise Minutes 15  -ES      01365 - OT Therapeutic Activity Minutes 8  -ES         Total Minutes    Timed Charges Total Minutes 23  -ES       Total Minutes 23  -ES                User Key  (r) = Recorded By, (t) = Taken By, (c) = Cosigned By      Initials Name Provider Type    ES June Plunkett, OTR/L, CSRS Occupational Therapist                  Therapy Charges for Today       Code Description Service Date Service Provider Modifiers Qty    95260394929 HC OT THER PROC EA 15 MIN 6/19/2025 June Plunkett, OTR/L, CSRS GO 1    73635546527 HC OT THERAPEUTIC ACT EA 15 MIN 6/19/2025 June Plunkett, OTR/L, CSRS GO 1                 June Plunkett OTR/L, CSRS  6/19/2025

## 2025-06-20 NOTE — PROGRESS NOTES
Discharge Planning Assessment  [facility]     Patient Name: Shukri Park  MRN: [id number]  Today's Date: 6/20/2025    Admit Date: 6/13/2025    Plan: [facility] scattered bed on 6/20/25. TAPAN Belcher RN, CCP.   Discharge Needs Assessment    No documentation.                  Discharge Plan       Row Name 06/20/25 1702       Plan    Plan [facility] scattered bed on 6/20/25. TAPAN Belcher RN, CCP.    Plan Comments The patient transferred to [facility] ICU/I387 on 6/20/25 @ 16:35. [facility] scattered bed on 6/20/25. TAPAN Belcher RN, CCP.    Final Discharge Disposition Code 51 - hospice medical facility    Final Note [facility] scattered bed on 6/20/25. TAPAN Belcher RN, CCP.                  Continued Care and Services - Admitted Since 6/13/2025       Destination Coordination complete.      Service Provider Request Status Services Address Phone Fax Patient Preferred    [facility] [phone] --    [facility] Pending - Request Sent -- [address] [place] [address] [phone] --                  Expected Discharge Date and Time       Expected Discharge Date Expected Discharge Time    Jun 20, 2025            Demographic Summary    No documentation.                  Functional Status    No documentation.                  Psychosocial    No documentation.                  Abuse/Neglect    No documentation.                  Legal    No documentation.                  Substance Abuse    No documentation.                  Patient Forms    No documentation.                     Sharon Belcher RN

## 2025-06-20 NOTE — PROGRESS NOTES
Case Management Discharge Note      Final Note: [facility] scattered bed on 6/20/25. TAPAN Belcher RN, CCP.    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      Service Provider Services Address Phone Fax Patient Preferred    [facility] [address] [phone] --              Durable Medical Equipment    No services have been selected for the patient.                Dialysis/Infusion    No services have been selected for the patient.                Home Medical Care    No services have been selected for the patient.                Therapy    No services have been selected for the patient.                Community Resources    No services have been selected for the patient.                Community & DME    No services have been selected for the patient.                         Final Discharge Disposition Code: 51 - hospice medical facility

## 2025-06-20 NOTE — CONSULTS
Purpose of the visit was to evaluate for: goals of care/advanced care planning and support for patient/family. Spoke with MD and RN as well as family and discussed palliative care, goals of care, care options, resuscitation status, [facility], and [facility] scattered bed status.      Assessment:  Patient is palliative care appropriate for inpatient care given (list diagnosis/symptoms):  84 year old male seen in ICU today with acute stroke, left carotid artery stenosis s/p caratid endarteriectomy, bilateral pulmonary infiltrates, aspiration pneumonia, COPD. Overnight pulled out NGT and had epistaxis. Patient is confused, on 12L humidified O2 via face tent.      Cultural and spiritual needs have been assessed. No needs identified.     Recommendations/Plan: Change code status to comfort measures only, gear all treatment options towards symptom management, transition to inpatient palliative care unit and consult [facility] for planned inpatient admission.    Other Comments: Spoke with patient's [facility] Sebastian along with Dr. Hare this morning. Sebastian spoke with family and came back this afternoon deciding to transition to comfort measures. I spoke with him on the phone about this, explained discontinuation of non-comfort based treatment, medications used to alleviate suffering, and [facility] consult. Answered all questions and provided support, advised of ongoing availability.

## 2025-06-20 NOTE — PROGRESS NOTES
Spoke with Dr. Hare.  He continues to have nosebleeds.  Xarelto is going to be on hold.  Family is also considering palliative care at this point.    Nakul Caballero MD

## 2025-06-20 NOTE — PLAN OF CARE
Goal Outcome Evaluation:  Plan of Care Reviewed With: patient, family           Outcome Evaluation: Patient passed at 1723. Family at bedside.

## 2025-06-20 NOTE — DISCHARGE SUMMARY
Date of Admission: 6/13/2025  Date of Discharge:  6/20/2025    Discharge Diagnosis:    Acute ischemic stroke, left MCA watershed  Left carotid artery stenosis with soft plaque  Mohit carotid endarteriectomy on 6/16/25  Bilateral pulmonary infiltrates  Fluid overload  Aspiration pneumonia  ? Acute hypotension also, on Lewis  Chronic hypotension on Midodrine  Severe MR  Afib on anticoagulation  COPD  Acute postop respiratory failure  Epistaxis    Presenting Problem/History of Present Illness    84-year-old male patient who is elderly and frail.  He was admitted to [facility] a few days ago on the 10th.  He was admitted with speech difficulty, hypotension and right sided weakness.  Per neurology the patient has had fluctuating systolic blood pressures and fluctuating mentation with speech difficulty.  His speech difficulty temporarily improved, they augmented his blood pressure with IV fluids.  He received large amount of IV fluids but the patient is also chronically hypotensive at home on midodrine.  Subsequently they realized that he needed blood pressure augmentation with Lewis-Synephrine.  The patient was transferred to the ICU and a call was made to this facility for further neurointerventional care.  CT angiogram of the head and neck at [facility] showed bulky plaque with left carotid stenosis and thrombus.  The patient was started on heparin drip.  Patient also started developing acute hypoxic respiratory failure with sudden increase requirement and FiO2.  He is not actually short of breath.  He is in no respiratory distress.  Dr. Nice from the neurology team here accepted the patient.  I discussed the patient with his power of , grandson at the bedside as well as the rest of family and nurse in the ICU.  Chart was reviewed.  NIH score was 3 prior to departure from [place]  Currently during my exam the patient is quite sleepy.  He is not really answering my questions, has some stuttering and his  speech is difficult to understand due to his lack of dentures.  He does follow a few simple commands but he has noticeable right-sided weakness during exam    Hospital Course    ***      Condition on Discharge:  Transferred to Hospice       Jose Armando Hare MD  06/20/25  16:25 EDT    Time: I spent UNDER 30mins in the discharge planning of this patient.    Some of this encounter note is an electronic transcription/translation of spoken language to printed text.

## 2025-06-20 NOTE — PROGRESS NOTES
Nutrition Services    Patient Name:  Shukri Park  YOB: 1940  MRN: [id number]  Admit Date:  6/13/2025    SHERLY ray/sari'ed by pt. Pt now comfort care per families wishes.  Will remain available as needed.     Electronically signed by:  Luda Mcpherson RD  06/20/25 15:17 EDT

## 2025-06-20 NOTE — NURSING NOTE
0730: Patient started spontaneously bleeding from right side nostril at this time, Dr. Hare on floor and informed. Patient pulled out Cortrak last night around 2000 and when attempted to reinsert Cortrak, patient had some bleeding from both nostrils but it subsided shortly after. Cortrak was unable to be replaced and TERRANCE Gong was notified.Dr. Hare this morning, placed a rhino rocket in right nostril, and patient was placed on open face mask at 7 liters. Bleeding has slowed down, day shift RN informed in report, she will continue to monitor and inform MD as needed.

## 2025-06-20 NOTE — PROGRESS NOTES
[facility] Progress Note      Patient Name: Shukri Park  : 1940  MRN: [id number]  Primary Care Physician:  Homa Carrizales MD  Date of admission: 2025    Subjective     Interval History:   The patient was seen and examined today for follow-up on Hypernatremia   Patient pulled his NG tube today and he is bleeding from his nose.   Denies any chest pain  Hospital course are improving.  Review of Systems:   As noted above    Objective     Vitals:   Temp:  [96.2 °F (35.7 °C)-99.7 °F (37.6 °C)] 99.7 °F (37.6 °C)  Heart Rate:  [70-76] 70  Resp:  [20-24] 24  BP: (134-166)/(63-99) 134/63  Flow (L/min) (Oxygen Therapy):  [2-15] 12    Intake/Output Summary (Last 24 hours) at 2025 1351  Last data filed at 2025 1200  Gross per 24 hour   Intake 2170.82 ml   Output 1400 ml   Net 770.82 ml       Physical Exam:    General Appearance: Ill looking, frail.  Oxygen by face tent  Skin: warm and dry  HEENT: oral mucosa normal, nonicteric sclera, bleeding nose  Neck: supple, no JVD  Lungs: CTA  Heart: RRR, normal S1 and S2  Abdomen: soft, nontender, nondistended  : no palpable bladder  Extremities: Trace bilateral ankle edema  Neuro: normal speech and mental status     Scheduled Meds:     acetaminophen, 1,000 mg, Nasogastric, Q8H  albuterol, 2.5 mg, Nebulization, Q4H - RT  aspirin, 81 mg, Nasogastric, Daily  atorvastatin, 40 mg, Nasogastric, Daily  lansoprazole, 30 mg, Nasogastric, Q AM  latanoprost, 1 drop, Both Eyes, Nightly  midodrine, 5 mg, Nasogastric, Q8H  mupirocin, 1 Application, Each Nare, BID  neomycin-bacitracin-polymyxin, , Both Eyes, Q4H  oxymetazoline, 2 spray, Each Nare, BID  predniSONE, 20 mg, Nasogastric, Daily With Breakfast  rivaroxaban, 15 mg, Oral, Daily With Dinner  senna-docusate sodium, 2 tablet, Nasogastric, BID  sodium chloride, 10 mL, Intravenous, Q12H  sodium chloride, 10 mL, Intravenous, Q12H      IV Meds:   dextrose, 125 mL/hr, Last Rate: 125 mL/hr  (06/20/25 0827)        Results Reviewed:   I have personally reviewed the results from the time of this admission to 6/20/2025 13:51 EDT     Results from last 7 days   Lab Units 06/20/25  0551 06/19/25  1426 06/19/25  0431 06/18/25  0303   SODIUM mmol/L 151*  --  155* 148*   POTASSIUM mmol/L 3.8 3.9 3.6 4.6   CHLORIDE mmol/L 111*  --  115* 115*   CO2 mmol/L 29.0  --  27.8 24.0   BUN mg/dL 21.0  --  25.0* 22.0   CREATININE mg/dL 0.60*  --  0.80 0.64*   CALCIUM mg/dL 8.4*  --  8.1* 7.6*   GLUCOSE mg/dL 101*  --  111* 142*       Estimated Creatinine Clearance: 87.5 mL/min (A) (by C-G formula based on SCr of 0.6 mg/dL (L)).    Results from last 7 days   Lab Units 06/20/25  0551 06/19/25  0431 06/18/25  1356 06/14/25  1933 06/14/25  0538   MAGNESIUM mg/dL  --   --   --   --  1.7   PHOSPHORUS mg/dL 1.9* 2.9  2.9 1.4*   < > 2.2*    < > = values in this interval not displayed.             Results from last 7 days   Lab Units 06/20/25  0551 06/19/25  0432 06/18/25  1651 06/18/25  0303 06/17/25  0329   WBC 10*3/mm3 17.14* 14.91* 13.27* 10.55 16.14*   HEMOGLOBIN g/dL 11.0* 11.3* 11.2* 10.1* 12.0*   PLATELETS 10*3/mm3 103* 100* 88* 80* 141             Assessment / Plan     ASSESSMENT:  Hypernatremia, secondary to free water deficit, has been receiving water flush at 50 cc/h since 6/18.  No peripheral edema; has mild left-sided crackles on exam; although respiratory status improved after lasix   Acute ischemic stroke, secondary to #2  Left carotid artery aneurysm/stenosis, s/p repair and bypass on 6/16 vascular surgery following  Bilateral pulmonary infiltrates/aspiration pneumonia, on IV antibiotics, managed by primary team  Acute on chronic hypotension, off pressors.  BP adequate on midodrine 5 TID  Chronic HFpEF, echo on 6/11/2025 showed EF 56 to 60% with severe mitral valve regurg.  RVSP 43, followed by cardiology  PAF, paced rhythm, on AC  CAD, no chest pain, followed by cardiology  Hx lung cancer  Rhabdo, CK trending  down        PLAN:  I believe the patient volume status is acceptable today.    Will start patient on D5 water at 125 cc an hour since NG tube was pulled   Agree with cardiology that most infiltrates are possibly due to aspiration pneumonia rather than pulmonary edema  Recheck sodium in afternoon.  Surveillance labs      Thank you for involving us in the care of Shukri Park.  Please feel free to call with any questions.    Bruce Arce MD  06/20/25  13:51 EDT    [facility]  [phone]    Parts of this note may be an electronic transcription/translation of spoken language to printed text using the Dragon dictation system.

## 2025-06-20 NOTE — PROGRESS NOTES
[facility]  [phone]  Dr. Jose Armando Hare    Subjective:  LOS: 7  [unfilled]      Chief Complaint: Left carotid artery stenosis    Yesterday he was down to regular nasal cannula.  Appeared to be doing reasonably well.  Unfortunately overnight he pulled his cortrack out despite the bridle.  Now with epistaxis.  Also declining O2 saturation requiring more oxygen.    Objective   Vital Signs past 24hrs  Temp range: Temp (24hrs), Av.7 °F (36.5 °C), Min:96.2 °F (35.7 °C), Max:99.7 °F (37.6 °C)    BP range: BP: (142-166)/(85-99) 146/85  Pulse range: Heart Rate:  [70-76] 70  Resp rate range: Resp:  [20-24] 24  Device (Oxygen Therapy): humidified;face tentFlow (L/min) (Oxygen Therapy):  [2-15] 12  Oxygen range:SpO2:  [79 %-100 %] 92 %   Mechanical Ventilator:     Physical Exam  HENT:      Mouth/Throat:      Comments: Epistaxis  Eyes:      Pupils: Pupils are equal, round, and reactive to light.   Cardiovascular:      Rate and Rhythm: Normal rate and regular rhythm.   Pulmonary:      Breath sounds: Decreased breath sounds present. No wheezing or rales.   Abdominal:      General: Bowel sounds are normal.      Palpations: Abdomen is soft. There is no mass.      Tenderness: There is no abdominal tenderness.   Musculoskeletal:         General: No swelling.   Neurological:      Mental Status: He is alert.       Results Review:    I have reviewed the laboratory and imaging data since the last note by [facility] physician.  My annotations are noted in assessment and plan.      Result Review:  I have personally reviewed the results from last note by [facility] physician to 2025 13:02 EDT and agree with these findings:  [x]  Laboratory list / accordion  [x]  Microbiology  [x]  Radiology  []  EKG/Telemetry   []  Cardiology/Vascular   []  Pathology  []  Old records  []  Other:      Medication Review:  I have reviewed the current MAR.  My annotations are noted in assessment and plan.    acetaminophen, 1,000 mg, Nasogastric,  Q8H  albuterol, 2.5 mg, Nebulization, Q4H - RT  aspirin, 81 mg, Nasogastric, Daily  atorvastatin, 40 mg, Nasogastric, Daily  lansoprazole, 30 mg, Nasogastric, Q AM  latanoprost, 1 drop, Both Eyes, Nightly  midodrine, 5 mg, Nasogastric, Q8H  mupirocin, 1 Application, Each Nare, BID  neomycin-bacitracin-polymyxin, , Both Eyes, Q4H  oxymetazoline, 2 spray, Each Nare, BID  predniSONE, 20 mg, Nasogastric, Daily With Breakfast  rivaroxaban, 15 mg, Oral, Daily With Dinner  senna-docusate sodium, 2 tablet, Nasogastric, BID  sodium chloride, 10 mL, Intravenous, Q12H  sodium chloride, 10 mL, Intravenous, Q12H        dextrose, 125 mL/hr, Last Rate: 125 mL/hr (06/20/25 0827)      Lines, Drains & Airways       Active LDAs       Name Placement date Placement time Site Days    Peripheral IV 06/10/25 1927 20 G Anterior;Right Forearm 06/10/25  1927  Forearm  3    Peripheral IV 06/13/25 0814 20 G Anterior;Right;Upper Arm 06/13/25  0814  Arm  1    Peripheral IV 06/13/25 0814 Anterior;Distal;Right;Upper Arm 06/13/25  0814  Arm  1    External Urinary Catheter 06/12/25  1835  --  1                    PCCM Problems  Acute ischemic stroke, left MCA watershed  Left carotid artery stenosis with soft plaque  Mohit carotid endarteriectomy on 6/16/25  Bilateral pulmonary infiltrates  Fluid overload  Aspiration pneumonia  ? Acute hypotension also, on Lewis  Chronic hypotension on Midodrine  Severe MR  Afib on anticoagulation  COPD  Acute postop respiratory failure  Epistaxis        Plan of Treatment    Carotid endarterectomy by vascular surgery 6/16/25.  Switched from Plavix to Xarelto on 6/19/2025    On midodrine, home medication. BP elevated, now on dose to 5 mg q8 hrs.    Afib is rate controlled presently. Pacemaker dependant. Cardiology is following.    Hypernatremia.  Now on D5 water to help with same.  Appreciate nephro input.    Bilateral pulmonary infiltrates with some improvement after Lasix but more likely aspiration pneumonia. -  completed Zosyn.    Patient was on nasal cannula yesterday.  Today with epistaxis his oxygenation is worse.  Currently on a Ventimask.  May need further respiratory support.    Failed swallow eval.  Cortrack placed for feeding however patient pulled it out last night and causing epistaxis.  He has failed his swallow test and will need enteral route of feeding.    COPD and wheezing better.  Tapering off steroids.    Platelets better.    I spoke to the grandson who is POA extensively at the bedside.  I explained the new problem of epistaxis after patient pulled out the cortrack last night.  I explained the need for continued anticoagulation.  If the bleeding does not stop neck step would be [facility] management of epistaxis.  Regardless patient will need continuation of anticoagulation with Xarelto.  Also requires alternate route of feeding because of dysphagia.  Unclear at this time if a nasal enteral feeding tube can be replaced without further damage to the nares.  If not he will need consideration of PEG feeding tube.  The grandson understands all this and wants to talk to the patient's children.  Palliative nurse was present and discussed further regarding goals of care with the patient's family especially given the importance of quality of life to the patient.  Family will make a decision this afternoon and let me know.    I have also sent a message to cardiology and to vascular surgery, if anticoagulation can be held for a few days.    Jose Armando Hare MD  06/20/25  13:02 EDT    Isolation status: No active isolations    Dietary Orders (From admission, onward)       Start     Ordered    06/20/25 0783  Tube Feeding: Formula: Isosource 1.5 (Jevity 1.5); Feeding Type: Continuous; Start at: 10 mL/hr; Then Advance By: 10 mL/hr; Every: 6 hours; To Goal Rate of: 60 mL/hr; Water Flush: Other; Other: 150; Every: 1 hour; Water Bolus: None  (Tube Feeding (RD to Initiate & Manage) + NPO)  Diet Effective Now         Question Answer Comment   Formula Isosource 1.5 (Jevity 1.5)    Feeding Type Continuous    Start at 10 mL/hr    Then Advance By 10 mL/hr    Every 6 hours    To Goal Rate of 60 mL/hr    Water Flush Other    Other 150    Every 1 hour    Water Bolus None        06/20/25 0725    06/18/25 1158  NPO Diet NPO Type: Tube Feeding  (Tube Feeding (RD to Initiate & Manage) + NPO)  Diet Effective Now        Question:  NPO Type  Answer:  Tube Feeding    06/18/25 7452                      Part of this note may be an electronic transcription/translation of spoken language to printed text using the Dragon Dictation System.

## 2025-06-20 NOTE — PROGRESS NOTES
LOS: 7 days   Patient Care Team:  Homa Carrizales MD as PCP - General (Family Medicine)    Chief Complaint: Follow-up carotid artery stenosis, left MCA portion CVA, persistent atrial fibrillation, AV node ablation with ICD placement, coronary artery disease, mitral regurgitation.    Interval History: He was very frustrated this morning.  He pulled his Cortrak out overnight.  He was having significant bleeding from the right nostril this morning, and a Rhino Rocket was placed.  He denied any shortness of breath or chest pain.    Vital Signs:  Temp:  [96.2 °F (35.7 °C)-97.9 °F (36.6 °C)] 96.2 °F (35.7 °C)  Heart Rate:  [70-76] 70  Resp:  [20-24] 20  BP: (135-166)/(76-99) 158/92    Intake/Output Summary (Last 24 hours) at 6/20/2025 0916  Last data filed at 6/20/2025 0833  Gross per 24 hour   Intake 1547 ml   Output 3100 ml   Net -1553 ml       Physical Exam:   General Appearance:    Awake and alert, bloody right nostril with Rhino Rocket in place.   Lungs:     Coarse breath sounds bilaterally anteriorly.    Heart:    Regular rhythm and normal rate. II/VI SM LLSB and apex.    Abdomen:     Soft, nontender, nondistended.    Extremities:   No clubbing, cyanosis, or edema.     Results Review:    Results from last 7 days   Lab Units 06/20/25  0551   SODIUM mmol/L 151*   POTASSIUM mmol/L 3.8   CHLORIDE mmol/L 111*   CO2 mmol/L 29.0   BUN mg/dL 21.0   CREATININE mg/dL 0.60*   GLUCOSE mg/dL 101*   CALCIUM mg/dL 8.4*     Results from last 7 days   Lab Units 06/16/25  1838 06/16/25  1443   HSTROP T ng/L 26* 24*     Results from last 7 days   Lab Units 06/20/25  0551   WBC 10*3/mm3 17.14*   HEMOGLOBIN g/dL 11.0*   HEMATOCRIT % 34.5*   PLATELETS 10*3/mm3 103*     Results from last 7 days   Lab Units 06/16/25  0559 06/15/25  0608 06/14/25  2359   APTT seconds 101.3* 95.1* 94.0*           Results from last 7 days   Lab Units 06/14/25  0538   MAGNESIUM mg/dL 1.7             I reviewed the patient's new clinical results.         Assessment:  1.  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  Status post left ICA and common carotid aneurysm repair with CCA to ICA bypass and ligation of the left external carotid artery by Dr. Alvarez on 6/16/2025.  3.  Bilateral pulmonary infiltrates (likely aspiration pneumonia)  4.  Acute on chronic hypotension (on baseline midodrine)  5.  Persistent atrial fibrillation  6.  Status post AV node ablation and biventricular ICD placement in 2020 (left ventricular lead subsequently turned off secondary to diaphragmatic stimulation)  7.  Coronary artery disease with high-grade stenosis of the RCA (in-stent restenosis of a previously placed stent) and 2020 (PCI unsuccessful at that time).  Nonobstructive disease of the 50% in the LAD at that time.  Patent stent in the left circumflex.  8.  History of ischemic cardiomyopathy with recovered ejection fraction  9.  COPD without acute exacerbation  10.  Severe mitral regurgitation  11.  Acute rhabdomyolysis  12.  Multifactorial chronic anemia  13.  Thrombocytopenia  14.  Hypernatremia  15.  Epistaxis following self-discontinuation of Cortrak, status post right Rhino Rocket on 6/20/2025.    Plan:  -Still suspect pulmonary infiltrates are aspiration pneumonia rather than pulmonary edema.  He did respond to IV Lasix, although holding on further diuresis for now given hypernatremia.  Nephrology is following.  He does not appear volume overloaded today.    -He has been treated with Zosyn.  Prednisone was added today.    -He is on midodrine 10 mg 3 times daily at baseline.  This was reduced to 5 mg 3 times daily for now.    -Resumed Xarelto 15 mg/day 6/19/2025, and stopped Plavix at that time.  He is also on aspirin.  With the current epistaxis, watch this closely.  Again, he has a Rhino Rocket which was placed after he self discontinued his  Cortrak.    -He has underlying persistent atrial fibrillation, although he is pacemaker dependent from a previous AV kelly ablation (paced from his ICD).     -Continue aspirin and Lipitor.    -Failed swallow study, so he will need another Cortrak potentially placed at some point.    Jd Caballero MD  06/20/25  09:16 EDT

## 2025-06-20 NOTE — CONSULTS
HSB Admission: 6/20/25  [facility] ID: [id number]  Diagnosis: CVA (I69.3) AICD scheduled to be deactivsated by [employer] today, per Lexie RN (ICU) also magnet placed per Yanelis RN (ICU). Carodid stenosis (I65.22) aspiration pna (J69.9) COPD (J44.9)  Symptom Management: anxiety/dyspnea/pain    Met with Sebastian Park/EH and multiple family members away from bedside per family request. Explanation of services provided with a focus on HSB ([facility]). Answered all questions, discussed medications, symptom management needs, and goals of care. [facility]. [facility] consent forms completed and signed by POA. [facility] information provided and encouraged to reach out with any needs.    Benefit change completed and copy left with Sharon Belcher CCP. [facility] daily visits will begin tomorrow 6/21/25.    Please call with any questions, concerns, or change in patient status. Thank you for allowing us the opportunity to participate in the care of this patient/family.    Brea Rivera, RN, BSN  [facility] Admissions  [phone]

## 2025-06-20 NOTE — SIGNIFICANT NOTE
06/20/25 1549   OTHER   Discipline physical therapist   Rehab Time/Intention   Session Not Performed other (see comments)  (goals of care changed to comfort measure, PT will sign off)

## 2025-06-20 NOTE — PLAN OF CARE
Problem: Adult Inpatient Plan of Care  Goal: Plan of Care Review  Outcome: Progressing  Flowsheets (Taken 6/20/2025 0963)  Progress: no change   Goal Outcome Evaluation:           Progress: no change

## 2025-06-20 NOTE — SIGNIFICANT NOTE
06/20/25 1431   OTHER   Discipline occupational therapist   Rehab Time/Intention   Session Not Performed other (see comments)  (patient now comfort measures,inpatient hospice. OT will sign off at this time.)

## 2025-06-21 NOTE — H&P
[facility]  Admission H&P  Shukri Park   1940   [facility] Hospice Admission Date: 2025   Current Date: 2025   Attending Provider: Sukhdeep Stevens     Chief Complaint: anxiety, dyspnea, pain     Information obtained from: past medical records    History of Present Illness: 85 yo male who was in the ICU for acute stroke and admitted to hospice [facility] level of care as he was requiring high levels of O2 that could not be provided in the home setting (12L humidified O2 via face tent). Prior night, patient had pulled out NGT which resulted in epistaxis. Family opted for comfort measures. Patient  on 2025 at 1723.         Past Medical and Surgical History:     Past Medical History:   Diagnosis Date    Aortic aneurysm     Chronic anticoagulation 2013    Chronic HFrEF (heart failure with reduced ejection fraction)     Chronic hypotension 2020    Chronic pain 2020    Coronary artery disease     Foot pain, bilateral     GERD (gastroesophageal reflux disease)     History of lung cancer 2020    Hyperlipidemia     Inguinodynia, right 2020    Added automatically from request for surgery [id number]      Ischemic cardiomyopathy 2020    Added automatically from request for surgery [id number]      Low back pain     Lung cancer     Mixed hyperlipidemia 2013    Persistent atrial fibrillation 2020    Added automatically from request for surgery [id number]      Presence of biventricular implantable cardioverter-defibrillator (ICD) 2020    S/P epidural steroid injection     Israel Gomes's - no relief    Vitamin D deficiency 2016    Weight loss     acute loss of weight 30 lbs      Past Surgical History:   Procedure Laterality Date    CARDIAC CATHETERIZATION N/A 2020    Procedure: LEFT HEART CATH with possible PCI;  Surgeon: Wade Cronin MD;  Location: [facility] CATH INVASIVE LOCATION;  Service: Cardiovascular;  Laterality:  N/A;  Local and IV sedation    CARDIAC CATHETERIZATION N/A 7/9/2020    Procedure: Percutaneous Coronary Intervention;  Surgeon: Wade Cronin MD;  Location: [facility] CATH INVASIVE LOCATION;  Service: Cardiovascular;  Laterality: N/A;    CARDIAC DEFIBRILLATOR PLACEMENT      CARDIAC ELECTROPHYSIOLOGY PROCEDURE N/A 7/10/2020    Procedure: Biventricular Device Insertion;  Surgeon: Jonathan Denney MD;  Location: [facility] CATH INVASIVE LOCATION;  Service: Cardiovascular;  Laterality: N/A;    CARDIAC ELECTROPHYSIOLOGY PROCEDURE N/A 7/10/2020    Procedure: ABLATION AV NODE;  Surgeon: Jonathan Denney MD;  Location: [facility] CATH INVASIVE LOCATION;  Service: Cardiovascular;  Laterality: N/A;    CARDIAC ELECTROPHYSIOLOGY PROCEDURE N/A 7/10/2020    Procedure: AV node ablation;  Surgeon: Jonathan Denney MD;  Location: [facility] CATH INVASIVE LOCATION;  Service: Cardiovascular;  Laterality: N/A;    CARDIOVASCULAR STRESS TEST  2020    CAROTID ENDARTERECTOMY Left 6/16/2025    Procedure: CAROTID ENDARTERECTOMY with common carotid to internal carotid interposition and repair of carotid aneurysm;  Surgeon: Maxine Alvarez MD;  Location: [facility] MAIN OR;  Service: Vascular;  Laterality: Left;    CATARACT EXTRACTION, BILATERAL      CONVERTED (HISTORICAL) HOLTER  2020    CORONARY ANGIOPLASTY WITH STENT PLACEMENT      ENDOSCOPY N/A 7/5/2020    Procedure: ESOPHAGOGASTRODUODENOSCOPY;  Surgeon: Neal Correa MD;  Location: [facility] ENDOSCOPY;  Service: Gastroenterology;  Laterality: N/A;    INGUINAL HERNIA REPAIR Right 1/7/2021    Procedure: INGUINAL HERNIA REPAIR OPEN WITH MESH;  Surgeon: Cody Randall MD;  Location: [facility] MAIN OR;  Service: General;  Laterality: Right;    LUMBAR DECOMPRESSION  09/2015    L2-L3    LUMBAR DECOMPRESSION  2006    Dr. Logan    OTHER SURGICAL HISTORY  05/2015    left SI fusion with bone graft - Dr. Presley    OTHER SURGICAL HISTORY      carotid artery repair     OTHER SURGICAL HISTORY Left  2016    Left SI fusion 2016 Dr. Zendejas    POSTERIOR SPINAL FUSION  10/24/2016    PSF T12-3/TLIF L3-L4 poss L2-L3 --- Dr. 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        Past Family and Social History:     Social History     Socioeconomic History    Marital status:    Tobacco Use    Smoking status: Former     Current packs/day: 0.00     Types: Cigarettes     Quit date: 1989     Years since quittin.5     Passive exposure: Past    Smokeless tobacco: Never   Vaping Use    Vaping status: Never Used   Substance and Sexual Activity    Alcohol use: No    Drug use: Never    Sexual activity: Defer        Family History   Problem Relation Age of Onset    Cancer Other     Hyperlipidemia Other     Heart disease Other         Medications:   No current facility-administered medications for this encounter.    Current Outpatient Medications:     aspirin 81 MG EC tablet, Take 4 tablets by mouth Daily for 30 days., Disp: 120 tablet, Rfl: 0    docusate sodium (COLACE) 100 MG capsule, Take 1 capsule by mouth 2 (Two) Times a Day., Disp: , Rfl:     ferrous sulfate 325 (65 FE) MG tablet, Take 1 tablet by mouth Daily With Breakfast. Hold DOs, Disp: , Rfl:     ipratropium (ATROVENT) 0.03 % nasal spray, Administer 2 sprays into the nostril(s) as directed by provider 3 (Three) Times a Day., Disp: , Rfl:     latanoprost (XALATAN) 0.005 % ophthalmic solution, Administer 1 drop to both eyes Every Night., Disp: , Rfl:     lisinopril (PRINIVIL,ZESTRIL) 2.5 MG tablet, Take 1 tablet by mouth Daily., Disp: , Rfl:     midodrine (PROAMATINE) 5 MG tablet, Take 1 tablet by mouth Every 8 (Eight) Hours. (Patient taking differently: Take 1 tablet by mouth 2 (Two) Times a Day.), Disp: , Rfl:     Multiple Vitamin (MULTIVITAMIN) tablet, Take 1 tablet by mouth Daily. LD , Disp: , Rfl:     pantoprazole (PROTONIX) 40 MG EC tablet, Take 1 tablet by mouth Daily., Disp: 30 tablet, Rfl: 0    simvastatin  (ZOCOR) 40 MG tablet, Take 1 tablet by mouth Daily., Disp: , Rfl:     vitamin B-12 (CYANOCOBALAMIN) 1000 MCG tablet, Take 1 tablet by mouth Daily., Disp: , Rfl:     Xarelto 15 MG tablet, TAKE 1 TABLET BY MOUTH EVERY DAY, Disp: 60 tablet, Rfl: 3     Allergies:   Allergies   Allergen Reactions    Ciprofloxacin Anaphylaxis    Amlodipine Unknown (See Comments)    Rocephin [Ceftriaxone] Other (See Comments)     Mouth sores  Tolerated on 6/10/25        Physical Assessment:   BP (!) 0/0   Pulse (!) 0   Resp (!) 0   SpO2 (!) 0%    Palliative Performance Scale:   Physical Exam     Patient  prior to physician visit     Data Reviewed:   Lab Results   Component Value Date    GLUCOSE 101 (H) 2025    BUN 21.0 2025    CREATININE 0.60 (L) 2025     (H) 2025    K 3.8 2025     (H) 2025    CALCIUM 8.4 (L) 2025    PROTEINTOT 6.2 2025    ALBUMIN 3.3 (L) 2025    ALT 27 2025    AST 43 (H) 2025    ALKPHOS 72 2025    BILITOT 0.9 2025    GLOB 2.2 2025    AGRATIO 1.8 2025    BCR 35.0 (H) 2025    ANIONGAP 11.0 2025    EGFR 94.6 2025       WBC   Date Value Ref Range Status   2025 17.14 (H) 3.40 - 10.80 10*3/mm3 Final     RBC   Date Value Ref Range Status   2025 3.48 (L) 4.14 - 5.80 10*6/mm3 Final     Hemoglobin   Date Value Ref Range Status   2025 11.0 (L) 13.0 - 17.7 g/dL Final     Hematocrit   Date Value Ref Range Status   2025 34.5 (L) 37.5 - 51.0 % Final     MCV   Date Value Ref Range Status   2025 99.1 (H) 79.0 - 97.0 fL Final     MCH   Date Value Ref Range Status   2025 31.6 26.6 - 33.0 pg Final     MCHC   Date Value Ref Range Status   2025 31.9 31.5 - 35.7 g/dL Final     RDW   Date Value Ref Range Status   2025 13.4 12.3 - 15.4 % Final     RDW-SD   Date Value Ref Range Status   2025 48.9 37.0 - 54.0 fl Final     MPV   Date Value Ref Range Status   2025  11.3 6.0 - 12.0 fL Final     Platelets   Date Value Ref Range Status   2025 103 (L) 140 - 450 10*3/mm3 Final          XR Chest 1 View  XR CHEST 1 VW-     Clinical: Follow-up pneumonia     COMPARISON examination dated 2025     FINDINGS: Bilateral pulmonary infiltrates similar to the previous  examination. The cardiomediastinal silhouette is stable. Elevation of  the right hemidiaphragm as before.     CONCLUSION: No significant interval change.     This report was finalized on 2025 5:54 AM by Dr. Sawyer Freeman M.D  on Workstation: [id number]           Assessment and Plan: Shukri Park is a 84 y.o.  male with a primary hospice diagnosis of CVA admitted to [facility] hospice level of care for management of anxiety, pain, dyspnea.      Patient  prior to physician encounter.     If you need to reach the provider on call for the hospice team please call [phone]    Elisabeth Padron DO  2025 15:25 EDT

## 2025-06-21 NOTE — DISCHARGE SUMMARY
[facility] Inpatient Hospice Death Summary  Shukri Park   1940   [facility] Hospice Admission Date: 2025   Date of Death: 25  Attending Provider: Sukhdeep Stevens MD      Cause of Death:     Acute CVA    Secondary Diagnosis:  Past Medical History:   Diagnosis Date    Aortic aneurysm     Chronic anticoagulation 2013    Chronic HFrEF (heart failure with reduced ejection fraction)     Chronic hypotension 2020    Chronic pain 2020    Coronary artery disease     Foot pain, bilateral     GERD (gastroesophageal reflux disease)     History of lung cancer 2020    Hyperlipidemia     Inguinodynia, right 2020    Added automatically from request for surgery [id number]      Ischemic cardiomyopathy 2020    Added automatically from request for surgery [id number]      Low back pain     Lung cancer     Mixed hyperlipidemia 2013    Persistent atrial fibrillation 2020    Added automatically from request for surgery [id number]      Presence of biventricular implantable cardioverter-defibrillator (ICD) 2020    S/P epidural steroid injection     Israel Gomes's - no relief    Vitamin D deficiency 2016    Weight loss     acute loss of weight 30 lbs        Procedures/Consulting Services:     NA      Hospital Course:     Shukri Park is a 83 yo male who was in the ICU for acute stroke and admitted to hospice [facility] level of  hospice care at [facility] as he was requiring high levels of O2 that could not be provided in the home setting (12L humidified O2 via face tent). Prior night, patient had pulled out NGT which resulted in epistaxis. Family opted for comfort measures. Patient  on 2025 at 1723. Body released to family's choice of  home/crematorium.     Elisabeth Padron DO  2025 15:50 EDT

## 2025-06-27 LAB
MYCOBACTERIUM SPEC CULT: NORMAL
NIGHT BLUE STAIN TISS: NORMAL

## 2025-07-04 LAB
MYCOBACTERIUM SPEC CULT: NORMAL
NIGHT BLUE STAIN TISS: NORMAL

## 2025-07-11 LAB
MYCOBACTERIUM SPEC CULT: NORMAL
NIGHT BLUE STAIN TISS: NORMAL

## 2025-07-18 LAB
MYCOBACTERIUM SPEC CULT: NORMAL
NIGHT BLUE STAIN TISS: NORMAL

## 2025-07-25 LAB
MYCOBACTERIUM SPEC CULT: NORMAL
NIGHT BLUE STAIN TISS: NORMAL

## 2025-08-01 LAB
MYCOBACTERIUM SPEC CULT: NORMAL
NIGHT BLUE STAIN TISS: NORMAL

## (undated) DEVICE — Device: Brand: EZ STEER NAV DS

## (undated) DEVICE — SUT MNCRYL 2/0 CT1 36IN

## (undated) DEVICE — APPL CHLORAPREP HI/LITE 26ML ORNG

## (undated) DEVICE — HI-TORQUE WHISPER MS GUIDE WIRE .014 STRAIGHT TIP 3.0 CM X 190 CM: Brand: HI-TORQUE WHISPER

## (undated) DEVICE — ANTIBACTERIAL UNDYED BRAIDED (POLYGLACTIN 910), SYNTHETIC ABSORBABLE SUTURE: Brand: COATED VICRYL

## (undated) DEVICE — SUT MNCRYL 3/0 SH 27 IN Y416H

## (undated) DEVICE — 3M™ PATIENT PLATE, CORDED, SPLIT, LARGE, 40 PER CASE, 1179: Brand: 3M™

## (undated) DEVICE — PINNACLE INTRODUCER SHEATH: Brand: PINNACLE

## (undated) DEVICE — SKIN AFFIX SURG ADHESIVE 72/CS 0.55ML: Brand: MEDLINE

## (undated) DEVICE — STERILE COTTON TIP 6IN 10PK: Brand: MEDLINE

## (undated) DEVICE — SYS PERFUS SEP PLATLT W TIPS CUST

## (undated) DEVICE — CABL BIPOL W/ALLGTR CLIP/SM 12FT

## (undated) DEVICE — TPE UMB 3/STRND 0.125X36IN

## (undated) DEVICE — Device: Brand: WEBSTER CS

## (undated) DEVICE — PK MINOR LAPAROTOMY 50

## (undated) DEVICE — PK TRY HEART CATH 50

## (undated) DEVICE — GUIDE CATHETER: Brand: MACH1™

## (undated) DEVICE — CATH GUIDE OUTR CPS DIRECT PL OC W/SHEATH 115DEG 7F/9F 47CM

## (undated) DEVICE — SUT SILK 3/0 SH CR8 18IN C013D

## (undated) DEVICE — CATHETER,URETHRAL,REDRUBBER,STERILE,20FR: Brand: MEDLINE

## (undated) DEVICE — CATH DIAG IMPULSE FR4 6F 100CM

## (undated) DEVICE — Device

## (undated) DEVICE — UNDYED BRAIDED (POLYGLACTIN 910), SYNTHETIC ABSORBABLE SUTURE: Brand: COATED VICRYL

## (undated) DEVICE — PENCL SMOKE/EVAC MEGADYNE TELESCP 10FT

## (undated) DEVICE — SKIN PREP TRAY W/CHG: Brand: MEDLINE INDUSTRIES, INC.

## (undated) DEVICE — UNIVERSAL PACK: Brand: CARDINAL HEALTH

## (undated) DEVICE — PK ENDO GI 50

## (undated) DEVICE — SUT PROLN 6/0 C1 D/A 30IN 8706H

## (undated) DEVICE — SUT VIC 2/0 SH 27IN

## (undated) DEVICE — SUT PROLN 6/0 BV1 D/A 30IN 8709H

## (undated) DEVICE — BITEBLOCK ENDO W/STRAP 60F A/ LF DISP

## (undated) DEVICE — DRSNG WND BORDR/ADHS NONADHR/GZ LF 4X4IN STRL

## (undated) DEVICE — INTRO CLASSIC SAFESHEATH SHT 10F 13CM

## (undated) DEVICE — HI-TORQUE PILOT 200 GUIDE WIRE .014 STRAIGHT TIP 3.0 CM X 190 CM: Brand: HI-TORQUE PILOT

## (undated) DEVICE — 3M™ IOBAN™ 2 ANTIMICROBIAL INCISE DRAPE 6640EZ: Brand: IOBAN™ 2

## (undated) DEVICE — SLV SCD CALF HEMOFORCE DVT THERP REPROC MD

## (undated) DEVICE — SOL NS 500ML

## (undated) DEVICE — CATH DIAG IMPULSE PIG .056 6F 110CM

## (undated) DEVICE — GLV SURG DERMASSURE GRN LF PF 8.5

## (undated) DEVICE — DRN PENRS 18 1/2IN LTX STRL

## (undated) DEVICE — TRAP FLD MINIVAC MEGADYNE 100ML

## (undated) DEVICE — SYR LUERLOK 20CC BX/50

## (undated) DEVICE — GOWN,REINFRCE,POLY,SIRUS,BREATH SLV,XXLG: Brand: MEDLINE

## (undated) DEVICE — SYR LL TP 10ML STRL

## (undated) DEVICE — SUT SILK 4/0 TIES 18IN A183H

## (undated) DEVICE — SYR LL W/SCALE/MARK 3ML STRL

## (undated) DEVICE — SUT ETHIB 0/0 MO6 I8IN CX45D

## (undated) DEVICE — PENCL EVAC ULTRAVAC SMOKE W/BLD

## (undated) DEVICE — KT SURG TURNOVER 050

## (undated) DEVICE — INTRO CLASSIC SAFESHEATH SHT 8F 13CM

## (undated) DEVICE — CVR PROB 96IN LF STRL

## (undated) DEVICE — PACEMAKER CDS: Brand: MEDLINE INDUSTRIES, INC.

## (undated) DEVICE — FLEXCEL CAROTID SHUNT (8F, 10F, 12F, 14F): Brand: FLEXCEL CAROTID SHUNT

## (undated) DEVICE — Device: Brand: CARTO 3

## (undated) DEVICE — GW CHOICE PT PLS .014 182CM

## (undated) DEVICE — CATH DIAG IMPULSE FL4 6F 100CM

## (undated) DEVICE — GW PTFE EMERALD HEPCOAT FC J TIP STD .035 3MM 150CM

## (undated) DEVICE — SUNDT™ INTERNAL CAROTID ENDARTERECTOMY SHUNT: Brand: SUNDT™

## (undated) DEVICE — SUT SILK 2/0 TIES 18IN A185H

## (undated) DEVICE — GLV SURG BIOGEL M LTX PF 6 1/2

## (undated) DEVICE — TREK CORONARY DILATATION CATHETER 2.25 MM X 20 MM / RAPID-EXCHANGE: Brand: TREK

## (undated) DEVICE — BG TRANSF W/COUPLER SPK 600ML

## (undated) DEVICE — ELECTRD DEFIB M/FUNC PROPADZ RADIOL 2PK

## (undated) DEVICE — PAPR PRNT PK SONY W RIBN UPC55

## (undated) DEVICE — SOL IRRIG H2O 1000ML STRL

## (undated) DEVICE — SOL IRRIG NACL 1000ML

## (undated) DEVICE — 3M™ STERI-STRIP™ REINFORCED ADHESIVE SKIN CLOSURES, R1547, 1/2 IN X 4 IN (12 MM X 100 MM), 6 STRIPS/ENVELOPE: Brand: 3M™ STERI-STRIP™

## (undated) DEVICE — SINGLE-USE BIOPSY FORCEPS: Brand: RADIAL JAW 4

## (undated) DEVICE — SUT SILK 2/0 SH CR8 18IN CR8 C012D

## (undated) DEVICE — GLV SURG SENSICARE SLT PF LF 8 STRL

## (undated) DEVICE — DRSNG WND GZ PAD BORDERED 4X8IN STRL

## (undated) DEVICE — 3M™ IOBAN™ 2 ANTIMICROBIAL INCISE DRAPE 6650EZ: Brand: IOBAN™ 2

## (undated) DEVICE — VESSEL LOOPS X-RAY DETECTABLE: Brand: DEROYAL

## (undated) DEVICE — 1LYRTR 16FR10ML100%SIL UMS SNP: Brand: MEDLINE INDUSTRIES, INC.

## (undated) DEVICE — HYPODERMIC SAFETY NEEDLE: Brand: MONOJECT

## (undated) DEVICE — SUT VIC 3/0 SH 27IN J416H

## (undated) DEVICE — ST ACC MICROPUNCTURE STFF/CANN PLAT/TP 4F 21G 40CM

## (undated) DEVICE — SUNDT™ EXTERNAL CAROTID ENDARTERECTOMY SHUNT: Brand: SUNDT™

## (undated) DEVICE — SUT SILK 3/0 TIES 18IN A184H

## (undated) DEVICE — Device: Brand: REFERENCE PATCH CARTO 3

## (undated) DEVICE — PK ATS CUST W CARDIOTOMY RESEVOIR

## (undated) DEVICE — PK ENDART CARTOID 40

## (undated) DEVICE — GOWN,SIRUS,NONRNF,SETINSLV,2XL,18/CS: Brand: MEDLINE